# Patient Record
Sex: FEMALE | Race: WHITE | Employment: OTHER | URBAN - METROPOLITAN AREA
[De-identification: names, ages, dates, MRNs, and addresses within clinical notes are randomized per-mention and may not be internally consistent; named-entity substitution may affect disease eponyms.]

---

## 2017-02-03 RX ORDER — POTASSIUM CHLORIDE 20 MEQ/1
TABLET, EXTENDED RELEASE ORAL
Qty: 60 TAB | Refills: 0 | Status: SHIPPED | OUTPATIENT
Start: 2017-02-03 | End: 2017-02-17 | Stop reason: SDUPTHER

## 2017-02-03 RX ORDER — ATENOLOL 50 MG/1
TABLET ORAL
Qty: 30 TAB | Refills: 0 | Status: SHIPPED | OUTPATIENT
Start: 2017-02-03 | End: 2017-02-17 | Stop reason: SDUPTHER

## 2017-02-03 RX ORDER — LEVOTHYROXINE SODIUM 100 UG/1
TABLET ORAL
Qty: 30 TAB | Refills: 0 | Status: SHIPPED | OUTPATIENT
Start: 2017-02-03 | End: 2017-02-17 | Stop reason: SDUPTHER

## 2017-02-17 ENCOUNTER — OFFICE VISIT (OUTPATIENT)
Dept: FAMILY MEDICINE CLINIC | Age: 82
End: 2017-02-17

## 2017-02-17 VITALS
WEIGHT: 140.1 LBS | OXYGEN SATURATION: 97 % | HEART RATE: 83 BPM | TEMPERATURE: 96 F | SYSTOLIC BLOOD PRESSURE: 125 MMHG | RESPIRATION RATE: 18 BRPM | DIASTOLIC BLOOD PRESSURE: 69 MMHG | BODY MASS INDEX: 23.34 KG/M2 | HEIGHT: 65 IN

## 2017-02-17 DIAGNOSIS — I10 ESSENTIAL HYPERTENSION: ICD-10-CM

## 2017-02-17 DIAGNOSIS — E03.9 ACQUIRED HYPOTHYROIDISM: Primary | ICD-10-CM

## 2017-02-17 DIAGNOSIS — J30.89 SEASONAL ALLERGIC RHINITIS DUE TO OTHER ALLERGIC TRIGGER: ICD-10-CM

## 2017-02-17 RX ORDER — LISINOPRIL 20 MG/1
TABLET ORAL
Qty: 30 TAB | Refills: 12 | Status: SHIPPED | OUTPATIENT
Start: 2017-02-17 | End: 2017-05-03

## 2017-02-17 RX ORDER — QUETIAPINE FUMARATE 25 MG/1
TABLET, FILM COATED ORAL
Qty: 30 TAB | Refills: 12 | Status: SHIPPED | OUTPATIENT
Start: 2017-02-17 | End: 2017-05-05 | Stop reason: ALTCHOICE

## 2017-02-17 RX ORDER — LEVOTHYROXINE SODIUM 100 UG/1
TABLET ORAL
Qty: 30 TAB | Refills: 12 | Status: SHIPPED | OUTPATIENT
Start: 2017-02-17 | End: 2017-03-03 | Stop reason: SDUPTHER

## 2017-02-17 RX ORDER — ATENOLOL 50 MG/1
TABLET ORAL
Qty: 30 TAB | Refills: 12 | Status: SHIPPED | OUTPATIENT
Start: 2017-02-17 | End: 2017-03-03 | Stop reason: SDUPTHER

## 2017-02-17 RX ORDER — CHLORPHENIRAMINE MALEATE 4 MG
4 TABLET ORAL
Qty: 90 TAB | Refills: 5 | Status: SHIPPED | OUTPATIENT
Start: 2017-02-17 | End: 2017-05-05 | Stop reason: ALTCHOICE

## 2017-02-17 RX ORDER — POTASSIUM CHLORIDE 20 MEQ/1
TABLET, EXTENDED RELEASE ORAL
Qty: 60 TAB | Refills: 12 | Status: SHIPPED | OUTPATIENT
Start: 2017-02-17 | End: 2017-05-03

## 2017-02-17 NOTE — PROGRESS NOTES
HISTORY OF PRESENT ILLNESS  Anshu Tarango is a 80 y.o. female. HPI Pt. Comes in for blood pressure check. . Needs thyroid rechecked. Has problems with runny nose, sneezing, headaches. Gets a pain in face. Has tried benadryl, zyrtec, flonase- minimal relief. ROS    Physical Exam   Constitutional: She is oriented to person, place, and time. She appears well-developed and well-nourished. Neck: No thyromegaly present. Cardiovascular: Normal rate, regular rhythm and normal heart sounds. No murmur heard. Pulmonary/Chest: Effort normal and breath sounds normal. She has no wheezes. Br- no mass   Abdominal: Soft. Bowel sounds are normal. She exhibits no distension. There is no tenderness. There is no rebound and no guarding. Musculoskeletal: Normal range of motion. She exhibits no edema. Feet- bilateral bunions   Lymphadenopathy:     She has no cervical adenopathy. Neurological: She is alert and oriented to person, place, and time. Nursing note and vitals reviewed. ASSESSMENT and PLAN  Orders Placed This Encounter    TSH 3RD GENERATION    CBC WITH AUTOMATED DIFF    METABOLIC PANEL, COMPREHENSIVE    LIPID PANEL    levothyroxine (SYNTHROID) 100 mcg tablet    atenolol (TENORMIN) 50 mg tablet    lisinopril (PRINIVIL, ZESTRIL) 20 mg tablet    potassium chloride (K-DUR, KLOR-CON) 20 mEq tablet    QUEtiapine (SEROQUEL) 25 mg tablet    chlorpheniramine (CHLOR-TRIMETRON) 4 mg tablet     Elisabeth was seen today for hypertension and thyroid problem.     Diagnoses and all orders for this visit:    Acquired hypothyroidism  -     TSH 3RD GENERATION    Essential hypertension  -     TSH 3RD GENERATION  -     CBC WITH AUTOMATED DIFF  -     METABOLIC PANEL, COMPREHENSIVE  -     LIPID PANEL    Seasonal allergic rhinitis due to other allergic trigger    Other orders  -     levothyroxine (SYNTHROID) 100 mcg tablet; TAKE ONE TABLET DAILY FOR THYROID  -     atenolol (TENORMIN) 50 mg tablet; TAKE 1 TABLET BY MOUTH DAILY FOR THE HEART AND BLOOD PRESSURE. -     lisinopril (PRINIVIL, ZESTRIL) 20 mg tablet; TAKE 1 TAB DAILY FOR BLOOD PRESSURE  -     potassium chloride (K-DUR, KLOR-CON) 20 mEq tablet; TAKE ONE TABLET TWICE A DAY  -     QUEtiapine (SEROQUEL) 25 mg tablet; TAKE 1 TAB EVERY EVENING  -     chlorpheniramine (CHLOR-TRIMETRON) 4 mg tablet; Take 1 Tab by mouth every six (6) hours as needed for Allergies. Follow-up Disposition:  Return in about 6 months (around 8/17/2017).

## 2017-02-17 NOTE — MR AVS SNAPSHOT
Visit Information Date & Time Provider Department Dept. Phone Encounter #  
 2/17/2017  8:45 AM Zita Angelo MD [facility] [phone] [id number] Follow-up Instructions Return in about 6 months (around 8/17/2017). Upcoming Health Maintenance Date Due  
 MEDICARE YEARLY EXAM 8/20/2015 GLAUCOMA SCREENING Q2Y 2/17/2019 DTaP/Tdap/Td series (2 - Td) 8/17/2026 Allergies as of 2/17/2017  Review Complete On: 2/17/2017 By: Zita Angelo MD  
 No Known Allergies Current Immunizations  Reviewed on 8/19/2014 Name Date Influenza Vaccine Split  Deferred (Patient Refused) Pneumococcal Polysaccharide (PPSV-23) 2/15/2013 Pneumococcal Vaccine (Unspecified Type)  Deferred (Patient Refused) Not reviewed this visit You Were Diagnosed With   
  
 Codes Comments Acquired hypothyroidism    -  Primary ICD-10-CM: E03.9 ICD-9-CM: 244.9 Essential hypertension     ICD-10-CM: I10 
ICD-9-CM: 401.9 Seasonal allergic rhinitis due to other allergic trigger     ICD-10-CM: J30.89 Vitals BP Pulse Temp Resp Height(growth percentile) Weight(growth percentile) 125/69 83 96 °F (35.6 °C) 18 5' 5\" (1.651 m) 140 lb 1.6 oz (63.5 kg) SpO2 BMI OB Status Smoking Status 97% 23.31 kg/m2 Postmenopausal Never Smoker Vitals History BMI and BSA Data Body Mass Index Body Surface Area  
 23.31 kg/m 2 1.71 m 2 Preferred Pharmacy Pharmacy Name Phone [address] [phone] Your Updated Medication List  
  
   
This list is accurate as of: 2/17/17  9:41 AM.  Always use your most recent med list.  
  
  
  
  
 atenolol 50 mg tablet Commonly known as:  TENORMIN  
TAKE 1 TABLET BY MOUTH DAILY FOR THE HEART AND BLOOD PRESSURE.  
  
 azelastine 137 mcg (0.1 %) nasal spray Commonly known as:  ASTELIN  
 1 [place] by Both Nostrils route two (2) times a day. Use in each nostril as directed Calcium 600 + D tablet Generic drug:  calcium-cholecalciferol (D3)  
TAKE 1 TABLET TWICE A DAY. (CALCIUM SUPPLEMENT)  
  
 chlorpheniramine 4 mg tablet Commonly known as:  CHLOR-TRIMETRON Take 1 Tab by mouth every six (6) hours as needed for Allergies. fluticasone 50 mcg/actuation nasal spray Commonly known as:  FLONASE  
2 sprays each nostril daily  
  
 levothyroxine 100 mcg tablet Commonly known as:  SYNTHROID  
TAKE ONE TABLET DAILY FOR THYROID  
  
 lisinopril 20 mg tablet Commonly known as:  PRINIVIL, ZESTRIL  
TAKE 1 TAB DAILY FOR BLOOD PRESSURE potassium chloride 20 mEq tablet Commonly known as:  K-DUR, KLOR-CON  
TAKE ONE TABLET TWICE A DAY PRADAXA 150 mg capsule Generic drug:  dabigatran etexilate TAKE 1 CAPSULE BY MOUTH TWICE A DAY. QUEtiapine 25 mg tablet Commonly known as:  SEROquel TAKE 1 TAB EVERY EVENING  
  
 spironolactone 50 mg tablet Commonly known as:  ALDACTONE  
TAKE 1 TABLET BY MOUTH DAILY AS A DIURETIC-FLUID PILL Prescriptions Printed Refills  
 chlorpheniramine (CHLOR-TRIMETRON) 4 mg tablet 5 Sig: Take 1 Tab by mouth every six (6) hours as needed for Allergies. Class: Print Route: Oral  
  
Prescriptions Sent to Pharmacy Refills  
 levothyroxine (SYNTHROID) 100 mcg tablet 12 Sig: TAKE ONE TABLET DAILY FOR THYROID Class: Normal  
 Pharmacy: [facility]any Flores Ph #: [phone]  
 atenolol (TENORMIN) 50 mg tablet 12 Sig: TAKE 1 TABLET BY MOUTH DAILY FOR THE HEART AND BLOOD PRESSURE. Class: Normal  
 Pharmacy: [facility] Ph #: [phone]  
 lisinopril (PRINIVIL, ZESTRIL) 20 mg tablet 12 Sig: TAKE 1 TAB DAILY FOR BLOOD PRESSURE  Class: Normal  
 Pharmacy: [facility], [address] [place] [address] Ph #: [phone]  
 potassium chloride (K-DUR, KLOR-CON) 20 mEq tablet 12 Sig: TAKE ONE TABLET TWICE A DAY Class: Normal  
 Pharmacy: [address] [place] [address] Ph #: [phone] QUEtiapine (SEROQUEL) 25 mg tablet 12 Sig: TAKE 1 TAB EVERY EVENING Class: Normal  
 Pharmacy: [address] Ph #: [phone] We Performed the Following CBC WITH AUTOMATED DIFF [63234 CPT(R)] LIPID PANEL [40789 CPT(R)] METABOLIC PANEL, COMPREHENSIVE [49201 CPT(R)] TSH 3RD GENERATION [19054 CPT(R)] Follow-up Instructions Return in about 6 months (around 8/17/2017). Introducing [facility]! Maria Isabel Ortiz introduces [employer] patient portal. Now you can access parts of your medical record, email your doctor's office, and request medication refills online. 1. In your internet browser, go to [email or web address] [email or web address] 2. Click on the First Time User? Click Here link in the Sign In box. You will see the New Member Sign Up page. 3. Enter your [employer] Access Code exactly as it appears below. You will not need to use this code after youve completed the sign-up process. If you do not sign up before the expiration date, you must request a new code. · [employer] Access Code: [email or web address] Expires: 5/18/2017  9:41 AM 
 
4. Enter the last four digits of your Social Security Number (xxxx) and Date of Birth (mm/dd/yyyy) as indicated and click Submit. You will be taken to the next sign-up page. 5. Create a [employer] ID. This will be your [employer] login ID and cannot be changed, so think of one that is secure and easy to remember. 6. Create a [employer] password. You can change your password at any time. 7. Enter your Password Reset Question and Answer.  This can be used at a later time if you forget your password. 8. Enter your e-mail address. You will receive e-mail notification when new information is available in [address] 9. Click Sign Up. You can now view and download portions of your medical record. 10. Click the Download Summary menu link to download a portable copy of your medical information. If you have questions, please visit the Frequently Asked Questions section of the [employer] website. Remember, [employer] is NOT to be used for urgent needs. For medical emergencies, dial 911. Now available from your iPhone and Android! Please provide this summary of care documentation to your next provider. Your primary care clinician is listed as Adilene Mauricio. If you have any questions after today's visit, please call [phone].

## 2017-02-17 NOTE — PROGRESS NOTES
Chief Complaint   Patient presents with    Hypertension    Thyroid Problem     Pt here for 6 month follow up. Discussed health maintenance with pt. Pt declined flu vaccine . Pt also declined eye exam. Stated she can see just fine. Pt stated she still has sinus issues. States nothing really helps the pressure.

## 2017-02-18 LAB
ALBUMIN SERPL-MCNC: 4.3 G/DL (ref 3.5–4.7)
ALBUMIN/GLOB SERPL: 1.2 {RATIO} (ref 1.1–2.5)
ALP SERPL-CCNC: 77 IU/L (ref 39–117)
ALT SERPL-CCNC: 11 IU/L (ref 0–32)
AST SERPL-CCNC: 16 IU/L (ref 0–40)
BASOPHILS # BLD AUTO: 0 X10E3/UL (ref 0–0.2)
BASOPHILS NFR BLD AUTO: 0 %
BILIRUB SERPL-MCNC: 0.3 MG/DL (ref 0–1.2)
BUN SERPL-MCNC: 25 MG/DL (ref 8–27)
BUN/CREAT SERPL: 20 (ref 11–26)
CALCIUM SERPL-MCNC: 8.7 MG/DL (ref 8.7–10.3)
CHLORIDE SERPL-SCNC: 107 MMOL/L (ref 96–106)
CHOLEST SERPL-MCNC: 94 MG/DL (ref 100–199)
CO2 SERPL-SCNC: 16 MMOL/L (ref 18–29)
CREAT SERPL-MCNC: 1.27 MG/DL (ref 0.57–1)
EOSINOPHIL # BLD AUTO: 0.2 X10E3/UL (ref 0–0.4)
EOSINOPHIL NFR BLD AUTO: 4 %
ERYTHROCYTE [DISTWIDTH] IN BLOOD BY AUTOMATED COUNT: 14.4 % (ref 12.3–15.4)
GLOBULIN SER CALC-MCNC: 3.7 G/DL (ref 1.5–4.5)
GLUCOSE SERPL-MCNC: 107 MG/DL (ref 65–99)
HCT VFR BLD AUTO: 42.6 % (ref 34–46.6)
HDLC SERPL-MCNC: 30 MG/DL
HGB BLD-MCNC: 14.4 G/DL (ref 11.1–15.9)
IMM GRANULOCYTES # BLD: 0 X10E3/UL (ref 0–0.1)
IMM GRANULOCYTES NFR BLD: 0 %
INTERPRETATION, 910389: NORMAL
INTERPRETATION: NORMAL
LDLC SERPL CALC-MCNC: 46 MG/DL (ref 0–99)
LYMPHOCYTES # BLD AUTO: 1.7 X10E3/UL (ref 0.7–3.1)
LYMPHOCYTES NFR BLD AUTO: 26 %
MCH RBC QN AUTO: 33.1 PG (ref 26.6–33)
MCHC RBC AUTO-ENTMCNC: 33.8 G/DL (ref 31.5–35.7)
MCV RBC AUTO: 98 FL (ref 79–97)
MONOCYTES # BLD AUTO: 0.5 X10E3/UL (ref 0.1–0.9)
MONOCYTES NFR BLD AUTO: 8 %
NEUTROPHILS # BLD AUTO: 4 X10E3/UL (ref 1.4–7)
NEUTROPHILS NFR BLD AUTO: 62 %
PDF IMAGE, 910387: NORMAL
PLATELET # BLD AUTO: 264 X10E3/UL (ref 150–379)
POTASSIUM SERPL-SCNC: 5.2 MMOL/L (ref 3.5–5.2)
PROT SERPL-MCNC: 8 G/DL (ref 6–8.5)
RBC # BLD AUTO: 4.35 X10E6/UL (ref 3.77–5.28)
SODIUM SERPL-SCNC: 137 MMOL/L (ref 134–144)
TRIGL SERPL-MCNC: 90 MG/DL (ref 0–149)
TSH SERPL DL<=0.005 MIU/L-ACNC: 1.97 UIU/ML (ref 0.45–4.5)
VLDLC SERPL CALC-MCNC: 18 MG/DL (ref 5–40)
WBC # BLD AUTO: 6.5 X10E3/UL (ref 3.4–10.8)

## 2017-04-03 RX ORDER — FLUTICASONE PROPIONATE 50 MCG
SPRAY, SUSPENSION (ML) NASAL
Qty: 16 G | Status: SHIPPED | OUTPATIENT
Start: 2017-04-03 | End: 2018-06-26 | Stop reason: SDUPTHER

## 2017-04-27 ENCOUNTER — HOSPITAL ENCOUNTER (INPATIENT)
Age: 82
LOS: 6 days | Discharge: HOME HEALTH | DRG: 683 | End: 2017-05-03
Attending: EMERGENCY MEDICINE | Admitting: INTERNAL MEDICINE
Payer: MEDICARE

## 2017-04-27 ENCOUNTER — APPOINTMENT (OUTPATIENT)
Dept: GENERAL RADIOLOGY | Age: 82
DRG: 683 | End: 2017-04-27
Attending: EMERGENCY MEDICINE
Payer: MEDICARE

## 2017-04-27 ENCOUNTER — OFFICE VISIT (OUTPATIENT)
Dept: FAMILY MEDICINE CLINIC | Age: 82
End: 2017-04-27

## 2017-04-27 VITALS
WEIGHT: 135 LBS | BODY MASS INDEX: 22.49 KG/M2 | DIASTOLIC BLOOD PRESSURE: 47 MMHG | OXYGEN SATURATION: 88 % | HEART RATE: 96 BPM | RESPIRATION RATE: 14 BRPM | SYSTOLIC BLOOD PRESSURE: 70 MMHG | TEMPERATURE: 95.6 F | HEIGHT: 65 IN

## 2017-04-27 DIAGNOSIS — N28.9 RENAL INSUFFICIENCY: ICD-10-CM

## 2017-04-27 DIAGNOSIS — E86.0 DEHYDRATION: ICD-10-CM

## 2017-04-27 DIAGNOSIS — I48.0 PAROXYSMAL ATRIAL FIBRILLATION (HCC): Primary | ICD-10-CM

## 2017-04-27 DIAGNOSIS — I95.9 HYPOTENSIVE EPISODE: ICD-10-CM

## 2017-04-27 DIAGNOSIS — E87.5 ACUTE HYPERKALEMIA: Primary | ICD-10-CM

## 2017-04-27 DIAGNOSIS — E87.20 METABOLIC ACIDOSIS, NORMAL ANION GAP (NAG): ICD-10-CM

## 2017-04-27 LAB
ALBUMIN SERPL BCP-MCNC: 2.9 G/DL (ref 3.5–5)
ALBUMIN/GLOB SERPL: 0.7 {RATIO} (ref 1.1–2.2)
ALP SERPL-CCNC: 60 U/L (ref 45–117)
ALT SERPL-CCNC: 17 U/L (ref 12–78)
ANION GAP BLD CALC-SCNC: 11 MMOL/L (ref 5–15)
ANION GAP BLD CALC-SCNC: 8 MMOL/L (ref 5–15)
APPEARANCE UR: CLEAR
AST SERPL W P-5'-P-CCNC: 12 U/L (ref 15–37)
ATRIAL RATE: 71 BPM
BACTERIA URNS QL MICRO: NEGATIVE /HPF
BASOPHILS # BLD AUTO: 0 K/UL (ref 0–0.1)
BASOPHILS # BLD: 1 % (ref 0–1)
BILIRUB SERPL-MCNC: 0.3 MG/DL (ref 0.2–1)
BILIRUB UR QL: NEGATIVE
BUN SERPL-MCNC: 63 MG/DL (ref 6–20)
BUN SERPL-MCNC: 65 MG/DL (ref 6–20)
BUN/CREAT SERPL: 30 (ref 12–20)
BUN/CREAT SERPL: 31 (ref 12–20)
CALCIUM SERPL-MCNC: 7.8 MG/DL (ref 8.5–10.1)
CALCIUM SERPL-MCNC: 8.5 MG/DL (ref 8.5–10.1)
CALCULATED R AXIS, ECG10: -42 DEGREES
CALCULATED T AXIS, ECG11: 23 DEGREES
CHLORIDE SERPL-SCNC: 126 MMOL/L (ref 97–108)
CHLORIDE SERPL-SCNC: 128 MMOL/L (ref 97–108)
CK MB CFR SERPL CALC: 4.3 % (ref 0–2.5)
CK MB SERPL-MCNC: 1 NG/ML (ref 5–25)
CK SERPL-CCNC: 23 U/L (ref 26–192)
CO2 SERPL-SCNC: 13 MMOL/L (ref 21–32)
CO2 SERPL-SCNC: 9 MMOL/L (ref 21–32)
COLOR UR: ABNORMAL
CREAT SERPL-MCNC: 2.03 MG/DL (ref 0.55–1.02)
CREAT SERPL-MCNC: 2.16 MG/DL (ref 0.55–1.02)
DIAGNOSIS, 93000: NORMAL
EOSINOPHIL # BLD: 0.3 K/UL (ref 0–0.4)
EOSINOPHIL NFR BLD: 5 % (ref 0–7)
EPITH CASTS URNS QL MICRO: ABNORMAL /LPF
ERYTHROCYTE [DISTWIDTH] IN BLOOD BY AUTOMATED COUNT: 17 % (ref 11.5–14.5)
GLOBULIN SER CALC-MCNC: 4.1 G/DL (ref 2–4)
GLUCOSE SERPL-MCNC: 59 MG/DL (ref 65–100)
GLUCOSE SERPL-MCNC: 87 MG/DL (ref 65–100)
GLUCOSE UR STRIP.AUTO-MCNC: NEGATIVE MG/DL
HCT VFR BLD AUTO: 34.9 % (ref 35–47)
HGB BLD-MCNC: 11.1 G/DL (ref 11.5–16)
HGB UR QL STRIP: NEGATIVE
KETONES UR QL STRIP.AUTO: NEGATIVE MG/DL
LEUKOCYTE ESTERASE UR QL STRIP.AUTO: ABNORMAL
LYMPHOCYTES # BLD AUTO: 26 % (ref 12–49)
LYMPHOCYTES # BLD: 1.8 K/UL (ref 0.8–3.5)
MAGNESIUM SERPL-MCNC: 1.8 MG/DL (ref 1.6–2.4)
MCH RBC QN AUTO: 28.8 PG (ref 26–34)
MCHC RBC AUTO-ENTMCNC: 31.8 G/DL (ref 30–36.5)
MCV RBC AUTO: 90.6 FL (ref 80–99)
MONOCYTES # BLD: 0.8 K/UL (ref 0–1)
MONOCYTES NFR BLD AUTO: 11 % (ref 5–13)
NEUTS SEG # BLD: 4.1 K/UL (ref 1.8–8)
NEUTS SEG NFR BLD AUTO: 57 % (ref 32–75)
NITRITE UR QL STRIP.AUTO: NEGATIVE
PH UR STRIP: 5.5 [PH] (ref 5–8)
PLATELET # BLD AUTO: 140 K/UL (ref 150–400)
POTASSIUM SERPL-SCNC: 5.4 MMOL/L (ref 3.5–5.1)
POTASSIUM SERPL-SCNC: 8.1 MMOL/L (ref 3.5–5.1)
PROT SERPL-MCNC: 7 G/DL (ref 6.4–8.2)
PROT UR STRIP-MCNC: ABNORMAL MG/DL
Q-T INTERVAL, ECG07: 372 MS
QRS DURATION, ECG06: 108 MS
QTC CALCULATION (BEZET), ECG08: 393 MS
RBC # BLD AUTO: 3.85 M/UL (ref 3.8–5.2)
RBC #/AREA URNS HPF: ABNORMAL /HPF (ref 0–5)
SODIUM SERPL-SCNC: 143 MMOL/L (ref 136–145)
SODIUM SERPL-SCNC: 152 MMOL/L (ref 136–145)
SP GR UR REFRACTOMETRY: 1.01 (ref 1–1.03)
TROPONIN I SERPL-MCNC: <0.04 NG/ML
UROBILINOGEN UR QL STRIP.AUTO: 0.2 EU/DL (ref 0.2–1)
VENTRICULAR RATE, ECG03: 67 BPM
WBC # BLD AUTO: 7.1 K/UL (ref 3.6–11)
WBC URNS QL MICRO: ABNORMAL /HPF (ref 0–4)

## 2017-04-27 PROCEDURE — 74011250636 HC RX REV CODE- 250/636: Performed by: EMERGENCY MEDICINE

## 2017-04-27 PROCEDURE — 36415 COLL VENOUS BLD VENIPUNCTURE: CPT | Performed by: EMERGENCY MEDICINE

## 2017-04-27 PROCEDURE — 80053 COMPREHEN METABOLIC PANEL: CPT | Performed by: EMERGENCY MEDICINE

## 2017-04-27 PROCEDURE — 80048 BASIC METABOLIC PNL TOTAL CA: CPT | Performed by: FAMILY MEDICINE

## 2017-04-27 PROCEDURE — 82550 ASSAY OF CK (CPK): CPT | Performed by: EMERGENCY MEDICINE

## 2017-04-27 PROCEDURE — 99285 EMERGENCY DEPT VISIT HI MDM: CPT

## 2017-04-27 PROCEDURE — 96375 TX/PRO/DX INJ NEW DRUG ADDON: CPT

## 2017-04-27 PROCEDURE — 96361 HYDRATE IV INFUSION ADD-ON: CPT

## 2017-04-27 PROCEDURE — 74011000250 HC RX REV CODE- 250: Performed by: INTERNAL MEDICINE

## 2017-04-27 PROCEDURE — 71010 XR CHEST PORT: CPT

## 2017-04-27 PROCEDURE — 81001 URINALYSIS AUTO W/SCOPE: CPT | Performed by: EMERGENCY MEDICINE

## 2017-04-27 PROCEDURE — 93005 ELECTROCARDIOGRAM TRACING: CPT

## 2017-04-27 PROCEDURE — 74011000250 HC RX REV CODE- 250: Performed by: EMERGENCY MEDICINE

## 2017-04-27 PROCEDURE — 65660000000 HC RM CCU STEPDOWN

## 2017-04-27 PROCEDURE — 74011250637 HC RX REV CODE- 250/637: Performed by: INTERNAL MEDICINE

## 2017-04-27 PROCEDURE — 85025 COMPLETE CBC W/AUTO DIFF WBC: CPT | Performed by: EMERGENCY MEDICINE

## 2017-04-27 PROCEDURE — 96365 THER/PROPH/DIAG IV INF INIT: CPT

## 2017-04-27 PROCEDURE — 84484 ASSAY OF TROPONIN QUANT: CPT | Performed by: EMERGENCY MEDICINE

## 2017-04-27 PROCEDURE — 83735 ASSAY OF MAGNESIUM: CPT | Performed by: EMERGENCY MEDICINE

## 2017-04-27 PROCEDURE — 74011636637 HC RX REV CODE- 636/637: Performed by: EMERGENCY MEDICINE

## 2017-04-27 PROCEDURE — 74011000258 HC RX REV CODE- 258: Performed by: EMERGENCY MEDICINE

## 2017-04-27 RX ORDER — ACETAMINOPHEN 325 MG/1
650 TABLET ORAL
Status: DISCONTINUED | OUTPATIENT
Start: 2017-04-27 | End: 2017-05-03 | Stop reason: HOSPADM

## 2017-04-27 RX ORDER — SODIUM CHLORIDE 0.9 % (FLUSH) 0.9 %
5-10 SYRINGE (ML) INJECTION AS NEEDED
Status: DISCONTINUED | OUTPATIENT
Start: 2017-04-27 | End: 2017-05-03 | Stop reason: HOSPADM

## 2017-04-27 RX ORDER — ATENOLOL 25 MG/1
25 TABLET ORAL DAILY
Status: DISCONTINUED | OUTPATIENT
Start: 2017-04-28 | End: 2017-05-03 | Stop reason: HOSPADM

## 2017-04-27 RX ORDER — SODIUM BICARBONATE 1 MEQ/ML
100 SYRINGE (ML) INTRAVENOUS
Status: COMPLETED | OUTPATIENT
Start: 2017-04-27 | End: 2017-04-27

## 2017-04-27 RX ORDER — CALCIUM GLUCONATE 94 MG/ML
1 INJECTION, SOLUTION INTRAVENOUS
Status: DISCONTINUED | OUTPATIENT
Start: 2017-04-27 | End: 2017-04-27 | Stop reason: SDUPTHER

## 2017-04-27 RX ORDER — SODIUM POLYSTYRENE SULFONATE 15 G/60ML
45 SUSPENSION ORAL; RECTAL
Status: COMPLETED | OUTPATIENT
Start: 2017-04-27 | End: 2017-04-27

## 2017-04-27 RX ORDER — DABIGATRAN ETEXILATE 75 MG/1
75 CAPSULE ORAL 2 TIMES DAILY
Status: DISCONTINUED | OUTPATIENT
Start: 2017-04-27 | End: 2017-04-30

## 2017-04-27 RX ORDER — LEVOTHYROXINE SODIUM 100 UG/1
100 TABLET ORAL
Status: DISCONTINUED | OUTPATIENT
Start: 2017-04-28 | End: 2017-05-03 | Stop reason: HOSPADM

## 2017-04-27 RX ORDER — DEXTROSE 50 % IN WATER (D50W) INTRAVENOUS SYRINGE
50
Status: COMPLETED | OUTPATIENT
Start: 2017-04-27 | End: 2017-04-27

## 2017-04-27 RX ORDER — SODIUM CHLORIDE 0.9 % (FLUSH) 0.9 %
5-10 SYRINGE (ML) INJECTION EVERY 8 HOURS
Status: DISCONTINUED | OUTPATIENT
Start: 2017-04-27 | End: 2017-05-03 | Stop reason: HOSPADM

## 2017-04-27 RX ORDER — SODIUM CHLORIDE 450 MG/100ML
50 INJECTION, SOLUTION INTRAVENOUS CONTINUOUS
Status: DISCONTINUED | OUTPATIENT
Start: 2017-04-28 | End: 2017-05-03 | Stop reason: HOSPADM

## 2017-04-27 RX ADMIN — DEXTROSE MONOHYDRATE 25 G: 25 INJECTION, SOLUTION INTRAVENOUS at 15:24

## 2017-04-27 RX ADMIN — INSULIN HUMAN 10 UNITS: 100 INJECTION, SOLUTION PARENTERAL at 15:23

## 2017-04-27 RX ADMIN — SODIUM CHLORIDE 500 ML: 900 INJECTION, SOLUTION INTRAVENOUS at 15:24

## 2017-04-27 RX ADMIN — Medication 10 ML: at 21:56

## 2017-04-27 RX ADMIN — Medication 10 ML: at 15:24

## 2017-04-27 RX ADMIN — CALCIUM GLUCONATE 1 G: 94 INJECTION, SOLUTION INTRAVENOUS at 15:51

## 2017-04-27 RX ADMIN — SODIUM BICARBONATE 100 MEQ: 84 INJECTION, SOLUTION INTRAVENOUS at 15:25

## 2017-04-27 RX ADMIN — DABIGATRAN ETEXILATE MESYLATE 75 MG: 75 CAPSULE ORAL at 22:30

## 2017-04-27 RX ADMIN — SODIUM CHLORIDE 1000 ML: 900 INJECTION, SOLUTION INTRAVENOUS at 13:22

## 2017-04-27 RX ADMIN — SODIUM POLYSTYRENE SULFONATE 45 G: 15 SUSPENSION ORAL; RECTAL at 15:59

## 2017-04-27 RX ADMIN — WATER: 1000 INJECTION, SOLUTION INTRAVENOUS at 19:13

## 2017-04-27 NOTE — PROGRESS NOTES
Chief Complaint   Patient presents with    Extremity Weakness     bilateral legs    Allergies     cough, sneezing, nose runny, ears feel clogged uo   Says she is not taking any allergy meds.

## 2017-04-27 NOTE — ED PROVIDER NOTES
HPI Comments: Keila Manzanares is a 80 y.o. female with hx significant for HTN and a-fib who presents ambulatory to ED [facility] ED sent here by PCP on call (Dr. Nathalie Fernandez) for irregular heart rate and hypotensive episode today. Pt reports she was seeing the PCP on call for BL leg weakness and sensation of them \"feeling like rubber\" which has been ongoing for several weeks. Pt was then sent here for irregular HR in the office. However, pt does not feel any palpitations. Per daughter, pt appears more SOB to her. Pt notes h/o electrical cardioversion in 2011 and has since been on Pradaxa. Pt denies any CP, nausea, vomiting, diarrhea, or appetite changes. PCP: Brittanie Cano MD  Cardiologist: Kristy Anderson MD    Social Hx: -tobacco, -EtOH, -illicit drug usage    There are no other complaints, changes or physical findings at this time. The history is provided by a relative and the patient (daughter). Past Medical History:   Diagnosis Date    Arrhythmia     atrial fibrillation, chronic. Stress test 2011 essent. normal.  Mild-mod AR on ECHO    Hypertension     Thyroid disease     hypothyroid       Past Surgical History:   Procedure Laterality Date    ECHO TRANSESOPHAGEAL (DENISE) W OR WO CONTRAST  4/27/2011         HX GYN      vaginal deliveries x10    HX TUBAL LIGATION      ME CARDIOVERSION ELECTIVE ARRHYTHMIA EXTERNAL  6/14/2011              Family History:   Problem Relation Age of Onset    Heart Disease Father     Cancer Son     Heart Disease Son        Social History     Social History    Marital status:      Spouse name: N/A    Number of children: N/A    Years of education: N/A     Occupational History    Not on file.      Social History Main Topics    Smoking status: Never Smoker    Smokeless tobacco: Never Used    Alcohol use No    Drug use: No    Sexual activity: No     Other Topics Concern    Not on file     Social History Narrative         ALLERGIES: Review of patient's allergies indicates no known allergies. Review of Systems   Constitutional: Negative for appetite change, chills, fatigue and fever. HENT: Negative. Negative for congestion, rhinorrhea, sinus pressure and sore throat. Eyes: Negative. Respiratory: Positive for shortness of breath. Negative for cough, choking, chest tightness and wheezing. Cardiovascular: Negative for chest pain, palpitations and leg swelling.        +irregular HR   Gastrointestinal: Negative for abdominal pain, constipation, diarrhea, nausea and vomiting. Endocrine: Negative. Genitourinary: Negative. Negative for difficulty urinating, dysuria, flank pain and urgency. Musculoskeletal: Negative. Skin: Negative. Neurological: Positive for weakness (BL legs). Negative for dizziness, speech difficulty, light-headedness, numbness and headaches. Psychiatric/Behavioral: Negative. All other systems reviewed and are negative. Patient Vitals for the past 12 hrs:   Temp Pulse Resp BP SpO2   04/27/17 1630 - 76 22 102/58 94 %   04/27/17 1530 - 71 19 99/66 100 %   04/27/17 1400 - 67 19 92/50 99 %   04/27/17 1309 - 68 21 (!) 80/57 97 %   04/27/17 1308 - 72 21 (!) 77/40 97 %   04/27/17 1300 - 72 19 (!) 86/49 (!) 89 %   04/27/17 1241 - 74 22 98/60 98 %   04/27/17 1219 97.3 °F (36.3 °C) 68 16 91/44 100 %              Physical Exam   Constitutional: She is oriented to person, place, and time. She appears well-developed and well-nourished. No distress. lderly female, no acute distress   HENT:   Head: Normocephalic and atraumatic. Mouth/Throat: No oropharyngeal exudate. MM- dry   Eyes: Conjunctivae and EOM are normal. Pupils are equal, round, and reactive to light. Neck: Normal range of motion. Neck supple. No JVD present. No tracheal deviation present. Cardiovascular: Normal rate, normal heart sounds and intact distal pulses. No murmur heard. Irreg   Pulmonary/Chest: Effort normal and breath sounds normal. No stridor. No respiratory distress. She has no wheezes. She has no rales. She exhibits no tenderness. Abdominal: Soft. She exhibits no distension. There is no tenderness. There is no rebound and no guarding. Musculoskeletal: Normal range of motion. She exhibits no edema or tenderness. Neurological: She is alert and oriented to person, place, and time. No cranial nerve deficit. No gross motor or sensory deficits    Skin: Skin is dry. She is not diaphoretic.   cool   Psychiatric: She has a normal mood and affect. Her behavior is normal.   Nursing note and vitals reviewed. MDM  Number of Diagnoses or Management Options  Acute hyperkalemia:   Dehydration:   Renal insufficiency:   Diagnosis management comments: DDx: a-fib, electrolyte abnormality, dehydration, UTI       Amount and/or Complexity of Data Reviewed  Clinical lab tests: ordered and reviewed  Tests in the radiology section of CPT®: ordered and reviewed  Tests in the medicine section of CPT®: reviewed and ordered  Obtain history from someone other than the patient: yes (Daughter)  Review and summarize past medical records: yes  Discuss the patient with other providers: yes (Hospitalist)  Independent visualization of images, tracings, or specimens: yes    Critical Care  Total time providing critical care: 30-74 minutes    Patient Progress  Patient progress: stable    ED Course       Procedures     EKG- Afib, rate 67, left axis, incomplete RBBB, NSST. Mary Jo Ruelas DO      Pt has been on potassium replacement daily for some time, pt is not on a loop diuretic, but is taking Spironolactone. PT K of 8, and non-anion gap acidosis, pt to be given     ATTESTATION:  This above portions of this note is prepared by Julio C Cedeno, acting as Scribe for Rodrigo Olguin, [address] [place] [address], DO: The scribe's documentation has been prepared under my direction and personally reviewed by me in its entirety.  I confirm that the note above accurately reflects all work, treatment, procedures, and medical decision making performed by me. CRITICAL CARE NOTE :  3:14 PM  IMPENDING DETERIORATION -Metabolic and Renal    ASSOCIATED RISK FACTORS - Metabolic changes    MANAGEMENT- Bedside Assessment and Supervision of Care    INTERPRETATION -  Xrays, ECG and labs    INTERVENTIONS - Metobolic interventions    CASE REVIEW - Hospitalist, Nursing and Family    TREATMENT RESPONSE -Stable    PERFORMED BY - Self    NOTES   :  I have spent 40 minutes of critical care time involved in lab review, consultations with specialist, family decision- making, bedside attention and documentation. During this entire length of time I was immediately available to the patient . Rafael James DO    CONSULT NOTE:   3:52 PM  Rafael James DO spoke with Dr Maylin Hobbs,   Specialty: Hospitalist  Discussed pt's hx, disposition, and available diagnostic and imaging results. Reviewed care plans. Consultant will evaluate pt for admission.   Written by Antonio Zepeda, ED Scribe, as dictated by Rafael James DO.    LABORATORY TESTS:  Recent Results (from the past 12 hour(s))   EKG, 12 LEAD, INITIAL    Collection Time: 04/27/17 12:11 PM   Result Value Ref Range    Ventricular Rate 67 BPM    Atrial Rate 71 BPM    QRS Duration 108 ms    Q-T Interval 372 ms    QTC Calculation (Bezet) 393 ms    Calculated R Axis -42 degrees    Calculated T Axis 23 degrees    Diagnosis       Atrial fibrillation with a competing junctional pacemaker  Left axis deviation  Low voltage QRS  Incomplete left bundle branch block  When compared with ECG of 14-JUN-2011 11:05,  Atrial fibrillation has replaced Sinus rhythm  QT has shortened     CBC WITH AUTOMATED DIFF    Collection Time: 04/27/17  1:20 PM   Result Value Ref Range    WBC 7.1 3.6 - 11.0 K/uL    RBC 3.85 3.80 - 5.20 M/uL    HGB 11.1 (L) 11.5 - 16.0 g/dL    HCT 34.9 (L) 35.0 - 47.0 %    MCV 90.6 80.0 - 99.0 FL    MCH 28.8 26.0 - 34.0 PG    MCHC 31.8 30.0 - 36.5 g/dL    RDW 17.0 (H) 11.5 - 14.5 %    PLATELET 334 (L) 566 - 400 K/uL    NEUTROPHILS 57 32 - 75 %    LYMPHOCYTES 26 12 - 49 %    MONOCYTES 11 5 - 13 %    EOSINOPHILS 5 0 - 7 %    BASOPHILS 1 0 - 1 %    ABS. NEUTROPHILS 4.1 1.8 - 8.0 K/UL    ABS. LYMPHOCYTES 1.8 0.8 - 3.5 K/UL    ABS. MONOCYTES 0.8 0.0 - 1.0 K/UL    ABS. EOSINOPHILS 0.3 0.0 - 0.4 K/UL    ABS. BASOPHILS 0.0 0.0 - 0.1 K/UL   METABOLIC PANEL, COMPREHENSIVE    Collection Time: 04/27/17  2:21 PM   Result Value Ref Range    Sodium 143 136 - 145 mmol/L    Potassium 8.1 (HH) 3.5 - 5.1 mmol/L    Chloride 126 (H) 97 - 108 mmol/L    CO2 9 (LL) 21 - 32 mmol/L    Anion gap 8 5 - 15 mmol/L    Glucose 87 65 - 100 mg/dL    BUN 65 (H) 6 - 20 MG/DL    Creatinine 2.16 (H) 0.55 - 1.02 MG/DL    BUN/Creatinine ratio 30 (H) 12 - 20      GFR est AA 26 (L) >60 ml/min/1.73m2    GFR est non-AA 22 (L) >60 ml/min/1.73m2    Calcium 7.8 (L) 8.5 - 10.1 MG/DL    Bilirubin, total 0.3 0.2 - 1.0 MG/DL    ALT (SGPT) 17 12 - 78 U/L    AST (SGOT) 12 (L) 15 - 37 U/L    Alk.  phosphatase 60 45 - 117 U/L    Protein, total 7.0 6.4 - 8.2 g/dL    Albumin 2.9 (L) 3.5 - 5.0 g/dL    Globulin 4.1 (H) 2.0 - 4.0 g/dL    A-G Ratio 0.7 (L) 1.1 - 2.2     CK W/ CKMB & INDEX    Collection Time: 04/27/17  2:21 PM   Result Value Ref Range    CK 23 (L) 26 - 192 U/L    CK - MB 1.0 <3.6 NG/ML    CK-MB Index 4.3 (H) 0 - 2.5     MAGNESIUM    Collection Time: 04/27/17  2:21 PM   Result Value Ref Range    Magnesium 1.8 1.6 - 2.4 mg/dL   TROPONIN I    Collection Time: 04/27/17  2:21 PM   Result Value Ref Range    Troponin-I, Qt. <0.04 <0.05 ng/mL   URINALYSIS W/MICROSCOPIC    Collection Time: 04/27/17  4:22 PM   Result Value Ref Range    Color YELLOW/STRAW      Appearance CLEAR CLEAR      Specific gravity 1.011 1.003 - 1.030      pH (UA) 5.5 5.0 - 8.0      Protein TRACE (A) NEG mg/dL    Glucose NEGATIVE  NEG mg/dL    Ketone NEGATIVE  NEG mg/dL    Bilirubin NEGATIVE  NEG      Blood NEGATIVE  NEG      Urobilinogen 0.2 0.2 - 1.0 EU/dL    Nitrites NEGATIVE  NEG      Leukocyte Esterase SMALL (A) NEG      WBC 5-10 0 - 4 /hpf    RBC 0-5 0 - 5 /hpf    Epithelial cells FEW FEW /lpf    Bacteria NEGATIVE  NEG /hpf       IMAGING RESULTS:  CXR Results  (Last 48 hours)               04/27/17 1654  XR CHEST PORT Final result    Impression:  IMPRESSION:    Clear lungs. Narrative:  PORTABLE CHEST RADIOGRAPH/S: 4/27/2017 2:44 PM       INDICATION: Chest pain. HISTORY (per electronic medical record): Atrial fibrillation. Hypertension. COMPARISON: 3/14/2011. TECHNIQUE: Portable frontal upright radiograph/s of the chest.       FINDINGS:    Incidental note is made of small calcified granulomas. The lungs are otherwise   clear. The central airways are patent. No pneumothorax or pleural effusion. MEDICATIONS GIVEN:  Medications   sodium chloride (NS) flush 5-10 mL (10 mL IntraVENous Given 4/27/17 1524)   sodium chloride (NS) flush 5-10 mL (not administered)   sodium chloride 0.9 % bolus infusion 1,000 mL (0 mL IntraVENous IV Completed 4/27/17 1422)   sodium chloride 0.9 % bolus infusion 500 mL (0 mL IntraVENous IV Completed 4/27/17 1624)   sodium bicarbonate 8.4 % (1 mEq/mL) injection 100 mEq (100 mEq IntraVENous Given 4/27/17 1525)   insulin regular (NOVOLIN R, HUMULIN R) injection 10 Units (10 Units IntraVENous Given 4/27/17 1523)   dextrose (D50W) injection syrg 25 g (25 g IntraVENous Given 4/27/17 1524)   calcium gluconate 1 g in 0.9% sodium chloride 100 mL IVPB (0 g IntraVENous IV Completed 4/27/17 1651)   sodium polystyrene (KAYEXALATE) 15 gram/60 mL oral suspension 45 g (45 g Oral Given 4/27/17 1559)       IMPRESSION:  1. Acute hyperkalemia    2. Renal insufficiency    3. Dehydration    4. Metabolic acidosis, normal anion gap (NAG)        PLAN:  1. Admit    3:55 PM  Patient is being admitted to the hospital by Dr. Juan Manuel aNir.  The results of their tests and reasons for their admission have been discussed with them and/or available family. They convey agreement and understanding for the need to be admitted and for their admission diagnosis. Consultation has been made with the inpatient physician specialist for hospitalization. This note is prepared by Hema Qiu, acting as Scribe for Anitra Baker, [address] [place] [address]: The scribe's documentation has been prepared under my direction and personally reviewed by me in its entirety. I confirm that the note above accurately reflects all work, treatment, procedures, and medical decision making performed by me.

## 2017-04-27 NOTE — ED NOTES
Bedside and Verbal shift change report given to TINO Hollingsworth (oncoming nurse) by [facility] [employer], RN (offgoing nurse). Report included the following information ED Summary.

## 2017-04-27 NOTE — PROGRESS NOTES
Alona Sullivan is a 80 y.o. female, pt of Dr. Sherine Hidalgo, here with her daughter. She feels like her legs are weak, like \"rubber\". She's a bit def, unable to pinpoint changes. Hypotensive: here her BP have been hypotensive. She is on atenolol, lisinopril and spironolactone. She have a hx of Afib that was successfully syncronized in 2011. However she is on chronic blood thinner Pradaxa. Last visit with Dr. Sherine Hidalgo 02/2017 her BP was WNL. EKG in office showed A-fib rate controlled. Last EKG scanned in chart in 2013 is afib with rate controlled. Given her age, new hypotensive and weakness, will send her to ED for further evaluation. Reviewed: active problem list, medication list, allergies, notes from last encounter, other EKG    Pertinent items are noted in HPI. No Known Allergies  Current Outpatient Prescriptions on File Prior to Visit   Medication Sig Dispense Refill    fluticasone (FLONASE) 50 mcg/actuation nasal spray USE 1-2 SPRAYS IN EACH NOSTRIL ONCE DAILY. 16 g PRN    levothyroxine (SYNTHROID) 100 mcg tablet TAKE ONE TABLET DAILY FOR THYROID 30 Tab 12    atenolol (TENORMIN) 50 mg tablet TAKE 1 TABLET BY MOUTH DAILY FOR THE HEART AND BLOOD PRESSURE. 30 Tab 12    lisinopril (PRINIVIL, ZESTRIL) 20 mg tablet TAKE 1 TAB DAILY FOR BLOOD PRESSURE 30 Tab 12    potassium chloride (K-DUR, KLOR-CON) 20 mEq tablet TAKE ONE TABLET TWICE A DAY 60 Tab 12    QUEtiapine (SEROQUEL) 25 mg tablet TAKE 1 TAB EVERY EVENING 30 Tab 12    spironolactone (ALDACTONE) 50 mg tablet TAKE 1 TABLET BY MOUTH DAILY AS A DIURETIC-FLUID PILL 30 Tab 5    PRADAXA 150 mg capsule TAKE 1 CAPSULE BY MOUTH TWICE A DAY. 60 Cap 12    CALCIUM 600 + D tablet TAKE 1 TABLET TWICE A DAY. (CALCIUM SUPPLEMENT) 60 Tab 12    chlorpheniramine (CHLOR-TRIMETRON) 4 mg tablet Take 1 Tab by mouth every six (6) hours as needed for Allergies.  90 Tab 5    azelastine (ASTELIN) 137 mcg (0.1 %) nasal spray 1 [place] by Both Nostrils route two (2) times a day. Use in each nostril as directed 1 Bottle 12     No current facility-administered medications on file prior to visit. Patient Active Problem List   Diagnosis Code    Rhabdomyolysis M62.82    Atrial fibrillation--s/p successful synchronized [address] I48.91    HTN (hypertension) I10    Encephalopathy acute G93.40    Insomnia G47.00    Hypokalemia E87.6    Hypothyroidism E03.9    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       Visit Vitals    BP (!) 70/47 (BP Patient Position: Standing)    Pulse 96    Temp 95.6 °F (35.3 °C) (Oral)    Resp 14    Ht 5' 5\" (1.651 m)    Wt 135 lb (61.2 kg)    SpO2 (!) 88%    BMI 22.47 kg/m2     General appearance: alert, cooperative, no distress, appears stated age  Head: Normocephalic, without obvious abnormality, atraumatic  Eyes: conjunctivae/corneas clear. PERRL, EOM's intact. Lungs: clear to auscultation bilaterally  Heart: irregularly irregular rhythm  Extremities: extremities normal, atraumatic, no cyanosis or edema  Pulses: irregularly irregular  Neuro: Normal neuro exam, no focal deficit. EKG: unchanged from previous tracings, atrial fibrillation, rate 70s. Assessment/Plans:  EKG in office showed A-fib rate controlled. Last EKG scanned in chart in 2013 is afib with rate controlled. Given her age, new hypotensive and weakness, will send her to ED for further evaluation. Paroxysmal atrial fibrillation (HCC)    Hypotensive episode  -     METABOLIC PANEL, COMPREHENSIVE  -     CBC W/O DIFF  -     AMB POC EKG ROUTINE W/ 12 LEADS, INTER & REP      Discussed plans, risk/benefits of treatments/observations. Through the use of shared decision making, above plans were agreed upon. Medication compliance advised. Patient verbalized understanding. Follow-up Disposition:  Return for to Dr. Marlon Gonzalez.       Abran Belcher MD  4/27/2017

## 2017-04-27 NOTE — H&P
Hospitalist Admission Note    NAME: Ryan Mesa   :  1930   MRN:  [id number]     Date/Time:  2017 6:02 PM    Patient PCP: Aidee Russo MD  ________________________________________________________________________    My assessment of this patient's clinical condition and my plan of care is as follows. Assessment / Plan:    Hypotension  -suspect volume depletion + BP medications   -start IVF volume expansion after fluid bolus in ER    Hyperkalemia and VY  -multifactorial from aldactone, ACEi, K supplements and VY. She received the acute treatment in ER so will give dose of Kayexalate and repeat labs. Obviously will hold meds. Bicarb drip will help also    Hyperchloremic metabolic acidosis (normal AG)  -she denies diarrhea. 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 Consider renal consult if her labs don't improve back to normal.    -will start bicarb infusion overnight then switch to half normal in AM    Chronic Afib  -continue pradaxa. Decrease atenolol to half for now    Hypothyroidism  -continue synthroid      Code Status:   Full  Surrogate Decision Maker:   She has AMD / health care proxy on file    DVT Prophylaxis:   Continue pradaxa  GI Prophylaxis: not indicated    Baseline:   . Lives with children          Subjective:   CHIEF COMPLAINT:    Referred to the ER for low BP. Patient complains of leg weakness     HISTORY OF PRESENT ILLNESS:     Rich Hussein is a 80 y.o.  female with a history of HTN, Afib and hypothyroidism who initially went to see her PCP because of leg weakness x weeks. In the office, she was noted to have an irregular HR and was orthostatic (70/47 standing) so she was referred to the ER. Her daughter came with her. Patient does not have much complaints. ER evaluation is remarkable for hyperkalemia (8), hypotension (77/40), VY (Cr 2.16), and acidosis (CO2 9). She was given the acute treatment for hyperkalemia and NS bolus.   We were asked to admit for work up and evaluation of the above problems. Past Medical History:   Diagnosis Date    Arrhythmia     atrial fibrillation, chronic. Stress test 2011 essent. normal.  Mild-mod AR on ECHO    Hypertension     Thyroid disease     hypothyroid        Past Surgical History:   Procedure Laterality Date    ECHO TRANSESOPHAGEAL (DENISE) W OR WO CONTRAST  4/27/2011         HX GYN      vaginal deliveries x10    HX TUBAL LIGATION      AR CARDIOVERSION ELECTIVE ARRHYTHMIA EXTERNAL  6/14/2011            Social History   Substance Use Topics    Smoking status: Never Smoker    Smokeless tobacco: Never Used    Alcohol use No        Family History   Problem Relation Age of Onset    Heart Disease Father     Cancer Son     Heart Disease Son      No Known Allergies     Prior to Admission medications    Medication Sig Start Date End Date Taking? Authorizing Provider   fluticasone (FLONASE) 50 mcg/actuation nasal spray USE 1-2 SPRAYS IN EACH NOSTRIL ONCE DAILY. 4/3/17   Sammie Falcon MD   levothyroxine (SYNTHROID) 100 mcg tablet TAKE ONE TABLET DAILY FOR THYROID 3/4/17   Sammie Falcon MD   atenolol (TENORMIN) 50 mg tablet TAKE 1 TABLET BY MOUTH DAILY FOR THE HEART AND BLOOD PRESSURE. 3/4/17   Sammie Falcon MD   lisinopril (PRINIVIL, ZESTRIL) 20 mg tablet TAKE 1 TAB DAILY FOR BLOOD PRESSURE 2/17/17   Sammie Falcon MD   potassium chloride (K-DUR, KLOR-CON) 20 mEq tablet TAKE ONE TABLET TWICE A DAY 2/17/17   Sammie Falcon MD   QUEtiapine (SEROQUEL) 25 mg tablet TAKE 1 TAB EVERY EVENING 2/17/17   Sammie Falcon MD   chlorpheniramine (CHLOR-TRIMETRON) 4 mg tablet Take 1 Tab by mouth every six (6) hours as needed for Allergies. 2/17/17   Sammie Falcon MD   spironolactone (ALDACTONE) 50 mg tablet TAKE 1 TABLET BY MOUTH DAILY AS A DIURETIC-FLUID PILL 12/3/16   Sammie Falcon MD   PRADAXA 150 mg capsule TAKE 1 CAPSULE BY MOUTH TWICE A DAY.  10/3/16   Sammie Falcon MD   azelastine (ASTELIN) 137 mcg (0.1 %) nasal spray 1 [place] by Both Nostrils route two (2) times a day. Use in each nostril as directed 8/17/15   Deja Shell MD   CALCIUM 600 + D tablet TAKE 1 TABLET TWICE A DAY. (CALCIUM SUPPLEMENT) 7/6/15   Deja Shell MD       REVIEW OF SYSTEMS:       Total of 12 systems reviewed as follows:       POSITIVE= underlined text  Negative = text not underlined  General:  fever, chills, sweats, generalized weakness, weight loss/gain,      loss of appetite   Eyes:    blurred vision, eye pain, loss of vision, double vision  ENT:    rhinorrhea, pharyngitis   Respiratory:   cough, sputum production, SOB, HILTON, wheezing, pleuritic pain   Cardiology:   chest pain, palpitations, orthopnea, PND, edema, syncope   Gastrointestinal:  abdominal pain , N/V, diarrhea, dysphagia, constipation, bleeding   Genitourinary:  frequency, urgency, dysuria, hematuria, incontinence   Muskuloskeletal :  arthralgia, myalgia, back pain  Hematology:  easy bruising, nose or gum bleeding, lymphadenopathy   Dermatological: rash, ulceration, pruritis, color change / jaundice  Endocrine:   hot flashes or polydipsia   Neurological:  headache, dizziness, confusion, focal weakness, paresthesia,     Speech difficulties, memory loss, gait difficulty  Psychological: Feelings of anxiety, depression, agitation    Objective:   VITALS:    Visit Vitals    /58    Pulse 76    Temp 97.3 °F (36.3 °C)    Resp 22    Ht 5' 5\" (1.651 m)    Wt 61.2 kg (135 lb)    SpO2 94%    BMI 22.47 kg/m2       PHYSICAL EXAM:    General:    Alert, cooperative, no distress, appears stated age. HEENT: Atraumatic, anicteric sclerae, pink conjunctivae     No oral ulcers, mucosa moist, throat clear, dentition fair  Neck:  Supple, symmetrical,  thyroid: non tender  Lungs:   Clear to auscultation bilaterally. No Wheezing or Rhonchi. No rales. Chest wall:  No tenderness  No Accessory muscle use.   Heart:   Regular  rhythm,  No  murmur   No edema  Abdomen: Soft, non-tender. Not distended. Bowel sounds normal  Extremities: No cyanosis. No clubbing,      Skin turgor normal, Capillary refill normal, Radial dial pulse 2+  Skin:     Not pale. Not Jaundiced  No rashes   Psych:  Good insight. Not depressed. Not anxious or agitated. Neurologic: EOMs intact. No facial asymmetry. No aphasia or slurred speech. Symmetrical strength, Sensation grossly intact. Alert and oriented X 4.     _______________________________________________________________________  Care Plan discussed with:    Comments   Patient x    Family  x  daughters   RN     Care Manager                    Consultant:      _______________________________________________________________________  Expected  Disposition:   Home with Family x   HH/PT/OT/RN    SNF/LTC    [facility]    ________________________________________________________________________  TOTAL TIME:  54  Minutes    Critical Care Provided     Minutes non procedure based      Comments    x Reviewed previous records   >50% of visit spent in counseling and coordination of care  Discussion with patient and/or family and questions answered       Given the patient's current clinical presentation, I have a high level of concern for decompensation if discharged from the ED. Complex decision making was performed which includes reviewing the patient's available past medical records, laboratory results, and Xray films. I have also directly communicated my plan and discussed this case with the involved ED physician.     ____________________________________________________________________  Antonieta Walters MD    Procedures: see electronic medical records for all procedures/Xrays and details which were not copied into this note but were reviewed prior to creation of Plan.     LAB DATA REVIEWED:    Recent Results (from the past 24 hour(s))   EKG, 12 LEAD, INITIAL    Collection Time: 04/27/17 12:11 PM   Result Value Ref Range    Ventricular Rate 67 BPM    Atrial Rate 71 BPM    QRS Duration 108 ms    Q-T Interval 372 ms    QTC Calculation (Bezet) 393 ms    Calculated R Axis -42 degrees    Calculated T Axis 23 degrees    Diagnosis       Atrial fibrillation with a competing junctional pacemaker  Left axis deviation  Low voltage QRS  Incomplete left bundle branch block  When compared with ECG of 14-JUN-2011 11:05,  Atrial fibrillation has replaced Sinus rhythm  QT has shortened     CBC WITH AUTOMATED DIFF    Collection Time: 04/27/17  1:20 PM   Result Value Ref Range    WBC 7.1 3.6 - 11.0 K/uL    RBC 3.85 3.80 - 5.20 M/uL    HGB 11.1 (L) 11.5 - 16.0 g/dL    HCT 34.9 (L) 35.0 - 47.0 %    MCV 90.6 80.0 - 99.0 FL    MCH 28.8 26.0 - 34.0 PG    MCHC 31.8 30.0 - 36.5 g/dL    RDW 17.0 (H) 11.5 - 14.5 %    PLATELET 480 (L) 682 - 400 K/uL    NEUTROPHILS 57 32 - 75 %    LYMPHOCYTES 26 12 - 49 %    MONOCYTES 11 5 - 13 %    EOSINOPHILS 5 0 - 7 %    BASOPHILS 1 0 - 1 %    ABS. NEUTROPHILS 4.1 1.8 - 8.0 K/UL    ABS. LYMPHOCYTES 1.8 0.8 - 3.5 K/UL    ABS. MONOCYTES 0.8 0.0 - 1.0 K/UL    ABS. EOSINOPHILS 0.3 0.0 - 0.4 K/UL    ABS. BASOPHILS 0.0 0.0 - 0.1 K/UL   METABOLIC PANEL, COMPREHENSIVE    Collection Time: 04/27/17  2:21 PM   Result Value Ref Range    Sodium 143 136 - 145 mmol/L    Potassium 8.1 (HH) 3.5 - 5.1 mmol/L    Chloride 126 (H) 97 - 108 mmol/L    CO2 9 (LL) 21 - 32 mmol/L    Anion gap 8 5 - 15 mmol/L    Glucose 87 65 - 100 mg/dL    BUN 65 (H) 6 - 20 MG/DL    Creatinine 2.16 (H) 0.55 - 1.02 MG/DL    BUN/Creatinine ratio 30 (H) 12 - 20      GFR est AA 26 (L) >60 ml/min/1.73m2    GFR est non-AA 22 (L) >60 ml/min/1.73m2    Calcium 7.8 (L) 8.5 - 10.1 MG/DL    Bilirubin, total 0.3 0.2 - 1.0 MG/DL    ALT (SGPT) 17 12 - 78 U/L    AST (SGOT) 12 (L) 15 - 37 U/L    Alk.  phosphatase 60 45 - 117 U/L    Protein, total 7.0 6.4 - 8.2 g/dL    Albumin 2.9 (L) 3.5 - 5.0 g/dL    Globulin 4.1 (H) 2.0 - 4.0 g/dL    A-G Ratio 0.7 (L) 1.1 - 2.2     CK W/ CKMB & INDEX    Collection Time: 04/27/17  2:21 PM Result Value Ref Range    CK 23 (L) 26 - 192 U/L    CK - MB 1.0 <3.6 NG/ML    CK-MB Index 4.3 (H) 0 - 2.5     MAGNESIUM    Collection Time: 04/27/17  2:21 PM   Result Value Ref Range    Magnesium 1.8 1.6 - 2.4 mg/dL   TROPONIN I    Collection Time: 04/27/17  2:21 PM   Result Value Ref Range    Troponin-I, Qt. <0.04 <0.05 ng/mL   URINALYSIS W/MICROSCOPIC    Collection Time: 04/27/17  4:22 PM   Result Value Ref Range    Color YELLOW/STRAW      Appearance CLEAR CLEAR      Specific gravity 1.011 1.003 - 1.030      pH (UA) 5.5 5.0 - 8.0      Protein TRACE (A) NEG mg/dL    Glucose NEGATIVE  NEG mg/dL    Ketone NEGATIVE  NEG mg/dL    Bilirubin NEGATIVE  NEG      Blood NEGATIVE  NEG      Urobilinogen 0.2 0.2 - 1.0 EU/dL    Nitrites NEGATIVE  NEG      Leukocyte Esterase SMALL (A) NEG      WBC 5-10 0 - 4 /hpf    RBC 0-5 0 - 5 /hpf    Epithelial cells FEW FEW /lpf    Bacteria NEGATIVE  NEG /hpf

## 2017-04-27 NOTE — ED NOTES
Bedside and Verbal shift change report given to Kinsey Owens RN (oncoming nurse) by Author Omar RN (offgoing nurse). Report included the following information SBAR, ED Summary, MAR and Recent Results.

## 2017-04-27 NOTE — IP AVS SNAPSHOT
[address] [facility] 
[phone] Patient: Kelton Osman MRN: [id number] LESLY:[birth year] You are allergic to the following No active allergies Recent Documentation Height Weight Breastfeeding? BMI OB Status Smoking Status 1.651 m 61.2 kg No 22.47 kg/m2 Postmenopausal Never Smoker Emergency Contacts Name Discharge Info Relation Home Work Mobile Yina Cooper  Child [2] [phone] About your hospitalization You were admitted on:  April 27, 2017 You last received care in the:  [facility] 3 RENAL MEDICINE You were discharged on:  May 3, 2017 Unit phone number:  [phone] Why you were hospitalized Your primary diagnosis was:  Not on File Your diagnoses also included:  Hyperkalemia Providers Seen During Your Hospitalizations Provider Role Specialty Primary office phone Myesha Reza DO Attending Provider Emergency Medicine [phone] Yolande Smith MD Attending Provider [facility] [phone] Your Primary Care Physician (PCP) Primary Care Physician Office Phone Office Fax Sugey Solis [phone] Follow-up Information Follow up With Details Comments Contact Info Yolande Smith MD On 5/10/2017 11:15am [address] [facility] 7 [id number] [phone] Your Appointments Wednesday May 10, 2017 11:15 AM EDT TRANSITIONAL CARE MANAGEMENT with Yolande Smith MD  
[facility] [address] [facility] 7 [id number] [phone] Current Discharge Medication List  
  
START taking these medications Dose & Instructions Dispensing Information Comments Morning Noon Evening Bedtime  
 pantoprazole 40 mg tablet Commonly known as:  PROTONIX Your last dose was: Your next dose is:    
   
   
 Dose:  40 mg Take 1 Tab by mouth Daily (before breakfast). For indigestion and gastritis. Quantity:  30 Tab Refills:  5  
     
   
   
   
  
 predniSONE 10 mg tablet Commonly known as:  Jeannette HERRMANNyster Your last dose was: Your next dose is:    
   
   
 Dose:  10 mg Take 1 Tab by mouth two (2) times a day. For foot pain Quantity:  10 Tab Refills:  0 CONTINUE these medications which have NOT CHANGED Dose & Instructions Dispensing Information Comments Morning Noon Evening Bedtime  
 atenolol 50 mg tablet Commonly known as:  TENORMIN Your last dose was: Your next dose is: TAKE 1 TABLET BY MOUTH DAILY FOR THE HEART AND BLOOD PRESSURE. Quantity:  30 Tab Refills:  12  
     
   
   
   
  
 azelastine 137 mcg (0.1 %) nasal spray Commonly known as:  ASTELIN Your last dose was: Your next dose is:    
   
   
 Dose:  1 Spray 1 [place] by Both Nostrils route two (2) times a day. Use in each nostril as directed Quantity:  1 Bottle Refills:  12 Calcium 600 + D tablet Generic drug:  calcium-cholecalciferol (D3) Your last dose was: Your next dose is: TAKE 1 TABLET TWICE A DAY. (CALCIUM SUPPLEMENT) Quantity:  60 Tab Refills:  12  
     
   
   
   
  
 chlorpheniramine 4 mg tablet Commonly known as:  CHLOR-TRIMETRON Your last dose was: Your next dose is:    
   
   
 Dose:  4 mg Take 1 Tab by mouth every six (6) hours as needed for Allergies. Quantity:  90 Tab Refills:  5  
     
   
   
   
  
 fluticasone 50 mcg/actuation nasal spray Commonly known as:  Dominique Six Your last dose was: Your next dose is:    
   
   
 USE 1-2 SPRAYS IN EACH NOSTRIL ONCE DAILY. Quantity:  16 g Refills:  PRN  
     
   
   
   
  
 levothyroxine 100 mcg tablet Commonly known as:  SYNTHROID  
   
 Your last dose was: Your next dose is: TAKE ONE TABLET DAILY FOR THYROID Quantity:  30 Tab Refills:  12 QUEtiapine 25 mg tablet Commonly known as:  SEROquel Your last dose was: Your next dose is: TAKE 1 TAB EVERY EVENING Quantity:  30 Tab Refills:  12 STOP taking these medications   
 lisinopril 20 mg tablet Commonly known as:  PRINIVIL, ZESTRIL  
   
  
 potassium chloride 20 mEq tablet Commonly known as:  K-DUR, KLOR-CON PRADAXA 150 mg capsule Generic drug:  dabigatran etexilate  
   
  
 spironolactone 50 mg tablet Commonly known as:  ALDACTONE Where to Get Your Medications Information on where to get these meds will be given to you by the nurse or doctor. ! Ask your nurse or doctor about these medications  
  pantoprazole 40 mg tablet  
 predniSONE 10 mg tablet Discharge Instructions PATIENT DISCHARGE INSTRUCTIONS PATIENT DISCHARGE INSTRUCTIONS Manuela Norma / [id number] : 1930 Admitted 2017 Discharged: 5/3/2017 · It is important that you take the medication exactly as they are prescribed. · Keep your medication in the bottles provided by the pharmacist and keep a list of the medication names, dosages, and times to be taken in your wallet. · Do not take other medications without consulting your doctor. What to do at [facility] Recommended Diet: Regular Diet Recommended Activity: Activity as tolerated If you experience any of the following symptoms increasing weakness, please follow up with Dr. Florina Tavarez. Signed By: Marilu Shoemaker MD   
 May 3, 2017 General Daily Progress Note Admit Date: 2017 Hospital day a few Subjective:  
 
Patient has no complaint of Saad Carr Medication side effects: none Current Facility-Administered Medications Medication Dose Route Frequency  potassium chloride SR (KLOR-CON 10) tablet 10 mEq  10 mEq Oral BID  QUEtiapine (SEROquel) tablet 25 mg  25 mg Oral QHS  pantoprazole (PROTONIX) tablet 40 mg  40 mg Oral ACB  ondansetron (ZOFRAN) injection 4 mg  4 mg IntraVENous Q6H PRN  
 sodium chloride (NS) flush 5-10 mL  5-10 mL IntraVENous Q8H  
 sodium chloride (NS) flush 5-10 mL  5-10 mL IntraVENous PRN  
 atenolol (TENORMIN) tablet 25 mg  25 mg Oral DAILY  levothyroxine (SYNTHROID) tablet 100 mcg  100 mcg Oral 7am  
 acetaminophen (TYLENOL) tablet 650 mg  650 mg Oral Q4H PRN  
 0.45% sodium chloride infusion  50 mL/hr IntraVENous CONTINUOUS Review of Systems Pertinent items are noted in HPI. Objective:  
 
Patient Vitals for the past 8 hrs: 
 BP Temp Pulse Resp SpO2  
05/03/17 0428 128/75 98 °F (36.7 °C) 99 16 95 % 05/03/17 0016 138/65 98.1 °F (36.7 °C) 74 16 98 % 05/02 1901 - 05/03 0700 In: 1080 [P.O.:480; I.V.:600] Out: -  
05/01 0701 - 05/02 1900 In: 1500 [P.O.:1300; I.V.:200] Out: - Physical Exam: No exam performed today, other. ECG: normal sinus rhythm Data Review Recent Results (from the past 24 hour(s)) POC H. PYLORI, TISSUE Collection Time: 05/02/17  1:15 PM  
Result Value Ref Range H. pylori from tissue Negative Negative Positive control postive Negative control negative Lot no. [id number] CBC WITH AUTOMATED DIFF Collection Time: 05/03/17  4:10 AM  
Result Value Ref Range WBC 5.4 3.6 - 11.0 K/uL  
 RBC 2.81 (L) 3.80 - 5.20 M/uL HGB 8.3 (L) 11.5 - 16.0 g/dL HCT 24.7 (L) 35.0 - 47.0 % MCV 87.9 80.0 - 99.0 FL  
 MCH 29.5 26.0 - 34.0 PG  
 MCHC 33.6 30.0 - 36.5 g/dL  
 RDW 16.2 (H) 11.5 - 14.5 % PLATELET 858 (L) 465 - 400 K/uL NEUTROPHILS 63 32 - 75 % LYMPHOCYTES 26 12 - 49 % MONOCYTES 9 5 - 13 % EOSINOPHILS 2 0 - 7 % BASOPHILS 0 0 - 1 %  
 ABS. NEUTROPHILS 3.4 1.8 - 8.0 K/UL  
 ABS. LYMPHOCYTES 1.4 0.8 - 3.5 K/UL ABS. MONOCYTES 0.5 0.0 - 1.0 K/UL  
 ABS. EOSINOPHILS 0.1 0.0 - 0.4 K/UL  
 ABS. BASOPHILS 0.0 0.0 - 0.1 K/UL Assessment:  
 
Active Problems: Hyperkalemia (4/27/2017) Plan:  
 
 
 
   
   
  
 
 
 
 
  
   
   
   
   
   
   
   
   
   
   
   
   
   
 
 
 
 
 
Discharge Orders None MyChart Announcement We are excited to announce that we are making your provider's discharge notes available to you in [employer]. You will see these notes when they are completed and signed by the physician that discharged you from your recent hospital stay. If you have any questions or concerns about any information you see in [employer], please call the Health Information Department where you were seen or reach out to your Primary Care Provider for more information about your plan of care. Introducing [facility] SERVICES! Serena Cleverly introduces [employer] patient portal. Now you can access parts of your medical record, email your doctor's office, and request medication refills online. 1. In your internet browser, go to [email or web address] 2. Click on the First Time User? Click Here link in the Sign In box. You will see the New Member Sign Up page. 3. Enter your [employer] Access Code exactly as it appears below. You will not need to use this code after youve completed the sign-up process. If you do not sign up before the expiration date, you must request a new code. · [employer] Access Code: [email or web address] Expires: 5/18/2017 10:41 AM 
 
4. Enter the last four digits of your Social Security Number (xxxx) and Date of Birth (mm/dd/yyyy) as indicated and click Submit. You will be taken to the next sign-up page. 5. Create a [employer] ID. This will be your [employer] login ID and cannot be changed, so think of one that is secure and easy to remember. 6. Create a [employer] password. You can change your password at any time. 7. Enter your Password Reset Question and Answer. This can be used at a later time if you forget your password. 8. Enter your e-mail address. You will receive e-mail notification when new information is available in [address] 9. Click Sign Up. You can now view and download portions of your medical record. 10. Click the Download Summary menu link to download a portable copy of your medical information. If you have questions, please visit the Frequently Asked Questions section of the [employer] website. Remember, [employer] is NOT to be used for urgent needs. For medical emergencies, dial 911. Now available from your iPhone and Android! General Information Please provide this summary of care documentation to your next provider. Patient Signature:  ____________________________________________________________ Date:  ____________________________________________________________  
  
[facility] Provider Signature:  ____________________________________________________________ Date:  ____________________________________________________________

## 2017-04-27 NOTE — IP AVS SNAPSHOT
Current Discharge Medication List  
  
START taking these medications Dose & Instructions Dispensing Information Comments Morning Noon Evening Bedtime  
 pantoprazole 40 mg tablet Commonly known as:  PROTONIX Your last dose was: Your next dose is:    
   
   
 Dose:  40 mg Take 1 Tab by mouth Daily (before breakfast). For indigestion and gastritis. Quantity:  30 Tab Refills:  5  
     
   
   
   
  
 predniSONE 10 mg tablet Commonly known as:  Leansarath Pian Your last dose was: Your next dose is:    
   
   
 Dose:  10 mg Take 1 Tab by mouth two (2) times a day. For foot pain Quantity:  10 Tab Refills:  0 CONTINUE these medications which have NOT CHANGED Dose & Instructions Dispensing Information Comments Morning Noon Evening Bedtime  
 atenolol 50 mg tablet Commonly known as:  TENORMIN Your last dose was: Your next dose is: TAKE 1 TABLET BY MOUTH DAILY FOR THE HEART AND BLOOD PRESSURE. Quantity:  30 Tab Refills:  12  
     
   
   
   
  
 azelastine 137 mcg (0.1 %) nasal spray Commonly known as:  ASTELIN Your last dose was: Your next dose is:    
   
   
 Dose:  1 Spray 1 [place] by Both Nostrils route two (2) times a day. Use in each nostril as directed Quantity:  1 Bottle Refills:  12 Calcium 600 + D tablet Generic drug:  calcium-cholecalciferol (D3) Your last dose was: Your next dose is: TAKE 1 TABLET TWICE A DAY. (CALCIUM SUPPLEMENT) Quantity:  60 Tab Refills:  12  
     
   
   
   
  
 chlorpheniramine 4 mg tablet Commonly known as:  CHLOR-TRIMETRON Your last dose was: Your next dose is:    
   
   
 Dose:  4 mg Take 1 Tab by mouth every six (6) hours as needed for Allergies. Quantity:  90 Tab Refills:  5  
     
   
   
   
  
 fluticasone 50 mcg/actuation nasal spray Commonly known as:  Virginia Gonzalez Your last dose was: Your next dose is:    
   
   
 USE 1-2 SPRAYS IN EACH NOSTRIL ONCE DAILY. Quantity:  16 g Refills:  PRN  
     
   
   
   
  
 levothyroxine 100 mcg tablet Commonly known as:  SYNTHROID Your last dose was: Your next dose is: TAKE ONE TABLET DAILY FOR THYROID Quantity:  30 Tab Refills:  12 QUEtiapine 25 mg tablet Commonly known as:  SEROquel Your last dose was: Your next dose is: TAKE 1 TAB EVERY EVENING Quantity:  30 Tab Refills:  12 STOP taking these medications   
 lisinopril 20 mg tablet Commonly known as:  PRINIVIL, ZESTRIL  
   
  
 potassium chloride 20 mEq tablet Commonly known as:  K-DUR, KLOR-CON PRADAXA 150 mg capsule Generic drug:  dabigatran etexilate  
   
  
 spironolactone 50 mg tablet Commonly known as:  ALDACTONE Where to Get Your Medications Information on where to get these meds will be given to you by the nurse or doctor. ! Ask your nurse or doctor about these medications  
  pantoprazole 40 mg tablet  
 predniSONE 10 mg tablet

## 2017-04-28 LAB
ANION GAP BLD CALC-SCNC: 10 MMOL/L (ref 5–15)
BASOPHILS # BLD AUTO: 0 K/UL (ref 0–0.1)
BASOPHILS # BLD: 0 % (ref 0–1)
BUN SERPL-MCNC: 51 MG/DL (ref 6–20)
BUN/CREAT SERPL: 31 (ref 12–20)
CALCIUM SERPL-MCNC: 8.1 MG/DL (ref 8.5–10.1)
CHLORIDE SERPL-SCNC: 116 MMOL/L (ref 97–108)
CO2 SERPL-SCNC: 20 MMOL/L (ref 21–32)
CREAT SERPL-MCNC: 1.67 MG/DL (ref 0.55–1.02)
EOSINOPHIL # BLD: 0.2 K/UL (ref 0–0.4)
EOSINOPHIL NFR BLD: 4 % (ref 0–7)
ERYTHROCYTE [DISTWIDTH] IN BLOOD BY AUTOMATED COUNT: 16.8 % (ref 11.5–14.5)
GLUCOSE SERPL-MCNC: 87 MG/DL (ref 65–100)
HCT VFR BLD AUTO: 28.7 % (ref 35–47)
HGB BLD-MCNC: 9.3 G/DL (ref 11.5–16)
LYMPHOCYTES # BLD AUTO: 22 % (ref 12–49)
LYMPHOCYTES # BLD: 1.3 K/UL (ref 0.8–3.5)
MAGNESIUM SERPL-MCNC: 1.5 MG/DL (ref 1.6–2.4)
MCH RBC QN AUTO: 28.6 PG (ref 26–34)
MCHC RBC AUTO-ENTMCNC: 32.4 G/DL (ref 30–36.5)
MCV RBC AUTO: 88.3 FL (ref 80–99)
MONOCYTES # BLD: 0.4 K/UL (ref 0–1)
MONOCYTES NFR BLD AUTO: 7 % (ref 5–13)
NEUTS SEG # BLD: 4 K/UL (ref 1.8–8)
NEUTS SEG NFR BLD AUTO: 67 % (ref 32–75)
PLATELET # BLD AUTO: 115 K/UL (ref 150–400)
POTASSIUM SERPL-SCNC: 4 MMOL/L (ref 3.5–5.1)
RBC # BLD AUTO: 3.25 M/UL (ref 3.8–5.2)
SODIUM SERPL-SCNC: 146 MMOL/L (ref 136–145)
WBC # BLD AUTO: 6 K/UL (ref 3.6–11)

## 2017-04-28 PROCEDURE — 74011250637 HC RX REV CODE- 250/637: Performed by: INTERNAL MEDICINE

## 2017-04-28 PROCEDURE — 74011000258 HC RX REV CODE- 258: Performed by: INTERNAL MEDICINE

## 2017-04-28 PROCEDURE — 80048 BASIC METABOLIC PNL TOTAL CA: CPT | Performed by: INTERNAL MEDICINE

## 2017-04-28 PROCEDURE — 65660000000 HC RM CCU STEPDOWN

## 2017-04-28 PROCEDURE — 85025 COMPLETE CBC W/AUTO DIFF WBC: CPT | Performed by: INTERNAL MEDICINE

## 2017-04-28 PROCEDURE — 36415 COLL VENOUS BLD VENIPUNCTURE: CPT | Performed by: INTERNAL MEDICINE

## 2017-04-28 PROCEDURE — 83735 ASSAY OF MAGNESIUM: CPT | Performed by: INTERNAL MEDICINE

## 2017-04-28 RX ADMIN — DABIGATRAN ETEXILATE MESYLATE 75 MG: 75 CAPSULE ORAL at 08:16

## 2017-04-28 RX ADMIN — DABIGATRAN ETEXILATE MESYLATE 75 MG: 75 CAPSULE ORAL at 17:24

## 2017-04-28 RX ADMIN — Medication 10 ML: at 21:38

## 2017-04-28 RX ADMIN — Medication 10 ML: at 16:28

## 2017-04-28 RX ADMIN — SODIUM CHLORIDE 100 ML/HR: 4.5 INJECTION, SOLUTION INTRAVENOUS at 03:09

## 2017-04-28 RX ADMIN — LEVOTHYROXINE SODIUM 100 MCG: 100 TABLET ORAL at 08:14

## 2017-04-28 RX ADMIN — Medication 10 ML: at 17:24

## 2017-04-28 RX ADMIN — SODIUM CHLORIDE 100 ML/HR: 4.5 INJECTION, SOLUTION INTRAVENOUS at 16:31

## 2017-04-28 RX ADMIN — Medication 10 ML: at 05:55

## 2017-04-28 RX ADMIN — ACETAMINOPHEN 650 MG: 325 TABLET, FILM COATED ORAL at 17:29

## 2017-04-28 NOTE — PROGRESS NOTES
Bedside and Verbal shift change report given to Altagracia JIMÉNEZ (oncoming nurse) by Aidan Flor RN (offgoing nurse). Report included the following information SBAR, Kardex, ED Summary, Intake/Output, MAR and Cardiac Rhythm A Fib rate controled. Zone Phone for oncoming shift:       Shift Summary: New admission from ED, hypotensive, family in room    LDAs               Peripheral IV 04/27/17 Right Antecubital (Active)   Site Assessment Clean, dry, & intact; Other (Comment) 4/28/2017  3:56 AM   Phlebitis Assessment 0 4/28/2017  3:56 AM   Infiltration Assessment 0 4/28/2017  3:56 AM   Dressing Status Clean, dry, & intact 4/28/2017  3:56 AM   Dressing Type Transparent;Tape 4/28/2017  3:56 AM   Hub Color/Line Status Infusing;Green 4/28/2017  3:56 AM                        Intake & Output   Date 04/27/17 0700 - 04/28/17 0659 04/28/17 0700 - 04/29/17 0659   Shift 6451-1888 8747-9563 24 Hour Total 2844-6474 7472-6490 24 Hour Total   I  N  T  A  K  E   I.V.  (mL/kg/hr)  547.5 547.5         Volume (sodium bicarbonate (8.4%) 150 mEq in sterile water 1,000 mL infusion)  195.8 195.8         Volume (0.45% sodium chloride infusion)  351.7 351.7       Shift Total  (mL/kg)  547.5  (8.9) 547.5  (8.9)      O  U  T  P  U  T   Urine  (mL/kg/hr)            Urine Occurrence(s)  1 x 1 x       Shift Total  (mL/kg)         NET  547.5 547.5      Weight (kg) 61.2 61.2 61.2 61.2 61.2 61.2      Last Bowel Movement Last Bowel Movement Date: 04/27/17   Glucose Checks [x] N/A  [] AC/HS  [] Q6  Concerns:   Nutrition Active Orders   Diet    DIET CARDIAC Regular       Consults []PT  []OT  []Speech  [x]Case Management   Cardiac Monitoring []N/A [x]Yes Expires:

## 2017-04-28 NOTE — ROUTINE PROCESS
TRANSFER - OUT REPORT:    Verbal report given to TINO Pierce(name) on [address]  being transferred to Renal(unit) for routine progression of care       Report consisted of patients Situation, Background, Assessment and   Recommendations(SBAR). Information from the following report(s) SBAR, ED Summary, [facility] and Recent Results was reviewed with the receiving nurse. Lines:   Peripheral IV 04/27/17 Right Antecubital (Active)   Site Assessment Clean, dry, & intact 4/27/2017 12:31 PM   Phlebitis Assessment 0 4/27/2017 12:31 PM   Infiltration Assessment 0 4/27/2017 12:31 PM   Dressing Status Clean, dry, & intact 4/27/2017 12:31 PM   Dressing Type Transparent 4/27/2017 12:31 PM   Hub Color/Line Status Pink 4/27/2017 12:31 PM        Opportunity for questions and clarification was provided.

## 2017-04-28 NOTE — PROGRESS NOTES
Pt admitted with hyperkalemia, weakness, and hypotension. Pt lives with dtr, in [facility], is independent with adls, uses no dme, PCP Dr aBrbara Bravo, does not want any blood transfusions, has Medicare//[employer], and is a full code. No d/c needs noted. Please order PT consult to assess safety//mobility. Care Management Interventions  PCP Verified by CM:  Yes  Mode of Transport at Discharge: Self  MyChart Signup: No  Discharge Durable Medical Equipment: No  Health Maintenance Reviewed: Yes  Physical Therapy Consult: No  Occupational Therapy Consult: No  Speech Therapy Consult: No  Current Support Network: Lives with Caregiver, Family Lives [place], Relative's Home  Confirm Follow Up Transport: Family  Plan discussed with Pt/Family/Caregiver: Yes  Freedom of Choice Offered: Yes  Discharge Location  Discharge Placement: Home

## 2017-04-28 NOTE — PROGRESS NOTES
General Daily Progress Note    Admit Date: 4/27/2017  Hospital day 2    Subjective:     Patient has no complaint of diarrhea or vomiting. Still feels somewhat weak. ..   Medication side effects: none    Current Facility-Administered Medications   Medication Dose Route Frequency    sodium chloride (NS) flush 5-10 mL  5-10 mL IntraVENous Q8H    sodium chloride (NS) flush 5-10 mL  5-10 mL IntraVENous PRN    atenolol (TENORMIN) tablet 25 mg  25 mg Oral DAILY    levothyroxine (SYNTHROID) tablet 100 mcg  100 mcg Oral 7am    dabigatran etexilate (PRADAXA) capsule 75 mg  75 mg Oral BID    acetaminophen (TYLENOL) tablet 650 mg  650 mg Oral Q4H PRN    0.45% sodium chloride infusion  100 mL/hr IntraVENous CONTINUOUS        Review of Systems  Pertinent items are noted in HPI. Objective:     Patient Vitals for the past 8 hrs:   BP Temp Pulse Resp SpO2   04/28/17 0447 (!) 89/43 - 77 - -   04/28/17 0320 (!) 76/47 97.5 °F (36.4 °C) 79 16 98 %   04/28/17 0032 100/57 97.6 °F (36.4 °C) 74 16 96 %        04/26 1901 - 04/28 0700  In: 547.5 [I.V.:547.5]  Out: -     Physical Exam:   Visit Vitals    BP (!) 89/43    Pulse 77    Temp 97.5 °F (36.4 °C)    Resp 16    Ht 5' 5\" (1.651 m)    Wt 135 lb (61.2 kg)    SpO2 98%    BMI 22.47 kg/m2     Lungs: clear to auscultation bilaterally  Heart: irregularly irregular rhythm  Abdomen: soft, non-tender.  Bowel sounds normal. No masses,  no organomegaly  Extremities: extremities normal, atraumatic, no cyanosis or edema      ECG: atrial fibrillation, rate 80     Data Review   Recent Results (from the past 24 hour(s))   EKG, 12 LEAD, INITIAL    Collection Time: 04/27/17 12:11 PM   Result Value Ref Range    Ventricular Rate 67 BPM    Atrial Rate 71 BPM    QRS Duration 108 ms    Q-T Interval 372 ms    QTC Calculation (Bezet) 393 ms    Calculated R Axis -42 degrees    Calculated T Axis 23 degrees    Diagnosis       Atrial fibrillation with a competing junctional pacemaker  Left axis deviation  Incomplete left bundle branch block  When compared with ECG of 14-JUN-2011 11:05,  Atrial fibrillation has replaced Sinus rhythm    Confirmed by JUDITH Wooten ([id number]) on 4/27/2017 6:27:07 PM     CBC WITH AUTOMATED DIFF    Collection Time: 04/27/17  1:20 PM   Result Value Ref Range    WBC 7.1 3.6 - 11.0 K/uL    RBC 3.85 3.80 - 5.20 M/uL    HGB 11.1 (L) 11.5 - 16.0 g/dL    HCT 34.9 (L) 35.0 - 47.0 %    MCV 90.6 80.0 - 99.0 FL    MCH 28.8 26.0 - 34.0 PG    MCHC 31.8 30.0 - 36.5 g/dL    RDW 17.0 (H) 11.5 - 14.5 %    PLATELET 610 (L) 554 - 400 K/uL    NEUTROPHILS 57 32 - 75 %    LYMPHOCYTES 26 12 - 49 %    MONOCYTES 11 5 - 13 %    EOSINOPHILS 5 0 - 7 %    BASOPHILS 1 0 - 1 %    ABS. NEUTROPHILS 4.1 1.8 - 8.0 K/UL    ABS. LYMPHOCYTES 1.8 0.8 - 3.5 K/UL    ABS. MONOCYTES 0.8 0.0 - 1.0 K/UL    ABS. EOSINOPHILS 0.3 0.0 - 0.4 K/UL    ABS. BASOPHILS 0.0 0.0 - 0.1 K/UL   METABOLIC PANEL, COMPREHENSIVE    Collection Time: 04/27/17  2:21 PM   Result Value Ref Range    Sodium 143 136 - 145 mmol/L    Potassium 8.1 (HH) 3.5 - 5.1 mmol/L    Chloride 126 (H) 97 - 108 mmol/L    CO2 9 (LL) 21 - 32 mmol/L    Anion gap 8 5 - 15 mmol/L    Glucose 87 65 - 100 mg/dL    BUN 65 (H) 6 - 20 MG/DL    Creatinine 2.16 (H) 0.55 - 1.02 MG/DL    BUN/Creatinine ratio 30 (H) 12 - 20      GFR est AA 26 (L) >60 ml/min/1.73m2    GFR est non-AA 22 (L) >60 ml/min/1.73m2    Calcium 7.8 (L) 8.5 - 10.1 MG/DL    Bilirubin, total 0.3 0.2 - 1.0 MG/DL    ALT (SGPT) 17 12 - 78 U/L    AST (SGOT) 12 (L) 15 - 37 U/L    Alk.  phosphatase 60 45 - 117 U/L    Protein, total 7.0 6.4 - 8.2 g/dL    Albumin 2.9 (L) 3.5 - 5.0 g/dL    Globulin 4.1 (H) 2.0 - 4.0 g/dL    A-G Ratio 0.7 (L) 1.1 - 2.2     CK W/ CKMB & INDEX    Collection Time: 04/27/17  2:21 PM   Result Value Ref Range    CK 23 (L) 26 - 192 U/L    CK - MB 1.0 <3.6 NG/ML    CK-MB Index 4.3 (H) 0 - 2.5     MAGNESIUM    Collection Time: 04/27/17  2:21 PM   Result Value Ref Range    Magnesium 1.8 1.6 - 2.4 mg/dL   TROPONIN I    Collection Time: 04/27/17  2:21 PM   Result Value Ref Range    Troponin-I, Qt. <0.04 <0.05 ng/mL   URINALYSIS W/MICROSCOPIC    Collection Time: 04/27/17  4:22 PM   Result Value Ref Range    Color YELLOW/STRAW      Appearance CLEAR CLEAR      Specific gravity 1.011 1.003 - 1.030      pH (UA) 5.5 5.0 - 8.0      Protein TRACE (A) NEG mg/dL    Glucose NEGATIVE  NEG mg/dL    Ketone NEGATIVE  NEG mg/dL    Bilirubin NEGATIVE  NEG      Blood NEGATIVE  NEG      Urobilinogen 0.2 0.2 - 1.0 EU/dL    Nitrites NEGATIVE  NEG      Leukocyte Esterase SMALL (A) NEG      WBC 5-10 0 - 4 /hpf    RBC 0-5 0 - 5 /hpf    Epithelial cells FEW FEW /lpf    Bacteria NEGATIVE  NEG /hpf   METABOLIC PANEL, BASIC    Collection Time: 04/27/17  6:05 PM   Result Value Ref Range    Sodium 152 (H) 136 - 145 mmol/L    Potassium 5.4 (H) 3.5 - 5.1 mmol/L    Chloride 128 (H) 97 - 108 mmol/L    CO2 13 (LL) 21 - 32 mmol/L    Anion gap 11 5 - 15 mmol/L    Glucose 59 (L) 65 - 100 mg/dL    BUN 63 (H) 6 - 20 MG/DL    Creatinine 2.03 (H) 0.55 - 1.02 MG/DL    BUN/Creatinine ratio 31 (H) 12 - 20      GFR est AA 28 (L) >60 ml/min/1.73m2    GFR est non-AA 23 (L) >60 ml/min/1.73m2    Calcium 8.5 8.5 - 10.1 MG/DL   CBC WITH AUTOMATED DIFF    Collection Time: 04/28/17  3:13 AM   Result Value Ref Range    WBC 6.0 3.6 - 11.0 K/uL    RBC 3.25 (L) 3.80 - 5.20 M/uL    HGB 9.3 (L) 11.5 - 16.0 g/dL    HCT 28.7 (L) 35.0 - 47.0 %    MCV 88.3 80.0 - 99.0 FL    MCH 28.6 26.0 - 34.0 PG    MCHC 32.4 30.0 - 36.5 g/dL    RDW 16.8 (H) 11.5 - 14.5 %    PLATELET 708 (L) 146 - 400 K/uL    NEUTROPHILS 67 32 - 75 %    LYMPHOCYTES 22 12 - 49 %    MONOCYTES 7 5 - 13 %    EOSINOPHILS 4 0 - 7 %    BASOPHILS 0 0 - 1 %    ABS. NEUTROPHILS 4.0 1.8 - 8.0 K/UL    ABS. LYMPHOCYTES 1.3 0.8 - 3.5 K/UL    ABS. MONOCYTES 0.4 0.0 - 1.0 K/UL    ABS. EOSINOPHILS 0.2 0.0 - 0.4 K/UL    ABS.  BASOPHILS 0.0 0.0 - 0.1 K/UL   METABOLIC PANEL, BASIC    Collection Time: 04/28/17  3:13 AM Result Value Ref Range    Sodium 146 (H) 136 - 145 mmol/L    Potassium 4.0 3.5 - 5.1 mmol/L    Chloride 116 (H) 97 - 108 mmol/L    CO2 20 (L) 21 - 32 mmol/L    Anion gap 10 5 - 15 mmol/L    Glucose 87 65 - 100 mg/dL    BUN 51 (H) 6 - 20 MG/DL    Creatinine 1.67 (H) 0.55 - 1.02 MG/DL    BUN/Creatinine ratio 31 (H) 12 - 20      GFR est AA 35 (L) >60 ml/min/1.73m2    GFR est non-AA 29 (L) >60 ml/min/1.73m2    Calcium 8.1 (L) 8.5 - 10.1 MG/DL   MAGNESIUM    Collection Time: 04/28/17  3:13 AM   Result Value Ref Range    Magnesium 1.5 (L) 1.6 - 2.4 mg/dL           Assessment:     Active Problems:    Hyperkalemia (4/27/2017)        Plan:     1, Hypotension- improved. Spironolactone and lisinopril on hold  2. ARF- improving on iv fluids  3. Hyperkalemia- resolved. 4. Past hx hypokalemia.

## 2017-04-28 NOTE — PROGRESS NOTES
Pharmacy MUE for Dabigatran    Pharmacy review for this 80 y.o. female ordered dabigatran for A fib    Major Interacting Medications (Dose/Frequency): None  Platelet Inhibitors (Dose/Frequecy): None    Recent Labs      04/27/17   1805  04/27/17   1421  04/27/17   1320   HGB   --    --   11.1*   PLT   --    --   140*   CREA  2.03*  2.16*   --    BUN  63*  65*   --      Creatinine Clearance (ml/min): 18 mL/min      Impression/Plan: Will decrease dose to 75 mg twice daily for CrCl 15-30 mL/min per renal dosing protocol.          Joao Fuentes, ALMAZD

## 2017-04-28 NOTE — PROGRESS NOTES
Primary Nurse Carl Mcrae RN and Nehemias Aguilar RN performed a dual skin assessment on this patient No impairment noted  Tanner score is 21

## 2017-04-29 LAB
ANION GAP BLD CALC-SCNC: 7 MMOL/L (ref 5–15)
BUN SERPL-MCNC: 37 MG/DL (ref 6–20)
BUN/CREAT SERPL: 28 (ref 12–20)
CALCIUM SERPL-MCNC: 7.2 MG/DL (ref 8.5–10.1)
CHLORIDE SERPL-SCNC: 116 MMOL/L (ref 97–108)
CO2 SERPL-SCNC: 22 MMOL/L (ref 21–32)
CREAT SERPL-MCNC: 1.31 MG/DL (ref 0.55–1.02)
GLUCOSE SERPL-MCNC: 108 MG/DL (ref 65–100)
POTASSIUM SERPL-SCNC: 3.6 MMOL/L (ref 3.5–5.1)
SODIUM SERPL-SCNC: 145 MMOL/L (ref 136–145)

## 2017-04-29 PROCEDURE — 65660000000 HC RM CCU STEPDOWN

## 2017-04-29 PROCEDURE — 74011000258 HC RX REV CODE- 258: Performed by: INTERNAL MEDICINE

## 2017-04-29 PROCEDURE — G8979 MOBILITY GOAL STATUS: HCPCS | Performed by: PHYSICAL THERAPIST

## 2017-04-29 PROCEDURE — 74011250637 HC RX REV CODE- 250/637: Performed by: INTERNAL MEDICINE

## 2017-04-29 PROCEDURE — 36415 COLL VENOUS BLD VENIPUNCTURE: CPT | Performed by: FAMILY MEDICINE

## 2017-04-29 PROCEDURE — G8978 MOBILITY CURRENT STATUS: HCPCS | Performed by: PHYSICAL THERAPIST

## 2017-04-29 PROCEDURE — 97116 GAIT TRAINING THERAPY: CPT | Performed by: PHYSICAL THERAPIST

## 2017-04-29 PROCEDURE — 80048 BASIC METABOLIC PNL TOTAL CA: CPT | Performed by: FAMILY MEDICINE

## 2017-04-29 PROCEDURE — 74011000258 HC RX REV CODE- 258: Performed by: FAMILY MEDICINE

## 2017-04-29 PROCEDURE — 97161 PT EVAL LOW COMPLEX 20 MIN: CPT | Performed by: PHYSICAL THERAPIST

## 2017-04-29 RX ORDER — ONDANSETRON 2 MG/ML
4 INJECTION INTRAMUSCULAR; INTRAVENOUS
Status: DISCONTINUED | OUTPATIENT
Start: 2017-04-29 | End: 2017-05-03 | Stop reason: HOSPADM

## 2017-04-29 RX ORDER — PANTOPRAZOLE SODIUM 40 MG/1
40 TABLET, DELAYED RELEASE ORAL
Status: DISCONTINUED | OUTPATIENT
Start: 2017-04-30 | End: 2017-05-03 | Stop reason: HOSPADM

## 2017-04-29 RX ADMIN — SODIUM CHLORIDE 100 ML/HR: 4.5 INJECTION, SOLUTION INTRAVENOUS at 01:40

## 2017-04-29 RX ADMIN — LEVOTHYROXINE SODIUM 100 MCG: 100 TABLET ORAL at 09:28

## 2017-04-29 RX ADMIN — ATENOLOL 25 MG: 25 TABLET ORAL at 09:27

## 2017-04-29 RX ADMIN — DABIGATRAN ETEXILATE MESYLATE 75 MG: 75 CAPSULE ORAL at 09:32

## 2017-04-29 RX ADMIN — SODIUM CHLORIDE 75 ML/HR: 4.5 INJECTION, SOLUTION INTRAVENOUS at 10:45

## 2017-04-29 RX ADMIN — DABIGATRAN ETEXILATE MESYLATE 75 MG: 75 CAPSULE ORAL at 19:25

## 2017-04-29 NOTE — PROGRESS NOTES
Problem: Mobility Impaired (Adult and Pediatric)  Goal: *Acute Goals and Plan of Care (Insert Text)  Physical Therapy Goals  Initiated 4/29/2017    1. Patient will transfer from bed to chair and chair to bed with independence using the least restrictive device within 7 day(s). 2. Patient will ambulate with supervision/set-up for 300 feet with the least restrictive device within 7 day(s). 3. Patient will ascend/descend 4 stairs with handrail(s) with supervision/set-up within 7 day(s). PHYSICAL THERAPY EVALUATION  SEEN 1407 TO 1436        Patient: Brittany Gunn (89 y.o. female)  Date: 4/29/2017  Primary Diagnosis: Hyperkalemia  Precautions: Falls      ASSESSMENT :  Based on the objective data described below, the patient presents with good bed mobility and sitting balance. Stood independently. Ambulated a total of 300' (150' without any assistive device and 150' with the RW). Without the RW she was scuffing her feet each step, kept her head down looking at her feet and had path deviations). With the RW, she cleared her feet and had fewer path deviations, still had to cue her to look ahead and not down at her feet. She went up/down 4 steps with use of bilateral rails. Her oldest daughter was present for the entire session. VSS throughout. Up in chair at end of session. Recommend HHPT at discharge. Daughter has a RW that patient can use at home. Patient will benefit from skilled intervention to address the above impairments.   Patients rehabilitation potential is considered to be Good  Factors which may influence rehabilitation potential include:   [X]         None noted  [ ]         Mental ability/status  [ ]         Medical condition  [ ]         Home/family situation and support systems  [ ]         Safety awareness  [ ]         Pain tolerance/management  [ ]         Other:        PLAN :  Recommendations and Planned Interventions:  [ ]           Bed Mobility Training             [ ] Neuromuscular Re-Education  [ ]           Transfer Training                   [ ]    Orthotic/Prosthetic Training  [X]           Gait Training                         [ ]    Modalities  [X]           Therapeutic Exercises           [ ]    Edema Management/Control  [ ]           Therapeutic Activities            [X]    Patient and Family Training/Education  [ ]           Other (comment):     Frequency/Duration: Patient will be followed by physical therapy  3 times a week to address goals. Discharge Recommendations: Home Health PT  Further Equipment Recommendations for Discharge: RW, daughter has one that patient can use       SUBJECTIVE:   Patient stated I had 13 children.       OBJECTIVE DATA SUMMARY:   HISTORY:    Past Medical History:   Diagnosis Date    Arrhythmia       atrial fibrillation, chronic. Stress test 2011 essent. normal.  Mild-mod AR on ECHO    Hypertension      Thyroid disease       hypothyroid     Past Surgical History:   Procedure Laterality Date    ECHO TRANSESOPHAGEAL (DENISE) W OR WO CONTRAST   4/27/2011          HX GYN         vaginal deliveries x10    HX TUBAL LIGATION        [place] CARDIOVERSION ELECTIVE ARRHYTHMIA EXTERNAL   6/14/2011           Prior Level of Function/Home Situation: independent with ADLs, ambulating without an assistive device, drives  Personal factors and/or comorbidities impacting plan of care:      Home Situation  Home Environment: Trailer/mobile home  # Steps to Enter: 2  One/Two Story Residence: One story  Living Alone: No  Support Systems: Child(ricardo)  Patient Expects to be Discharged to[de-identified] Trailer/mobile home  Current DME Used/Available at Home: None     EXAMINATION/PRESENTATION/DECISION MAKING:   Critical Behavior:  Pleasant and cooperative     Hearing: Auditory  Auditory Impairment: None      Skin:  Intact     Edema: none noted     Range Of Motion:  AROM: Within functional limits     Strength:    Strength:  Within functional limits     Tone & Sensation:   Tone: Normal  Sensation: Intact     Coordination:  Coordination: Within functional limits      Functional Mobility:  Bed Mobility:  Rolling: Independent  Supine to Sit: Independent  Sit to Supine: Independent  Scooting: Independent      Transfers:  Sit to Stand: Independent  Stand to Sit: Independent        Balance:   Sitting: Intact  Standing: Impaired  Standing - Static: Good  Standing - Dynamic : Fair (without RW)     Ambulation/Gait Training:  Distance (ft): 300 Feet (ft)  Assistive Device: Gait belt;Walker, rolling (150' without an assistive device, 150' with RW)  Ambulation - Level of Assistance: Contact guard assistance  Gait Abnormalities: Decreased step clearance; Path deviations                 Stairs:  Number of Stairs Trained: 4  Stairs - Level of Assistance: Contact guard assistance              Rail Use: Both     Functional Measure:     Elder Mobility Scale      14/20         EMS and G-code impairment scale:  Percentage of impairment CH  0% CI  1-19% CJ  20-39% CK  40-59% CL  60-79% CM  80-99% CN  100%   EMS Score 0-20 20 17-19 13-16 9-12 5-8 1-4 0      Scores under 10  generally these patients are dependent in mobility maneuvers; require help with  basic ADL, such as transfers, toileting and dressing. Scores between 10  13  generally these patients are borderline in terms of safe mobility and  independence in ADL i.e. they require some help with some mobility maneuvers. Scores over 14  Generally these patients are able to perform mobility maneuvers alone and safely  and are independent in basic ADL. G codes: In compliance with CMSs Claims Based Outcome Reporting, the following G-code set was chosen for this patient based on their primary functional limitation being treated: The outcome measure chosen to determine the severity of the functional limitation was the Elderly Mobility Scale Score with a score of 14/20 which was correlated with the impairment scale.       · Mobility - Walking and Moving Around:               - CURRENT STATUS:    CJ - 20%-39% impaired, limited or restricted               - GOAL STATUS:           CI - 1%-19% impaired, limited or restricted               - D/C STATUS:                       ---------------To be determined---------------      Pain:  Pain Scale 1: Numeric (0 - 10)  Pain Intensity 1: 0     Activity Tolerance: Tolerated PT evaluation/treatment session well. Please refer to the flowsheet for vital signs taken during this treatment. After treatment:   [X]         Patient left in no apparent distress sitting up in chair  [ ]         Patient left in no apparent distress in bed  [X]         Call bell left within reach  [X]         Nursing notified  [X]         Caregiver present  [ ]         Bed alarm activated (not being used)      COMMUNICATION/EDUCATION:   The patients plan of care was discussed with: Registered Nurse.  [X]         Fall prevention education was provided and the patient/caregiver indicated understanding. [ ]         Patient/family have participated as able in goal setting and plan of care. [X]         Patient/family agree to work toward stated goals and plan of care. [ ]         Patient understands intent and goals of therapy, but is neutral about his/her participation. [ ]         Patient is unable to participate in goal setting and plan of care.      Thank you for this referral.  Yamilka James, PT  Time Calculation: 29 mins

## 2017-04-29 NOTE — PROGRESS NOTES
General Daily Progress Note    Admit Date: 4/27/2017  Hospital day 3    Subjective:     Patient has no complaint of shortness of breath or chest pain. Some nausea this am. ..   Medication side effects: none    Current Facility-Administered Medications   Medication Dose Route Frequency    sodium chloride (NS) flush 5-10 mL  5-10 mL IntraVENous Q8H    sodium chloride (NS) flush 5-10 mL  5-10 mL IntraVENous PRN    atenolol (TENORMIN) tablet 25 mg  25 mg Oral DAILY    levothyroxine (SYNTHROID) tablet 100 mcg  100 mcg Oral 7am    dabigatran etexilate (PRADAXA) capsule 75 mg  75 mg Oral BID    acetaminophen (TYLENOL) tablet 650 mg  650 mg Oral Q4H PRN    0.45% sodium chloride infusion  75 mL/hr IntraVENous CONTINUOUS        Review of Systems  Pertinent items are noted in HPI. Objective:     Patient Vitals for the past 8 hrs:   BP Temp Pulse Resp SpO2   04/29/17 0817 93/62 97.9 °F (36.6 °C) 65 20 94 %   04/29/17 0147 107/55 97.6 °F (36.4 °C) 70 16 94 %     04/29 0701 - 04/29 1900  In: 335 [I.V.:335]  Out: -   04/27 1901 - 04/29 0700  In: 2785.8 [P.O.:120; I.V.:2665.8]  Out: -     Physical Exam:   Visit Vitals    BP 93/62 (BP 1 Location: Left arm, BP Patient Position: At rest)    Pulse 65    Temp 97.9 °F (36.6 °C)    Resp 20    Ht 5' 5\" (1.651 m)    Wt 135 lb (61.2 kg)    SpO2 94%    Breastfeeding No    BMI 22.47 kg/m2     Lungs: clear to auscultation bilaterally  Heart: regular rate and rhythm, S1, S2 normal, no murmur, click, rub or gallop  Abdomen: soft, non-tender.  Bowel sounds normal. No masses,  no organomegaly  Extremities: extremities normal, atraumatic, no cyanosis or edema      ECG: normal sinus rhythm     Data Review   Recent Results (from the past 24 hour(s))   METABOLIC PANEL, BASIC    Collection Time: 04/29/17  1:37 AM   Result Value Ref Range    Sodium 145 136 - 145 mmol/L    Potassium 3.6 3.5 - 5.1 mmol/L    Chloride 116 (H) 97 - 108 mmol/L    CO2 22 21 - 32 mmol/L    Anion gap 7 5 - 15 mmol/L    Glucose 108 (H) 65 - 100 mg/dL    BUN 37 (H) 6 - 20 MG/DL    Creatinine 1.31 (H) 0.55 - 1.02 MG/DL    BUN/Creatinine ratio 28 (H) 12 - 20      GFR est AA 47 (L) >60 ml/min/1.73m2    GFR est non-AA 39 (L) >60 ml/min/1.73m2    Calcium 7.2 (L) 8.5 - 10.1 MG/DL           Assessment:     Active Problems:    Hyperkalemia (4/27/2017)        Plan:     1, Hypotension- improved. Spironolactone and lisinopril on hold. BP still borderline low. 2. ARF- improving on iv fluids  3. Hyperkalemia- resolved. 4. Past hx hypokalemia.

## 2017-04-29 NOTE — PROGRESS NOTES
Bedside and Verbal shift change report given to Wes Green (oncoming nurse) by Torres Zamora RN (offgoing nurse). Report included the following information SBAR, Kardex, Intake/Output, MAR and Cardiac Rhythm A Fib Zone Phone for oncoming shift:       Shift Summary: BP is on the rise    LDAs               Peripheral IV 04/28/17 Left Forearm (Active)   Site Assessment Clean, dry, & intact 4/29/2017  3:51 AM   Phlebitis Assessment 0 4/29/2017  3:51 AM   Infiltration Assessment 0 4/29/2017  3:51 AM   Dressing Status Clean, dry, & intact 4/29/2017  3:51 AM   Dressing Type Tape;Transparent 4/29/2017  3:51 AM   Hub Color/Line Status Pink; Infusing 4/29/2017  3:51 AM                        Intake & Output   Date 04/28/17 0700 - 04/29/17 0659 04/29/17 0700 - 04/30/17 0659   Shift 7881-5787 1487-4612 24 Hour Total 6394-8660 5715-5304 24 Hour Total   I  N  T  A  K  E   P.O. 120  120         P. O. 120  120       I.V.  (mL/kg/hr)  2118.3  (2.9) 2118.3  (1.4) 335  335      Volume (0.45% sodium chloride infusion)  2118.3 2118.3 335  335    Shift Total  (mL/kg) 120  (2) 2118.3  (34.6) 2238.3  (36.6) 335  (5.5)  335  (5.5)   O  U  T  P  U  T   Urine  (mL/kg/hr)            Urine Occurrence(s) 1 x  1 x       Stool            Stool Occurrence(s) 1 x  1 x       Shift Total  (mL/kg)          2118.3 2238.3 335  335   Weight (kg) 61.2 61.2 61.2 61.2 61.2 61.2      Last Bowel Movement Last Bowel Movement Date: 04/27/17   Glucose Checks [x] N/A  [] AC/HS  [] Q6  Concerns:   Nutrition Active Orders   Diet    DIET CARDIAC Regular       Consults []PT  []OT  []Speech  []Case Management   Cardiac Monitoring []N/A [x]Yes Expires:

## 2017-04-30 LAB
ANION GAP BLD CALC-SCNC: 10 MMOL/L (ref 5–15)
BUN SERPL-MCNC: 21 MG/DL (ref 6–20)
BUN/CREAT SERPL: 18 (ref 12–20)
CALCIUM SERPL-MCNC: 7.1 MG/DL (ref 8.5–10.1)
CHLORIDE SERPL-SCNC: 116 MMOL/L (ref 97–108)
CO2 SERPL-SCNC: 19 MMOL/L (ref 21–32)
CREAT SERPL-MCNC: 1.14 MG/DL (ref 0.55–1.02)
ERYTHROCYTE [DISTWIDTH] IN BLOOD BY AUTOMATED COUNT: 16.6 % (ref 11.5–14.5)
GLUCOSE SERPL-MCNC: 78 MG/DL (ref 65–100)
HCT VFR BLD AUTO: 26 % (ref 35–47)
HGB BLD-MCNC: 8.6 G/DL (ref 11.5–16)
MCH RBC QN AUTO: 29 PG (ref 26–34)
MCHC RBC AUTO-ENTMCNC: 33.1 G/DL (ref 30–36.5)
MCV RBC AUTO: 87.5 FL (ref 80–99)
PLATELET # BLD AUTO: 94 K/UL (ref 150–400)
POTASSIUM SERPL-SCNC: 3.2 MMOL/L (ref 3.5–5.1)
RBC # BLD AUTO: 2.97 M/UL (ref 3.8–5.2)
SODIUM SERPL-SCNC: 145 MMOL/L (ref 136–145)
WBC # BLD AUTO: 5.4 K/UL (ref 3.6–11)

## 2017-04-30 PROCEDURE — 80048 BASIC METABOLIC PNL TOTAL CA: CPT | Performed by: FAMILY MEDICINE

## 2017-04-30 PROCEDURE — 36415 COLL VENOUS BLD VENIPUNCTURE: CPT | Performed by: FAMILY MEDICINE

## 2017-04-30 PROCEDURE — 65660000000 HC RM CCU STEPDOWN

## 2017-04-30 PROCEDURE — 85027 COMPLETE CBC AUTOMATED: CPT | Performed by: FAMILY MEDICINE

## 2017-04-30 PROCEDURE — 74011000258 HC RX REV CODE- 258: Performed by: FAMILY MEDICINE

## 2017-04-30 PROCEDURE — 74011250637 HC RX REV CODE- 250/637: Performed by: FAMILY MEDICINE

## 2017-04-30 PROCEDURE — 74011250637 HC RX REV CODE- 250/637: Performed by: INTERNAL MEDICINE

## 2017-04-30 RX ORDER — POTASSIUM CHLORIDE 750 MG/1
10 TABLET, FILM COATED, EXTENDED RELEASE ORAL 2 TIMES DAILY
Status: DISCONTINUED | OUTPATIENT
Start: 2017-04-30 | End: 2017-05-03 | Stop reason: HOSPADM

## 2017-04-30 RX ORDER — QUETIAPINE FUMARATE 25 MG/1
25 TABLET, FILM COATED ORAL
Status: DISCONTINUED | OUTPATIENT
Start: 2017-04-30 | End: 2017-05-03 | Stop reason: HOSPADM

## 2017-04-30 RX ORDER — BISACODYL 5 MG
10 TABLET, DELAYED RELEASE (ENTERIC COATED) ORAL
Status: COMPLETED | OUTPATIENT
Start: 2017-05-01 | End: 2017-05-01

## 2017-04-30 RX ADMIN — Medication 10 ML: at 15:31

## 2017-04-30 RX ADMIN — LEVOTHYROXINE SODIUM 100 MCG: 100 TABLET ORAL at 07:14

## 2017-04-30 RX ADMIN — SODIUM CHLORIDE 75 ML/HR: 4.5 INJECTION, SOLUTION INTRAVENOUS at 02:00

## 2017-04-30 RX ADMIN — POTASSIUM CHLORIDE 10 MEQ: 750 TABLET, FILM COATED, EXTENDED RELEASE ORAL at 17:25

## 2017-04-30 RX ADMIN — SODIUM CHLORIDE 75 ML/HR: 4.5 INJECTION, SOLUTION INTRAVENOUS at 15:31

## 2017-04-30 RX ADMIN — POTASSIUM CHLORIDE 10 MEQ: 750 TABLET, FILM COATED, EXTENDED RELEASE ORAL at 10:38

## 2017-04-30 RX ADMIN — Medication 10 ML: at 21:56

## 2017-04-30 RX ADMIN — PANTOPRAZOLE SODIUM 40 MG: 40 TABLET, DELAYED RELEASE ORAL at 07:14

## 2017-04-30 RX ADMIN — ATENOLOL 25 MG: 25 TABLET ORAL at 10:37

## 2017-04-30 RX ADMIN — QUETIAPINE FUMARATE 25 MG: 25 TABLET, FILM COATED ORAL at 21:56

## 2017-04-30 NOTE — PROGRESS NOTES
Bedside and Verbal shift change report given to [address] [place] [address] (oncoming nurse) by Cj Malave RN (offgoing nurse). Report included the following information SBAR, Kardex, Intake/Output, MAR and Recent Results. Zone Phone for oncoming shift:   [phone]    Shift Summary: VS stable. No c/o pain. LDAs               Peripheral IV 04/28/17 Left Forearm (Active)   Site Assessment Clean, dry, & intact 4/29/2017  4:54 PM   Phlebitis Assessment 0 4/29/2017  4:54 PM   Infiltration Assessment 0 4/29/2017  4:54 PM   Dressing Status Clean, dry, & intact 4/29/2017  4:54 PM   Dressing Type Transparent 4/29/2017  4:54 PM   Hub Color/Line Status Pink; Infusing 4/29/2017  4:54 PM                        Intake & Output   Date 04/28/17 1900 - 04/29/17 0659 04/29/17 0700 - 04/30/17 0659   Shift 6503-5021 24 Hour Total 0925-2477 9428-1856 24 Hour Total   I  N  T  A  K  E   P.O.  120 480  480      P. O.  120 480  480    I.V.  (mL/kg/hr) 2118.3 2118.3 815  (1.1)  815      Volume (0.45% sodium chloride infusion) 2118.3 2118.3 815  815    Shift Total  (mL/kg) 2118.3  (34.6) 2238.3  (36.6) 1295  (21.1)  1295  (21.1)   O  U  T  P  U  T   Urine  (mL/kg/hr)           Urine Occurrence(s)  1 x 2 x  2 x    Stool           Stool Occurrence(s)  1 x       Shift Total  (mL/kg)        NET 2118.3 2238.3 1295  1295   Weight (kg) 61.2 61.2 61.2 61.2 61.2      Last Bowel Movement Last Bowel Movement Date: 04/27/17   Glucose Checks [x] N/A  [] AC/HS  [] Q6  Concerns:   Nutrition Active Orders   Diet    DIET CARDIAC Regular       Consults [x]PT  []OT  []Speech  []Case Management   Cardiac Monitoring []N/A [x]Yes Expires:

## 2017-04-30 NOTE — CONSULTS
Gastroenterology Consult     Referring Physician: Antonio Sommer  PCP:  Mandy Baron MD  Gastroenterologist:  None prior that she knows--had colonoscopy many years ago    Consult Date: 4/30/2017     Subjective:     Chief Complaint: anemia, progressive     History of Present Illness: Samantha Cui is a 80 y.o. female who is seen in consultation for the above issues. She had symtomws of leg pain, weakness and upper resp symptoms. .  She is improve with RX of her low K+. Found to have anemia, progressive, heme +. No blood transfusion. --Vivia Lama witness    On Pradaxa--stopped today  A fib    + prior colonoscopy  Not sure of doctor    +++ indigstion sour stomach yesterday    No black or bloody stools  + weight loss 30 lbs a year or so ago, then another 5 lbs since 2.2017            Past Medical History:   Diagnosis Date    Arrhythmia     atrial fibrillation, chronic. Stress test 2011 ess[facility].  normal.  Mild-mod AR on ECHO    Hypertension     Thyroid disease     hypothyroid     Past Surgical History:   Procedure Laterality Date    ECHO TRANSESOPHAGEAL (DENISE) W OR WO CONTRAST  4/27/2011         HX GYN      vaginal deliveries x10    HX TUBAL LIGATION      [place] CARDIOVERSION ELECTIVE ARRHYTHMIA EXTERNAL  6/14/2011           Family History   Problem Relation Age of Onset    Heart Disease Father     Cancer Son     Heart Disease Son      Social History   Substance Use Topics    Smoking status: Never Smoker    Smokeless tobacco: Never Used    Alcohol use No      No Known Allergies  Current Facility-Administered Medications   Medication Dose Route Frequency    potassium chloride SR (KLOR-CON 10) tablet 10 mEq  10 mEq Oral BID    pantoprazole (PROTONIX) tablet 40 mg  40 mg Oral ACB    ondansetron (ZOFRAN) injection 4 mg  4 mg IntraVENous Q6H PRN    sodium chloride (NS) flush 5-10 mL  5-10 mL IntraVENous Q8H    sodium chloride (NS) flush 5-10 mL  5-10 mL IntraVENous PRN    atenolol (TENORMIN) tablet 25 mg  25 mg Oral DAILY    levothyroxine (SYNTHROID) tablet 100 mcg  100 mcg Oral 7am    acetaminophen (TYLENOL) tablet 650 mg  650 mg Oral Q4H PRN    0.45% sodium chloride infusion  75 mL/hr IntraVENous CONTINUOUS        Review of Systems:  A detailed 10 organ review of systems is obtained with pertinent positives as listed in the History of Present Illness and Past Medical History. All others are negative.    +_ thyroid problems      Objective:     Physical Exam:  Visit Vitals    /65 (BP 1 Location: Right arm, BP Patient Position: At rest)    Pulse 65    Temp 97.9 °F (36.6 °C)    Resp 18    Ht 5' 5\" (1.651 m)    Wt 61.2 kg (135 lb)    SpO2 95%    Breastfeeding No    BMI 22.47 kg/m2      Gen: Thin nad. Skin:  Extremities and face reveal no rashes. HEENT: Sclerae anicteric. Extra-occular muscles are intact. No oral ulcers. Cardiovascular: Regular rate and rhythm. No murmurs, gallops, or rubs. PMI nondisplaced. Carotids without bruits. Respiratory:  Comfortable breathing with no accessory muscle use. Clear breath sounds with no wheezes, rales, or rhonchi. GI:  Abdomen nondistended, soft, and nontender. Normal active bowel sounds. No enlargement of the liver or spleen. No masses palpable. Rectal:  Deferred  Musculoskeletal:  No pitting edema of the lower legs. Extremities have good range of motion. No costovertebral tenderness. Neurological:  Gross memory appears intact. Patient is alert and oriented. Psychiatric:  Mood appears appropriate with judgement intact. Lymphatic:  No cervical or supraclavicular adenopathy.     Lab/Data Review:  Recent Labs      04/30/17 0452  04/28/17 0313   WBC  5.4  6.0   HGB  8.6*  9.3*   HCT  26.0*  28.7*   PLT  94*  115*     Recent Labs      04/30/17   0452  04/29/17   0137  04/28/17 0313 04/27/17   1421   NA  145  145  146*   < >  143   K  3.2*  3.6  4.0   < >  8.1*   CL  116*  116*  116*   < >  126*   CO2  19*  22  20*   < >  9*   BUN  21*  37*  51*   < > 65*   CREA  1.14*  1.31*  1.67*   < >  2.16*   GLU  78  108*  87   < >  87   CA  7.1*  7.2*  8.1*   < >  7.8*   MG   --    --   1.5*   --   1.8    < > = values in this interval not displayed. Recent Labs      04/27/17   1421   SGOT  12*   ALT  17   AP  60   TBILI  0.3   TP  7.0   ALB  2.9*   GLOB  4.1*     No results for input(s): INR, PTP, APTT in the last 72 hours. No lab exists for component: INREXT   No results for input(s): FE, TIBC, PSAT, FERR in the last 72 hours. No results found for: FOL, RBCF   No results for input(s): PH, PCO2, PO2 in the last 72 hours. Recent Labs      04/27/17   1421   CPK  23*   CKNDX  4.3*   TROIQ  <0.04     Lab Results   Component Value Date/Time    Cholesterol, total 94 02/17/2017 09:52 AM    HDL Cholesterol 30 02/17/2017 09:52 AM    LDL, calculated 46 02/17/2017 09:52 AM    Triglyceride 90 02/17/2017 09:52 AM    CHOL/HDL Ratio 3.1 04/20/2010 01:10 AM     No results found for: [facility]  Lab Results   Component Value Date/Time    Color YELLOW/STRAW 04/27/2017 04:22 PM    Appearance CLEAR 04/27/2017 04:22 PM    Specific gravity 1.011 04/27/2017 04:22 PM    Specific gravity 1.025 05/07/2011 11:50 PM    pH (UA) 5.5 04/27/2017 04:22 PM    Protein TRACE 04/27/2017 04:22 PM    Glucose NEGATIVE  04/27/2017 04:22 PM    Ketone NEGATIVE  04/27/2017 04:22 PM    Bilirubin NEGATIVE  04/27/2017 04:22 PM    Urobilinogen 0.2 04/27/2017 04:22 PM    Nitrites NEGATIVE  04/27/2017 04:22 PM    Leukocyte Esterase SMALL 04/27/2017 04:22 PM    Epithelial cells FEW 04/27/2017 04:22 PM    Bacteria NEGATIVE  04/27/2017 04:22 PM    WBC 5-10 04/27/2017 04:22 PM    RBC 0-5 04/27/2017 04:22 PM        Assessment/Plan:     Active Problems:    Hyperkalemia (4/27/2017)       Given age, renal function and pradaxa use as well as her not wanting blood transfusions will defer endoscopic work up to 5. 2.2017  I will write prep for tomorrow   I discussed with her (family present) the objectives, risks, consequences and alternatives to the procedure. We discussed risk of no blood products. She is comitted to not receiving blood. I discussed myocardial infarction, poor tissue perfusion.   I consider this an informed refusal.      Griselda Muir, MD  1:48 PM  4/30/2017

## 2017-04-30 NOTE — PROGRESS NOTES
Pt vomited minimal emesis, states she \"is not sure if it's heartburn or not\"; paged Dr. Graeme Garcia, telephone orders received for Protonix 40 mg PO Daily and Zofran 4 mg IV Q6 PRN nausea.

## 2017-04-30 NOTE — PROGRESS NOTES
General Daily Progress Note    Admit Date: 4/27/2017  Hospital day 4    Subjective:     Patient has no complaint of abdominal pain. Did have episode of vomiting yesterday, and has been having heartburn lately. ..   Medication side effects: none    Current Facility-Administered Medications   Medication Dose Route Frequency    pantoprazole (PROTONIX) tablet 40 mg  40 mg Oral ACB    ondansetron (ZOFRAN) injection 4 mg  4 mg IntraVENous Q6H PRN    sodium chloride (NS) flush 5-10 mL  5-10 mL IntraVENous Q8H    sodium chloride (NS) flush 5-10 mL  5-10 mL IntraVENous PRN    atenolol (TENORMIN) tablet 25 mg  25 mg Oral DAILY    levothyroxine (SYNTHROID) tablet 100 mcg  100 mcg Oral 7am    dabigatran etexilate (PRADAXA) capsule 75 mg  75 mg Oral BID    acetaminophen (TYLENOL) tablet 650 mg  650 mg Oral Q4H PRN    0.45% sodium chloride infusion  75 mL/hr IntraVENous CONTINUOUS        Review of Systems  Pertinent items are noted in HPI. Objective:     Patient Vitals for the past 8 hrs:   BP Temp Pulse Resp SpO2   04/30/17 0835 112/65 97.9 °F (36.6 °C) 65 18 95 %   04/30/17 0355 118/67 97.8 °F (36.6 °C) 64 18 97 %        04/28 1901 - 04/30 0700  In: 4413.3 [P.O.:680; I.V.:3733.3]  Out: -     Physical Exam:   Visit Vitals    /65 (BP 1 Location: Right arm, BP Patient Position: At rest)    Pulse 65    Temp 97.9 °F (36.6 °C)    Resp 18    Ht 5' 5\" (1.651 m)    Wt 135 lb (61.2 kg)    SpO2 95%    Breastfeeding No    BMI 22.47 kg/m2     Lungs: clear to auscultation bilaterally  Heart: regular rate and rhythm, S1, S2 normal, no murmur, click, rub or gallop  Abdomen: soft, non-tender. Bowel sounds normal. No masses,  no organomegaly  Extremities: extremities normal, atraumatic, no cyanosis or edema  Rect- stool brown and markedly heme positive.        ECG: normal sinus rhythm     Data Review   Recent Results (from the past 24 hour(s))   METABOLIC PANEL, BASIC    Collection Time: 04/30/17  4:52 AM   Result Value Ref Range    Sodium 145 136 - 145 mmol/L    Potassium 3.2 (L) 3.5 - 5.1 mmol/L    Chloride 116 (H) 97 - 108 mmol/L    CO2 19 (L) 21 - 32 mmol/L    Anion gap 10 5 - 15 mmol/L    Glucose 78 65 - 100 mg/dL    BUN 21 (H) 6 - 20 MG/DL    Creatinine 1.14 (H) 0.55 - 1.02 MG/DL    BUN/Creatinine ratio 18 12 - 20      GFR est AA 55 (L) >60 ml/min/1.73m2    GFR est non-AA 45 (L) >60 ml/min/1.73m2    Calcium 7.1 (L) 8.5 - 10.1 MG/DL   CBC W/O DIFF    Collection Time: 04/30/17  4:52 AM   Result Value Ref Range    WBC 5.4 3.6 - 11.0 K/uL    RBC 2.97 (L) 3.80 - 5.20 M/uL    HGB 8.6 (L) 11.5 - 16.0 g/dL    HCT 26.0 (L) 35.0 - 47.0 %    MCV 87.5 80.0 - 99.0 FL    MCH 29.0 26.0 - 34.0 PG    MCHC 33.1 30.0 - 36.5 g/dL    RDW 16.6 (H) 11.5 - 14.5 %    PLATELET 94 (L) 227 - 400 K/uL           Assessment:     Active Problems:    Hyperkalemia (4/27/2017)        Plan:       1, Hypotension- improved. Spironolactone and lisinopril on hold. BP still borderline low. 2. ARF- improving on iv fluids  3. Hyperkalemia- resolved. Now potassium is low. Will replace. 4. Past hx hypokalemia. 5. Drop in hgb, heme positive stool. Recent GERD symptoms. Will start PPI and consult gi. Pt. Bj Melvin Witness, refuses transfusions.  Will hold pradaxa for now also.

## 2017-05-01 LAB
BASOPHILS # BLD AUTO: 0 K/UL (ref 0–0.1)
BASOPHILS # BLD: 0 % (ref 0–1)
EOSINOPHIL # BLD: 0.1 K/UL (ref 0–0.4)
EOSINOPHIL NFR BLD: 3 % (ref 0–7)
ERYTHROCYTE [DISTWIDTH] IN BLOOD BY AUTOMATED COUNT: 16.6 % (ref 11.5–14.5)
HCT VFR BLD AUTO: 27.6 % (ref 35–47)
HGB BLD-MCNC: 8.9 G/DL (ref 11.5–16)
LYMPHOCYTES # BLD AUTO: 33 % (ref 12–49)
LYMPHOCYTES # BLD: 1.7 K/UL (ref 0.8–3.5)
MCH RBC QN AUTO: 28.6 PG (ref 26–34)
MCHC RBC AUTO-ENTMCNC: 32.2 G/DL (ref 30–36.5)
MCV RBC AUTO: 88.7 FL (ref 80–99)
MONOCYTES # BLD: 0.5 K/UL (ref 0–1)
MONOCYTES NFR BLD AUTO: 10 % (ref 5–13)
NEUTS SEG # BLD: 2.7 K/UL (ref 1.8–8)
NEUTS SEG NFR BLD AUTO: 54 % (ref 32–75)
PLATELET # BLD AUTO: 103 K/UL (ref 150–400)
RBC # BLD AUTO: 3.11 M/UL (ref 3.8–5.2)
WBC # BLD AUTO: 5 K/UL (ref 3.6–11)

## 2017-05-01 PROCEDURE — 74011250637 HC RX REV CODE- 250/637: Performed by: INTERNAL MEDICINE

## 2017-05-01 PROCEDURE — 74011250637 HC RX REV CODE- 250/637: Performed by: SPECIALIST

## 2017-05-01 PROCEDURE — 74011000258 HC RX REV CODE- 258: Performed by: FAMILY MEDICINE

## 2017-05-01 PROCEDURE — 65660000000 HC RM CCU STEPDOWN

## 2017-05-01 PROCEDURE — 74011250637 HC RX REV CODE- 250/637: Performed by: FAMILY MEDICINE

## 2017-05-01 PROCEDURE — 85025 COMPLETE CBC W/AUTO DIFF WBC: CPT | Performed by: FAMILY MEDICINE

## 2017-05-01 PROCEDURE — 74011000250 HC RX REV CODE- 250: Performed by: SPECIALIST

## 2017-05-01 PROCEDURE — 97116 GAIT TRAINING THERAPY: CPT | Performed by: PHYSICAL THERAPIST

## 2017-05-01 PROCEDURE — 36415 COLL VENOUS BLD VENIPUNCTURE: CPT | Performed by: FAMILY MEDICINE

## 2017-05-01 RX ADMIN — SODIUM CHLORIDE 75 ML/HR: 4.5 INJECTION, SOLUTION INTRAVENOUS at 06:35

## 2017-05-01 RX ADMIN — BISACODYL 10 MG: 5 TABLET, COATED ORAL at 16:30

## 2017-05-01 RX ADMIN — QUETIAPINE FUMARATE 25 MG: 25 TABLET, FILM COATED ORAL at 21:03

## 2017-05-01 RX ADMIN — Medication 10 ML: at 21:03

## 2017-05-01 RX ADMIN — LEVOTHYROXINE SODIUM 100 MCG: 100 TABLET ORAL at 06:30

## 2017-05-01 RX ADMIN — POTASSIUM CHLORIDE 10 MEQ: 750 TABLET, FILM COATED, EXTENDED RELEASE ORAL at 09:38

## 2017-05-01 RX ADMIN — ATENOLOL 25 MG: 25 TABLET ORAL at 09:38

## 2017-05-01 RX ADMIN — Medication 10 ML: at 16:27

## 2017-05-01 RX ADMIN — PANTOPRAZOLE SODIUM 40 MG: 40 TABLET, DELAYED RELEASE ORAL at 09:38

## 2017-05-01 RX ADMIN — POTASSIUM CHLORIDE 10 MEQ: 750 TABLET, FILM COATED, EXTENDED RELEASE ORAL at 17:22

## 2017-05-01 RX ADMIN — POLYETHYLENE GLYCOL 3350, SODIUM SULFATE ANHYDROUS, SODIUM BICARBONATE, SODIUM CHLORIDE, POTASSIUM CHLORIDE 4000 ML: 236; 22.74; 6.74; 5.86; 2.97 POWDER, FOR SOLUTION ORAL at 09:47

## 2017-05-01 RX ADMIN — ACETAMINOPHEN 650 MG: 325 TABLET, FILM COATED ORAL at 03:52

## 2017-05-01 RX ADMIN — Medication 10 ML: at 06:30

## 2017-05-01 NOTE — PROGRESS NOTES
Problem: Mobility Impaired (Adult and Pediatric)  Goal: *Acute Goals and Plan of Care (Insert Text)  Physical Therapy Goals  Initiated 4/29/2017    1. Patient will transfer from bed to chair and chair to bed with independence using the least restrictive device within 7 day(s). 2. Patient will ambulate with supervision/set-up for 300 feet with the least restrictive device within 7 day(s). 3. Patient will ascend/descend 4 stairs with handrail(s) with supervision/set-up within 7 day(s). PHYSICAL THERAPY TREATMENT  Patient: Mora López (28 y.o. female)  Date: 5/1/2017  Diagnosis: Hyperkalemia  progressive anemia, heme Pos stool   <principal problem not specified>  Procedure(s) (LRB):  ESOPHAGOGASTRODUODENOSCOPY (EGD) (N/A)  COLONOSCOPY (N/A)           ASSESSMENT: Patient reporting R foot pain this am which limits her overall OOB mobility. Patient demonstrating independence with bed mobility and requires SBA for transfers. Gait is slow and antalgic secondary to R foot pain, but gait is steady using RW for support. Recommend HHPT for safety evaluation following discharge. Patient lives with children and has reports excellent family support. Progression toward goals:  [ ]    Improving appropriately and progressing toward goals  [X]    Improving slowly and progressing toward goals  [ ]    Not making progress toward goals and plan of care will be adjusted       PLAN:  Patient continues to benefit from skilled intervention to address the above impairments. Continue treatment per established plan of care. Discharge Recommendations:  Home Health  Further Equipment Recommendations for Discharge:  Daughter has RW for patient to use       SUBJECTIVE:   Patient stated I think I twisted my foot in my sleep. My foot really hurts today.       OBJECTIVE DATA SUMMARY:   Critical Behavior:  Neurologic State: Alert  Orientation Level: Oriented X4  Cognition: Appropriate for age attention/concentration, Follows commands Functional Mobility Training:  Bed Mobility:  Rolling: Independent  Supine to Sit: Independent     Scooting: Independent        Transfers:  Sit to Stand: Stand-by asssistance  Stand to Sit: Contact guard assistance (VC for safety)                             Balance:  Sitting: Intact  Standing: Impaired  Standing - Static: Good; Unsupported  Standing - Dynamic : Good (using RW for support)  Ambulation/Gait Training:  Distance (ft): 175 Feet (ft)  Assistive Device: Walker, rolling  Ambulation - Level of Assistance: Contact guard assistance        Gait Abnormalities: Antalgic;Decreased step clearance        Base of Support: Widened     Speed/Vanessa: Pace decreased (<100 feet/min)  Step Length: Left shortened         Gait is slow and antalgic using RW for support; no LOB noted        Pain:  Pain Scale 1: Numeric (0 - 10)  Pain Intensity 1: 0  Pain Location 1: Foot  Pain Orientation 1: Right  Pain Description 1: Aching  Pain Intervention(s) 1: Medication (see MAR)  Activity Tolerance:   O2 sats remained 98-99% throughout tx session  Please refer to the flowsheet for vital signs taken during this treatment.   After treatment:   [X]    Patient left in no apparent distress sitting up in chair  [ ]    Patient left in no apparent distress in bed  [X]    Call bell left within reach  [X]    Nursing notified  [ ]    Caregiver present  [ ]    Bed alarm activated      COMMUNICATION/COLLABORATION:   The patients plan of care was discussed with: Registered Nurse     Pollyann Apgar, PT   Time Calculation: 19 mins

## 2017-05-01 NOTE — PROGRESS NOTES
General Daily Progress Note    Admit Date: 4/27/2017  Hospital day 4    Subjective:     Patient has no complaint of abdominal pain, bloody stools. Some foot pain. ..   Medication side effects: none    Current Facility-Administered Medications   Medication Dose Route Frequency    potassium chloride SR (KLOR-CON 10) tablet 10 mEq  10 mEq Oral BID    bisacodyl (DULCOLAX) tablet 10 mg  10 mg Oral NOW    peg 3350-electrolytes (COLYTE) 4000 mL  4,000 mL Oral ONCE    QUEtiapine (SEROquel) tablet 25 mg  25 mg Oral QHS    pantoprazole (PROTONIX) tablet 40 mg  40 mg Oral ACB    ondansetron (ZOFRAN) injection 4 mg  4 mg IntraVENous Q6H PRN    sodium chloride (NS) flush 5-10 mL  5-10 mL IntraVENous Q8H    sodium chloride (NS) flush 5-10 mL  5-10 mL IntraVENous PRN    atenolol (TENORMIN) tablet 25 mg  25 mg Oral DAILY    levothyroxine (SYNTHROID) tablet 100 mcg  100 mcg Oral 7am    acetaminophen (TYLENOL) tablet 650 mg  650 mg Oral Q4H PRN    0.45% sodium chloride infusion  75 mL/hr IntraVENous CONTINUOUS        Review of Systems  Pertinent items are noted in HPI. Objective:     Patient Vitals for the past 8 hrs:   BP Temp Pulse Resp SpO2   05/01/17 0354 129/60 97.9 °F (36.6 °C) 67 16 96 %        04/29 1901 - 05/01 0700  In: 1240 [P.O.:440; I.V.:800]  Out: -     Physical Exam:   Visit Vitals    /60 (BP 1 Location: Right arm, BP Patient Position: At rest;Post activity)    Pulse 67    Temp 97.9 °F (36.6 °C)    Resp 16    Ht 5' 5\" (1.651 m)    Wt 135 lb (61.2 kg)    SpO2 96%    Breastfeeding No    BMI 22.47 kg/m2     Lungs: clear to auscultation bilaterally  Heart: regular rate and rhythm, S1, S2 normal, no murmur, click, rub or gallop  Abdomen: soft, non-tender.  Bowel sounds normal. No masses,  no organomegaly  Extremities: extremities normal, atraumatic, no cyanosis or edema      ECG: atrial fibrillation, rate 80     Data Review   Recent Results (from the past 24 hour(s))   CBC WITH AUTOMATED DIFF Collection Time: 05/01/17  3:54 AM   Result Value Ref Range    WBC 5.0 3.6 - 11.0 K/uL    RBC 3.11 (L) 3.80 - 5.20 M/uL    HGB 8.9 (L) 11.5 - 16.0 g/dL    HCT 27.6 (L) 35.0 - 47.0 %    MCV 88.7 80.0 - 99.0 FL    MCH 28.6 26.0 - 34.0 PG    MCHC 32.2 30.0 - 36.5 g/dL    RDW 16.6 (H) 11.5 - 14.5 %    PLATELET 249 (L) 969 - 400 K/uL    NEUTROPHILS 54 32 - 75 %    LYMPHOCYTES 33 12 - 49 %    MONOCYTES 10 5 - 13 %    EOSINOPHILS 3 0 - 7 %    BASOPHILS 0 0 - 1 %    ABS. NEUTROPHILS 2.7 1.8 - 8.0 K/UL    ABS. LYMPHOCYTES 1.7 0.8 - 3.5 K/UL    ABS. MONOCYTES 0.5 0.0 - 1.0 K/UL    ABS. EOSINOPHILS 0.1 0.0 - 0.4 K/UL    ABS. BASOPHILS 0.0 0.0 - 0.1 K/UL           Assessment:     Active Problems:    Hyperkalemia (4/27/2017)        Plan:     1, Hypotension- improved. Spironolactone and lisinopril on hold. BP still borderline low. 2. ARF- improving on iv fluids  3. Hyperkalemia- resolved. Now potassium is low. Will replace. 4. Past hx hypokalemia. 5. Drop in hgb, heme positive stool. Recent GERD symptoms. Will start PPI and consult gi. Pt. Ericka Sales Witness, refuses transfusions. Will hold pradaxa for now also. Gi workup tomorrow.

## 2017-05-01 NOTE — PROGRESS NOTES
Bedside and Verbal shift change report given to Altagracia JIMÉNEZ (oncoming nurse) by Sudhakar Fulton (offgoing nurse). Report included the following information SBAR, Kardex, Intake/Output, MAR and Recent Results. Zone Phone for oncoming shift:   [phone]    Shift Summary: Patient rested quietly throughout the night. C/O pain in R foot, PRN Tylenol given. LDAs               Peripheral IV 04/28/17 Left Forearm (Active)   Site Assessment Clean, dry, & intact 5/1/2017 12:56 AM   Phlebitis Assessment 0 5/1/2017 12:56 AM   Infiltration Assessment 0 5/1/2017 12:56 AM   Dressing Status Clean, dry, & intact 5/1/2017 12:56 AM   Dressing Type Tape;Transparent 5/1/2017 12:56 AM   Hub Color/Line Status Blue; Infusing 5/1/2017 12:56 AM                        Intake & Output   Date 04/30/17 0700 - 05/01/17 0659 05/01/17 0700 - 05/02/17 0659   Shift 6129-2416 5833-2641 24 Hour Total 5437-1248 6072-5504 24 Hour Total   I  N  T  A  K  E   P.O. 240  240         P. O. 240  240       Shift Total  (mL/kg) 240  (3.9)  240  (3.9)      O  U  T  P  U  T   Shift Total  (mL/kg)           240      Weight (kg) 61.2 61.2 61.2 61.2 61.2 61.2      Last Bowel Movement Last Bowel Movement Date: 04/30/17   Glucose Checks [x] N/A  [] AC/HS  [] Q6  Concerns:   Nutrition Active Orders   Diet    DIET CLEAR LIQUID    DIET NPO       Consults [x]PT  [x]OT  []Speech  []Case Management   Cardiac Monitoring []N/A [x]Yes Expires: 72 hours

## 2017-05-01 NOTE — PROGRESS NOTES
Shift change report given to El Angelo Jun (oncoming nurse) by Byron Esparza RN (offgoing nurse). Report included the following information SBAR, Kardex, Intake/Output, MAR and Recent Results. Zone Phone for oncoming shift:   n/a    Shift Summary: VS stable. No c/o pain. LDAs               Peripheral IV 04/28/17 Left Forearm (Active)   Site Assessment Clean, dry, & intact 4/30/2017 10:03 PM   Phlebitis Assessment 0 4/30/2017 10:03 PM   Infiltration Assessment 0 4/30/2017 10:03 PM   Dressing Status Clean, dry, & intact 4/30/2017 10:03 PM   Dressing Type Transparent 4/30/2017 10:03 PM   Hub Color/Line Status Blue; Infusing 4/30/2017 10:03 PM                        Intake & Output   Date 04/29/17 1900 - 04/30/17 0659 04/30/17 0700 - 05/01/17 0659   Shift 7653-0767 24 Hour Total 2386-9717 7855-8861 24 Hour Total   I  N  T  A  K  E   P.O. 200 680 240  240      P. O. 200 680 240  240    I.V.  (mL/kg/hr) 800 1615         I.V. 800 800         Volume (0.45% sodium chloride infusion)  815       Blood 0 0         Autotransfused 0 0       Shift Total  (mL/kg) 1000  (16.3) 2295  (37.5) 240  (3.9)  240  (3.9)   O  U  T  P  U  T   Urine  (mL/kg/hr)           Urine Occurrence(s)  2 x       Shift Total  (mL/kg)        NET 1000 2295 240  240   Weight (kg) 61.2 61.2 61.2 61.2 61.2      Last Bowel Movement Last Bowel Movement Date: 04/30/17   Glucose Checks [x] N/A  [] AC/HS  [] Q6  Concerns:   Nutrition Active Orders   Diet    DIET CARDIAC Regular    DIET CLEAR LIQUID    DIET NPO       Consults [x]PT  []OT  []Speech  []Case Management   Cardiac Monitoring []N/A [x]Yes Expires:

## 2017-05-01 NOTE — PROGRESS NOTES
F/U for anemia, heme +, Pradaxa, [place]    S: Ms. Sera Guerrier was seen by me today during rounds. At this time, she is resting comfortably. The patient has no new complaints today. Please see admission consult for details of ROS; there are no changes today. No melena or hematochezia. No abd pain or N/V. Clear liquid diet and bowel prep today for EGD/Colon tomorrow. Holding Pradaxa. Patient has been drinking red juice. O: Blood pressure 121/57, pulse 74, temperature 97.6 °F (36.4 °C), resp. rate 16, height 5' 5\" (1.651 m), weight 61.2 kg (135 lb), SpO2 97 %, not currently breastfeeding. Gen: Patient is in no acute distress. There is no jaundice. Lungs: Clear to auscultation bilaterally . Heart:RRR. Abd: Soft, non tender, non-distended, bowel sounds present. Extremities: Warm. Lab Results   Component Value Date/Time    WBC 5.0 05/01/2017 03:54 AM    HGB 8.9 05/01/2017 03:54 AM    HCT 27.6 05/01/2017 03:54 AM    PLATELET 563 05/57/0561 03:54 AM    MCV 88.7 05/01/2017 03:54 AM     Hgb yesterday 8.6    A: Active Problems:    Hyperkalemia (4/27/2017)        Comment:    P: Hgb stable/up slightly. No overt signs of GIB. Proceed with bowel prep today, EGD/colon tomorrow. I wrote for no red or purple colored liquids today.     NITHYA Oneill  05/01/17  10:36 AM

## 2017-05-01 NOTE — PROGRESS NOTES
[facility]rapy is [facility] . Will arrange [facility] nursing//PT if ordered and pt is in agreement.

## 2017-05-02 ENCOUNTER — ANESTHESIA EVENT (OUTPATIENT)
Dept: ENDOSCOPY | Age: 82
DRG: 683 | End: 2017-05-02
Payer: MEDICARE

## 2017-05-02 ENCOUNTER — ANESTHESIA (OUTPATIENT)
Dept: ENDOSCOPY | Age: 82
DRG: 683 | End: 2017-05-02
Payer: MEDICARE

## 2017-05-02 ENCOUNTER — APPOINTMENT (OUTPATIENT)
Dept: GENERAL RADIOLOGY | Age: 82
DRG: 683 | End: 2017-05-02
Attending: FAMILY MEDICINE
Payer: MEDICARE

## 2017-05-02 LAB
ANION GAP BLD CALC-SCNC: 13 MMOL/L (ref 5–15)
BASOPHILS # BLD AUTO: 0 K/UL (ref 0–0.1)
BASOPHILS # BLD: 0 % (ref 0–1)
BUN SERPL-MCNC: 13 MG/DL (ref 6–20)
BUN/CREAT SERPL: 11 (ref 12–20)
CALCIUM SERPL-MCNC: 7.5 MG/DL (ref 8.5–10.1)
CHLORIDE SERPL-SCNC: 112 MMOL/L (ref 97–108)
CO2 SERPL-SCNC: 19 MMOL/L (ref 21–32)
CREAT SERPL-MCNC: 1.18 MG/DL (ref 0.55–1.02)
EOSINOPHIL # BLD: 0.1 K/UL (ref 0–0.4)
EOSINOPHIL NFR BLD: 2 % (ref 0–7)
ERYTHROCYTE [DISTWIDTH] IN BLOOD BY AUTOMATED COUNT: 16.6 % (ref 11.5–14.5)
GLUCOSE SERPL-MCNC: 77 MG/DL (ref 65–100)
H PYLORI FROM TISSUE: NEGATIVE
HCT VFR BLD AUTO: 28 % (ref 35–47)
HGB BLD-MCNC: 9.1 G/DL (ref 11.5–16)
KIT LOT NO., HCLOLOT: NORMAL
LYMPHOCYTES # BLD AUTO: 23 % (ref 12–49)
LYMPHOCYTES # BLD: 1.1 K/UL (ref 0.8–3.5)
MCH RBC QN AUTO: 28.8 PG (ref 26–34)
MCHC RBC AUTO-ENTMCNC: 32.5 G/DL (ref 30–36.5)
MCV RBC AUTO: 88.6 FL (ref 80–99)
MONOCYTES # BLD: 0.6 K/UL (ref 0–1)
MONOCYTES NFR BLD AUTO: 12 % (ref 5–13)
NEGATIVE CONTROL: NEGATIVE
NEUTS SEG # BLD: 3.1 K/UL (ref 1.8–8)
NEUTS SEG NFR BLD AUTO: 63 % (ref 32–75)
PLATELET # BLD AUTO: 113 K/UL (ref 150–400)
POSITIVE CONTROL: NORMAL
POTASSIUM SERPL-SCNC: 3.3 MMOL/L (ref 3.5–5.1)
RBC # BLD AUTO: 3.16 M/UL (ref 3.8–5.2)
SODIUM SERPL-SCNC: 144 MMOL/L (ref 136–145)
WBC # BLD AUTO: 4.9 K/UL (ref 3.6–11)

## 2017-05-02 PROCEDURE — 74011250637 HC RX REV CODE- 250/637: Performed by: FAMILY MEDICINE

## 2017-05-02 PROCEDURE — 76060000031 HC ANESTHESIA FIRST 0.5 HR: Performed by: SPECIALIST

## 2017-05-02 PROCEDURE — 80048 BASIC METABOLIC PNL TOTAL CA: CPT | Performed by: FAMILY MEDICINE

## 2017-05-02 PROCEDURE — 36415 COLL VENOUS BLD VENIPUNCTURE: CPT | Performed by: FAMILY MEDICINE

## 2017-05-02 PROCEDURE — 88305 TISSUE EXAM BY PATHOLOGIST: CPT | Performed by: SPECIALIST

## 2017-05-02 PROCEDURE — 85025 COMPLETE CBC W/AUTO DIFF WBC: CPT | Performed by: FAMILY MEDICINE

## 2017-05-02 PROCEDURE — 74011000250 HC RX REV CODE- 250

## 2017-05-02 PROCEDURE — 77030019988 HC FCPS ENDOSC DISP BSC -B: Performed by: SPECIALIST

## 2017-05-02 PROCEDURE — 65660000000 HC RM CCU STEPDOWN

## 2017-05-02 PROCEDURE — 74011250636 HC RX REV CODE- 250/636

## 2017-05-02 PROCEDURE — 0DJD8ZZ INSPECTION OF LOWER INTESTINAL TRACT, VIA NATURAL OR ARTIFICIAL OPENING ENDOSCOPIC: ICD-10-PCS | Performed by: SPECIALIST

## 2017-05-02 PROCEDURE — 76040000019: Performed by: SPECIALIST

## 2017-05-02 PROCEDURE — 74011250637 HC RX REV CODE- 250/637: Performed by: INTERNAL MEDICINE

## 2017-05-02 PROCEDURE — 87077 CULTURE AEROBIC IDENTIFY: CPT | Performed by: SPECIALIST

## 2017-05-02 PROCEDURE — 73630 X-RAY EXAM OF FOOT: CPT

## 2017-05-02 PROCEDURE — 74011000258 HC RX REV CODE- 258: Performed by: FAMILY MEDICINE

## 2017-05-02 PROCEDURE — 0DB68ZX EXCISION OF STOMACH, VIA NATURAL OR ARTIFICIAL OPENING ENDOSCOPIC, DIAGNOSTIC: ICD-10-PCS | Performed by: SPECIALIST

## 2017-05-02 RX ORDER — DEXTROMETHORPHAN/PSEUDOEPHED 2.5-7.5/.8
1.2 DROPS ORAL
Status: DISCONTINUED | OUTPATIENT
Start: 2017-05-02 | End: 2017-05-02 | Stop reason: HOSPADM

## 2017-05-02 RX ORDER — FLUMAZENIL 0.1 MG/ML
0.2 INJECTION INTRAVENOUS
Status: DISCONTINUED | OUTPATIENT
Start: 2017-05-02 | End: 2017-05-02 | Stop reason: HOSPADM

## 2017-05-02 RX ORDER — SODIUM CHLORIDE 0.9 % (FLUSH) 0.9 %
5-10 SYRINGE (ML) INJECTION EVERY 8 HOURS
Status: ACTIVE | OUTPATIENT
Start: 2017-05-02 | End: 2017-05-02

## 2017-05-02 RX ORDER — ATROPINE SULFATE 0.1 MG/ML
0.5 INJECTION INTRAVENOUS
Status: DISCONTINUED | OUTPATIENT
Start: 2017-05-02 | End: 2017-05-02 | Stop reason: HOSPADM

## 2017-05-02 RX ORDER — LIDOCAINE HYDROCHLORIDE 20 MG/ML
INJECTION, SOLUTION EPIDURAL; INFILTRATION; INTRACAUDAL; PERINEURAL AS NEEDED
Status: DISCONTINUED | OUTPATIENT
Start: 2017-05-02 | End: 2017-05-02 | Stop reason: HOSPADM

## 2017-05-02 RX ORDER — SODIUM CHLORIDE 0.9 % (FLUSH) 0.9 %
5-10 SYRINGE (ML) INJECTION AS NEEDED
Status: ACTIVE | OUTPATIENT
Start: 2017-05-02 | End: 2017-05-02

## 2017-05-02 RX ORDER — SODIUM CHLORIDE 9 MG/ML
100 INJECTION, SOLUTION INTRAVENOUS CONTINUOUS
Status: DISPENSED | OUTPATIENT
Start: 2017-05-02 | End: 2017-05-02

## 2017-05-02 RX ORDER — SODIUM CHLORIDE 0.9 % (FLUSH) 0.9 %
5-10 SYRINGE (ML) INJECTION EVERY 8 HOURS
Status: COMPLETED | OUTPATIENT
Start: 2017-05-02 | End: 2017-05-02

## 2017-05-02 RX ORDER — SODIUM CHLORIDE 9 MG/ML
INJECTION, SOLUTION INTRAVENOUS
Status: DISCONTINUED | OUTPATIENT
Start: 2017-05-02 | End: 2017-05-02 | Stop reason: HOSPADM

## 2017-05-02 RX ORDER — PROPOFOL 10 MG/ML
INJECTION, EMULSION INTRAVENOUS AS NEEDED
Status: DISCONTINUED | OUTPATIENT
Start: 2017-05-02 | End: 2017-05-02 | Stop reason: HOSPADM

## 2017-05-02 RX ORDER — MIDAZOLAM HYDROCHLORIDE 1 MG/ML
.25-5 INJECTION, SOLUTION INTRAMUSCULAR; INTRAVENOUS
Status: DISCONTINUED | OUTPATIENT
Start: 2017-05-02 | End: 2017-05-02 | Stop reason: HOSPADM

## 2017-05-02 RX ORDER — NALOXONE HYDROCHLORIDE 0.4 MG/ML
0.4 INJECTION, SOLUTION INTRAMUSCULAR; INTRAVENOUS; SUBCUTANEOUS
Status: DISCONTINUED | OUTPATIENT
Start: 2017-05-02 | End: 2017-05-02 | Stop reason: HOSPADM

## 2017-05-02 RX ORDER — EPINEPHRINE 0.1 MG/ML
1 INJECTION INTRACARDIAC; INTRAVENOUS
Status: DISCONTINUED | OUTPATIENT
Start: 2017-05-02 | End: 2017-05-02 | Stop reason: HOSPADM

## 2017-05-02 RX ADMIN — PROPOFOL 100 MG: 10 INJECTION, EMULSION INTRAVENOUS at 13:30

## 2017-05-02 RX ADMIN — Medication 10 ML: at 14:34

## 2017-05-02 RX ADMIN — POTASSIUM CHLORIDE 10 MEQ: 750 TABLET, FILM COATED, EXTENDED RELEASE ORAL at 17:24

## 2017-05-02 RX ADMIN — PANTOPRAZOLE SODIUM 40 MG: 40 TABLET, DELAYED RELEASE ORAL at 14:33

## 2017-05-02 RX ADMIN — Medication 10 ML: at 21:28

## 2017-05-02 RX ADMIN — LIDOCAINE HYDROCHLORIDE 20 MG: 20 INJECTION, SOLUTION EPIDURAL; INFILTRATION; INTRACAUDAL; PERINEURAL at 13:00

## 2017-05-02 RX ADMIN — SODIUM CHLORIDE: 9 INJECTION, SOLUTION INTRAVENOUS at 12:55

## 2017-05-02 RX ADMIN — ATENOLOL 25 MG: 25 TABLET ORAL at 14:41

## 2017-05-02 RX ADMIN — QUETIAPINE FUMARATE 25 MG: 25 TABLET, FILM COATED ORAL at 21:28

## 2017-05-02 RX ADMIN — PROPOFOL 20 MG: 10 INJECTION, EMULSION INTRAVENOUS at 13:28

## 2017-05-02 RX ADMIN — SODIUM CHLORIDE 50 ML/HR: 4.5 INJECTION, SOLUTION INTRAVENOUS at 03:06

## 2017-05-02 RX ADMIN — Medication 10 ML: at 06:17

## 2017-05-02 RX ADMIN — PROPOFOL 100 MG: 10 INJECTION, EMULSION INTRAVENOUS at 13:00

## 2017-05-02 RX ADMIN — Medication 10 ML: at 14:24

## 2017-05-02 NOTE — PROGRESS NOTES
Physical Therapy  Patient currently off the floor for procedure. Will check back at later time.   Bello Reyes, PT, DPT

## 2017-05-02 NOTE — PROGRESS NOTES
General Daily Progress Note    Admit Date: 4/27/2017  Hospital day 5    Subjective:     Patient has no complaint of abdominal pain, dizziness. .co pain on dorsum of R foot. Medication side effects: none    Current Facility-Administered Medications   Medication Dose Route Frequency    potassium chloride SR (KLOR-CON 10) tablet 10 mEq  10 mEq Oral BID    QUEtiapine (SEROquel) tablet 25 mg  25 mg Oral QHS    pantoprazole (PROTONIX) tablet 40 mg  40 mg Oral ACB    ondansetron (ZOFRAN) injection 4 mg  4 mg IntraVENous Q6H PRN    sodium chloride (NS) flush 5-10 mL  5-10 mL IntraVENous Q8H    sodium chloride (NS) flush 5-10 mL  5-10 mL IntraVENous PRN    atenolol (TENORMIN) tablet 25 mg  25 mg Oral DAILY    levothyroxine (SYNTHROID) tablet 100 mcg  100 mcg Oral 7am    acetaminophen (TYLENOL) tablet 650 mg  650 mg Oral Q4H PRN    0.45% sodium chloride infusion  50 mL/hr IntraVENous CONTINUOUS        Review of Systems  Pertinent items are noted in HPI. Objective:     Patient Vitals for the past 8 hrs:   BP Temp Pulse Resp SpO2   05/02/17 0302 130/67 98.4 °F (36.9 °C) 79 16 96 %   05/02/17 0016 113/63 99.1 °F (37.3 °C) 76 18 93 %        04/30 0701 - 05/01 1900  In: 1240 [P.O.:1240]  Out: -     Physical Exam:   Visit Vitals    /67 (BP 1 Location: Right arm, BP Patient Position: Head of bed elevated (Comment degrees))    Pulse 79    Temp 98.4 °F (36.9 °C)    Resp 16    Ht 5' 5\" (1.651 m)    Wt 135 lb (61.2 kg)    SpO2 96%    Breastfeeding No    BMI 22.47 kg/m2     Lungs: clear to auscultation bilaterally  Heart: regular rate and rhythm, S1, S2 normal, no murmur, click, rub or gallop  Abdomen: soft, non-tender. Bowel sounds normal. No masses,  no organomegaly  Extremities: edema , none. Tenderness dorsum R foot.        ECG: normal sinus rhythm     Data Review   Recent Results (from the past 24 hour(s))   METABOLIC PANEL, BASIC    Collection Time: 05/02/17  3:20 AM   Result Value Ref Range    Sodium 144 136 - 145 mmol/L    Potassium 3.3 (L) 3.5 - 5.1 mmol/L    Chloride 112 (H) 97 - 108 mmol/L    CO2 19 (L) 21 - 32 mmol/L    Anion gap 13 5 - 15 mmol/L    Glucose 77 65 - 100 mg/dL    BUN 13 6 - 20 MG/DL    Creatinine 1.18 (H) 0.55 - 1.02 MG/DL    BUN/Creatinine ratio 11 (L) 12 - 20      GFR est AA 53 (L) >60 ml/min/1.73m2    GFR est non-AA 43 (L) >60 ml/min/1.73m2    Calcium 7.5 (L) 8.5 - 10.1 MG/DL   CBC WITH AUTOMATED DIFF    Collection Time: 05/02/17  3:20 AM   Result Value Ref Range    WBC 4.9 3.6 - 11.0 K/uL    RBC 3.16 (L) 3.80 - 5.20 M/uL    HGB 9.1 (L) 11.5 - 16.0 g/dL    HCT 28.0 (L) 35.0 - 47.0 %    MCV 88.6 80.0 - 99.0 FL    MCH 28.8 26.0 - 34.0 PG    MCHC 32.5 30.0 - 36.5 g/dL    RDW 16.6 (H) 11.5 - 14.5 %    PLATELET 573 (L) 456 - 400 K/uL    NEUTROPHILS 63 32 - 75 %    LYMPHOCYTES 23 12 - 49 %    MONOCYTES 12 5 - 13 %    EOSINOPHILS 2 0 - 7 %    BASOPHILS 0 0 - 1 %    ABS. NEUTROPHILS 3.1 1.8 - 8.0 K/UL    ABS. LYMPHOCYTES 1.1 0.8 - 3.5 K/UL    ABS. MONOCYTES 0.6 0.0 - 1.0 K/UL    ABS. EOSINOPHILS 0.1 0.0 - 0.4 K/UL    ABS. BASOPHILS 0.0 0.0 - 0.1 K/UL           Assessment:     Active Problems:    Hyperkalemia (4/27/2017)        Plan:     1, Hypotension- improved. Spironolactone and lisinopril on hold. BP stable  2. ARF- improving on iv fluids  3. Hyperkalemia- resolved. Now potassium is low. Will replace. 4. Past hx hypokalemia. 5. Drop in hgb, heme positive stool. Recent GERD symptoms. Will start PPI and consult gi. Pt. Simonne Card Witness, refuses transfusions. Will hold pradaxa for now also. Gi workup today. 6. R foot pain- normal pulse. Will xray today.

## 2017-05-02 NOTE — PROGRESS NOTES
Alona Sullivan  12/5/1930  [id number]    Situation:  Verbal report received from: Wendy Myers RN  Procedure: Procedure(s) with comments:  ESOPHAGOGASTRODUODENOSCOPY (EGD) - [place] patient, Toro Said or Boones or nehapolo to do  COLONOSCOPY  ESOPHAGOGASTRODUODENAL (EGD) BIOPSY    Background:    Preoperative diagnosis: progressive anemia, heme Pos stool    Postoperative diagnosis: EGD- mid esophagus ulcer  colon-interan hemorrhoid    :  Dr. Marco Antonio Lopez  Assistant(s): Endoscopy Technician-1: Genaro Corbett  Endoscopy RN-1: Navi Kumari RN    Specimens:   ID Type Source Tests Collected by Time Destination   1 : duodenal esophagus biopsy Preservative   Lindsay Johnson MD 5/2/2017 1317 Pathology   2 : Distal esophagus biopsy Preservative Esophagus, Distal  Lindsay Johnson MD 5/2/2017 1317 Pathology   3 : Mid esophagus ulcer biopsy Preservative Esophagus, Mid  Lindsay Johnson MD 5/2/2017 1318 Pathology     H. Pylori  yes    Assessment:  Intra-procedure medications   Anesthesia gave intra-procedure sedation and medications, see anesthesia flow sheet yes    Intravenous fluids: NS@ KVO     Vital signs stable  yes    Abdominal assessment: round and soft  yes    Recommendation:  Discharge patient per MD order yes.   Return to floor yes  Permission to share finding with family or friend yes

## 2017-05-02 NOTE — PROGRESS NOTES
F/U for anemia  Dyspepsia  Weight loss    S: Ms. Oriana Gee was seen by me today during rounds. At this time, she is resting +comfortably. The patient has no new complaints today. Please see admission consult for details of ROS; there are no changes today. O: Blood pressure 128/53, pulse 81, temperature 98 °F (36.7 °C), resp. rate 16, height 5' 5\" (1.651 m), weight 61.2 kg (135 lb), SpO2 93 %, not currently breastfeeding. Gen: Patient is in no acute distress. There is no jaundice. Lungs: Clear to auscultation bilaterally . Heart:irregular . Abd: Soft, non tender, non-distended, bowel sounds present. Extremities: Warm. Recent Labs      05/01/17   0354  04/30/17   0452   WBC  5.0  5.4   HGB  8.9*  8.6*   HCT  27.6*  26.0*   PLT  103*  94*     Recent Labs      04/30/17   0452  04/29/17   0137   NA  145  145   K  3.2*  3.6   CL  116*  116*   CO2  19*  22   BUN  21*  37*   CREA  1.14*  1.31*   GLU  78  108*   CA  7.1*  7.2*     No results for input(s): SGOT, GPT, ALT, AP, TBIL, TBILI, TP, ALB, GLOB, GGT, AML, LPSE in the last 72 hours. No lab exists for component: AMYP, HLPSE  No results for input(s): INR, PTP, APTT in the last 72 hours. No lab exists for component: INREXT   No results for input(s): FE, TIBC, PSAT, FERR in the last 72 hours. No results found for: FOL, RBCF   No results for input(s): PH, PCO2, PO2 in the last 72 hours. No results for input(s): CPK, CKNDX, TROIQ in the last 72 hours.     No lab exists for component: CPKMB  Lab Results   Component Value Date/Time    Cholesterol, total 94 02/17/2017 09:52 AM    HDL Cholesterol 30 02/17/2017 09:52 AM    LDL, calculated 46 02/17/2017 09:52 AM    Triglyceride 90 02/17/2017 09:52 AM    CHOL/HDL Ratio 3.1 04/20/2010 01:10 AM     No results found for: [facility]  Lab Results   Component Value Date/Time    Color YELLOW/STRAW 04/27/2017 04:22 PM    Appearance CLEAR 04/27/2017 04:22 PM    Specific gravity 1.011 04/27/2017 04:22 PM Specific gravity 1.025 05/07/2011 11:50 PM    pH (UA) 5.5 04/27/2017 04:22 PM    Protein TRACE 04/27/2017 04:22 PM    Glucose NEGATIVE  04/27/2017 04:22 PM    Ketone NEGATIVE  04/27/2017 04:22 PM    Bilirubin NEGATIVE  04/27/2017 04:22 PM    Urobilinogen 0.2 04/27/2017 04:22 PM    Nitrites NEGATIVE  04/27/2017 04:22 PM    Leukocyte Esterase SMALL 04/27/2017 04:22 PM    Epithelial cells FEW 04/27/2017 04:22 PM    Bacteria NEGATIVE  04/27/2017 04:22 PM    WBC 5-10 04/27/2017 04:22 PM    RBC 0-5 04/27/2017 04:22 PM        A: Active Problems:    Hyperkalemia (4/27/2017)        Comment:  Anemia is no worse    Pt is preppign for colonoscopy egd tomorrow off anticoagulants     P:  Egd/ colonoscopy tomorrow   I discussed with her the objectives, risks, consequences and alternatives to the procedure.       Bertha Rosenthal MD  5/1/2017 9:32 PM

## 2017-05-02 NOTE — ANESTHESIA PREPROCEDURE EVALUATION
Anesthetic History   No history of anesthetic complications            Review of Systems / Medical History  Patient summary reviewed, nursing notes reviewed and pertinent labs reviewed    Pulmonary  Within defined limits                 Neuro/Psych   Within defined limits           Cardiovascular    Hypertension        Dysrhythmias : atrial fibrillation      Exercise tolerance: >4 METS     GI/Hepatic/Renal  Within defined limits              Endo/Other      Hypothyroidism       Other Findings              Physical Exam    Airway  Mallampati: II  TM Distance: 4 - 6 cm  Neck ROM: normal range of motion   Mouth opening: Normal     Cardiovascular    Rhythm: irregular  Rate: normal         Dental    Dentition: Edentulous     Pulmonary  Breath sounds clear to auscultation               Abdominal  GI exam deferred       Other Findings            Anesthetic Plan    ASA: 3  Anesthesia type: general and total IV anesthesia          Induction: Intravenous  Anesthetic plan and risks discussed with: Patient

## 2017-05-02 NOTE — H&P
Gastroenterology Outpatient History and Physical    Patient: Connie Brooks    Physician: Jane Huang MD    Vital Signs: Blood pressure 145/76, pulse 75, temperature 98.2 °F (36.8 °C), resp. rate 16, height 5' 5\" (1.651 m), weight 61.2 kg (135 lb), SpO2 98 %, not currently breastfeeding. Allergies: No Known Allergies    Chief Complaint: Anemia, r/o GI blood loss    History of Present Illness: 81 yo WF who is a [place] Witness admitted with symptomatic anemia with Hgb 8.9 who is on Pradaxa and is for EGD and Colonoscopy to r/o GI bleeding source. Justification for Procedure: above    History:  Past Medical History:   Diagnosis Date    Arrhythmia     atrial fibrillation, chronic. Stress test 2011 essent. normal.  Mild-mod AR on ECHO    Hypertension     Thyroid disease     hypothyroid      Past Surgical History:   Procedure Laterality Date    ECHO TRANSESOPHAGEAL (DENISE) W OR WO CONTRAST  4/27/2011         HX GYN      vaginal deliveries x10    HX TUBAL LIGATION      IA CARDIOVERSION ELECTIVE ARRHYTHMIA EXTERNAL  6/14/2011           Social History     Social History    Marital status:      Spouse name: N/A    Number of children: N/A    Years of education: N/A     Social History Main Topics    Smoking status: Never Smoker    Smokeless tobacco: Never Used    Alcohol use No    Drug use: No    Sexual activity: No     Other Topics Concern    None     Social History Narrative      Family History   Problem Relation Age of Onset    Heart Disease Father     Cancer Son     Heart Disease Son        Medications:   Prior to Admission medications    Medication Sig Start Date End Date Taking?  Authorizing Provider   levothyroxine (SYNTHROID) 100 mcg tablet TAKE ONE TABLET DAILY FOR THYROID 3/4/17  Yes Deja Shell MD   atenolol (TENORMIN) 50 mg tablet TAKE 1 TABLET BY MOUTH DAILY FOR THE HEART AND BLOOD PRESSURE. 3/4/17  Yes Deja Shell MD   lisinopril (PRINIVIL, ZESTRIL) 20 mg tablet TAKE 1 TAB DAILY FOR BLOOD PRESSURE 2/17/17  Yes Cipriano Cano MD   potassium chloride (K-DUR, KLOR-CON) 20 mEq tablet TAKE ONE TABLET TWICE A DAY 2/17/17  Yes Cipriano Cano MD   spironolactone (ALDACTONE) 50 mg tablet TAKE 1 TABLET BY MOUTH DAILY AS A DIURETIC-FLUID PILL 12/3/16  Yes Cipriano Cano MD   PRADAXA 150 mg capsule TAKE 1 CAPSULE BY MOUTH TWICE A DAY. 10/3/16  Yes Cipriano Cano MD   CALCIUM 600 + D tablet TAKE 1 TABLET TWICE A DAY. (CALCIUM SUPPLEMENT) 7/6/15  Yes Cipriano Cano MD   fluticasone (FLONASE) 50 mcg/actuation nasal spray USE 1-2 SPRAYS IN EACH NOSTRIL ONCE DAILY. 4/3/17   Cipriano Cano MD   QUEtiapine (SEROQUEL) 25 mg tablet TAKE 1 TAB EVERY EVENING 2/17/17   Cipriano Cano MD   chlorpheniramine (CHLOR-TRIMETRON) 4 mg tablet Take 1 Tab by mouth every six (6) hours as needed for Allergies. 2/17/17   Cipriano Cano MD   azelastine (ASTELIN) 137 mcg (0.1 %) nasal spray 1 [employer] [place] by Both Nostrils route two (2) times a day. Use in each nostril as directed 8/17/15   Cipriano Cano MD       Physical Exam:   General: alert, no distress   HEENT: Head: Normocephalic, no lesions, without obvious abnormality.    Heart: regular rate and rhythm, S1, S2 normal, no murmur, click, rub or gallop   Lungs: chest clear, no wheezing, rales, normal symmetric air entry   Abdominal: soft, nontender, nondistended, + BS   Neurological: Grossly normal   Extremities: extremities normal, atraumatic, no cyanosis or edema     Findings/Diagnosis: Iron Deficiency Anemia    Plan of Care/Planned Procedure: EGD and Colonoscopy with MAC    Signed By: Lavone Ganser, MD     May 2, 2017

## 2017-05-02 NOTE — PROGRESS NOTES
Bedside and Verbal shift change report given to Altagracia JIMÉNEZ (oncoming nurse) by Nidia Hale (offgoing nurse). Report included the following information SBAR, Kardex, Intake/Output, MAR and Recent Results. Zone Phone for oncoming shift:   [phone]    Shift Summary: Patient rested quietly throughout the night. No C/O pain voiced. Tolerated bowel prep well. LDAs               Peripheral IV 05/02/17 Left Forearm (Active)                        Intake & Output   Date 05/01/17 0700 - 05/02/17 0659 05/02/17 0700 - 05/03/17 0659   Shift 4768-1869 1938-6300 24 Hour Total 5125-0668 4432-5565 24 Hour Total   I  N  T  A  K  E   P.O. 1000  1000         P. O. 1000  1000       Shift Total  (mL/kg) 1000  (16.3)  1000  (16.3)      O  U  T  P  U  T   Shift Total  (mL/kg)         NET 1000  1000      Weight (kg) 61.2 61.2 61.2 61.2 61.2 61.2      Last Bowel Movement Last Bowel Movement Date: 05/01/17   Glucose Checks [x] N/A  [] AC/HS  [] Q6  Concerns:   Nutrition Active Orders   Diet    DIET NPO       Consults [x]PT  [x]OT  []Speech  []Case Management   Cardiac Monitoring []N/A [x]Yes Expires: 48 hours

## 2017-05-02 NOTE — ANESTHESIA POSTPROCEDURE EVALUATION
Post-Anesthesia Evaluation and Assessment    Patient: aSmantha Cui MRN: [id number]  SSN: [id number]    YOB: 1930  Age: 80 y.o. Sex: female       Cardiovascular Function/Vital Signs  Visit Vitals    /81    Pulse 89    Temp 36.8 °C (98.3 °F)    Resp 16    Ht 5' 5\" (1.651 m)    Wt 61.2 kg (135 lb)    SpO2 97%    Breastfeeding No    BMI 22.47 kg/m2       Patient is status post general, total IV anesthesia anesthesia for Procedure(s):  ESOPHAGOGASTRODUODENOSCOPY (EGD)  COLONOSCOPY  ESOPHAGOGASTRODUODENAL (EGD) BIOPSY. Nausea/Vomiting: None    Postoperative hydration reviewed and adequate. Pain:  Pain Scale 1: Numeric (0 - 10) (05/02/17 1442)  Pain Intensity 1: 0 (05/02/17 1442)   Managed    Neurological Status:   Neuro  Neurologic State: Alert (05/02/17 8925)  Orientation Level: Oriented X4 (05/02/17 0729)  Cognition: Appropriate for age attention/concentration (05/02/17 3463)  Speech: Clear (05/02/17 0729)   At baseline    Mental Status and Level of Consciousness: Arousable    Pulmonary Status:   O2 Device: Room air (05/02/17 1407)   Adequate oxygenation and airway patent    Complications related to anesthesia: None    Post-anesthesia assessment completed.  No concerns    Signed By: Uche Marrufo DO     May 2, 2017

## 2017-05-02 NOTE — PERIOP NOTES
TRANSFER - OUT REPORT:    Verbal report given to Altagracia(name) on Christiane Sanchez  being transferred to renal(unit) for ordered procedure       Report consisted of patients Situation, Background, Assessment and   Recommendations(SBAR). Information from the following report(s) SBAR was reviewed with the receiving nurse. Lines:   Peripheral IV 05/02/17 Left Forearm (Active)   Site Assessment Clean, dry, & intact 5/2/2017  7:34 AM   Phlebitis Assessment 0 5/2/2017  7:34 AM   Infiltration Assessment 0 5/2/2017  7:34 AM   Dressing Status Clean, dry, & intact 5/2/2017  7:34 AM   Dressing Type Tape;Transparent 5/2/2017  7:34 AM   Hub Color/Line Status Blue;Capped 5/2/2017  7:34 AM        Opportunity for questions and clarification was provided.       Patient transported with:

## 2017-05-02 NOTE — PERIOP NOTES
Anesthesia reports 200mg Propofol, 20mg Lidocaine and 200mL NS given during procedure. Received report from anesthesia staff on vital signs and status of patient.

## 2017-05-02 NOTE — PROCEDURES
Esophagogastroduodenoscopy Procedure Note      Manuela Green  12/5/1930  [id number]    Indication: Iron Deficiency Anemia on Pradaxa     Endoscopist: Kimberly Kincaid MD    Referring Provider:  Marilu Shoemaker MD    Sedation:  MAC anesthesia Propofol    Procedure Details:  After infomed consent was obtained for the procedure, with all risks and benefits of procedure explained the patient was taken to the endoscopy suite and placed in the left lateral decubitus position. Following sequential administration of sedation as per above, the endoscope was inserted into the mouth and advanced under direct vision to second portion of the duodenum. A careful inspection was made as the gastroscope was withdrawn, including a retroflexed view of the proximal stomach; findings and interventions are described below. Findings:     Esophagus:   + Focal ulceration that was seen at 29 cm from the esophagus with some friability r/o Pill Esophagitis vs other s/p Bx.  + Normal Z line at 39 cm from incisors. Stomach:   + Mild erythema in body and antrum without ulcer s/p REDD Bx to r/o H Pylori. Duodenum:   + There was mild erythema in duodenal bulb s/p Bx. No duodenal ulcer seen. Therapies:  See above    Specimen: Specimens were collected as described and send to the laboratory. Complications:   None were encountered during the procedure. EBL:  < 10 ml.           Recommendations:   -f/u path of esophageal ulcer that appeared c/w pill induced  -PPI     Kimberly Kincaid MD  5/2/2017  1:48 PM

## 2017-05-02 NOTE — PERIOP NOTES
TRANSFER - IN REPORT:    Verbal report received from Altagracia RN(name) on [address]  being received from renal(unit) for ordered procedure      Report consisted of patients Situation, Background, Assessment and   Recommendations(SBAR). Information from the following report(s) SBAR was reviewed with the receiving nurse. Opportunity for questions and clarification was provided. Assessment completed upon patients arrival to unit and care assumed.

## 2017-05-02 NOTE — PROGRESS NOTES
Bedside and Verbal shift change report given to Chrissie Tyson  (oncoming nurse) by Josias Rivera (offgoing nurse). Report included the following information SBAR, Kardex, Procedure Summary, Intake/Output, MAR and Recent Results.

## 2017-05-02 NOTE — PROGRESS NOTES
F/U for anemia, heme +, Pradaxa, Oriental orthodox    S: Ms. Merry Peterson was seen by me today during rounds. At this time, she is resting comfortably. The patient has no new complaints today. Please see admission consult for details of ROS; there are no changes today. Hgb up to 9.1. Had EGD/colon today. No blood noted. Colon negative. Esophageal ulcer, likely pill induced noted. O: Blood pressure 143/81, pulse 89, temperature 98.3 °F (36.8 °C), resp. rate 16, height 5' 5\" (1.651 m), weight 61.2 kg (135 lb), SpO2 97 %, not currently breastfeeding. Gen: Patient is in no acute distress. A: Active Problems:    Hyperkalemia (4/27/2017)     Anemia   Comment:    P: Stable hgb. Would pursue a capsule endoscopy as an outpatient to complete the GI work up for occult bleed.     NITHYA Gandhi  05/02/17  3:56 PM

## 2017-05-02 NOTE — PROCEDURES
Colonoscopy Procedure Note    Indications:   Iron deficiency anemia    Referring Physician: Leonela Salinas MD  Anesthesia/Sedation: MAC anesthesia Propofol  Endoscopist:  Dr. Jose Del Cid    Procedure in Detail:  Informed consent was obtained for the procedure, including sedation. Risks of perforation, hemorrhage, adverse drug reaction, and aspiration were discussed. The patient was placed in the left lateral decubitus position. Based on the pre-procedure assessment, including review of the patient's medical history, medications, allergies, and review of systems, she had been deemed to be an appropriate candidate for moderate sedation; she was therefore sedated with the medications listed above. The patient was monitored continuously with ECG tracing, pulse oximetry, blood pressure monitoring, and direct observations. A rectal examination was performed. The [id number] was inserted into the rectum and advanced under direct vision to the terminal ileum. The quality of the colonic preparation was excellent. A careful inspection was made as the colonoscope was withdrawn, including a retroflexed view of the rectum; findings and interventions are described below. Appropriate photodocumentation was obtained. Findings:      Excellent bowel preparation. Scope passed to the cecum and then traversed the ileocecal valve into the terminal ileum. There was no blood seen. Normal mucosa seen throughout. No polyps seen. No AVMs or bleeding noted. Normal ileum visualized. Small internal hemorrhoids. Therapies:  none    Specimen:  none     Complications: None were encountered during the procedure. EBL: none. Recommendations:   -negative colonoscopy for bleeding seen.   -start PO diet and consider Pill cam per Dr. Amor Smith By: Leah Talley MD                        May 2, 2017

## 2017-05-03 VITALS
SYSTOLIC BLOOD PRESSURE: 121 MMHG | BODY MASS INDEX: 22.49 KG/M2 | WEIGHT: 135 LBS | RESPIRATION RATE: 16 BRPM | DIASTOLIC BLOOD PRESSURE: 59 MMHG | TEMPERATURE: 98.4 F | HEART RATE: 74 BPM | HEIGHT: 65 IN | OXYGEN SATURATION: 94 %

## 2017-05-03 LAB
BASOPHILS # BLD AUTO: 0 K/UL (ref 0–0.1)
BASOPHILS # BLD: 0 % (ref 0–1)
EOSINOPHIL # BLD: 0.1 K/UL (ref 0–0.4)
EOSINOPHIL NFR BLD: 2 % (ref 0–7)
ERYTHROCYTE [DISTWIDTH] IN BLOOD BY AUTOMATED COUNT: 16.2 % (ref 11.5–14.5)
HCT VFR BLD AUTO: 24.7 % (ref 35–47)
HGB BLD-MCNC: 8.3 G/DL (ref 11.5–16)
LYMPHOCYTES # BLD AUTO: 26 % (ref 12–49)
LYMPHOCYTES # BLD: 1.4 K/UL (ref 0.8–3.5)
MCH RBC QN AUTO: 29.5 PG (ref 26–34)
MCHC RBC AUTO-ENTMCNC: 33.6 G/DL (ref 30–36.5)
MCV RBC AUTO: 87.9 FL (ref 80–99)
MONOCYTES # BLD: 0.5 K/UL (ref 0–1)
MONOCYTES NFR BLD AUTO: 9 % (ref 5–13)
NEUTS SEG # BLD: 3.4 K/UL (ref 1.8–8)
NEUTS SEG NFR BLD AUTO: 63 % (ref 32–75)
PLATELET # BLD AUTO: 109 K/UL (ref 150–400)
RBC # BLD AUTO: 2.81 M/UL (ref 3.8–5.2)
URATE SERPL-MCNC: 5 MG/DL (ref 2.6–6)
WBC # BLD AUTO: 5.4 K/UL (ref 3.6–11)

## 2017-05-03 PROCEDURE — 74011000258 HC RX REV CODE- 258: Performed by: FAMILY MEDICINE

## 2017-05-03 PROCEDURE — 74011250637 HC RX REV CODE- 250/637: Performed by: INTERNAL MEDICINE

## 2017-05-03 PROCEDURE — 36415 COLL VENOUS BLD VENIPUNCTURE: CPT | Performed by: FAMILY MEDICINE

## 2017-05-03 PROCEDURE — 85025 COMPLETE CBC W/AUTO DIFF WBC: CPT | Performed by: FAMILY MEDICINE

## 2017-05-03 PROCEDURE — 74011250637 HC RX REV CODE- 250/637: Performed by: FAMILY MEDICINE

## 2017-05-03 PROCEDURE — 84550 ASSAY OF BLOOD/URIC ACID: CPT | Performed by: FAMILY MEDICINE

## 2017-05-03 RX ORDER — PREDNISONE 10 MG/1
10 TABLET ORAL 2 TIMES DAILY
Qty: 10 TAB | Refills: 0 | Status: SHIPPED | OUTPATIENT
Start: 2017-05-03 | End: 2017-05-19 | Stop reason: ALTCHOICE

## 2017-05-03 RX ORDER — PANTOPRAZOLE SODIUM 40 MG/1
40 TABLET, DELAYED RELEASE ORAL
Qty: 30 TAB | Refills: 5 | Status: SHIPPED | OUTPATIENT
Start: 2017-05-03 | End: 2017-05-05 | Stop reason: ALTCHOICE

## 2017-05-03 RX ADMIN — ATENOLOL 25 MG: 25 TABLET ORAL at 08:32

## 2017-05-03 RX ADMIN — SODIUM CHLORIDE 50 ML/HR: 4.5 INJECTION, SOLUTION INTRAVENOUS at 04:08

## 2017-05-03 RX ADMIN — LEVOTHYROXINE SODIUM 100 MCG: 100 TABLET ORAL at 08:32

## 2017-05-03 RX ADMIN — PANTOPRAZOLE SODIUM 40 MG: 40 TABLET, DELAYED RELEASE ORAL at 08:31

## 2017-05-03 RX ADMIN — Medication 10 ML: at 05:16

## 2017-05-03 RX ADMIN — ACETAMINOPHEN 650 MG: 325 TABLET, FILM COATED ORAL at 05:35

## 2017-05-03 RX ADMIN — POTASSIUM CHLORIDE 10 MEQ: 750 TABLET, FILM COATED, EXTENDED RELEASE ORAL at 08:32

## 2017-05-03 NOTE — PROGRESS NOTES
Bedside shift change report given to Danette Real (oncoming nurse) by Adia Walter (offgoing nurse). Report included the following information SBAR, Kardex, Intake/Output, MAR and Recent Results. Zone Phone for oncoming shift:       Shift Summary: Pt rested quietly through night. Incidence of footache, relieved by medication. LDAs               Peripheral IV 05/02/17 Left Forearm (Active)   Site Assessment Clean, dry, & intact 5/3/2017  2:38 AM   Phlebitis Assessment 0 5/3/2017  2:38 AM   Infiltration Assessment 0 5/3/2017  2:38 AM   Dressing Status Clean, dry, & intact 5/3/2017  2:38 AM   Dressing Type Transparent;Tape 5/3/2017  2:38 AM   Hub Color/Line Status Blue;Flushed 5/3/2017  2:38 AM                        Intake & Output   Date 05/02/17 0700 - 05/03/17 0659 05/03/17 0700 - 05/04/17 0659   Shift 6369-4909 5376-5981 24 Hour Total 3458-9865 8251-8665 24 Hour Total   I  N  T  A  K  E   P.O. 300 480 780         P. O. 300 480 780       I.V.  (mL/kg/hr) 200  (0.3) 600 800         I.V.  600 600         Volume (0.9% sodium chloride infusion) 200  200       Shift Total  (mL/kg) 500  (8.2) 1080  (17.6) 1580  (25.8)      O  U  T  P  U  T   Urine  (mL/kg/hr)            Urine Occurrence(s) 1 x 2 x 3 x       Shift Total  (mL/kg)          1080 1580      Weight (kg) 61.2 61.2 61.2 61.2 61.2 61.2      Last Bowel Movement Last Bowel Movement Date: 05/01/17   Glucose Checks [x] N/A  [] AC/HS  [] Q6  Concerns:   Nutrition Active Orders   Diet    DIET GI LITE (POST SURGICAL)       Consults []PT  []OT  []Speech  [x]Case Management   Cardiac Monitoring []N/A [x]Yes Expires:48 hrs

## 2017-05-03 NOTE — PROGRESS NOTES
Per.  Dr. Andrea Antonio order, Mrs. Lock Lower follow-up appointment has been rescheduled for May 5 at 11:30am

## 2017-05-03 NOTE — DISCHARGE INSTRUCTIONS
PATIENT DISCHARGE INSTRUCTIONS      PATIENT DISCHARGE INSTRUCTIONS    Rafael Granados / [id number] : 1930    Admitted 2017 Discharged: 5/3/2017       · It is important that you take the medication exactly as they are prescribed. · Keep your medication in the bottles provided by the pharmacist and keep a list of the medication names, dosages, and times to be taken in your wallet. · Do not take other medications without consulting your doctor. What to do at Home    Recommended Diet: Regular Diet    Recommended Activity: Activity as tolerated    If you experience any of the following symptoms increasing weakness, please follow up with Dr. Estella Bhakta. Signed By: Jarrett López MD     May 3, 2017         General Daily Progress Note    Admit Date: 2017  Hospital day a few    Subjective:     Patient has no complaint of . Dominick Gill Medication side effects: none    Current Facility-Administered Medications   Medication Dose Route Frequency    potassium chloride SR (KLOR-CON 10) tablet 10 mEq  10 mEq Oral BID    QUEtiapine (SEROquel) tablet 25 mg  25 mg Oral QHS    pantoprazole (PROTONIX) tablet 40 mg  40 mg Oral ACB    ondansetron (ZOFRAN) injection 4 mg  4 mg IntraVENous Q6H PRN    sodium chloride (NS) flush 5-10 mL  5-10 mL IntraVENous Q8H    sodium chloride (NS) flush 5-10 mL  5-10 mL IntraVENous PRN    atenolol (TENORMIN) tablet 25 mg  25 mg Oral DAILY    levothyroxine (SYNTHROID) tablet 100 mcg  100 mcg Oral 7am    acetaminophen (TYLENOL) tablet 650 mg  650 mg Oral Q4H PRN    0.45% sodium chloride infusion  50 mL/hr IntraVENous CONTINUOUS        Review of Systems  Pertinent items are noted in HPI.     Objective:     Patient Vitals for the past 8 hrs:   BP Temp Pulse Resp SpO2   17 0428 128/75 98 °F (36.7 °C) 99 16 95 %   17 0016 138/65 98.1 °F (36.7 °C) 74 16 98 %     1901 -  07  In: 1080 [P.O.:480; I.V.:600]  Out: -    07 -  1900  In: 1500 [P.O.:1300; I.V.:200]  Out: -     Physical Exam: No exam performed today, other. ECG: normal sinus rhythm     Data Review   Recent Results (from the past 24 hour(s))   POC H. PYLORI, TISSUE    Collection Time: 05/02/17  1:15 PM   Result Value Ref Range    H. pylori from tissue Negative Negative    Positive control postive     Negative control negative     Lot no. [id number]    CBC WITH AUTOMATED DIFF    Collection Time: 05/03/17  4:10 AM   Result Value Ref Range    WBC 5.4 3.6 - 11.0 K/uL    RBC 2.81 (L) 3.80 - 5.20 M/uL    HGB 8.3 (L) 11.5 - 16.0 g/dL    HCT 24.7 (L) 35.0 - 47.0 %    MCV 87.9 80.0 - 99.0 FL    MCH 29.5 26.0 - 34.0 PG    MCHC 33.6 30.0 - 36.5 g/dL    RDW 16.2 (H) 11.5 - 14.5 %    PLATELET 322 (L) 496 - 400 K/uL    NEUTROPHILS 63 32 - 75 %    LYMPHOCYTES 26 12 - 49 %    MONOCYTES 9 5 - 13 %    EOSINOPHILS 2 0 - 7 %    BASOPHILS 0 0 - 1 %    ABS. NEUTROPHILS 3.4 1.8 - 8.0 K/UL    ABS. LYMPHOCYTES 1.4 0.8 - 3.5 K/UL    ABS. MONOCYTES 0.5 0.0 - 1.0 K/UL    ABS. EOSINOPHILS 0.1 0.0 - 0.4 K/UL    ABS.  BASOPHILS 0.0 0.0 - 0.1 K/UL           Assessment:     Active Problems:    Hyperkalemia (4/27/2017)        Plan:

## 2017-05-03 NOTE — PROGRESS NOTES
1224 Received discharge orders. Uric acid also drawn per orders, result within normal limits. 1004 Reviewed discharge instructions with patient and her daughter. Provided medication education and answered all questions. Removed IV and disconnected from tele box. Will ask if tech can return tele box. Requested volunteer services to assist patient to exit via wheelchair, daughter to transport home.

## 2017-05-04 ENCOUNTER — PATIENT OUTREACH (OUTPATIENT)
Dept: FAMILY MEDICINE CLINIC | Age: 82
End: 2017-05-04

## 2017-05-04 NOTE — ADT AUTH CERT NOTES
Patient Demographics        Patient Name [address] Sex  Address Phone       Diann Carlin [phone] Female 1930 [address] [phone] (Home)  [phone] (Mobile)           CSN:       [id number]           Admit Date: Admit Time Room Bed       2017 12:25 PM 3256 [18041] 01 [55169]           Attending Providers        Provider Pager From To       Cheryle Wells DO  17       Roseann Saleem MD  17           Emergency Contact(s)        Name Relation Home Work Mobile       Ronnie Toney [phone]           Utilization Review            Contin stay review. by Naomi Joyner        Review Status Review Entered       In Primary 2017       Details         ; vs 98.3 °F (36.8 °C) 85, bp 101/48 -- -- 22, sat 98 % Room air .  lab; [facility] 9.. plt 113. K 3.3, cr 1.18, ca 7.5, gfr non aa 43. Meds; NS iv at 50, tenormin po ,protonix po , klorcon po.      C scopy; Excellent bowel preparation. Scope passed to the cecum and then traversed the ileocecal valve into the terminal ileum. There was no blood seen. Normal mucosa seen throughout. No polyps seen. No AVMs or bleeding noted. Normal ileum visualized. Small internal hemorrhoids. EBL: none. Post proc Dx; Iron Def Anemia, Internal Hemorrhoids.    Recommendations: -negative colonoscopy for bleeding seen. -start PO diet and consider Pill cam per Dr. Eduardo Hidalgo.      EGD; Esophagus:   + Focal ulceration that was seen at 29 cm from the esophagus with some friability r/o Pill Esophagitis vs other s/p Bx.  + Normal Z line at 39 cm from incisors.   Stomach: + Mild erythema in body and antrum without ulcer s/p REDD Bx to r/o H Pylori.   Duodenum: + There was mild erythema in duodenal bulb s/p Bx. No duodenal ulcer seen.     Post proced Dx; Ulcer of Esophagus without bleeding, Acute gastritis on hemorrhage.      Recommendations: -f/u path of esophageal ulcer that appeared c/w pill induced - PPI.            Additional Notes       PER MEDICINE; 1, Hypotension- improved. Spironolactone and lisinopril on hold. BP stable       2. ARF- improving on iv fluids       3. Hyperkalemia- resolved. Now potassium is low. Will replace.        4. Past hx hypokalemia.        5. Drop in hgb, heme positive stool. Recent GERD symptoms. Will start PPI and consult gi. Pt. Homer Serene Witness, refuses transfusions. Will hold pradaxa for now also. Gi workup today.        6. R foot pain- normal pulse. Will xray today.               Order FOR GI lite diet near 1356 pm.           5/1 Contin stay review. by Ivanna Garza        Review Status Review Entered       In Primary 5/4/2017       Details         5/1 VS; 99.1 °F (37.3 °C) 76 , /63 -- At rest 18 SAT 93 ra.     LABS; hh 8.9/ 27.6, rbc 3.11, plt 103. (K 3.2 on 4/30)     meds; duocolax po , COLYTE PO , NS iv at 50/h, tenormin po , protonix po , klor con po bid,      5/1 Per GI; Pt is prepping for colonoscopy egd tomorrow off anticoagulants . Clear liquid diet and bowel prep today for EGD/Colon tomorrow. Holding Pradaxa. Patient has been drinking red juice.      PER MEDICINE; 1, Hypotension- improved. Spironolactone and lisinopril on hold. BP still borderline low. 2. ARF- improving on iv fluids  3. Hyperkalemia- resolved. Now potassium is low. Will replace. 4. Past hx hypokalemia. 5. Drop in hgb, heme positive stool. Recent GERD symptoms. Will start PPI and consult gi. Pt. Homer Serene Witness, refuses transfusions. Will hold pradaxa for now also. Gi workup tomorrow.

## 2017-05-04 NOTE — DISCHARGE SUMMARY
[address] SUMMARY       Name:  Chintan Duncan   MR#:  [id number]   :  1930   Account #:  [de-identified]        Date of Adm:  2017       FINAL DIAGNOSES:   1. Hypotension. 2. Acute renal failure. 3. Hyperkalemia. 4. Gastrointestinal bleeding. 5. Esophageal ulcers. 6. Gastritis. 7. Chronic atrial fibrillation. 8. Hypothyroidism. 9. Probable right foot. The patient is to follow up in my office in 2 days. DISCHARGE MEDICATIONS:   1. Prednisone 10 mg b.i.d. for 5 days. 2. Protonix 40 mg daily. 3. Flonase 2 sprays in each nostril once daily. 4. Synthroid 100 mcg daily. 5. Atenolol 50 mg daily. 6. Seroquel 25 mg at bedtime. 7. Astelin nasal spray. HISTORY OF PRESENT ILLNESS: The patient is a pleasant 80-year-  old white female with history of hypertension, atrial fibrillation, and   hypothyroidism who had been seen recently for leg weakness. She   was noted to be orthostatic with a blood pressure of 70/47, was sent to   the emergency room for further evaluation. She was found to be   hyperkalemic, hypotensive 77/40. Creatinine was up to 2.16 and CO2   of 9. The patient was given normal saline bolus and Kayexalate. PAST MEDICAL HISTORY: Significant for chronic atrial fibrillation,   hypertension, hypothyroidism. The patient has had 10 vaginal   deliveries, history of tubal ligation, history of cardioversion. SOCIAL HISTORY: Smoking and alcohol are negative. The patient is   . Her daughter lives with her. MEDICATIONS ON ADMISSION:   1. Flonase 2 sprays each nostril once daily. 2. Synthroid 100 mcg daily. 3. Atenolol 50 mg daily. 4. Lisinopril 20 mg daily. 5. Potassium chloride 20 mEq twice daily. 6. Seroquel 50 mg at bedtime. 7. Chlor-Trimeton p.r.n.   8. Spironolactone 50 mg daily. 9. Pradaxa 150 mg twice daily. 10. Astelin nasal spray.    11. Calcium 1 tablet twice daily. REVIEW OF SYSTEMS: Please see HPI. PHYSICAL EXAMINATION   VITAL SIGNS: Blood pressure 102/68, pulse 76, temperature 97.3,   respiratory rate 22, height 5 feet 5 inches, weight 135. GENERAL: Alert, cooperative, in no distress. NECK: Supple, symmetrical. Thyroid nontender. LUNGS: Clear to auscultation and percussion. CARDIOVASCULAR: Regular rhythm and rate. No murmurs, rubs, or   gallops. ABDOMEN: Soft, nontender, nondistended. NEUROLOGIC: Nonfocal.     HOSPITAL COURSE: Again, the patient was given Kayexalate. She   was started on IV fluids. Her lisinopril, spironolactone and potassium   were all held. Her Pradaxa was continued. On admission, her   potassium was 8.1, sodium was 143, BUN 65, creatinine 2. 16. At   discharge, potassium down to 3.3, BUN 13, creatinine 1. 18. The   patient was noted to have progressive anemia throughout her hospital   stay with a drop in hemoglobin to 9.3. She is a [employer]. Rectal exam was done, was heme-positive. Her Pradaxa was stopped. She was started on Protonix. She was also having problems with   indigestion. Attending consultation by Ahsan Issa and Bing. Her   Pradaxa was stopped. The patient had an EGD and colonoscopy done   prior to discharge. There was focal ulceration in the esophagus at 29   cm with some friability thought to possibly be pill esophagitis, mild   erythema in the body and antrum of the stomach without ulcers. Mild   erythema in the duodenal bulb without ulcers. It was felt that maybe   the esophageal ulcer was pill-induced. She had a colonoscopy done. This was negative. Her hemoglobin remained stable at 8.3 on the day   of discharge. She was quite anxious to go home. She is feeling better. Her indigestion has resolved. Her Pradaxa will be held for 2 weeks. She is to recheck in the office in 2 days for repeat hemoglobin. Stable   and discharged home on 05/03/2017.  She also developed severe pain   and swelling in the right foot, thought to possibly be gout. X-ray was   negative. She was started on prednisone on the day of discharge. This   will be followed up in 2 days also.         Anne Marie Valdes MD      MS / [facility]   D:  05/03/2017   19:57   T:  05/03/2017   22:05   Job #:  [id number]

## 2017-05-05 ENCOUNTER — OFFICE VISIT (OUTPATIENT)
Dept: FAMILY MEDICINE CLINIC | Age: 82
End: 2017-05-05

## 2017-05-05 ENCOUNTER — PATIENT OUTREACH (OUTPATIENT)
Dept: FAMILY MEDICINE CLINIC | Age: 82
End: 2017-05-05

## 2017-05-05 VITALS
OXYGEN SATURATION: 93 % | WEIGHT: 147.8 LBS | BODY MASS INDEX: 24.62 KG/M2 | DIASTOLIC BLOOD PRESSURE: 49 MMHG | HEART RATE: 84 BPM | RESPIRATION RATE: 14 BRPM | SYSTOLIC BLOOD PRESSURE: 116 MMHG | TEMPERATURE: 95.2 F | HEIGHT: 65 IN

## 2017-05-05 DIAGNOSIS — E87.6 HYPOKALEMIA: Primary | ICD-10-CM

## 2017-05-05 DIAGNOSIS — Z13.39 SCREENING FOR ALCOHOLISM: ICD-10-CM

## 2017-05-05 DIAGNOSIS — Z00.00 ROUTINE GENERAL MEDICAL EXAMINATION AT A HEALTH CARE FACILITY: ICD-10-CM

## 2017-05-05 RX ORDER — PANTOPRAZOLE SODIUM 40 MG/1
40 TABLET, DELAYED RELEASE ORAL DAILY
Qty: 30 TAB | Refills: 12
Start: 2017-05-05 | End: 2017-11-02 | Stop reason: SDUPTHER

## 2017-05-05 RX ORDER — POTASSIUM CHLORIDE 20 MEQ/1
TABLET, EXTENDED RELEASE ORAL
COMMUNITY
Start: 2017-04-03 | End: 2017-05-05 | Stop reason: ALTCHOICE

## 2017-05-05 NOTE — PROGRESS NOTES
This is a Subsequent Medicare Annual Wellness Visit providing Personalized Prevention Plan Services (PPPS) (Performed 12 months after initial AWV and PPPS )    I have reviewed the patient's medical history in detail and updated the computerized patient record. History     Past Medical History:   Diagnosis Date    Arrhythmia     atrial fibrillation, chronic. Stress test 2011 essent. normal.  Mild-mod AR on ECHO    Hypertension     Thyroid disease     hypothyroid      Past Surgical History:   Procedure Laterality Date    COLONOSCOPY N/A 5/2/2017    COLONOSCOPY performed by Jane Huang MD at [facility] ENDOSCOPY    ECHO TRANSESOPHAGEAL (DENISE) W OR WO CONTRAST  4/27/2011         HX GYN      vaginal deliveries x10    HX TUBAL LIGATION      VT CARDIOVERSION ELECTIVE ARRHYTHMIA EXTERNAL  6/14/2011          Current Outpatient Prescriptions   Medication Sig Dispense Refill    predniSONE (DELTASONE) 10 mg tablet Take 1 Tab by mouth two (2) times a day. For foot pain 10 Tab 0    fluticasone (FLONASE) 50 mcg/actuation nasal spray USE 1-2 SPRAYS IN EACH NOSTRIL ONCE DAILY. 16 g PRN    levothyroxine (SYNTHROID) 100 mcg tablet TAKE ONE TABLET DAILY FOR THYROID 30 Tab 12    atenolol (TENORMIN) 50 mg tablet TAKE 1 TABLET BY MOUTH DAILY FOR THE HEART AND BLOOD PRESSURE. 30 Tab 12    CALCIUM 600 + D tablet TAKE 1 TABLET TWICE A DAY. (CALCIUM SUPPLEMENT) 60 Tab 12    potassium chloride (K-DUR, KLOR-CON) 20 mEq tablet       pantoprazole (PROTONIX) 40 mg tablet Take 1 Tab by mouth Daily (before breakfast). For indigestion and gastritis. 30 Tab 5    QUEtiapine (SEROQUEL) 25 mg tablet TAKE 1 TAB EVERY EVENING 30 Tab 12    chlorpheniramine (CHLOR-TRIMETRON) 4 mg tablet Take 1 Tab by mouth every six (6) hours as needed for Allergies. 90 Tab 5    azelastine (ASTELIN) 137 mcg (0.1 %) nasal spray 1 [place] by Both Nostrils route two (2) times a day.  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 No    Do you average more than 7 drinks per week? No      Functional Ability and Level of Safety:     Hearing Loss   borderline normal    Activities of Daily Living   Self-care. Requires assistance with: no ADLs    Fall Risk     Fall Risk Assessment, last 12 mths 5/5/2017   Able to walk? Yes   Fall in past 12 months? No     Abuse Screen   Patient is not abused    Review of Systems   Not required    Physical Examination     Evaluation of Cognitive Function:  Mood/affect:  happy  Appearance: age appropriate  Family member/caregiver input: Daughter-Pat    No exam performed today, done by Dr Babar Up. Patient Care Team:  Yolande Smith MD as PCP - Minerva Monaco, RN as Ambulatory Care Navigator    Advice/Referrals/Counseling   Education and counseling provided:Declined Tetanus vaccine. Over due for Eye Exam, states she had Zoster Vaccine at [facility] in [place]. Assessment/Plan   As per Dr Babar Up.

## 2017-05-05 NOTE — PATIENT INSTRUCTIONS
Medicare Part B Preventive Services Limitations Recommendation Scheduled   Bone Mass Measurement  (age 72 & older, biennial) Requires diagnosis related to osteoporosis or estrogen deficiency. Biennial benefit unless patient has history of long-term glucocorticoid tx or baseline is needed because initial test was by other method  Done 2/2012   Cardiovascular Screening Blood Tests (every 5 years)  Total cholesterol, HDL, Triglycerides Order as a panel if possible  Last blood sugar 77 5/2017   Colorectal Cancer Screening  -Fecal occult blood test (annual)  -Flexible sigmoidoscopy (5y)  -Screening colonoscopy (10y)  -Barium Enema   Done 5/2017   Counseling to Prevent Tobacco Use (up to 8 sessions per year)  - Counseling greater than 3 and up to 10 minutes  - Counseling greater than 10 minutes Patients must be asymptomatic of tobacco-related conditions to receive as preventive service  Non smoker   Diabetes Screening Tests (at least every 3 years, Medicare covers annually or at 6-month intervals for prediabetic patients)    Fasting blood sugar (FBS) or glucose tolerance test (GTT) Patient must be diagnosed with one of the following:  -Hypertension, Dyslipidemia, obesity, previous impaired FBS or GTT  Or any two of the following: overweight, FH of diabetes, age ? 72, history of gestational diabetes, birth of baby weighing more than 9 pounds  N/A   Diabetes Self-Management Training (DSMT) (no USPSTF recommendation) Requires referral by treating physician for patient with diabetes or renal disease. 10 hours of initial DSMT session of no less than 30 minutes each in a continuous 12-month period. 2 hours of follow-up DSMT in subsequent years.   N/A   Glaucoma Screening (no USPSTF recommendation) Diabetes mellitus, family history, , age 48 or over,  American, age 72 or over  Over due   Human Immunodeficiency Virus (HIV) Screening (annually for increased risk patients)  HIV-1 and HIV-2 by EIA, KADEN, rapid antibody test, or oral mucosa transudate Patient must be at increased risk for HIV infection per USPSTF guidelines or pregnant. Tests covered annually for patients at increased risk. Pregnant patients may receive up to 3 test during pregnancy. Not high risk   Medical Nutrition Therapy (MNT) (for diabetes or renal disease not recommended schedule) Requires referral by treating physician for patient with diabetes or renal disease. Can be provided in same year as diabetes self-management training (DSMT), and CMS recommends medical nutrition therapy take place after DSMT. Up to 3 hours for initial year and 2 hours in subsequent years. N/A   Shingles Vaccination A shingles vaccine is also recommended once in a lifetime after age 61  States done at [facility]   Seasonal Influenza Vaccination (annually)   Due Fall-2017   Pneumococcal Vaccination (once after 65)   Done   Hepatitis B Vaccinations (if medium/high risk) Medium/high risk factors:  End-stage renal disease,  Hemophiliacs who received Factor VIII or IX concentrates, Clients of institutions for the mentally retarded, Persons who live in the same house as a HepB virus carrier, Homosexual men, Illicit injectable drug abusers. N/A   Screening Mammography (biennial age 54-69) Annually (age 36 or over)  Last done 2/2012   Screening Pap Tests and Pelvic Examination (up to age 79 and after 79 if unknown history or abnormal study last 10 years) Every 25 months except high risk  All normal in the past   Ultrasound Screening for Abdominal Aortic Aneurysm (AAA) (once) Patient must be referred through IPPE and not have had a screening for abdominal aortic aneurysm before under Medicare.   Limited to patients who meet one of the following criteria:  - Men who are 73-68 years old and have smoked more than 100 cigarettes in their lifetime.  -Anyone with a FH of AAA  -Anyone recommended for screening by USPSTF  N/A

## 2017-05-05 NOTE — MR AVS SNAPSHOT
Visit Information Date & Time Provider Department Dept. Phone Encounter #  
 5/5/2017 11:30 AM Gaby Pennington MD [facility] [phone] [id number] Follow-up Instructions Return in about 2 weeks (around 5/19/2017). Follow-up and Disposition History Your Appointments 8/17/2017  9:45 AM  
ROUTINE CARE with Gaby Pennington MD  
[facility]) Appt Note: 6m f/u  
 [address] [phone]  
[phone] [address] [id number]  
  
    
  
 5/10/2017 11:15 AM  
TRANSITIONAL CARE MANAGEMENT with Gaby Pennington MD  
[facility]) Appt Note: f/u [address] [facility] [address] [phone]  
[phone] [address] Upcoming Health Maintenance Date Due  
 MEDICARE YEARLY EXAM 8/20/2015 INFLUENZA AGE 9 TO ADULT 8/1/2017 GLAUCOMA SCREENING Q2Y 2/17/2019 DTaP/Tdap/Td series (2 - Td) 8/17/2026 Allergies as of 5/5/2017  Review Complete On: 5/5/2017 By: Gaby Pennington MD  
 No Known Allergies Current Immunizations  Reviewed on 8/19/2014 Name Date Influenza Vaccine Split  Deferred (Patient Refused) Pneumococcal Polysaccharide (PPSV-23) 2/15/2013 Pneumococcal Vaccine (Unspecified Type)  Deferred (Patient Refused) Not reviewed this visit You Were Diagnosed With   
  
 Codes Comments Hypokalemia    -  Primary ICD-10-CM: E87.6 ICD-9-CM: 276.8 Routine general medical examination at a health care facility     ICD-10-CM: Z00.00 ICD-9-CM: V70.0 Screening for alcoholism     ICD-10-CM: Z13.89 ICD-9-CM: V79.1 Vitals BP Pulse Temp Resp Height(growth percentile) Weight(growth percentile) 116/49 84 95.2 °F (35.1 °C) (Oral) 14 5' 5\" (1.651 m) 147 lb 12.8 oz (67 kg) SpO2 BMI OB Status Smoking Status 93% 24.6 kg/m2 Postmenopausal Never Smoker BMI and BSA Data Body Mass Index Body Surface Area  
 24.6 kg/m 2 1.75 m 2 Preferred Pharmacy Pharmacy Name Phone [address], [place] [facility] [phone] Your Updated Medication List  
  
   
This list is accurate as of: 5/5/17  1:17 PM.  Always use your most recent med list.  
  
  
  
  
 atenolol 50 mg tablet Commonly known as:  TENORMIN  
TAKE 1 TABLET BY MOUTH DAILY FOR THE HEART AND BLOOD PRESSURE. Calcium 600 + D tablet Generic drug:  calcium-cholecalciferol (D3)  
TAKE 1 TABLET TWICE A DAY. (CALCIUM SUPPLEMENT)  
  
 fluticasone 50 mcg/actuation nasal spray Commonly known as:  FLONASE  
USE 1-2 SPRAYS IN EACH NOSTRIL ONCE DAILY. levothyroxine 100 mcg tablet Commonly known as:  SYNTHROID  
TAKE ONE TABLET DAILY FOR THYROID  
  
 pantoprazole 40 mg tablet Commonly known as:  PROTONIX Take 1 Tab by mouth daily. predniSONE 10 mg tablet Commonly known as:  [place] Take 1 Tab by mouth two (2) times a day. For foot pain We Performed the Following AMB POC COMPLETE CBC, AUTOMATED [64608 CPT(R)] Follow-up Instructions Return in about 2 weeks (around 5/19/2017). Patient Instructions Medicare Part B Preventive Services Limitations Recommendation Scheduled Bone Mass Measurement 
(age 72 & older, biennial) Requires diagnosis related to osteoporosis or estrogen deficiency. Biennial benefit unless patient has history of long-term glucocorticoid tx or baseline is needed because initial test was by other method  Done 2/2012 Cardiovascular Screening Blood Tests (every 5 years) Total cholesterol, HDL, Triglycerides Order as a panel if possible  Last blood sugar 77 5/2017 Colorectal Cancer Screening 
-Fecal occult blood test (annual) -Flexible sigmoidoscopy (5y) 
-Screening colonoscopy (10y) -Barium Enema   Done 5/2017 Counseling to Prevent Tobacco Use (up to 8 sessions per year) - Counseling greater than 3 and up to 10 minutes - Counseling greater than 10 minutes Patients must be asymptomatic of tobacco-related conditions to receive as preventive service  Non smoker Diabetes Screening Tests (at least every 3 years, Medicare covers annually or at 6-month intervals for prediabetic patients) Fasting blood sugar (FBS) or glucose tolerance test (GTT) Patient must be diagnosed with one of the following: 
-Hypertension, Dyslipidemia, obesity, previous impaired FBS or GTT 
Or any two of the following: overweight, FH of diabetes, age ? 72, history of gestational diabetes, birth of baby weighing more than 9 pounds  N/A Diabetes Self-Management Training (DSMT) (no USPSTF recommendation) Requires referral by treating physician for patient with diabetes or renal disease. 10 hours of initial DSMT session of no less than 30 minutes each in a continuous 12-month period. 2 hours of follow-up DSMT in subsequent years. N/A Glaucoma Screening (no USPSTF recommendation) Diabetes mellitus, family history, , age 48 or over,  American, age 72 or over  Over due Human Immunodeficiency Virus (HIV) Screening (annually for increased risk patients) HIV-1 and HIV-2 by EIA, KADEN, rapid antibody test, or oral mucosa transudate Patient must be at increased risk for HIV infection per USPSTF guidelines or pregnant. Tests covered annually for patients at increased risk. Pregnant patients may receive up to 3 test during pregnancy. Not high risk Medical Nutrition Therapy (MNT) (for diabetes or renal disease not recommended schedule) Requires referral by treating physician for patient with diabetes or renal disease. Can be provided in same year as diabetes self-management training (DSMT), and CMS recommends medical nutrition therapy take place after DSMT.   Up to 3 hours for initial year and 2 hours in subsequent years. N/A Shingles Vaccination A shingles vaccine is also recommended once in a lifetime after age 61  States done at [facility] Seasonal Influenza Vaccination (annually)   Due Fall-2017 Pneumococcal Vaccination (once after 65)   Done Hepatitis B Vaccinations (if medium/high risk) Medium/high risk factors:  End-stage renal disease, Hemophiliacs who received Factor VIII or IX concentrates, Clients of institutions for the mentally retarded, Persons who live in the same house as a HepB virus carrier, Homosexual men, Illicit injectable drug abusers. N/A Screening Mammography (biennial age 54-69) Annually (age 36 or over)  Last done 2/2012 Screening Pap Tests and Pelvic Examination (up to age 79 and after 79 if unknown history or abnormal study last 10 years) Every 24 months except high risk  All normal in the past  
Ultrasound Screening for Abdominal Aortic Aneurysm (AAA) (once) Patient must be referred through IPPE and not have had a screening for abdominal aortic aneurysm before under Medicare. Limited to patients who meet one of the following criteria: 
- Men who are 73-68 years old and have smoked more than 100 cigarettes in their lifetime. 
-Anyone with a FH of AAA 
-Anyone recommended for screening by USPSTF  N/A Introducing [facility]! Eleuterio Dukes introduces [employer] patient portal. Now you can access parts of your medical record, email your doctor's office, and request medication refills online. 1. In your internet browser, go to [email or web address] 2. Click on the First Time User? Click Here link in the Sign In box. You will see the New Member Sign Up page. 3. Enter your [employer] Access Code exactly as it appears below. You will not need to use this code after youve completed the sign-up process. If you do not sign up before the expiration date, you must request a new code. · [employer] Access Code: [email or web address] Expires: 5/18/2017 10:41 AM 
 
4. Enter the last four digits of your Social Security Number (xxxx) and Date of Birth (mm/dd/yyyy) as indicated and click Submit. You will be taken to the next sign-up page. 5. Create a [employer] ID. This will be your [employer] login ID and cannot be changed, so think of one that is secure and easy to remember. 6. Create a [employer] password. You can change your password at any time. 7. Enter your Password Reset Question and Answer. This can be used at a later time if you forget your password. 8. Enter your e-mail address. You will receive e-mail notification when new information is available in [address] 9. Click Sign Up. You can now view and download portions of your medical record. 10. Click the Download Summary menu link to download a portable copy of your medical information. If you have questions, please visit the Frequently Asked Questions section of the [employer] website. Remember, [employer] is NOT to be used for urgent needs. For medical emergencies, dial 911. Now available from your iPhone and Android! Please provide this summary of care documentation to your next provider. Your primary care clinician is listed as Roxie Pantoja. If you have any questions after today's visit, please call [phone].

## 2017-05-05 NOTE — PROGRESS NOTES
HISTORY OF PRESENT ILLNESS  Goldy Acosta is a 80 y.o. female. HPI In for hospital followup. Doing well, no dizziness. Still feels like ears are stopped up. No abdominal pain. Going back for pill cam test.  Is off of pradaxa, aldactone, potassium and lisinopril     ROS    Physical Exam   Constitutional: She is oriented to person, place, and time. She appears well-developed and well-nourished. HENT:   R ear- cerumen present   Neck: No thyromegaly present. Cardiovascular: Normal rate, regular rhythm and normal heart sounds. No murmur heard. Pulmonary/Chest: Effort normal and breath sounds normal. She has no wheezes. Abdominal: Soft. Bowel sounds are normal. She exhibits no distension. There is no tenderness. There is no rebound and no guarding. Musculoskeletal: Normal range of motion. She exhibits no edema. Lymphadenopathy:     She has no cervical adenopathy. Neurological: She is alert and oriented to person, place, and time. Nursing note and vitals reviewed. ASSESSMENT and PLAN  Orders Placed This Encounter    AMB POC COMPLETE CBC, AUTOMATED     Elisabeth was seen today for hospital follow up and annual wellness visit. Diagnoses and all orders for this visit:    Hypokalemia  -     AMB POC COMPLETE CBC, AUTOMATED    Routine general medical examination at a health care facility    Screening for alcoholism      Follow-up Disposition:  Return in about 2 weeks (around 5/19/2017). R ear irrigated.

## 2017-05-05 NOTE — PROGRESS NOTES
Chief Complaint   Patient presents with   [facility] Follow Up     Endoscopy and High K+.   4/27/17 and 5/2/17   [facility]     1. Have you been to the ER, urgent care clinic since your last visit? Hospitalized since your last visit? YES see chief complaint      2. Have you seen or consulted any other health care providers outside of the [address] [place] [address] since your last visit? Include any pap smears or colon screening.  No    Health Maintenance Due   Topic Date Due    MEDICARE YEARLY EXAM  08/20/2015

## 2017-05-09 RX ORDER — FUROSEMIDE 40 MG/1
TABLET ORAL
Qty: 60 TAB | Refills: 3 | Status: SHIPPED | OUTPATIENT
Start: 2017-05-09 | End: 2017-12-11

## 2017-05-09 NOTE — TELEPHONE ENCOUNTER
Spoke with pt and informed per  new order for lasix 40mg tab- take 1-2 tabs po daily if needed to remove excess fluids from legs and feet.  Pt verbalized understanding

## 2017-05-19 ENCOUNTER — OFFICE VISIT (OUTPATIENT)
Dept: FAMILY MEDICINE CLINIC | Age: 82
End: 2017-05-19

## 2017-05-19 VITALS
BODY MASS INDEX: 22.26 KG/M2 | RESPIRATION RATE: 18 BRPM | OXYGEN SATURATION: 95 % | HEIGHT: 65 IN | HEART RATE: 78 BPM | TEMPERATURE: 96.8 F | DIASTOLIC BLOOD PRESSURE: 54 MMHG | SYSTOLIC BLOOD PRESSURE: 110 MMHG | WEIGHT: 133.6 LBS

## 2017-05-19 DIAGNOSIS — K92.2 GASTROINTESTINAL HEMORRHAGE, UNSPECIFIED GASTROINTESTINAL HEMORRHAGE TYPE: Primary | ICD-10-CM

## 2017-05-19 DIAGNOSIS — I10 ESSENTIAL HYPERTENSION: ICD-10-CM

## 2017-05-19 RX ORDER — QUETIAPINE FUMARATE 25 MG/1
TABLET, FILM COATED ORAL
COMMUNITY
Start: 2017-05-03 | End: 2017-06-12 | Stop reason: SDUPTHER

## 2017-05-19 NOTE — PROGRESS NOTES
Samantha Cui          Name and  verified        Chief Complaint   Patient presents with   [facility] Follow Up     2017 Hyperkalemia       Health Maintenance reviewed-discussed with patient.

## 2017-05-19 NOTE — PROGRESS NOTES
HISTORY OF PRESENT ILLNESS  [place] Galeazzi is a 80 y.o. female. HPI In for followup. Went for small bowel capsule study today. Feeling well, no abdominal pains, melena, anorexia. R ankle pain is much better. ROS    Physical Exam   Constitutional: She is oriented to person, place, and time. She appears well-developed and well-nourished. Neck: No thyromegaly present. Cardiovascular: Normal rate, regular rhythm and normal heart sounds. No murmur heard. Pulmonary/Chest: Effort normal and breath sounds normal. She has no wheezes. Abdominal: Soft. Bowel sounds are normal. She exhibits no distension. There is no tenderness. There is no rebound and no guarding. Musculoskeletal: Normal range of motion. She exhibits no edema. Lymphadenopathy:     She has no cervical adenopathy. Neurological: She is alert and oriented to person, place, and time. Nursing note and vitals reviewed. ASSESSMENT and PLAN  Orders Placed This Encounter    METABOLIC PANEL, BASIC    AMB POC COMPLETE CBC, AUTOMATED     Elisabeth was seen today for hospital follow up. Diagnoses and all orders for this visit:    Gastrointestinal hemorrhage, unspecified gastrointestinal hemorrhage type  -     AMB POC COMPLETE CBC, AUTOMATED    Essential hypertension  -     METABOLIC PANEL, BASIC      Follow-up Disposition:  Return in about 6 weeks (around 6/30/2017). Would like to see what small bowel capsule study shows before resuming pradaxa.

## 2017-05-19 NOTE — MR AVS SNAPSHOT
Visit Information Date & Time Provider Department Dept. Phone Encounter #  
 5/19/2017 11:15 AM Libby Guaman MD [facility] [phone] [id number] Follow-up Instructions Return in about 6 weeks (around 6/30/2017). Your Appointments 8/17/2017  9:45 AM  
ROUTINE CARE with Libby Guaman MD  
[facility]) Appt Note: 6m f/u  
 [address] [facility] [address]  [id number] [phone] [address] [facility] [address] Upcoming Health Maintenance Date Due  
 MEDICARE YEARLY EXAM 8/20/2015 INFLUENZA AGE 9 TO ADULT 8/1/2017 GLAUCOMA SCREENING Q2Y 2/17/2019 DTaP/Tdap/Td series (2 - Td) 8/17/2026 Allergies as of 5/19/2017  Review Complete On: 5/19/2017 By: Libby Guaman MD  
 No Known Allergies Current Immunizations  Reviewed on 8/19/2014 Name Date Influenza Vaccine Split  Deferred (Patient Refused) Pneumococcal Polysaccharide (PPSV-23) 2/15/2013 Pneumococcal Vaccine (Unspecified Type)  Deferred (Patient Refused) Not reviewed this visit You Were Diagnosed With   
  
 Codes Comments Gastrointestinal hemorrhage, unspecified gastrointestinal hemorrhage type    -  Primary ICD-10-CM: K92.2 ICD-9-CM: 578.9 Essential hypertension     ICD-10-CM: I10 
ICD-9-CM: 401.9 Vitals BP Pulse Temp Resp Height(growth percentile) Weight(growth percentile) 110/54 (BP 1 Location: Left arm, BP Patient Position: At rest) 78 96.8 °F (36 °C) (Oral) 18 5' 5\" (1.651 m) 133 lb 9.6 oz (60.6 kg) SpO2 BMI OB Status Smoking Status 95% 22.23 kg/m2 Postmenopausal Never Smoker Vitals History BMI and BSA Data Body Mass Index Body Surface Area  
 22.23 kg/m 2 1.67 m 2 Preferred Pharmacy Pharmacy Name Phone [address], [address] [place] [address] [phone] Your Updated Medication List  
  
   
 This list is accurate as of: 5/19/17 12:18 PM.  Always use your most recent med list.  
  
  
  
  
 atenolol 50 mg tablet Commonly known as:  TENORMIN  
TAKE 1 TABLET BY MOUTH DAILY FOR THE HEART AND BLOOD PRESSURE. Calcium 600 + D tablet Generic drug:  calcium-cholecalciferol (D3)  
TAKE 1 TABLET TWICE A DAY. (CALCIUM SUPPLEMENT)  
  
 fluticasone 50 mcg/actuation nasal spray Commonly known as:  FLONASE  
USE 1-2 SPRAYS IN EACH NOSTRIL ONCE DAILY. furosemide 40 mg tablet Commonly known as:  LASIX Take 1 - 2 tabs po daily if needed to remove excess fluid  
  
 levothyroxine 100 mcg tablet Commonly known as:  SYNTHROID  
TAKE ONE TABLET DAILY FOR THYROID  
  
 pantoprazole 40 mg tablet Commonly known as:  PROTONIX Take 1 Tab by mouth daily. QUEtiapine 25 mg tablet Commonly known as:  SEROquel  
nightly. We Performed the Following AMB POC COMPLETE CBC, AUTOMATED [80778 CPT(R)] METABOLIC PANEL, BASIC [08428 CPT(R)] Follow-up Instructions Return in about 6 weeks (around 6/30/2017). Patient Instructions Hyperkalemia: Care Instructions Your Care Instructions Hyperkalemia is too much potassium in the blood. Potassium helps keep the right mix of fluids in your body. It also helps your nerves and muscles work as they should. And it keeps your heartbeat in a normal rhythm. Some things can raise potassium levels. These include some health problems, medicines, and kidney problems. (Normally, your kidneys remove extra potassium.) Too much potassium can cause nausea. It also can cause a heartbeat that isn't normal. But you may not have any symptoms. Too much potassium can be dangerous. That's why it's important to treat it. If you are taking any of the medicines that can raise your levels, your doctor will ask you to stop. You may get medicines to lower your levels. And you may have to limit or not eat foods that have a lot of potassium. Follow-up care is a key part of your treatment and safety. Be sure to make and go to all appointments, and call your doctor if you are having problems. It's also a good idea to know your test results and keep a list of the medicines you take. How can you care for yourself at home? · Take your medicines exactly as prescribed. Call your doctor if you think you are having a problem with your medicine. · Stop taking certain medicines if your doctor asks you to. They may be causing your high potassium levels. If you have concerns about stopping medicine, talk with your doctor. · If you have kidney, heart, or liver disease and have to limit fluids, talk with your doctor before you increase the amount of fluids you drink. If the doctor says it's okay, drink plenty of fluids. This means drinking enough so that your urine is light yellow or clear like water. · Avoid strenuous exercise until your doctor tells you it is okay. · Potassium is in many foods, including vegetables, fruits, and milk products. Foods high in potassium include bananas, cantaloupe, broccoli, milk, potatoes, and tomatoes. · Low potassium foods include blueberries, raspberries, cucumber, white or brown rice, spaghetti, and macaroni. · Do not use a salt substitute without talking to your doctor first. Most of these are very high in potassium. · Be sure to tell your doctor about any prescription, over-the-counter, or herbal medicines you take. Some of these can raise potassium. When should you call for help? Call 911 anytime you think you may need emergency care. For example, call if: 
· You passed out (lost consciousness). · You have trouble breathing. · You have an unusual heartbeat. Your heart may beat fast or skip beats. Call your doctor now or seek immediate medical care if: 
· You have trouble urinating or can urinate only very small amounts. · Your nausea does not get better. Watch closely for changes in your health, and be sure to contact your doctor if: 
· You do not get better as expected. · You want more help planning meals. Where can you learn more? Go to http://dana-keshia.info/. Enter N103 in the search box to learn more about \"Hyperkalemia: Care Instructions. \" Current as of: November 20, 2015 Content Version: 11.2 © 9479-2438 [employer]. Care instructions adapted under license by [employer] (which disclaims liability or warranty for this information). If you have questions about a medical condition or this instruction, always ask your healthcare professional. [address] any warranty or liability for your use of this information. Introducing [facility] [place] [facility]! [employer] introduces [employer] patient portal. Now you can access parts of your medical record, email your doctor's office, and request medication refills online. 1. In your internet browser, go to [email or web address] 2. Click on the First Time User? Click Here link in the Sign In box. You will see the New Member Sign Up page. 3. Enter your [employer] Access Code exactly as it appears below. You will not need to use this code after youve completed the sign-up process. If you do not sign up before the expiration date, you must request a new code. · [employer] Access Code: [email or web address] Expires: 8/17/2017 12:17 PM 
 
4. Enter the last four digits of your Social Security Number (xxxx) and Date of Birth (mm/dd/yyyy) as indicated and click Submit. You will be taken to the next sign-up page. 5. Create a [employer] ID. This will be your [employer] login ID and cannot be changed, so think of one that is secure and easy to remember. 6. Create a [employer] password. You can change your password at any time. 7. Enter your Password Reset Question and Answer. This can be used at a later time if you forget your password. 8. Enter your e-mail address. You will receive e-mail notification when new information is available in [address] 9. Click Sign Up. You can now view and download portions of your medical record. 10. Click the Download Summary menu link to download a portable copy of your medical information. If you have questions, please visit the Frequently Asked Questions section of the [employer] website. Remember, [employer] is NOT to be used for urgent needs. For medical emergencies, dial 911. Now available from your iPhone and Android! Please provide this summary of care documentation to your next provider. Your primary care clinician is listed as Veto Leyva. If you have any questions after today's visit, please call [phone].

## 2017-05-19 NOTE — PATIENT INSTRUCTIONS
Hyperkalemia: Care Instructions  Your Care Instructions  Hyperkalemia is too much potassium in the blood. Potassium helps keep the right mix of fluids in your body. It also helps your nerves and muscles work as they should. And it keeps your heartbeat in a normal rhythm. Some things can raise potassium levels. These include some health problems, medicines, and kidney problems. (Normally, your kidneys remove extra potassium.)  Too much potassium can cause nausea. It also can cause a heartbeat that isn't normal. But you may not have any symptoms. Too much potassium can be dangerous. That's why it's important to treat it. If you are taking any of the medicines that can raise your levels, your doctor will ask you to stop. You may get medicines to lower your levels. And you may have to limit or not eat foods that have a lot of potassium. Follow-up care is a key part of your treatment and safety. Be sure to make and go to all appointments, and call your doctor if you are having problems. It's also a good idea to know your test results and keep a list of the medicines you take. How can you care for yourself at home? · Take your medicines exactly as prescribed. Call your doctor if you think you are having a problem with your medicine. · Stop taking certain medicines if your doctor asks you to. They may be causing your high potassium levels. If you have concerns about stopping medicine, talk with your doctor. · If you have kidney, heart, or liver disease and have to limit fluids, talk with your doctor before you increase the amount of fluids you drink. If the doctor says it's okay, drink plenty of fluids. This means drinking enough so that your urine is light yellow or clear like water. · Avoid strenuous exercise until your doctor tells you it is okay. · Potassium is in many foods, including vegetables, fruits, and milk products.  Foods high in potassium include bananas, cantaloupe, broccoli, milk, potatoes, and tomatoes. · Low potassium foods include blueberries, raspberries, cucumber, white or brown rice, spaghetti, and macaroni. · Do not use a salt substitute without talking to your doctor first. Most of these are very high in potassium. · Be sure to tell your doctor about any prescription, over-the-counter, or herbal medicines you take. Some of these can raise potassium. When should you call for help? Call 911 anytime you think you may need emergency care. For example, call if:  · You passed out (lost consciousness). · You have trouble breathing. · You have an unusual heartbeat. Your heart may beat fast or skip beats. Call your doctor now or seek immediate medical care if:  · You have trouble urinating or can urinate only very small amounts. · Your nausea does not get better. Watch closely for changes in your health, and be sure to contact your doctor if:  · You do not get better as expected. · You want more help planning meals. Where can you learn more? Go to http://dana-keshia.info/. Enter S150 in the search box to learn more about \"Hyperkalemia: Care Instructions. \"  Current as of: November 20, 2015  Content Version: 11.2  © 8948-2379 [employer]wise[employer]. Care instructions adapted under license by [employer] (which disclaims liability or warranty for this information). If you have questions about a medical condition or this instruction, always ask your healthcare professional. [place] [address] any warranty or liability for your use of this information.

## 2017-05-20 LAB
BUN SERPL-MCNC: 14 MG/DL (ref 8–27)
BUN/CREAT SERPL: 13 (ref 12–28)
CALCIUM SERPL-MCNC: 8.6 MG/DL (ref 8.7–10.3)
CHLORIDE SERPL-SCNC: 97 MMOL/L (ref 96–106)
CO2 SERPL-SCNC: 26 MMOL/L (ref 18–29)
CREAT SERPL-MCNC: 1.05 MG/DL (ref 0.57–1)
GLUCOSE SERPL-MCNC: 155 MG/DL (ref 65–99)
INTERPRETATION: NORMAL
POTASSIUM SERPL-SCNC: 3.6 MMOL/L (ref 3.5–5.2)
SODIUM SERPL-SCNC: 141 MMOL/L (ref 134–144)

## 2017-06-07 ENCOUNTER — DOCUMENTATION ONLY (OUTPATIENT)
Dept: FAMILY MEDICINE CLINIC | Age: 82
End: 2017-06-07

## 2017-06-07 NOTE — PROGRESS NOTES
Reached out to 's office to get results of small bowel capsule study done and any other procedures , notes from Alycia Ba recently. Dr. Crowley Shadow office still awaiting study results but will fax over waht they have for now.

## 2017-06-12 NOTE — PROGRESS NOTES
pc with pt. Will rx atrial fib with aspirin 81 mg daily. 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

## 2017-06-19 ENCOUNTER — TELEPHONE (OUTPATIENT)
Dept: FAMILY MEDICINE CLINIC | Age: 82
End: 2017-06-19

## 2017-06-19 NOTE — TELEPHONE ENCOUNTER
----- Message from Claudell Alken sent at 6/19/2017  9:05 AM EDT -----  Regarding: Dr. Eulogio Baker  Patient is having pain in her left leg that comes and goes. She has been seen for this before and they are keeping an eye on it. She is also having trouble sleeping and is sluggish during the day. Daughter would like a call back today. No appts available. Best contact number is [phone], Harish Darden.

## 2017-06-21 ENCOUNTER — PATIENT OUTREACH (OUTPATIENT)
Dept: FAMILY MEDICINE CLINIC | Age: 82
End: 2017-06-21

## 2017-06-21 ENCOUNTER — OFFICE VISIT (OUTPATIENT)
Dept: FAMILY MEDICINE CLINIC | Age: 82
End: 2017-06-21

## 2017-06-21 VITALS
RESPIRATION RATE: 16 BRPM | OXYGEN SATURATION: 93 % | HEIGHT: 65 IN | HEART RATE: 78 BPM | SYSTOLIC BLOOD PRESSURE: 145 MMHG | BODY MASS INDEX: 23.99 KG/M2 | DIASTOLIC BLOOD PRESSURE: 62 MMHG | TEMPERATURE: 97.5 F | WEIGHT: 144 LBS

## 2017-06-21 DIAGNOSIS — Z00.00 ROUTINE GENERAL MEDICAL EXAMINATION AT A HEALTH CARE FACILITY: ICD-10-CM

## 2017-06-21 DIAGNOSIS — Z23 ENCOUNTER FOR IMMUNIZATION: ICD-10-CM

## 2017-06-21 DIAGNOSIS — Z13.39 SCREENING FOR ALCOHOLISM: ICD-10-CM

## 2017-06-21 DIAGNOSIS — R06.09 DYSPNEA ON EXERTION: Primary | ICD-10-CM

## 2017-06-21 DIAGNOSIS — M79.605 LEFT LEG PAIN: ICD-10-CM

## 2017-06-21 RX ORDER — QUETIAPINE FUMARATE 25 MG/1
TABLET, FILM COATED ORAL
COMMUNITY
Start: 2017-06-03 | End: 2017-06-29

## 2017-06-21 NOTE — PROGRESS NOTES
Chief Complaint   Patient presents with    Leg Pain     pain and numbness,  below the knee  Pain is affecting pt's sleep    Shortness of Breath     upon exertion    Insomnia     1. Have you been to the ER, urgent care clinic since your last visit? Hospitalized since your last visit? No    2. Have you seen or consulted any other health care providers outside of the [address] [place] [address] since your last visit? Include any pap smears or colon screening.  No     Health Maintenance Due   Topic Date Due    MEDICARE YEARLY EXAM  08/20/2015

## 2017-06-21 NOTE — PATIENT INSTRUCTIONS
Medicare Part B Preventive Services Limitations Recommendation Scheduled   Bone Mass Measurement  (age 72 & older, biennial) Requires diagnosis related to osteoporosis or estrogen deficiency. Biennial benefit unless patient has history of long-term glucocorticoid tx or baseline is needed because initial test was by other method  Done 2/2012   Cardiovascular Screening Blood Tests (every 5 years)  Total cholesterol, HDL, Triglycerides Order as a panel if possible  Done 2/2017   Colorectal Cancer Screening  -Fecal occult blood test (annual)  -Flexible sigmoidoscopy (5y)  -Screening colonoscopy (10y)  -Barium Enema   Done this year   Counseling to Prevent Tobacco Use (up to 8 sessions per year)  - Counseling greater than 3 and up to 10 minutes  - Counseling greater than 10 minutes Patients must be asymptomatic of tobacco-related conditions to receive as preventive service  Non smoker   Diabetes Screening Tests (at least every 3 years, Medicare covers annually or at 6-month intervals for prediabetic patients)    Fasting blood sugar (FBS) or glucose tolerance test (GTT) Patient must be diagnosed with one of the following:  -Hypertension, Dyslipidemia, obesity, previous impaired FBS or GTT  Or any two of the following: overweight, FH of diabetes, age ? 72, history of gestational diabetes, birth of baby weighing more than 9 pounds  Last blood sugar was 88 on 5/2017 normal is    Diabetes Self-Management Training (DSMT) (no USPSTF recommendation) Requires referral by treating physician for patient with diabetes or renal disease. 10 hours of initial DSMT session of no less than 30 minutes each in a continuous 12-month period. 2 hours of follow-up DSMT in subsequent years.   N/A   Glaucoma Screening (no USPSTF recommendation) Diabetes mellitus, family history, , age 48 or over,  American, age 72 or over  Over due, done by Dr Brock Perez (HIV) Screening (annually for increased risk patients)  HIV-1 and HIV-2 by EIA, KADEN, rapid antibody test, or oral mucosa transudate Patient must be at increased risk for HIV infection per USPSTF guidelines or pregnant. Tests covered annually for patients at increased risk. Pregnant patients may receive up to 3 test during pregnancy. Not high risk   Medical Nutrition Therapy (MNT) (for diabetes or renal disease not recommended schedule) Requires referral by treating physician for patient with diabetes or renal disease. Can be provided in same year as diabetes self-management training (DSMT), and CMS recommends medical nutrition therapy take place after DSMT. Up to 3 hours for initial year and 2 hours in subsequent years. N/A   Shingles Vaccination A shingles vaccine is also recommended once in a lifetime after age 61  Done 9/2014   Seasonal Influenza Vaccination (annually)   Fall 2014   Pneumococcal Vaccination (once after 72)   Ordered Prevnar 15 today   Hepatitis B Vaccinations (if medium/high risk) Medium/high risk factors:  End-stage renal disease,  Hemophiliacs who received Factor VIII or IX concentrates, Clients of institutions for the mentally retarded, Persons who live in the same house as a HepB virus carrier, Homosexual men, Illicit injectable drug abusers. N/A   Screening Mammography (biennial age 54-69) Annually (age 36 or over)  Done 2/2012   Screening Pap Tests and Pelvic Examination (up to age 79 and after 79 if unknown history or abnormal study last 8 years) Every 25 months except high risk  Age 80   Ultrasound Screening for Abdominal Aortic Aneurysm (AAA) (once) Patient must be referred through IPPE and not have had a screening for abdominal aortic aneurysm before under Medicare.   Limited to patients who meet one of the following criteria:  - Men who are 73-68 years old and have smoked more than 100 cigarettes in their lifetime.  -Anyone with a FH of AAA  -Anyone recommended for screening by USPSTF  N/A

## 2017-06-21 NOTE — PROGRESS NOTES
HISTORY OF PRESENT ILLNESS  Rafael Granados is a 80 y.o. female. HPI Had a bad  pain in L leg  For 30 minutes last Wednesday. Pain was from L knee down to L ankle, was associated with numbness. Relieved by tylenol and aspercream. Has had some mild dyspnea for 2-3 weeks. Some cough. No fever, chills. No chest pains. NOnsmoker, but has lots of 2nd hand smoke exposure. Has been having some swelling in both legs. ROS    Physical Exam   Constitutional: She is oriented to person, place, and time. She appears well-developed and well-nourished. Neck: No thyromegaly present. Cardiovascular: Normal rate, regular rhythm and normal heart sounds. No murmur heard. Pulmonary/Chest: Effort normal and breath sounds normal. She has no wheezes. Abdominal: Soft. Bowel sounds are normal. She exhibits no distension. There is no tenderness. There is no rebound and no guarding. Musculoskeletal: Normal range of motion. She exhibits edema (1-2 + edema bilaterally). Lymphadenopathy:     She has no cervical adenopathy. Neurological: She is alert and oriented to person, place, and time. Nursing note and vitals reviewed. ASSESSMENT and PLAN  Orders Placed This Encounter    XR CHEST PA LAT    XR TIB/FIB LT    METABOLIC PANEL, COMPREHENSIVE    REFERRAL TO CARDIOLOGY    AMB POC COMPLETE CBC, AUTOMATED    AMB POC EKG ROUTINE W/ 12 LEADS, INTER & REP    2D ECHO COMPLETE ADULT (TTE) W OR WO CONTR    pneumococcal 13 katia conj dip (PREVNAR 13, PF,) 0.5 mL syrg injection     Elisabeth was seen today for leg pain, shortness of breath, insomnia and annual wellness visit. Diagnoses and all orders for this visit:    Dyspnea on exertion  -     AMB POC EKG ROUTINE W/ 12 LEADS, INTER & REP  -     XR CHEST PA LAT;  Future  -     AMB POC COMPLETE CBC, AUTOMATED  -     REFERRAL TO CARDIOLOGY  -     2D ECHO COMPLETE ADULT (TTE) W OR WO CONTR; Future    Routine general medical examination at a health care facility  - pneumococcal 13 katia conj dip (PREVNAR 13, PF,) 0.5 mL syrg injection; 0.5 mL by IntraMUSCular route once for 1 dose. -     METABOLIC PANEL, COMPREHENSIVE    Screening for alcoholism    Encounter for immunization  -     pneumococcal 13 katia conj dip (PREVNAR 13, PF,) 0.5 mL syrg injection; 0.5 mL by IntraMUSCular route once for 1 dose. Left leg pain  -     XR TIB/FIB LT; Future      Follow-up Disposition:  Return in about 1 week (around 6/28/2017).

## 2017-06-21 NOTE — PROGRESS NOTES
This is a Subsequent Medicare Annual Wellness Visit providing Personalized Prevention Plan Services (PPPS) (Performed 12 months after initial AWV and PPPS )    I have reviewed the patient's medical history in detail and updated the computerized patient record. History     Past Medical History:   Diagnosis Date    Arrhythmia     atrial fibrillation, chronic. Stress test 2011 essent. normal.  Mild-mod AR on ECHO    Atrial fibrillation (HCC)     GI bleeding     Hypertension     Refusal of blood transfusions as patient is Muslim     Thyroid disease     hypothyroid      Past Surgical History:   Procedure Laterality Date    COLONOSCOPY N/A 5/2/2017    [facility] ENDOSCOPY    ECHO TRANSESOPHAGEAL (DENISE) W OR WO CONTRAST  4/27/2011         HX GYN      vaginal deliveries x10    HX TUBAL LIGATION      VT CARDIOVERSION ELECTIVE ARRHYTHMIA EXTERNAL  6/14/2011          Current Outpatient Prescriptions   Medication Sig Dispense Refill    QUEtiapine (SEROQUEL) 25 mg tablet       aspirin 81 mg chewable tablet Take 1 Tab by mouth daily. To prevent stroke 30 Tab 12    QUEtiapine (SEROQUEL) 50 mg tablet Take 1 Tab by mouth nightly. 30 Tab 12    furosemide (LASIX) 40 mg tablet Take 1 - 2 tabs po daily if needed to remove excess fluid 60 Tab 3    pantoprazole (PROTONIX) 40 mg tablet Take 1 Tab by mouth daily. 30 Tab 12    fluticasone (FLONASE) 50 mcg/actuation nasal spray USE 1-2 SPRAYS IN EACH NOSTRIL ONCE DAILY. 16 g PRN    levothyroxine (SYNTHROID) 100 mcg tablet TAKE ONE TABLET DAILY FOR THYROID 30 Tab 12    atenolol (TENORMIN) 50 mg tablet TAKE 1 TABLET BY MOUTH DAILY FOR THE HEART AND BLOOD PRESSURE. 30 Tab 12    CALCIUM 600 + D tablet TAKE 1 TABLET TWICE A DAY.  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 No    Do you average more than 7 drinks per week? No        Functional Ability and Level of Safety:     Hearing Loss   normal-to-mild    Activities of Daily Living   Self-care. Requires assistance with: no ADLs    Fall Risk   Fall Risk Assessment, last 12 mths 6/21/2017   Able to walk? Yes   Fall in past 12 months? No     Abuse Screen   Patient is not abused    Review of Systems   Not required    Physical Examination     Evaluation of Cognitive Function:  Mood/affect:  neutral  Appearance: age appropriate  Family member/caregiver input: Daughter-Pat    No exam performed today, done by Dr Azeb Celeste    Patient Care Team:  Aidee Russo MD as PCP - Blu Angelo RN as Ambulatory Care Navigator    Advice/Referrals/Counseling   Education and counseling provided:  Screening for glaucoma      Assessment/Plan   As directed by Dr Azeb Celeste.

## 2017-06-21 NOTE — MR AVS SNAPSHOT
Visit Information Date & Time Provider Department Dept. Phone Encounter #  
 6/21/2017 12:30 PM Mandy Baron MD [facility] [phone] [id number] Your Appointments 8/17/2017  9:45 AM  
ROUTINE CARE with Mandy Baron MD  
[facility]) Appt Note: 6m f/u  
 [address] [facility] [address]  [address] [phone] [address] [place] [address] [address] Upcoming Health Maintenance Date Due  
 MEDICARE YEARLY EXAM 8/20/2015 INFLUENZA AGE 9 TO ADULT 8/1/2017 GLAUCOMA SCREENING Q2Y 2/17/2019 DTaP/Tdap/Td series (2 - Td) 8/17/2026 Allergies as of 6/21/2017  Review Complete On: 6/21/2017 By: Mandy Baron MD  
 No Known Allergies Current Immunizations  Reviewed on 8/19/2014 Name Date Influenza Vaccine Split  Deferred (Patient Refused) Pneumococcal Polysaccharide (PPSV-23) 2/15/2013 Pneumococcal Vaccine (Unspecified Type)  Deferred (Patient Refused) Not reviewed this visit You Were Diagnosed With   
  
 Codes Comments Dyspnea on exertion    -  Primary ICD-10-CM: R06.09 
ICD-9-CM: 786.09 Routine general medical examination at a health care facility     ICD-10-CM: Z00.00 ICD-9-CM: V70.0 Screening for alcoholism     ICD-10-CM: Z13.89 ICD-9-CM: V79.1 Encounter for immunization     ICD-10-CM: J85 ICD-9-CM: V03.89 Left leg pain     ICD-10-CM: M79.605 ICD-9-CM: 729.5 Vitals BP Pulse Temp Resp Height(growth percentile) Weight(growth percentile) 145/62 78 97.5 °F (36.4 °C) (Oral) 16 5' 5\" (1.651 m) 144 lb (65.3 kg) SpO2 BMI OB Status Smoking Status 93% 23.96 kg/m2 Postmenopausal Never Smoker Vitals History BMI and BSA Data Body Mass Index Body Surface Area  
 23.96 kg/m 2 1.73 m 2 Preferred Pharmacy Pharmacy Name Phone  [facility] - [address], [address] [place] [address] [phone] Your Updated Medication List  
  
   
This list is accurate as of: 17  2:07 PM.  Always use your most recent med list.  
  
  
  
  
 aspirin 81 mg chewable tablet Take 1 Tab by mouth daily. To prevent stroke  
  
 atenolol 50 mg tablet Commonly known as:  TENORMIN  
TAKE 1 TABLET BY MOUTH DAILY FOR THE HEART AND BLOOD PRESSURE. Calcium 600 + D tablet Generic drug:  calcium-cholecalciferol (D3)  
TAKE 1 TABLET TWICE A DAY. (CALCIUM SUPPLEMENT)  
  
 fluticasone 50 mcg/actuation nasal spray Commonly known as:  FLONASE  
USE 1-2 SPRAYS IN EACH NOSTRIL ONCE DAILY. furosemide 40 mg tablet Commonly known as:  LASIX Take 1 - 2 tabs po daily if needed to remove excess fluid  
  
 levothyroxine 100 mcg tablet Commonly known as:  SYNTHROID  
TAKE ONE TABLET DAILY FOR THYROID  
  
 pantoprazole 40 mg tablet Commonly known as:  PROTONIX Take 1 Tab by mouth daily. pneumococcal 13 katia conj dip 0.5 mL Syrg injection Commonly known as:  PREVNAR 13 (PF)  
0.5 mL by IntraMUSCular route once for 1 dose. * QUEtiapine 25 mg tablet Commonly known as:  SEROquel * QUEtiapine 50 mg tablet Commonly known as:  SEROquel Take 1 Tab by mouth nightly. * Notice: This list has 2 medication(s) that are the same as other medications prescribed for you. Read the directions carefully, and ask your doctor or other care provider to review them with you. Prescriptions Printed Refills  
 pneumococcal 13 katia conj dip (PREVNAR 13, PF,) 0.5 mL syrg injection 0 Si.5 mL by IntraMUSCular route once for 1 dose. Class: Print Route: IntraMUSCular We Performed the Following AMB POC COMPLETE CBC, AUTOMATED [30510 CPT(R)] AMB POC EKG ROUTINE W/ 12 LEADS, INTER & REP [87809 CPT(R)] METABOLIC PANEL, COMPREHENSIVE [36508 CPT(R)] To-Do List   
 2017 Imaging:  XR CHEST PA LAT   
  
 2017 Imaging:  XR TIB/FIB LT   
  
  
Patient Instructions Medicare Part B Preventive Services Limitations Recommendation Scheduled Bone Mass Measurement 
(age 72 & older, biennial) Requires diagnosis related to osteoporosis or estrogen deficiency. Biennial benefit unless patient has history of long-term glucocorticoid tx or baseline is needed because initial test was by other method  Done 2/2012 Cardiovascular Screening Blood Tests (every 5 years) Total cholesterol, HDL, Triglycerides Order as a panel if possible  Done 2/2017 Colorectal Cancer Screening 
-Fecal occult blood test (annual) -Flexible sigmoidoscopy (5y) 
-Screening colonoscopy (10y) -Barium Enema   Done this year Counseling to Prevent Tobacco Use (up to 8 sessions per year) - Counseling greater than 3 and up to 10 minutes - Counseling greater than 10 minutes Patients must be asymptomatic of tobacco-related conditions to receive as preventive service  Non smoker Diabetes Screening Tests (at least every 3 years, Medicare covers annually or at 6-month intervals for prediabetic patients) Fasting blood sugar (FBS) or glucose tolerance test (GTT) Patient must be diagnosed with one of the following: 
-Hypertension, Dyslipidemia, obesity, previous impaired FBS or GTT 
Or any two of the following: overweight, FH of diabetes, age ? 72, history of gestational diabetes, birth of baby weighing more than 9 pounds  Last blood sugar was 88 on 5/2017 normal is  Diabetes Self-Management Training (DSMT) (no USPSTF recommendation) Requires referral by treating physician for patient with diabetes or renal disease. 10 hours of initial DSMT session of no less than 30 minutes each in a continuous 12-month period. 2 hours of follow-up DSMT in subsequent years. N/A Glaucoma Screening (no USPSTF recommendation) Diabetes mellitus, family history, , age 48 or over,  American, age 72 or over  Over due, done by Dr Jane Spencer Human Immunodeficiency Virus (HIV) Screening (annually for increased risk patients) HIV-1 and HIV-2 by EIA, KADEN, rapid antibody test, or oral mucosa transudate Patient must be at increased risk for HIV infection per USPSTF guidelines or pregnant. Tests covered annually for patients at increased risk. Pregnant patients may receive up to 3 test during pregnancy. Not high risk Medical Nutrition Therapy (MNT) (for diabetes or renal disease not recommended schedule) Requires referral by treating physician for patient with diabetes or renal disease. Can be provided in same year as diabetes self-management training (DSMT), and CMS recommends medical nutrition therapy take place after DSMT. Up to 3 hours for initial year and 2 hours in subsequent years. N/A Shingles Vaccination A shingles vaccine is also recommended once in a lifetime after age 61  Done 9/2014 Seasonal Influenza Vaccination (annually)   Fall 2014 Pneumococcal Vaccination (once after 65)   Ordered Prevnar 13 today Hepatitis B Vaccinations (if medium/high risk) Medium/high risk factors:  End-stage renal disease, Hemophiliacs who received Factor VIII or IX concentrates, Clients of institutions for the mentally retarded, Persons who live in the same house as a HepB virus carrier, Homosexual men, Illicit injectable drug abusers. N/A Screening Mammography (biennial age 54-69) Annually (age 36 or over)  Done 2/2012 Screening Pap Tests and Pelvic Examination (up to age 79 and after 79 if unknown history or abnormal study last 10 years) Every 24 months except high risk  Age 80 Ultrasound Screening for Abdominal Aortic Aneurysm (AAA) (once) Patient must be referred through IPPE and not have had a screening for abdominal aortic aneurysm before under Medicare. Limited to patients who meet one of the following criteria: 
- Men who are 73-68 years old and have smoked more than 100 cigarettes in their lifetime. 
-Anyone with a FH of AAA -Anyone recommended for screening by USPSTF  N/A Introducing \[facility]\"" & HEALTH SERVICES! [employer] introduces [employer] patient portal. Now you can access parts of your medical record, email your doctor's office, and request medication refills online. 1. In your internet browser, go to [email or web address] 2. Click on the First Time User? Click Here link in the Sign In box. You will see the New Member Sign Up page. 3. Enter your [employer] Access Code exactly as it appears below. You will not need to use this code after youve completed the sign-up process. If you do not sign up before the expiration date, you must request a new code. · [employer] Access Code: [email or web address] Expires: 8/17/2017 12:17 PM 
 
4. Enter the last four digits of your Social Security Number (xxxx) and Date of Birth (mm/dd/yyyy) as indicated and click Submit. You will be taken to the next sign-up page. 5. Create a [employer] ID. This will be your [employer] login ID and cannot be changed, so think of one that is secure and easy to remember. 6. Create a [employer] password. You can change your password at any time. 7. Enter your Password Reset Question and Answer. This can be used at a later time if you forget your password. 8. Enter your e-mail address. You will receive e-mail notification when new information is available in [address] 9. Click Sign Up. You can now view and download portions of your medical record. 10. Click the Download Summary menu link to download a portable copy of your medical information. If you have questions, please visit the Frequently Asked Questions section of the [employer] website. Remember, [employer] is NOT to be used for urgent needs. For medical emergencies, dial 911. Now available from your iPhone and Android! Please provide this summary of care documentation to your next provider. Your primary care clinician is listed as Sun Melendez.  If you have any questions after today's visit, please call [phone].

## 2017-06-22 LAB
ALBUMIN SERPL-MCNC: 3.7 G/DL (ref 3.5–4.7)
ALBUMIN/GLOB SERPL: 0.9 {RATIO} (ref 1.2–2.2)
ALP SERPL-CCNC: 75 IU/L (ref 39–117)
ALT SERPL-CCNC: 7 IU/L (ref 0–32)
AST SERPL-CCNC: 34 IU/L (ref 0–40)
BILIRUB SERPL-MCNC: 1.2 MG/DL (ref 0–1.2)
BUN SERPL-MCNC: 18 MG/DL (ref 8–27)
BUN/CREAT SERPL: 16 (ref 12–28)
CALCIUM SERPL-MCNC: 8.7 MG/DL (ref 8.7–10.3)
CHLORIDE SERPL-SCNC: 106 MMOL/L (ref 96–106)
CO2 SERPL-SCNC: 23 MMOL/L (ref 18–29)
CREAT SERPL-MCNC: 1.13 MG/DL (ref 0.57–1)
GLOBULIN SER CALC-MCNC: 3.9 G/DL (ref 1.5–4.5)
GLUCOSE SERPL-MCNC: 108 MG/DL (ref 65–99)
INTERPRETATION: NORMAL
POTASSIUM SERPL-SCNC: 3.5 MMOL/L (ref 3.5–5.2)
PROT SERPL-MCNC: 7.6 G/DL (ref 6–8.5)
SODIUM SERPL-SCNC: 146 MMOL/L (ref 134–144)

## 2017-06-27 ENCOUNTER — TELEPHONE (OUTPATIENT)
Dept: FAMILY MEDICINE CLINIC | Age: 82
End: 2017-06-27

## 2017-06-27 NOTE — TELEPHONE ENCOUNTER
----- Message from Curry Tucker sent at 6/27/2017  1:45 PM EDT -----  Regarding:  Faina Dye, granddaughter, is requesting a call back regarding anxiety possibly due to \"Asprin 81 mg\" interfering with \"Calcium 600 mg\", \"Flonase 50 mg\", \"Lasix 40 mg\", \"Levathoroxin 100 mcg\", \"Protonic 40 mg\", and \"Seroquil 50 mg\". Best contact [phone].

## 2017-06-27 NOTE — TELEPHONE ENCOUNTER
----- Message from Tosin Mike sent at 6/27/2017  2:33 PM EDT -----  Regarding: Dr Rosa/telephone  Pt daughter would like to know when is her mom suppose to get a echocardiogram, was suppose to had it before her appt with Dr Ange Maria on 6-.  Please call daughter Ernestine [facility] [phone]

## 2017-06-27 NOTE — TELEPHONE ENCOUNTER
Pt called back and writer spoke with pt directly. Pt stated she hasnt heard when her appt is to have her echo test done. Information was placed in chart when appt was for pt along with documentation that pt was attempted to reach but unavailable. Pt complained she hadn;t heard anything and wanted to speak with referral office. Rosibel Parish from referral office picked up and spoke with pt.

## 2017-06-28 ENCOUNTER — HOSPITAL ENCOUNTER (OUTPATIENT)
Dept: NON INVASIVE DIAGNOSTICS | Age: 82
Discharge: HOME | End: 2017-06-28
Attending: FAMILY MEDICINE
Payer: MEDICARE

## 2017-06-28 DIAGNOSIS — R06.09 DYSPNEA ON EXERTION: ICD-10-CM

## 2017-06-28 PROCEDURE — 93306 TTE W/DOPPLER COMPLETE: CPT

## 2017-06-29 ENCOUNTER — OFFICE VISIT (OUTPATIENT)
Dept: FAMILY MEDICINE CLINIC | Age: 82
End: 2017-06-29

## 2017-06-29 VITALS
RESPIRATION RATE: 16 BRPM | TEMPERATURE: 97 F | HEART RATE: 86 BPM | SYSTOLIC BLOOD PRESSURE: 125 MMHG | BODY MASS INDEX: 21.46 KG/M2 | DIASTOLIC BLOOD PRESSURE: 52 MMHG | HEIGHT: 65 IN | OXYGEN SATURATION: 96 % | WEIGHT: 128.8 LBS

## 2017-06-29 DIAGNOSIS — R60.0 LOCALIZED EDEMA: Primary | ICD-10-CM

## 2017-06-29 RX ORDER — TRAZODONE HYDROCHLORIDE 150 MG/1
150 TABLET ORAL
Qty: 30 TAB | Refills: 5 | Status: SHIPPED | OUTPATIENT
Start: 2017-06-29 | End: 2017-11-14 | Stop reason: ALTCHOICE

## 2017-06-29 RX ORDER — POTASSIUM CHLORIDE 20 MEQ/1
TABLET, EXTENDED RELEASE ORAL
COMMUNITY
Start: 2017-04-03 | End: 2017-07-03 | Stop reason: SDUPTHER

## 2017-06-29 NOTE — PROGRESS NOTES
Chief Complaint   Patient presents with    Heart Problem     F/U     1. Have you been to the ER, urgent care clinic since your last visit? Hospitalized since your last visit? No    2. Have you seen or consulted any other health care providers outside of the [address] [place] [address] since your last visit? Include any pap smears or colon screening. No     There are no preventive care reminders to display for this patient.

## 2017-06-29 NOTE — MR AVS SNAPSHOT
Visit Information Date & Time Provider Department Dept. Phone Encounter #  
 6/29/2017  3:00 PM Kelli Cifuentes MD [facility] [phone] [id number] Your Appointments 7/5/2017  1:30 PM  
New Patient with Stevie Mahoney MD  
[facility] ([address]) Appt Note: np; dysnea on excution; ref by jordan/dr bradley; mld forms 6-21-17 -lj [address] [place] [address] [address] [facility] [address]  
[phone]  
  
   
 [address] [place] [address] [address] [facility] [address]  
  
    
 8/17/2017  9:45 AM  
ROUTINE CARE with Kelli Cifuentes MD  
[facility] [address] [facility]) Appt Note: 6m f/u  
 [address] [facility] [address] [address]  
[phone] [address] [place] [address] [address] Upcoming Health Maintenance Date Due INFLUENZA AGE 9 TO ADULT 8/1/2017 MEDICARE YEARLY EXAM 6/22/2018 GLAUCOMA SCREENING Q2Y 2/17/2019 DTaP/Tdap/Td series (2 - Td) 8/17/2026 Allergies as of 6/29/2017  Review Complete On: 6/29/2017 By: Kelli Cifuentes MD  
  
 Severity Noted Reaction Type Reactions Ambien [Zolpidem]  06/29/2017    Other (comments) Couldn't move Seroquel [Quetiapine]  06/29/2017    Other (comments) PSYCOLOGICAL REACTION Pt didn't feel like them selves but felt like a different person, iT DEEPLY DISTURBED PT.  PT BECAME IRRITABLE. Current Immunizations  Reviewed on 8/19/2014 Name Date Influenza Vaccine Split  Deferred (Patient Refused) Pneumococcal Polysaccharide (PPSV-23) 2/15/2013 Pneumococcal Vaccine (Unspecified Type)  Deferred (Patient Refused) Not reviewed this visit You Were Diagnosed With   
  
 Codes Comments Localized edema    -  Primary ICD-10-CM: R60.0 ICD-9-CM: 697. 3 Vitals BP Pulse Temp Resp Height(growth percentile) Weight(growth percentile) 125/52 86 97 °F (36.1 °C) (Oral) 16 5' 5\" (1.651 m) 128 lb 12.8 oz (58.4 kg) SpO2 BMI OB Status Smoking Status 96% 21.43 kg/m2 Postmenopausal Never Smoker BMI and BSA Data Body Mass Index Body Surface Area  
 21.43 kg/m 2 1.64 m 2 Preferred Pharmacy Pharmacy Name Phone [facility] [phone] Your Updated Medication List  
  
   
This list is accurate as of: 6/29/17  3:35 PM.  Always use your most recent med list.  
  
  
  
  
 aspirin 81 mg chewable tablet Take 1 Tab by mouth daily. To prevent stroke  
  
 atenolol 50 mg tablet Commonly known as:  TENORMIN  
TAKE 1 TABLET BY MOUTH DAILY FOR THE HEART AND BLOOD PRESSURE. Calcium 600 + D tablet Generic drug:  calcium-cholecalciferol (D3)  
TAKE 1 TABLET TWICE A DAY. (CALCIUM SUPPLEMENT)  
  
 fluticasone 50 mcg/actuation nasal spray Commonly known as:  FLONASE  
USE 1-2 SPRAYS IN EACH NOSTRIL ONCE DAILY. furosemide 40 mg tablet Commonly known as:  LASIX Take 1 - 2 tabs po daily if needed to remove excess fluid  
  
 levothyroxine 100 mcg tablet Commonly known as:  SYNTHROID  
TAKE ONE TABLET DAILY FOR THYROID  
  
 pantoprazole 40 mg tablet Commonly known as:  PROTONIX Take 1 Tab by mouth daily. potassium chloride 20 mEq tablet Commonly known as:  K-DUR, KLOR-CON  
  
 traZODone 150 mg tablet Commonly known as:  Shelva [place] Take 1 Tab by mouth nightly. For sleep Prescriptions Sent to Pharmacy Refills  
 traZODone (DESYREL) 150 mg tablet 5 Sig: Take 1 Tab by mouth nightly. For sleep Class: Normal  
 Pharmacy: [facility] Ph #: [phone] Route: Oral  
  
We Performed the Following METABOLIC PANEL, BASIC [41272 CPT(R)] Introducing [facility] & HEALTH SERVICES! [employer] introduces [employer] patient portal. Now you can access parts of your medical record, email your doctor's office, and request medication refills online. 1. In your internet browser, go to [email or web address] 2. Click on the First Time User? Click Here link in the Sign In box. You will see the New Member Sign Up page. 3. Enter your [employer] Access Code exactly as it appears below. You will not need to use this code after youve completed the sign-up process. If you do not sign up before the expiration date, you must request a new code. · [employer] Access Code: [email or web address] Expires: 8/17/2017 12:17 PM 
 
4. Enter the last four digits of your Social Security Number (xxxx) and Date of Birth (mm/dd/yyyy) as indicated and click Submit. You will be taken to the next sign-up page. 5. Create a [employer] ID. This will be your [employer] login ID and cannot be changed, so think of one that is secure and easy to remember. 6. Create a [employer] password. You can change your password at any time. 7. Enter your Password Reset Question and Answer. This can be used at a later time if you forget your password. 8. Enter your e-mail address. You will receive e-mail notification when new information is available in [address] 9. Click Sign Up. You can now view and download portions of your medical record. 10. Click the Download Summary menu link to download a portable copy of your medical information. If you have questions, please visit the Frequently Asked Questions section of the [employer] website. Remember, [employer] is NOT to be used for urgent needs. For medical emergencies, dial 911. Now available from your iPhone and Android! Please provide this summary of care documentation to your next provider. Your primary care clinician is listed as Tommie Mckeon. If you have any questions after today's visit, please call [phone].

## 2017-06-29 NOTE — PROGRESS NOTES
HISTORY OF PRESENT ILLNESS  Rafael Granados is a 80 y.o. female. HPI In for recheck. Breathing is doing very well. Swelling in legs is gone. No chest tightness. ROS    Physical Exam   Constitutional: She is oriented to person, place, and time. She appears well-developed and well-nourished. Neck: No thyromegaly present. Cardiovascular: Normal rate, regular rhythm and normal heart sounds. No murmur heard. Pulmonary/Chest: Effort normal and breath sounds normal. She has no wheezes. Abdominal: Soft. Bowel sounds are normal. She exhibits no distension. There is no tenderness. There is no rebound and no guarding. Musculoskeletal: Normal range of motion. She exhibits no edema. Lymphadenopathy:     She has no cervical adenopathy. Neurological: She is alert and oriented to person, place, and time. Nursing note and vitals reviewed. ASSESSMENT and PLAN  Orders Placed This Encounter    METABOLIC PANEL, BASIC    traZODone (DESYREL) 150 mg tablet     Elisabeth was seen today for heart problem. Diagnoses and all orders for this visit:    Localized edema  -     METABOLIC PANEL, BASIC    Other orders  -     traZODone (DESYREL) 150 mg tablet; Take 1 Tab by mouth nightly.  For sleep      Follow-up Disposition: Not on File

## 2017-06-30 LAB
BUN SERPL-MCNC: 31 MG/DL (ref 8–27)
BUN/CREAT SERPL: 22 (ref 12–28)
CALCIUM SERPL-MCNC: 8.5 MG/DL (ref 8.7–10.3)
CHLORIDE SERPL-SCNC: 91 MMOL/L (ref 96–106)
CO2 SERPL-SCNC: 30 MMOL/L (ref 18–29)
CREAT SERPL-MCNC: 1.38 MG/DL (ref 0.57–1)
GLUCOSE SERPL-MCNC: 142 MG/DL (ref 65–99)
INTERPRETATION: NORMAL
POTASSIUM SERPL-SCNC: 3.1 MMOL/L (ref 3.5–5.2)
SODIUM SERPL-SCNC: 140 MMOL/L (ref 134–144)

## 2017-07-05 ENCOUNTER — OFFICE VISIT (OUTPATIENT)
Dept: CARDIOLOGY CLINIC | Age: 82
End: 2017-07-05

## 2017-07-05 VITALS
DIASTOLIC BLOOD PRESSURE: 62 MMHG | SYSTOLIC BLOOD PRESSURE: 132 MMHG | HEART RATE: 80 BPM | BODY MASS INDEX: 22.09 KG/M2 | HEIGHT: 65 IN | WEIGHT: 132.6 LBS | RESPIRATION RATE: 18 BRPM | OXYGEN SATURATION: 95 %

## 2017-07-05 DIAGNOSIS — K92.2 GASTROINTESTINAL HEMORRHAGE, UNSPECIFIED GASTROINTESTINAL HEMORRHAGE TYPE: ICD-10-CM

## 2017-07-05 DIAGNOSIS — K21.00 GERD WITH ESOPHAGITIS: ICD-10-CM

## 2017-07-05 DIAGNOSIS — Z92.29 HX: LONG TERM ANTICOAGULANT USE: ICD-10-CM

## 2017-07-05 DIAGNOSIS — N18.30 CKD (CHRONIC KIDNEY DISEASE) STAGE 3, GFR 30-59 ML/MIN (HCC): ICD-10-CM

## 2017-07-05 DIAGNOSIS — E78.5 DYSLIPIDEMIA (HIGH LDL; LOW HDL): ICD-10-CM

## 2017-07-05 DIAGNOSIS — I42.9 CARDIOMYOPATHY, SECONDARY (HCC): ICD-10-CM

## 2017-07-05 DIAGNOSIS — R06.09 DOE (DYSPNEA ON EXERTION): Primary | ICD-10-CM

## 2017-07-05 DIAGNOSIS — I48.19 PERSISTENT ATRIAL FIBRILLATION (HCC): ICD-10-CM

## 2017-07-05 DIAGNOSIS — R60.0 BILATERAL LEG EDEMA: ICD-10-CM

## 2017-07-05 DIAGNOSIS — G47.00 INSOMNIA, UNSPECIFIED TYPE: ICD-10-CM

## 2017-07-05 DIAGNOSIS — I10 ESSENTIAL HYPERTENSION: ICD-10-CM

## 2017-07-05 NOTE — PROGRESS NOTES
Chief Complaint   Patient presents with    Shortness of Breath     NP, ref by Dr. Amish Bond. Connie for SOB on exertion.

## 2017-07-05 NOTE — PROGRESS NOTES
[address] [place] [address]        [phone]                             NEW PATIENT HPI/FOLLOW-UP    NAME:  Ortega Bennett   :   1930   MRN:   [id number]   PCP:  Moe Yusuf MD           Subjective: The patient is a 80y.o. year old female with PMHx HTN, hypothyroidism, chronic atrial fib s/p DENISE/DCCV  on Pradaxa stopped  due to recent GI bleed, refusal of blood transfusions as Sikhism,negative stress test  who returns after long hiatus since  for symptom based f/u for leg edema,vague chest discomfort and HILTON. All resolved on Lasix intermittently since discharge after hydration for hypotension, dehydration,hyperkalemia,ARF. Also here to discuss resumption of Pradaxa since recent GI bleed. Denies change in exercise tolerance, chest pain, medication intolerance, palpitations, PND/orthopnea wheezing, sputum, syncope, dizziness or light headedness. Doing satisfactorily now. Echo 17 revealed LVEF 40-45% as compared to LVEF 65% .        Review of Systems:     [] Unable to obtain  ROS due to  []mental status change  []sedated   []intubated   [x]Total of 12 systems reviewed as follows:  Constitutional: negative fever, negative chills, negative weight loss  Eyes:   negative visual changes  ENT:   negative sore throat, tongue or lip swelling  Chest/Resp:  negative cough, wheezing, + dyspnea,tenderness  Cards:  negative for chest pain, decreased exercise endurance, palpitations, +lower extremity edema,PND,orthopnea,syncpoe,dizziness,lightheadedness  GI:   negative for nausea, vomiting, diarrhea, and abdominal pain;+ GI bleed  :  negative for frequency, dysuria  Integument:  negative for rash and pruritus  Heme:  negative for easy bruising and gum/nose bleeding  Musculoskel: negative for myalgias,  back pain and muscle weakness  Neuro:  negative for headaches, dizziness, vertigo  Psych:  negative for feelings of anxiety, depression Past Medical History:   Diagnosis Date    Arrhythmia     atrial fibrillation, chronic. Stress test 2011 essent. normal.  Mild-mod AR on ECHO    Atrial fibrillation ([address]     GI bleeding     Hypertension     Refusal of blood transfusions as patient is Gnosticist     Thyroid disease     hypothyroid     Patient Active Problem List    Diagnosis Date Noted    HILTON (dyspnea on exertion) 07/05/2017    Bilateral leg edema 07/05/2017    HX: long term anticoagulant use--Pradaxa stopped 5/17 for chronic atrial fib 07/05/2017    GI bleeding     Hyperkalemia 04/27/2017    Hypothyroidism 04/27/2011    Hyperthyroidism--iatrogenic 04/27/2011    Insomnia 03/16/2011    Hypokalemia 03/16/2011    Encephalopathy acute 03/15/2011    Rhabdomyolysis 03/14/2011    Persistent atrial fibrillation ([facility] [address] 03/14/2011    HTN (hypertension) 03/14/2011      Past Surgical History:   Procedure Laterality Date    COLONOSCOPY N/A 5/2/2017    COLONOSCOPY performed by [facility]ceo [facility] ENDOSCOPY    ECHO TRANSESOPHAGEAL (DENISE) W OR WO CONTRAST  4/27/2011         HX GYN      vaginal deliveries x10    HX TUBAL LIGATION      MI CARDIOVERSION ELECTIVE ARRHYTHMIA EXTERNAL  6/14/2011          Allergies   Allergen Reactions    Ambien [Zolpidem] Other (comments)     Couldn't move    Seroquel [Quetiapine] Other (comments)     PSYCOLOGICAL REACTION Pt didn't feel like them selves but felt like a different person, iT DEEPLY DISTURBED PT.  PT BECAME IRRITABLE. Family History   Problem Relation Age of Onset    Heart Disease Father     Cancer Son     Heart Disease Son       Social History     Social History    Marital status:      Spouse name: N/A    Number of children: N/A    Years of education: N/A     Occupational History    Not on file.      Social History Main Topics    Smoking status: Never Smoker    Smokeless tobacco: Never Used    Alcohol use No    Drug use: No    Sexual activity: No     Other Topics Concern    Not on file     Social History Narrative      Current Outpatient Prescriptions   Medication Sig    potassium chloride (K-DUR, KLOR-CON) 20 mEq tablet Take 1 Tab by mouth two (2) times a day.  traZODone (DESYREL) 150 mg tablet Take 1 Tab by mouth nightly. For sleep    aspirin 81 mg chewable tablet Take 1 Tab by mouth daily. To prevent stroke    furosemide (LASIX) 40 mg tablet Take 1 - 2 tabs po daily if needed to remove excess fluid    pantoprazole (PROTONIX) 40 mg tablet Take 1 Tab by mouth daily.  fluticasone (FLONASE) 50 mcg/actuation nasal spray USE 1-2 SPRAYS IN EACH NOSTRIL ONCE DAILY.  levothyroxine (SYNTHROID) 100 mcg tablet TAKE ONE TABLET DAILY FOR THYROID    atenolol (TENORMIN) 50 mg tablet TAKE 1 TABLET BY MOUTH DAILY FOR THE HEART AND BLOOD PRESSURE.  CALCIUM 600 + D tablet TAKE 1 TABLET TWICE A DAY. (CALCIUM SUPPLEMENT)     No current facility-administered medications for this visit. I have reviewed the nurses notes, vitals, problem list, allergy list, medical history, family medical, social history and medications. Objective:     Physical Exam:     Vitals:    07/05/17 1319 07/05/17 1325   BP: 128/60 132/62   Pulse: 80    Resp: 18    SpO2: 95%    Weight: 132 lb 9.6 oz (60.1 kg)    Height: 5' 5\" (1.651 m)     Body mass index is 22.07 kg/(m^2). General: Well developed, in no acute distress. HEENT: No carotid bruits, no JVD, trach is midline. Heart:  Normal S1/S2 negative S3 or S4. Irregular, no murmur, gallop or rub.   Respiratory: Clear bilaterally, no wheezing or rales  Abdomen:   Soft, non-tender, bowel sounds are active.   Extremities:  No edema, normal cap refill, no cyanosis. Neuro: A&Ox3, speech clear, gait stable. Skin: Skin color is normal. No rashes or lesions. No diaphoresis.   Vascular: 2+ pulses symmetric in all extremities        Data Review:       Cardiographics:    EKG: Atrial fibrillation with controlled VR,LAD, clockwise rotation    Cardiology Labs:    Results for orders placed or performed during the hospital encounter of 04/27/17   EKG, 12 LEAD, INITIAL   Result Value Ref Range    Ventricular Rate 67 BPM    Atrial Rate 71 BPM    QRS Duration 108 ms    Q-T Interval 372 ms    QTC Calculation (Bezet) 393 ms    Calculated R Axis -42 degrees    Calculated T Axis 23 degrees    Diagnosis       Atrial fibrillation with a competing junctional pacemaker  Left axis deviation  Incomplete left bundle branch block  When compared with ECG of 14-JUN-2011 11:05,  Atrial fibrillation has replaced Sinus rhythm    Confirmed by Mo Santoyo, P.V. ([id number]) on 4/27/2017 6:27:07 PM         Lab Results   Component Value Date/Time    Cholesterol, total 94 02/17/2017 09:52 AM    HDL Cholesterol 30 02/17/2017 09:52 AM    LDL, calculated 46 02/17/2017 09:52 AM    Triglyceride 90 02/17/2017 09:52 AM    CHOL/HDL Ratio 3.1 04/20/2010 01:10 AM       Lab Results   Component Value Date/Time    Sodium 140 06/29/2017 03:38 PM    Potassium 3.1 06/29/2017 03:38 PM    Chloride 91 06/29/2017 03:38 PM    CO2 30 06/29/2017 03:38 PM    Anion gap 13 05/02/2017 03:20 AM    Glucose 142 06/29/2017 03:38 PM    BUN 31 06/29/2017 03:38 PM    Creatinine 1.38 06/29/2017 03:38 PM    BUN/Creatinine ratio 22 06/29/2017 03:38 PM    GFR est AA 40 06/29/2017 03:38 PM    GFR est non-AA 35 06/29/2017 03:38 PM    Calcium 8.5 06/29/2017 03:38 PM    Bilirubin, total 1.2 06/21/2017 02:55 PM    AST (SGOT) 34 06/21/2017 02:55 PM    Alk. phosphatase 75 06/21/2017 02:55 PM    Protein, total 7.6 06/21/2017 02:55 PM    Albumin 3.7 06/21/2017 02:55 PM    Globulin 4.1 04/27/2017 02:21 PM    A-G Ratio 0.9 06/21/2017 02:55 PM    ALT (SGPT) 7 06/21/2017 02:55 PM          Assessment:       ICD-10-CM ICD-9-CM    1. HILTON (dyspnea on exertion) R06.09 786.09 AMB POC EKG ROUTINE W/ 12 LEADS, INTER & REP      CANCELED: 2D ECHO COMPLETE ADULT (TTE) W OR WO CONTR   2.  Gastrointestinal hemorrhage, unspecified gastrointestinal hemorrhage type--esophageal(pill) K92.2 578.9    3. Persistent atrial fibrillation (HCC) I48.1 427.31    4. Essential hypertension I10 401.9 CANCELED: 2D ECHO COMPLETE ADULT (TTE) W OR WO CONTR   5. Insomnia, unspecified type G47.00 780.52 CANCELED: 2D ECHO COMPLETE ADULT (TTE) W OR WO CONTR   6. Bilateral leg edema R60.0 782.3 CANCELED: 2D ECHO COMPLETE ADULT (TTE) W OR WO CONTR   7. HX: long term anticoagulant use--Pradaxa stopped 5/17 for chronic atrial fib Z79.01 V58.61    8. Cardiomyopathy, secondary ([facility] [address] I42.9 425.9    9. GERD with esophagitis K21.0 530.11    10. Dyslipidemia (high LDL; low HDL) E78.4 272.4    11. CKD (chronic kidney disease) stage 3, GFR 30-59 ml/min N18.3 585. 3          Discussion: Patient presents at this time with recent multitude of symptoms and hospitalization for extreme hypotension, GI bleed, dehydration, VY, hyperkalemia requiring hydration, chronic atrial fib,etc. Also has had vague HILTON and non-exertional chest discomfort and transient leg edema possibly holdover from hydration and steroid rx--resolved. No recurrent HITLON nor chest pain since resolution of leg edema. Recent echo revealed mild cardiomyopathy etiology to be determined. Will obtain Lexiscan NST to exclude CAD given risk factors. On Atenolol providing rate control for AFIB. However may be contributing to mild CM. Will continue for now and repeat echo 3 months. Would recommend restart of Pradaxa and will leave to PCP if okay with GI. Patient has f/u with PCP next week. On Lasix prn 2-5 lb weight gain so as to avoid dehydration/worsening CKD and hyperkalemia. Plan: 1. Continue same meds. Lipid profile and labs followed by PCP    2. Encouraged to exercise to tolerance and follow low fat, low cholesterol, low sodium predominantly Plant-based (consider Mediterranean) diet. Call with questions or concerns. Will follow up any test results by phone and/or f/u here in office if needed. Yamilet Niño       3.Follow up: 3 MONTHS    I have discussed the diagnosis with the patient and the intended plan as seen in the above orders. The patient has received an after-visit summary and questions were answered concerning future plans. I have discussed any concerning medication side effects and warnings with the patient as well.     Cabrera Parra MD  7/5/2017

## 2017-07-06 ENCOUNTER — TELEPHONE (OUTPATIENT)
Dept: CARDIOLOGY CLINIC | Age: 82
End: 2017-07-06

## 2017-07-06 DIAGNOSIS — E78.5 DYSLIPIDEMIA (HIGH LDL; LOW HDL): ICD-10-CM

## 2017-07-06 DIAGNOSIS — R60.0 BILATERAL LEG EDEMA: ICD-10-CM

## 2017-07-06 DIAGNOSIS — R06.09 DOE (DYSPNEA ON EXERTION): ICD-10-CM

## 2017-07-06 DIAGNOSIS — I10 ESSENTIAL HYPERTENSION: ICD-10-CM

## 2017-07-06 DIAGNOSIS — N18.30 CKD (CHRONIC KIDNEY DISEASE) STAGE 3, GFR 30-59 ML/MIN (HCC): ICD-10-CM

## 2017-07-06 DIAGNOSIS — I48.19 PERSISTENT ATRIAL FIBRILLATION (HCC): ICD-10-CM

## 2017-07-06 DIAGNOSIS — I42.9 CARDIOMYOPATHY, SECONDARY (HCC): Primary | ICD-10-CM

## 2017-07-06 DIAGNOSIS — R07.9 CHEST PAIN WITH MODERATE RISK FOR CARDIAC ETIOLOGY: ICD-10-CM

## 2017-07-06 NOTE — TELEPHONE ENCOUNTER
Spoke with patient. Verified patient with two patient identifiers. Advised needs Herbert JACK. Will ask PSR to call pt to schedule test.  Patient verbalized understanding.

## 2017-07-13 ENCOUNTER — OFFICE VISIT (OUTPATIENT)
Dept: FAMILY MEDICINE CLINIC | Age: 82
End: 2017-07-13

## 2017-07-13 VITALS
TEMPERATURE: 96.6 F | BODY MASS INDEX: 22.16 KG/M2 | HEIGHT: 65 IN | RESPIRATION RATE: 16 BRPM | WEIGHT: 133 LBS | OXYGEN SATURATION: 93 % | DIASTOLIC BLOOD PRESSURE: 58 MMHG | HEART RATE: 77 BPM | SYSTOLIC BLOOD PRESSURE: 136 MMHG

## 2017-07-13 DIAGNOSIS — D50.0 IRON DEFICIENCY ANEMIA DUE TO CHRONIC BLOOD LOSS: Primary | ICD-10-CM

## 2017-07-13 DIAGNOSIS — I50.32 CHRONIC DIASTOLIC CONGESTIVE HEART FAILURE (HCC): ICD-10-CM

## 2017-07-13 RX ORDER — DABIGATRAN ETEXILATE 75 MG/1
75 CAPSULE ORAL EVERY 12 HOURS
Qty: 60 CAP | Refills: 5 | Status: SHIPPED | OUTPATIENT
Start: 2017-07-13 | End: 2018-01-02 | Stop reason: SDUPTHER

## 2017-07-13 NOTE — MR AVS SNAPSHOT
Visit Information Date & Time Provider Department Dept. Phone Encounter #  
 7/13/2017  2:30 PM Pat Lee MD [facility] [phone] [id number] Your Appointments 7/17/2017  9:00 AM  
NUCLEAR MEDICINE with NUCLEAR, [facility] [address] [facility]) Appt Note: Lexiscan stress test - NUCLEAR STRESS TEST [LRB9541] (Order [id number]) [address] [facility]  
[phone] [address] [facility]  
  
    
 8/17/2017  9:45 AM  
ROUTINE CARE with Pat Lee MD  
[facility] [address] [facility]) Appt Note: 6m f/u  
 [address] [facility] [address] [id number] [phone] [address] [facility] [address]  
  
    
 10/4/2017  1:00 PM  
3 MONTH with Jennifer Burton [facility] [address] [place] [address]) Appt Note: $0CP 7//5/17ksr [address] [facility] [address]  
[phone]  
  
   
 [address] [place] [address] [address] Upcoming Health Maintenance Date Due INFLUENZA AGE 9 TO ADULT 8/1/2017 MEDICARE YEARLY EXAM 6/22/2018 GLAUCOMA SCREENING Q2Y 2/17/2019 DTaP/Tdap/Td series (2 - Td) 8/17/2026 Allergies as of 7/13/2017  Review Complete On: 7/5/2017 By: Conrad Cespedes MD  
  
 Severity Noted Reaction Type Reactions Ambien [Zolpidem]  06/29/2017    Other (comments) Couldn't move Seroquel [Quetiapine]  06/29/2017    Other (comments) PSYCOLOGICAL REACTION Pt didn't feel like them selves but felt like a different person, iT DEEPLY DISTURBED PT.  PT BECAME IRRITABLE. Current Immunizations  Reviewed on 8/19/2014 Name Date Influenza Vaccine Split  Deferred (Patient Refused) Pneumococcal Polysaccharide (PPSV-23) 2/15/2013 Pneumococcal Vaccine (Unspecified Type)  Deferred (Patient Refused) Not reviewed this visit You Were Diagnosed With   
 Codes Comments Iron deficiency anemia due to chronic blood loss    -  Primary ICD-10-CM: D50.0 ICD-9-CM: 280.0 Chronic diastolic congestive heart failure (HCC)     ICD-10-CM: I50.32 
ICD-9-CM: 428.32, 428.0 Vitals BP Pulse Temp Resp Height(growth percentile) Weight(growth percentile) 136/58 (BP 1 Location: Right arm, BP Patient Position: Sitting) 77 96.6 °F (35.9 °C) (Oral) 16 5' 5\" (1.651 m) 133 lb (60.3 kg) SpO2 BMI OB Status Smoking Status 93% 22.13 kg/m2 Postmenopausal Never Smoker Vitals History BMI and BSA Data Body Mass Index Body Surface Area  
 22.13 kg/m 2 1.66 m 2 Preferred Pharmacy Pharmacy Name Phone [address], [address] [place] [address] [phone] Your Updated Medication List  
  
   
This list is accurate as of: 7/13/17  3:37 PM.  Always use your most recent med list.  
  
  
  
  
 aspirin 81 mg chewable tablet Take 1 Tab by mouth daily. To prevent stroke  
  
 atenolol 50 mg tablet Commonly known as:  TENORMIN  
TAKE 1 TABLET BY MOUTH DAILY FOR THE HEART AND BLOOD PRESSURE. Calcium 600 + D tablet Generic drug:  calcium-cholecalciferol (D3)  
TAKE 1 TABLET TWICE A DAY. (CALCIUM SUPPLEMENT)  
  
 dabigatran etexilate 75 mg capsule Commonly known as:  PRADAXA Take 1 Cap by mouth every twelve (12) hours. fluticasone 50 mcg/actuation nasal spray Commonly known as:  FLONASE  
USE 1-2 SPRAYS IN EACH NOSTRIL ONCE DAILY. furosemide 40 mg tablet Commonly known as:  LASIX Take 1 - 2 tabs po daily if needed to remove excess fluid  
  
 levothyroxine 100 mcg tablet Commonly known as:  SYNTHROID  
TAKE ONE TABLET DAILY FOR THYROID  
  
 pantoprazole 40 mg tablet Commonly known as:  PROTONIX Take 1 Tab by mouth daily. potassium chloride 20 mEq tablet Commonly known as:  K-DUR, KLOR-CON Take 1 Tab by mouth two (2) times a day. traZODone 150 mg tablet Commonly known as:  Parry Spatz Take 1 Tab by mouth nightly. For sleep Prescriptions Printed Refills  
 dabigatran etexilate (PRADAXA) 75 mg capsule 5 Sig: Take 1 Cap by mouth every twelve (12) hours. Class: Print Route: Oral  
  
We Performed the Following AMB POC COMPLETE CBC, AUTOMATED [60600 CPT(R)] METABOLIC PANEL, BASIC [67556 CPT(R)] Introducing [facility] SERVICES! [facility] introduces [facility] patient portal. Now you can access parts of your medical record, email your doctor's office, and request medication refills online. 1. In your internet browser, go to [email or web address] 2. Click on the First Time User? Click Here link in the Sign In box. You will see the New Member Sign Up page. 3. Enter your [employer] Access Code exactly as it appears below. You will not need to use this code after youve completed the sign-up process. If you do not sign up before the expiration date, you must request a new code. · [employer] Access Code: [email or web address] Expires: 8/17/2017 12:17 PM 
 
4. Enter the last four digits of your Social Security Number (xxxx) and Date of Birth (mm/dd/yyyy) as indicated and click Submit. You will be taken to the next sign-up page. 5. Create a [employer] ID. This will be your [employer] login ID and cannot be changed, so think of one that is secure and easy to remember. 6. Create a [employer] password. You can change your password at any time. 7. Enter your Password Reset Question and Answer. This can be used at a later time if you forget your password. 8. Enter your e-mail address. You will receive e-mail notification when new information is available in [address] 9. Click Sign Up. You can now view and download portions of your medical record. 10. Click the Download Summary menu link to download a portable copy of your medical information.  
 
If you have questions, please visit the Frequently Asked Questions section of the [facility]. Remember, [facility] is NOT to be used for urgent needs. For medical emergencies, dial 911. Now available from your iPhone and Android! Please provide this summary of care documentation to your next provider. Your primary care clinician is listed as Elio Carey. If you have any questions after today's visit, please call [phone].

## 2017-07-13 NOTE — PROGRESS NOTES
HISTORY OF PRESENT ILLNESS  Kirill Miller is a 80 y.o. female. HPI In for recheck. Breathing is doing well. Has seen Dr. Adeola Barkley who recommended she go back on pradaxa. No abdominal pain. No blood in stools. ROS    Physical Exam   Constitutional: She is oriented to person, place, and time. She appears well-developed and well-nourished. Neck: No thyromegaly present. Cardiovascular: Normal rate and normal heart sounds. No murmur heard. Irregular irregular rhythmn   Pulmonary/Chest: Effort normal and breath sounds normal. She has no wheezes. Abdominal: Soft. Bowel sounds are normal. She exhibits no distension. There is no tenderness. There is no rebound and no guarding. Musculoskeletal: Normal range of motion. She exhibits no edema. Lymphadenopathy:     She has no cervical adenopathy. Neurological: She is alert and oriented to person, place, and time. Nursing note and vitals reviewed. ASSESSMENT and PLAN  Orders Placed This Encounter    METABOLIC PANEL, BASIC    AMB POC COMPLETE CBC, AUTOMATED    dabigatran etexilate (PRADAXA) 75 mg capsule     Elisabeth was seen today for follow-up. Diagnoses and all orders for this visit:    Iron deficiency anemia due to chronic blood loss  -     AMB POC COMPLETE CBC, AUTOMATED    Chronic diastolic congestive heart failure (HCC)  -     METABOLIC PANEL, BASIC    Other orders  -     dabigatran etexilate (PRADAXA) 75 mg capsule; Take 1 Cap by mouth every twelve (12) hours. Follow-up Disposition:  Return in about 4 weeks (around 8/10/2017).

## 2017-07-13 NOTE — PROGRESS NOTES
Camilla Morejon is a 80 y.o. female    Chief Complaint   Patient presents with    Follow-up     1. Have you been to the ER, urgent care clinic since your last visit? Hospitalized since your last visit? No    2. Have you seen or consulted any other health care providers outside of the [address] [place] [address] since your last visit? Include any pap smears or colon screening.  No

## 2017-07-14 LAB
BUN SERPL-MCNC: 30 MG/DL (ref 8–27)
BUN/CREAT SERPL: 31 (ref 12–28)
CALCIUM SERPL-MCNC: 9 MG/DL (ref 8.7–10.3)
CHLORIDE SERPL-SCNC: 105 MMOL/L (ref 96–106)
CO2 SERPL-SCNC: 19 MMOL/L (ref 18–29)
CREAT SERPL-MCNC: 0.97 MG/DL (ref 0.57–1)
GLUCOSE SERPL-MCNC: 88 MG/DL (ref 65–99)
INTERPRETATION: NORMAL
POTASSIUM SERPL-SCNC: 4.8 MMOL/L (ref 3.5–5.2)
SODIUM SERPL-SCNC: 142 MMOL/L (ref 134–144)

## 2017-07-17 ENCOUNTER — CLINICAL SUPPORT (OUTPATIENT)
Dept: CARDIOLOGY CLINIC | Age: 82
End: 2017-07-17

## 2017-07-17 DIAGNOSIS — R06.09 DYSPNEA ON EXERTION: Primary | ICD-10-CM

## 2017-07-25 ENCOUNTER — TELEPHONE (OUTPATIENT)
Dept: FAMILY MEDICINE CLINIC | Age: 82
End: 2017-07-25

## 2017-07-25 NOTE — TELEPHONE ENCOUNTER
----- Message from Cinthia Schwartz sent at 7/25/2017  8:38 AM EDT -----  Regarding: Dr. Gwen Liu telephone  Contact: [phone]  Pt Brain Kipper daughter is requesting a call back to see if the results for recent stress test was available. Best contact number is [phone].

## 2017-07-25 NOTE — TELEPHONE ENCOUNTER
Called daughter Slava Jin back. No answer. Left vm stating we didn't received results yet but she can reach out to 's office for them, number was given.  Will continue to be on look out for results

## 2017-07-31 ENCOUNTER — TELEPHONE (OUTPATIENT)
Dept: CARDIOLOGY CLINIC | Age: 82
End: 2017-07-31

## 2017-07-31 NOTE — TELEPHONE ENCOUNTER
Spoke with pt and daughter, Missael Hernandes, on HIPAA. Verified patient with two patient identifiers. Advised ESt read as low likelihood for CAD. Will confirm with Dr. Bronson Walker for formal reading. Patient verbalized understanding.

## 2017-08-01 NOTE — TELEPHONE ENCOUNTER
Spoke with daughter, Radha Mccallum, on HIPAA. Verified patient with two patient identifiers. Discussed Nuc EST with her. EF improved. Return in 3 months with repeat echo prior. Will ask PSR to call pt to schedule once order for echo is entered. She verbalized understanding.       Claudeen Platts, MD Fleeta Dk, LPN        Caller: Unspecified (Yesterday, 12:18 PM)                     Likely normal     lvef 60%--normal--up from 45% via echo last month     Continue same     F/u 3 months for repeat echo     B

## 2017-08-02 DIAGNOSIS — R60.0 BILATERAL LEG EDEMA: Primary | ICD-10-CM

## 2017-08-02 DIAGNOSIS — I48.19 PERSISTENT ATRIAL FIBRILLATION (HCC): ICD-10-CM

## 2017-08-02 DIAGNOSIS — I42.9 CARDIOMYOPATHY, SECONDARY (HCC): ICD-10-CM

## 2017-08-02 DIAGNOSIS — R07.9 CHEST PAIN WITH MODERATE RISK FOR CARDIAC ETIOLOGY: ICD-10-CM

## 2017-08-02 DIAGNOSIS — R06.09 DOE (DYSPNEA ON EXERTION): ICD-10-CM

## 2017-08-02 DIAGNOSIS — I10 ESSENTIAL HYPERTENSION: ICD-10-CM

## 2017-08-02 NOTE — TELEPHONE ENCOUNTER
Spoke with patient. Verified patient with two patient identifiers. Echo scheduled for 9-27-17 at 1:30 pm.   Has FU appt Wed 10-4-17 at 1:00 pm.    Patient verbalized understanding.

## 2017-08-11 ENCOUNTER — TELEPHONE (OUTPATIENT)
Dept: CARDIOLOGY CLINIC | Age: 82
End: 2017-08-11

## 2017-08-14 ENCOUNTER — OFFICE VISIT (OUTPATIENT)
Dept: FAMILY MEDICINE CLINIC | Age: 82
End: 2017-08-14

## 2017-08-14 VITALS
HEIGHT: 65 IN | BODY MASS INDEX: 21.73 KG/M2 | TEMPERATURE: 96.3 F | WEIGHT: 130.4 LBS | DIASTOLIC BLOOD PRESSURE: 62 MMHG | HEART RATE: 70 BPM | SYSTOLIC BLOOD PRESSURE: 120 MMHG | OXYGEN SATURATION: 97 % | RESPIRATION RATE: 18 BRPM

## 2017-08-14 DIAGNOSIS — D50.0 IRON DEFICIENCY ANEMIA DUE TO CHRONIC BLOOD LOSS: Primary | ICD-10-CM

## 2017-08-14 DIAGNOSIS — E87.5 HYPERKALEMIA: ICD-10-CM

## 2017-08-14 DIAGNOSIS — G47.00 INSOMNIA, UNSPECIFIED TYPE: ICD-10-CM

## 2017-08-14 NOTE — MR AVS SNAPSHOT
Visit Information Date & Time Provider Department Dept. Phone Encounter #  
 8/14/2017 11:45 AM Kenia Pope MD [facility] [phone] [id number] Follow-up Instructions Return in about 3 months (around 11/14/2017). Your Appointments 9/27/2017  1:30 PM  
ECHO CARDIOGRAMS 2D with ECHO, [facility]) Appt Note: $0CP 8/2/17ksr /per Dr FOURNIER/  
 [address]  
[phone] [address]  
  
    
 10/4/2017  1:00 PM  
3 MONTH with Cabrera Parra, [address] [facility] Associate [address] [facility]) Appt Note: $0CP 7//5/17ksr [address] [id number]  
[phone]  
  
   
 [address] Upcoming Health Maintenance Date Due INFLUENZA AGE 9 TO ADULT 8/1/2017 MEDICARE YEARLY EXAM 6/22/2018 GLAUCOMA SCREENING Q2Y 2/17/2019 DTaP/Tdap/Td series (2 - Td) 8/17/2026 Allergies as of 8/14/2017  Review Complete On: 8/14/2017 By: Kenia Pope MD  
  
 Severity Noted Reaction Type Reactions Ambien [Zolpidem]  06/29/2017    Other (comments) Couldn't move Seroquel [Quetiapine]  06/29/2017    Other (comments) PSYCOLOGICAL REACTION Pt didn't feel like them selves but felt like a different person, iT DEEPLY DISTURBED PT.  PT BECAME IRRITABLE. Current Immunizations  Reviewed on 8/19/2014 Name Date Influenza Vaccine Split  Deferred (Patient Refused) Pneumococcal Polysaccharide (PPSV-23) 2/15/2013 ZZZ-RETIRED (DO NOT USE) Pneumococcal Vaccine (Unspecified Type)  Deferred (Patient Refused) Not reviewed this visit You Were Diagnosed With   
  
 Codes Comments Iron deficiency anemia due to chronic blood loss    -  Primary ICD-10-CM: D50.0 ICD-9-CM: 280.0 Hyperkalemia     ICD-10-CM: E87.5 ICD-9-CM: 276.7 Insomnia, unspecified type     ICD-10-CM: G47.00 ICD-9-CM: 780.52 Vitals BP Pulse Temp Resp Height(growth percentile) Weight(growth percentile) 120/62 70 96.3 °F (35.7 °C) (Oral) 18 5' 5\" (1.651 m) 130 lb 6.4 oz (59.1 kg) SpO2 BMI OB Status Smoking Status 97% 21.7 kg/m2 Postmenopausal Never Smoker Vitals History BMI and BSA Data Body Mass Index Body Surface Area 21.7 kg/m 2 1.65 m 2 Preferred Pharmacy Pharmacy Name Phone [address], [address] [place] [address] [phone] Your Updated Medication List  
  
   
This list is accurate as of: 8/14/17 12:18 PM.  Always use your most recent med list.  
  
  
  
  
 atenolol 50 mg tablet Commonly known as:  TENORMIN  
TAKE 1 TABLET BY MOUTH DAILY FOR THE HEART AND BLOOD PRESSURE. Calcium 600 + D tablet Generic drug:  calcium-cholecalciferol (D3)  
TAKE 1 TABLET TWICE A DAY. (CALCIUM SUPPLEMENT)  
  
 dabigatran etexilate 75 mg capsule Commonly known as:  PRADAXA Take 1 Cap by mouth every twelve (12) hours. fluticasone 50 mcg/actuation nasal spray Commonly known as:  FLONASE  
USE 1-2 SPRAYS IN EACH NOSTRIL ONCE DAILY. furosemide 40 mg tablet Commonly known as:  LASIX Take 1 - 2 tabs po daily if needed to remove excess fluid  
  
 levothyroxine 100 mcg tablet Commonly known as:  SYNTHROID  
TAKE ONE TABLET DAILY FOR THYROID  
  
 pantoprazole 40 mg tablet Commonly known as:  PROTONIX Take 1 Tab by mouth daily. potassium chloride 20 mEq tablet Commonly known as:  K-DUR, KLOR-CON Take 1 Tab by mouth two (2) times a day. suvorexant 15 mg tablet Commonly known as:  Lola Juniper Take 1 Tab by mouth nightly as needed for Insomnia. Max Daily Amount: 15 mg.  
  
 traZODone 150 mg tablet Commonly known as:  Laurie  Take 1 Tab by mouth nightly. For sleep Prescriptions Printed Refills suvorexant (BELSOMRA) 15 mg tablet 12 Sig: Take 1 Tab by mouth nightly as needed for Insomnia. Max Daily Amount: 15 mg.  
 Class: Print Route: Oral  
  
We Performed the Following CBC WITH AUTOMATED DIFF [46261 CPT(R)] METABOLIC PANEL, BASIC [64749 CPT(R)] Follow-up Instructions Return in about 3 months (around 11/14/2017). Introducing [facility] SERVICES! Ruthy Hansen introduces Coderwall patient portal. Now you can access parts of your medical record, email your doctor's office, and request medication refills online. 1. In your internet browser, go to [email or web address] 2. Click on the First Time User? Click Here link in the Sign In box. You will see the New Member Sign Up page. 3. Enter your Coderwall Access Code exactly as it appears below. You will not need to use this code after youve completed the sign-up process. If you do not sign up before the expiration date, you must request a new code. · Coderwall Access Code: [email or web address] Expires: 8/17/2017 12:17 PM 
 
4. Enter the last four digits of your Social Security Number (xxxx) and Date of Birth (mm/dd/yyyy) as indicated and click Submit. You will be taken to the next sign-up page. 5. Create a Coderwall ID. This will be your Coderwall login ID and cannot be changed, so think of one that is secure and easy to remember. 6. Create a Coderwall password. You can change your password at any time. 7. Enter your Password Reset Question and Answer. This can be used at a later time if you forget your password. 8. Enter your e-mail address. You will receive e-mail notification when new information is available in [address] 9. Click Sign Up. You can now view and download portions of your medical record. 10. Click the Download Summary menu link to download a portable copy of your medical information.  
 
If you have questions, please visit the Frequently Asked Questions section of the Navera. Remember, Dubizzlehart is NOT to be used for urgent needs. For medical emergencies, dial 911. Now available from your iPhone and Android! Please provide this summary of care documentation to your next provider. Your primary care clinician is listed as Zainab Patel. If you have any questions after today's visit, please call [phone].

## 2017-08-14 NOTE — PROGRESS NOTES
HISTORY OF PRESENT ILLNESS  Latrice Collazo is a 80 y.o. female. HPI Has been having difficulty sleeping. Breathing has been doing ok. ROS    Physical Exam   Constitutional: She is oriented to person, place, and time. She appears well-developed and well-nourished. Neck: No thyromegaly present. Cardiovascular: Normal rate, regular rhythm and normal heart sounds. No murmur heard. Pulmonary/Chest: Effort normal and breath sounds normal. She has no wheezes. Abdominal: Soft. Bowel sounds are normal. She exhibits no distension. There is no tenderness. There is no rebound and no guarding. Musculoskeletal: Normal range of motion. She exhibits no edema. Lymphadenopathy:     She has no cervical adenopathy. Neurological: She is alert and oriented to person, place, and time. Nursing note and vitals reviewed. ASSESSMENT and PLAN  Orders Placed This Encounter    METABOLIC PANEL, BASIC    CBC WITH AUTOMATED DIFF    suvorexant (BELSOMRA) 15 mg tablet     Diagnoses and all orders for this visit:    1. Iron deficiency anemia due to chronic blood loss  -     CBC WITH AUTOMATED DIFF    2. Hyperkalemia  -     METABOLIC PANEL, BASIC    3. Insomnia, unspecified type    Other orders  -     suvorexant (BELSOMRA) 15 mg tablet; Take 1 Tab by mouth nightly as needed for Insomnia. Max Daily Amount: 15 mg. Follow-up Disposition:  Return in about 3 months (around 11/14/2017).

## 2017-08-14 NOTE — PROGRESS NOTES
Chief Complaint   Patient presents with    Breathing Problem     Pt here for 4 week follow up. Pt started back taking pradaxa.     Pt stated trazadone doesn't want

## 2017-08-15 LAB
BASOPHILS # BLD AUTO: 0 X10E3/UL (ref 0–0.2)
BASOPHILS NFR BLD AUTO: 0 %
BUN SERPL-MCNC: 21 MG/DL (ref 8–27)
BUN/CREAT SERPL: 17 (ref 12–28)
CALCIUM SERPL-MCNC: 8.7 MG/DL (ref 8.7–10.3)
CHLORIDE SERPL-SCNC: 104 MMOL/L (ref 96–106)
CO2 SERPL-SCNC: 17 MMOL/L (ref 18–29)
CREAT SERPL-MCNC: 1.23 MG/DL (ref 0.57–1)
EOSINOPHIL # BLD AUTO: 0.2 X10E3/UL (ref 0–0.4)
EOSINOPHIL NFR BLD AUTO: 3 %
ERYTHROCYTE [DISTWIDTH] IN BLOOD BY AUTOMATED COUNT: 16.4 % (ref 12.3–15.4)
GLUCOSE SERPL-MCNC: 97 MG/DL (ref 65–99)
HCT VFR BLD AUTO: 32.8 % (ref 34–46.6)
HGB BLD-MCNC: 10.2 G/DL (ref 11.1–15.9)
IMM GRANULOCYTES # BLD: 0 X10E3/UL (ref 0–0.1)
IMM GRANULOCYTES NFR BLD: 1 %
INTERPRETATION: NORMAL
LYMPHOCYTES # BLD AUTO: 1.6 X10E3/UL (ref 0.7–3.1)
LYMPHOCYTES NFR BLD AUTO: 31 %
MCH RBC QN AUTO: 24.5 PG (ref 26.6–33)
MCHC RBC AUTO-ENTMCNC: 31.1 G/DL (ref 31.5–35.7)
MCV RBC AUTO: 79 FL (ref 79–97)
MONOCYTES # BLD AUTO: 0.6 X10E3/UL (ref 0.1–0.9)
MONOCYTES NFR BLD AUTO: 11 %
NEUTROPHILS # BLD AUTO: 2.8 X10E3/UL (ref 1.4–7)
NEUTROPHILS NFR BLD AUTO: 54 %
PLATELET # BLD AUTO: 196 X10E3/UL (ref 150–379)
POTASSIUM SERPL-SCNC: 4.1 MMOL/L (ref 3.5–5.2)
RBC # BLD AUTO: 4.17 X10E6/UL (ref 3.77–5.28)
SODIUM SERPL-SCNC: 140 MMOL/L (ref 134–144)
WBC # BLD AUTO: 5.2 X10E3/UL (ref 3.4–10.8)

## 2017-08-16 NOTE — TELEPHONE ENCOUNTER
Spoke with patient  Verified patient with 2 patient identifier    Discussed vascular screening, patient declined says not having any symptoms at this time.

## 2017-08-17 ENCOUNTER — DOCUMENTATION ONLY (OUTPATIENT)
Dept: FAMILY MEDICINE CLINIC | Age: 82
End: 2017-08-17

## 2017-08-18 ENCOUNTER — DOCUMENTATION ONLY (OUTPATIENT)
Dept: FAMILY MEDICINE CLINIC | Age: 82
End: 2017-08-18

## 2017-10-02 RX ORDER — CALCIUM CARBONATE/VITAMIN D3 600 MG-10
TABLET ORAL
Qty: 60 TAB | Refills: 0 | Status: SHIPPED | OUTPATIENT
Start: 2017-10-02

## 2017-10-04 ENCOUNTER — OFFICE VISIT (OUTPATIENT)
Dept: CARDIOLOGY CLINIC | Age: 82
End: 2017-10-04

## 2017-10-04 VITALS
DIASTOLIC BLOOD PRESSURE: 58 MMHG | WEIGHT: 131.8 LBS | RESPIRATION RATE: 18 BRPM | HEIGHT: 65 IN | BODY MASS INDEX: 21.96 KG/M2 | OXYGEN SATURATION: 97 % | HEART RATE: 76 BPM | SYSTOLIC BLOOD PRESSURE: 114 MMHG

## 2017-10-04 DIAGNOSIS — K21.00 GERD WITH ESOPHAGITIS: ICD-10-CM

## 2017-10-04 DIAGNOSIS — I48.19 PERSISTENT ATRIAL FIBRILLATION (HCC): ICD-10-CM

## 2017-10-04 DIAGNOSIS — I42.9 CARDIOMYOPATHY, SECONDARY (HCC): ICD-10-CM

## 2017-10-04 DIAGNOSIS — E78.5 DYSLIPIDEMIA (HIGH LDL; LOW HDL): ICD-10-CM

## 2017-10-04 DIAGNOSIS — R06.09 DOE (DYSPNEA ON EXERTION): Primary | ICD-10-CM

## 2017-10-04 DIAGNOSIS — Z92.29 HX: LONG TERM ANTICOAGULANT USE: ICD-10-CM

## 2017-10-04 DIAGNOSIS — N18.30 CKD (CHRONIC KIDNEY DISEASE) STAGE 3, GFR 30-59 ML/MIN (HCC): ICD-10-CM

## 2017-10-04 NOTE — PROGRESS NOTES
[address] [place] [address]        [phone]                             NEW PATIENT HPI/FOLLOW-UP    NAME:  Ernie Miranda   :   1930   MRN:   [id number]   PCP:  Elvie Petersen MD           Subjective: The patient is a 80y.o. year old female  who returns for a routine follow-up. Since the last visit, patient reports no new symptoms. NST  revealed normal study--LVEF 60%,back to normal. Was 40-45% back in  at time of acute hospitalization. Denies change in exercise tolerance, chest pain, edema, medication intolerance, palpitations, shortness of breath, PND/orthopnea wheezing, sputum, syncope, dizziness or light headedness. Doing satisfactorily. Review of Systems  General: Pt denies excessive weight gain or loss. Pt is able to conduct ADL's. Respiratory:+ HILTON, wheezing or stridor. Cardiovascular: Denies precordial pain, palpitations, edema or PND  Gastrointestinal: Denies poor appetite, indigestion, abdominal pain or blood in stool  Peripheral vascular: Denies claudication, leg cramps  Neurological: Denies paresthesias, tingling.numbness  Psychiatric: Denies anxiety,depression,fatigue  Musculoskeletal: Denies pain,tenderness, soreness,swelling      Past Medical History:   Diagnosis Date    Arrhythmia     atrial fibrillation, chronic. Stress test  essent.  normal.  Mild-mod AR on ECHO    Atrial fibrillation (HCC)     GI bleeding     Hypertension     Refusal of blood transfusions as patient is [employer]     Thyroid disease     hypothyroid     Patient Active Problem List    Diagnosis Date Noted    Chest pain with moderate risk for cardiac etiology 2017    HILTON (dyspnea on exertion) 2017    Bilateral leg edema 2017    HX: long term anticoagulant use--Pradaxa stopped  for chronic atrial fib 2017    Cardiomyopathy, secondary ([facility] 2017    GERD with esophagitis 2017    Dyslipidemia (high LDL; low HDL) 07/05/2017    CKD (chronic kidney disease) stage 3, GFR 30-59 ml/min 07/05/2017    GI bleeding     Hyperkalemia 04/27/2017    Hypothyroidism 04/27/2011    Hyperthyroidism--iatrogenic 04/27/2011    Insomnia 03/16/2011    Hypokalemia 03/16/2011    Encephalopathy acute 03/15/2011    Rhabdomyolysis 03/14/2011    Persistent atrial fibrillation ([address] 03/14/2011    HTN (hypertension) 03/14/2011      Past Surgical History:   Procedure Laterality Date    COLONOSCOPY N/A 5/2/2017    COLONOSCOPY performed by [facility]erva [facility] at [facility] ENDOSCOPY    ECHO TRANSESOPHAGEAL (DENISE) W OR WO CONTRAST  4/27/2011         HX GYN      vaginal deliveries x10    HX TUBAL LIGATION      SC CARDIOVERSION ELECTIVE ARRHYTHMIA EXTERNAL  6/14/2011          Allergies   Allergen Reactions    Ambien [Zolpidem] Other (comments)     Couldn't move    Seroquel [Quetiapine] Other (comments)     PSYCOLOGICAL REACTION Pt didn't feel like them selves but felt like a different person, iT DEEPLY DISTURBED PT.  PT BECAME IRRITABLE. Family History   Problem Relation Age of Onset    Heart Disease Father     Cancer Son     Heart Disease Son       Social History     Social History    Marital status:      Spouse name: N/A    Number of children: N/A    Years of education: N/A     Occupational History    Not on file. Social History Main Topics    Smoking status: Never Smoker    Smokeless tobacco: Never Used    Alcohol use Not on file    Drug use: No    Sexual activity: No     Other Topics Concern    Not on file     Social History Narrative      Current Outpatient Prescriptions   Medication Sig    CALCIUM 600 + D tablet TAKE 1 TABLET TWICE A DAY. (CALCIUM SUPPLEMENT)    dabigatran etexilate (PRADAXA) 75 mg capsule Take 1 Cap by mouth every twelve (12) hours.  potassium chloride (K-DUR, KLOR-CON) 20 mEq tablet Take 1 Tab by mouth two (2) times a day.     traZODone (DESYREL) 150 mg tablet Take 1 Tab by mouth nightly. For sleep    furosemide (LASIX) 40 mg tablet Take 1 - 2 tabs po daily if needed to remove excess fluid (Patient taking differently: Take 1  po daily if needed to remove excess fluid)    pantoprazole (PROTONIX) 40 mg tablet Take 1 Tab by mouth daily.  fluticasone (FLONASE) 50 mcg/actuation nasal spray USE 1-2 SPRAYS IN EACH NOSTRIL ONCE DAILY.  levothyroxine (SYNTHROID) 100 mcg tablet TAKE ONE TABLET DAILY FOR THYROID    atenolol (TENORMIN) 50 mg tablet TAKE 1 TABLET BY MOUTH DAILY FOR THE HEART AND BLOOD PRESSURE.  suvorexant (BELSOMRA) 15 mg tablet Take 1 Tab by mouth nightly as needed for Insomnia. Max Daily Amount: 15 mg. No current facility-administered medications for this visit. I have reviewed the nurses notes, vitals, problem list, allergy list, medical history, family medical, social history and medications. Objective:     Physical Exam:     Vitals:    10/04/17 1257   BP: 114/58   Pulse: 76   Resp: 18   SpO2: 97%   Weight: 131 lb 12.8 oz (59.8 kg)   Height: 5' 5\" (1.651 m)    Body mass index is 21.93 kg/(m^2). General: Well developed, in no acute distress. HEENT: No carotid bruits, no JVD, trach is midline. Heart:  Normal S1/S2 negative S3 or S4. Irregular, no murmur, gallop or rub.   Respiratory: Clear bilaterally, no wheezing or rales  Abdomen:   Soft, non-tender, bowel sounds are active.   Extremities:  No edema, normal cap refill, no cyanosis. Neuro: A&Ox3, speech clear, gait stable. Skin: Skin color is normal. No rashes or lesions. No diaphoresis.   Vascular: 2+ pulses symmetric in all extremities        Data Review:       Cardiographics:    EKG: Atrial fib with controlled VR,LVH,LAD    Cardiology Labs:    Results for orders placed or performed during the hospital encounter of 04/27/17   EKG, 12 LEAD, INITIAL   Result Value Ref Range    Ventricular Rate 67 BPM    Atrial Rate 71 BPM    QRS Duration 108 ms    Q-T Interval 372 ms    QTC Calculation (Bezet) 393 ms    Calculated R Axis -42 degrees    Calculated T Axis 23 degrees    Diagnosis       Atrial fibrillation with a competing junctional pacemaker  Left axis deviation  Incomplete left bundle branch block  When compared with ECG of 14-JUN-2011 11:05,  Atrial fibrillation has replaced Sinus rhythm    Confirmed by JUDITH Peña ([id number]) on 4/27/2017 6:27:07 PM         Lab Results   Component Value Date/Time    Cholesterol, total 94 02/17/2017 09:52 AM    HDL Cholesterol 30 02/17/2017 09:52 AM    LDL, calculated 46 02/17/2017 09:52 AM    Triglyceride 90 02/17/2017 09:52 AM    CHOL/HDL Ratio 3.1 04/20/2010 01:10 AM       Lab Results   Component Value Date/Time    Sodium 140 08/14/2017 12:25 PM    Potassium 4.1 08/14/2017 12:25 PM    Chloride 104 08/14/2017 12:25 PM    CO2 17 08/14/2017 12:25 PM    Anion gap 13 05/02/2017 03:20 AM    Glucose 97 08/14/2017 12:25 PM    BUN 21 08/14/2017 12:25 PM    Creatinine 1.23 08/14/2017 12:25 PM    BUN/Creatinine ratio 17 08/14/2017 12:25 PM    GFR est AA 46 08/14/2017 12:25 PM    GFR est non-AA 40 08/14/2017 12:25 PM    Calcium 8.7 08/14/2017 12:25 PM    Bilirubin, total 1.2 06/21/2017 02:55 PM    AST (SGOT) 34 06/21/2017 02:55 PM    Alk. phosphatase 75 06/21/2017 02:55 PM    Protein, total 7.6 06/21/2017 02:55 PM    Albumin 3.7 06/21/2017 02:55 PM    Globulin 4.1 04/27/2017 02:21 PM    A-G Ratio 0.9 06/21/2017 02:55 PM    ALT (SGPT) 7 06/21/2017 02:55 PM          Assessment:       ICD-10-CM ICD-9-CM    1. HILTON (dyspnea on exertion) R06.09 786.09 AMB POC EKG ROUTINE W/ 12 LEADS, INTER & REP   2. Persistent atrial fibrillation (HCC) I48.1 427.31 AMB POC EKG ROUTINE W/ 12 LEADS, INTER & REP   3. Cardiomyopathy, secondary ([address] I42.9 425.9    4. Dyslipidemia (high LDL; low HDL) E78.4 272.4    5. GERD with esophagitis K21.0 530.11    6. CKD (chronic kidney disease) stage 3, GFR 30-59 ml/min N18.3 585.3    7.  HX: long term anticoagulant use--Pradaxa stopped 5/17 for chronic atrial fib Z79.01 V58.61          Discussion: Patient presents at this time stable from a cardiac perspective. Remains in chronic atrial fib--rate controlled. CM resolved. LVEF NOW NORMAL via NST 7/17. Back on Pradaxa low dose. Hgb stable per PCP since recent GI bleed. Pleased with present cardiac status. Plan: 1. Continue same meds. Lipid profile and labs followed by PCP. 2.Encouraged to exercise to tolerance, lose weight and follow low fat, low cholesterol, low sodium predominantly Plant-based (consider Mediterranean) diet. Call with questions or concerns. Will follow up any test results by phone and/or f/u here in office if needed. Martell Zarate 3.Follow up: 6 months. No need for echo since EF now normal via NST. I have discussed the diagnosis with the patient and the intended plan as seen in the above orders. The patient has received an after-visit summary and questions were answered concerning future plans. I have discussed any concerning medication side effects and warnings with the patient as well.     Patric Hernandez MD  10/4/2017

## 2017-11-02 RX ORDER — PANTOPRAZOLE SODIUM 40 MG/1
TABLET, DELAYED RELEASE ORAL
Qty: 30 TAB | Refills: 0 | Status: SHIPPED | OUTPATIENT
Start: 2017-11-02 | End: 2017-11-14 | Stop reason: SDUPTHER

## 2017-11-14 ENCOUNTER — OFFICE VISIT (OUTPATIENT)
Dept: FAMILY MEDICINE CLINIC | Age: 82
End: 2017-11-14

## 2017-11-14 VITALS
RESPIRATION RATE: 14 BRPM | DIASTOLIC BLOOD PRESSURE: 45 MMHG | OXYGEN SATURATION: 96 % | HEIGHT: 65 IN | HEART RATE: 64 BPM | WEIGHT: 134 LBS | BODY MASS INDEX: 22.33 KG/M2 | TEMPERATURE: 95.9 F | SYSTOLIC BLOOD PRESSURE: 128 MMHG

## 2017-11-14 DIAGNOSIS — I42.9 CARDIOMYOPATHY, SECONDARY (HCC): Primary | ICD-10-CM

## 2017-11-14 DIAGNOSIS — D64.9 ANEMIA, UNSPECIFIED TYPE: ICD-10-CM

## 2017-11-14 DIAGNOSIS — F51.01 PRIMARY INSOMNIA: ICD-10-CM

## 2017-11-14 RX ORDER — QUETIAPINE FUMARATE 25 MG/1
25 TABLET, FILM COATED ORAL
Qty: 30 TAB | Refills: 5 | Status: SHIPPED | OUTPATIENT
Start: 2017-11-14 | End: 2018-05-14 | Stop reason: ALTCHOICE

## 2017-11-14 RX ORDER — QUETIAPINE FUMARATE 25 MG/1
TABLET, FILM COATED ORAL
COMMUNITY
Start: 2017-11-02 | End: 2017-12-18 | Stop reason: SDUPTHER

## 2017-11-14 RX ORDER — PANTOPRAZOLE SODIUM 40 MG/1
TABLET, DELAYED RELEASE ORAL
Qty: 30 TAB | Refills: 12 | Status: SHIPPED | OUTPATIENT
Start: 2017-11-14 | End: 2018-05-14 | Stop reason: SDUPTHER

## 2017-11-14 NOTE — MR AVS SNAPSHOT
Visit Information Date & Time Provider Department Dept. Phone Encounter #  
 11/14/2017 10:30 AM Rose Haile MD [facility] [phone] [id number] Your Appointments 5/9/2018  1:00 PM  
6 MONTH with Linda Jade MD  
[facility] Cardiology Associate [place] [address]) Appt Note: $40 cp 10/4/17KSR  
 [address] [address]  
[phone]  
  
   
 [address] [place] [address] Road [address] Upcoming Health Maintenance Date Due  
 MEDICARE YEARLY EXAM 6/22/2018 GLAUCOMA SCREENING Q2Y 2/17/2019 DTaP/Tdap/Td series (2 - Td) 8/17/2026 Allergies as of 11/14/2017  Review Complete On: 11/14/2017 By: Megan George LPN Severity Noted Reaction Type Reactions Ambien [Zolpidem]  06/29/2017    Other (comments) Couldn't move Seroquel [Quetiapine]  06/29/2017    Other (comments) PSYCOLOGICAL REACTION Pt didn't feel like them selves but felt like a different person, iT DEEPLY DISTURBED PT.  PT BECAME IRRITABLE. Current Immunizations  Reviewed on 8/19/2014 Name Date Influenza Vaccine Split  Deferred (Patient Refused) Pneumococcal Polysaccharide (PPSV-23) 2/15/2013 ZZZ-RETIRED (DO NOT USE) Pneumococcal Vaccine (Unspecified Type)  Deferred (Patient Refused) Not reviewed this visit You Were Diagnosed With   
  
 Codes Comments Cardiomyopathy, secondary ([facility]    -  Primary ICD-10-CM: I42.9 ICD-9-CM: 425.9 Anemia, unspecified type     ICD-10-CM: D64.9 ICD-9-CM: 516. 9 Vitals BP Pulse Temp Resp Height(growth percentile) Weight(growth percentile) 128/45 64 95.9 °F (35.5 °C) (Oral) 14 5' 5\" (1.651 m) 134 lb (60.8 kg) SpO2 BMI OB Status Smoking Status 96% 22.3 kg/m2 Postmenopausal Never Smoker BMI and BSA Data Body Mass Index Body Surface Area  
 22.3 kg/m 2 1.67 m 2 Preferred Pharmacy Pharmacy Name Phone [address], [address] [facility] File [phone] Your Updated Medication List  
  
   
This list is accurate as of: 11/14/17 11:49 AM.  Always use your most recent med list.  
  
  
  
  
 atenolol 50 mg tablet Commonly known as:  TENORMIN  
TAKE 1 TABLET BY MOUTH DAILY FOR THE HEART AND BLOOD PRESSURE. Calcium 600 + D tablet Generic drug:  calcium-cholecalciferol (D3)  
TAKE 1 TABLET TWICE A DAY. (CALCIUM SUPPLEMENT)  
  
 dabigatran etexilate 75 mg capsule Commonly known as:  PRADAXA Take 1 Cap by mouth every twelve (12) hours. fluticasone 50 mcg/actuation nasal spray Commonly known as:  FLONASE  
USE 1-2 SPRAYS IN EACH NOSTRIL ONCE DAILY. furosemide 40 mg tablet Commonly known as:  LASIX Take 1 - 2 tabs po daily if needed to remove excess fluid  
  
 levothyroxine 100 mcg tablet Commonly known as:  SYNTHROID  
TAKE ONE TABLET DAILY FOR THYROID  
  
 pantoprazole 40 mg tablet Commonly known as:  PROTONIX  
TAKE 1 TABLET BY MOUTH DAILY BEFORE BREAKFAST, FOR INDIGESTION AND GASTRITIS. potassium chloride 20 mEq tablet Commonly known as:  K-DUR, KLOR-CON Take 1 Tab by mouth two (2) times a day. QUEtiapine 25 mg tablet Commonly known as:  SEROquel Introducing [facility] SERVICES! [facility] introduces LS9 patient portal. Now you can access parts of your medical record, email your doctor's office, and request medication refills online. 1. In your internet browser, go to [email or web address] 2. Click on the First Time User? Click Here link in the Sign In box. You will see the New Member Sign Up page. 3. Enter your LS9 Access Code exactly as it appears below. You will not need to use this code after youve completed the sign-up process. If you do not sign up before the expiration date, you must request a new code. · LS9 Access Code: [email or web address] Expires: 2/12/2018 10:18 AM 
 
 4. Enter the last four digits of your Social Security Number (xxxx) and Date of Birth (mm/dd/yyyy) as indicated and click Submit. You will be taken to the next sign-up page. 5. Create a Lovely ID. This will be your Lovely login ID and cannot be changed, so think of one that is secure and easy to remember. 6. Create a Lovely password. You can change your password at any time. 7. Enter your Password Reset Question and Answer. This can be used at a later time if you forget your password. 8. Enter your e-mail address. You will receive e-mail notification when new information is available in [address] 9. Click Sign Up. You can now view and download portions of your medical record. 10. Click the Download Summary menu link to download a portable copy of your medical information. If you have questions, please visit the Frequently Asked Questions section of the Lovely website. Remember, Lovely is NOT to be used for urgent needs. For medical emergencies, dial 911. Now available from your iPhone and Android! Please provide this summary of care documentation to your next provider. Your primary care clinician is listed as Casie Orta. If you have any questions after today's visit, please call [phone].

## 2017-11-14 NOTE — PROGRESS NOTES
Chief Complaint   Patient presents with    Hypertension    Heart Problem     1. Have you been to the ER, urgent care clinic since your last visit? Hospitalized since your last visit? No    2. Have you seen or consulted any other health care providers outside of the [address] [place] [address] since your last visit? Include any pap smears or colon screening. No     There are no preventive care reminders to display for this patient.

## 2017-11-14 NOTE — PROGRESS NOTES
HISTORY OF PRESENT ILLNESS  Alyse Amaro is a 80 y.o. female. HPI In for blood pressure recheck. belsomra didn't help sleep. Says that did well with seroquel in the past.    ROS    Physical Exam   Constitutional: She is oriented to person, place, and time. She appears well-developed and well-nourished. Neck: No thyromegaly present. Cardiovascular: Normal rate, regular rhythm and normal heart sounds. No murmur heard. Pulmonary/Chest: Effort normal and breath sounds normal. She has no wheezes. Abdominal: Soft. Bowel sounds are normal. She exhibits no distension. There is no tenderness. There is no rebound and no guarding. Musculoskeletal: Normal range of motion. She exhibits no edema. Lymphadenopathy:     She has no cervical adenopathy. Neurological: She is alert and oriented to person, place, and time. Nursing note and vitals reviewed. ASSESSMENT and PLAN  Orders Placed This Encounter    CBC WITH AUTOMATED DIFF    QUEtiapine (SEROQUEL) 25 mg tablet    pantoprazole (PROTONIX) 40 mg tablet     Diagnoses and all orders for this visit:    1. Cardiomyopathy, secondary ([address]  -     CBC WITH AUTOMATED DIFF    2. Anemia, unspecified type    3. Primary insomnia    Other orders  -     QUEtiapine (SEROQUEL) 25 mg tablet; Take 1 Tab by mouth nightly. -     pantoprazole (PROTONIX) 40 mg tablet; TAKE 1 TABLET BY MOUTH DAILY BEFORE BREAKFAST, FOR INDIGESTION AND GASTRITIS.       Follow-up Disposition: Not on File

## 2017-11-15 LAB
BASOPHILS # BLD AUTO: 0 X10E3/UL (ref 0–0.2)
BASOPHILS NFR BLD AUTO: 0 %
EOSINOPHIL # BLD AUTO: 0.1 X10E3/UL (ref 0–0.4)
EOSINOPHIL NFR BLD AUTO: 2 %
ERYTHROCYTE [DISTWIDTH] IN BLOOD BY AUTOMATED COUNT: 19.2 % (ref 12.3–15.4)
HCT VFR BLD AUTO: 32.9 % (ref 34–46.6)
HGB BLD-MCNC: 10.5 G/DL (ref 11.1–15.9)
IMM GRANULOCYTES # BLD: 0 X10E3/UL (ref 0–0.1)
IMM GRANULOCYTES NFR BLD: 0 %
LYMPHOCYTES # BLD AUTO: 2.1 X10E3/UL (ref 0.7–3.1)
LYMPHOCYTES NFR BLD AUTO: 34 %
MCH RBC QN AUTO: 24.3 PG (ref 26.6–33)
MCHC RBC AUTO-ENTMCNC: 31.9 G/DL (ref 31.5–35.7)
MCV RBC AUTO: 76 FL (ref 79–97)
MONOCYTES # BLD AUTO: 0.8 X10E3/UL (ref 0.1–0.9)
MONOCYTES NFR BLD AUTO: 14 %
NEUTROPHILS # BLD AUTO: 3 X10E3/UL (ref 1.4–7)
NEUTROPHILS NFR BLD AUTO: 50 %
PLATELET # BLD AUTO: 218 X10E3/UL (ref 150–379)
RBC # BLD AUTO: 4.32 X10E6/UL (ref 3.77–5.28)
WBC # BLD AUTO: 6 X10E3/UL (ref 3.4–10.8)

## 2017-12-11 ENCOUNTER — TELEPHONE (OUTPATIENT)
Dept: FAMILY MEDICINE CLINIC | Age: 82
End: 2017-12-11

## 2017-12-11 ENCOUNTER — APPOINTMENT (OUTPATIENT)
Dept: CT IMAGING | Age: 82
End: 2017-12-11
Attending: EMERGENCY MEDICINE
Payer: MEDICARE

## 2017-12-11 ENCOUNTER — HOSPITAL ENCOUNTER (EMERGENCY)
Age: 82
Discharge: HOME | End: 2017-12-11
Attending: EMERGENCY MEDICINE
Payer: MEDICARE

## 2017-12-11 VITALS
TEMPERATURE: 97.3 F | BODY MASS INDEX: 22.15 KG/M2 | WEIGHT: 132.94 LBS | OXYGEN SATURATION: 98 % | DIASTOLIC BLOOD PRESSURE: 71 MMHG | HEART RATE: 84 BPM | HEIGHT: 65 IN | SYSTOLIC BLOOD PRESSURE: 147 MMHG | RESPIRATION RATE: 16 BRPM

## 2017-12-11 DIAGNOSIS — K52.9 GASTROENTERITIS, ACUTE: Primary | ICD-10-CM

## 2017-12-11 LAB
ALBUMIN SERPL-MCNC: 3.6 G/DL (ref 3.5–5)
ALBUMIN/GLOB SERPL: 0.7 {RATIO} (ref 1.1–2.2)
ALP SERPL-CCNC: 97 U/L (ref 45–117)
ALT SERPL-CCNC: 13 U/L (ref 12–78)
ANION GAP SERPL CALC-SCNC: 9 MMOL/L (ref 5–15)
AST SERPL-CCNC: 18 U/L (ref 15–37)
BASOPHILS # BLD: 0 K/UL (ref 0–0.1)
BASOPHILS NFR BLD: 0 % (ref 0–1)
BILIRUB SERPL-MCNC: 0.4 MG/DL (ref 0.2–1)
BUN SERPL-MCNC: 28 MG/DL (ref 6–20)
BUN/CREAT SERPL: 26 (ref 12–20)
CALCIUM SERPL-MCNC: 9.2 MG/DL (ref 8.5–10.1)
CHLORIDE SERPL-SCNC: 107 MMOL/L (ref 97–108)
CO2 SERPL-SCNC: 24 MMOL/L (ref 21–32)
CREAT SERPL-MCNC: 1.08 MG/DL (ref 0.55–1.02)
EOSINOPHIL # BLD: 0 K/UL (ref 0–0.4)
EOSINOPHIL NFR BLD: 0 % (ref 0–7)
ERYTHROCYTE [DISTWIDTH] IN BLOOD BY AUTOMATED COUNT: 18.1 % (ref 11.5–14.5)
GLOBULIN SER CALC-MCNC: 5.3 G/DL (ref 2–4)
GLUCOSE SERPL-MCNC: 134 MG/DL (ref 65–100)
HCT VFR BLD AUTO: 35 % (ref 35–47)
HGB BLD-MCNC: 10.8 G/DL (ref 11.5–16)
LACTATE SERPL-SCNC: 1.8 MMOL/L (ref 0.4–2)
LYMPHOCYTES # BLD: 1.3 K/UL (ref 0.8–3.5)
LYMPHOCYTES NFR BLD: 17 % (ref 12–49)
MCH RBC QN AUTO: 23.5 PG (ref 26–34)
MCHC RBC AUTO-ENTMCNC: 30.9 G/DL (ref 30–36.5)
MCV RBC AUTO: 76.1 FL (ref 80–99)
MONOCYTES # BLD: 1 K/UL (ref 0–1)
MONOCYTES NFR BLD: 13 % (ref 5–13)
NEUTS SEG # BLD: 5.5 K/UL (ref 1.8–8)
NEUTS SEG NFR BLD: 70 % (ref 32–75)
PLATELET # BLD AUTO: 205 K/UL (ref 150–400)
POTASSIUM SERPL-SCNC: 4.3 MMOL/L (ref 3.5–5.1)
PROT SERPL-MCNC: 8.9 G/DL (ref 6.4–8.2)
RBC # BLD AUTO: 4.6 M/UL (ref 3.8–5.2)
SODIUM SERPL-SCNC: 140 MMOL/L (ref 136–145)
WBC # BLD AUTO: 7.9 K/UL (ref 3.6–11)

## 2017-12-11 PROCEDURE — 96374 THER/PROPH/DIAG INJ IV PUSH: CPT

## 2017-12-11 PROCEDURE — 74177 CT ABD & PELVIS W/CONTRAST: CPT

## 2017-12-11 PROCEDURE — 74011636320 HC RX REV CODE- 636/320: Performed by: EMERGENCY MEDICINE

## 2017-12-11 PROCEDURE — 36415 COLL VENOUS BLD VENIPUNCTURE: CPT | Performed by: EMERGENCY MEDICINE

## 2017-12-11 PROCEDURE — 80053 COMPREHEN METABOLIC PANEL: CPT | Performed by: EMERGENCY MEDICINE

## 2017-12-11 PROCEDURE — 85025 COMPLETE CBC W/AUTO DIFF WBC: CPT | Performed by: EMERGENCY MEDICINE

## 2017-12-11 PROCEDURE — 96361 HYDRATE IV INFUSION ADD-ON: CPT

## 2017-12-11 PROCEDURE — 83605 ASSAY OF LACTIC ACID: CPT | Performed by: EMERGENCY MEDICINE

## 2017-12-11 PROCEDURE — 96375 TX/PRO/DX INJ NEW DRUG ADDON: CPT

## 2017-12-11 PROCEDURE — 99282 EMERGENCY DEPT VISIT SF MDM: CPT

## 2017-12-11 PROCEDURE — 74011250636 HC RX REV CODE- 250/636: Performed by: EMERGENCY MEDICINE

## 2017-12-11 RX ORDER — FENTANYL CITRATE 50 UG/ML
50 INJECTION, SOLUTION INTRAMUSCULAR; INTRAVENOUS
Status: COMPLETED | OUTPATIENT
Start: 2017-12-11 | End: 2017-12-11

## 2017-12-11 RX ORDER — SODIUM CHLORIDE 0.9 % (FLUSH) 0.9 %
10 SYRINGE (ML) INJECTION
Status: COMPLETED | OUTPATIENT
Start: 2017-12-11 | End: 2017-12-11

## 2017-12-11 RX ORDER — SODIUM CHLORIDE 9 MG/ML
50 INJECTION, SOLUTION INTRAVENOUS
Status: COMPLETED | OUTPATIENT
Start: 2017-12-11 | End: 2017-12-11

## 2017-12-11 RX ORDER — DICYCLOMINE HYDROCHLORIDE 10 MG/1
10 CAPSULE ORAL 4 TIMES DAILY
Qty: 20 CAP | Refills: 0 | Status: SHIPPED | OUTPATIENT
Start: 2017-12-11 | End: 2017-12-16

## 2017-12-11 RX ORDER — ONDANSETRON 4 MG/1
4 TABLET, FILM COATED ORAL
Qty: 20 TAB | Refills: 0 | Status: SHIPPED | OUTPATIENT
Start: 2017-12-11 | End: 2019-11-20

## 2017-12-11 RX ORDER — LOPERAMIDE HCL 2 MG
2 TABLET ORAL
Qty: 20 TAB | Refills: 0 | Status: SHIPPED | OUTPATIENT
Start: 2017-12-11 | End: 2017-12-21

## 2017-12-11 RX ORDER — ONDANSETRON 2 MG/ML
4 INJECTION INTRAMUSCULAR; INTRAVENOUS
Status: COMPLETED | OUTPATIENT
Start: 2017-12-11 | End: 2017-12-11

## 2017-12-11 RX ADMIN — SODIUM CHLORIDE 50 ML/HR: 900 INJECTION, SOLUTION INTRAVENOUS at 18:57

## 2017-12-11 RX ADMIN — ONDANSETRON 4 MG: 2 INJECTION INTRAMUSCULAR; INTRAVENOUS at 17:23

## 2017-12-11 RX ADMIN — FENTANYL CITRATE 50 MCG: 50 INJECTION, SOLUTION INTRAMUSCULAR; INTRAVENOUS at 17:23

## 2017-12-11 RX ADMIN — IOPAMIDOL 100 ML: 755 INJECTION, SOLUTION INTRAVENOUS at 18:57

## 2017-12-11 RX ADMIN — SODIUM CHLORIDE 1000 ML: 900 INJECTION, SOLUTION INTRAVENOUS at 17:16

## 2017-12-11 RX ADMIN — Medication 10 ML: at 18:57

## 2017-12-11 NOTE — ED NOTES
Patient resting in stretcher with call bell within reach. Patient provided with vomit bag because of complaints of nausea. Daughter at bedside.      1850 Patient to CT via stretcher

## 2017-12-11 NOTE — ED PROVIDER NOTES
EMERGENCY DEPARTMENT HISTORY AND PHYSICAL EXAM      Date: 12/11/2017  Patient Name: Horacio Armenta    History of Presenting Illness     Chief Complaint   Patient presents with    Abdominal Pain     onset last night    Vomiting    Diarrhea       History Provided By: Patient and Patient's Daughter    HPI: Horacio Armenta, 80 y.o. female with PMHx significant for HTN, a fib, and PShx for tubal ligation, presents ambulatory with her daughter to the ED with cc of sudden onset lower abdominal pain with nausea, vomiting, and diarrhea that began last night. Pt reports waking up with her sxs. She was unable to sleep through the night due to episodes of vomiting and diarrhea. This morning, pt was given Zofran by her daughter and ate some cottage cheese and an Ensure but vomited shortly after. In the ED, pt complains of fatigue. Her other daughter has been ill with N/V/D sxs recently. Per the daughter, pt \"dehydrates easily\". Pt denies a history of diverticulitis, colitis, recent Abx tx, or intake of suspicious food. Pt specifically denies dysuria, fevers, or blood in stool. PCP: Haven Yadav MD     Social Hx: - Tobacco, - EtOH, - Illicit Drugs    There are no other complaints, changes, or physical findings at this time. Current Outpatient Prescriptions   Medication Sig Dispense Refill    loperamide (IMMODIUM) 2 mg tablet Take 1 Tab by mouth four (4) times daily as needed for Diarrhea for up to 10 days. 20 Tab 0    QUEtiapine (SEROQUEL) 25 mg tablet Take 1 Tab by mouth nightly. 30 Tab 5    pantoprazole (PROTONIX) 40 mg tablet TAKE 1 TABLET BY MOUTH DAILY BEFORE BREAKFAST, FOR INDIGESTION AND GASTRITIS. 30 Tab 12    CALCIUM 600 + D tablet TAKE 1 TABLET TWICE A DAY. (CALCIUM SUPPLEMENT) 60 Tab 0    dabigatran etexilate (PRADAXA) 75 mg capsule Take 1 Cap by mouth every twelve (12) hours. 60 Cap 5    potassium chloride (K-DUR, KLOR-CON) 20 mEq tablet Take 1 Tab by mouth two (2) times a day.  60 Tab 12    levothyroxine (SYNTHROID) 100 mcg tablet TAKE ONE TABLET DAILY FOR THYROID 30 Tab 12    atenolol (TENORMIN) 50 mg tablet TAKE 1 TABLET BY MOUTH DAILY FOR THE HEART AND BLOOD PRESSURE. 30 Tab 12    QUEtiapine (SEROQUEL) 25 mg tablet       fluticasone (FLONASE) 50 mcg/actuation nasal spray USE 1-2 SPRAYS IN EACH NOSTRIL ONCE DAILY. 16 g PRN       Past History     Past Medical History:  Past Medical History:   Diagnosis Date    Arrhythmia     atrial fibrillation, chronic. Stress test 2011 essent. normal.  Mild-mod AR on ECHO    Atrial fibrillation (HCC)     GI bleeding     Hypertension     Refusal of blood transfusions as patient is [employer]     Thyroid disease     hypothyroid     Past Surgical History:  Past Surgical History:   Procedure Laterality Date    COLONOSCOPY N/A 5/2/2017    COLONOSCOPY performed by Jose Juan Forde [facility] ENDOSCOPY    ECHO TRANSESOPHAGEAL (DENISE) W OR WO CONTRAST  4/27/2011         HX GYN      vaginal deliveries x10    HX TUBAL LIGATION      OH CARDIOVERSION ELECTIVE ARRHYTHMIA EXTERNAL  6/14/2011          Family History:  Family History   Problem Relation Age of Onset    Heart Disease Father     Cancer Son     Heart Disease Son      Social History:  Social History   Substance Use Topics    Smoking status: Never Smoker    Smokeless tobacco: Never Used    Alcohol use No     Allergies: Allergies   Allergen Reactions    Ambien [Zolpidem] Other (comments)     Couldn't move    Seroquel [Quetiapine] Other (comments)     PSYCOLOGICAL REACTION Pt didn't feel like them selves but felt like a different person, iT DEEPLY DISTURBED PT.  PT BECAME IRRITABLE. Review of Systems   Review of Systems   Constitutional: Positive for fatigue. Negative for chills and fever. Respiratory: Negative for cough and shortness of breath. Cardiovascular: Negative for chest pain. Gastrointestinal: Positive for abdominal pain, diarrhea, nausea and vomiting.  Negative for blood in stool and constipation. Genitourinary: Negative for dysuria. Neurological: Negative for weakness and numbness. All other systems reviewed and are negative. Physical Exam   Physical Exam   Constitutional: She is oriented to person, place, and time. She appears well-developed and well-nourished. HENT:   Head: Normocephalic and atraumatic. Mouth/Throat: Mucous membranes are dry. Eyes: Conjunctivae and EOM are normal.   Neck: Normal range of motion. Neck supple. Cardiovascular: Normal rate and regular rhythm. Pulmonary/Chest: Effort normal and breath sounds normal. No respiratory distress. Abdominal: Soft. She exhibits no distension. There is tenderness in the right lower quadrant and periumbilical area. Minimally tender over the epigastric region   Musculoskeletal: Normal range of motion. Neurological: She is alert and oriented to person, place, and time. Skin: Skin is warm and dry. Psychiatric: She has a normal mood and affect. Nursing note and vitals reviewed. Diagnostic Study Results     Labs -     Recent Results (from the past 12 hour(s))   CBC WITH AUTOMATED DIFF    Collection Time: 12/11/17  5:17 PM   Result Value Ref Range    WBC 7.9 3.6 - 11.0 K/uL    RBC 4.60 3.80 - 5.20 M/uL    HGB 10.8 (L) 11.5 - 16.0 g/dL    HCT 35.0 35.0 - 47.0 %    MCV 76.1 (L) 80.0 - 99.0 FL    MCH 23.5 (L) 26.0 - 34.0 PG    MCHC 30.9 30.0 - 36.5 g/dL    RDW 18.1 (H) 11.5 - 14.5 %    PLATELET 899 548 - 655 K/uL    NEUTROPHILS 70 32 - 75 %    LYMPHOCYTES 17 12 - 49 %    MONOCYTES 13 5 - 13 %    EOSINOPHILS 0 0 - 7 %    BASOPHILS 0 0 - 1 %    ABS. NEUTROPHILS 5.5 1.8 - 8.0 K/UL    ABS. LYMPHOCYTES 1.3 0.8 - 3.5 K/UL    ABS. MONOCYTES 1.0 0.0 - 1.0 K/UL    ABS. EOSINOPHILS 0.0 0.0 - 0.4 K/UL    ABS.  BASOPHILS 0.0 0.0 - 0.1 K/UL   METABOLIC PANEL, COMPREHENSIVE    Collection Time: 12/11/17  5:17 PM   Result Value Ref Range    Sodium 140 136 - 145 mmol/L    Potassium 4.3 3.5 - 5.1 mmol/L    Chloride 107 97 - 108 mmol/L    CO2 24 21 - 32 mmol/L    Anion gap 9 5 - 15 mmol/L    Glucose 134 (H) 65 - 100 mg/dL    BUN 28 (H) 6 - 20 MG/DL    Creatinine 1.08 (H) 0.55 - 1.02 MG/DL    BUN/Creatinine ratio 26 (H) 12 - 20      GFR est AA 58 (L) >60 ml/min/1.73m2    GFR est non-AA 48 (L) >60 ml/min/1.73m2    Calcium 9.2 8.5 - 10.1 MG/DL    Bilirubin, total 0.4 0.2 - 1.0 MG/DL    ALT (SGPT) 13 12 - 78 U/L    AST (SGOT) 18 15 - 37 U/L    Alk. phosphatase 97 45 - 117 U/L    Protein, total 8.9 (H) 6.4 - 8.2 g/dL    Albumin 3.6 3.5 - 5.0 g/dL    Globulin 5.3 (H) 2.0 - 4.0 g/dL    A-G Ratio 0.7 (L) 1.1 - 2.2     LACTIC ACID    Collection Time: 12/11/17  5:17 PM   Result Value Ref Range    Lactic acid 1.8 0.4 - 2.0 MMOL/L       Radiologic Studies -     CT Results  (Last 48 hours)               12/11/17 1856  CT ABD PELV W CONT Final result    Impression:  IMPRESSION:       1. 1 stool within the distal small bowel with mild distention of the distal   small bowel without overt obstruction. Question nonspecific enteritis   2. 14 mm hyperdense lesion upper pole left kidney. Dedicated renal protocol CT   is recommended once acute episode has resolved           Narrative:  EXAM:  CT ABD PELV W CONT       INDICATION: abd pain diarrhea       COMPARISON: None       CONTRAST:  100 mL of Isovue-370. TECHNIQUE:    Following the uneventful intravenous administration of contrast, thin axial   images were obtained through the abdomen and pelvis. Coronal and sagittal   reconstructions were generated. Oral contrast was not administered. CT dose   reduction was achieved through use of a standardized protocol tailored for this   examination and automatic exposure control for dose modulation. FINDINGS:    LUNG BASES: Clear.    INCIDENTALLY IMAGED HEART AND MEDIASTINUM: Heart is mildly enlarged   LIVER: Tiny sub-5 mm hypodensity inferior right hepatic lobe too small to   characterize   GALLBLADDER: Multiple calcified stones within the gallbladder. No wall   thickening or surrounding inflammation   SPLEEN: No mass. PANCREAS: No mass or ductal dilatation. ADRENALS: Unremarkable. KIDNEYS: Cyst within the right kidney. Within the upper pole left kidney there   is a hyperdense 14 mm exophytic lesion measuring 97 Hounsfield units. No   hydronephrosis   STOMACH: Distal small bowel loops are fluid-filled. There is formed stool within   the distal small bowel. .   SMALL BOWEL: No dilatation or wall thickening. COLON: No dilatation or wall thickening. APPENDIX: not identified but there are no secondary signs of appendicitis. PERITONEUM: Small amount of free fluid around the liver and no free air   RETROPERITONEUM: No lymphadenopathy or aortic aneurysm. There is vascular   calcification   REPRODUCTIVE ORGANS: Within normal limits   URINARY BLADDER: No mass or calculus. BONES: Chronic collapse of L3   ADDITIONAL COMMENTS: N/A                 Medical Decision Making   I am the first provider for this patient. I reviewed the vital signs, available nursing notes, past medical history, past surgical history, family history and social history. Vital Signs-Reviewed the patient's vital signs. Patient Vitals for the past 12 hrs:   Temp Pulse Resp BP SpO2   12/11/17 1906 - - - 147/71 98 %   12/11/17 1830 - - - 150/74 96 %   12/11/17 1758 - - - 152/72 95 %   12/11/17 1637 97.3 °F (36.3 °C) 84 16 149/86 100 %     Records Reviewed: Nursing Notes and Old Medical Records    Provider Notes (Medical Decision Making):   Patient presents with abdominal pain. DDx: Gastroenteritis, SBO, appendicitis, colitis, IBD, diverticulitis, mesenteric ischemia, AAA or descending dissection, ACS, ureteral stone. Will get labs and CT Abdomen. ED Course:   Initial assessment performed. The patients presenting problems have been discussed, and they are in agreement with the care plan formulated and outlined with them.   I have encouraged them to ask questions as they arise throughout their visit. 7:20 PM  Counseled pt and family on gastroenteritis. Also provided [facility] information as pt may become dehydrated. Disposition:  Discharge Note:  7:24 PM  The patient has been re-evaluated and is ready for discharge. Reviewed available results with patient. Counseled patient/parent/guardian on diagnosis and care plan. Patient has expressed understanding, and all questions have been answered. Patient agrees with plan and agrees to follow up as recommended, or return to the ED if their symptoms worsen. Discharge instructions have been provided and explained to the patient, along with reasons to return to the ED. PLAN:  1. Current Discharge Medication List      START taking these medications    Details   loperamide (IMMODIUM) 2 mg tablet Take 1 Tab by mouth four (4) times daily as needed for Diarrhea for up to 10 days. Qty: 20 Tab, Refills: 0           2. Follow-up Information     Follow up With Details Comments Contact Info    Angelica Malave MD  As needed Parvez Woo  [phone]          Return to ED if worse     Diagnosis     Clinical Impression:   1. Gastroenteritis, acute        Attestations:    Attestation: This note is prepared by Michelle Alonzo, acting as Scribe for MD Jamal Deal MD: The scribe's documentation has been prepared under my direction and personally reviewed by me in its entirety. I confirm that the note above accurately reflects all work, treatment, procedures, and medical decision making performed by me.

## 2017-12-11 NOTE — ED NOTES
Patient reports to ED with daughter who states patient's appetite has decreased tremendously because patient is unable to eat with becoming nauseated and having diarrhea. Patient states she began having belly pain yesterday which was accompanied with diarrhea. Patient denies seeing blood in her stools. Patient on the monitor x3 call bell within reach. Side rails x2. Daughter remains at bedside.

## 2017-12-11 NOTE — TELEPHONE ENCOUNTER
Patients daughter called wanting her to be seen asap. She is vomiting and has an upset stomach. There was an appointment available on Wednesday but the patient said she can not wait that long and wanted to be called. Her  Number is [phone].

## 2017-12-12 NOTE — TELEPHONE ENCOUNTER
Called pt's daughter back. She took pt to ED. Pt was given meds for nausea and vomiting, and for diarrhea. Pt at home now resting.  Daughter will keep us posted

## 2017-12-12 NOTE — DISCHARGE INSTRUCTIONS
Gastroenteritis: Care Instructions  Your Care Instructions    Gastroenteritis is an illness that may cause nausea, vomiting, and diarrhea. It is sometimes called \"stomach flu. \" It can be caused by bacteria or a virus. You will probably begin to feel better in 1 to 2 days. In the meantime, get plenty of rest and make sure you do not become dehydrated. Dehydration occurs when your body loses too much fluid. Follow-up care is a key part of your treatment and safety. Be sure to make and go to all appointments, and call your doctor if you are having problems. It's also a good idea to know your test results and keep a list of the medicines you take. How can you care for yourself at home? · If your doctor prescribed antibiotics, take them as directed. Do not stop taking them just because you feel better. You need to take the full course of antibiotics. · Drink plenty of fluids to prevent dehydration, enough so that your urine is light yellow or clear like water. Choose water and other caffeine-free clear liquids until you feel better. If you have kidney, heart, or liver disease and have to limit fluids, talk with your doctor before you increase your fluid intake. · Drink fluids slowly, in frequent, small amounts, because drinking too much too fast can cause vomiting. · Begin eating mild foods, such as dry toast, yogurt, applesauce, bananas, and rice. Avoid spicy, hot, or high-fat foods, and do not drink alcohol or caffeine for a day or two. Do not drink milk or eat ice cream until you are feeling better. How to prevent gastroenteritis  · Keep hot foods hot and cold foods cold. · Do not eat meats, dressings, salads, or other foods that have been kept at room temperature for more than 2 hours. · Use a thermometer to check your refrigerator. It should be between 34°F and 40°F.  · Defrost meats in the refrigerator or microwave, not on the kitchen counter. · Keep your hands and your kitchen clean.  Wash your hands, cutting boards, and countertops with hot soapy water frequently. · Cook meat until it is well done. · Do not eat raw eggs or uncooked sauces made with raw eggs. · Do not take chances. If food looks or tastes spoiled, throw it out. When should you call for help? Call 911 anytime you think you may need emergency care. For example, call if:  ? · You vomit blood or what looks like coffee grounds. ? · You passed out (lost consciousness). ? · You pass maroon or very bloody stools. ?Call your doctor now or seek immediate medical care if:  ? · You have severe belly pain. ? · You have signs of needing more fluids. You have sunken eyes, a dry mouth, and pass only a little dark urine. ? · You feel like you are going to faint. ? · You have increased belly pain that does not go away in 1 to 2 days. ? · You have new or increased nausea, or you are vomiting. ? · You have a new or higher fever. ? · Your stools are black and tarlike or have streaks of blood. ? Watch closely for changes in your health, and be sure to contact your doctor if:  ? · You are dizzy or lightheaded. ? · You urinate less than usual, or your urine is dark yellow or brown. ? · You do not feel better with each day that goes by. Where can you learn more? Go to http://dana-keshia.info/. Enter N142 in the search box to learn more about \"Gastroenteritis: Care Instructions. \"  Current as of: March 3, 2017  Content Version: 11.4  © 6039-3778 [employer]. Care instructions adapted under license by [employer] (which disclaims liability or warranty for this information). If you have questions about a medical condition or this instruction, always ask your healthcare professional. [address] any warranty or liability for your use of this information.

## 2017-12-12 NOTE — ED NOTES
MD Brett Hunter reviewed discharge instructions with the patient. The patient verbalized understanding. Patient discharged home with vitals at baseline. Patient to vehicle via wheelchair. No further complaints noted at this time.

## 2017-12-18 ENCOUNTER — OFFICE VISIT (OUTPATIENT)
Dept: FAMILY MEDICINE CLINIC | Age: 82
End: 2017-12-18

## 2017-12-18 VITALS
OXYGEN SATURATION: 96 % | RESPIRATION RATE: 18 BRPM | SYSTOLIC BLOOD PRESSURE: 131 MMHG | TEMPERATURE: 95.8 F | HEART RATE: 63 BPM | HEIGHT: 65 IN | WEIGHT: 133.4 LBS | BODY MASS INDEX: 22.23 KG/M2 | DIASTOLIC BLOOD PRESSURE: 54 MMHG

## 2017-12-18 DIAGNOSIS — J40 BRONCHITIS: Primary | ICD-10-CM

## 2017-12-18 RX ORDER — AMOXICILLIN 500 MG/1
500 CAPSULE ORAL 3 TIMES DAILY
Qty: 30 CAP | Refills: 0 | Status: SHIPPED | OUTPATIENT
Start: 2017-12-18 | End: 2017-12-28

## 2017-12-18 NOTE — MR AVS SNAPSHOT
Visit Information Date & Time Provider Department Dept. Phone Encounter #  
 12/18/2017 12:30 PM Neisha Sood MD [facility] [phone] [id number] Your Appointments 5/9/2018  1:00 PM  
6 MONTH with Irene Ortega MD  
[facility] Cardiology Associate [address] [facility]) Appt Note: $40 cp 10/4/17KSR  
 [address] [facility] [address] [phone]  
  
   
 [address] [place] [address] [address] 5/14/2018 11:00 AM  
ROUTINE CARE with Neisha Sood MD  
[facility]) Appt Note: f/u  
 [address] [facility] [address] [id number] [phone] [address] [facility] [address] Upcoming Health Maintenance Date Due  
 MEDICARE YEARLY EXAM 6/22/2018 GLAUCOMA SCREENING Q2Y 2/17/2019 DTaP/Tdap/Td series (2 - Td) 8/17/2026 Allergies as of 12/18/2017  Review Complete On: 12/18/2017 By: Neisha Sood MD  
  
 Severity Noted Reaction Type Reactions Ambien [Zolpidem]  06/29/2017    Other (comments) Couldn't move Seroquel [Quetiapine]  06/29/2017    Other (comments) PSYCOLOGICAL REACTION Pt didn't feel like them selves but felt like a different person, iT DEEPLY DISTURBED PT.  PT BECAME IRRITABLE. Current Immunizations  Reviewed on 8/19/2014 Name Date Influenza Vaccine Split  Deferred (Patient Refused) Pneumococcal Polysaccharide (PPSV-23) 2/15/2013 ZZZ-RETIRED (DO NOT USE) Pneumococcal Vaccine (Unspecified Type)  Deferred (Patient Refused) Not reviewed this visit You Were Diagnosed With   
  
 Codes Comments Bronchitis    -  Primary ICD-10-CM: U72 ICD-9-CM: 935 Vitals BP Pulse Temp Resp Height(growth percentile) Weight(growth percentile) 131/54 63 95.8 °F (35.4 °C) (Oral) 18 5' 5\" (1.651 m) 133 lb 6.4 oz (60.5 kg) SpO2 BMI OB Status Smoking Status 96% 22.2 kg/m2 Postmenopausal Never Smoker Vitals History BMI and BSA Data Body Mass Index Body Surface Area  
 22.2 kg/m 2 1.67 m 2 Preferred Pharmacy Pharmacy Name Phone [address] [facility] [phone] Your Updated Medication List  
  
   
This list is accurate as of: 12/18/17  2:12 PM.  Always use your most recent med list.  
  
  
  
  
 amoxicillin 500 mg capsule Commonly known as:  AMOXIL Take 1 Cap by mouth three (3) times daily for 10 days. atenolol 50 mg tablet Commonly known as:  TENORMIN  
TAKE 1 TABLET BY MOUTH DAILY FOR THE HEART AND BLOOD PRESSURE. Calcium 600 + D tablet Generic drug:  calcium-cholecalciferol (D3)  
TAKE 1 TABLET TWICE A DAY. (CALCIUM SUPPLEMENT)  
  
 dabigatran etexilate 75 mg capsule Commonly known as:  PRADAXA Take 1 Cap by mouth every twelve (12) hours. fluticasone 50 mcg/actuation nasal spray Commonly known as:  FLONASE  
USE 1-2 SPRAYS IN EACH NOSTRIL ONCE DAILY. levothyroxine 100 mcg tablet Commonly known as:  SYNTHROID  
TAKE ONE TABLET DAILY FOR THYROID  
  
 loperamide 2 mg tablet Commonly known as:  IMMODIUM Take 1 Tab by mouth four (4) times daily as needed for Diarrhea for up to 10 days. ondansetron hcl 4 mg tablet Commonly known as:  ZOFRAN (AS HYDROCHLORIDE) Take 1 Tab by mouth every eight (8) hours as needed for Nausea. pantoprazole 40 mg tablet Commonly known as:  PROTONIX  
TAKE 1 TABLET BY MOUTH DAILY BEFORE BREAKFAST, FOR INDIGESTION AND GASTRITIS. potassium chloride 20 mEq tablet Commonly known as:  K-DUR, KLOR-CON Take 1 Tab by mouth two (2) times a day. QUEtiapine 25 mg tablet Commonly known as:  SEROquel Take 1 Tab by mouth nightly. Prescriptions Sent to Pharmacy Refills  
 amoxicillin (AMOXIL) 500 mg capsule 0 Sig: Take 1 Cap by mouth three (3) times daily for 10 days.   
 Class: Normal  
 Pharmacy: [address]  #: [phone] Route: Oral  
  
Introducing [facility]! [employer] introduces [employer] patient portal. Now you can access parts of your medical record, email your doctor's office, and request medication refills online. 1. In your internet browser, go to [email or web address] 2. Click on the First Time User? Click Here link in the Sign In box. You will see the New Member Sign Up page. 3. Enter your [employer] Access Code exactly as it appears below. You will not need to use this code after youve completed the sign-up process. If you do not sign up before the expiration date, you must request a new code. · [employer] Access Code: [email or web address] Expires: 2/12/2018 10:18 AM 
 
4. Enter the last four digits of your Social Security Number (xxxx) and Date of Birth (mm/dd/yyyy) as indicated and click Submit. You will be taken to the next sign-up page. 5. Create a [employer] ID. This will be your [employer] login ID and cannot be changed, so think of one that is secure and easy to remember. 6. Create a [employer] password. You can change your password at any time. 7. Enter your Password Reset Question and Answer. This can be used at a later time if you forget your password. 8. Enter your e-mail address. You will receive e-mail notification when new information is available in [address] 9. Click Sign Up. You can now view and download portions of your medical record. 10. Click the Download Summary menu link to download a portable copy of your medical information. If you have questions, please visit the Frequently Asked Questions section of the [employer] website. Remember, [employer] is NOT to be used for urgent needs. For medical emergencies, dial 911. Now available from your iPhone and Android! Please provide this summary of care documentation to your next provider. Your primary care clinician is listed as [facility]. If you have any questions after today's visit, please call [phone].

## 2017-12-18 NOTE — PROGRESS NOTES
Chief Complaint   Patient presents with    Cough    Cold Symptoms     Pt had cough and congested for about a week along with diarrhea. Pt went to ED on 12/11/17 for this reason/.    Pt feeling better now but wanted to get checked out.

## 2017-12-18 NOTE — PROGRESS NOTES
HISTORY OF PRESENT ILLNESS  Donald Velazquez is a 80 y.o. female. HPI Has had runny nose and cough and diarrhea for 5 days. No fever, chills. No dyspnea or wheezing. Mucinex- some relief. Has been sleeping better on seroquel. ROS    Physical Exam   Constitutional: She is oriented to person, place, and time. She appears well-developed and well-nourished. Neck: No thyromegaly present. Cardiovascular: Normal rate, regular rhythm and normal heart sounds. No murmur heard. Pulmonary/Chest: Effort normal and breath sounds normal. She has no wheezes. Abdominal: Soft. Bowel sounds are normal. She exhibits no distension. There is no tenderness. There is no rebound and no guarding. Musculoskeletal: Normal range of motion. She exhibits no edema. Lymphadenopathy:     She has no cervical adenopathy. Neurological: She is alert and oriented to person, place, and time. Nursing note and vitals reviewed. ASSESSMENT and PLAN  Orders Placed This Encounter    amoxicillin (AMOXIL) 500 mg capsule     Diagnoses and all orders for this visit:    1. Bronchitis    Other orders  -     amoxicillin (AMOXIL) 500 mg capsule; Take 1 Cap by mouth three (3) times daily for 10 days.       Follow-up Disposition: Not on File

## 2018-01-02 RX ORDER — DABIGATRAN ETEXILATE MESYLATE 75 MG/1
CAPSULE ORAL
Qty: 60 CAP | Refills: 0 | Status: SHIPPED | OUTPATIENT
Start: 2018-01-02 | End: 2018-03-02 | Stop reason: SDUPTHER

## 2018-03-13 ENCOUNTER — HOSPITAL ENCOUNTER (EMERGENCY)
Age: 83
Discharge: HOME | End: 2018-03-13
Attending: EMERGENCY MEDICINE | Admitting: EMERGENCY MEDICINE
Payer: MEDICARE

## 2018-03-13 ENCOUNTER — APPOINTMENT (OUTPATIENT)
Dept: GENERAL RADIOLOGY | Age: 83
End: 2018-03-13
Attending: PHYSICIAN ASSISTANT
Payer: MEDICARE

## 2018-03-13 VITALS
WEIGHT: 137.13 LBS | BODY MASS INDEX: 22.85 KG/M2 | RESPIRATION RATE: 16 BRPM | SYSTOLIC BLOOD PRESSURE: 146 MMHG | DIASTOLIC BLOOD PRESSURE: 75 MMHG | OXYGEN SATURATION: 98 % | HEIGHT: 65 IN | HEART RATE: 81 BPM | TEMPERATURE: 98.2 F

## 2018-03-13 DIAGNOSIS — K56.41 FECAL IMPACTION (HCC): ICD-10-CM

## 2018-03-13 DIAGNOSIS — K59.00 CONSTIPATION, UNSPECIFIED CONSTIPATION TYPE: Primary | ICD-10-CM

## 2018-03-13 LAB — HEMOCCULT STL QL: POSITIVE

## 2018-03-13 PROCEDURE — 74018 RADEX ABDOMEN 1 VIEW: CPT

## 2018-03-13 PROCEDURE — 99282 EMERGENCY DEPT VISIT SF MDM: CPT

## 2018-03-13 PROCEDURE — 82272 OCCULT BLD FECES 1-3 TESTS: CPT | Performed by: EMERGENCY MEDICINE

## 2018-03-13 RX ORDER — POLYETHYLENE GLYCOL 3350 17 G/17G
17 POWDER, FOR SOLUTION ORAL DAILY
Qty: 595 G | Refills: 0 | Status: SHIPPED | OUTPATIENT
Start: 2018-03-13 | End: 2019-05-28

## 2018-03-13 RX ORDER — FACIAL-BODY WIPES
10 EACH TOPICAL DAILY
Qty: 5 SUPPOSITORY | Refills: 0 | Status: SHIPPED | OUTPATIENT
Start: 2018-03-13 | End: 2019-05-28

## 2018-03-13 NOTE — ED PROVIDER NOTES
EMERGENCY DEPARTMENT HISTORY AND PHYSICAL EXAM      Date: 3/13/2018  Patient Name: Leena Randall    History of Presenting Illness     Chief Complaint   Patient presents with    Constipation     pt reports constipation, last normal BM was Sunday, took OTC meds with no relief       History Provided By: Patient    HPI: Leena Randall, 80 y.o. female with PMHx significant for hypothyroid, HTN, and a-fib, presents ambulatory to the ED for evaluation of mild constipation x 2 days. Pt reports onset of symptoms s/p eating a lot of almonds. She notes associated mild rectal pain with bearing down. She denies any alleviating factors. She denies any nausea or vomiting. No abd pain. Denies any urinary frequency, hematuria or dysuria. She notes she has been taking OTC medication with minimal relief. She states her last normal BM was 2 days ago. She denies any history of similar symptoms. Pt specifically denies any fever or NV. PCP: Debbie King MD    There are no other complaints, changes, or physical findings at this time. Current Outpatient Prescriptions   Medication Sig Dispense Refill    polyethylene glycol (MIRALAX) 17 gram/dose powder Take 17 g by mouth daily. 1 tablespoon with 8 oz of water daily 595 g 0    bisacodyl (DULCOLAX, BISACODYL,) 10 mg suppository Insert 10 mg into rectum daily. 5 Suppository 0    PRADAXA 75 mg capsule TAKE ONE CAPSULE BY MOUTH EVERY 12 HOURS 60 Cap 5    ondansetron hcl (ZOFRAN, AS HYDROCHLORIDE,) 4 mg tablet Take 1 Tab by mouth every eight (8) hours as needed for Nausea. 20 Tab 0    QUEtiapine (SEROQUEL) 25 mg tablet Take 1 Tab by mouth nightly. 30 Tab 5    pantoprazole (PROTONIX) 40 mg tablet TAKE 1 TABLET BY MOUTH DAILY BEFORE BREAKFAST, FOR INDIGESTION AND GASTRITIS. 30 Tab 12    CALCIUM 600 + D tablet TAKE 1 TABLET TWICE A DAY. (CALCIUM SUPPLEMENT) 60 Tab 0    potassium chloride (K-DUR, KLOR-CON) 20 mEq tablet Take 1 Tab by mouth two (2) times a day.  60 Tab 12    fluticasone (FLONASE) 50 mcg/actuation nasal spray USE 1-2 SPRAYS IN EACH NOSTRIL ONCE DAILY. 16 g PRN    levothyroxine (SYNTHROID) 100 mcg tablet TAKE ONE TABLET DAILY FOR THYROID 30 Tab 12    atenolol (TENORMIN) 50 mg tablet TAKE 1 TABLET BY MOUTH DAILY FOR THE HEART AND BLOOD PRESSURE. 30 Tab 12       Past History     Past Medical History:  Past Medical History:   Diagnosis Date    Arrhythmia     atrial fibrillation, chronic. Stress test 2011 essent. normal.  Mild-mod AR on ECHO    Atrial fibrillation (HCC)     GI bleeding     Hypertension     Refusal of blood transfusions as patient is Alevism     Thyroid disease     hypothyroid       Past Surgical History:  Past Surgical History:   Procedure Laterality Date    COLONOSCOPY N/A 5/2/2017    COLONOSCOPY performed by Jessica Talley MD at hospitals ENDOSCOPY    ECHO TRANSESOPHAGEAL (DENISE) W OR WO CONTRAST  4/27/2011         HX GYN      vaginal deliveries x10    HX TUBAL LIGATION      SD CARDIOVERSION ELECTIVE ARRHYTHMIA EXTERNAL  6/14/2011            Family History:  Family History   Problem Relation Age of Onset    Heart Disease Father     Cancer Son     Heart Disease Son        Social History:  Social History   Substance Use Topics    Smoking status: Never Smoker    Smokeless tobacco: Never Used    Alcohol use No       Allergies: Allergies   Allergen Reactions    Ambien [Zolpidem] Other (comments)     Couldn't move    Seroquel [Quetiapine] Other (comments)     PSYCOLOGICAL REACTION Pt didn't feel like them selves but felt like a different person, iT DEEPLY DISTURBED PT.  PT BECAME IRRITABLE. Review of Systems   Review of Systems   Constitutional: Negative. Negative for chills and fever. HENT: Negative. Negative for congestion, facial swelling, rhinorrhea, sore throat, trouble swallowing and voice change. Eyes: Negative. Respiratory: Negative.   Negative for apnea, cough, chest tightness, shortness of breath and wheezing. Cardiovascular: Negative. Negative for chest pain, palpitations and leg swelling. Gastrointestinal: Positive for constipation and rectal pain. Negative for abdominal distention, abdominal pain, anal bleeding, blood in stool, diarrhea, nausea and vomiting. Endocrine: Negative. Negative for cold intolerance, heat intolerance and polyuria. Genitourinary: Negative. Negative for difficulty urinating, dysuria, flank pain, frequency, hematuria and urgency. Musculoskeletal: Negative. Negative for arthralgias, back pain, myalgias, neck pain and neck stiffness. Skin: Negative. Negative for color change and rash. Neurological: Negative. Negative for dizziness, syncope, facial asymmetry, speech difficulty, weakness, light-headedness, numbness and headaches. Hematological: Negative. Does not bruise/bleed easily. Psychiatric/Behavioral: Negative. Negative for confusion and self-injury. The patient is not nervous/anxious. Physical Exam   Physical Exam   Constitutional: She is oriented to person, place, and time. She appears well-developed and well-nourished. No distress. HENT:   Head: Normocephalic and atraumatic. Mouth/Throat: Oropharynx is clear and moist. No oropharyngeal exudate. Eyes: Conjunctivae and EOM are normal. Pupils are equal, round, and reactive to light. Neck: Normal range of motion. Neck supple. Cardiovascular: Normal rate, regular rhythm, normal heart sounds and intact distal pulses. Exam reveals no gallop and no friction rub. No murmur heard. Pulmonary/Chest: Effort normal and breath sounds normal. No respiratory distress. She has no wheezes. She has no rales. She exhibits no tenderness. Abdominal: Soft. Bowel sounds are normal. She exhibits no distension and no mass. There is no tenderness. There is no rebound and no guarding. Musculoskeletal: Normal range of motion. She exhibits no edema, tenderness or deformity.    Neurological: She is alert and oriented to person, place, and time. She displays normal reflexes. No cranial nerve deficit. She exhibits normal muscle tone. Coordination normal.   Skin: Skin is warm and dry. No rash noted. She is not diaphoretic. Psychiatric: She has a normal mood and affect. Her behavior is normal. Thought content normal.   Nursing note and vitals reviewed. Diagnostic Study Results     Labs -     No results found for this or any previous visit (from the past 12 hour(s)). Radiologic Studies -   EXAM:  XR ABD (KUB)  INDICATION:  constipation; pain  COMPARISON: CT of the abdomen and pelvis, 12/11/2017. Alexandr Taylor FINDINGS:   A supine radiograph of the abdomen shows a normal bowel gas pattern. Levoscoliosis with degenerative changes in the lumbar spine. Calcifications in  the aorta. Vascular calcifications in the pelvis and in the left upper quadrant. Superior endplate compression of L3, likely not significant changed. Alexandr Taylor IMPRESSION  IMPRESSION:  1. No evidence of acute process.       Medical Decision Making   I am the first provider for this patient. I reviewed the vital signs, available nursing notes, past medical history, past surgical history, family history and social history. Vital Signs-Reviewed the patient's vital signs. No data found. Pulse Oximetry Analysis - 98% on RA    Cardiac Monitor:   Rate: 81 bpm    Records Reviewed: Nursing Notes, Old Medical Records, Previous electrocardiograms, Previous Radiology Studies and Previous Laboratory Studies    Provider Notes (Medical Decision Making):   DDx: Obstruction, constipation, UTI    79 yo F presents with constipation, no signs of obstruction. Stable vitals. Non toxic. Abd nontender. Will order XR ABD, fleets enema and reassess. No indication for labs or CT warranted at this time. ED Course:   Initial assessment performed. The patients presenting problems have been discussed, and they are in agreement with the care plan formulated and outlined with them.   I have encouraged them to ask questions as they arise throughout their visit. Procedure Note - Rectal Exam:   5:05 PM  Performed by: Hortencia Block MD  Chaperoned by: RN  Rectal exam performed. Stool was collected. Stool was collected and sent to the lab for Hemoccult testing. Other findings: No evidence of hemorrhoids or active bleeding. Evidence of food products, ie almonds, in stool. The procedure took 1-15 minutes, and pt tolerated well.    6:27 PM  Pt reevaluated and updated on results. Reports significant improvement after enema. States she's ready to go home. Written by Maine Carrillo, ED Scribe, as dictated by Hortencia Block MD.    Progress Note:  Pt states she feels much better; pain resolved; denies any nausea; no new symptoms; pt able to tolerate PO and ambulate without issues; pt clinically safe for discharge home with close PCP f/u. At time of discharge, pt had stable vitals and had no questions or concerns, and was very satisfied with overall care. Critical Care Time:   0    Disposition:  DISCHARGE NOTE  6:36 PM  The patient has been re-evaluated and is ready for discharge. Reviewed available results with patient. Counseled pt on diagnosis and care plan. Pt has expressed understanding, and all questions have been answered. Pt agrees with plan and agrees to follow up as recommended, or return to the ED if their symptoms worsen. Discharge instructions have been provided and explained to the pt, along with reasons to return to the ED. PLAN:  1.    Discharge Medication List as of 3/13/2018  6:31 PM      CONTINUE these medications which have NOT CHANGED    Details   PRADAXA 75 mg capsule TAKE ONE CAPSULE BY MOUTH EVERY 12 HOURS, Normal, Disp-60 Cap, R-5      ondansetron hcl (ZOFRAN, AS HYDROCHLORIDE,) 4 mg tablet Take 1 Tab by mouth every eight (8) hours as needed for Nausea., Normal, Disp-20 Tab, R-0      QUEtiapine (SEROQUEL) 25 mg tablet Take 1 Tab by mouth nightly., Normal, Disp-30 Tab, R-5 pantoprazole (PROTONIX) 40 mg tablet TAKE 1 TABLET BY MOUTH DAILY BEFORE BREAKFAST, FOR INDIGESTION AND GASTRITIS., Normal, Disp-30 Tab, R-12      CALCIUM 600 + D tablet TAKE 1 TABLET TWICE A DAY. (CALCIUM SUPPLEMENT), Normal, Disp-60 Tab, R-0      potassium chloride (K-DUR, KLOR-CON) 20 mEq tablet Take 1 Tab by mouth two (2) times a day., Normal, Disp-60 Tab, R-12      fluticasone (FLONASE) 50 mcg/actuation nasal spray USE 1-2 SPRAYS IN EACH NOSTRIL ONCE DAILY., Normal, Disp-16 g, R-PRN      levothyroxine (SYNTHROID) 100 mcg tablet TAKE ONE TABLET DAILY FOR THYROID, Normal, Disp-30 Tab, R-12      atenolol (TENORMIN) 50 mg tablet TAKE 1 TABLET BY MOUTH DAILY FOR THE HEART AND BLOOD PRESSURE., Normal, Disp-30 Tab, R-12           2. Follow-up Information     Follow up With Details Comments Contact Info    MD Hang Moss [address] [place], [place] [id number]  [phone]      [facility] EMERGENCY DEPT  As needed, If symptoms worsen [address] [place] [address]  [phone]        Return to ED if worse     Diagnosis     Clinical Impression:   1. Constipation, unspecified constipation type    2. Fecal impaction ([facility] [address]          Attestations: This note is prepared by Vikki Goodson, acting as Scribe for Suresh Gillis MD.    Suresh Gillis MD: The scribe's documentation has been prepared under my direction and personally reviewed by me in its entirety. I confirm that the note above accurately reflects all work, treatment, procedures, and medical decision making performed by me. This note will not be viewable in [address]

## 2018-03-13 NOTE — DISCHARGE INSTRUCTIONS
Thank you! Thank you for allowing us to provide you with excellent care today. We hope we addressed all of your concerns and needs. We strive to provide excellent quality care in the Emergency Department. You will receive a survey after your visit to evaluate the care you were provided. Please rate us a level 5 (excellent), as anything less than excellent does not meet our goals. If you feel that you have not received excellent quality care or timely care, please ask to speak to the nurse manager. Please choose us in the future for your continued health care needs. ------------------------------------------------------------------------------------------------------------  The exam and treatment you received in the Emergency Department were for an urgent problem and are not intended as complete care. It is important that you follow up with a doctor, nurse practitioner, or physician assistant for ongoing care. If your symptoms become worse or you do not improve as expected and you are unable to reach your usual health care provider, you should return to the Emergency Department. We are available 24 hours a day. Please take your discharge instructions with you when you go to your follow-up appointment. If you have any problem arranging a follow-up appointment, contact the Emergency Department immediately. If a prescription has been provided, please have it filled as soon as possible to prevent a delay in treatment. Read the entire medication instruction sheet provided to you by the pharmacy. If you have any questions or reservations about taking the medication due to side effects or interactions with other medications, please call your primary care physician or contact the ER to speak with the charge nurse. Make an appointment with your family doctor or the physician you were referred to for follow-up of this visit as instructed on your discharge paperwork, as this is mandatory follow-up. Return to the ER if you are unable to be seen or if you are unable to be seen in a timely manner. If you have any problem arranging the follow-up visit, contact the Emergency Department immediately. Constipation: Care Instructions  Your Care Instructions    Constipation means that you have a hard time passing stools (bowel movements). People pass stools from 3 times a day to once every 3 days. What is normal for you may be different. Constipation may occur with pain in the rectum and cramping. The pain may get worse when you try to pass stools. Sometimes there are small amounts of bright red blood on toilet paper or the surface of stools. This is because of enlarged veins near the rectum (hemorrhoids). A few changes in your diet and lifestyle may help you avoid ongoing constipation. Your doctor may also prescribe medicine to help loosen your stool. Some medicines can cause constipation. These include pain medicines and antidepressants. Tell your doctor about all the medicines you take. Your doctor may want to make a medicine change to ease your symptoms. Follow-up care is a key part of your treatment and safety. Be sure to make and go to all appointments, and call your doctor if you are having problems. It's also a good idea to know your test results and keep a list of the medicines you take. How can you care for yourself at home? · Drink plenty of fluids, enough so that your urine is light yellow or clear like water. If you have kidney, heart, or liver disease and have to limit fluids, talk with your doctor before you increase the amount of fluids you drink. · Include high-fiber foods in your diet each day. These include fruits, vegetables, beans, and whole grains. · Get at least 30 minutes of exercise on most days of the week. Walking is a good choice. You also may want to do other activities, such as running, swimming, cycling, or playing tennis or team sports.   · Take a fiber supplement, such as Citrucel or Metamucil, every day. Read and follow all instructions on the label. · Schedule time each day for a bowel movement. A daily routine may help. Take your time having your bowel movement. · Support your feet with a small step stool when you sit on the toilet. This helps flex your hips and places your pelvis in a squatting position. · Your doctor may recommend an over-the-counter laxative to relieve your constipation. Examples are Milk of Magnesia and MiraLax. Read and follow all instructions on the label. Do not use laxatives on a long-term basis. When should you call for help? Call your doctor now or seek immediate medical care if:  ? · You have new or worse belly pain. ? · You have new or worse nausea or vomiting. ? · You have blood in your stools. ? Watch closely for changes in your health, and be sure to contact your doctor if:  ? · Your constipation is getting worse. ? · You do not get better as expected. Where can you learn more? Go to http://dana-keshia.info/. Enter 21 [phone] in the search box to learn more about \"Constipation: Care Instructions. \"  Current as of: March 20, 2017  Content Version: 11.4  © 9415-5704 [employer]. Care instructions adapted under license by [employer] (which disclaims liability or warranty for this information). If you have questions about a medical condition or this instruction, always ask your healthcare professional. [place] [address] any warranty or liability for your use of this information.

## 2018-03-13 NOTE — ED NOTES
Dr Rob Tyler reviewed discharge instructions with the patient. The patient verbalized understanding. Patient discharged home in stable condition, ambulatory with family. Patient discharged with discharge papers and prescriptions in hand. Patient awake and alert, stable gait.

## 2018-04-02 RX ORDER — ATENOLOL 50 MG/1
TABLET ORAL
Qty: 30 TAB | Refills: 0 | Status: SHIPPED | OUTPATIENT
Start: 2018-04-02 | End: 2018-05-03 | Stop reason: SDUPTHER

## 2018-04-02 RX ORDER — LEVOTHYROXINE SODIUM 100 UG/1
TABLET ORAL
Qty: 30 TAB | Refills: 0 | Status: SHIPPED | OUTPATIENT
Start: 2018-04-02 | End: 2018-05-03 | Stop reason: SDUPTHER

## 2018-05-04 RX ORDER — LEVOTHYROXINE SODIUM 100 UG/1
TABLET ORAL
Qty: 30 TAB | Refills: 0 | Status: SHIPPED | OUTPATIENT
Start: 2018-05-04 | End: 2018-06-26 | Stop reason: SDUPTHER

## 2018-05-04 RX ORDER — ATENOLOL 50 MG/1
TABLET ORAL
Qty: 30 TAB | Refills: 0 | Status: SHIPPED | OUTPATIENT
Start: 2018-05-04 | End: 2018-06-26 | Stop reason: SDUPTHER

## 2018-05-14 ENCOUNTER — OFFICE VISIT (OUTPATIENT)
Dept: FAMILY MEDICINE CLINIC | Age: 83
End: 2018-05-14

## 2018-05-14 VITALS
SYSTOLIC BLOOD PRESSURE: 119 MMHG | WEIGHT: 139 LBS | DIASTOLIC BLOOD PRESSURE: 62 MMHG | OXYGEN SATURATION: 97 % | RESPIRATION RATE: 14 BRPM | TEMPERATURE: 97 F | HEART RATE: 69 BPM | BODY MASS INDEX: 23.16 KG/M2 | HEIGHT: 65 IN

## 2018-05-14 DIAGNOSIS — E03.9 ACQUIRED HYPOTHYROIDISM: Primary | ICD-10-CM

## 2018-05-14 DIAGNOSIS — I10 ESSENTIAL HYPERTENSION: ICD-10-CM

## 2018-05-14 RX ORDER — TRAZODONE HYDROCHLORIDE 150 MG/1
150 TABLET ORAL
COMMUNITY
Start: 2018-05-03 | End: 2018-08-15 | Stop reason: SDUPTHER

## 2018-05-14 RX ORDER — PANTOPRAZOLE SODIUM 40 MG/1
TABLET, DELAYED RELEASE ORAL
Qty: 60 TAB | Refills: 12 | Status: SHIPPED | OUTPATIENT
Start: 2018-05-14 | End: 2019-06-03 | Stop reason: SDUPTHER

## 2018-05-14 NOTE — PROGRESS NOTES
Chief Complaint   Patient presents with    Hypertension    Hypothyroidism    GERD      1. Have you been to the ER, urgent care clinic since your last visit? Hospitalized since your last visit? UTI   Earlier     2. Have you seen or consulted any other health care providers outside of the [address] [place] [address] since your last visit? Include any pap smears or colon screening. No     There are no preventive care reminders to display for this patient.

## 2018-05-14 NOTE — PROGRESS NOTES
HISTORY OF PRESENT ILLNESS  Brennan Hudson is a 80 y.o. female. HPI In for blood pressure and thyroid check. Has some problems with constipation and gas. Had taken some pantoprazole in the past, not helping as much as before. ROS    Physical Exam   Constitutional: She appears well-developed and well-nourished. HENT:   Right Ear: External ear normal.   Left Ear: External ear normal.   Mouth/Throat: Oropharynx is clear and moist.   Neck: No thyromegaly present. Cardiovascular: Normal rate, regular rhythm, normal heart sounds and intact distal pulses. Pulmonary/Chest: Breath sounds normal. She has no wheezes. Abdominal: Soft. Bowel sounds are normal. She exhibits no distension and no mass. There is no tenderness. Musculoskeletal: She exhibits no edema. Lymphadenopathy:     She has no cervical adenopathy. Nursing note and vitals reviewed. ASSESSMENT and PLAN  Orders Placed This Encounter    TSH 3RD GENERATION    METABOLIC PANEL, BASIC    pantoprazole (PROTONIX) 40 mg tablet     Sig: TAKE 1 TABLET BY MOUTH TWICE DAILY,  FOR INDIGESTION AND GASTRITIS. Dispense:  60 Tab     Refill:  12     Orders Placed This Encounter    TSH 3RD GENERATION    METABOLIC PANEL, BASIC    pantoprazole (PROTONIX) 40 mg tablet     Diagnoses and all orders for this visit:    1. Acquired hypothyroidism  -     TSH 3RD GENERATION    2. Essential hypertension  -     METABOLIC PANEL, BASIC    Other orders  -     pantoprazole (PROTONIX) 40 mg tablet; TAKE 1 TABLET BY MOUTH TWICE DAILY,  FOR INDIGESTION AND GASTRITIS.

## 2018-05-14 NOTE — MR AVS SNAPSHOT
[address] [facility] 
 
 
 [address] [facility] [place] [address] [address] 
[phone] Patient: Simba Larios MRN: [id number] Visit Information Date & Time Provider Department Dept. Phone Encounter #  
 5/14/2018 11:00 AM Sommer Lackey MD [facility] [phone] [id number] Follow-up Instructions Return in about 6 months (around 11/14/2018). Your Appointments 6/4/2018  9:15 AM  
ESTABLISHED PATIENT with aKren Rider MD  
[facility] [address]) Appt Note: $40 cp 10/4/17KSR; r/s due to overbooking 5/7/18 Sycuan [address] [place] [address] [address] [facility] [address]  
[phone]  
  
   
 [address] [place] [address] [address] Upcoming Health Maintenance Date Due  
 MEDICARE YEARLY EXAM 6/22/2018 Influenza Age 5 to Adult 8/1/2018 GLAUCOMA SCREENING Q2Y 2/17/2019 DTaP/Tdap/Td series (2 - Td) 8/17/2026 Allergies as of 5/14/2018  Review Complete On: 5/14/2018 By: Sommer Lackey MD  
  
 Severity Noted Reaction Type Reactions Ambien [Zolpidem]  06/29/2017    Other (comments) Couldn't move Seroquel [Quetiapine]  06/29/2017    Other (comments) PSYCOLOGICAL REACTION Pt didn't feel like them selves but felt like a different person, iT DEEPLY DISTURBED PT.  PT BECAME IRRITABLE. Current Immunizations  Reviewed on 8/19/2014 Name Date Influenza Vaccine Split  Deferred (Patient Refused) Pneumococcal Polysaccharide (PPSV-23) 2/15/2013 ZZZ-RETIRED (DO NOT USE) Pneumococcal Vaccine (Unspecified Type)  Deferred (Patient Refused) Not reviewed this visit You Were Diagnosed With   
  
 Codes Comments Acquired hypothyroidism    -  Primary ICD-10-CM: E03.9 ICD-9-CM: 244.9 Essential hypertension     ICD-10-CM: I10 
ICD-9-CM: 401.9 Vitals BP Pulse Temp Resp Height(growth percentile) Weight(growth percentile) 119/62 69 97 °F (36.1 °C) (Oral) 14 5' 5\" (1.651 m) 139 lb (63 kg) SpO2 BMI OB Status Smoking Status 97% 23.13 kg/m2 Postmenopausal Never Smoker BMI and BSA Data Body Mass Index Body Surface Area  
 23.13 kg/m 2 1.7 m 2 Preferred Pharmacy Pharmacy Name Phone [address], [address] [place] [address] Ivan [phone] Your Updated Medication List  
  
   
This list is accurate as of 5/14/18 12:49 PM.  Always use your most recent med list.  
  
  
  
  
 atenolol 50 mg tablet Commonly known as:  TENORMIN  
TAKE 1 TABLET BY MOUTH DAILY FOR THE HEART AND BLOOD PRESSURE.  
  
 bisacodyl 10 mg suppository Commonly known as:  DULCOLAX (BISACODYL) Insert 10 mg into rectum daily. Calcium 600 + D tablet Generic drug:  calcium-cholecalciferol (D3)  
TAKE 1 TABLET TWICE A DAY. (CALCIUM SUPPLEMENT)  
  
 fluticasone 50 mcg/actuation nasal spray Commonly known as:  FLONASE  
USE 1-2 SPRAYS IN EACH NOSTRIL ONCE DAILY. levothyroxine 100 mcg tablet Commonly known as:  SYNTHROID  
TAKE ONE TABLET DAILY FOR THYROID  
  
 ondansetron hcl 4 mg tablet Commonly known as:  Tiny Maxin Take 1 Tab by mouth every eight (8) hours as needed for Nausea. pantoprazole 40 mg tablet Commonly known as:  PROTONIX  
TAKE 1 TABLET BY MOUTH TWICE DAILY,  FOR INDIGESTION AND GASTRITIS. polyethylene glycol 17 gram/dose powder Commonly known as:  Claudeen Cast Take 17 g by mouth daily. 1 tablespoon with 8 oz of water daily  
  
 potassium chloride 20 mEq tablet Commonly known as:  K-DUR, KLOR-CON Take 1 Tab by mouth two (2) times a day. PRADAXA 75 mg capsule Generic drug:  dabigatran etexilate TAKE ONE CAPSULE BY MOUTH EVERY 12 HOURS  
  
 traZODone 150 mg tablet Commonly known as:  Selina Edill Prescriptions Sent to Pharmacy  Refills  
 pantoprazole (PROTONIX) 40 mg tablet 12  
 Sig: TAKE 1 TABLET BY MOUTH TWICE DAILY,  FOR INDIGESTION AND GASTRITIS. Class: Normal  
 Pharmacy: [address] [facility], [address] [facility] #: [phone] We Performed the Following METABOLIC PANEL, BASIC [50554 CPT(R)] TSH 3RD GENERATION [02632 CPT(R)] Follow-up Instructions Return in about 6 months (around 11/14/2018). Introducing [facility] & HEALTH SERVICES! [employer] introduces Mowjow patient portal. Now you can access parts of your medical record, email your doctor's office, and request medication refills online. 1. In your internet browser, go to [email or web address] 2. Click on the First Time User? Click Here link in the Sign In box. You will see the New Member Sign Up page. 3. Enter your Mowjow Access Code exactly as it appears below. You will not need to use this code after youve completed the sign-up process. If you do not sign up before the expiration date, you must request a new code. · Mowjow Access Code: [email or web address] Expires: 6/11/2018  6:31 PM 
 
4. Enter the last four digits of your Social Security Number (xxxx) and Date of Birth (mm/dd/yyyy) as indicated and click Submit. You will be taken to the next sign-up page. 5. Create a Mowjow ID. This will be your Mowjow login ID and cannot be changed, so think of one that is secure and easy to remember. 6. Create a Mowjow password. You can change your password at any time. 7. Enter your Password Reset Question and Answer. This can be used at a later time if you forget your password. 8. Enter your e-mail address. You will receive e-mail notification when new information is available in [address] 9. Click Sign Up. You can now view and download portions of your medical record. 10. Click the Download Summary menu link to download a portable copy of your medical information.  
 
If you have questions, please visit the Frequently Asked Questions section of the "[employer]. Remember, itembasehart is NOT to be used for urgent needs. For medical emergencies, dial 911. Now available from your iPhone and Android! Please provide this summary of care documentation to your next provider. Your primary care clinician is listed as Fili Bueno. If you have any questions after today's visit, please call [phone].

## 2018-05-15 LAB
BUN SERPL-MCNC: 17 MG/DL (ref 8–27)
BUN/CREAT SERPL: 17 (ref 12–28)
CALCIUM SERPL-MCNC: 8.8 MG/DL (ref 8.7–10.3)
CHLORIDE SERPL-SCNC: 108 MMOL/L (ref 96–106)
CO2 SERPL-SCNC: 20 MMOL/L (ref 18–29)
CREAT SERPL-MCNC: 0.99 MG/DL (ref 0.57–1)
GFR SERPLBLD CREATININE-BSD FMLA CKD-EPI: 51 ML/MIN/1.73
GFR SERPLBLD CREATININE-BSD FMLA CKD-EPI: 59 ML/MIN/1.73
GLUCOSE SERPL-MCNC: 100 MG/DL (ref 65–99)
INTERPRETATION: NORMAL
POTASSIUM SERPL-SCNC: 4.4 MMOL/L (ref 3.5–5.2)
SODIUM SERPL-SCNC: 141 MMOL/L (ref 134–144)
TSH SERPL DL<=0.005 MIU/L-ACNC: 1.48 UIU/ML (ref 0.45–4.5)

## 2018-06-04 ENCOUNTER — OFFICE VISIT (OUTPATIENT)
Dept: CARDIOLOGY CLINIC | Age: 83
End: 2018-06-04

## 2018-06-04 VITALS
DIASTOLIC BLOOD PRESSURE: 68 MMHG | BODY MASS INDEX: 22.49 KG/M2 | OXYGEN SATURATION: 95 % | WEIGHT: 135 LBS | SYSTOLIC BLOOD PRESSURE: 132 MMHG | HEIGHT: 65 IN | HEART RATE: 70 BPM | RESPIRATION RATE: 18 BRPM

## 2018-06-04 DIAGNOSIS — I48.19 PERSISTENT ATRIAL FIBRILLATION (HCC): Primary | ICD-10-CM

## 2018-06-04 DIAGNOSIS — Z92.29 HX: LONG TERM ANTICOAGULANT USE: ICD-10-CM

## 2018-06-04 DIAGNOSIS — E78.5 DYSLIPIDEMIA (HIGH LDL; LOW HDL): ICD-10-CM

## 2018-06-04 DIAGNOSIS — I42.9 CARDIOMYOPATHY, SECONDARY (HCC): ICD-10-CM

## 2018-06-04 DIAGNOSIS — K92.2 GASTROINTESTINAL HEMORRHAGE, UNSPECIFIED GASTROINTESTINAL HEMORRHAGE TYPE: ICD-10-CM

## 2018-06-04 DIAGNOSIS — N18.30 CKD (CHRONIC KIDNEY DISEASE) STAGE 3, GFR 30-59 ML/MIN (HCC): ICD-10-CM

## 2018-06-04 DIAGNOSIS — R06.09 DOE (DYSPNEA ON EXERTION): ICD-10-CM

## 2018-06-04 DIAGNOSIS — I49.9 IRREGULAR HEART BEAT: ICD-10-CM

## 2018-06-04 NOTE — PROGRESS NOTES
1. Have you been to the ER, urgent care clinic since your last visit? Hospitalized since your last visit? No    2. Have you seen or consulted any other health care providers outside of the [facility] since your last visit? Include any pap smears or colon screening. No    Chief Complaint   Patient presents with    Irregular Heart Beat     6 mo appt. Denied cardiac symptoms.

## 2018-06-04 NOTE — PROGRESS NOTES
[address] [place] [address]        [phone]                             NEW PATIENT HPI/FOLLOW-UP    NAME:  Ortega Bennett   :   1930   MRN:   [id number]   PCP:  Moe Yusuf MD           Subjective: The patient is a 80y.o. year old female  who returns for a routine follow-up. Since the last visit, patient reports no new symptoms. Denies change in exercise tolerance, chest pain, edema, medication intolerance, palpitations, shortness of breath, PND/orthopnea wheezing, sputum, syncope, dizziness or light headedness. Doing satisfactorily. Review of Systems  General: Pt denies excessive weight gain or loss. Pt is able to conduct ADL's. Respiratory: Denies shortness of breath, HILTON, wheezing or stridor. Cardiovascular: Denies precordial pain, palpitations, edema or PND  Gastrointestinal: Denies poor appetite, indigestion, abdominal pain or blood in stool  Peripheral vascular: Denies claudication, leg cramps  Neurological: Denies paresthesias, tingling.numbness  Psychiatric: Denies anxiety,depression,fatigue  Musculoskeletal: Denies pain,tenderness, soreness,swelling      Past Medical History:   Diagnosis Date    Arrhythmia     atrial fibrillation, chronic. Stress test  essent.  normal.  Mild-mod AR on ECHO    Atrial fibrillation ([address]     GI bleeding     Hypertension     Refusal of blood transfusions as patient is [place]     Thyroid disease     hypothyroid     Patient Active Problem List    Diagnosis Date Noted    Irregular heart beat 2018    Chest pain with moderate risk for cardiac etiology 2017    HILTON (dyspnea on exertion) 2017    Bilateral leg edema 2017    HX: long term anticoagulant use--Pradaxa stopped  for chronic atrial fib 2017    Cardiomyopathy, secondary ([address] 2017    GERD with esophagitis 2017    Dyslipidemia (high LDL; low HDL) 2017    CKD (chronic kidney disease) stage 3, GFR 30-59 ml/min 07/05/2017    GI bleeding     Hyperkalemia 04/27/2017    Hypothyroidism 04/27/2011    Hyperthyroidism--iatrogenic 04/27/2011    Insomnia 03/16/2011    Hypokalemia 03/16/2011    Encephalopathy acute 03/15/2011    Rhabdomyolysis 03/14/2011    Persistent atrial fibrillation ([facility] 03/14/2011    HTN (hypertension) 03/14/2011      Past Surgical History:   Procedure Laterality Date    COLONOSCOPY N/A 5/2/2017    COLONOSCOPY performed by Conor Sneed MD at [facility] ENDOSCOPY    ECHO TRANSESOPHAGEAL (DENISE) W OR WO CONTRAST  4/27/2011         HX GYN      vaginal deliveries x10    HX TUBAL LIGATION      WA CARDIOVERSION ELECTIVE ARRHYTHMIA EXTERNAL  6/14/2011          Allergies   Allergen Reactions    Ambien [Zolpidem] Other (comments)     Couldn't move    Seroquel [Quetiapine] Other (comments)     PSYCOLOGICAL REACTION Pt didn't feel like them selves but felt like a different person, iT DEEPLY DISTURBED PT.  PT BECAME IRRITABLE. Family History   Problem Relation Age of Onset    Heart Disease Father     Cancer Son     Heart Disease Son       Social History     Social History    Marital status:      Spouse name: N/A    Number of children: N/A    Years of education: N/A     Occupational History    Not on file. Social History Main Topics    Smoking status: Never Smoker    Smokeless tobacco: Never Used    Alcohol use No    Drug use: No    Sexual activity: No     Other Topics Concern    Not on file     Social History Narrative      Current Outpatient Prescriptions   Medication Sig    traZODone (DESYREL) 150 mg tablet Take 150 mg by mouth nightly.  pantoprazole (PROTONIX) 40 mg tablet TAKE 1 TABLET BY MOUTH TWICE DAILY,  FOR INDIGESTION AND GASTRITIS. (Patient taking differently: Take 40 mg by mouth daily.  TAKE 1 TABLET BY MOUTH TWICE DAILY,  FOR INDIGESTION AND GASTRITIS.)    atenolol (TENORMIN) 50 mg tablet TAKE 1 TABLET BY MOUTH DAILY FOR THE HEART AND BLOOD PRESSURE.  levothyroxine (SYNTHROID) 100 mcg tablet TAKE ONE TABLET DAILY FOR THYROID    PRADAXA 75 mg capsule TAKE ONE CAPSULE BY MOUTH EVERY 12 HOURS    ondansetron hcl (ZOFRAN, AS HYDROCHLORIDE,) 4 mg tablet Take 1 Tab by mouth every eight (8) hours as needed for Nausea.  CALCIUM 600 + D tablet TAKE 1 TABLET TWICE A DAY. (CALCIUM SUPPLEMENT)    potassium chloride (K-DUR, KLOR-CON) 20 mEq tablet Take 1 Tab by mouth two (2) times a day.  fluticasone (FLONASE) 50 mcg/actuation nasal spray USE 1-2 SPRAYS IN EACH NOSTRIL ONCE DAILY.  polyethylene glycol (MIRALAX) 17 gram/dose powder Take 17 g by mouth daily. 1 tablespoon with 8 oz of water daily    bisacodyl (DULCOLAX, BISACODYL,) 10 mg suppository Insert 10 mg into rectum daily. No current facility-administered medications for this visit. I have reviewed the nurses notes, vitals, problem list, allergy list, medical history, family medical, social history and medications. Objective:     Physical Exam:     Vitals:    06/04/18 0846   BP: 132/68   Pulse: 70   Resp: 18   SpO2: 95%   Weight: 135 lb (61.2 kg)   Height: 5' 5\" (1.651 m)    Body mass index is 22.47 kg/(m^2). General: Well developed, in no acute distress. HEENT: No carotid bruits, no JVD, trach is midline. Heart:  Normal S1/S2 negative S3 or S4. Irregular, no murmur, gallop or rub.   Respiratory: Clear bilaterally, no wheezing or rales  Abdomen:   Soft, non-tender, bowel sounds are active.   Extremities:  No edema, normal cap refill, no cyanosis. Neuro: A&Ox3, speech clear, gait stable. Skin: Skin color is normal. No rashes or lesions. No diaphoresis.   Vascular: 2+ pulses symmetric in all extremities        Data Review:       Cardiographics:    EKG: Atrial fib with controlled VR,LAD,clockwise rotation    Cardiology Labs:    Results for orders placed or performed during the hospital encounter of 04/27/17   EKG, 12 LEAD, INITIAL   Result Value Ref Range Ventricular Rate 67 BPM    Atrial Rate 71 BPM    QRS Duration 108 ms    Q-T Interval 372 ms    QTC Calculation (Bezet) 393 ms    Calculated R Axis -42 degrees    Calculated T Axis 23 degrees    Diagnosis       Atrial fibrillation with a competing junctional pacemaker  Left axis deviation  Incomplete left bundle branch block  When compared with ECG of 14-JUN-2011 11:05,  Atrial fibrillation has replaced Sinus rhythm    Confirmed by JUDITH Childers ([id number]) on 4/27/2017 6:27:07 PM         Lab Results   Component Value Date/Time    Cholesterol, total 94 (L) 02/17/2017 09:52 AM    HDL Cholesterol 30 (L) 02/17/2017 09:52 AM    LDL, calculated 46 02/17/2017 09:52 AM    Triglyceride 90 02/17/2017 09:52 AM    CHOL/HDL Ratio 3.1 04/20/2010 01:10 AM       Lab Results   Component Value Date/Time    Sodium 141 05/14/2018 01:10 PM    Potassium 4.4 05/14/2018 01:10 PM    Chloride 108 (H) 05/14/2018 01:10 PM    CO2 20 05/14/2018 01:10 PM    Anion gap 9 12/11/2017 05:17 PM    Glucose 100 (H) 05/14/2018 01:10 PM    BUN 17 05/14/2018 01:10 PM    Creatinine 0.99 05/14/2018 01:10 PM    BUN/Creatinine ratio 17 05/14/2018 01:10 PM    GFR est AA 59 (L) 05/14/2018 01:10 PM    GFR est non-AA 51 (L) 05/14/2018 01:10 PM    Calcium 8.8 05/14/2018 01:10 PM    Bilirubin, total 0.4 12/11/2017 05:17 PM    AST (SGOT) 18 12/11/2017 05:17 PM    Alk. phosphatase 97 12/11/2017 05:17 PM    Protein, total 8.9 (H) 12/11/2017 05:17 PM    Albumin 3.6 12/11/2017 05:17 PM    Globulin 5.3 (H) 12/11/2017 05:17 PM    A-G Ratio 0.7 (L) 12/11/2017 05:17 PM    ALT (SGPT) 13 12/11/2017 05:17 PM          Assessment:       ICD-10-CM ICD-9-CM    1. Persistent atrial fibrillation (HCC) I48.1 427.31 AMB POC EKG ROUTINE W/ 12 LEADS, INTER & REP   2. Irregular heart beat I49.9 427.9 AMB POC EKG ROUTINE W/ 12 LEADS, INTER & REP   3. Cardiomyopathy, secondary ([address] I42.9 425.9    4.  Dyslipidemia (high LDL; low HDL) E78.5 272.4    5. HILTON (dyspnea on exertion) R06.09 786.09 6. HX: long term anticoagulant use--Pradaxa stopped 5/17 for chronic atrial fib Z92.29 V87.49    7. CKD (chronic kidney disease) stage 3, GFR 30-59 ml/min N18.3 585.3    8. Gastrointestinal hemorrhage, unspecified gastrointestinal hemorrhage type K92.2 578.9          Discussion: Patient presents at this time stable from a cardiac perspective. Persistent atrial fib on low dose Pradaxa. Pleased with present cardiac status. Plan: 1. Continue same meds. Lipid profile and labs followed by PCP. 2.Encouraged to exercise to tolerance, lose weight and follow low fat, low cholesterol, low sodium predominantly Plant-based (consider Mediterranean) diet. Call with questions or concerns. Will follow up any test results by phone and/or f/u here in office if needed. Arleen Antonio 3.Follow up: 6 MONTHS    I have discussed the diagnosis with the patient and the intended plan as seen in the above orders. The patient has received an after-visit summary and questions were answered concerning future plans. I have discussed any concerning medication side effects and warnings with the patient as well.     Guido Marcus MD  6/4/2018

## 2018-06-26 RX ORDER — LEVOTHYROXINE SODIUM 100 UG/1
TABLET ORAL
Qty: 30 TAB | Refills: 0 | Status: SHIPPED | OUTPATIENT
Start: 2018-06-26 | End: 2018-11-08 | Stop reason: SDUPTHER

## 2018-06-26 RX ORDER — ATENOLOL 50 MG/1
TABLET ORAL
Qty: 30 TAB | Refills: 0 | Status: SHIPPED | OUTPATIENT
Start: 2018-06-26 | End: 2018-08-03 | Stop reason: SDUPTHER

## 2018-06-26 RX ORDER — TRAZODONE HYDROCHLORIDE 150 MG/1
TABLET ORAL
Qty: 30 TAB | Refills: 0 | Status: SHIPPED | OUTPATIENT
Start: 2018-06-26 | End: 2018-08-03 | Stop reason: SDUPTHER

## 2018-06-26 RX ORDER — FLUTICASONE PROPIONATE 50 MCG
SPRAY, SUSPENSION (ML) NASAL
Qty: 16 G | Status: SHIPPED | OUTPATIENT
Start: 2018-06-26 | End: 2019-05-28

## 2018-08-03 RX ORDER — ATENOLOL 50 MG/1
TABLET ORAL
Qty: 30 TAB | Refills: 0 | Status: SHIPPED | OUTPATIENT
Start: 2018-08-03 | End: 2018-09-04 | Stop reason: SDUPTHER

## 2018-08-03 RX ORDER — LEVOTHYROXINE SODIUM 100 UG/1
TABLET ORAL
Qty: 30 TAB | Refills: 0 | Status: SHIPPED | OUTPATIENT
Start: 2018-08-03 | End: 2018-08-15 | Stop reason: SDUPTHER

## 2018-08-03 RX ORDER — POTASSIUM CHLORIDE 20 MEQ/1
TABLET, EXTENDED RELEASE ORAL
Qty: 60 TAB | Refills: 0 | Status: SHIPPED | OUTPATIENT
Start: 2018-08-03 | End: 2018-09-04 | Stop reason: SDUPTHER

## 2018-08-03 RX ORDER — TRAZODONE HYDROCHLORIDE 150 MG/1
TABLET ORAL
Qty: 30 TAB | Refills: 0 | Status: SHIPPED | OUTPATIENT
Start: 2018-08-03 | End: 2018-09-04 | Stop reason: SDUPTHER

## 2018-08-15 ENCOUNTER — OFFICE VISIT (OUTPATIENT)
Dept: FAMILY MEDICINE CLINIC | Age: 83
End: 2018-08-15

## 2018-08-15 VITALS
DIASTOLIC BLOOD PRESSURE: 57 MMHG | SYSTOLIC BLOOD PRESSURE: 124 MMHG | OXYGEN SATURATION: 96 % | BODY MASS INDEX: 22.33 KG/M2 | HEIGHT: 65 IN | RESPIRATION RATE: 14 BRPM | HEART RATE: 68 BPM | TEMPERATURE: 96 F | WEIGHT: 134 LBS

## 2018-08-15 DIAGNOSIS — J40 BRONCHITIS: Primary | ICD-10-CM

## 2018-08-15 RX ORDER — PROMETHAZINE HYDROCHLORIDE AND DEXTROMETHORPHAN HYDROBROMIDE 6.25; 15 MG/5ML; MG/5ML
5 SYRUP ORAL
Qty: 240 ML | Refills: 2 | Status: SHIPPED | OUTPATIENT
Start: 2018-08-15 | End: 2019-05-28 | Stop reason: ALTCHOICE

## 2018-08-15 RX ORDER — METHYLPREDNISOLONE 4 MG/1
TABLET ORAL
Refills: 0 | COMMUNITY
Start: 2018-08-12 | End: 2018-11-08 | Stop reason: ALTCHOICE

## 2018-08-15 RX ORDER — DOXYCYCLINE HYCLATE 100 MG
TABLET ORAL
Refills: 0 | COMMUNITY
Start: 2018-08-12 | End: 2018-11-08 | Stop reason: ALTCHOICE

## 2018-08-15 NOTE — PROGRESS NOTES
HISTORY OF PRESENT ILLNESS  Gretel Álvarez is a 80 y.o. female. HPI Has had a cough for 3-4 days. Cough has been productive at times. No fever, chills. No wheezing or dyspnea. [address] to clinic [address], was given medrol dose pack and doxycycline. Had a CXR done, was told that this was ok. No dyspnea or chest pains. ROS    Physical Exam   Constitutional: She appears well-developed and well-nourished. HENT:   Right Ear: External ear normal.   Left Ear: External ear normal.   Mouth/Throat: Oropharynx is clear and moist.   Neck: No thyromegaly present. Cardiovascular: Normal rate, regular rhythm, normal heart sounds and intact distal pulses. Pulmonary/Chest: Breath sounds normal. She has no wheezes. Abdominal: Soft. Bowel sounds are normal. She exhibits no distension and no mass. There is no tenderness. Musculoskeletal: She exhibits no edema. Lymphadenopathy:     She has no cervical adenopathy. Nursing note and vitals reviewed.       ASSESSMENT and PLAN  Orders Placed This Encounter    promethazine-dextromethorphan (PROMETHAZINE-DM) 6.25-15 mg/5 mL syrup     Bronchitis- continue doxycycline

## 2018-08-15 NOTE — MR AVS SNAPSHOT
[address] [facility] 
 
 
 [address] [facility] [address] [address] 
[phone] Patient: Ernie Miranda MRN: [id number] LEK:[id number] Visit Information Date & Time Provider Department Dept. Phone Encounter #  
 8/15/2018  9:45 AM Elvie Petersen MD [facility] [phone] [id number] Your Appointments 11/8/2018 10:30 AM  
ROUTINE CARE with Elvie Petersen MD  
[facility]) Appt Note: 6m follow up  
 [address] [facility] [address] [address]  
[phone] [phone]  
  
    
 12/17/2018 11:30 AM  
ESTABLISHED PATIENT with Jackie Fernandez, [facility] [address] Jordi Jinnatalya Barahona [facility]) Appt Note: 6 month f/u 6/4/18 [address] [place] [address]  
[phone]  
  
   
 [address] [place] [address] 60 [address] [facility] [address] Upcoming Health Maintenance Date Due  
 MEDICARE YEARLY EXAM 6/22/2018 Influenza Age 5 to Adult 8/1/2018 GLAUCOMA SCREENING Q2Y 2/17/2019 DTaP/Tdap/Td series (2 - Td) 8/17/2026 Allergies as of 8/15/2018  Review Complete On: 8/15/2018 By: Elvie Petersen MD  
  
 Severity Noted Reaction Type Reactions Ambien [Zolpidem]  06/29/2017    Other (comments) Couldn't move Seroquel [Quetiapine]  06/29/2017    Other (comments) PSYCOLOGICAL REACTION Pt didn't feel like them selves but felt like a different person, iT DEEPLY DISTURBED PT.  PT BECAME IRRITABLE. Current Immunizations  Reviewed on 8/19/2014 Name Date Influenza Vaccine Split  Deferred (Patient Refused) Pneumococcal Polysaccharide (PPSV-23) 2/15/2013 ZZZ-RETIRED (DO NOT USE) Pneumococcal Vaccine (Unspecified Type)  Deferred (Patient Refused) Not reviewed this visit Vitals BP Pulse Temp Resp Height(growth percentile) Weight(growth percentile) 124/57 68 96 °F (35.6 °C) (Oral) 14 5' 5\" (1.651 m) 134 lb (60.8 kg) SpO2 BMI OB Status Smoking Status 96% 22.3 kg/m2 Postmenopausal Never Smoker Vitals History BMI and BSA Data Body Mass Index Body Surface Area  
 22.3 kg/m 2 1.67 m 2 Preferred Pharmacy Pharmacy Name Phone [address], [address] [facility] File [phone] Your Updated Medication List  
  
   
This list is accurate as of 8/15/18 10:50 AM.  Always use your most recent med list.  
  
  
  
  
 atenolol 50 mg tablet Commonly known as:  TENORMIN  
TAKE 1 TABLET BY MOUTH DAILY FOR THE HEART AND BLOOD PRESSURE.  
  
 bisacodyl 10 mg suppository Commonly known as:  DULCOLAX (BISACODYL) Insert 10 mg into rectum daily. Calcium 600 + D tablet Generic drug:  calcium-cholecalciferol (D3)  
TAKE 1 TABLET TWICE A DAY. (CALCIUM SUPPLEMENT)  
  
 doxycycline 100 mg tablet Commonly known as:  VIBRA-TABS TAKE 1 TABLET BY MOUTH TWICE A DAY  
  
 fluticasone 50 mcg/actuation nasal spray Commonly known as:  FLONASE  
USE 1-2 SPRAYS IN EACH NOSTRIL ONCE DAILY. levothyroxine 100 mcg tablet Commonly known as:  SYNTHROID  
TAKE ONE TABLET DAILY FOR THYROID  
  
 methylPREDNISolone 4 mg tablet Commonly known as:  MEDROL DOSEPACK  
TAKE 6 TABLETS ON DAY 1 AS DIRECTED ON PACKAGE AND DECREASE BY 1 TAB EACH DAY FOR A TOTAL OF 6 DAYS  
  
 ondansetron hcl 4 mg tablet Commonly known as:  Tommas Harder Take 1 Tab by mouth every eight (8) hours as needed for Nausea. pantoprazole 40 mg tablet Commonly known as:  PROTONIX  
TAKE 1 TABLET BY MOUTH TWICE DAILY,  FOR INDIGESTION AND GASTRITIS. polyethylene glycol 17 gram/dose powder Commonly known as:  Sheria Bud Take 17 g by mouth daily. 1 tablespoon with 8 oz of water daily  
  
 potassium chloride 20 mEq tablet Commonly known as:  K-DUR, KLOR-CON  
TAKE 1 TABLET BY MOUTH TWICE A DAY. (POTASSIUM) PRADAXA 75 mg capsule Generic drug:  dabigatran etexilate TAKE ONE CAPSULE BY MOUTH EVERY 12 HOURS  
  
 promethazine-dextromethorphan 6.25-15 mg/5 mL syrup Commonly known as:  PROMETHAZINE-DM Take 5 mL by mouth four (4) times daily as needed for Cough. traZODone 150 mg tablet Commonly known as:  DESYREL  
TAKE 1 TABLET BY MOUTH NIGHTLY FOR SLEEP. Prescriptions Sent to Pharmacy Refills  
 promethazine-dextromethorphan (PROMETHAZINE-DM) 6.25-15 mg/5 mL syrup 2 Sig: Take 5 mL by mouth four (4) times daily as needed for Cough. Class: Normal  
 Pharmacy: [facility], [address] [place] [address] #: [phone] Route: Oral  
  
Introducing [facility]! Chris Schultz introduces [employer] (SAM) patient portal. Now you can access parts of your medical record, email your doctor's office, and request medication refills online. 1. In your internet browser, go to [email or web address] 2. Click on the First Time User? Click Here link in the Sign In box. You will see the New Member Sign Up page. 3. Enter your [employer] (SAM) Access Code exactly as it appears below. You will not need to use this code after youve completed the sign-up process. If you do not sign up before the expiration date, you must request a new code. · [employer] Mapping (SAM) Access Code: [email or web address] Expires: 11/13/2018 10:06 AM 
 
4. Enter the last four digits of your Social Security Number (xxxx) and Date of Birth (mm/dd/yyyy) as indicated and click Submit. You will be taken to the next sign-up page. 5. Create a Within3t ID. This will be your [employer] Mapping (SAM) login ID and cannot be changed, so think of one that is secure and easy to remember. 6. Create a [employer] (SAM) password. You can change your password at any time. 7. Enter your Password Reset Question and Answer. This can be used at a later time if you forget your password. 8. Enter your e-mail address. You will receive e-mail notification when new information is available in [address] 9. Click Sign Up. You can now view and download portions of your medical record. 10. Click the Download Summary menu link to download a portable copy of your medical information. If you have questions, please visit the Frequently Asked Questions section of the [employer] website. Remember, Arch[employer]urn is NOT to be used for urgent needs. For medical emergencies, dial 911. Now available from your iPhone and Android! Please provide this summary of care documentation to your next provider. Your primary care clinician is listed as Lance Hyatt. If you have any questions after today's visit, please call [phone].

## 2018-08-15 NOTE — PROGRESS NOTES
Chief Complaint   Patient presents with    ED Follow-up     URGENT CARE  [facility]  Cold symptom  coughing sneezing     Cough    Insomnia    Nasal Congestion     runny nose      1. Have you been to the ER, urgent care clinic since your last visit? Hospitalized since your last visit? No    2. Have you seen or consulted any other health care providers outside of the [address] [place] [address] since your last visit? Include any pap smears or colon screening.  No     Health Maintenance Due   Topic Date Due    MEDICARE YEARLY EXAM  06/22/2018    Influenza Age 5 to Adult  08/01/2018

## 2018-09-04 RX ORDER — POTASSIUM CHLORIDE 20 MEQ/1
TABLET, EXTENDED RELEASE ORAL
Qty: 60 TAB | Refills: 0 | Status: SHIPPED | OUTPATIENT
Start: 2018-09-04 | End: 2018-10-03 | Stop reason: SDUPTHER

## 2018-09-04 RX ORDER — ATENOLOL 50 MG/1
TABLET ORAL
Qty: 30 TAB | Refills: 0 | Status: SHIPPED | OUTPATIENT
Start: 2018-09-04 | End: 2018-10-03 | Stop reason: SDUPTHER

## 2018-09-04 RX ORDER — TRAZODONE HYDROCHLORIDE 150 MG/1
TABLET ORAL
Qty: 30 TAB | Refills: 0 | Status: SHIPPED | OUTPATIENT
Start: 2018-09-04 | End: 2018-10-03 | Stop reason: SDUPTHER

## 2018-09-04 RX ORDER — LEVOTHYROXINE SODIUM 100 UG/1
TABLET ORAL
Qty: 30 TAB | Refills: 0 | Status: SHIPPED | OUTPATIENT
Start: 2018-09-04 | End: 2018-10-03 | Stop reason: SDUPTHER

## 2018-10-03 RX ORDER — TRAZODONE HYDROCHLORIDE 150 MG/1
TABLET ORAL
Qty: 30 TAB | Refills: 0 | Status: SHIPPED | OUTPATIENT
Start: 2018-10-03 | End: 2018-11-01 | Stop reason: SDUPTHER

## 2018-10-03 RX ORDER — LEVOTHYROXINE SODIUM 100 UG/1
TABLET ORAL
Qty: 30 TAB | Refills: 0 | Status: SHIPPED | OUTPATIENT
Start: 2018-10-03 | End: 2018-11-08 | Stop reason: SDUPTHER

## 2018-10-03 RX ORDER — DABIGATRAN ETEXILATE MESYLATE 75 MG/1
CAPSULE ORAL
Qty: 60 CAP | Refills: 0 | Status: SHIPPED | OUTPATIENT
Start: 2018-10-03 | End: 2018-11-01 | Stop reason: SDUPTHER

## 2018-10-03 RX ORDER — ATENOLOL 50 MG/1
TABLET ORAL
Qty: 30 TAB | Refills: 0 | Status: SHIPPED | OUTPATIENT
Start: 2018-10-03 | End: 2018-11-01 | Stop reason: SDUPTHER

## 2018-10-03 RX ORDER — POTASSIUM CHLORIDE 20 MEQ/1
TABLET, EXTENDED RELEASE ORAL
Qty: 60 TAB | Refills: 0 | Status: SHIPPED | OUTPATIENT
Start: 2018-10-03 | End: 2018-11-01 | Stop reason: SDUPTHER

## 2018-11-01 RX ORDER — DABIGATRAN ETEXILATE MESYLATE 75 MG/1
CAPSULE ORAL
Qty: 60 CAP | Refills: 0 | Status: SHIPPED | OUTPATIENT
Start: 2018-11-01 | End: 2018-11-02 | Stop reason: SDUPTHER

## 2018-11-01 RX ORDER — POTASSIUM CHLORIDE 20 MEQ/1
TABLET, EXTENDED RELEASE ORAL
Qty: 60 TAB | Refills: 0 | Status: SHIPPED | OUTPATIENT
Start: 2018-11-01 | End: 2018-11-08 | Stop reason: SDUPTHER

## 2018-11-01 RX ORDER — ATENOLOL 50 MG/1
TABLET ORAL
Qty: 30 TAB | Refills: 0 | Status: SHIPPED | OUTPATIENT
Start: 2018-11-01 | End: 2018-11-08 | Stop reason: SDUPTHER

## 2018-11-01 RX ORDER — TRAZODONE HYDROCHLORIDE 150 MG/1
TABLET ORAL
Qty: 30 TAB | Refills: 0 | Status: SHIPPED | OUTPATIENT
Start: 2018-11-01 | End: 2018-11-08 | Stop reason: SDUPTHER

## 2018-11-01 RX ORDER — LEVOTHYROXINE SODIUM 100 UG/1
TABLET ORAL
Qty: 30 TAB | Refills: 0 | Status: SHIPPED | OUTPATIENT
Start: 2018-11-01 | End: 2018-11-08 | Stop reason: SDUPTHER

## 2018-11-02 RX ORDER — DABIGATRAN ETEXILATE MESYLATE 75 MG/1
CAPSULE ORAL
Qty: 60 CAP | Refills: 0 | Status: SHIPPED | OUTPATIENT
Start: 2018-11-02 | End: 2018-11-08 | Stop reason: SDUPTHER

## 2018-11-08 ENCOUNTER — OFFICE VISIT (OUTPATIENT)
Dept: FAMILY MEDICINE CLINIC | Age: 83
End: 2018-11-08

## 2018-11-08 VITALS
WEIGHT: 145.2 LBS | BODY MASS INDEX: 24.19 KG/M2 | HEART RATE: 66 BPM | TEMPERATURE: 96 F | HEIGHT: 65 IN | OXYGEN SATURATION: 98 % | DIASTOLIC BLOOD PRESSURE: 57 MMHG | RESPIRATION RATE: 16 BRPM | SYSTOLIC BLOOD PRESSURE: 138 MMHG

## 2018-11-08 DIAGNOSIS — E03.9 HYPOTHYROIDISM, UNSPECIFIED TYPE: Primary | ICD-10-CM

## 2018-11-08 DIAGNOSIS — Z23 ENCOUNTER FOR IMMUNIZATION: ICD-10-CM

## 2018-11-08 DIAGNOSIS — I10 ESSENTIAL HYPERTENSION: ICD-10-CM

## 2018-11-08 RX ORDER — ATENOLOL 50 MG/1
TABLET ORAL
Qty: 30 TAB | Refills: 12 | Status: SHIPPED | OUTPATIENT
Start: 2018-11-08 | End: 2019-11-08 | Stop reason: SDUPTHER

## 2018-11-08 RX ORDER — POTASSIUM CHLORIDE 20 MEQ/1
TABLET, EXTENDED RELEASE ORAL
Qty: 60 TAB | Refills: 12 | Status: SHIPPED | OUTPATIENT
Start: 2018-11-08 | End: 2019-11-08 | Stop reason: SDUPTHER

## 2018-11-08 RX ORDER — TRAZODONE HYDROCHLORIDE 150 MG/1
TABLET ORAL
Qty: 30 TAB | Refills: 12 | Status: SHIPPED | OUTPATIENT
Start: 2018-11-08 | End: 2019-11-08 | Stop reason: SDUPTHER

## 2018-11-08 RX ORDER — LEVOTHYROXINE SODIUM 100 UG/1
TABLET ORAL
Qty: 30 TAB | Refills: 12 | Status: SHIPPED | OUTPATIENT
Start: 2018-11-08 | End: 2019-11-08 | Stop reason: SDUPTHER

## 2018-11-08 RX ORDER — DABIGATRAN ETEXILATE 75 MG/1
CAPSULE ORAL
Qty: 60 CAP | Refills: 12 | Status: SHIPPED | OUTPATIENT
Start: 2018-11-08 | End: 2019-12-02 | Stop reason: SDUPTHER

## 2018-11-08 NOTE — PROGRESS NOTES
HISTORY OF PRESENT ILLNESS Lorraine Long is a 80 y.o. female. HPI In for cholesterol and thyroid check. Doesn't feel depressed. No complaints of chest pain, shortness of breath, TIAs, claudication or edema. ROS Physical Exam  
Constitutional: She appears well-developed and well-nourished. HENT:  
Right Ear: External ear normal.  
Left Ear: External ear normal.  
Mouth/Throat: Oropharynx is clear and moist.  
Neck: No thyromegaly present. Cardiovascular: Normal rate, regular rhythm, normal heart sounds and intact distal pulses. Pulmonary/Chest: Breath sounds normal. She has no wheezes. Abdominal: Soft. Bowel sounds are normal. She exhibits no distension and no mass. There is no tenderness. Musculoskeletal: She exhibits no edema. Lymphadenopathy:  
  She has no cervical adenopathy. Nursing note and vitals reviewed. ASSESSMENT and PLAN Orders Placed This Encounter  Pneumococcal conjugate 13 valent, IM (PREVNAR-13)  CBC WITH AUTOMATED DIFF  
 METABOLIC PANEL, COMPREHENSIVE  
 TSH 3RD GENERATION  
 dabigatran etexilate (PRADAXA) 75 mg capsule  traZODone (DESYREL) 150 mg tablet  atenolol (TENORMIN) 50 mg tablet  potassium chloride (K-DUR, KLOR-CON) 20 mEq tablet  levothyroxine (SYNTHROID) 100 mcg tablet Diagnoses and all orders for this visit: 
 
1. Hypothyroidism, unspecified type 
-     TSH 3RD GENERATION 2. Essential hypertension 
-     CBC WITH AUTOMATED DIFF 
-     METABOLIC PANEL, COMPREHENSIVE 3. Encounter for immunization 
-     PNEUMOCOCCAL CONJ VACCINE 13 VALENT IM Other orders 
-     dabigatran etexilate (PRADAXA) 75 mg capsule; TAKE ONE CAPSULE BY MOUTH EVERY 12 HOURS 
-     traZODone (DESYREL) 150 mg tablet; TAKE 1 TABLET BY MOUTH NIGHTLY FOR SLEEP. -     atenolol (TENORMIN) 50 mg tablet; TAKE 1 TABLET BY MOUTH DAILY FOR THE HEART AND BLOOD PRESSURE.  
-     potassium chloride (K-DUR, KLOR-CON) 20 mEq tablet; TAKE 1 TABLET BY MOUTH TWICE A DAY. (POTASSIUM) -     levothyroxine (SYNTHROID) 100 mcg tablet; TAKE ONE TABLET DAILY FOR THYROID Follow-up Disposition: 
Return in about 6 months (around 5/8/2019).

## 2018-11-08 NOTE — PROGRESS NOTES
Chief Complaint Patient presents with  Cholesterol Problem  Thyroid Problem  
  hypothyroid Pt here for 6 month follow up Pt refused flu vaccine

## 2018-11-09 LAB
ALBUMIN SERPL-MCNC: 4.3 G/DL (ref 3.5–4.7)
ALBUMIN/GLOB SERPL: 1.3 {RATIO} (ref 1.2–2.2)
ALP SERPL-CCNC: 67 IU/L (ref 39–117)
ALT SERPL-CCNC: 10 IU/L (ref 0–32)
AST SERPL-CCNC: 19 IU/L (ref 0–40)
BASOPHILS # BLD AUTO: 0 X10E3/UL (ref 0–0.2)
BASOPHILS NFR BLD AUTO: 1 %
BILIRUB SERPL-MCNC: <0.2 MG/DL (ref 0–1.2)
BUN SERPL-MCNC: 23 MG/DL (ref 8–27)
BUN/CREAT SERPL: 21 (ref 12–28)
CALCIUM SERPL-MCNC: 8.9 MG/DL (ref 8.7–10.3)
CHLORIDE SERPL-SCNC: 110 MMOL/L (ref 96–106)
CO2 SERPL-SCNC: 20 MMOL/L (ref 20–29)
CREAT SERPL-MCNC: 1.11 MG/DL (ref 0.57–1)
EOSINOPHIL # BLD AUTO: 0.1 X10E3/UL (ref 0–0.4)
EOSINOPHIL NFR BLD AUTO: 2 %
ERYTHROCYTE [DISTWIDTH] IN BLOOD BY AUTOMATED COUNT: 19.1 % (ref 12.3–15.4)
GLOBULIN SER CALC-MCNC: 3.4 G/DL (ref 1.5–4.5)
GLUCOSE SERPL-MCNC: 111 MG/DL (ref 65–99)
HCT VFR BLD AUTO: 31.9 % (ref 34–46.6)
HGB BLD-MCNC: 9.8 G/DL (ref 11.1–15.9)
IMM GRANULOCYTES # BLD: 0 X10E3/UL (ref 0–0.1)
IMM GRANULOCYTES NFR BLD: 0 %
INTERPRETATION: NORMAL
LYMPHOCYTES # BLD AUTO: 2 X10E3/UL (ref 0.7–3.1)
LYMPHOCYTES NFR BLD AUTO: 37 %
MCH RBC QN AUTO: 23.9 PG (ref 26.6–33)
MCHC RBC AUTO-ENTMCNC: 30.7 G/DL (ref 31.5–35.7)
MCV RBC AUTO: 78 FL (ref 79–97)
MONOCYTES # BLD AUTO: 0.7 X10E3/UL (ref 0.1–0.9)
MONOCYTES NFR BLD AUTO: 13 %
NEUTROPHILS # BLD AUTO: 2.5 X10E3/UL (ref 1.4–7)
NEUTROPHILS NFR BLD AUTO: 47 %
PLATELET # BLD AUTO: 195 X10E3/UL (ref 150–379)
POTASSIUM SERPL-SCNC: 4.5 MMOL/L (ref 3.5–5.2)
PROT SERPL-MCNC: 7.7 G/DL (ref 6–8.5)
RBC # BLD AUTO: 4.1 X10E6/UL (ref 3.77–5.28)
SODIUM SERPL-SCNC: 142 MMOL/L (ref 134–144)
TSH SERPL DL<=0.005 MIU/L-ACNC: 0.94 UIU/ML (ref 0.45–4.5)
WBC # BLD AUTO: 5.4 X10E3/UL (ref 3.4–10.8)

## 2018-11-14 NOTE — PROGRESS NOTES
pc with pt. Has a mild hypochromic, microcytic anemia- but she had egd and colonoscopy last year. Ulcer on egd. Continue iron pills for now. hgb is actually better than it was a year ago.

## 2018-12-17 ENCOUNTER — OFFICE VISIT (OUTPATIENT)
Dept: CARDIOLOGY CLINIC | Age: 83
End: 2018-12-17

## 2018-12-17 VITALS
BODY MASS INDEX: 23.93 KG/M2 | HEIGHT: 65 IN | SYSTOLIC BLOOD PRESSURE: 150 MMHG | RESPIRATION RATE: 18 BRPM | OXYGEN SATURATION: 96 % | DIASTOLIC BLOOD PRESSURE: 70 MMHG | HEART RATE: 66 BPM | WEIGHT: 143.6 LBS

## 2018-12-17 DIAGNOSIS — I48.19 PERSISTENT ATRIAL FIBRILLATION (HCC): Primary | ICD-10-CM

## 2018-12-17 DIAGNOSIS — Z92.29 HX: LONG TERM ANTICOAGULANT USE: ICD-10-CM

## 2018-12-17 DIAGNOSIS — N18.30 CKD (CHRONIC KIDNEY DISEASE) STAGE 3, GFR 30-59 ML/MIN (HCC): ICD-10-CM

## 2018-12-17 DIAGNOSIS — K21.00 GERD WITH ESOPHAGITIS: ICD-10-CM

## 2018-12-17 RX ORDER — DEXTROMETHORPHAN HYDROBROMIDE, GUAIFENESIN 5; 100 MG/5ML; MG/5ML
650 LIQUID ORAL EVERY 8 HOURS
COMMUNITY

## 2018-12-17 RX ORDER — DIPHENHYDRAMINE HCL 25 MG
25 CAPSULE ORAL
COMMUNITY
End: 2020-05-27 | Stop reason: ALTCHOICE

## 2018-12-17 NOTE — PROGRESS NOTES
[address] [facility]        [phone]                             NEW PATIENT HPI/FOLLOW-UP    NAME:  Nga Johansen   :   1930   MRN:   [id number]   PCP:  Tonja Taylor MD           Subjective: The patient is a 80y.o. year old female  who returns for a routine follow-up. Since the last visit, patient reports no new symptoms. Son hospitalized with pneumonia sepsis and lung cancer since Friday. Quite stressful. Here with daughter. Denies change in exercise tolerance, chest pain, edema, medication intolerance, palpitations, shortness of breath, PND/orthopnea wheezing, sputum, syncope, dizziness or light headedness. Doing satisfactorily. Review of Systems  General: Pt denies excessive weight gain or loss. Pt is able to conduct ADL's. Respiratory: Denies shortness of breath, HILTON, wheezing or stridor. Cardiovascular: Denies precordial pain, palpitations, edema or PND  Gastrointestinal: Denies poor appetite, indigestion, abdominal pain or blood in stool  Peripheral vascular: Denies claudication, leg cramps  Neurological: Denies paresthesias, tingling.numbness  Psychiatric: Denies anxiety,depression,fatigue  Musculoskeletal: Denies pain,tenderness, soreness,swelling      Past Medical History:   Diagnosis Date    Arrhythmia     atrial fibrillation, chronic. Stress test  essent.  normal.  Mild-mod AR on ECHO    Atrial fibrillation (HCC)     GI bleeding     Hypertension     Refusal of blood transfusions as patient is Hinduism     Thyroid disease     hypothyroid     Patient Active Problem List    Diagnosis Date Noted    Irregular heart beat 2018    Chest pain with moderate risk for cardiac etiology 2017    HILTON (dyspnea on exertion) 2017    Bilateral leg edema 2017    HX: long term anticoagulant use--Pradaxa stopped  for chronic atrial fib 2017    Cardiomyopathy, secondary ([address] 2017    GERD with esophagitis 07/05/2017    Dyslipidemia (high LDL; low HDL) 07/05/2017    CKD (chronic kidney disease) stage 3, GFR 30-59 ml/min ([facility]) 07/05/2017    GI bleeding     Hyperkalemia 04/27/2017    Hypothyroidism 04/27/2011    Hyperthyroidism--iatrogenic 04/27/2011    Insomnia 03/16/2011    Hypokalemia 03/16/2011    Encephalopathy acute 03/15/2011    Rhabdomyolysis 03/14/2011    Persistent atrial fibrillation ([address] 03/14/2011    HTN (hypertension) 03/14/2011      Past Surgical History:   Procedure Laterality Date    COLONOSCOPY N/A 5/2/2017    COLONOSCOPY performed by Henny Alexander MD at [facility] ENDOSCOPY    ECHO TRANSESOPHAGEAL (DENISE) W OR WO CONTRAST  4/27/2011         HX GYN      vaginal deliveries x10    HX TUBAL LIGATION      NC CARDIOVERSION ELECTIVE ARRHYTHMIA EXTERNAL  6/14/2011          Allergies   Allergen Reactions    Seroquel [Quetiapine] Other (comments)     PSYCOLOGICAL REACTION Pt didn't feel like them selves but felt like a different person, iT DEEPLY DISTURBED PT.  PT BECAME IRRITABLE.     Ambien [Zolpidem] Other (comments)     Couldn't move      Family History   Problem Relation Age of Onset    Heart Disease Father     Cancer Son     Heart Disease Son       Social History     Socioeconomic History    Marital status:      Spouse name: Not on file    Number of children: Not on file    Years of education: Not on file    Highest education level: Not on file   Social Needs    Financial resource strain: Not on file    Food insecurity - worry: Not on file    Food insecurity - inability: Not on file   [employer] needs - medical: Not on file   [employer] needs - non-medical: Not on file   Occupational History    Not on file   Tobacco Use    Smoking status: Never Smoker    Smokeless tobacco: Never Used   Substance and Sexual Activity    Alcohol use: No    Drug use: No    Sexual activity: No   Other Topics Concern    Not on file   Social History Narrative    Not on file      Current Outpatient Medications   Medication Sig    diphenhydrAMINE (BENADRYL) 25 mg capsule Take 25 mg by mouth every six (6) hours as needed.  acetaminophen (TYLENOL ARTHRITIS PAIN) 650 mg TbER Take 650 mg by mouth every eight (8) hours.  dabigatran etexilate (PRADAXA) 75 mg capsule TAKE ONE CAPSULE BY MOUTH EVERY 12 HOURS    traZODone (DESYREL) 150 mg tablet TAKE 1 TABLET BY MOUTH NIGHTLY FOR SLEEP.  atenolol (TENORMIN) 50 mg tablet TAKE 1 TABLET BY MOUTH DAILY FOR THE HEART AND BLOOD PRESSURE.  potassium chloride (K-DUR, KLOR-CON) 20 mEq tablet TAKE 1 TABLET BY MOUTH TWICE A DAY. (POTASSIUM)    levothyroxine (SYNTHROID) 100 mcg tablet TAKE ONE TABLET DAILY FOR THYROID    fluticasone (FLONASE) 50 mcg/actuation nasal spray USE 1-2 SPRAYS IN EACH NOSTRIL ONCE DAILY.  pantoprazole (PROTONIX) 40 mg tablet TAKE 1 TABLET BY MOUTH TWICE DAILY,  FOR INDIGESTION AND GASTRITIS. (Patient taking differently: Take 40 mg by mouth daily. TAKE 1 TABLET BY MOUTH TWICE DAILY,  FOR INDIGESTION AND GASTRITIS.)    ondansetron hcl (ZOFRAN, AS HYDROCHLORIDE,) 4 mg tablet Take 1 Tab by mouth every eight (8) hours as needed for Nausea.  promethazine-dextromethorphan (PROMETHAZINE-DM) 6.25-15 mg/5 mL syrup Take 5 mL by mouth four (4) times daily as needed for Cough.  polyethylene glycol (MIRALAX) 17 gram/dose powder Take 17 g by mouth daily. 1 tablespoon with 8 oz of water daily    bisacodyl (DULCOLAX, BISACODYL,) 10 mg suppository Insert 10 mg into rectum daily.  CALCIUM 600 + D tablet TAKE 1 TABLET TWICE A DAY. (CALCIUM SUPPLEMENT)     No current facility-administered medications for this visit. I have reviewed the nurses notes, vitals, problem list, allergy list, medical history, family medical, social history and medications.         Objective:     Physical Exam:     Vitals:    12/17/18 1155 12/17/18 1202   BP: 142/70 150/70   Pulse: 66    Resp: 18    SpO2: 96%    Weight: 143 lb 9.6 oz (65.1 kg)    Height: 5' 5\" (1.651 m)     Body mass index is 23.9 kg/m². General: Well developed, in no acute distress. HEENT: No carotid bruits, no JVD, trach is midline. Heart:  Normal S1/S2 negative S3 or S4. Regular, no murmur, gallop or rub.   Respiratory: Clear bilaterally, no wheezing or rales  Abdomen:   Soft, non-tender, bowel sounds are active.   Extremities:  No edema, normal cap refill, no cyanosis. Neuro: A&Ox3, speech clear, gait stable. Skin: Skin color is normal. No rashes or lesions. No diaphoresis.   Vascular: 2+ pulses symmetric in all extremities        Data Review:       Cardiographics:    EKG: Atrial fib with controlled VR, clockwise rotation, LAD,IVCD    Cardiology Labs:    Results for orders placed or performed during the hospital encounter of 04/27/17   EKG, 12 LEAD, INITIAL   Result Value Ref Range    Ventricular Rate 67 BPM    Atrial Rate 71 BPM    QRS Duration 108 ms    Q-T Interval 372 ms    QTC Calculation (Bezet) 393 ms    Calculated R Axis -42 degrees    Calculated T Axis 23 degrees    Diagnosis       Atrial fibrillation with a competing junctional pacemaker  Left axis deviation  Incomplete left bundle branch block  When compared with ECG of 14-JUN-2011 11:05,  Atrial fibrillation has replaced Sinus rhythm    Confirmed by Shaun Hurtado P.VSaad ([id number]) on 4/27/2017 6:27:07 PM         Lab Results   Component Value Date/Time    Cholesterol, total 94 (L) 02/17/2017 09:52 AM    HDL Cholesterol 30 (L) 02/17/2017 09:52 AM    LDL, calculated 46 02/17/2017 09:52 AM    Triglyceride 90 02/17/2017 09:52 AM    CHOL/HDL Ratio 3.1 04/20/2010 01:10 AM       Lab Results   Component Value Date/Time    Sodium 142 11/08/2018 11:46 AM    Potassium 4.5 11/08/2018 11:46 AM    Chloride 110 (H) 11/08/2018 11:46 AM    CO2 20 11/08/2018 11:46 AM    Anion gap 9 12/11/2017 05:17 PM    Glucose 111 (H) 11/08/2018 11:46 AM    BUN 23 11/08/2018 11:46 AM    Creatinine 1.11 (H) 11/08/2018 11:46 AM    BUN/Creatinine ratio 21 11/08/2018 11:46 AM    GFR est AA 52 (L) 11/08/2018 11:46 AM    GFR est non-AA 45 (L) 11/08/2018 11:46 AM    Calcium 8.9 11/08/2018 11:46 AM    Bilirubin, total <0.2 11/08/2018 11:46 AM    AST (SGOT) 19 11/08/2018 11:46 AM    Alk. phosphatase 67 11/08/2018 11:46 AM    Protein, total 7.7 11/08/2018 11:46 AM    Albumin 4.3 11/08/2018 11:46 AM    Globulin 5.3 (H) 12/11/2017 05:17 PM    A-G Ratio 1.3 11/08/2018 11:46 AM    ALT (SGPT) 10 11/08/2018 11:46 AM          Assessment:       ICD-10-CM ICD-9-CM    1. Persistent atrial fibrillation (HCC) I48.1 427.31 AMB POC EKG ROUTINE W/ 12 LEADS, INTER & REP   2. HX: long term anticoagulant use--Pradaxa stopped 5/17 for chronic atrial fib Z92.29 V87.49    3. GERD with esophagitis K21.0 530.11    4. CKD (chronic kidney disease) stage 3, GFR 30-59 ml/min (HCC) N18.3 585. 3          Discussion: Patient presents at this time stable from a cardiac perspective. Remains in persistent AFIB on low dose Pradaxa. Pleased with present cardiac status. Plan: 1. Continue same meds. Lipid profile and labs followed by PCP--\"too perfect\" on no meds! 2. Encouraged to exercise to tolerance and follow low fat, low cholesterol, low sodium predominantly Plant-based (consider Mediterranean) diet. Call with questions or concerns. Will follow up any test results by phone and/or f/u here in office if needed. Sarah Han 3.Follow up: 6 months    I have discussed the diagnosis with the patient and the intended plan as seen in the above orders. The patient has received an after-visit summary and questions were answered concerning future plans. I have discussed any concerning medication side effects and warnings with the patient as well.     Alysha Gipson MD  12/17/2018

## 2018-12-17 NOTE — PROGRESS NOTES
1. Have you been to the ER, urgent care clinic since your last visit? Hospitalized since your last visit? No    2. Have you seen or consulted any other health care providers outside of the [facility] since your last visit? Include any pap smears or colon screening. No      Chief Complaint   Patient presents with    Hypertension     6 mo appt. Denied cardiac symptoms.

## 2019-02-27 ENCOUNTER — OFFICE VISIT (OUTPATIENT)
Dept: FAMILY MEDICINE CLINIC | Age: 84
End: 2019-02-27

## 2019-02-27 VITALS
DIASTOLIC BLOOD PRESSURE: 50 MMHG | WEIGHT: 141.2 LBS | SYSTOLIC BLOOD PRESSURE: 133 MMHG | OXYGEN SATURATION: 96 % | BODY MASS INDEX: 23.53 KG/M2 | HEART RATE: 62 BPM | TEMPERATURE: 98 F | HEIGHT: 65 IN | RESPIRATION RATE: 18 BRPM

## 2019-02-27 DIAGNOSIS — J40 BRONCHITIS: Primary | ICD-10-CM

## 2019-02-27 RX ORDER — AZITHROMYCIN 250 MG/1
TABLET, FILM COATED ORAL
Qty: 6 TAB | Refills: 0 | Status: SHIPPED | OUTPATIENT
Start: 2019-02-27 | End: 2019-04-04 | Stop reason: ALTCHOICE

## 2019-02-27 RX ORDER — PROMETHAZINE HYDROCHLORIDE AND DEXTROMETHORPHAN HYDROBROMIDE 6.25; 15 MG/5ML; MG/5ML
5 SYRUP ORAL
Qty: 240 ML | Refills: 2 | Status: SHIPPED | OUTPATIENT
Start: 2019-02-27 | End: 2019-04-04 | Stop reason: SDUPTHER

## 2019-02-27 NOTE — PROGRESS NOTES
HISTORY OF PRESENT ILLNESS Bessie Damon is a 80 y.o. female. HPI cough and sneezing for one week. Cough has been minimally productive. No fever, chills. No wheezing, dyspnea. Has been taking cough syrup and flonase- mild relief. ROS Physical Exam  
Constitutional: She appears well-developed and well-nourished. HENT:  
Right Ear: External ear normal.  
Left Ear: External ear normal.  
Mouth/Throat: Oropharynx is clear and moist.  
Neck: No thyromegaly present. Cardiovascular: Normal rate, regular rhythm, normal heart sounds and intact distal pulses. Pulmonary/Chest: Breath sounds normal. She has no wheezes. Abdominal: Soft. Bowel sounds are normal. She exhibits no distension and no mass. There is no tenderness. Musculoskeletal: She exhibits no edema. Lymphadenopathy:  
  She has no cervical adenopathy. Nursing note and vitals reviewed. ASSESSMENT and PLAN Orders Placed This Encounter  azithromycin (ZITHROMAX) 250 mg tablet  promethazine-dextromethorphan (PROMETHAZINE-DM) 6.25-15 mg/5 mL syrup Diagnoses and all orders for this visit: 1. Bronchitis Other orders 
-     azithromycin (ZITHROMAX) 250 mg tablet; 2 tabs po -day 1, then 1 tab po daily- days 2-5 
-     promethazine-dextromethorphan (PROMETHAZINE-DM) 6.25-15 mg/5 mL syrup; Take 5 mL by mouth four (4) times daily as needed for Cough. Follow-up Disposition: Not on File

## 2019-02-27 NOTE — PROGRESS NOTES
Chief Complaint Patient presents with  Cough Pt reports having cough and congestion for over a week. No productive cough. . Mucus is loose and not tight. Pt tried some otc mucinex and some flonax but nothing helped. 1. Have you been to the ER, urgent care clinic since your last visit? Hospitalized since your last visit? No  
 
2. Have you seen or consulted any other health care providers outside of the [address] [place] [address] since your last visit? No Include any pap smears or colon screening.

## 2019-04-04 ENCOUNTER — TELEPHONE (OUTPATIENT)
Dept: FAMILY MEDICINE CLINIC | Age: 84
End: 2019-04-04

## 2019-04-04 ENCOUNTER — OFFICE VISIT (OUTPATIENT)
Dept: FAMILY MEDICINE CLINIC | Age: 84
End: 2019-04-04

## 2019-04-04 VITALS
TEMPERATURE: 97.1 F | HEART RATE: 81 BPM | DIASTOLIC BLOOD PRESSURE: 51 MMHG | WEIGHT: 140.4 LBS | BODY MASS INDEX: 23.39 KG/M2 | SYSTOLIC BLOOD PRESSURE: 118 MMHG | HEIGHT: 65 IN | RESPIRATION RATE: 14 BRPM | OXYGEN SATURATION: 92 %

## 2019-04-04 DIAGNOSIS — J40 BRONCHITIS: Primary | ICD-10-CM

## 2019-04-04 RX ORDER — DOXYCYCLINE 100 MG/1
100 CAPSULE ORAL 2 TIMES DAILY
Qty: 20 CAP | Refills: 0 | Status: SHIPPED | OUTPATIENT
Start: 2019-04-04 | End: 2019-04-14

## 2019-04-04 RX ORDER — PROMETHAZINE HYDROCHLORIDE AND DEXTROMETHORPHAN HYDROBROMIDE 6.25; 15 MG/5ML; MG/5ML
5 SYRUP ORAL
Qty: 240 ML | Refills: 2 | Status: SHIPPED | OUTPATIENT
Start: 2019-04-04 | End: 2019-04-04 | Stop reason: CLARIF

## 2019-04-04 NOTE — TELEPHONE ENCOUNTER
Patient called stating that she would like to be seen this afternoon. Patient is vomiting and appears to have a temperature because she is really hot. Patient is also coughing. Patient can be reached at  [phone].

## 2019-04-04 NOTE — PROGRESS NOTES
HISTORY OF PRESENT ILLNESS Beulah Ruiz is a 80 y.o. female. HPI3 day hx cough, sneezing, yellowish nasal drainage. No fever. No wheezing or dyspnea. mucinex- some relief. ROS Physical Exam  
Constitutional: She appears well-developed and well-nourished. HENT:  
Right Ear: External ear normal.  
Left Ear: External ear normal.  
Mouth/Throat: Oropharynx is clear and moist.  
Neck: No thyromegaly present. Cardiovascular: Normal rate, regular rhythm, normal heart sounds and intact distal pulses. Pulmonary/Chest: Breath sounds normal. She has no wheezes. Abdominal: Soft. Bowel sounds are normal. She exhibits no distension and no mass. There is no tenderness. Musculoskeletal: She exhibits no edema. Lymphadenopathy:  
  She has no cervical adenopathy. Nursing note and vitals reviewed. ASSESSMENT and PLAN Orders Placed This Encounter  DISCONTD: promethazine-dextromethorphan (PROMETHAZINE-DM) 6.25-15 mg/5 mL syrup  doxycycline (VIBRAMYCIN) 100 mg capsule Diagnoses and all orders for this visit: 1. Bronchitis Other orders 
-     doxycycline (VIBRAMYCIN) 100 mg capsule; Take 1 Cap by mouth two (2) times a day for 10 days.

## 2019-04-04 NOTE — PROGRESS NOTES
Chief Complaint Patient presents with  Cough  Cold Symptoms  Nasal Congestion  Sore Throat Terry Cords Annual Wellness Visit 1. Have you been to the ER, urgent care clinic since your last visit? Hospitalized since your last visit? No 
 
2. Have you seen or consulted any other health care providers outside of the [address] [place] [address] since your last visit? Include any pap smears or colon screening. No  
 
Health Maintenance Due Topic Date Due  Shingrix Vaccine Age 50> (1 of 2) [birth year]/05/1980  MEDICARE YEARLY EXAM  06/22/2018  GLAUCOMA SCREENING Q2Y  02/17/2019

## 2019-05-08 ENCOUNTER — OFFICE VISIT (OUTPATIENT)
Dept: FAMILY MEDICINE CLINIC | Age: 84
End: 2019-05-08

## 2019-05-08 VITALS
RESPIRATION RATE: 16 BRPM | TEMPERATURE: 96.8 F | WEIGHT: 138.4 LBS | BODY MASS INDEX: 23.06 KG/M2 | DIASTOLIC BLOOD PRESSURE: 82 MMHG | HEIGHT: 65 IN | SYSTOLIC BLOOD PRESSURE: 145 MMHG | OXYGEN SATURATION: 97 % | HEART RATE: 62 BPM

## 2019-05-08 DIAGNOSIS — K22.4 ESOPHAGEAL HYPERTENSION: Primary | ICD-10-CM

## 2019-05-08 DIAGNOSIS — Z00.00 ENCOUNTER FOR MEDICARE ANNUAL WELLNESS EXAM: ICD-10-CM

## 2019-05-08 NOTE — PROGRESS NOTES
Chief Complaint   Patient presents with    Hypertension    Thyroid Problem     1. Have you been to the ER, urgent care clinic since your last visit? Hospitalized since your last visit? No    2. Have you seen or consulted any other health care providers outside of the [address] [place] [address] since your last visit? Include any pap smears or colon screening. No      Pt to see if eye exam is needed.

## 2019-05-08 NOTE — PROGRESS NOTES
HISTORY OF PRESENT ILLNESS  Sasha López is a 80 y.o. female. HPI Pt. Comes in for blood pressure check. Needs thyroid checked also. No complaints of chest pain, shortness of breath, TIAs, claudication or edema. Has been having problems with runny nose, helped by flonase. Sees Dr. Diana Hernandez (cardiology). UTD on pneumonia shots, declines flu shot. No doing mammograms anymore. Review of Systems   HENT: Negative for hearing loss. Neurological:        No falls, canes, walkers. Independent in all adls. Psychiatric/Behavioral: Negative for depression. No alcohol. Feels safe at home. Physical Exam   Constitutional: She appears well-developed and well-nourished. HENT:   Right Ear: External ear normal.   Left Ear: External ear normal.   Mouth/Throat: Oropharynx is clear and moist.   Neck: No thyromegaly present. Cardiovascular: Normal rate, regular rhythm, normal heart sounds and intact distal pulses. Pulmonary/Chest: Breath sounds normal. She has no wheezes. Abdominal: Soft. Bowel sounds are normal. She exhibits no distension and no mass. There is no tenderness. Musculoskeletal: She exhibits no edema. Lymphadenopathy:     She has no cervical adenopathy. Neurological:   AMT 4 4/4   Nursing note and vitals reviewed. ASSESSMENT and PLAN  Orders Placed This Encounter    CBC WITH AUTOMATED DIFF    METABOLIC PANEL, COMPREHENSIVE     Diagnoses and all orders for this visit:    1. Esophageal hypertension  -     CBC WITH AUTOMATED DIFF  -     METABOLIC PANEL, COMPREHENSIVE    2. Encounter for Medicare annual wellness exam      Follow-up and Dispositions    · Return in about 6 months (around 11/8/2019).

## 2019-05-09 LAB
ALBUMIN SERPL-MCNC: 3.9 G/DL (ref 3.5–4.7)
ALBUMIN/GLOB SERPL: 1.1 {RATIO} (ref 1.2–2.2)
ALP SERPL-CCNC: 49 IU/L (ref 39–117)
ALT SERPL-CCNC: 6 IU/L (ref 0–32)
AST SERPL-CCNC: 17 IU/L (ref 0–40)
BASOPHILS # BLD AUTO: 0 X10E3/UL (ref 0–0.2)
BASOPHILS NFR BLD AUTO: 0 %
BILIRUB SERPL-MCNC: <0.2 MG/DL (ref 0–1.2)
BUN SERPL-MCNC: 15 MG/DL (ref 8–27)
BUN/CREAT SERPL: 13 (ref 12–28)
CALCIUM SERPL-MCNC: 8.8 MG/DL (ref 8.7–10.3)
CHLORIDE SERPL-SCNC: 108 MMOL/L (ref 96–106)
CO2 SERPL-SCNC: 19 MMOL/L (ref 20–29)
CREAT SERPL-MCNC: 1.14 MG/DL (ref 0.57–1)
EOSINOPHIL # BLD AUTO: 0.2 X10E3/UL (ref 0–0.4)
EOSINOPHIL NFR BLD AUTO: 3 %
ERYTHROCYTE [DISTWIDTH] IN BLOOD BY AUTOMATED COUNT: 20.3 % (ref 12.3–15.4)
GLOBULIN SER CALC-MCNC: 3.7 G/DL (ref 1.5–4.5)
GLUCOSE SERPL-MCNC: 107 MG/DL (ref 65–99)
HCT VFR BLD AUTO: 32.4 % (ref 34–46.6)
HGB BLD-MCNC: 9.5 G/DL (ref 11.1–15.9)
IMM GRANULOCYTES # BLD AUTO: 0 X10E3/UL (ref 0–0.1)
IMM GRANULOCYTES NFR BLD AUTO: 0 %
INTERPRETATION: NORMAL
LYMPHOCYTES # BLD AUTO: 1.9 X10E3/UL (ref 0.7–3.1)
LYMPHOCYTES NFR BLD AUTO: 34 %
MCH RBC QN AUTO: 23.9 PG (ref 26.6–33)
MCHC RBC AUTO-ENTMCNC: 29.3 G/DL (ref 31.5–35.7)
MCV RBC AUTO: 81 FL (ref 79–97)
MONOCYTES # BLD AUTO: 0.6 X10E3/UL (ref 0.1–0.9)
MONOCYTES NFR BLD AUTO: 11 %
NEUTROPHILS # BLD AUTO: 2.9 X10E3/UL (ref 1.4–7)
NEUTROPHILS NFR BLD AUTO: 52 %
PLATELET # BLD AUTO: 215 X10E3/UL (ref 150–379)
POTASSIUM SERPL-SCNC: 4.4 MMOL/L (ref 3.5–5.2)
PROT SERPL-MCNC: 7.6 G/DL (ref 6–8.5)
RBC # BLD AUTO: 3.98 X10E6/UL (ref 3.77–5.28)
SODIUM SERPL-SCNC: 141 MMOL/L (ref 134–144)
WBC # BLD AUTO: 5.6 X10E3/UL (ref 3.4–10.8)

## 2019-05-28 ENCOUNTER — OFFICE VISIT (OUTPATIENT)
Dept: FAMILY MEDICINE CLINIC | Age: 84
End: 2019-05-28

## 2019-05-28 VITALS
OXYGEN SATURATION: 94 % | HEIGHT: 65 IN | WEIGHT: 138.6 LBS | RESPIRATION RATE: 16 BRPM | SYSTOLIC BLOOD PRESSURE: 139 MMHG | BODY MASS INDEX: 23.09 KG/M2 | HEART RATE: 86 BPM | DIASTOLIC BLOOD PRESSURE: 73 MMHG | TEMPERATURE: 97.2 F

## 2019-05-28 DIAGNOSIS — J30.89 ENVIRONMENTAL AND SEASONAL ALLERGIES: Primary | ICD-10-CM

## 2019-05-28 RX ORDER — MOMETASONE FUROATE 50 UG/1
2 SPRAY, METERED NASAL DAILY
Qty: 1 CONTAINER | Refills: 1 | Status: SHIPPED | OUTPATIENT
Start: 2019-05-28 | End: 2019-11-08 | Stop reason: ALTCHOICE

## 2019-05-28 RX ORDER — MINERAL OIL
180 ENEMA (ML) RECTAL
Qty: 30 TAB | Refills: 1 | Status: SHIPPED | OUTPATIENT
Start: 2019-05-28 | End: 2019-11-08 | Stop reason: ALTCHOICE

## 2019-05-28 NOTE — PATIENT INSTRUCTIONS
You can use Debrox (available over-the-counter) to help loosen and remove your earwax. Allergies: Care Instructions  Your Care Instructions    Allergies occur when your body's defense system (immune system) overreacts to certain substances. The immune system treats a harmless substance as if it were a harmful germ or virus. Many things can cause this overreaction, including pollens, medicine, food, dust, animal dander, and mold. Allergies can be mild or severe. Mild allergies can be managed with home treatment. But medicine may be needed to prevent problems. Managing your allergies is an important part of staying healthy. Your doctor may suggest that you have allergy testing to help find out what is causing your allergies. When you know what things trigger your symptoms, you can avoid them. This can prevent allergy symptoms and other health problems. For severe allergies that cause reactions that affect your whole body (anaphylactic reactions), your doctor may prescribe a shot of epinephrine to carry with you in case you have a severe reaction. Learn how to give yourself the shot and keep it with you at all times. Make sure it is not . Follow-up care is a key part of your treatment and safety. Be sure to make and go to all appointments, and call your doctor if you are having problems. It's also a good idea to know your test results and keep a list of the medicines you take. How can you care for yourself at home? · If you have been told by your doctor that dust or dust mites are causing your allergy, decrease the dust around your bed:  ? Wash sheets, pillowcases, and other bedding in hot water every week. ? Use dust-proof covers for pillows, duvets, and mattresses. Avoid plastic covers because they tear easily and do not \"breathe. \" Wash as instructed on the label. ? Do not use any blankets and pillows that you do not need. ? Use blankets that you can wash in your washing machine. ?  Consider removing drapes and carpets, which attract and hold dust, from your bedroom. · If you are allergic to house dust and mites, do not use home humidifiers. Your doctor can suggest ways you can control dust and mites. · Look for signs of cockroaches. Cockroaches cause allergic reactions. Use cockroach baits to get rid of them. Then, clean your home well. Cockroaches like areas where grocery bags, newspapers, empty bottles, or cardboard boxes are stored. Do not keep these inside your home, and keep trash and food containers sealed. Seal off any spots where cockroaches might enter your home. · If you are allergic to mold, get rid of furniture, rugs, and drapes that smell musty. Check for mold in the bathroom. · If you are allergic to outdoor pollen or mold spores, use air-conditioning. Change or clean all filters every month. Keep windows closed. · If you are allergic to pollen, stay inside when pollen counts are high. Use a vacuum  with a HEPA filter or a double-thickness filter at least two times each week. · Stay inside when air pollution is bad. Avoid paint fumes, perfumes, and other strong odors. · Avoid conditions that make your allergies worse. Stay away from smoke. Do not smoke or let anyone else smoke in your house. Do not use fireplaces or wood-burning stoves. · If you are allergic to your pets, change the air filter in your furnace every month. Use high-efficiency filters. · If you are allergic to pet dander, keep pets outside or out of your bedroom. Old carpet and cloth furniture can hold a lot of animal dander. You may need to replace them. When should you call for help? Give an epinephrine shot if:    · You think you are having a severe allergic reaction.     · You have symptoms in more than one body area, such as mild nausea and an itchy mouth.    After giving an epinephrine shot call 911, even if you feel better.   Call 911 if:    · You have symptoms of a severe allergic reaction.  These may include:  ? Sudden raised, red areas (hives) all over your body. ? Swelling of the throat, mouth, lips, or tongue. ? Trouble breathing. ? Passing out (losing consciousness). Or you may feel very lightheaded or suddenly feel weak, confused, or restless.     · You have been given an epinephrine shot, even if you feel better.    Call your doctor now or seek immediate medical care if:    · You have symptoms of an allergic reaction, such as:  ? A rash or hives (raised, red areas on the skin). ? Itching. ? Swelling. ? Belly pain, nausea, or vomiting.    Watch closely for changes in your health, and be sure to contact your doctor if:    · You do not get better as expected. Where can you learn more? Go to http://dana-keshia.info/. Enter Q338 in the search box to learn more about \"Allergies: Care Instructions. \"  Current as of: June 27, 2018  Content Version: 11.9  © 9462-3717 [employer]. Care instructions adapted under license by [employer] (which disclaims liability or warranty for this information). If you have questions about a medical condition or this instruction, always ask your healthcare professional. [place] [address] any warranty or liability for your use of this information.

## 2019-05-28 NOTE — PROGRESS NOTES
HISTORY OF PRESENT ILLNESS  Luci Garvin is a 80 y.o. female. HPI  Patient of Dr. Lali Brown with PMHx significant for hypertension, afib, cardiomyopathy, CKD here for an acute visit with chief complaint of cough and congestion. Has a history of bad allergies. She is having nasal congestion, runny eyes, non-productive cough; her symptoms started less than a week ago. She has tried benadryl, Mucinex, and cough syrup. No fevers. Was previously on flonase but stopped it as she did not find it was helping. Had similar symptoms earlier this month when she saw her PCP and was given antibiotics, which helped for a few days, but then symptoms recurred. Visit Vitals  /73 (BP 1 Location: Right arm, BP Patient Position: Sitting)   Pulse 86   Temp 97.2 °F (36.2 °C) (Oral)   Resp 16   Ht 5' 5\" (1.651 m)   Wt 138 lb 9.6 oz (62.9 kg)   SpO2 94%   BMI 23.06 kg/m²     Current Outpatient Medications on File Prior to Visit   Medication Sig Dispense Refill    acetaminophen (TYLENOL ARTHRITIS PAIN) 650 mg TbER Take 650 mg by mouth every eight (8) hours.  dabigatran etexilate (PRADAXA) 75 mg capsule TAKE ONE CAPSULE BY MOUTH EVERY 12 HOURS 60 Cap 12    traZODone (DESYREL) 150 mg tablet TAKE 1 TABLET BY MOUTH NIGHTLY FOR SLEEP. 30 Tab 12    atenolol (TENORMIN) 50 mg tablet TAKE 1 TABLET BY MOUTH DAILY FOR THE HEART AND BLOOD PRESSURE. 30 Tab 12    potassium chloride (K-DUR, KLOR-CON) 20 mEq tablet TAKE 1 TABLET BY MOUTH TWICE A DAY. (POTASSIUM) 60 Tab 12    levothyroxine (SYNTHROID) 100 mcg tablet TAKE ONE TABLET DAILY FOR THYROID 30 Tab 12    pantoprazole (PROTONIX) 40 mg tablet TAKE 1 TABLET BY MOUTH TWICE DAILY,  FOR INDIGESTION AND GASTRITIS. (Patient taking differently: Take 40 mg by mouth daily. TAKE 1 TABLET BY MOUTH TWICE DAILY,  FOR INDIGESTION AND GASTRITIS.) 60 Tab 12    CALCIUM 600 + D tablet TAKE 1 TABLET TWICE A DAY.  (CALCIUM SUPPLEMENT) 60 Tab 0    diphenhydrAMINE (BENADRYL) 25 mg capsule Take 25 mg by mouth every six (6) hours as needed.  ondansetron hcl (ZOFRAN, AS HYDROCHLORIDE,) 4 mg tablet Take 1 Tab by mouth every eight (8) hours as needed for Nausea. (Patient not taking: Reported on 5/8/2019) 20 Tab 0     No current facility-administered medications on file prior to visit. Review of Systems   Constitutional: Negative for chills, fever and malaise/fatigue. HENT: Positive for congestion and sinus pain. Negative for ear discharge and ear pain. Eyes: Positive for discharge. Negative for blurred vision, double vision, pain and redness. Respiratory: Positive for cough. Negative for sputum production, shortness of breath and wheezing. Cardiovascular: Negative for chest pain and palpitations. Neurological: Negative for weakness and headaches. Physical Exam   Constitutional: She is oriented to person, place, and time. She appears well-developed and well-nourished. No distress. HENT:   Head: Normocephalic and atraumatic. Right Ear: External ear normal.   Left Ear: External ear normal.   Nose: Mucosal edema and rhinorrhea present. Right sinus exhibits no maxillary sinus tenderness and no frontal sinus tenderness. Left sinus exhibits no maxillary sinus tenderness and no frontal sinus tenderness. Mouth/Throat: Mucous membranes are normal. Posterior oropharyngeal erythema present. No oropharyngeal exudate or posterior oropharyngeal edema.   + PND   Eyes: Pupils are equal, round, and reactive to light. Conjunctivae and EOM are normal. Right eye exhibits no discharge. Left eye exhibits no discharge. Neck: Neck supple. Cardiovascular: Normal rate. An irregularly irregular rhythm present. Pulmonary/Chest: Effort normal and breath sounds normal. No respiratory distress. She has no wheezes. She has no rales. Lymphadenopathy:     She has no cervical adenopathy. Neurological: She is alert and oriented to person, place, and time. Skin: Skin is warm and dry.  She is not diaphoretic. Nursing note and vitals reviewed. ASSESSMENT and PLAN    ICD-10-CM ICD-9-CM    1. Environmental and seasonal allergies J30.89 477.8 mometasone (NASONEX) 50 mcg/actuation nasal spray      fexofenadine (ALLEGRA) 180 mg tablet     1. Allergies - Symptoms consistent with allergies. Start allegra daily and switch to nasonex nasal spray. Discussed that antibiotics will not work against these symptoms. Could add singulair if symptoms fail to improve. Follow up with me PRN and with PCP as recommended/scheduled for routine care. Follow-up and Dispositions    · Return if symptoms worsen or fail to improve.

## 2019-05-28 NOTE — PROGRESS NOTES
Chief Complaint   Patient presents with    Cough    Nasal Congestion     1. Have you been to the ER, urgent care clinic since your last visit? Hospitalized since your last visit? no    2. Have you seen or consulted any other health care providers outside of the [address] [place] [address] since your last visit? Include any pap smears or colon screening. No     Patient here for non productive cough, nasal congestion and eyes redness.

## 2019-06-03 RX ORDER — PANTOPRAZOLE SODIUM 40 MG/1
TABLET, DELAYED RELEASE ORAL
Qty: 60 TAB | Refills: 0 | Status: SHIPPED | OUTPATIENT
Start: 2019-06-03 | End: 2019-07-02 | Stop reason: SDUPTHER

## 2019-06-24 ENCOUNTER — OFFICE VISIT (OUTPATIENT)
Dept: CARDIOLOGY CLINIC | Age: 84
End: 2019-06-24

## 2019-06-24 VITALS
HEIGHT: 65 IN | HEART RATE: 66 BPM | SYSTOLIC BLOOD PRESSURE: 110 MMHG | OXYGEN SATURATION: 97 % | BODY MASS INDEX: 23.13 KG/M2 | RESPIRATION RATE: 16 BRPM | WEIGHT: 138.8 LBS | DIASTOLIC BLOOD PRESSURE: 66 MMHG

## 2019-06-24 DIAGNOSIS — I10 HYPERTENSION, UNSPECIFIED TYPE: ICD-10-CM

## 2019-06-24 DIAGNOSIS — K92.2 GASTROINTESTINAL HEMORRHAGE, UNSPECIFIED GASTROINTESTINAL HEMORRHAGE TYPE: ICD-10-CM

## 2019-06-24 DIAGNOSIS — K21.9 GERD WITHOUT ESOPHAGITIS: ICD-10-CM

## 2019-06-24 DIAGNOSIS — Z92.29 HX: LONG TERM ANTICOAGULANT USE: ICD-10-CM

## 2019-06-24 DIAGNOSIS — N18.30 CKD (CHRONIC KIDNEY DISEASE) STAGE 3, GFR 30-59 ML/MIN (HCC): ICD-10-CM

## 2019-06-24 DIAGNOSIS — E78.5 DYSLIPIDEMIA (HIGH LDL; LOW HDL): ICD-10-CM

## 2019-06-24 DIAGNOSIS — I48.19 PERSISTENT ATRIAL FIBRILLATION (HCC): Primary | ICD-10-CM

## 2019-06-24 DIAGNOSIS — I42.9 CARDIOMYOPATHY, SECONDARY (HCC): ICD-10-CM

## 2019-06-24 RX ORDER — FLUTICASONE PROPIONATE 50 MCG
2 SPRAY, SUSPENSION (ML) NASAL
COMMUNITY
Start: 2019-05-03 | End: 2021-11-17

## 2019-06-24 NOTE — PROGRESS NOTES
[address] [place] [address]        [phone]                             NEW PATIENT HPI/FOLLOW-UP    NAME:  Matt Lambert   :   1930   MRN:   [id number]   PCP:  Edwina Fuentes MD           Subjective: The patient is a 80y.o. year old female  who returns for a routine follow-up. Tolerating lower dose Pradaxa for persistent atrial fib. Since the last visit, patient reports no new symptoms. Denies change in exercise tolerance, chest pain, edema, medication intolerance, palpitations, shortness of breath, PND/orthopnea wheezing, sputum, syncope, dizziness or light headedness. Doing satisfactorily. Review of Systems  General: Pt denies excessive weight gain or loss. Pt is able to conduct ADL's. Respiratory: Denies shortness of breath, HILTON, wheezing or stridor. Cardiovascular: Denies precordial pain, palpitations, edema or PND  Gastrointestinal: Denies poor appetite, indigestion, abdominal pain or blood in stool  Peripheral vascular: Denies claudication, leg cramps  Neurological: Denies paresthesias, tingling.numbness  Psychiatric: Denies anxiety,depression,fatigue  Musculoskeletal: Denies pain,tenderness, soreness,swelling      Past Medical History:   Diagnosis Date    Arrhythmia     atrial fibrillation, chronic. Stress test  essent.  normal.  Mild-mod AR on ECHO    Atrial fibrillation (HCC)     GI bleeding     Hypertension     Refusal of blood transfusions as patient is Anabaptism     Thyroid disease     hypothyroid     Patient Active Problem List    Diagnosis Date Noted    Irregular heart beat 2018    Chest pain with moderate risk for cardiac etiology 2017    HILTON (dyspnea on exertion) 2017    Bilateral leg edema 2017    HX: long term anticoagulant use--Pradaxa stopped  for chronic atrial fib 2017    Cardiomyopathy, secondary ([facility] [address] 2017    GERD with esophagitis 2017    Dyslipidemia (high LDL; low HDL) 07/05/2017    CKD (chronic kidney disease) stage 3, GFR 30-59 ml/min (HCC) 07/05/2017    GI bleeding     Hyperkalemia 04/27/2017    Hypothyroidism 04/27/2011    Hyperthyroidism--iatrogenic 04/27/2011    Insomnia 03/16/2011    Hypokalemia 03/16/2011    Encephalopathy acute 03/15/2011    Rhabdomyolysis 03/14/2011    Persistent atrial fibrillation ([address] 03/14/2011    HTN (hypertension) 03/14/2011      Past Surgical History:   Procedure Laterality Date    COLONOSCOPY N/A 5/2/2017    COLONOS[facility]eder [facility] ENDOSCOPY    ECHO TRANSESOPHAGEAL (DENISE) W OR WO CONTRAST  4/27/2011         HX GYN      vaginal deliveries x10    HX TUBAL LIGATION      IA CARDIOVERSION ELECTIVE ARRHYTHMIA EXTERNAL  6/14/2011          Allergies   Allergen Reactions    Seroquel [Quetiapine] Other (comments)     PSYCOLOGICAL REACTION Pt didn't feel like them selves but felt like a different person, iT DEEPLY DISTURBED PT.  PT BECAME IRRITABLE.     Ambien [Zolpidem] Other (comments)     Couldn't move      Family History   Problem Relation Age of Onset    Heart Disease Father     Cancer Son     Heart Disease Son       Social History     Socioeconomic History    Marital status:      Spouse name: Not on file    Number of children: Not on file    Years of education: Not on file    Highest education level: Not on file   Occupational History    Not on file   Social Needs    Financial resource strain: Not on file    Food insecurity:     Worry: Not on file     Inability: Not on file    Transportation needs:     Medical: Not on file     Non-medical: Not on file   Tobacco Use    Smoking status: Never Smoker    Smokeless tobacco: Never Used   Substance and Sexual Activity    Alcohol use: No    Drug use: No    Sexual activity: Never   Lifestyle    Physical activity:     Days per week: Not on file     Minutes per session: Not on file    Stress: Not on file   Relationships    Social connections:     Talks on phone: Not on file     Gets together: Not on file     Attends Confucianism service: Not on file     Active member of club or organization: Not on file     Attends meetings of clubs or organizations: Not on file     Relationship status: Not on file    Intimate partner violence:     Fear of current or ex partner: Not on file     Emotionally abused: Not on file     Physically abused: Not on file     Forced sexual activity: Not on file   Other Topics Concern    Not on file   Social History Narrative    Not on file      Current Outpatient Medications   Medication Sig    fluticasone propionate (FLONASE) 50 mcg/actuation nasal spray     pantoprazole (PROTONIX) 40 mg tablet TAKE 1 TABLET BY MOUTH TWICE DAILY, FOR INDIGESTION AND GASTRITIS.  mometasone (NASONEX) 50 mcg/actuation nasal spray 2 Sprays by Both Nostrils route daily. Indications: inflammation of the nose due to an allergy    diphenhydrAMINE (BENADRYL) 25 mg capsule Take 25 mg by mouth every six (6) hours as needed.  acetaminophen (TYLENOL ARTHRITIS PAIN) 650 mg TbER Take 650 mg by mouth every eight (8) hours.  dabigatran etexilate (PRADAXA) 75 mg capsule TAKE ONE CAPSULE BY MOUTH EVERY 12 HOURS    traZODone (DESYREL) 150 mg tablet TAKE 1 TABLET BY MOUTH NIGHTLY FOR SLEEP.  atenolol (TENORMIN) 50 mg tablet TAKE 1 TABLET BY MOUTH DAILY FOR THE HEART AND BLOOD PRESSURE.  potassium chloride (K-DUR, KLOR-CON) 20 mEq tablet TAKE 1 TABLET BY MOUTH TWICE A DAY. (POTASSIUM)    levothyroxine (SYNTHROID) 100 mcg tablet TAKE ONE TABLET DAILY FOR THYROID    CALCIUM 600 + D tablet TAKE 1 TABLET TWICE A DAY. (CALCIUM SUPPLEMENT)    fexofenadine (ALLEGRA) 180 mg tablet Take 1 Tab by mouth daily as needed for Allergies.  ondansetron hcl (ZOFRAN, AS HYDROCHLORIDE,) 4 mg tablet Take 1 Tab by mouth every eight (8) hours as needed for Nausea.  (Patient not taking: Reported on 5/8/2019)     No current facility-administered medications for this visit. I have reviewed the nurses notes, vitals, problem list, allergy list, medical history, family medical, social history and medications. Objective:     Physical Exam:     Vitals:    06/24/19 1115   BP: 110/66   Pulse: 66   Resp: 16   SpO2: 97%   Weight: 138 lb 12.8 oz (63 kg)   Height: 5' 5\" (1.651 m)    Body mass index is 23.1 kg/m². General: Well developed, in no acute distress. HEENT: No carotid bruits, no JVD, trach is midline. Heart:  Normal S1/S2 negative S3 or S4. Irregular, no murmur, gallop or rub.   Respiratory: Clear bilaterally, no wheezing or rales  Abdomen:   Soft, non-tender, bowel sounds are active.   Extremities:  No edema, normal cap refill, no cyanosis. Neuro: A&Ox3, speech clear, gait stable. Skin: Skin color is normal. No rashes or lesions. No diaphoresis.   Vascular: 2+ pulses symmetric in all extremities        Data Review:       Cardiographics:    EKG: Atrial fib controlled VR    Cardiology Labs:    Results for orders placed or performed during the hospital encounter of 04/27/17   EKG, 12 LEAD, INITIAL   Result Value Ref Range    Ventricular Rate 67 BPM    Atrial Rate 71 BPM    QRS Duration 108 ms    Q-T Interval 372 ms    QTC Calculation (Bezet) 393 ms    Calculated R Axis -42 degrees    Calculated T Axis 23 degrees    Diagnosis       Atrial fibrillation with a competing junctional pacemaker  Left axis deviation  Incomplete left bundle branch block  When compared with ECG of 14-JUN-2011 11:05,  Atrial fibrillation has replaced Sinus rhythm    Confirmed by Rosendo Aden, P.V. ([id number]) on 4/27/2017 6:27:07 PM         Lab Results   Component Value Date/Time    Cholesterol, total 94 (L) 02/17/2017 09:52 AM    HDL Cholesterol 30 (L) 02/17/2017 09:52 AM    LDL, calculated 46 02/17/2017 09:52 AM    Triglyceride 90 02/17/2017 09:52 AM    CHOL/HDL Ratio 3.1 04/20/2010 01:10 AM       Lab Results   Component Value Date/Time    Sodium 141 05/08/2019 11:22 AM Potassium 4.4 05/08/2019 11:22 AM    Chloride 108 (H) 05/08/2019 11:22 AM    CO2 19 (L) 05/08/2019 11:22 AM    Anion gap 9 12/11/2017 05:17 PM    Glucose 107 (H) 05/08/2019 11:22 AM    BUN 15 05/08/2019 11:22 AM    Creatinine 1.14 (H) 05/08/2019 11:22 AM    BUN/Creatinine ratio 13 05/08/2019 11:22 AM    GFR est AA 50 (L) 05/08/2019 11:22 AM    GFR est non-AA 43 (L) 05/08/2019 11:22 AM    Calcium 8.8 05/08/2019 11:22 AM    Bilirubin, total <0.2 05/08/2019 11:22 AM    AST (SGOT) 17 05/08/2019 11:22 AM    Alk. phosphatase 49 05/08/2019 11:22 AM    Protein, total 7.6 05/08/2019 11:22 AM    Albumin 3.9 05/08/2019 11:22 AM    Globulin 5.3 (H) 12/11/2017 05:17 PM    A-G Ratio 1.1 (L) 05/08/2019 11:22 AM    ALT (SGPT) 6 05/08/2019 11:22 AM          Assessment:       ICD-10-CM ICD-9-CM    1. Persistent atrial fibrillation (HCC) I48.1 427.31    2. Hypertension, unspecified type I10 401.9 AMB POC EKG ROUTINE W/ 12 LEADS, INTER & REP   3. HX: long term anticoagulant use--Pradaxa stopped 5/17 GI bleed then resumed lower dose Z92.29 V87.49    4. CKD (chronic kidney disease) stage 3, GFR 30-59 ml/min (HCC) N18.3 585.3    5. GERD without esophagitis K21.9 530.81    6. Dyslipidemia (high LDL; low HDL) E78.5 272.4    7. Cardiomyopathy, secondary (HCC)--LVEF 40-45% 6/17 echo I42.9 425.9    8. Gastrointestinal hemorrhage, unspecified gastrointestinal hemorrhage type--hx of K92.2 578.9          Discussion: Patient presents at this time stable from a cardiac perspective. Permanent atrial fib on PAC--Pradaxa 75 mg BID--dose lowered due to GI bleed. Pleased with present cardiac status. Plan: 1. Continue same meds. Lipid profile and labs followed by PCP---at goal.    2.Encouraged to exercise to tolerance and follow low fat, low cholesterol, low sodium predominantly Plant-based (consider Mediterranean) diet. Call with questions or concerns. Will follow up any test results by phone and/or f/u here in office if needed. Meek Self       3.Follow up: 6 months    I have discussed the diagnosis with the patient and the intended plan as seen in the above orders. The patient has received an after-visit summary and questions were answered concerning future plans. I have discussed any concerning medication side effects and warnings with the patient as well.     Porfirio Meek MD  6/24/2019

## 2019-06-24 NOTE — PROGRESS NOTES
Chief Complaint   Patient presents with    Hypertension     6 month follow up     1. Have you been to the ER, urgent care clinic since your last visit? Hospitalized since your last visit? No    2. Have you seen or consulted any other health care providers outside of the [address] [place] [address] since your last visit? Include any pap smears or colon screening.  NO

## 2019-07-03 RX ORDER — PANTOPRAZOLE SODIUM 40 MG/1
TABLET, DELAYED RELEASE ORAL
Qty: 60 TAB | Refills: 0 | Status: SHIPPED | OUTPATIENT
Start: 2019-07-03 | End: 2019-08-07 | Stop reason: SDUPTHER

## 2019-08-05 RX ORDER — PANTOPRAZOLE SODIUM 40 MG/1
TABLET, DELAYED RELEASE ORAL
Qty: 60 TAB | Refills: 0 | OUTPATIENT
Start: 2019-08-05

## 2019-08-05 RX ORDER — PANTOPRAZOLE SODIUM 40 MG/1
TABLET, DELAYED RELEASE ORAL
Qty: 60 TAB | Refills: 0 | Status: SHIPPED | OUTPATIENT
Start: 2019-08-05 | End: 2019-11-20

## 2019-08-07 RX ORDER — PANTOPRAZOLE SODIUM 40 MG/1
TABLET, DELAYED RELEASE ORAL
Qty: 60 TAB | Refills: 0 | Status: CANCELLED | OUTPATIENT
Start: 2019-08-07

## 2019-08-07 RX ORDER — PANTOPRAZOLE SODIUM 40 MG/1
40 TABLET, DELAYED RELEASE ORAL DAILY
Qty: 60 TAB | Refills: 6 | Status: SHIPPED | OUTPATIENT
Start: 2019-08-07 | End: 2019-11-08 | Stop reason: ALTCHOICE

## 2019-11-08 ENCOUNTER — OFFICE VISIT (OUTPATIENT)
Dept: FAMILY MEDICINE CLINIC | Age: 84
End: 2019-11-08

## 2019-11-08 VITALS
HEART RATE: 60 BPM | DIASTOLIC BLOOD PRESSURE: 60 MMHG | RESPIRATION RATE: 18 BRPM | BODY MASS INDEX: 23.17 KG/M2 | OXYGEN SATURATION: 96 % | TEMPERATURE: 97.2 F | SYSTOLIC BLOOD PRESSURE: 134 MMHG | HEIGHT: 65 IN | WEIGHT: 139.1 LBS

## 2019-11-08 DIAGNOSIS — H54.7 DECREASED VISUAL ACUITY: Primary | ICD-10-CM

## 2019-11-08 DIAGNOSIS — I10 ESSENTIAL HYPERTENSION: ICD-10-CM

## 2019-11-08 DIAGNOSIS — E03.9 HYPOTHYROIDISM, UNSPECIFIED TYPE: ICD-10-CM

## 2019-11-08 RX ORDER — LEVOTHYROXINE SODIUM 100 UG/1
TABLET ORAL
Qty: 30 TAB | Refills: 12 | Status: SHIPPED | OUTPATIENT
Start: 2019-11-08 | End: 2020-11-15

## 2019-11-08 RX ORDER — POTASSIUM CHLORIDE 20 MEQ/1
TABLET, EXTENDED RELEASE ORAL
Qty: 60 TAB | Refills: 12 | Status: SHIPPED | OUTPATIENT
Start: 2019-11-08 | End: 2019-12-04 | Stop reason: SDUPTHER

## 2019-11-08 RX ORDER — SPIRONOLACTONE 25 MG
TABLET ORAL
Refills: 11 | COMMUNITY
Start: 2019-11-03 | End: 2019-11-20 | Stop reason: SDUPTHER

## 2019-11-08 RX ORDER — TRAZODONE HYDROCHLORIDE 150 MG/1
TABLET ORAL
Qty: 30 TAB | Refills: 12 | Status: SHIPPED | OUTPATIENT
Start: 2019-11-08 | End: 2020-11-20

## 2019-11-08 RX ORDER — ATENOLOL 50 MG/1
TABLET ORAL
Qty: 30 TAB | Refills: 12 | Status: SHIPPED | OUTPATIENT
Start: 2019-11-08 | End: 2019-11-25

## 2019-11-08 NOTE — PROGRESS NOTES
Chief Complaint   Patient presents with    Hypertension    Thyroid Problem     Pt here for 6 month follow up.     Pt declined flu vaccine

## 2019-11-08 NOTE — PROGRESS NOTES
HISTORY OF PRESENT ILLNESS  Craig Wallace is a 80 y.o. female. HPI Pt. Comes in for blood pressure check. Needs referal to ophthalmology. ROS    Physical Exam   Constitutional: She appears well-developed and well-nourished. HENT:   Right Ear: External ear normal.   Left Ear: External ear normal.   Mouth/Throat: Oropharynx is clear and moist.   Neck: No thyromegaly present. Cardiovascular: Normal rate, regular rhythm, normal heart sounds and intact distal pulses. Pulmonary/Chest: Breath sounds normal. She has no wheezes. Abdominal: Soft. Bowel sounds are normal. She exhibits no distension and no mass. There is no tenderness. Musculoskeletal: She exhibits no edema. Lymphadenopathy:     She has no cervical adenopathy. Nursing note and vitals reviewed. ASSESSMENT and PLAN  Orders Placed This Encounter    TSH 3RD GENERATION    METABOLIC PANEL, COMPREHENSIVE    CBC WITH AUTOMATED DIFF    REFERRAL TO OPHTHALMOLOGY    levothyroxine (SYNTHROID) 100 mcg tablet    potassium chloride (K-DUR, KLOR-CON) 20 mEq tablet    atenolol (TENORMIN) 50 mg tablet    traZODone (DESYREL) 150 mg tablet     Diagnoses and all orders for this visit:    1. Decreased visual acuity  -     REFERRAL TO OPHTHALMOLOGY    2. Hypothyroidism, unspecified type  -     TSH 3RD GENERATION    3. Essential hypertension  -     METABOLIC PANEL, COMPREHENSIVE  -     CBC WITH AUTOMATED DIFF    Other orders  -     levothyroxine (SYNTHROID) 100 mcg tablet; TAKE ONE TABLET DAILY FOR THYROID  -     potassium chloride (K-DUR, KLOR-CON) 20 mEq tablet; TAKE 1 TABLET BY MOUTH TWICE A DAY. (POTASSIUM)  -     atenolol (TENORMIN) 50 mg tablet; TAKE 1 TABLET BY MOUTH DAILY FOR THE HEART AND BLOOD PRESSURE. -     traZODone (DESYREL) 150 mg tablet; TAKE 1 TABLET BY MOUTH NIGHTLY FOR SLEEP.

## 2019-11-09 LAB
ALBUMIN SERPL-MCNC: 4.4 G/DL (ref 3.5–4.7)
ALBUMIN/GLOB SERPL: 1 {RATIO} (ref 1.2–2.2)
ALP SERPL-CCNC: 66 IU/L (ref 39–117)
ALT SERPL-CCNC: 10 IU/L (ref 0–32)
AST SERPL-CCNC: 26 IU/L (ref 0–40)
BASOPHILS # BLD AUTO: 0 X10E3/UL (ref 0–0.2)
BASOPHILS NFR BLD AUTO: 1 %
BILIRUB SERPL-MCNC: 0.2 MG/DL (ref 0–1.2)
BUN SERPL-MCNC: 20 MG/DL (ref 8–27)
BUN/CREAT SERPL: 18 (ref 12–28)
CALCIUM SERPL-MCNC: 9.7 MG/DL (ref 8.7–10.3)
CHLORIDE SERPL-SCNC: 108 MMOL/L (ref 96–106)
CO2 SERPL-SCNC: 18 MMOL/L (ref 20–29)
CREAT SERPL-MCNC: 1.1 MG/DL (ref 0.57–1)
EOSINOPHIL # BLD AUTO: 0.2 X10E3/UL (ref 0–0.4)
EOSINOPHIL NFR BLD AUTO: 3 %
ERYTHROCYTE [DISTWIDTH] IN BLOOD BY AUTOMATED COUNT: 16.6 % (ref 12.3–15.4)
GLOBULIN SER CALC-MCNC: 4.2 G/DL (ref 1.5–4.5)
GLUCOSE SERPL-MCNC: 90 MG/DL (ref 65–99)
HCT VFR BLD AUTO: 35.6 % (ref 34–46.6)
HGB BLD-MCNC: 10.5 G/DL (ref 11.1–15.9)
IMM GRANULOCYTES # BLD AUTO: 0 X10E3/UL (ref 0–0.1)
IMM GRANULOCYTES NFR BLD AUTO: 0 %
INTERPRETATION: NORMAL
LYMPHOCYTES # BLD AUTO: 2.4 X10E3/UL (ref 0.7–3.1)
LYMPHOCYTES NFR BLD AUTO: 38 %
MCH RBC QN AUTO: 23.7 PG (ref 26.6–33)
MCHC RBC AUTO-ENTMCNC: 29.5 G/DL (ref 31.5–35.7)
MCV RBC AUTO: 80 FL (ref 79–97)
MONOCYTES # BLD AUTO: 0.6 X10E3/UL (ref 0.1–0.9)
MONOCYTES NFR BLD AUTO: 10 %
NEUTROPHILS # BLD AUTO: 2.9 X10E3/UL (ref 1.4–7)
NEUTROPHILS NFR BLD AUTO: 48 %
PLATELET # BLD AUTO: 266 X10E3/UL (ref 150–450)
POTASSIUM SERPL-SCNC: 5 MMOL/L (ref 3.5–5.2)
PROT SERPL-MCNC: 8.6 G/DL (ref 6–8.5)
RBC # BLD AUTO: 4.43 X10E6/UL (ref 3.77–5.28)
SODIUM SERPL-SCNC: 141 MMOL/L (ref 134–144)
TSH SERPL DL<=0.005 MIU/L-ACNC: 1.76 UIU/ML (ref 0.45–4.5)
WBC # BLD AUTO: 6.1 X10E3/UL (ref 3.4–10.8)

## 2019-11-20 ENCOUNTER — HOSPITAL ENCOUNTER (INPATIENT)
Age: 84
LOS: 5 days | Discharge: HOME | DRG: 291 | End: 2019-11-25
Attending: EMERGENCY MEDICINE | Admitting: INTERNAL MEDICINE
Payer: MEDICARE

## 2019-11-20 ENCOUNTER — APPOINTMENT (OUTPATIENT)
Dept: GENERAL RADIOLOGY | Age: 84
DRG: 291 | End: 2019-11-20
Attending: EMERGENCY MEDICINE
Payer: MEDICARE

## 2019-11-20 DIAGNOSIS — I48.21 PERMANENT ATRIAL FIBRILLATION (HCC): ICD-10-CM

## 2019-11-20 DIAGNOSIS — I50.31 ACUTE DIASTOLIC CHF (CONGESTIVE HEART FAILURE) (HCC): ICD-10-CM

## 2019-11-20 DIAGNOSIS — A41.9 SEPSIS, DUE TO UNSPECIFIED ORGANISM, UNSPECIFIED WHETHER ACUTE ORGAN DYSFUNCTION PRESENT (HCC): ICD-10-CM

## 2019-11-20 DIAGNOSIS — J96.01 ACUTE RESPIRATORY FAILURE WITH HYPOXIA (HCC): Primary | ICD-10-CM

## 2019-11-20 DIAGNOSIS — J18.9 PNEUMONIA OF RIGHT LOWER LOBE DUE TO INFECTIOUS ORGANISM: ICD-10-CM

## 2019-11-20 PROBLEM — I50.9 CHF (CONGESTIVE HEART FAILURE) (HCC): Status: ACTIVE | Noted: 2019-11-20

## 2019-11-20 LAB
ALBUMIN SERPL-MCNC: 3.3 G/DL (ref 3.5–5)
ALBUMIN/GLOB SERPL: 0.6 {RATIO} (ref 1.1–2.2)
ALP SERPL-CCNC: 70 U/L (ref 45–117)
ALT SERPL-CCNC: 12 U/L (ref 12–78)
ANION GAP SERPL CALC-SCNC: 8 MMOL/L (ref 5–15)
AST SERPL-CCNC: 23 U/L (ref 15–37)
ATRIAL RATE: 108 BPM
BASOPHILS # BLD: 0 K/UL (ref 0–0.1)
BASOPHILS NFR BLD: 0 % (ref 0–1)
BILIRUB SERPL-MCNC: 1.2 MG/DL (ref 0.2–1)
BNP SERPL-MCNC: 3113 PG/ML
BUN SERPL-MCNC: 18 MG/DL (ref 6–20)
BUN/CREAT SERPL: 15 (ref 12–20)
CALCIUM SERPL-MCNC: 8.7 MG/DL (ref 8.5–10.1)
CALCULATED R AXIS, ECG10: -37 DEGREES
CALCULATED T AXIS, ECG11: 90 DEGREES
CHLORIDE SERPL-SCNC: 101 MMOL/L (ref 97–108)
CK SERPL-CCNC: 48 U/L (ref 26–192)
CO2 SERPL-SCNC: 23 MMOL/L (ref 21–32)
CREAT SERPL-MCNC: 1.22 MG/DL (ref 0.55–1.02)
DIAGNOSIS, 93000: NORMAL
DIFFERENTIAL METHOD BLD: ABNORMAL
EOSINOPHIL # BLD: 0 K/UL (ref 0–0.4)
EOSINOPHIL NFR BLD: 0 % (ref 0–7)
ERYTHROCYTE [DISTWIDTH] IN BLOOD BY AUTOMATED COUNT: 18.6 % (ref 11.5–14.5)
GLOBULIN SER CALC-MCNC: 6 G/DL (ref 2–4)
GLUCOSE SERPL-MCNC: 117 MG/DL (ref 65–100)
HCT VFR BLD AUTO: 35.4 % (ref 35–47)
HGB BLD-MCNC: 10.6 G/DL (ref 11.5–16)
IMM GRANULOCYTES # BLD AUTO: 0.1 K/UL (ref 0–0.04)
IMM GRANULOCYTES NFR BLD AUTO: 1 % (ref 0–0.5)
LACTATE BLD-SCNC: 1.83 MMOL/L (ref 0.4–2)
LIPASE SERPL-CCNC: 71 U/L (ref 73–393)
LYMPHOCYTES # BLD: 1.4 K/UL (ref 0.8–3.5)
LYMPHOCYTES NFR BLD: 13 % (ref 12–49)
MCH RBC QN AUTO: 24.5 PG (ref 26–34)
MCHC RBC AUTO-ENTMCNC: 29.9 G/DL (ref 30–36.5)
MCV RBC AUTO: 81.8 FL (ref 80–99)
MONOCYTES # BLD: 1 K/UL (ref 0–1)
MONOCYTES NFR BLD: 9 % (ref 5–13)
NEUTS SEG # BLD: 8.8 K/UL (ref 1.8–8)
NEUTS SEG NFR BLD: 77 % (ref 32–75)
NRBC # BLD: 0 K/UL (ref 0–0.01)
NRBC BLD-RTO: 0 PER 100 WBC
PLATELET # BLD AUTO: 177 K/UL (ref 150–400)
PMV BLD AUTO: 12.1 FL (ref 8.9–12.9)
POTASSIUM SERPL-SCNC: 4 MMOL/L (ref 3.5–5.1)
PROT SERPL-MCNC: 9.3 G/DL (ref 6.4–8.2)
Q-T INTERVAL, ECG07: 352 MS
QRS DURATION, ECG06: 120 MS
QTC CALCULATION (BEZET), ECG08: 471 MS
RBC # BLD AUTO: 4.33 M/UL (ref 3.8–5.2)
SODIUM SERPL-SCNC: 132 MMOL/L (ref 136–145)
TROPONIN I SERPL-MCNC: <0.05 NG/ML
VENTRICULAR RATE, ECG03: 108 BPM
WBC # BLD AUTO: 11.3 K/UL (ref 3.6–11)

## 2019-11-20 PROCEDURE — 77010033678 HC OXYGEN DAILY

## 2019-11-20 PROCEDURE — 83690 ASSAY OF LIPASE: CPT

## 2019-11-20 PROCEDURE — 65660000000 HC RM CCU STEPDOWN

## 2019-11-20 PROCEDURE — 36415 COLL VENOUS BLD VENIPUNCTURE: CPT

## 2019-11-20 PROCEDURE — 96365 THER/PROPH/DIAG IV INF INIT: CPT

## 2019-11-20 PROCEDURE — 74011000250 HC RX REV CODE- 250: Performed by: INTERNAL MEDICINE

## 2019-11-20 PROCEDURE — 84484 ASSAY OF TROPONIN QUANT: CPT

## 2019-11-20 PROCEDURE — 83880 ASSAY OF NATRIURETIC PEPTIDE: CPT

## 2019-11-20 PROCEDURE — 80053 COMPREHEN METABOLIC PANEL: CPT

## 2019-11-20 PROCEDURE — 94640 AIRWAY INHALATION TREATMENT: CPT

## 2019-11-20 PROCEDURE — 74011250637 HC RX REV CODE- 250/637: Performed by: INTERNAL MEDICINE

## 2019-11-20 PROCEDURE — 83605 ASSAY OF LACTIC ACID: CPT

## 2019-11-20 PROCEDURE — 74011250637 HC RX REV CODE- 250/637: Performed by: EMERGENCY MEDICINE

## 2019-11-20 PROCEDURE — 74011000258 HC RX REV CODE- 258: Performed by: EMERGENCY MEDICINE

## 2019-11-20 PROCEDURE — 82550 ASSAY OF CK (CPK): CPT

## 2019-11-20 PROCEDURE — 71046 X-RAY EXAM CHEST 2 VIEWS: CPT

## 2019-11-20 PROCEDURE — 99285 EMERGENCY DEPT VISIT HI MDM: CPT

## 2019-11-20 PROCEDURE — 74011000250 HC RX REV CODE- 250: Performed by: EMERGENCY MEDICINE

## 2019-11-20 PROCEDURE — 74011250636 HC RX REV CODE- 250/636: Performed by: INTERNAL MEDICINE

## 2019-11-20 PROCEDURE — 93005 ELECTROCARDIOGRAM TRACING: CPT

## 2019-11-20 PROCEDURE — 87040 BLOOD CULTURE FOR BACTERIA: CPT

## 2019-11-20 PROCEDURE — 74011250636 HC RX REV CODE- 250/636: Performed by: EMERGENCY MEDICINE

## 2019-11-20 PROCEDURE — 77030029684 HC NEB SM VOL KT MONA -A

## 2019-11-20 PROCEDURE — 85025 COMPLETE CBC W/AUTO DIFF WBC: CPT

## 2019-11-20 RX ORDER — PANTOPRAZOLE SODIUM 40 MG/1
40 TABLET, DELAYED RELEASE ORAL
Status: DISCONTINUED | OUTPATIENT
Start: 2019-11-21 | End: 2019-11-20

## 2019-11-20 RX ORDER — ALBUTEROL SULFATE 0.83 MG/ML
5 SOLUTION RESPIRATORY (INHALATION)
Status: COMPLETED | OUTPATIENT
Start: 2019-11-20 | End: 2019-11-20

## 2019-11-20 RX ORDER — GUAIFENESIN 100 MG/5ML
400 SOLUTION ORAL 3 TIMES DAILY
Status: DISCONTINUED | OUTPATIENT
Start: 2019-11-20 | End: 2019-11-25 | Stop reason: HOSPADM

## 2019-11-20 RX ORDER — IPRATROPIUM BROMIDE AND ALBUTEROL SULFATE 2.5; .5 MG/3ML; MG/3ML
3 SOLUTION RESPIRATORY (INHALATION)
Status: DISCONTINUED | OUTPATIENT
Start: 2019-11-20 | End: 2019-11-25 | Stop reason: HOSPADM

## 2019-11-20 RX ORDER — LEVOTHYROXINE SODIUM 100 UG/1
100 TABLET ORAL
Status: DISCONTINUED | OUTPATIENT
Start: 2019-11-21 | End: 2019-11-25 | Stop reason: HOSPADM

## 2019-11-20 RX ORDER — AZITHROMYCIN 250 MG/1
500 TABLET, FILM COATED ORAL DAILY
Status: COMPLETED | OUTPATIENT
Start: 2019-11-21 | End: 2019-11-24

## 2019-11-20 RX ORDER — ATENOLOL 25 MG/1
50 TABLET ORAL DAILY
Status: DISCONTINUED | OUTPATIENT
Start: 2019-11-21 | End: 2019-11-23

## 2019-11-20 RX ORDER — METOPROLOL TARTRATE 5 MG/5ML
5 INJECTION INTRAVENOUS
Status: DISCONTINUED | OUTPATIENT
Start: 2019-11-20 | End: 2019-11-25 | Stop reason: HOSPADM

## 2019-11-20 RX ORDER — ONDANSETRON 2 MG/ML
4 INJECTION INTRAMUSCULAR; INTRAVENOUS
Status: DISCONTINUED | OUTPATIENT
Start: 2019-11-20 | End: 2019-11-25 | Stop reason: HOSPADM

## 2019-11-20 RX ORDER — FLUTICASONE PROPIONATE 50 MCG
2 SPRAY, SUSPENSION (ML) NASAL DAILY
Status: DISCONTINUED | OUTPATIENT
Start: 2019-11-21 | End: 2019-11-25 | Stop reason: HOSPADM

## 2019-11-20 RX ORDER — AZITHROMYCIN 250 MG/1
500 TABLET, FILM COATED ORAL
Status: COMPLETED | OUTPATIENT
Start: 2019-11-20 | End: 2019-11-20

## 2019-11-20 RX ORDER — DABIGATRAN ETEXILATE 75 MG/1
75 CAPSULE ORAL EVERY 12 HOURS
Status: DISCONTINUED | OUTPATIENT
Start: 2019-11-20 | End: 2019-11-25 | Stop reason: HOSPADM

## 2019-11-20 RX ORDER — GUAIFENESIN 100 MG/5ML
100 SOLUTION ORAL
Status: DISCONTINUED | OUTPATIENT
Start: 2019-11-20 | End: 2019-11-20

## 2019-11-20 RX ORDER — AZITHROMYCIN 250 MG/1
500 TABLET, FILM COATED ORAL DAILY
Status: DISCONTINUED | OUTPATIENT
Start: 2019-11-21 | End: 2019-11-20

## 2019-11-20 RX ORDER — ACETAMINOPHEN 325 MG/1
650 TABLET ORAL
Status: DISCONTINUED | OUTPATIENT
Start: 2019-11-20 | End: 2019-11-25 | Stop reason: HOSPADM

## 2019-11-20 RX ORDER — GUAIFENESIN 600 MG/1
600 TABLET, EXTENDED RELEASE ORAL ONCE
Status: COMPLETED | OUTPATIENT
Start: 2019-11-20 | End: 2019-11-20

## 2019-11-20 RX ORDER — FUROSEMIDE 10 MG/ML
40 INJECTION INTRAMUSCULAR; INTRAVENOUS DAILY
Status: DISCONTINUED | OUTPATIENT
Start: 2019-11-20 | End: 2019-11-23

## 2019-11-20 RX ADMIN — GUAIFENESIN 400 MG: 200 SOLUTION ORAL at 23:26

## 2019-11-20 RX ADMIN — GUAIFENESIN 600 MG: 600 TABLET, EXTENDED RELEASE ORAL at 13:35

## 2019-11-20 RX ADMIN — AZITHROMYCIN MONOHYDRATE 500 MG: 250 TABLET ORAL at 12:18

## 2019-11-20 RX ADMIN — GUAIFENESIN 400 MG: 200 SOLUTION ORAL at 17:20

## 2019-11-20 RX ADMIN — FUROSEMIDE 40 MG: 10 INJECTION, SOLUTION INTRAMUSCULAR; INTRAVENOUS at 17:13

## 2019-11-20 RX ADMIN — ALBUTEROL SULFATE 5 MG: 2.5 SOLUTION RESPIRATORY (INHALATION) at 12:57

## 2019-11-20 RX ADMIN — CEFTRIAXONE 1 G: 1 INJECTION, POWDER, FOR SOLUTION INTRAMUSCULAR; INTRAVENOUS at 12:18

## 2019-11-20 RX ADMIN — TRAZODONE HYDROCHLORIDE 150 MG: 100 TABLET ORAL at 23:26

## 2019-11-20 RX ADMIN — DABIGATRAN ETEXILATE MESYLATE 75 MG: 75 CAPSULE ORAL at 21:58

## 2019-11-20 RX ADMIN — SODIUM CHLORIDE 1000 ML: 900 INJECTION, SOLUTION INTRAVENOUS at 12:18

## 2019-11-20 RX ADMIN — IPRATROPIUM BROMIDE AND ALBUTEROL SULFATE 3 ML: .5; 3 SOLUTION RESPIRATORY (INHALATION) at 20:50

## 2019-11-20 NOTE — PROGRESS NOTES
Pharmacy Clarification of the Prior to Admission Medication Regimen Retrospective to the Admission Medication Reconciliation    The patient was interviewed regarding clarification of the prior to admission medication regimen. Patient's granddaughter was present in room and obtained permission from patient to discuss drug regimen with visitor(s) present. Patient was questioned regarding use of any other inhalers, topical products, over the counter medications, herbal medications, vitamin products or ophthalmic/nasal/otic medication use. Information Obtained From: RX Query, Patient    Recommendations/Findings: The following amendments were made to the patient's active medication list on file at [facility]:     1) Additions: None    2) Removals: Ondansetron  Pantoprazole     3) Changes:  fluticasone propionate (FLONASE) 50 mcg/actuation nasal spray (Old regimen: no sig /New regimen: 2 sprays each nostril daily)    4) Pertinent Pharmacy Findings: None     PTA medication list was corrected to the following:     Prior to Admission Medications   Prescriptions Last Dose Informant Patient Reported? Taking? CALCIUM 600 + D tablet 2019 at Unknown time Self No Yes   Sig: TAKE 1 TABLET TWICE A DAY. (CALCIUM SUPPLEMENT)   acetaminophen (TYLENOL ARTHRITIS PAIN) 650 mg TbER 2019 at Unknown time Self Yes Yes   Sig: Take 650 mg by mouth every eight (8) hours. atenolol (TENORMIN) 50 mg tablet 2019 at Unknown time Self No Yes   Sig: TAKE 1 TABLET BY MOUTH DAILY FOR THE HEART AND BLOOD PRESSURE.   dabigatran etexilate (PRADAXA) 75 mg capsule 2019 at Unknown time Self No Yes   Sig: TAKE ONE CAPSULE BY MOUTH EVERY 12 HOURS   diphenhydrAMINE (BENADRYL) 25 mg capsule 2019 at Unknown time Self Yes Yes   Sig: Take 25 mg by mouth every six (6) hours as needed.    fluticasone propionate (FLONASE) 50 mcg/actuation nasal spray 2019 at Unknown time Self Yes Yes   Si Sprays by Both Nostrils route daily.   levothyroxine (SYNTHROID) 100 mcg tablet 11/19/2019 at Unknown time Self No Yes   Sig: TAKE ONE TABLET DAILY FOR THYROID   potassium chloride (K-DUR, KLOR-CON) 20 mEq tablet 11/19/2019 at Unknown time Self No Yes   Sig: TAKE 1 TABLET BY MOUTH TWICE A DAY. (POTASSIUM)   traZODone (DESYREL) 150 mg tablet 11/19/2019 at Unknown time Self No Yes   Sig: TAKE 1 TABLET BY MOUTH NIGHTLY FOR SLEEP.       Facility-Administered Medications: None          Thank you,  Heaven Higgins  Medication History Pharmacy Technician

## 2019-11-20 NOTE — ED NOTES
Patient arrives to ED with complaints of cough, c/p and SOB. Patient placed on the monitor x3. In no apparent distress. Family at bedside.

## 2019-11-20 NOTE — ACP (ADVANCE CARE PLANNING)
Advance Care Planning Note      NAME: Chanel Gorman   :  1930   MRN:  [id number]     Date/Time:  2019 4:28 PM    Active Diagnoses:  Hospital Problems  Date Reviewed: 2019          Codes Class Noted POA    Acute hypoxemic respiratory failure [facility]) ICD-10-CM: J96.01  ICD-9-CM: 518.81  2019 Unknown        Pneumonia ICD-10-CM: J18.9  ICD-9-CM: 216  2019 Unknown        CHF (congestive heart failure) ([facility] [address] ICD-10-CM: I50.9  ICD-9-CM: 428.0  2019 Unknown              These active diagnoses are of sufficient risk that focused discussion on advance care planning is indicated in order to allow the patient to thoughtfully consider personal goals of care, and if situations arise that prevent the ability to personally give input, to ensure appropriate representation of their personal desires for different levels and aggressiveness of care. Discussion:   Code status addressed and wants to be a DNR / DNI. Patient does not central line and vasopressors if needed. Patient would not want a feeding tube, if needed, for nutritional support. Patient  would like to assign [facility]  [phone] as the surrogate decision maker. Persons present and participating in discussion: Heather Waite MD, [facility] ([phone])      Time Spent:   Total time spent face-to-face in education and discussion:   16  minutes.          Vazquez Valladares MD   Hospitalist

## 2019-11-20 NOTE — H&P
Hospitalist Admission Note    NAME: Hailee Jolly   :  1930   MRN:  [id number]     Date/Time:  2019 3:44 PM    Patient PCP: Pk Way MD  ______________________________________________________________________  Given the patient's current clinical presentation, I have a high level of concern for decompensation if discharged from the emergency department. Complex decision making was performed, which includes reviewing the patient's available past medical records, laboratory results, and x-ray films. My assessment of this patient's clinical condition and my plan of care is as follows. Assessment / Plan:  Acute Hypoxic Respiratory Failure POA: O2 sat 86% on RA, due to PNA and CHF, will c/w supplemental O2. Sepsis POA: tachycardia, leukocytosis, start ABX. Right hilar Pneumonia: start CTX/Z-max, bronchodilators, mucolytics, check sputum. Acute Diastolic Heart Failure POA: start diuresis, check ECHO, monitor I/O and weight, get Cardiology evaluation. Hypothyroidism: c/w L-thyroxine. HTN: c/w Atenolol, use metoprolol prn. A. Fib: rate controlled on Atenolol, c/w Pradaxa, get Cardiology input. Code Status: DNR as per patient  Surrogate Decision Maker: DTR Dallas Lorenz [phone] or [address] 120 [id number]  DVT Prophylaxis: Pradaxa  GI Prophylaxis: not indicated  Baseline: Independent is Confucianism (no blood products)      Subjective:   CHIEF COMPLAINT: \"I feel weak and tired\"    HISTORY OF PRESENT ILLNESS:     Kamila Diehl is a 80 y.o.  female  with past medical history of HTN, A. Fib, Hypothyroidism, comes to ER due to not feeling well for the past 3 days, patient was feeling weak and tired with abdominal discomfort, SOB and cough  Pt denies any other alleviating or exacerbating factors.  Additionally, pt specifically denies any recent fever, chills, headache, nausea, vomiting, abdominal pain, CP, lightheadedness, dizziness, numbness, weakness, BLE swelling, heart palpitations, urinary sxs, diarrhea, constipation, melena, hematochezia, cough, or congestion. At this time patient is lying in bed c/o feeling weak and tired, she is Confucianist and is on Pradaxa for A. Fib, denies chest pain, no fever, no N/V no diarrhea, no urinary symptoms, no other associated symptoms. We were asked to admit for work up and evaluation of the above problems. Past Medical History:   Diagnosis Date    Arrhythmia     atrial fibrillation, chronic. Stress test 2011 essent. normal.  Mild-mod AR on ECHO    Atrial fibrillation (HCC)     GI bleeding     Hypertension     Refusal of blood transfusions as patient is Congregational     Thyroid disease     hypothyroid        Past Surgical History:   Procedure Laterality Date    COLONOSCOPY N/A 5/2/2017    COLONOSCOPY performed by Karen Colmenares MD at [facility] ENDOSCOPY    ECHO TRANSESOPHAGEAL (DENISE) W OR WO CONTRAST  4/27/2011         HX GYN      vaginal deliveries x10    HX TUBAL LIGATION      AL CARDIOVERSION ELECTIVE ARRHYTHMIA EXTERNAL  6/14/2011            Social History     Tobacco Use    Smoking status: Never Smoker    Smokeless tobacco: Never Used   Substance Use Topics    Alcohol use: No        Family History   Problem Relation Age of Onset    Heart Disease Father     Cancer Son     Heart Disease Son      Allergies   Allergen Reactions    Seroquel [Quetiapine] Other (comments)     PSYCOLOGICAL REACTION Pt didn't feel like them selves but felt like a different person, iT DEEPLY DISTURBED PT.  PT BECAME IRRITABLE.  Ambien [Zolpidem] Other (comments)     Couldn't move        Prior to Admission medications    Medication Sig Start Date End Date Taking?  Authorizing Provider   CALCIUM 600 WITH VITAMIN D3 600 mg(1,500mg) -400 unit chew  11/3/19   Provider, Historical   levothyroxine (SYNTHROID) 100 mcg tablet TAKE ONE TABLET DAILY FOR THYROID 11/8/19   Pk Swann MD   potassium chloride (K-DUR, KLOR-CON) 20 mEq tablet TAKE 1 TABLET BY MOUTH TWICE A DAY. (POTASSIUM) 11/8/19   Maryse Jacobs MD   atenolol (TENORMIN) 50 mg tablet TAKE 1 TABLET BY MOUTH DAILY FOR THE HEART AND BLOOD PRESSURE. 11/8/19   Maryse Jacobs MD   traZODone (DESYREL) 150 mg tablet TAKE 1 TABLET BY MOUTH NIGHTLY FOR SLEEP. 11/8/19   Maryse Jacobs MD   pantoprazole (PROTONIX) 40 mg tablet TAKE 1 TABLET BY MOUTH TWICE DAILY, FOR INDIGESTION AND GASTRITIS. 8/5/19   Maryse Jacobs MD   fluticasone propionate [facility]) 50 mcg/actuation nasal spray  5/3/19   Provider, Historical   diphenhydrAMINE (BENADRYL) 25 mg capsule Take 25 mg by mouth every six (6) hours as needed. Provider, Historical   acetaminophen (TYLENOL ARTHRITIS PAIN) 650 mg TbER Take 650 mg by mouth every eight (8) hours. Provider, Historical   dabigatran etexilate (PRADAXA) 75 mg capsule TAKE ONE CAPSULE BY MOUTH EVERY 12 HOURS 11/8/18   Maryse Jacobs MD   ondansetron hcl (ZOFRAN, AS HYDROCHLORIDE,) 4 mg tablet Take 1 Tab by mouth every eight (8) hours as needed for Nausea. 12/11/17   Donald Hall MD   CALCIUM 600 + D tablet TAKE 1 TABLET TWICE A DAY. (CALCIUM SUPPLEMENT) 10/2/17   Maryse Jacobs MD       REVIEW OF SYSTEMS:     I am not able to complete the review of systems because:    The patient is intubated and sedated    The patient has altered mental status due to his acute medical problems    The patient has baseline aphasia from prior stroke(s)    The patient has baseline dementia and is not reliable historian    The patient is in acute medical distress and unable to provide information           Total of 12 systems reviewed as follows:       POSITIVE= underlined text  Negative = text not underlined  General:  fever, chills, sweats, generalized weakness, weight loss/gain,      loss of appetite   Eyes:    blurred vision, eye pain, loss of vision, double vision  ENT:    rhinorrhea, pharyngitis   Respiratory:   cough, sputum production, SOB, HILTON, wheezing, pleuritic pain   Cardiology:   chest pain, palpitations, orthopnea, PND, edema, syncope   Gastrointestinal:  abdominal pain , N/V, diarrhea, dysphagia, constipation, bleeding   Genitourinary:  frequency, urgency, dysuria, hematuria, incontinence   Muskuloskeletal :  arthralgia, myalgia, back pain  Hematology:  easy bruising, nose or gum bleeding, lymphadenopathy   Dermatological: rash, ulceration, pruritis, color change / jaundice  Endocrine:   hot flashes or polydipsia   Neurological:  headache, dizziness, confusion, focal weakness, paresthesia,     Speech difficulties, memory loss, gait difficulty  Psychological: Feelings of anxiety, depression, agitation    Objective:   VITALS:    Visit Vitals  BP (!) 133/96   Pulse 100   Temp 99.1 °F (37.3 °C)   Resp 24   Ht 5' 2\" (1.575 m)   Wt 63 kg (139 lb)   SpO2 94%   BMI 25.42 kg/m²       PHYSICAL EXAM:    General:    Alert, cooperative, no distress, appears stated age. HEENT: Atraumatic, anicteric sclerae, pink conjunctivae     No oral ulcers, mucosa moist, throat clear, dentition poor  Neck:  Supple, symmetrical,  thyroid: non tender  Lungs:   Coarse BS, crackle sin both sides  Chest wall:  No tenderness  No Accessory muscle use. Heart:   Regular  rhythm,  No  murmur   Trace  edema  Abdomen:   Soft, non-tender. Not distended. Bowel sounds normal  Extremities: No cyanosis. No clubbing,      Skin turgor normal, Capillary refill normal, Radial  pulse 2+  Skin:     Not pale. Not Jaundiced  No rashes   Psych:  Good insight. Not depressed. Not anxious or agitated. Neurologic: EOMs intact. No facial asymmetry. No aphasia or slurred speech. Symmetrical strength, Sensation grossly intact.  Alert and oriented X 4.     _______________________________________________________________________  Care Plan discussed with:    Comments   Patient y    Family  y [id number] Bowser RN y    Care Manager                    Consultant: _______________________________________________________________________  Expected  Disposition:   Home with Family    HH/PT/OT/RN y   SNF/LTC    [facility]    ________________________________________________________________________  TOTAL TIME:  61 Minutes    Critical Care Provided     Minutes non procedure based      Comments    y Reviewed previous records   >50% of visit spent in counseling and coordination of care y Discussion with patient and/or family and questions answered       ________________________________________________________________________  Signed: Genoveva Lackey MD    Procedures: see electronic medical records for all procedures/Xrays and details which were not copied into this note but were reviewed prior to creation of Plan. LAB DATA REVIEWED:    Recent Results (from the past 24 hour(s))   CBC WITH AUTOMATED DIFF    Collection Time: 11/20/19  9:12 AM   Result Value Ref Range    WBC 11.3 (H) 3.6 - 11.0 K/uL    RBC 4.33 3.80 - 5.20 M/uL    HGB 10.6 (L) 11.5 - 16.0 g/dL    HCT 35.4 35.0 - 47.0 %    MCV 81.8 80.0 - 99.0 FL    MCH 24.5 (L) 26.0 - 34.0 PG    MCHC 29.9 (L) 30.0 - 36.5 g/dL    RDW 18.6 (H) 11.5 - 14.5 %    PLATELET 970 180 - 255 K/uL    MPV 12.1 8.9 - 12.9 FL    NRBC 0.0 0  WBC    ABSOLUTE NRBC 0.00 0.00 - 0.01 K/uL    NEUTROPHILS 77 (H) 32 - 75 %    LYMPHOCYTES 13 12 - 49 %    MONOCYTES 9 5 - 13 %    EOSINOPHILS 0 0 - 7 %    BASOPHILS 0 0 - 1 %    IMMATURE GRANULOCYTES 1 (H) 0.0 - 0.5 %    ABS. NEUTROPHILS 8.8 (H) 1.8 - 8.0 K/UL    ABS. LYMPHOCYTES 1.4 0.8 - 3.5 K/UL    ABS. MONOCYTES 1.0 0.0 - 1.0 K/UL    ABS. EOSINOPHILS 0.0 0.0 - 0.4 K/UL    ABS. BASOPHILS 0.0 0.0 - 0.1 K/UL    ABS. IMM.  GRANS. 0.1 (H) 0.00 - 0.04 K/UL    DF AUTOMATED     METABOLIC PANEL, COMPREHENSIVE    Collection Time: 11/20/19  9:12 AM   Result Value Ref Range    Sodium 132 (L) 136 - 145 mmol/L    Potassium 4.0 3.5 - 5.1 mmol/L    Chloride 101 97 - 108 mmol/L    CO2 23 21 - 32 mmol/L    Anion gap 8 5 - 15 mmol/L    Glucose 117 (H) 65 - 100 mg/dL    BUN 18 6 - 20 MG/DL    Creatinine 1.22 (H) 0.55 - 1.02 MG/DL    BUN/Creatinine ratio 15 12 - 20      GFR est AA 50 (L) >60 ml/min/1.73m2    GFR est non-AA 42 (L) >60 ml/min/1.73m2    Calcium 8.7 8.5 - 10.1 MG/DL    Bilirubin, total 1.2 (H) 0.2 - 1.0 MG/DL    ALT (SGPT) 12 12 - 78 U/L    AST (SGOT) 23 15 - 37 U/L    Alk.  phosphatase 70 45 - 117 U/L    Protein, total 9.3 (H) 6.4 - 8.2 g/dL    Albumin 3.3 (L) 3.5 - 5.0 g/dL    Globulin 6.0 (H) 2.0 - 4.0 g/dL    A-G Ratio 0.6 (L) 1.1 - 2.2     CK W/ REFLX CKMB    Collection Time: 11/20/19  9:12 AM   Result Value Ref Range    CK 48 26 - 192 U/L   TROPONIN I    Collection Time: 11/20/19  9:12 AM   Result Value Ref Range    Troponin-I, Qt. <0.05 <0.05 ng/mL   LIPASE    Collection Time: 11/20/19  9:12 AM   Result Value Ref Range    Lipase 71 (L) 73 - 393 U/L   EKG, 12 LEAD, INITIAL    Collection Time: 11/20/19  9:16 AM   Result Value Ref Range    Ventricular Rate 108 BPM    Atrial Rate 108 BPM    QRS Duration 120 ms    Q-T Interval 352 ms    QTC Calculation (Bezet) 471 ms    Calculated R Axis -37 degrees    Calculated T Axis 90 degrees    Diagnosis       Atrial fibrillation  Left axis deviation  Left ventricular hypertrophy with QRS widening and repolarization abnormality  Confirmed by Sindy Trejo ([id number]) on 11/20/2019 2:06:12 PM     NT-PRO BNP    Collection Time: 11/20/19 11:15 AM   Result Value Ref Range    NT pro-BNP 3,113 (H) <450 PG/ML   POC LACTIC ACID    Collection Time: 11/20/19 11:55 AM   Result Value Ref Range    Lactic Acid (POC) 1.83 0.40 - 2.00 mmol/L

## 2019-11-20 NOTE — ED PROVIDER NOTES
EMERGENCY DEPARTMENT HISTORY AND PHYSICAL EXAM      Please note that this dictation was completed with [employer], the computer voice recognition software. Quite often unanticipated grammatical, syntax, homophones, and other interpretive errors are inadvertently transcribed by the computer software. Please disregard these errors and any errors that have escaped final proofreading. Thank you. Date: 11/20/2019  Patient Name: Claudeen Mitts  Patient Age and Sex: 80 y.o. female    History of Presenting Illness     Chief Complaint   Patient presents with    Cough     pt reports cough, chest pain and shortness of breath x2 days, when asking pt where pain is she points to abdomen, denies pain at this time    Chest Pain    Shortness of Breath       History Provided By: Patient and EMS    HPI: Claudeen Mitts, 80 y.o. female with past medical history as documented below presents to the ED with c/o of 2-3 days of cough, chest pain and SOB. She reports cough has been productive of yellowish sputum. She reports two days of chest tightness, worst with cough and exertion. She reports feeling fatigued overall. She does take Pradaxa for Afib and denies any s/s of bleeding. Pt denies any other alleviating or exacerbating factors. Additionally, pt specifically denies any recent fever, chills, headache, nausea, vomiting, abdominal pain, lightheadedness, dizziness, numbness, weakness, BLE swelling, heart palpitations, urinary sxs, diarrhea, constipation, melena, hematochezia. There are no other complaints, changes or physical findings at this time. PCP: Anna Gill MD    Past History   Past Medical History:  Past Medical History:   Diagnosis Date    Arrhythmia     atrial fibrillation, chronic. Stress test 2011 [facility].  normal.  Mild-mod AR on ECHO    Atrial fibrillation (HCC)     GI bleeding     Hypertension     Refusal of blood transfusions as patient is Islam     Thyroid disease     hypothyroid Past Surgical History:  Past Surgical History:   Procedure Laterality Date    COLONOSCOPY N/A 5/2/2017    COLONOSCOPY performed by Fr[facility] Helm [facility] at [facility] ENDOSCOPY    ECHO TRANSESOPHAGEAL (DENISE) W OR WO CONTRAST  4/27/2011         HX GYN      vaginal deliveries x10    HX TUBAL LIGATION      SC CARDIOVERSION ELECTIVE ARRHYTHMIA EXTERNAL  6/14/2011            Family History:  Family History   Problem Relation Age of Onset    Heart Disease Father     Cancer Son     Heart Disease Son        Social History:  Social History     Tobacco Use    Smoking status: Never Smoker    Smokeless tobacco: Never Used   Substance Use Topics    Alcohol use: No    Drug use: No       Allergies: Allergies   Allergen Reactions    Seroquel [Quetiapine] Other (comments)     PSYCOLOGICAL REACTION Pt didn't feel like them selves but felt like a different person, iT DEEPLY DISTURBED PT.  PT BECAME IRRITABLE.  Ambien [Zolpidem] Other (comments)     Couldn't move       Current Medications:  No current facility-administered medications on file prior to encounter. Current Outpatient Medications on File Prior to Encounter   Medication Sig Dispense Refill    CALCIUM 600 WITH VITAMIN D3 600 mg(1,500mg) -400 unit chew   11    levothyroxine (SYNTHROID) 100 mcg tablet TAKE ONE TABLET DAILY FOR THYROID 30 Tab 12    potassium chloride (K-DUR, KLOR-CON) 20 mEq tablet TAKE 1 TABLET BY MOUTH TWICE A DAY. (POTASSIUM) 60 Tab 12    atenolol (TENORMIN) 50 mg tablet TAKE 1 TABLET BY MOUTH DAILY FOR THE HEART AND BLOOD PRESSURE. 30 Tab 12    traZODone (DESYREL) 150 mg tablet TAKE 1 TABLET BY MOUTH NIGHTLY FOR SLEEP. 30 Tab 12    pantoprazole (PROTONIX) 40 mg tablet TAKE 1 TABLET BY MOUTH TWICE DAILY, FOR INDIGESTION AND GASTRITIS. 60 Tab 0    fluticasone propionate (FLONASE) 50 mcg/actuation nasal spray       diphenhydrAMINE (BENADRYL) 25 mg capsule Take 25 mg by mouth every six (6) hours as needed.       acetaminophen (TYLENOL ARTHRITIS PAIN) 650 mg TbER Take 650 mg by mouth every eight (8) hours.  dabigatran etexilate (PRADAXA) 75 mg capsule TAKE ONE CAPSULE BY MOUTH EVERY 12 HOURS 60 Cap 12    ondansetron hcl (ZOFRAN, AS HYDROCHLORIDE,) 4 mg tablet Take 1 Tab by mouth every eight (8) hours as needed for Nausea. 20 Tab 0    CALCIUM 600 + D tablet TAKE 1 TABLET TWICE A DAY. (CALCIUM SUPPLEMENT) 60 Tab 0       Review of Systems   Review of Systems   Constitutional: Negative. Negative for chills and fever. HENT: Positive for congestion. Negative for facial swelling, rhinorrhea, sore throat, trouble swallowing and voice change. Eyes: Negative. Respiratory: Positive for cough and shortness of breath. Negative for apnea, chest tightness and wheezing. Cardiovascular: Positive for chest pain. Negative for palpitations and leg swelling. Gastrointestinal: Negative. Negative for abdominal distention, abdominal pain, blood in stool, constipation, diarrhea, nausea and vomiting. Endocrine: Negative. Negative for cold intolerance, heat intolerance and polyuria. Genitourinary: Negative. Negative for difficulty urinating, dysuria, flank pain, frequency, hematuria and urgency. Musculoskeletal: Negative. Negative for arthralgias, back pain, myalgias, neck pain and neck stiffness. Skin: Negative. Negative for color change and rash. Neurological: Negative. Negative for dizziness, syncope, facial asymmetry, speech difficulty, weakness, light-headedness, numbness and headaches. Hematological: Negative. Does not bruise/bleed easily. Psychiatric/Behavioral: Negative. Negative for confusion and self-injury. The patient is not nervous/anxious. Physical Exam   Physical Exam  Vitals signs and nursing note reviewed. Constitutional:       General: She is in acute distress. Appearance: She is well-developed. She is ill-appearing, toxic-appearing and diaphoretic. HENT:      Head: Normocephalic and atraumatic. Mouth/Throat:      Pharynx: No oropharyngeal exudate. Eyes:      Conjunctiva/sclera: Conjunctivae normal.      Pupils: Pupils are equal, round, and reactive to light. Neck:      Musculoskeletal: Normal range of motion. Cardiovascular:      Rate and Rhythm: Normal rate. Rhythm irregular. Heart sounds: Normal heart sounds. No murmur. No friction rub. No gallop. Pulmonary:      Effort: Tachypnea, accessory muscle usage and respiratory distress present. Breath sounds: Wheezing and rhonchi present. No rales. Chest:      Chest wall: No tenderness. Abdominal:      General: Bowel sounds are normal. There is no distension. Palpations: Abdomen is soft. There is no mass. Tenderness: There is no tenderness. There is no guarding or rebound. Musculoskeletal: Normal range of motion. General: No tenderness or deformity. Right lower leg: Edema present. Left lower leg: Edema present. Skin:     General: Skin is warm. Findings: No rash. Neurological:      Mental Status: She is alert and oriented to person, place, and time. Cranial Nerves: No cranial nerve deficit. Motor: No abnormal muscle tone.       Coordination: Coordination normal.      Deep Tendon Reflexes: Reflexes normal.         Diagnostic Study Results     Labs -  Recent Results (from the past 24 hour(s))   CBC WITH AUTOMATED DIFF    Collection Time: 11/20/19  9:12 AM   Result Value Ref Range    WBC 11.3 (H) 3.6 - 11.0 K/uL    RBC 4.33 3.80 - 5.20 M/uL    HGB 10.6 (L) 11.5 - 16.0 g/dL    HCT 35.4 35.0 - 47.0 %    MCV 81.8 80.0 - 99.0 FL    MCH 24.5 (L) 26.0 - 34.0 PG    MCHC 29.9 (L) 30.0 - 36.5 g/dL    RDW 18.6 (H) 11.5 - 14.5 %    PLATELET 993 394 - 824 K/uL    MPV 12.1 8.9 - 12.9 FL    NRBC 0.0 0  WBC    ABSOLUTE NRBC 0.00 0.00 - 0.01 K/uL    NEUTROPHILS 77 (H) 32 - 75 %    LYMPHOCYTES 13 12 - 49 %    MONOCYTES 9 5 - 13 %    EOSINOPHILS 0 0 - 7 %    BASOPHILS 0 0 - 1 %    IMMATURE GRANULOCYTES 1 (H) 0.0 - 0.5 %    ABS. NEUTROPHILS 8.8 (H) 1.8 - 8.0 K/UL    ABS. LYMPHOCYTES 1.4 0.8 - 3.5 K/UL    ABS. MONOCYTES 1.0 0.0 - 1.0 K/UL    ABS. EOSINOPHILS 0.0 0.0 - 0.4 K/UL    ABS. BASOPHILS 0.0 0.0 - 0.1 K/UL    ABS. IMM. GRANS. 0.1 (H) 0.00 - 0.04 K/UL    DF AUTOMATED     METABOLIC PANEL, COMPREHENSIVE    Collection Time: 11/20/19  9:12 AM   Result Value Ref Range    Sodium 132 (L) 136 - 145 mmol/L    Potassium 4.0 3.5 - 5.1 mmol/L    Chloride 101 97 - 108 mmol/L    CO2 23 21 - 32 mmol/L    Anion gap 8 5 - 15 mmol/L    Glucose 117 (H) 65 - 100 mg/dL    BUN 18 6 - 20 MG/DL    Creatinine 1.22 (H) 0.55 - 1.02 MG/DL    BUN/Creatinine ratio 15 12 - 20      GFR est AA 50 (L) >60 ml/min/1.73m2    GFR est non-AA 42 (L) >60 ml/min/1.73m2    Calcium 8.7 8.5 - 10.1 MG/DL    Bilirubin, total 1.2 (H) 0.2 - 1.0 MG/DL    ALT (SGPT) 12 12 - 78 U/L    AST (SGOT) 23 15 - 37 U/L    Alk.  phosphatase 70 45 - 117 U/L    Protein, total 9.3 (H) 6.4 - 8.2 g/dL    Albumin 3.3 (L) 3.5 - 5.0 g/dL    Globulin 6.0 (H) 2.0 - 4.0 g/dL    A-G Ratio 0.6 (L) 1.1 - 2.2     CK W/ REFLX CKMB    Collection Time: 11/20/19  9:12 AM   Result Value Ref Range    CK 48 26 - 192 U/L   TROPONIN I    Collection Time: 11/20/19  9:12 AM   Result Value Ref Range    Troponin-I, Qt. <0.05 <0.05 ng/mL   LIPASE    Collection Time: 11/20/19  9:12 AM   Result Value Ref Range    Lipase 71 (L) 73 - 393 U/L   EKG, 12 LEAD, INITIAL    Collection Time: 11/20/19  9:16 AM   Result Value Ref Range    Ventricular Rate 108 BPM    Atrial Rate 108 BPM    QRS Duration 120 ms    Q-T Interval 352 ms    QTC Calculation (Bezet) 471 ms    Calculated R Axis -37 degrees    Calculated T Axis 90 degrees    Diagnosis       Atrial fibrillation  Left axis deviation  Left ventricular hypertrophy with QRS widening and repolarization abnormality  Confirmed by James Belle ([id number]) on 11/20/2019 2:06:12 PM     NT-PRO BNP    Collection Time: 11/20/19 11:15 AM   Result Value Ref Range    NT pro-BNP 3,113 (H) <450 PG/ML   POC LACTIC ACID    Collection Time: 11/20/19 11:55 AM   Result Value Ref Range    Lactic Acid (POC) 1.83 0.40 - 2.00 mmol/L       Radiologic Studies -   XR CHEST PA LAT   Final Result   IMPRESSION:   1. Right perihilar airspace disease consistent with pneumonia. Follow-up to   resolution is suggested. CT Results  (Last 48 hours)    None        CXR Results  (Last 48 hours)               11/20/19 0924  XR CHEST PA LAT Final result    Impression:  IMPRESSION:   1. Right perihilar airspace disease consistent with pneumonia. Follow-up to   resolution is suggested. Narrative:  Exam:  2 view chest       Indication: Cough and chest pain and shortness of breath x2 days. COMPARISON: 6/21/2017       PA and lateral views demonstrate normal heart size. The lungs are fairly well aerated. There is airspace disease in the right   perihilar region consistent with pneumonia. Follow-up to resolution is   suggested. No pleural effusions. No evidence for pulmonary edema. There are vascular calcifications in the upper abdomen. Old right rib fractures are noted. Medical Decision Making   I am the first provider for this patient. I reviewed the vital signs, available nursing notes, past medical history, past surgical history, family history and social history. Vital Signs-Reviewed the patient's vital signs.   Patient Vitals for the past 24 hrs:   Temp Pulse Resp BP SpO2   11/20/19 1400  (!) 121 (!) 33 125/66 93 %   11/20/19 1357  (!) 121 (!) 33  (!) 88 %   11/20/19 1330   27 124/84 94 %   11/20/19 1328  (!) 115      11/20/19 1319  (!) 108 25  96 %   11/20/19 1308     95 %   11/20/19 1300  (!) 102 25 145/71 90 %   11/20/19 1258  (!) 101 23  (!) 89 %   11/20/19 1257     95 %   11/20/19 1242  98 24  91 %   11/20/19 1241  (!) 103 27  (!) 86 %   11/20/19 1240  97 (!) 32  (!) 87 %   11/20/19 1226  (!) 105 22  (!) 89 % 11/20/19 1208  (!) 109 26  90 %   11/20/19 1200  (!) 111 (!) 32 137/79 91 %   11/20/19 0902 99.1 °F (37.3 °C) (!) 115 18 116/81 94 %     Pulse Oximetry Analysis - 89% on RA    Cardiac Monitor:   Rate: 115 bpm  Rhythm: Atrial Fibrillation      ED EKG interpretation:  Rhythm: atrial fib; and irregular. Rate (approx.): 108; Axis: left axis deviation; P wave: normal; QRS interval: normal ; ST/T wave: normal; Other findings: normal. This EKG was interpreted by Jil Inman M.D. Records Reviewed: Nursing Notes, Old Medical Records, Previous electrocardiograms, Previous Radiology Studies and Previous Laboratory Studies    Provider Notes (Medical Decision Making):   Patient presents with acute dyspnea, cough, and chest tightness. DDx: asthma, copd, pna, pulmonary edema, acute bronchitis, ACS, ptx, pna. Will obtain EKG, labs, CXR, provide O2 as needed for hypoxia, treat symptomatically and reassess. Will continue to monitor closely in ED. ED Course:   Initial assessment performed. The patients presenting problems have been discussed, and they are in agreement with the care plan formulated and outlined with them. I have encouraged them to ask questions as they arise throughout their visit. HYPERTENSION COUNSELING   Education was provided to the patient today regarding their hypertension. Patient is made aware of their elevated blood pressure and is instructed to follow up this week with their Primary Care for a recheck. Patient is counseled regarding consequences of chronic, uncontrolled hypertension including kidney disease, heart disease, stroke or even death. Patient states their understanding and agrees to follow up this week. Additionally, during their visit, I discussed sodium restriction, maintaining ideal body weight and regular exercise program as physiologic means to achieve blood pressure control. The patient will strive towards this.      I reviewed our electronic medical record system for any past medical records that were available that may contribute to the patient's current condition, the nursing notes and vital signs from today's visit.   Vahid Joseph MD    ED Orders Placed :  Orders Placed This Encounter    SEVERE SEPSIS AND SEPTIC SHOCK BUNDLE INITIATED    CULTURE, BLOOD, PAIRED    XR CHEST PA LAT    XR FOOT RT MIN 3 V    CBC WITH AUTOMATED DIFF    METABOLIC PANEL, COMPREHENSIVE    CK W/ REFLX CKMB    TROPONIN I (Utilize POC if available)    LIPASE    NT-PRO BNP    CBC WITH AUTOMATED DIFF    HEMOGLOBIN A1C WITH EAG    MAGNESIUM    METABOLIC PANEL, COMPREHENSIVE    TSH 3RD GENERATION    CBC WITH AUTOMATED DIFF    METABOLIC PANEL, BASIC    METABOLIC PANEL, BASIC    CBC WITH AUTOMATED DIFF    URIC ACID    IP CONSULT TO OCCUPATIONAL THERAPY    IP CONSULT TO PHYSICAL THERAPY    OXYGEN CANNULA Liters per minute: 2; Indications for O2 therapy: CHEST PAIN CONTINUOUS STAT    OXYGEN CANNULA Liters per minute: 2; Indications for O2 therapy: HYPOXIA CONTINUOUS STAT    POC LACTIC ACID    EKG, 12 LEAD, INITIAL    cefTRIAXone (ROCEPHIN) 1 g in 0.9% sodium chloride (MBP/ADV) 50 mL    azithromycin (ZITHROMAX) tablet 500 mg    albuterol (PROVENTIL VENTOLIN) nebulizer solution 5 mg    sodium chloride 0.9 % bolus infusion 1,000 mL    guaiFENesin ER (MUCINEX) tablet 600 mg    DISCONTD: atenolol (TENORMIN) tablet 50 mg    DISCONTD: dabigatran etexilate (PRADAXA) capsule 75 mg    DISCONTD: fluticasone propionate (FLONASE) 50 mcg/actuation nasal spray 2 Spray    DISCONTD: levothyroxine (SYNTHROID) tablet 100 mcg    DISCONTD: pantoprazole (PROTONIX) tablet 40 mg    DISCONTD: traZODone (DESYREL) tablet 150 mg    DISCONTD: cefTRIAXone (ROCEPHIN) 1 g in 0.9% sodium chloride (MBP/ADV) 50 mL    DISCONTD: azithromycin (ZITHROMAX) tablet 500 mg    DISCONTD: furosemide (LASIX) injection 40 mg    DISCONTD: guaiFENesin (ROBITUSSIN) 100 mg/5 mL oral liquid 100 mg    DISCONTD: metoprolol (LOPRESSOR) injection 5 mg    DISCONTD: acetaminophen (TYLENOL) tablet 650 mg    DISCONTD: ondansetron (ZOFRAN) injection 4 mg    DISCONTD: guaiFENesin (ROBITUSSIN) 100 mg/5 mL oral liquid 400 mg    DISCONTD: albuterol-ipratropium (DUO-NEB) 2.5 MG-0.5 MG/3 ML    azithromycin (ZITHROMAX) tablet 500 mg    DISCONTD: cefTRIAXone (ROCEPHIN) 1 g in 0.9% sodium chloride (MBP/ADV) 50 mL    DISCONTD: lisinopril (PRINIVIL, ZESTRIL) tablet 10 mg    lisinopril (PRINIVIL, ZESTRIL) tablet 5 mg    DISCONTD: metoprolol succinate (TOPROL-XL) XL tablet 25 mg    DISCONTD: potassium chloride (K-DUR, KLOR-CON) SR tablet 20 mEq    lisinopril (PRINIVIL, ZESTRIL) 10 mg tablet    metoprolol succinate (TOPROL-XL) 25 mg XL tablet    predniSONE (DELTASONE) 10 mg tablet    DISCONTD: furosemide (LASIX) 20 mg tablet    IP CONSULT TO CARDIOLOGY    INITIAL PHYSICIAN ORDER: INPATIENT Telemetry; 3.  Patient receiving treatment that can only be provided in an inpatient setting (further clarification in H&P documentation)     ED Medications Administered:  Medications   cefTRIAXone (ROCEPHIN) 1 g in 0.9% sodium chloride (MBP/ADV) 50 mL (0 g IntraVENous IV Completed 11/20/19 1248)   azithromycin (ZITHROMAX) tablet 500 mg (500 mg Oral Given 11/20/19 1218)   albuterol (PROVENTIL VENTOLIN) nebulizer solution 5 mg (5 mg Nebulization Given 11/20/19 1257)   sodium chloride 0.9 % bolus infusion 1,000 mL (0 mL IntraVENous IV Completed 11/20/19 1318)   guaiFENesin ER (MUCINEX) tablet 600 mg (600 mg Oral Given 11/20/19 1335)   azithromycin (ZITHROMAX) tablet 500 mg (500 mg Oral Given 11/24/19 0854)   lisinopril (PRINIVIL, ZESTRIL) tablet 5 mg (5 mg Oral Given 11/22/19 1111)         Progress Note:  Sats noted to be 86% on RA, will place on supplemental oxygen, pt given neb treatment, antitussives    Progress Note:  Given tachycardia and elevated WBC, concern for SIRS/sepsis, broad IV abx started    Progress Note:  Chest imaging c/w with right hilar PNA, also evidence of acute diastolic CHF, start IV diuretics, admit    Progress Note:  I have just re-evaluated the patient. Patient reports improvement of sx's   I have reviewed Her vital signs and determined there is currently no worsening in their condition or physical exam. Results have been reviewed with them and their questions have been answered. We will continue to review further results as they come available. Consult Note:  Nevaeh Haskins MD spoke with Dr. Avelino Tejeda,   Specialty: Hospitalist  Discussed pt's hx, disposition, and available diagnostic and imaging results. Reviewed care plans. Agree with management and plan thus far. Consultant will evaluate pt for admission. [facility] CRITICAL CARE NOTE :    IMPENDING DETERIORATION -Airway, Respiratory and Cardiovascular  ASSOCIATED RISK FACTORS - Hypotension, Shock and Hypoxia  MANAGEMENT- Bedside Assessment and Supervision of Care  INTERPRETATION -  Xrays, ECG, Blood Pressure and Cardiac Output Measures   INTERVENTIONS - hemodynamic mngmt and Metobolic interventions  CASE REVIEW - Hospitalist, Nursing and Family  TREATMENT RESPONSE -Improved  PERFORMED BY - Self    NOTES   :    I have spent 60 minutes of critical care time involved in lab review, consultations with specialist, family decision- making, bedside attention and documentation. During this entire length of time I was immediately available to the patient . Critical Care: The reason for providing this level of medical care for this critically ill patient was due to a critical illness that impaired one or more vital organ systems, such that there was a high probability of imminent or life threatening deterioration in the patient's condition. This care involved high complexity decision making to assess, manipulate, and support vital system functions, to treat this degree of vital organ system failure, and to prevent further life threatening deterioration of the patients condition.       Nevaeh Haskins, MD    Disposition: ADMIT  Patient is being admitted to the hospital. The results of their tests and reasons for their admission have been discussed with them and/or available family. I have discussed the case with the admitting specialist and expressed my high concern for decompensation if patient is discharged from the ED. The patient and/or available family convey agreement and understanding for the need to be admitted and for their admission diagnosis. Consultation has been made with the inpatient physician specialist for hospitalization. Diagnosis     Clinical Impression:   1. Acute respiratory failure with hypoxia ([address]    2. Pneumonia of right lower lobe due to infectious organism ([address]    3. Acute diastolic CHF (congestive heart failure) ([address]    4. Sepsis, due to unspecified organism, unspecified whether acute organ dysfunction present ([address]    5. Permanent atrial fibrillation        Attestation:  I personally performed the services described in this documentation on this date 11/20/2019 for patient, Lei Ritter. Shahid Malhotra MD      This note will not be viewable in [address]

## 2019-11-20 NOTE — ED NOTES
Pt ambulated in hallway with pulse ox on room air. pts oxygen maintained 89-91%, pt appeared short of breath during ambulation.

## 2019-11-21 ENCOUNTER — APPOINTMENT (OUTPATIENT)
Dept: NON INVASIVE DIAGNOSTICS | Age: 84
DRG: 291 | End: 2019-11-21
Attending: INTERNAL MEDICINE
Payer: MEDICARE

## 2019-11-21 PROBLEM — I48.20 CHRONIC ATRIAL FIBRILLATION (HCC): Status: ACTIVE | Noted: 2019-11-21

## 2019-11-21 PROBLEM — I48.92 ATRIAL FLUTTER WITH RAPID VENTRICULAR RESPONSE (HCC): Status: ACTIVE | Noted: 2019-11-21

## 2019-11-21 LAB
ALBUMIN SERPL-MCNC: 3.1 G/DL (ref 3.5–5)
ALBUMIN/GLOB SERPL: 0.6 {RATIO} (ref 1.1–2.2)
ALP SERPL-CCNC: 79 U/L (ref 45–117)
ALT SERPL-CCNC: 12 U/L (ref 12–78)
ANION GAP SERPL CALC-SCNC: 7 MMOL/L (ref 5–15)
AST SERPL-CCNC: 28 U/L (ref 15–37)
AV VELOCITY RATIO: 0.41
AV VTI RATIO: 0.5
BASOPHILS # BLD: 0 K/UL (ref 0–0.1)
BASOPHILS NFR BLD: 0 % (ref 0–1)
BILIRUB SERPL-MCNC: 0.8 MG/DL (ref 0.2–1)
BUN SERPL-MCNC: 21 MG/DL (ref 6–20)
BUN/CREAT SERPL: 17 (ref 12–20)
CALCIUM SERPL-MCNC: 8.5 MG/DL (ref 8.5–10.1)
CHLORIDE SERPL-SCNC: 102 MMOL/L (ref 97–108)
CO2 SERPL-SCNC: 27 MMOL/L (ref 21–32)
CREAT SERPL-MCNC: 1.26 MG/DL (ref 0.55–1.02)
DIFFERENTIAL METHOD BLD: ABNORMAL
ECHO AO ROOT DIAM: 3.11 CM
ECHO AV AREA PEAK VELOCITY: 1 CM2
ECHO AV AREA VTI: 1.1 CM2
ECHO AV AREA/BSA PEAK VELOCITY: 0.6 CM2/M2
ECHO AV AREA/BSA VTI: 0.7 CM2/M2
ECHO AV CUSP MM: 1.7 CM
ECHO AV MEAN GRADIENT: 9.3 MMHG
ECHO AV MEAN VELOCITY: 1.42 M/S
ECHO AV PEAK GRADIENT: 17.1 MMHG
ECHO AV PEAK VELOCITY: 206.49 CM/S
ECHO AV VTI: 34.85 CM
ECHO EST RA PRESSURE: 10 MMHG
ECHO LA AREA 4C: 19.2 CM2
ECHO LA MAJOR AXIS: 2.96 CM
ECHO LA TO AORTIC ROOT RATIO: 0.95
ECHO LA VOL 2C: 53.79 ML (ref 22–52)
ECHO LA VOL 4C: 46.31 ML (ref 22–52)
ECHO LA VOL BP: 52.24 ML (ref 22–52)
ECHO LA VOL/BSA BIPLANE: 31.91 ML/M2 (ref 16–28)
ECHO LA VOLUME INDEX A2C: 32.86 ML/M2 (ref 16–28)
ECHO LA VOLUME INDEX A4C: 28.29 ML/M2 (ref 16–28)
ECHO LV EDV TEICHHOLZ: 41.16 ML
ECHO LV ESV TEICHHOLZ: 19.32 ML
ECHO LV INTERNAL DIMENSION DIASTOLIC: 3.96 CM (ref 3.9–5.3)
ECHO LV INTERNAL DIMENSION SYSTOLIC: 2.9 CM
ECHO LV IVSD: 1.36 CM (ref 0.6–0.9)
ECHO LV IVSS: 1.58 CM
ECHO LV MASS 2D: 243.1 G (ref 67–162)
ECHO LV MASS INDEX 2D: 148.5 G/M2 (ref 43–95)
ECHO LV POSTERIOR WALL DIASTOLIC: 1.44 CM (ref 0.6–0.9)
ECHO LV POSTERIOR WALL SYSTOLIC: 1.93 CM
ECHO LVOT CARDIAC OUTPUT: 3.5 L/MIN
ECHO LVOT DIAM: 1.76 CM
ECHO LVOT PEAK GRADIENT: 2.8 MMHG
ECHO LVOT PEAK VELOCITY: 84.14 CM/S
ECHO LVOT SV: 39.1 ML
ECHO LVOT VTI: 16.02 CM
ECHO MV AREA PHT: 5.1 CM2
ECHO MV E DECELERATION TIME (DT): 140.4 MS
ECHO MV E VELOCITY: 115.52 CM/S
ECHO MV PRESSURE HALF TIME (PHT): 43.4 MS
ECHO MV REGURGITANT RADIUS PISA: 0.84 CM
ECHO PULMONARY ARTERY SYSTOLIC PRESSURE (PASP): 30.8 MMHG
ECHO PV MAX VELOCITY: 75.87 CM/S
ECHO PV PEAK GRADIENT: 2.3 MMHG
ECHO RIGHT VENTRICULAR SYSTOLIC PRESSURE (RVSP): 27.3 MMHG
ECHO TV REGURGITANT MAX VELOCITY: 207.81 CM/S
ECHO TV REGURGITANT PEAK GRADIENT: 17.3 MMHG
EOSINOPHIL # BLD: 0.1 K/UL (ref 0–0.4)
EOSINOPHIL NFR BLD: 1 % (ref 0–7)
ERYTHROCYTE [DISTWIDTH] IN BLOOD BY AUTOMATED COUNT: 18.4 % (ref 11.5–14.5)
EST. AVERAGE GLUCOSE BLD GHB EST-MCNC: 131 MG/DL
GLOBULIN SER CALC-MCNC: 5.5 G/DL (ref 2–4)
GLUCOSE SERPL-MCNC: 96 MG/DL (ref 65–100)
HBA1C MFR BLD: 6.2 % (ref 4–5.6)
HCT VFR BLD AUTO: 33.8 % (ref 35–47)
HGB BLD-MCNC: 10 G/DL (ref 11.5–16)
IMM GRANULOCYTES # BLD AUTO: 0.1 K/UL (ref 0–0.04)
IMM GRANULOCYTES NFR BLD AUTO: 1 % (ref 0–0.5)
LVFS 2D: 26.89 %
LVOT MG: 1.46 MMHG
LVOT MV: 0.55 CM/S
LVSV (TEICH): 21.84 ML
LYMPHOCYTES # BLD: 2.2 K/UL (ref 0.8–3.5)
LYMPHOCYTES NFR BLD: 22 % (ref 12–49)
MAGNESIUM SERPL-MCNC: 1.6 MG/DL (ref 1.6–2.4)
MCH RBC QN AUTO: 24.6 PG (ref 26–34)
MCHC RBC AUTO-ENTMCNC: 29.6 G/DL (ref 30–36.5)
MCV RBC AUTO: 83 FL (ref 80–99)
MONOCYTES # BLD: 1 K/UL (ref 0–1)
MONOCYTES NFR BLD: 10 % (ref 5–13)
MV DEC SLOPE: 8.23
NEUTS SEG # BLD: 6.5 K/UL (ref 1.8–8)
NEUTS SEG NFR BLD: 66 % (ref 32–75)
NRBC # BLD: 0 K/UL (ref 0–0.01)
NRBC BLD-RTO: 0 PER 100 WBC
PLATELET # BLD AUTO: 149 K/UL (ref 150–400)
PMV BLD AUTO: 11.8 FL (ref 8.9–12.9)
POTASSIUM SERPL-SCNC: 3.7 MMOL/L (ref 3.5–5.1)
PROT SERPL-MCNC: 8.6 G/DL (ref 6.4–8.2)
RBC # BLD AUTO: 4.07 M/UL (ref 3.8–5.2)
SODIUM SERPL-SCNC: 136 MMOL/L (ref 136–145)
TSH SERPL DL<=0.05 MIU/L-ACNC: 1.54 UIU/ML (ref 0.36–3.74)
WBC # BLD AUTO: 9.8 K/UL (ref 3.6–11)

## 2019-11-21 PROCEDURE — 77010033678 HC OXYGEN DAILY

## 2019-11-21 PROCEDURE — 80053 COMPREHEN METABOLIC PANEL: CPT

## 2019-11-21 PROCEDURE — 93306 TTE W/DOPPLER COMPLETE: CPT

## 2019-11-21 PROCEDURE — 65660000000 HC RM CCU STEPDOWN

## 2019-11-21 PROCEDURE — 74011000250 HC RX REV CODE- 250: Performed by: INTERNAL MEDICINE

## 2019-11-21 PROCEDURE — 85025 COMPLETE CBC W/AUTO DIFF WBC: CPT

## 2019-11-21 PROCEDURE — 83036 HEMOGLOBIN GLYCOSYLATED A1C: CPT

## 2019-11-21 PROCEDURE — 36415 COLL VENOUS BLD VENIPUNCTURE: CPT

## 2019-11-21 PROCEDURE — 97165 OT EVAL LOW COMPLEX 30 MIN: CPT | Performed by: OCCUPATIONAL THERAPIST

## 2019-11-21 PROCEDURE — 74011250637 HC RX REV CODE- 250/637: Performed by: FAMILY MEDICINE

## 2019-11-21 PROCEDURE — 83735 ASSAY OF MAGNESIUM: CPT

## 2019-11-21 PROCEDURE — 97535 SELF CARE MNGMENT TRAINING: CPT | Performed by: OCCUPATIONAL THERAPIST

## 2019-11-21 PROCEDURE — 97162 PT EVAL MOD COMPLEX 30 MIN: CPT

## 2019-11-21 PROCEDURE — 94760 N-INVAS EAR/PLS OXIMETRY 1: CPT

## 2019-11-21 PROCEDURE — 97116 GAIT TRAINING THERAPY: CPT

## 2019-11-21 PROCEDURE — 74011250636 HC RX REV CODE- 250/636: Performed by: INTERNAL MEDICINE

## 2019-11-21 PROCEDURE — 74011250637 HC RX REV CODE- 250/637: Performed by: INTERNAL MEDICINE

## 2019-11-21 PROCEDURE — 74011250636 HC RX REV CODE- 250/636: Performed by: FAMILY MEDICINE

## 2019-11-21 PROCEDURE — 74011000258 HC RX REV CODE- 258: Performed by: FAMILY MEDICINE

## 2019-11-21 PROCEDURE — 94640 AIRWAY INHALATION TREATMENT: CPT

## 2019-11-21 PROCEDURE — 84443 ASSAY THYROID STIM HORMONE: CPT

## 2019-11-21 RX ADMIN — GUAIFENESIN 400 MG: 200 SOLUTION ORAL at 08:37

## 2019-11-21 RX ADMIN — FLUTICASONE PROPIONATE 2 SPRAY: 50 SPRAY, METERED NASAL at 15:52

## 2019-11-21 RX ADMIN — IPRATROPIUM BROMIDE AND ALBUTEROL SULFATE 3 ML: .5; 3 SOLUTION RESPIRATORY (INHALATION) at 16:11

## 2019-11-21 RX ADMIN — TRAZODONE HYDROCHLORIDE 150 MG: 100 TABLET ORAL at 21:20

## 2019-11-21 RX ADMIN — AZITHROMYCIN MONOHYDRATE 500 MG: 250 TABLET ORAL at 08:38

## 2019-11-21 RX ADMIN — FUROSEMIDE 40 MG: 10 INJECTION, SOLUTION INTRAMUSCULAR; INTRAVENOUS at 08:38

## 2019-11-21 RX ADMIN — IPRATROPIUM BROMIDE AND ALBUTEROL SULFATE 3 ML: .5; 3 SOLUTION RESPIRATORY (INHALATION) at 20:13

## 2019-11-21 RX ADMIN — GUAIFENESIN 400 MG: 200 SOLUTION ORAL at 17:26

## 2019-11-21 RX ADMIN — ATENOLOL 50 MG: 25 TABLET ORAL at 10:52

## 2019-11-21 RX ADMIN — CEFTRIAXONE 1 G: 1 INJECTION, POWDER, FOR SOLUTION INTRAMUSCULAR; INTRAVENOUS at 08:38

## 2019-11-21 RX ADMIN — DABIGATRAN ETEXILATE MESYLATE 75 MG: 75 CAPSULE ORAL at 08:38

## 2019-11-21 RX ADMIN — LEVOTHYROXINE SODIUM 100 MCG: 100 TABLET ORAL at 05:48

## 2019-11-21 RX ADMIN — ACETAMINOPHEN 650 MG: 325 TABLET ORAL at 19:22

## 2019-11-21 RX ADMIN — ONDANSETRON 4 MG: 2 INJECTION INTRAMUSCULAR; INTRAVENOUS at 08:38

## 2019-11-21 RX ADMIN — DABIGATRAN ETEXILATE MESYLATE 75 MG: 75 CAPSULE ORAL at 21:20

## 2019-11-21 RX ADMIN — GUAIFENESIN 400 MG: 200 SOLUTION ORAL at 21:20

## 2019-11-21 NOTE — PROGRESS NOTES
Problem: Self Care Deficits Care Plan (Adult)  Goal: *Acute Goals and Plan of Care (Insert Text)  Description    FUNCTIONAL STATUS PRIOR TO ADMISSION: Patient was independent and active without use of DME. Patient was independent for basic ADLs and shared responsibility for instrumental ADLs. Pt reports that she has recently needed additional assistance with ADLs d/t decline in health. HOME SUPPORT: The patient lived with her two daughters. Occupational Therapy Goals  Initiated 11/21/2019  1. Patient will perform light housework with stand by assist within 7 day(s). 2.  Patient will perform upper body and lower body dressing with item retrieval and with stand by assist within 7 day(s). 3.  Patient will perform all functional transfers (i.e. toilet transfers) with modified independence within 7 day(s). 4.  Patient will utilize energy conservation techniques during functional activities with verbal cues within 7 day(s). Outcome: Progressing Towards Goal   OCCUPATIONAL THERAPY EVALUATION  Patient: Francisco Bush (35 y.o. female)  Date: 11/21/2019  Primary Diagnosis: Acute hypoxemic respiratory failure (HCC) [J96.01]        Precautions: DNR, Bed Alarm    ASSESSMENT  Based on the objective data described below, the patient presents with pleasant demeanor and greeted therapist upon entry. Pt was attended by daughter asleep on the couch. Pt was received sitting in chair and was agreeable to therapy. Pt was able to mobilize from chair to bathroom, complete toilet transfer, and return to bed. Pt was able to mobilize throughout the room, and during course of eval demonstrated some LOB during gross positional changes, specifically posterior lean after standing from bending over forward. Pt was able to self correct this LOB. Pt's VSS throughout session.     Current Level of Function Impacting Discharge (ADLs/self-care): Pt is able to independently mobilize in bed, but requires CGA for functional mobility and transfers. She is independent with UB ADLs and needs CGA for all standing aspects of ADLs. Functional Outcome Measure: The patient scored 80/100 on the Barthel Index outcome measure which is indicative of minimal impairment of independent completion of ADLs. Other factors to consider for discharge: level of assistance available at home     Patient will benefit from skilled therapy intervention to address the above noted impairments. PLAN :  Recommendations and Planned Interventions: functional mobility training, therapeutic exercise, balance training, therapeutic activities, endurance activities, patient education, home safety training, and family training/education    Frequency/Duration: Patient will be followed by occupational therapy 3 times a week to address goals. Recommendation for discharge: (in order for the patient to meet his/her long term goals)  No skilled occupational therapy/ follow up rehabilitation needs identified at this time. This discharge recommendation:  Has not yet been discussed the attending provider and/or case management    IF patient discharges home will need the following DME: to be determined (TBD)       SUBJECTIVE:   Patient stated \"I don't like anyone doing anything for me if I can do it myself. \"    OBJECTIVE DATA SUMMARY:   HISTORY:   Past Medical History:   Diagnosis Date    Arrhythmia     atrial fibrillation, chronic. Stress test 2011 [facility]  normal.  Mild-mod AR on ECHO    Atrial fibrillation ([address]     Atrial flutter with rapid ventricular response ([address] 11/21/2019    GI bleeding     Hypertension     Refusal of blood transfusions as patient is Mandaeism     Thyroid disease     hypothyroid     Past Surgical History:   Procedure Laterality Date    COLONOSCOPY N/A 5/2/2017    COLONOSCOPY performed by Leonardo Beard MD at \[facility]\"" ENDOSCOPY    ECHO TRANSESOPHAGEAL (DENISE) W OR WO CONTRAST  4/27/2011         HX GYN      vaginal deliveries x10    HX TUBAL LIGATION      HI CARDIOVERSION ELECTIVE ARRHYTHMIA EXTERNAL  6/14/2011            Expanded or extensive additional review of patient history:     Home Situation  Home Environment: Trailer/mobile home  # Steps to Enter: 5  Rails to Enter: Yes  Hand Rails : Bilateral  One/Two Story Residence: One story  Living Alone: No  Support Systems: Child(ricardo)  Patient Expects to be Discharged to[de-identified] Unknown  Current DME Used/Available at Home: Shower chair, Grab bars  Tub or Shower Type: Tub/Shower combination    Hand dominance: Right    EXAMINATION OF PERFORMANCE DEFICITS:  Cognitive/Behavioral Status:  Neurologic State: Alert  Orientation Level: Oriented X4;Appropriate for age  Cognition: Appropriate decision making; Appropriate for age attention/concentration; Follows commands            Hearing: Auditory  Auditory Impairment: None    Vision/Perceptual:                           Acuity: Able to read clock/calendar on wall without difficulty    Corrective Lenses: Reading glasses    Range of Motion:  AROM: Within functional limits  PROM: Within functional limits                      Strength:  Strength: Generally decreased, functional                Coordination:  Coordination: Within functional limits  Fine Motor Skills-Upper: Left Intact; Right Intact    Gross Motor Skills-Upper: Left Intact; Right Intact    Tone & Sensation:  Tone: Normal  Sensation: Intact                      Balance:  Sitting: Intact  Standing: Impaired; Without support  Standing - Static: Good; Unsupported  Standing - Dynamic : Fair;Unsupported    Functional Mobility and Transfers for ADLs:  Bed Mobility:  Rolling: Independent  Supine to Sit: Stand-by assistance  Sit to Supine: Independent  Scooting: Independent    Transfers:  Sit to Stand: Contact guard assistance  Stand to Sit: Contact guard assistance  Bed to Chair: Contact guard assistance  Bathroom Mobility: Contact guard assistance  Toilet Transfer : Contact guard assistance    ADL Assessment:  Feeding: Independent    Oral Facial Hygiene/Grooming: Contact guard assistance(standing at sink)    Bathing: Contact guard assistance    Upper Body Dressing: Setup;Contact guard assistance(items provided; item retrieval)    Lower Body Dressing: Contact guard assistance    Toileting: Stand by assistance        Functional Measure:  Barthel Index:    Bathin  Bladder: 10  Bowels: 10  Groomin  Dressing: 10  Feeding: 10  Mobility: 10  Stairs: 5  Toilet Use: 10  Transfer (Bed to Chair and Back): 10  Total: 80/100        The Barthel ADL Index: Guidelines  1. The index should be used as a record of what a patient does, not as a record of what a patient could do. 2. The main aim is to establish degree of independence from any help, physical or verbal, however minor and for whatever reason. 3. The need for supervision renders the patient not independent. 4. A patient's performance should be established using the best available evidence. Asking the patient, friends/relatives and nurses are the usual sources, but direct observation and common sense are also important. However direct testing is not needed. 5. Usually the patient's performance over the preceding 24-48 hours is important, but occasionally longer periods will be relevant. 6. Middle categories imply that the patient supplies over 50 per cent of the effort. 7. Use of aids to be independent is allowed. Izaiah Robin., Barthel, D.W. (8491). Functional evaluation: the Barthel Index. [address] [employer] (14)2. [place] [employer] Welch, Klaus Castillo., Ilene Payton., [place], [address] [facility] (). Measuring the change indisability after inpatient rehabilitation; comparison of the responsiveness of the Barthel Index and Functional Wilcox Measure. Journal of Neurology, Neurosurgery, and Psychiatry, 66(4), 160-318. Alejandro Andino, JESSICA.J.A, Masood Lopez,  BLANQUITA.J.M, & Lord Hatch MSaadA. (2004.) Assessment of post-stroke quality of life in cost-effectiveness studies:  The usefulness of the Barthel Index and the EuroQoL-5D. Quality of Life Research, 15, 580-95        Occupational Therapy Evaluation Charge Determination   History Examination Decision-Making   LOW Complexity : Brief history review  LOW Complexity : 1-3 performance deficits relating to physical, cognitive , or psychosocial skils that result in activity limitations and / or participation restrictions  LOW Complexity : No comorbidities that affect functional and no verbal or physical assistance needed to complete eval tasks       Based on the above components, the patient evaluation is determined to be of the following complexity level: LOW   Pain Rating:  No complaints    Activity Tolerance:   Good  Please refer to the flowsheet for vital signs taken during this treatment. After treatment patient left in no apparent distress:    Supine in bed, Call bell within reach, Caregiver / family present, and Side rails x 3    COMMUNICATION/EDUCATION:   The patients plan of care was discussed with: Registered Nurse. Patient/family agree to work toward stated goals and plan of care. This patients plan of care is appropriate for delegation to [facility].     Thank you for this referral.  Mallory Otero  Time Calculation: 22 mins

## 2019-11-21 NOTE — PROGRESS NOTES
Bedside shift change report GIVEN TO [facility], RN. Report included the following information SBAR and Kardex. SIGNIFICANT CHANGES DURING SHIFT:  Uneventful shift       CONCERNS TO ADDRESS WITH MD:           [facility] NURSING NOTE   Admission Date 11/20/2019   Admission Diagnosis Acute hypoxemic respiratory failure ([address] [J96.01]   Consults IP CONSULT TO CARDIOLOGY  IP CONSULT TO PRIMARY CARE PROVIDER      Cardiac Monitoring [x] Yes [] No      Purposeful Hourly Rounding [x] Yes    Den Score Total Score: 2   Den score 3 or > [x] Bed Alarm [] Avasys [] 1:1 sitter [] Patient refused (Signed refusal form in chart)   Tanner Score Tanner Score: 20   Tanner score 14 or < [] PMT consult [] Wound Care consult    []  Specialty bed  [] Nutrition consult      Influenza Vaccine Received Flu Vaccine for Current Season (usually Sept-March): No    Patient/Guardian Refused (Notify MD): Yes      Oxygen needs? [] Room air Oxygen @  []1L    []2L    [x]3L   []4L    []5L   []6L via  NC   Chronic home O2 use?  [] Yes [x] No  Perform O2 challenge test and document in progress note using smartphrase (.Homeoxygen)      Last bowel movement        Urinary Catheter             LDAs               Peripheral IV 11/20/19 Left Forearm (Active)   Site Assessment Clean, dry, & intact 11/21/2019  3:51 AM   Phlebitis Assessment 0 11/21/2019  3:51 AM   Infiltration Assessment 0 11/21/2019  3:51 AM   Dressing Status Clean, dry, & intact 11/21/2019  3:51 AM   Dressing Type Transparent 11/21/2019  3:51 AM   Hub Color/Line Status Pink 11/21/2019  3:51 AM       Peripheral IV 11/20/19 Right Antecubital (Active)   Site Assessment Clean, dry, & intact 11/21/2019  3:51 AM   Phlebitis Assessment 0 11/21/2019  3:51 AM   Infiltration Assessment 0 11/21/2019  3:51 AM   Dressing Status Clean, dry, & intact 11/21/2019  3:51 AM   Dressing Type Transparent 11/21/2019  3:51 AM   Hub Color/Line Status Pink 11/21/2019  3:51 AM                         Readmission Risk Assessment Tool Score Medium Risk            19       Total Score        3 Has Seen PCP in Last 6 Months (Yes=3, No=0)    5 Pt. Coverage (Medicare=5 , Medicaid, or Self-Pay=4)    11 Charlson Comorbidity Score (Age + Comorbid Conditions)        Criteria that do not apply:    . Living with Significant Other. Assisted Living. LTAC. SNF.  or   Rehab    Patient Length of Stay (>5 days = 3)    IP Visits Last 12 Months (1-3=4, 4=9, >4=11)       Expected Length of Stay - - -   Actual Length of Stay 1

## 2019-11-21 NOTE — ED NOTES
TRANSFER - OUT REPORT:    Verbal report given to [facility], RN (name) on Radhames Patel  being transferred to [address] (unit) for routine progression of care       Report consisted of patients Situation, Background, Assessment and   Recommendations(SBAR). Information from the following report(s) SBAR, Kardex, ED Summary, Intake/Output, MAR and Recent Results was reviewed with the receiving nurse. Lines:   Peripheral IV 11/20/19 Left Forearm (Active)   Site Assessment Clean, dry, & intact 11/20/2019  9:18 AM   Phlebitis Assessment 0 11/20/2019  9:18 AM   Infiltration Assessment 0 11/20/2019  9:18 AM   Dressing Status Clean, dry, & intact 11/20/2019  9:18 AM   Dressing Type Transparent 11/20/2019  9:18 AM   Hub Color/Line Status Pink;Flushed;Patent 11/20/2019  9:18 AM       Peripheral IV 11/20/19 Right Antecubital (Active)   Site Assessment Clean, dry, & intact 11/20/2019  3:47 PM   Phlebitis Assessment 0 11/20/2019  3:47 PM   Infiltration Assessment 0 11/20/2019  3:47 PM   Dressing Status Clean, dry, & intact 11/20/2019  3:47 PM   Dressing Type Transparent 11/20/2019  3:47 PM   Hub Color/Line Status Flushed 11/20/2019  3:47 PM        Opportunity for questions and clarification was provided.       Patient transported with:   [employer]

## 2019-11-21 NOTE — PROGRESS NOTES
TRANSFER - IN REPORT:    Verbal report received from Estefany(name) on [address]  being received from ED(unit) for routine progression of care      Report consisted of patients Situation, Background, Assessment and   Recommendations(SBAR). Information from the following report(s) SBAR and Kardex was reviewed with the receiving nurse. Opportunity for questions and clarification was provided. Assessment completed upon patients arrival to unit and care assumed.        Primary Nurse Fernandez Payton and Dariusz RN performed a dual skin assessment on this patient No impairment noted  Tanner score is 20

## 2019-11-21 NOTE — PROGRESS NOTES
MICHELLE:  1. Home with family - declined HH  2. OP f/u appts with PCP and Cardiology  3. O2 challenge 24 hrs prior to d/c      Reason for Admission:  Respiratory failure due to PNA and CHF                    RRAT Score:  19                Do you (patient/family) have any concerns for transition/discharge? No concerns indicated at this time. Plan for utilizing home health: [facility]. Current Advanced Directive/Advance Care Plan: DNR code. AMD from 2011 on file - Maria Del Carmen Santana and Jason Rangel listed as mPOAs. They are not listed as emergency contacts, however. CM brought this to pt's attention and asked if she was aware of this document. Pt didn't remember but she said these two men were friends of hers. CM offered to have an updated AMD completed during her admission. Pt declined although it is unclear whether pt fully understands the advanced directive. Would be beneficial to have this explained again to her. Transition of Care Plan: Home with family assistance and f/u appts    CM met with patient at the bedside to introduce role, verify demographics, and discuss discharge planning. Patient was awake and alert/oriented x4. Pt is an 81 yo  female admitted to [facility] on 11/20/19 for pneumonia and CHF. Pt is currently requiring supplemental O2 and will need to have a walking challenge completed 24 hrs prior to d/c to assess for home O2 needs. Pt lives in a trailer with her two daughters who are supportive. Pt declined concern regarding ambulation/mobility and stated if she needs help her daughters are available to assist. Pt denied access to DME in the home. No hx of HH/SNF/Rehab. Pt reported to be independent with ADL/IADLs to include driving. Pt's daughters can transport at d/c - CM informed pt of hospital-preferred d/c time of 9:00 am on day of d/c. Pt verbalized understanding.  Pt is insured with [employer] and declined financial/medication cost concerns. CM will continue to follow and assist with d/c planning as needed. PCP: Dr. Mendez Ramirez  Cardiologist: Dr. Alfonse Phoenix  Ambulatory NN: Ezequiel Avery In elke message sent to inform of admission  Pharmacy: [facility]  PCP Verified by CM: Yes(Dr. Mendez Ramirez)  Last Visit to PCP: 11/08/19  Mode of Transport at Discharge:  Other (see comment)(daughter)  Transition of Care Consult (CM Consult): Discharge Planning(initial eval - anticipate home with f/u appts, pt declined HH)  MyChart Signup: No  Discharge Durable Medical Equipment: No(no reported DME)  Physical Therapy Consult: Yes  Occupational Therapy Consult: Yes  Speech Therapy Consult: No  Current Support Network: Own Home, Family Lives Nearby(pt and 2 daughters live together in a 1 story trailer with 5 NATE)  Confirm Follow Up Transport: Self(pt reporeted to still drive)  Plan discussed with Pt/Family/Caregiver: Yes(discussed with pt at bedside)    MONICA Stephenson  Care Manager  [phone]

## 2019-11-21 NOTE — PROGRESS NOTES
General Daily Progress Note    Admit Date: 11/20/2019  Hospital day 2    Subjective:     Patient has no complaint of shortness of breath this am. Abdominal pain is better. Still has some cough. Has been having some shortness of breath, anorexia, fatigue for 2 weeks. ..   Medication side effects: none    Current Facility-Administered Medications   Medication Dose Route Frequency    atenolol (TENORMIN) tablet 50 mg  50 mg Oral DAILY    dabigatran etexilate (PRADAXA) capsule 75 mg  75 mg Oral Q12H    fluticasone propionate (FLONASE) 50 mcg/actuation nasal spray 2 Spray  2 Spray Both Nostrils DAILY    levothyroxine (SYNTHROID) tablet 100 mcg  100 mcg Oral 6am    traZODone (DESYREL) tablet 150 mg  150 mg Oral QHS    furosemide (LASIX) injection 40 mg  40 mg IntraVENous DAILY    metoprolol (LOPRESSOR) injection 5 mg  5 mg IntraVENous Q6H PRN    acetaminophen (TYLENOL) tablet 650 mg  650 mg Oral Q4H PRN    ondansetron (ZOFRAN) injection 4 mg  4 mg IntraVENous Q6H PRN    guaiFENesin (ROBITUSSIN) 100 mg/5 mL oral liquid 400 mg  400 mg Oral TID    albuterol-ipratropium (DUO-NEB) 2.5 MG-0.5 MG/3 ML  3 mL Nebulization TID RT    azithromycin (ZITHROMAX) tablet 500 mg  500 mg Oral DAILY    cefTRIAXone (ROCEPHIN) 1 g in 0.9% sodium chloride (MBP/ADV) 50 mL  1 g IntraVENous Q24H        Review of Systems  Pertinent items are noted in HPI. Objective:     Patient Vitals for the past 8 hrs:   BP Temp Pulse Resp SpO2 Weight   11/21/19 0306 106/58 97.8 °F (36.6 °C) 84 22 99 %    11/21/19 0003 116/58 98.1 °F (36.7 °C) 89 22 98 % 136 lb 0.4 oz (61.7 kg)     11/20 1901 - 11/21 0700  In: 300 [P.O.:300]  Out: -   11/19 0701 - 11/20 1900  In: 8949 [I.V.:1050]  Out: -     Physical Exam:   Visit Vitals  /58 (BP 1 Location: Right arm, BP Patient Position: Supine; Head of bed elevated (Comment degrees); At rest)   Pulse 84   Temp 97.8 °F (36.6 °C)   Resp 22   Ht 5' 2\" (1.575 m)   Wt 136 lb 0.4 oz (61.7 kg)   SpO2 99%   BMI 24.88 kg/m²     Lungs: rales R base, L base  Heart: regular rate and rhythm, S1, S2 normal, no murmur, click, rub or gallop  Abdomen: soft, non-tender. Bowel sounds normal. No masses,  no organomegaly  Extremities: extremities normal, atraumatic, no cyanosis or edema      ECG: atrial fibrillation, rate 86     Data Review   Recent Results (from the past 24 hour(s))   CBC WITH AUTOMATED DIFF    Collection Time: 11/20/19  9:12 AM   Result Value Ref Range    WBC 11.3 (H) 3.6 - 11.0 K/uL    RBC 4.33 3.80 - 5.20 M/uL    HGB 10.6 (L) 11.5 - 16.0 g/dL    HCT 35.4 35.0 - 47.0 %    MCV 81.8 80.0 - 99.0 FL    MCH 24.5 (L) 26.0 - 34.0 PG    MCHC 29.9 (L) 30.0 - 36.5 g/dL    RDW 18.6 (H) 11.5 - 14.5 %    PLATELET 980 823 - 272 K/uL    MPV 12.1 8.9 - 12.9 FL    NRBC 0.0 0  WBC    ABSOLUTE NRBC 0.00 0.00 - 0.01 K/uL    NEUTROPHILS 77 (H) 32 - 75 %    LYMPHOCYTES 13 12 - 49 %    MONOCYTES 9 5 - 13 %    EOSINOPHILS 0 0 - 7 %    BASOPHILS 0 0 - 1 %    IMMATURE GRANULOCYTES 1 (H) 0.0 - 0.5 %    ABS. NEUTROPHILS 8.8 (H) 1.8 - 8.0 K/UL    ABS. LYMPHOCYTES 1.4 0.8 - 3.5 K/UL    ABS. MONOCYTES 1.0 0.0 - 1.0 K/UL    ABS. EOSINOPHILS 0.0 0.0 - 0.4 K/UL    ABS. BASOPHILS 0.0 0.0 - 0.1 K/UL    ABS. IMM. GRANS. 0.1 (H) 0.00 - 0.04 K/UL    DF AUTOMATED     METABOLIC PANEL, COMPREHENSIVE    Collection Time: 11/20/19  9:12 AM   Result Value Ref Range    Sodium 132 (L) 136 - 145 mmol/L    Potassium 4.0 3.5 - 5.1 mmol/L    Chloride 101 97 - 108 mmol/L    CO2 23 21 - 32 mmol/L    Anion gap 8 5 - 15 mmol/L    Glucose 117 (H) 65 - 100 mg/dL    BUN 18 6 - 20 MG/DL    Creatinine 1.22 (H) 0.55 - 1.02 MG/DL    BUN/Creatinine ratio 15 12 - 20      GFR est AA 50 (L) >60 ml/min/1.73m2    GFR est non-AA 42 (L) >60 ml/min/1.73m2    Calcium 8.7 8.5 - 10.1 MG/DL    Bilirubin, total 1.2 (H) 0.2 - 1.0 MG/DL    ALT (SGPT) 12 12 - 78 U/L    AST (SGOT) 23 15 - 37 U/L    Alk.  phosphatase 70 45 - 117 U/L    Protein, total 9.3 (H) 6.4 - 8.2 g/dL    Albumin 3.3 (L) 3.5 - 5.0 g/dL    Globulin 6.0 (H) 2.0 - 4.0 g/dL    A-G Ratio 0.6 (L) 1.1 - 2.2     CK W/ REFLX CKMB    Collection Time: 11/20/19  9:12 AM   Result Value Ref Range    CK 48 26 - 192 U/L   TROPONIN I    Collection Time: 11/20/19  9:12 AM   Result Value Ref Range    Troponin-I, Qt. <0.05 <0.05 ng/mL   LIPASE    Collection Time: 11/20/19  9:12 AM   Result Value Ref Range    Lipase 71 (L) 73 - 393 U/L   EKG, 12 LEAD, INITIAL    Collection Time: 11/20/19  9:16 AM   Result Value Ref Range    Ventricular Rate 108 BPM    Atrial Rate 108 BPM    QRS Duration 120 ms    Q-T Interval 352 ms    QTC Calculation (Bezet) 471 ms    Calculated R Axis -37 degrees    Calculated T Axis 90 degrees    Diagnosis       Atrial fibrillation  Left axis deviation  Left ventricular hypertrophy with QRS widening and repolarization abnormality  Confirmed by Janina Cowden ([id number]) on 11/20/2019 2:06:12 PM     NT-PRO BNP    Collection Time: 11/20/19 11:15 AM   Result Value Ref Range    NT pro-BNP 3,113 (H) <450 PG/ML   POC LACTIC ACID    Collection Time: 11/20/19 11:55 AM   Result Value Ref Range    Lactic Acid (POC) 1.83 0.40 - 2.00 mmol/L   CBC WITH AUTOMATED DIFF    Collection Time: 11/21/19  3:20 AM   Result Value Ref Range    WBC 9.8 3.6 - 11.0 K/uL    RBC 4.07 3.80 - 5.20 M/uL    HGB 10.0 (L) 11.5 - 16.0 g/dL    HCT 33.8 (L) 35.0 - 47.0 %    MCV 83.0 80.0 - 99.0 FL    MCH 24.6 (L) 26.0 - 34.0 PG    MCHC 29.6 (L) 30.0 - 36.5 g/dL    RDW 18.4 (H) 11.5 - 14.5 %    PLATELET 626 (L) 050 - 400 K/uL    MPV 11.8 8.9 - 12.9 FL    NRBC 0.0 0  WBC    ABSOLUTE NRBC 0.00 0.00 - 0.01 K/uL    NEUTROPHILS 66 32 - 75 %    LYMPHOCYTES 22 12 - 49 %    MONOCYTES 10 5 - 13 %    EOSINOPHILS 1 0 - 7 %    BASOPHILS 0 0 - 1 %    IMMATURE GRANULOCYTES 1 (H) 0.0 - 0.5 %    ABS. NEUTROPHILS 6.5 1.8 - 8.0 K/UL    ABS. LYMPHOCYTES 2.2 0.8 - 3.5 K/UL    ABS. MONOCYTES 1.0 0.0 - 1.0 K/UL    ABS. EOSINOPHILS 0.1 0.0 - 0.4 K/UL    ABS.  BASOPHILS 0.0 0.0 - 0.1 K/UL    ABS. IMM. GRANS. 0.1 (H) 0.00 - 0.04 K/UL    DF AUTOMATED     MAGNESIUM    Collection Time: 11/21/19  3:20 AM   Result Value Ref Range    Magnesium 1.6 1.6 - 2.4 mg/dL   METABOLIC PANEL, COMPREHENSIVE    Collection Time: 11/21/19  3:20 AM   Result Value Ref Range    Sodium 136 136 - 145 mmol/L    Potassium 3.7 3.5 - 5.1 mmol/L    Chloride 102 97 - 108 mmol/L    CO2 27 21 - 32 mmol/L    Anion gap 7 5 - 15 mmol/L    Glucose 96 65 - 100 mg/dL    BUN 21 (H) 6 - 20 MG/DL    Creatinine 1.26 (H) 0.55 - 1.02 MG/DL    BUN/Creatinine ratio 17 12 - 20      GFR est AA 49 (L) >60 ml/min/1.73m2    GFR est non-AA 40 (L) >60 ml/min/1.73m2    Calcium 8.5 8.5 - 10.1 MG/DL    Bilirubin, total 0.8 0.2 - 1.0 MG/DL    ALT (SGPT) 12 12 - 78 U/L    AST (SGOT) 28 15 - 37 U/L    Alk. phosphatase 79 45 - 117 U/L    Protein, total 8.6 (H) 6.4 - 8.2 g/dL    Albumin 3.1 (L) 3.5 - 5.0 g/dL    Globulin 5.5 (H) 2.0 - 4.0 g/dL    A-G Ratio 0.6 (L) 1.1 - 2.2     TSH 3RD GENERATION    Collection Time: 11/21/19  3:22 AM   Result Value Ref Range    TSH 1.54 0.36 - 3.74 uIU/mL           Assessment:     Active Problems:    Acute hypoxemic respiratory failure ([facility] (11/20/2019)      Pneumonia (11/20/2019)      CHF (congestive heart failure) ([facility] (11/20/2019)        Plan:     1. Hypoxic respiratory failure secondary to R perihilar pneumonia and/or CHF. Continue iv rocephin, zithromax, and lasix for now. 2. Echo 2 years ago showing EF 40-45% and mild to moderate AR. For repeat this am  3.  Chronic A fib- on pradaxa

## 2019-11-21 NOTE — PROGRESS NOTES
Problem: Mobility Impaired (Adult and Pediatric)  Goal: *Acute Goals and Plan of Care (Insert Text)  Description  FUNCTIONAL STATUS PRIOR TO ADMISSION: Patient was independent and active without use of DME.    HOME SUPPORT PRIOR TO ADMISSION: The patient lived with daughters but did not require assist. Daughters are home during the day. Physical Therapy Goals  Initiated 11/21/2019  1. Patient will move from supine to sit and sit to supine , scoot up and down and roll side to side in bed with independence within 7 day(s). 2.  Patient will transfer from bed to chair and chair to bed with independence using the least restrictive device within 7 day(s). 3.  Patient will perform sit to stand with independence within 7 day(s). 4.  Patient will ambulate with independence for 150 feet with the least restrictive device within 7 day(s). 5.  Patient will ascend/descend 5 stairs with bilateral handrail(s) with modified independence within 7 day(s). Outcome: Not Met   PHYSICAL THERAPY EVALUATION  Patient: Julio C Lowery (20 y.o. female)  Date: 11/21/2019  Primary Diagnosis: Acute hypoxemic respiratory failure (HCC) [J96.01]        Precautions:   (P) DNR, Bed Alarm      ASSESSMENT  Based on the objective data described below, the patient presents with decreased strength, decreased functional mobility, impaired balance, and unsteady gait following admission for acute respiratory failure. Patient is currently functioning below her baseline. Patient with decreased endurance, O2 sats dropped to 87% on 2L O2 with activity. She demonstrates mild path deviations, although reaching for walls for stability. Current Level of Function Impacting Discharge (mobility/balance): CGA-SBA without AD    Functional Outcome Measure: The patient scored 85/100 on the Barthel outcome measure which is indicative of mild functional impairment.       Other factors to consider for discharge: respiratory, O2     Patient will benefit from skilled therapy intervention to address the above noted impairments. PLAN :  Recommendations and Planned Interventions: bed mobility training, transfer training, gait training, therapeutic exercises, patient and family training/education and therapeutic activities      Frequency/Duration: Patient will be followed by physical therapy:  3 times a week to address goals. Recommendation for discharge: (in order for the patient to meet his/her long term goals)  Physical therapy at least 2 days/week in the home AND ensure assist and/or supervision for safety with functional mobility and ADLS    This discharge recommendation:  Has not yet been discussed the attending provider and/or case management    IF patient discharges home will need the following DME: none         SUBJECTIVE:   Patient stated I feel a little wobbly.     OBJECTIVE DATA SUMMARY:   HISTORY:    Past Medical History:   Diagnosis Date    Arrhythmia     atrial fibrillation, chronic. Stress test 2011 ess[facility].  normal.  Mild-mod AR on ECHO    Atrial fibrillation (HCC)     GI bleeding     Hypertension     Refusal of blood transfusions as patient is Anglican     Thyroid disease     hypothyroid     Past Surgical History:   Procedure Laterality Date    COLONOSCOPY N/A 5/2/2017    COLONOSCOPY performed by Brooke Mckeon MD at [facility] ENDOSCOPY    ECHO TRANSESOPHAGEAL (DENISE) W OR WO CONTRAST  4/27/2011         HX GYN      vaginal deliveries x10    HX TUBAL LIGATION      CT CARDIOVERSION ELECTIVE ARRHYTHMIA EXTERNAL  6/14/2011            Personal factors and/or comorbidities impacting plan of care: respiratory, O2    Home Situation  Home Environment: Trailer/mobile home  # Steps to Enter: 5  Rails to Enter: Yes  Hand Rails : Bilateral  One/Two Story Residence: One story  Living Alone: No  Support Systems: Child(ricardo)  Patient Expects to be Discharged to[de-identified] Unknown  Current DME Used/Available at Home: (P) Shower chair, Grab bars  Tub or Shower Type: Tub/Shower combination    EXAMINATION/PRESENTATION/DECISION MAKING:   Critical Behavior:              Hearing: Auditory  Auditory Impairment: None  Skin:    Edema:   Range Of Motion:  AROM: Within functional limits           PROM: Within functional limits           Strength:    Strength: Generally decreased, functional                    Tone & Sensation:   Tone: Normal              Sensation: Intact               Coordination:  Coordination: Within functional limits  Vision:      Functional Mobility:  Bed Mobility:  Rolling: Stand-by assistance  Supine to Sit: Stand-by assistance     Scooting: Stand-by assistance  Transfers:  Sit to Stand: Contact guard assistance  Stand to Sit: Contact guard assistance        Bed to Chair: Contact guard assistance              Balance:   Sitting: Intact; Without support  Standing: Impaired; Without support  Standing - Static: Good  Standing - Dynamic : Fair  Ambulation/Gait Training:  Distance (ft): 150 Feet (ft)  Assistive Device: Gait belt  Ambulation - Level of Assistance: Contact guard assistance     Gait Description (WDL): Exceptions to WDL  Gait Abnormalities: Decreased step clearance;Trunk sway increased; Path deviations        Base of Support: Narrowed     Speed/Vanessa: Pace decreased (<100 feet/min)  Step Length: Right shortened;Left shortened             Functional Measure:  Barthel Index:    Bathin  Bladder: 10  Bowels: 10  Groomin  Dressing: 10  Feeding: 10  Mobility: 10  Stairs: 5  Toilet Use: 10  Transfer (Bed to Chair and Back): 10  Total: 85/100       The Barthel ADL Index: Guidelines  1. The index should be used as a record of what a patient does, not as a record of what a patient could do. 2. The main aim is to establish degree of independence from any help, physical or verbal, however minor and for whatever reason. 3. The need for supervision renders the patient not independent.   4. A patient's performance should be established using the best available evidence. Asking the patient, friends/relatives and nurses are the usual sources, but direct observation and common sense are also important. However direct testing is not needed. 5. Usually the patient's performance over the preceding 24-48 hours is important, but occasionally longer periods will be relevant. 6. Middle categories imply that the patient supplies over 50 per cent of the effort. 7. Use of aids to be independent is allowed. Pricilla Acosta., Barthel, DSaadW. ([id number]). Functional evaluation: the Barthel Index. [address] [facility] (14)2. CHECO Dominique, Von Melvin., [facility]., Ivor, [address] (1999). Measuring the change indisability after inpatient rehabilitation; comparison of the responsiveness of the Barthel Index and Functional Mckinney Measure. Journal of Neurology, Neurosurgery, and Psychiatry, 66(4), 698-649. Florin Loredo, N.J.A, GATO Almonte, & Livier Carvajal M.A. (2004.) Assessment of post-stroke quality of life in cost-effectiveness studies: The usefulness of the Barthel Index and the EuroQoL-5D. Quality of Life Research, 15, 409-81            Physical Therapy Evaluation Charge Determination   History Examination Presentation Decision-Making   MEDIUM  Complexity : 1-2 comorbidities / personal factors will impact the outcome/ POC  MEDIUM Complexity : 3 Standardized tests and measures addressing body structure, function, activity limitation and / or participation in recreation  MEDIUM Complexity : Evolving with changing characteristics  Other outcome measures Barthel   MEDIUM      Based on the above components, the patient evaluation is determined to be of the following complexity level: MEDIUM      Activity Tolerance:   Fair, O2 sats dropped to 87% on 2L O2  Please refer to the flowsheet for vital signs taken during this treatment.     After treatment patient left in no apparent distress:   Sitting in chair, Call bell within reach and Bed / chair alarm activated    COMMUNICATION/EDUCATION:   The patients plan of care was discussed with: Occupational Therapist, Registered Nurse and . Fall prevention education was provided and the patient/caregiver indicated understanding., Patient/family have participated as able in goal setting and plan of care. and Patient/family agree to work toward stated goals and plan of care.     Thank you for this referral.  Kody Mcneill, PT, DPT   Time Calculation: 22 mins

## 2019-11-21 NOTE — PROGRESS NOTES
Bedside shift change report given to Yeni (oncoming nurse) by [facility] (offgoing nurse). Report included the following information SBAR.     0800 - Patient states she ate breakfast quickly and vomited. No complaints of nausea. 1426 - Called RT regarding nebulizer treatments.      1604 - Called RT again for nebulizer treatment

## 2019-11-21 NOTE — PROGRESS NOTES
Problem: Falls - Risk of  Goal: *Absence of Falls  Description  Document Mayorga Reason Fall Risk and appropriate interventions in the flowsheet.   Outcome: Progressing Towards Goal  Note: Fall Risk Interventions:  Mobility Interventions: Patient to call before getting OOB, PT Consult for mobility concerns, Strengthening exercises (ROM-active/passive), Bed/chair exit alarm         Medication Interventions: Patient to call before getting OOB, Teach patient to arise slowly, Bed/chair exit alarm

## 2019-11-21 NOTE — PROGRESS NOTES
Dual Skin assessment performed noted the following:  Varicosities bilateral lower extremities,  Sacrum red, but blanchable  Calluses of medial side of foot bilaterally

## 2019-11-21 NOTE — CONSULTS
[address] [place] [address] [address] [facility]     Date of  Admission: 11/20/2019 10:51 AM     Admission type:Emergency    Consult for: possible HF  Consult by: hospitalist     Subjective:     Mayra Rai is a 80 y.o. female admitted for Acute hypoxemic respiratory failure ([facility] [J96.01]. PMH of chronic afib, on chronic NOAC, cardiomyopathy with EF 40-45%. Admitted c/o SOB, respiratory failure, tx for PNA. Currently feeling better has diuresed and is on O2 nasal.    Denies CP, palpitations, dizziness/lightheadedness.      ECG afib, proBNP 3113    Previous treatment/evaluation includes echocardiogram . Cardiac risk factors: family history, dyslipidemia, sedentary life style, male gender, hypertension, post-menopausal.      Patient Active Problem List    Diagnosis Date Noted    Chronic atrial fibrillation 11/21/2019    [facility] ([facility] 11/20/2019    Pneumonia 11/20/2019    CHF (congestive heart failure) ([facility] 11/20/2019    Irregular heart beat 06/04/2018    Chest pain with moderate risk for cardiac etiology 07/06/2017    HILTON (dyspnea on exertion) 07/05/2017    Bilateral leg edema 07/05/2017    HX: long term anticoagulant use--Pradaxa stopped 5/17 for chronic atrial fib 07/05/2017    Cardiomyopathy, secondary ([facility] 07/05/2017    GERD with esophagitis 07/05/2017    Dyslipidemia (high LDL; low HDL) 07/05/2017    CKD (chronic kidney disease) stage 3, GFR 30-59 ml/min ([facility] 07/05/2017    GI bleeding     Hyperkalemia 04/27/2017    Hypothyroidism 04/27/2011    Hyperthyroidism--iatrogenic 04/27/2011    Insomnia 03/16/2011    Hypokalemia 03/16/2011    Encephalopathy acute 03/15/2011    Rhabdomyolysis 03/14/2011    Persistent atrial fibrillation 03/14/2011    HTN (hypertension) 03/14/2011      Kathy Lara MD  Past Medical History:   Diagnosis Date    Arrhythmia     atrial fibrillation, chronic. Stress test 2011 essent. normal.  Mild-mod AR on ECHO    Atrial fibrillation ([address]     Atrial flutter with rapid ventricular response ([address] 11/21/2019    Chronic atrial fibrillation 11/21/2019    GI bleeding     Hypertension     Refusal of blood transfusions as patient is Anglican     Thyroid disease     hypothyroid      Social History     Socioeconomic History    Marital status:      Spouse name: Not on file    Number of children: Not on file    Years of education: Not on file    Highest education level: Not on file   Tobacco Use    Smoking status: Never Smoker    Smokeless tobacco: Never Used   Substance and Sexual Activity    Alcohol use: No    Drug use: No    Sexual activity: Never     Allergies   Allergen Reactions    Seroquel [Quetiapine] Other (comments)     PSYCOLOGICAL REACTION Pt didn't feel like them selves but felt like a different person, iT DEEPLY DISTURBED PT.  PT BECAME IRRITABLE.     Ambien [Zolpidem] Other (comments)     Couldn't move      Family History   Problem Relation Age of Onset    Heart Disease Father     Cancer Son     Heart Disease Son       Current Facility-Administered Medications   Medication Dose Route Frequency    atenolol (TENORMIN) tablet 50 mg  50 mg Oral DAILY    dabigatran etexilate (PRADAXA) capsule 75 mg  75 mg Oral Q12H    fluticasone propionate (FLONASE) 50 mcg/actuation nasal spray 2 Spray  2 Spray Both Nostrils DAILY    levothyroxine (SYNTHROID) tablet 100 mcg  100 mcg Oral 6am    traZODone (DESYREL) tablet 150 mg  150 mg Oral QHS    furosemide (LASIX) injection 40 mg  40 mg IntraVENous DAILY    metoprolol (LOPRESSOR) injection 5 mg  5 mg IntraVENous Q6H PRN    acetaminophen (TYLENOL) tablet 650 mg  650 mg Oral Q4H PRN    ondansetron (ZOFRAN) injection 4 mg  4 mg IntraVENous Q6H PRN    guaiFENesin (ROBITUSSIN) 100 mg/5 mL oral liquid 400 mg  400 mg Oral TID    albuterol-ipratropium (DUO-NEB) 2.5 MG-0.5 MG/3 ML  3 mL Nebulization TID RT    azithromycin (ZITHROMAX) tablet 500 mg  500 mg Oral DAILY    cefTRIAXone (ROCEPHIN) 1 g in 0.9% sodium chloride (MBP/ADV) 50 mL  1 g IntraVENous Q24H        Review of Symptoms:   11 systems reviewed, negative other than as stated in the HPI        Objective:      Visit Vitals  /59 (BP 1 Location: Left arm, BP Patient Position: At rest)   Pulse 98   Temp 97.7 °F (36.5 °C)   Resp 16   Ht 5' 2\" (1.575 m)   Wt 61.7 kg (136 lb 0.4 oz)   SpO2 96%   BMI 24.88 kg/m²       Physical:   General: elderly  female in no acute distress  Heart: irr, irr, no m/S3/JVD, no carotid bruits   Lungs: diminished at bases  Abdomen: Soft, +BS, NTND   Extremities: LE luis e no edema   Neurologic: Grossly normal    Data Review:   Recent Labs     11/21/19  0320 11/20/19  0912   WBC 9.8 11.3*   HGB 10.0* 10.6*   HCT 33.8* 35.4   * 177     Recent Labs     11/21/19  0320 11/20/19  0912    132*   K 3.7 4.0    101   CO2 27 23   GLU 96 117*   BUN 21* 18   CREA 1.26* 1.22*   CA 8.5 8.7   MG 1.6  --    ALB 3.1* 3.3*   TBILI 0.8 1.2*   SGOT 28 23   ALT 12 12       Recent Labs     11/20/19  0912   TROIQ <0.05         Intake/Output Summary (Last 24 hours) at 11/21/2019 1252  Last data filed at 11/21/2019 1055  Gross per 24 hour   Intake 1420 ml   Output 300 ml   Net 1120 ml        Cardiographics    Telemetry: afib  ECG:  afib     Echocardiogram: 2017  Left ventricle: Ejection fraction was estimated in the range of 40 % to 45  %. There were no regional wall motion abnormalities.   Mitral valve: There was mild to moderate annular calcification.   Aortic valve: There was mild to moderate regurgitation.   INDICATIONS: Dyspnea/shortness of breath. CXRAY:Right perihilar airspace disease consistent with pneumonia.  Follow-up to  resolution is suggested       Assessment:       Active Problems:    HTN (hypertension) (3/14/2011)      Bilateral leg edema (7/5/2017)      HX: long term anticoagulant use--Pradaxa stopped 5/17 for chronic atrial fib (7/5/2017)      Acute hypoxemic respiratory failure ([facility] (11/20/2019)      Pneumonia (11/20/2019)      CHF (congestive heart failure) ([facility] (11/20/2019)      Chronic atrial fibrillation (11/21/2019)         Plan:     Cardiomyopathy with systolic heart failure (EF 40-45%)  Agree with diuresis, monitor I/Os, daily weights, labs  Echo pending  Continue on BB, consider ACEI/ARB/Entresto if BP allows    Chronic Afib:  Continue on Atenolol, on Pradaxa    PNA:  Per hospitalist    Thank you for consulting MELISSA Miranda NP

## 2019-11-22 LAB
ANION GAP SERPL CALC-SCNC: 7 MMOL/L (ref 5–15)
BASOPHILS # BLD: 0 K/UL (ref 0–0.1)
BASOPHILS NFR BLD: 0 % (ref 0–1)
BUN SERPL-MCNC: 29 MG/DL (ref 6–20)
BUN/CREAT SERPL: 23 (ref 12–20)
CALCIUM SERPL-MCNC: 8.1 MG/DL (ref 8.5–10.1)
CHLORIDE SERPL-SCNC: 100 MMOL/L (ref 97–108)
CO2 SERPL-SCNC: 28 MMOL/L (ref 21–32)
CREAT SERPL-MCNC: 1.25 MG/DL (ref 0.55–1.02)
DIFFERENTIAL METHOD BLD: ABNORMAL
EOSINOPHIL # BLD: 0.2 K/UL (ref 0–0.4)
EOSINOPHIL NFR BLD: 4 % (ref 0–7)
ERYTHROCYTE [DISTWIDTH] IN BLOOD BY AUTOMATED COUNT: 18.1 % (ref 11.5–14.5)
GLUCOSE SERPL-MCNC: 86 MG/DL (ref 65–100)
HCT VFR BLD AUTO: 32.6 % (ref 35–47)
HGB BLD-MCNC: 9.3 G/DL (ref 11.5–16)
IMM GRANULOCYTES # BLD AUTO: 0.1 K/UL (ref 0–0.04)
IMM GRANULOCYTES NFR BLD AUTO: 1 % (ref 0–0.5)
LYMPHOCYTES # BLD: 1.4 K/UL (ref 0.8–3.5)
LYMPHOCYTES NFR BLD: 24 % (ref 12–49)
MCH RBC QN AUTO: 23.8 PG (ref 26–34)
MCHC RBC AUTO-ENTMCNC: 28.5 G/DL (ref 30–36.5)
MCV RBC AUTO: 83.4 FL (ref 80–99)
MONOCYTES # BLD: 0.8 K/UL (ref 0–1)
MONOCYTES NFR BLD: 13 % (ref 5–13)
NEUTS SEG # BLD: 3.3 K/UL (ref 1.8–8)
NEUTS SEG NFR BLD: 58 % (ref 32–75)
NRBC # BLD: 0 K/UL (ref 0–0.01)
NRBC BLD-RTO: 0 PER 100 WBC
PLATELET # BLD AUTO: 140 K/UL (ref 150–400)
PMV BLD AUTO: 12.2 FL (ref 8.9–12.9)
POTASSIUM SERPL-SCNC: 3.8 MMOL/L (ref 3.5–5.1)
RBC # BLD AUTO: 3.91 M/UL (ref 3.8–5.2)
RBC MORPH BLD: ABNORMAL
RBC MORPH BLD: ABNORMAL
SODIUM SERPL-SCNC: 135 MMOL/L (ref 136–145)
WBC # BLD AUTO: 5.8 K/UL (ref 3.6–11)

## 2019-11-22 PROCEDURE — 80048 BASIC METABOLIC PNL TOTAL CA: CPT

## 2019-11-22 PROCEDURE — 77010033678 HC OXYGEN DAILY

## 2019-11-22 PROCEDURE — 65660000000 HC RM CCU STEPDOWN

## 2019-11-22 PROCEDURE — 85025 COMPLETE CBC W/AUTO DIFF WBC: CPT

## 2019-11-22 PROCEDURE — 94640 AIRWAY INHALATION TREATMENT: CPT

## 2019-11-22 PROCEDURE — 97116 GAIT TRAINING THERAPY: CPT

## 2019-11-22 PROCEDURE — 74011000258 HC RX REV CODE- 258: Performed by: FAMILY MEDICINE

## 2019-11-22 PROCEDURE — 74011250636 HC RX REV CODE- 250/636: Performed by: FAMILY MEDICINE

## 2019-11-22 PROCEDURE — 36415 COLL VENOUS BLD VENIPUNCTURE: CPT

## 2019-11-22 PROCEDURE — 74011000250 HC RX REV CODE- 250: Performed by: INTERNAL MEDICINE

## 2019-11-22 PROCEDURE — 94760 N-INVAS EAR/PLS OXIMETRY 1: CPT

## 2019-11-22 PROCEDURE — 74011250637 HC RX REV CODE- 250/637: Performed by: INTERNAL MEDICINE

## 2019-11-22 PROCEDURE — 74011250637 HC RX REV CODE- 250/637: Performed by: FAMILY MEDICINE

## 2019-11-22 PROCEDURE — 74011250636 HC RX REV CODE- 250/636: Performed by: INTERNAL MEDICINE

## 2019-11-22 RX ORDER — LISINOPRIL 5 MG/1
10 TABLET ORAL DAILY
Status: DISCONTINUED | OUTPATIENT
Start: 2019-11-22 | End: 2019-11-25 | Stop reason: HOSPADM

## 2019-11-22 RX ORDER — LISINOPRIL 5 MG/1
5 TABLET ORAL ONCE
Status: COMPLETED | OUTPATIENT
Start: 2019-11-22 | End: 2019-11-22

## 2019-11-22 RX ADMIN — GUAIFENESIN 400 MG: 200 SOLUTION ORAL at 20:43

## 2019-11-22 RX ADMIN — ATENOLOL 50 MG: 25 TABLET ORAL at 11:11

## 2019-11-22 RX ADMIN — LISINOPRIL 5 MG: 5 TABLET ORAL at 11:11

## 2019-11-22 RX ADMIN — GUAIFENESIN 400 MG: 200 SOLUTION ORAL at 10:14

## 2019-11-22 RX ADMIN — ACETAMINOPHEN 650 MG: 325 TABLET ORAL at 16:25

## 2019-11-22 RX ADMIN — LEVOTHYROXINE SODIUM 100 MCG: 100 TABLET ORAL at 05:25

## 2019-11-22 RX ADMIN — IPRATROPIUM BROMIDE AND ALBUTEROL SULFATE 3 ML: .5; 3 SOLUTION RESPIRATORY (INHALATION) at 19:51

## 2019-11-22 RX ADMIN — ACETAMINOPHEN 650 MG: 325 TABLET ORAL at 10:24

## 2019-11-22 RX ADMIN — FUROSEMIDE 40 MG: 10 INJECTION, SOLUTION INTRAMUSCULAR; INTRAVENOUS at 11:11

## 2019-11-22 RX ADMIN — DABIGATRAN ETEXILATE MESYLATE 75 MG: 75 CAPSULE ORAL at 10:14

## 2019-11-22 RX ADMIN — TRAZODONE HYDROCHLORIDE 150 MG: 100 TABLET ORAL at 20:43

## 2019-11-22 RX ADMIN — IPRATROPIUM BROMIDE AND ALBUTEROL SULFATE 3 ML: .5; 3 SOLUTION RESPIRATORY (INHALATION) at 07:38

## 2019-11-22 RX ADMIN — GUAIFENESIN 400 MG: 200 SOLUTION ORAL at 16:24

## 2019-11-22 RX ADMIN — IPRATROPIUM BROMIDE AND ALBUTEROL SULFATE 3 ML: .5; 3 SOLUTION RESPIRATORY (INHALATION) at 13:36

## 2019-11-22 RX ADMIN — AZITHROMYCIN MONOHYDRATE 500 MG: 250 TABLET ORAL at 10:14

## 2019-11-22 RX ADMIN — FLUTICASONE PROPIONATE 2 SPRAY: 50 SPRAY, METERED NASAL at 10:16

## 2019-11-22 RX ADMIN — DABIGATRAN ETEXILATE MESYLATE 75 MG: 75 CAPSULE ORAL at 20:42

## 2019-11-22 RX ADMIN — CEFTRIAXONE 1 G: 1 INJECTION, POWDER, FOR SOLUTION INTRAMUSCULAR; INTRAVENOUS at 10:15

## 2019-11-22 NOTE — PROGRESS NOTES
Bedside shift change report GIVEN TO Suzanne Bailey RN. Report included the following information SBAR and Kardex. SIGNIFICANT CHANGES DURING SHIFT:  Uneventful Shift      CONCERNS TO ADDRESS WITH MD:           [facility] NURSING NOTE   Admission Date 11/20/2019   Admission Diagnosis Acute hypoxemic respiratory failure ([address] [J96.01]   Consults IP CONSULT TO CARDIOLOGY  IP CONSULT TO PRIMARY CARE PROVIDER      Cardiac Monitoring [x] Yes [] No      Purposeful Hourly Rounding [x] Yes    Den Score Total Score: 2   Den score 3 or > [x] Bed Alarm [] Avasys [] 1:1 sitter [] Patient refused (Signed refusal form in chart)   Tanner Score Tanner Score: 17   Tanner score 14 or < [] PMT consult [] Wound Care consult    []  Specialty bed  [] Nutrition consult      Influenza Vaccine Received Flu Vaccine for Current Season (usually Sept-March): No    Patient/Guardian Refused (Notify MD): Yes      Oxygen needs? [] Room air Oxygen @  [x]1L    []2L    []3L   []4L    []5L   []6L via  NC   Chronic home O2 use?  [] Yes [x] No  Perform O2 challenge test and document in progress note using smartphrase (.Homeoxygen)      Last bowel movement Last Bowel Movement Date: 11/21/19      Urinary Catheter             LDAs               Peripheral IV 11/20/19 Left Forearm (Active)   Site Assessment Clean, dry, & intact 11/22/2019  3:54 AM   Phlebitis Assessment 0 11/22/2019  3:54 AM   Infiltration Assessment 0 11/22/2019  3:54 AM   Dressing Status Clean, dry, & intact 11/22/2019  3:54 AM   Dressing Type Transparent;Tape 11/22/2019  3:54 AM   Hub Color/Line Status Pink;Flushed 11/22/2019  3:54 AM       Peripheral IV 11/20/19 Right Antecubital (Active)   Site Assessment Clean, dry, & intact 11/22/2019  3:54 AM   Phlebitis Assessment 0 11/22/2019  3:54 AM   Infiltration Assessment 0 11/22/2019  3:54 AM   Dressing Status Clean, dry, & intact 11/22/2019  3:54 AM   Dressing Type Transparent;Tape 11/22/2019  3:54 AM   Hub Color/Line Status Pink;Flushed 11/22/2019  3:54 AM                         Readmission Risk Assessment Tool Score Medium Risk            19       Total Score        3 Has Seen PCP in Last 6 Months (Yes=3, No=0)    5 Pt. Coverage (Medicare=5 , Medicaid, or Self-Pay=4)    11 Charlson Comorbidity Score (Age + Comorbid Conditions)        Criteria that do not apply:    . Living with Significant Other. Assisted Living. LTAC. SNF.  or   Rehab    Patient Length of Stay (>5 days = 3)    IP Visits Last 12 Months (1-3=4, 4=9, >4=11)       Expected Length of Stay 4d 19h   Actual Length of Stay 2

## 2019-11-22 NOTE — PROGRESS NOTES
ADULT PROTOCOL: JET AEROSOL ASSESSMENT    Patient  Julio C Lowery     80 y.o.   female     11/22/2019  12:43 AM    Breath Sounds Pre Procedure: Right Breath Sounds: Diminished                               Left Breath Sounds: Diminished    Breath Sounds Post Procedure:  Diminished                                      Breathing pattern: Pre procedure Breathing Pattern: Regular          Post procedure Breathing Pattern: Regular    Heart Rate: Pre procedure Pulse: 81           Post procedure Pulse: 83    Resp Rate: Pre procedure Respirations: 20           Post procedure Respirations: 18    Cough: Pre procedure Cough: Non-productive    Oxygen: O2 Device: Nasal cannula       SpO2: Pre procedure SpO2: 96 %                Post procedure SpO2: 93 %     Nebulizer Therapy: Current medications Aerosolized Medications: DuoNeb  Problem List:   Patient Active Problem List   Diagnosis Code    Rhabdomyolysis M62.82    Persistent atrial fibrillation I48.19    HTN (hypertension) I10    Encephalopathy acute G93.40    Insomnia G47.00    Hypokalemia E87.6    Hypothyroidism E03.9    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    Hyperkalemia E87.5    GI bleeding K92.2    HILTON (dyspnea on exertion) R06.09    Bilateral leg edema R60.0    HX: long term anticoagulant use--Pradaxa stopped 5/17 for chronic atrial fib Z92.29    Cardiomyopathy, 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    GERD with esophagitis K21.0    Dyslipidemia (high LDL; low HDL) E78.5    CKD (chronic kidney disease) stage 3, GFR 30-59 ml/min (HCC) N18.3    Chest pain with moderate risk for cardiac etiology R07.9    Irregular heart beat I49.9    Acute hypoxemic respiratory failure (HCC) J96.01    Pneumonia J18.9    CHF (congestive heart failure) (HCC) I50.9    Chronic atrial fibrillation I48.20       Respiratory Therapist: Mary Ye RT

## 2019-11-22 NOTE — PROGRESS NOTES
General Daily Progress Note    Admit Date: 11/20/2019  Hospital day 3    Subjective:     Patient has no complaint of shortness of breath this am. Some  Rfoot pain. No chest pain. ..   Medication side effects: none    Current Facility-Administered Medications   Medication Dose Route Frequency    lisinopril (PRINIVIL, ZESTRIL) tablet 10 mg  10 mg Oral DAILY    atenolol (TENORMIN) tablet 50 mg  50 mg Oral DAILY    dabigatran etexilate (PRADAXA) capsule 75 mg  75 mg Oral Q12H    fluticasone propionate (FLONASE) 50 mcg/actuation nasal spray 2 Spray  2 Spray Both Nostrils DAILY    levothyroxine (SYNTHROID) tablet 100 mcg  100 mcg Oral 6am    traZODone (DESYREL) tablet 150 mg  150 mg Oral QHS    furosemide (LASIX) injection 40 mg  40 mg IntraVENous DAILY    metoprolol (LOPRESSOR) injection 5 mg  5 mg IntraVENous Q6H PRN    acetaminophen (TYLENOL) tablet 650 mg  650 mg Oral Q4H PRN    ondansetron (ZOFRAN) injection 4 mg  4 mg IntraVENous Q6H PRN    guaiFENesin (ROBITUSSIN) 100 mg/5 mL oral liquid 400 mg  400 mg Oral TID    albuterol-ipratropium (DUO-NEB) 2.5 MG-0.5 MG/3 ML  3 mL Nebulization TID RT    azithromycin (ZITHROMAX) tablet 500 mg  500 mg Oral DAILY    cefTRIAXone (ROCEPHIN) 1 g in 0.9% sodium chloride (MBP/ADV) 50 mL  1 g IntraVENous Q24H        Review of Systems  Pertinent items are noted in HPI.     Objective:     Patient Vitals for the past 8 hrs:   BP Temp Pulse Resp SpO2 Weight   11/22/19 0356      133 lb 6.1 oz (60.5 kg)   11/22/19 0335 114/54 97.4 °F (36.3 °C) 75 18 96 %    11/21/19 2308 111/49 98 °F (36.7 °C) 81 18 100 %      11/21 1901 - 11/22 0700  In: 360 [P.O.:360]  Out: -   11/20 0701 - 11/21 1900  In: 4486 [P.O.:660; I.V.:1050]  Out: 300 [Urine:300]    Physical Exam:   Visit Vitals  /54 (BP 1 Location: Right arm, BP Patient Position: At rest)   Pulse 75   Temp 97.4 °F (36.3 °C)   Resp 18   Ht 5' 2\" (1.575 m)   Wt 133 lb 6.1 oz (60.5 kg)   SpO2 96%   BMI 24.40 kg/m² Lungs: rales R base, L base  Heart: irregularly irregular rhythm  Abdomen: soft, non-tender. Bowel sounds normal. No masses,  no organomegaly  Extremities: no edema. 2+ dp pulse. Mild tenderness dorsum of foot.        ECG: atrial fibrillation, rate 60     Data Review   Recent Results (from the past 24 hour(s))   ECHO ADULT COMPLETE    Collection Time: 11/21/19  2:36 PM   Result Value Ref Range    Aortic Valve Systolic Peak Velocity 404.75 cm/s    AoV VTI 34.85 cm    Aortic Valve Area by Continuity of Peak Velocity 1.0 cm2    Aortic Valve Area by Continuity of VTI 1.1 cm2    AoV PG 17.1 mmHg    LVIDd 3.96 3.9 - 5.3 cm    LVPWd 1.44 (A) 0.6 - 0.9 cm    LVIDs 2.90 cm    IVSd 1.36 (A) 0.6 - 0.9 cm    IVSs 1.58 cm    LVOT d 1.76 cm    LVOT Peak Velocity 84.14 cm/s    LVOT Peak Gradient 2.8 mmHg    LVOT VTI 16.02 cm    MVA (PHT) 5.1 cm2    MV E Jonny 115.52 cm/s    Left Atrium to Aortic Root Ratio 0.95     Aortic Valve Systolic Mean Gradient 9.3 mmHg    LVOT Cardiac Output 3.5 L/min    LA Vol 4C 46.31 22 - 52 mL    LA Vol 2C 53.79 (A) 22 - 52 mL    LA Area 4C 19.2 cm2    LV Mass .1 (A) 67 - 162 g    LV Mass AL Index 148.5 (A) 43 - 95 g/m2    LVPWs 1.93 cm    Mitral Valve E Wave Deceleration Time 140.4 ms    Mitral Valve Pressure Half-time 43.4 ms    Left Atrium Major Axis 2.96 cm    Triscuspid Valve Regurgitation Peak Gradient 17.3 mmHg    Pulmonic Valve Max Velocity 75.87 cm/s    LVOT SV 39.1 ml    TR Max Velocity 207.81 cm/s    PISA MR Rad 0.84 cm    LA Vol Index 31.91 16 - 28 ml/m2    LA Vol Index 32.86 16 - 28 ml/m2    LA Vol Index 28.29 16 - 28 ml/m2    KELSIE/BSA Pk Jonny 0.6 cm2/m2    KELSIE/BSA VTI 0.7 cm2/m2    AV Cusp 1.70 cm    Left Ventricular Fractional Shortening by 2D 90.903854828 %    Left Ventricular Outflow Tract Mean Gradient 5.0610905968464 mmHg    Left Ventricular Outflow Tract Mean Velocity 2.99216332987226 cm/s    Mitral Valve Deceleration [place] 4.9329073726     AV Velocity Ratio 0.41     AV VTI Ratio 0.5     Aortic valve mean velocity 1.6583700560067 m/s    Left Ventricular End Diastolic Volume by Teichholz Method 98.992067098 mL    Left Ventricular End Systolic Volume by Teichholz Method 51.761365398 mL    Left Ventricular Stroke Volume by Teichholz Method 79.298276468 mL    PV peak gradient 2.3 mmHg    LA Volume 52.24 22 - 52 mL    Ao Root D 3.11 cm    RVSP 27.3 mmHg    Est. RA Pressure 10.0 mmHg    PASP 30.8 mmHg   CBC WITH AUTOMATED DIFF    Collection Time: 11/22/19  3:30 AM   Result Value Ref Range    WBC 5.8 3.6 - 11.0 K/uL    RBC 3.91 3.80 - 5.20 M/uL    HGB 9.3 (L) 11.5 - 16.0 g/dL    HCT 32.6 (L) 35.0 - 47.0 %    MCV 83.4 80.0 - 99.0 FL    MCH 23.8 (L) 26.0 - 34.0 PG    MCHC 28.5 (L) 30.0 - 36.5 g/dL    RDW 18.1 (H) 11.5 - 14.5 %    PLATELET 292 (L) 722 - 400 K/uL    MPV 12.2 8.9 - 12.9 FL    NRBC 0.0 0  WBC    ABSOLUTE NRBC 0.00 0.00 - 0.01 K/uL    NEUTROPHILS 58 32 - 75 %    LYMPHOCYTES 24 12 - 49 %    MONOCYTES 13 5 - 13 %    EOSINOPHILS 4 0 - 7 %    BASOPHILS 0 0 - 1 %    IMMATURE GRANULOCYTES 1 (H) 0.0 - 0.5 %    ABS. NEUTROPHILS 3.3 1.8 - 8.0 K/UL    ABS. LYMPHOCYTES 1.4 0.8 - 3.5 K/UL    ABS. MONOCYTES 0.8 0.0 - 1.0 K/UL    ABS. EOSINOPHILS 0.2 0.0 - 0.4 K/UL    ABS. BASOPHILS 0.0 0.0 - 0.1 K/UL    ABS. IMM.  GRANS. 0.1 (H) 0.00 - 0.04 K/UL    DF SMEAR SCANNED      RBC COMMENTS ANISOCYTOSIS  1+        RBC COMMENTS HYPOCHROMIA  1+       METABOLIC PANEL, BASIC    Collection Time: 11/22/19  3:30 AM   Result Value Ref Range    Sodium 135 (L) 136 - 145 mmol/L    Potassium 3.8 3.5 - 5.1 mmol/L    Chloride 100 97 - 108 mmol/L    CO2 28 21 - 32 mmol/L    Anion gap 7 5 - 15 mmol/L    Glucose 86 65 - 100 mg/dL    BUN 29 (H) 6 - 20 MG/DL    Creatinine 1.25 (H) 0.55 - 1.02 MG/DL    BUN/Creatinine ratio 23 (H) 12 - 20      GFR est AA 49 (L) >60 ml/min/1.73m2    GFR est non-AA 40 (L) >60 ml/min/1.73m2    Calcium 8.1 (L) 8.5 - 10.1 MG/DL           Assessment:     Active Problems:    HTN (hypertension) (3/14/2011)      Bilateral leg edema (7/5/2017)      HX: long term anticoagulant use--Pradaxa stopped 5/17 for chronic atrial fib (7/5/2017)      Acute hypoxemic respiratory failure ([address] (11/20/2019)      Pneumonia (11/20/2019)      CHF (congestive heart failure) ([address] (11/20/2019)      Chronic atrial fibrillation (11/21/2019)        Plan:     1. Hypoxic respiratory failure secondary to R perihilar pneumonia and/or CHF. Seems more like chf to me. Continue iv rocephin, zithromax, and lasix for now. Will add lisinopril. Also on atenolol. Try to wean off oxygen. Cost of entresto might be an issue. 2. Echo- EF 40-45%, moderate AR and mild MR  3.  Chronic A fib- on pradaxa

## 2019-11-22 NOTE — INTERDISCIPLINARY ROUNDS
[facility] INTERDISCIPLINARY ROUNDS Cardiopulmonary Care Interdisciplinary Rounds were held today to discuss patient's plan of care and outcomes. The following members were present: NP/Physician, Pharmacy, Nursing and Case Management. Expected Length of Stay:  4d 19h PLAN OF CARE:  
Continue current treatment plan

## 2019-11-22 NOTE — PROGRESS NOTES
Problem: Breathing Pattern - Ineffective  Goal: *Absence of hypoxia  Outcome: Progressing Towards Goal  Goal: *Use of effective breathing techniques  Outcome: Progressing Towards Goal     Problem: Patient Education: Go to Patient Education Activity  Goal: Patient/Family Education  Outcome: Progressing Towards Goal     Problem: Falls - Risk of  Goal: *Absence of Falls  Description  Document Binu Bert Fall Risk and appropriate interventions in the flowsheet.   Outcome: Progressing Towards Goal  Note: Fall Risk Interventions:  Mobility Interventions: Assess mobility with egress test, Bed/chair exit alarm, Communicate number of staff needed for ambulation/transfer, OT consult for ADLs, Patient to call before getting OOB, PT Consult for mobility concerns         Medication Interventions: Assess postural VS orthostatic hypotension, Bed/chair exit alarm, Evaluate medications/consider consulting pharmacy, Patient to call before getting OOB, Teach patient to arise slowly, Utilize gait belt for transfers/ambulation

## 2019-11-22 NOTE — PROGRESS NOTES
Bedside shift change report given to Yeni (oncoming nurse) by Naz Kowalski (offgoing nurse). Report included the following information SBAR.     1036 - Paged Dr. Anderson Verdin regarding blood pressure medications scheduled for this morning. BP is 104/52.    1020 - Administered tylenol for arthritis pain in R foot. 1049 - Given orders to administer 5mg lisinopril, OK to give atenolol and lasix.

## 2019-11-22 NOTE — PROGRESS NOTES
Problem: Falls - Risk of  Goal: *Absence of Falls  Description  Document Afshan Villafana Fall Risk and appropriate interventions in the flowsheet.   Outcome: Progressing Towards Goal  Note: Fall Risk Interventions:  Mobility Interventions: Assess mobility with egress test, Bed/chair exit alarm, Communicate number of staff needed for ambulation/transfer, OT consult for ADLs, Patient to call before getting OOB, PT Consult for mobility concerns, PT Consult for assist device competence         Medication Interventions: Assess postural VS orthostatic hypotension, Bed/chair exit alarm, Patient to call before getting OOB, Teach patient to arise slowly

## 2019-11-22 NOTE — PROGRESS NOTES
[address] [place] [address]  [phone]      Cardiology Progress Note      11/22/2019 3:59 PM    Admit Date: 11/20/2019    Admit Diagnosis:   Acute hypoxemic respiratory failure (HCC) [J96.01]    Subjective:     Darian Castillo has no c/o SOB and CP. Diuresing well    Visit Vitals  BP 95/46   Pulse 81   Temp 97.8 °F (36.6 °C)   Resp 19   Ht 5' 2\" (1.575 m)   Wt 60.5 kg (133 lb 6.1 oz)   SpO2 90%   BMI 24.40 kg/m²       Current Facility-Administered Medications   Medication Dose Route Frequency    lisinopril (PRINIVIL, ZESTRIL) tablet 10 mg  10 mg Oral DAILY    atenolol (TENORMIN) tablet 50 mg  50 mg Oral DAILY    dabigatran etexilate (PRADAXA) capsule 75 mg  75 mg Oral Q12H    fluticasone propionate (FLONASE) 50 mcg/actuation nasal spray 2 Spray  2 Spray Both Nostrils DAILY    levothyroxine (SYNTHROID) tablet 100 mcg  100 mcg Oral 6am    traZODone (DESYREL) tablet 150 mg  150 mg Oral QHS    furosemide (LASIX) injection 40 mg  40 mg IntraVENous DAILY    metoprolol (LOPRESSOR) injection 5 mg  5 mg IntraVENous Q6H PRN    acetaminophen (TYLENOL) tablet 650 mg  650 mg Oral Q4H PRN    ondansetron (ZOFRAN) injection 4 mg  4 mg IntraVENous Q6H PRN    guaiFENesin (ROBITUSSIN) 100 mg/5 mL oral liquid 400 mg  400 mg Oral TID    albuterol-ipratropium (DUO-NEB) 2.5 MG-0.5 MG/3 ML  3 mL Nebulization TID RT    azithromycin (ZITHROMAX) tablet 500 mg  500 mg Oral DAILY    cefTRIAXone (ROCEPHIN) 1 g in 0.9% sodium chloride (MBP/ADV) 50 mL  1 g IntraVENous Q24H       Objective:      Physical Exam:  General Appearance:  elderly  female in no acute distress  Chest:   Clear  Cardiovascular:  Regular rate and rhythm, no murmur. Abdomen:   Soft, non-tender, bowel sounds are active. Extremities: no peripheral edema  Skin:  Warm and dry.      Data Review:   Recent Labs     11/22/19  0330 11/21/19  0320 11/20/19  0912   WBC 5.8 9.8 11.3*   HGB 9.3* 10.0* 10.6*   HCT 32.6* 33.8* 35.4 * 149* 177     Recent Labs     11/22/19  0330 11/21/19  0320 11/20/19  0912   * 136 132*   K 3.8 3.7 4.0    102 101   CO2 28 27 23   GLU 86 96 117*   BUN 29* 21* 18   CREA 1.25* 1.26* 1.22*   CA 8.1* 8.5 8.7   MG  --  1.6  --    ALB  --  3.1* 3.3*   TBILI  --  0.8 1.2*   SGOT  --  28 23   ALT  --  12 12       Recent Labs     11/20/19  0912   TROIQ <0.05         Intake/Output Summary (Last 24 hours) at 11/22/2019 1559  Last data filed at 11/22/2019 1540  Gross per 24 hour   Intake 720 ml   Output    Net 720 ml        Telemetry: SR      Assessment:     Active Problems:    HTN (hypertension) (3/14/2011)      Bilateral leg edema (7/5/2017)      HX: long term anticoagulant use--Pradaxa stopped 5/17 for chronic atrial fib (7/5/2017)      Acute hypoxemic respiratory failure ([facility] (11/20/2019)      Pneumonia (11/20/2019)      CHF (congestive heart failure) ([facility] [address] (11/20/2019)      Chronic atrial fibrillation (11/21/2019)        Plan:     Cardiomyopathy with systolic heart failure (EF 40-45%)  Continue with diuresis, monitor I/Os, daily weights, labs  Continue on BB, consider ACEI/ARB/Entresto if BP allows     Chronic Afib:  Continue on Atenolol, on Pradaxa     PNA:  Per hospitalist       Owen Hurtado ACNP  Cardiology

## 2019-11-22 NOTE — PROGRESS NOTES
Spiritual Care Partner Volunteer visited patient in [facility] on 11/22/19. Documented by:  Dillon Lafleur MDiv   For  Assistance Page [phone])

## 2019-11-22 NOTE — PROGRESS NOTES
Bedside shift change report GIVEN TO Manjinder Naik RN. Report included the following information SBAR. SIGNIFICANT CHANGES DURING SHIFT:        CONCERNS TO ADDRESS WITH MD:            [facility] NURSING NOTE   Admission Date 11/20/2019   Admission Diagnosis Acute hypoxemic respiratory failure ([address] [J96.01]   Consults IP CONSULT TO CARDIOLOGY  IP CONSULT TO PRIMARY CARE PROVIDER      Cardiac Monitoring [x] Yes [] No      Purposeful Hourly Rounding [x] Yes    Den Score Total Score: 2   Den score 3 or > [x] Bed Alarm [] Avasys [] 1:1 sitter [] Patient refused (Signed refusal form in chart)   Tanner Score Tanner Score: 17   Tanner score 14 or < [] PMT consult [] Wound Care consult    []  Specialty bed  [] Nutrition consult      Influenza Vaccine Received Flu Vaccine for Current Season (usually Sept-March): No    Patient/Guardian Refused (Notify MD): Yes      Oxygen needs? [] Room air Oxygen @  [x]1L    []2L    []3L   []4L    []5L   []6L via  NC   Chronic home O2 use?  [] Yes [x] No  Perform O2 challenge test and document in progress note using smartphetrigge (.Homeoxygen)      Last bowel movement Last Bowel Movement Date: 11/21/19      Urinary Catheter             LDAs               Peripheral IV 11/20/19 Left Forearm (Active)   Site Assessment Clean, dry, & intact 11/22/2019  3:54 AM   Phlebitis Assessment 0 11/22/2019  3:54 AM   Infiltration Assessment 0 11/22/2019  3:54 AM   Dressing Status Clean, dry, & intact 11/22/2019  3:54 AM   Dressing Type Transparent;Tape 11/22/2019  3:54 AM   Hub Color/Line Status Pink;Flushed 11/22/2019  3:54 AM       Peripheral IV 11/20/19 Right Antecubital (Active)   Site Assessment Clean, dry, & intact 11/22/2019  3:54 AM   Phlebitis Assessment 0 11/22/2019  3:54 AM   Infiltration Assessment 0 11/22/2019  3:54 AM   Dressing Status Clean, dry, & intact 11/22/2019  3:54 AM   Dressing Type Transparent;Tape 11/22/2019  3:54 AM   Hub Color/Line Status Pink;Flushed 11/22/2019  3:54 AM Readmission Risk Assessment Tool Score Medium Risk            19       Total Score        3 Has Seen PCP in Last 6 Months (Yes=3, No=0)    5 Pt. Coverage (Medicare=5 , Medicaid, or Self-Pay=4)    11 Charlson Comorbidity Score (Age + Comorbid Conditions)        Criteria that do not apply:    . Living with Significant Other. Assisted Living. LTAC. SNF.  or   Rehab    Patient Length of Stay (>5 days = 3)    IP Visits Last 12 Months (1-3=4, 4=9, >4=11)       Expected Length of Stay 4d 19h   Actual Length of Stay 2

## 2019-11-22 NOTE — PROGRESS NOTES
Problem: Mobility Impaired (Adult and Pediatric)  Goal: *Acute Goals and Plan of Care (Insert Text)  Description  FUNCTIONAL STATUS PRIOR TO ADMISSION: Patient was independent and active without use of DME.    HOME SUPPORT PRIOR TO ADMISSION: The patient lived with daughters but did not require assist. Daughters are home during the day. Physical Therapy Goals  Initiated 11/21/2019  1. Patient will move from supine to sit and sit to supine , scoot up and down and roll side to side in bed with independence within 7 day(s). 2.  Patient will transfer from bed to chair and chair to bed with independence using the least restrictive device within 7 day(s). 3.  Patient will perform sit to stand with independence within 7 day(s). 4.  Patient will ambulate with independence for 150 feet with the least restrictive device within 7 day(s). 5.  Patient will ascend/descend 5 stairs with bilateral handrail(s) with modified independence within 7 day(s). Outcome: Progressing Towards Goal   PHYSICAL THERAPY TREATMENT  Patient: Tony De Paz (52 y.o. female)  Date: 11/22/2019  Diagnosis: Acute hypoxemic respiratory failure ([facility] [address] [J96.01]   <principal problem not specified>       Precautions: DNR, Bed Alarm  Chart, physical therapy assessment, plan of care and goals were reviewed. ASSESSMENT  Patient continues with skilled PT services and is progressing towards goals. Pt presents with decreased endurance and strength. Pt received in RA with o2 stats at 93% at rest. Pt performed all bed mobility independently with cueing. Pt performed sit to stand transfer at [facility] with cueing for hand placement. Pt reported increased pain in right foot. Due to pain pt agreeable to using RW. Pt ambulated 70ft with RW at [facility]. Pt  with antalgic gait due to foot pain but improving with time. Pts o2 stats >91% throughout ambulation but reported feeling fatigued after short bout. Pts o2 stats improving to 94% once sitting after ambulation. Will continue to increase mobility tolerance on RA without RW. Current Level of Function Impacting Discharge (mobility/balance): Pt requiring CGA for ambulation due to foot pain. Other factors to consider for discharge: none         PLAN :  Patient continues to benefit from skilled intervention to address the above impairments. Continue treatment per established plan of care. to address goals. Recommendation for discharge: (in order for the patient to meet his/her long term goals)  Physical therapy at least 2 days/week in the home     This discharge recommendation:  Has been made in collaboration with the attending provider and/or case management    IF patient discharges home will need the following DME: to be determined (TBD)       SUBJECTIVE:   Patient stated  My foot hurts today for some reason.     OBJECTIVE DATA SUMMARY:   Critical Behavior:  Neurologic State: Alert, Appropriate for age  Orientation Level: Oriented X4  Cognition: Appropriate for age attention/concentration, Follows commands     Functional Mobility Training:  Bed Mobility:  Rolling: Independent  Supine to Sit: Modified independent; Independent     Scooting: Independent        Transfers:  Sit to Stand: Contact guard assistance  Stand to Sit: Contact guard assistance                             Balance:  Sitting: Intact  Standing: Impaired; With support  Standing - Static: Good;Constant support  Standing - Dynamic : Fair;Constant support  Ambulation/Gait Training:  Distance (ft): 70 Feet (ft)  Assistive Device: Walker, rolling;Gait belt  Ambulation - Level of Assistance: Contact guard assistance        Gait Abnormalities: Decreased step clearance; Antalgic        Base of Support: Narrowed     Speed/Vanessa: Pace decreased (<100 feet/min)  Step Length: Right shortened;Left shortened          Pain Rating:  Pt reported pain in right foot but improved with mobility.      Activity Tolerance:   Good and SpO2 stable on RA but limited by foot pain  Please refer to the flowsheet for vital signs taken during this treatment.     After treatment patient left in no apparent distress:   Sitting in chair, Call bell within reach, Bed / chair alarm activated, and Caregiver / family present    COMMUNICATION/COLLABORATION:   The patients plan of care was discussed with: Registered Nurse    Lyn Sheffield PTA   Time Calculation: 14 mins

## 2019-11-23 LAB
ANION GAP SERPL CALC-SCNC: 9 MMOL/L (ref 5–15)
BASOPHILS # BLD: 0 K/UL (ref 0–0.1)
BASOPHILS NFR BLD: 1 % (ref 0–1)
BUN SERPL-MCNC: 33 MG/DL (ref 6–20)
BUN/CREAT SERPL: 22 (ref 12–20)
CALCIUM SERPL-MCNC: 8.3 MG/DL (ref 8.5–10.1)
CHLORIDE SERPL-SCNC: 99 MMOL/L (ref 97–108)
CO2 SERPL-SCNC: 27 MMOL/L (ref 21–32)
CREAT SERPL-MCNC: 1.49 MG/DL (ref 0.55–1.02)
DIFFERENTIAL METHOD BLD: ABNORMAL
EOSINOPHIL # BLD: 0.2 K/UL (ref 0–0.4)
EOSINOPHIL NFR BLD: 3 % (ref 0–7)
ERYTHROCYTE [DISTWIDTH] IN BLOOD BY AUTOMATED COUNT: 17.6 % (ref 11.5–14.5)
GLUCOSE SERPL-MCNC: 102 MG/DL (ref 65–100)
HCT VFR BLD AUTO: 31.5 % (ref 35–47)
HGB BLD-MCNC: 9.2 G/DL (ref 11.5–16)
IMM GRANULOCYTES # BLD AUTO: 0 K/UL (ref 0–0.04)
IMM GRANULOCYTES NFR BLD AUTO: 1 % (ref 0–0.5)
LYMPHOCYTES # BLD: 1.3 K/UL (ref 0.8–3.5)
LYMPHOCYTES NFR BLD: 21 % (ref 12–49)
MCH RBC QN AUTO: 24.3 PG (ref 26–34)
MCHC RBC AUTO-ENTMCNC: 29.2 G/DL (ref 30–36.5)
MCV RBC AUTO: 83.3 FL (ref 80–99)
MONOCYTES # BLD: 0.7 K/UL (ref 0–1)
MONOCYTES NFR BLD: 11 % (ref 5–13)
NEUTS SEG # BLD: 4 K/UL (ref 1.8–8)
NEUTS SEG NFR BLD: 63 % (ref 32–75)
NRBC # BLD: 0 K/UL (ref 0–0.01)
NRBC BLD-RTO: 0 PER 100 WBC
PLATELET # BLD AUTO: 186 K/UL (ref 150–400)
PMV BLD AUTO: 12.2 FL (ref 8.9–12.9)
POTASSIUM SERPL-SCNC: 3.3 MMOL/L (ref 3.5–5.1)
RBC # BLD AUTO: 3.78 M/UL (ref 3.8–5.2)
SODIUM SERPL-SCNC: 135 MMOL/L (ref 136–145)
WBC # BLD AUTO: 6.2 K/UL (ref 3.6–11)

## 2019-11-23 PROCEDURE — 85025 COMPLETE CBC W/AUTO DIFF WBC: CPT

## 2019-11-23 PROCEDURE — 74011000258 HC RX REV CODE- 258: Performed by: FAMILY MEDICINE

## 2019-11-23 PROCEDURE — 65660000000 HC RM CCU STEPDOWN

## 2019-11-23 PROCEDURE — 80048 BASIC METABOLIC PNL TOTAL CA: CPT

## 2019-11-23 PROCEDURE — 74011250636 HC RX REV CODE- 250/636: Performed by: FAMILY MEDICINE

## 2019-11-23 PROCEDURE — 74011250637 HC RX REV CODE- 250/637: Performed by: INTERNAL MEDICINE

## 2019-11-23 PROCEDURE — 74011000250 HC RX REV CODE- 250: Performed by: INTERNAL MEDICINE

## 2019-11-23 PROCEDURE — 74011250637 HC RX REV CODE- 250/637: Performed by: FAMILY MEDICINE

## 2019-11-23 PROCEDURE — 94760 N-INVAS EAR/PLS OXIMETRY 1: CPT

## 2019-11-23 PROCEDURE — 94640 AIRWAY INHALATION TREATMENT: CPT

## 2019-11-23 PROCEDURE — 36415 COLL VENOUS BLD VENIPUNCTURE: CPT

## 2019-11-23 RX ORDER — METOPROLOL SUCCINATE 25 MG/1
25 TABLET, EXTENDED RELEASE ORAL DAILY
Status: DISCONTINUED | OUTPATIENT
Start: 2019-11-24 | End: 2019-11-25 | Stop reason: HOSPADM

## 2019-11-23 RX ADMIN — ACETAMINOPHEN 650 MG: 325 TABLET ORAL at 15:06

## 2019-11-23 RX ADMIN — LISINOPRIL 10 MG: 5 TABLET ORAL at 09:41

## 2019-11-23 RX ADMIN — TRAZODONE HYDROCHLORIDE 150 MG: 100 TABLET ORAL at 21:10

## 2019-11-23 RX ADMIN — FLUTICASONE PROPIONATE 2 SPRAY: 50 SPRAY, METERED NASAL at 09:42

## 2019-11-23 RX ADMIN — DABIGATRAN ETEXILATE MESYLATE 75 MG: 75 CAPSULE ORAL at 09:41

## 2019-11-23 RX ADMIN — DABIGATRAN ETEXILATE MESYLATE 75 MG: 75 CAPSULE ORAL at 21:10

## 2019-11-23 RX ADMIN — GUAIFENESIN 400 MG: 200 SOLUTION ORAL at 15:06

## 2019-11-23 RX ADMIN — CEFTRIAXONE 1 G: 1 INJECTION, POWDER, FOR SOLUTION INTRAMUSCULAR; INTRAVENOUS at 09:41

## 2019-11-23 RX ADMIN — ATENOLOL 50 MG: 25 TABLET ORAL at 09:41

## 2019-11-23 RX ADMIN — IPRATROPIUM BROMIDE AND ALBUTEROL SULFATE 3 ML: .5; 3 SOLUTION RESPIRATORY (INHALATION) at 07:40

## 2019-11-23 RX ADMIN — IPRATROPIUM BROMIDE AND ALBUTEROL SULFATE 3 ML: .5; 3 SOLUTION RESPIRATORY (INHALATION) at 14:22

## 2019-11-23 RX ADMIN — LEVOTHYROXINE SODIUM 100 MCG: 100 TABLET ORAL at 06:09

## 2019-11-23 RX ADMIN — GUAIFENESIN 400 MG: 200 SOLUTION ORAL at 09:41

## 2019-11-23 RX ADMIN — GUAIFENESIN 400 MG: 200 SOLUTION ORAL at 21:10

## 2019-11-23 RX ADMIN — AZITHROMYCIN MONOHYDRATE 500 MG: 250 TABLET ORAL at 09:41

## 2019-11-23 RX ADMIN — IPRATROPIUM BROMIDE AND ALBUTEROL SULFATE 3 ML: .5; 3 SOLUTION RESPIRATORY (INHALATION) at 20:26

## 2019-11-23 NOTE — PROGRESS NOTES
Problem: Falls - Risk of  Goal: *Absence of Falls  Description  Document Martin Melara Fall Risk and appropriate interventions in the flowsheet. Outcome: Progressing Towards Goal  Note: Fall Risk Interventions:  Mobility Interventions: Bed/chair exit alarm         Medication Interventions: Bed/chair exit alarm                   Problem: Pressure Injury - Risk of  Goal: *Prevention of pressure injury  Description  Document Tanner Scale and appropriate interventions in the flowsheet.   Outcome: Progressing Towards Goal  Note: Pressure Injury Interventions:  Sensory Interventions: Assess changes in LOC         Activity Interventions: Increase time out of bed    Mobility Interventions: Assess need for specialty bed    Nutrition Interventions: Document food/fluid/supplement intake    Friction and Shear Interventions: Apply protective barrier, creams and emollients

## 2019-11-23 NOTE — PROGRESS NOTES
[facility] Assumed care of patient.

## 2019-11-23 NOTE — PROGRESS NOTES
Problem: Falls - Risk of  Goal: *Absence of Falls  Description  Document Rose Merino Fall Risk and appropriate interventions in the flowsheet.   Outcome: Progressing Towards Goal  Note: Fall Risk Interventions:  Mobility Interventions: Assess mobility with egress test, Bed/chair exit alarm, Communicate number of staff needed for ambulation/transfer, PT Consult for assist device competence, Strengthening exercises (ROM-active/passive), Patient to call before getting OOB         Medication Interventions: Assess postural VS orthostatic hypotension, Bed/chair exit alarm, Evaluate medications/consider consulting pharmacy, Patient to call before getting OOB, Teach patient to arise slowly, Utilize gait belt for transfers/ambulation

## 2019-11-23 NOTE — PROGRESS NOTES
[facility]      [address] [place] [address]  [phone]      Cardiology Progress Note      11/23/2019 1:46 PM    Admit Date: 11/20/2019    Admit Diagnosis:   Acute hypoxemic respiratory failure (HCC) [J96.01]    Subjective:     Elisabeth Blanco     No sob    Visit Vitals  /52 (BP 1 Location: Right arm, BP Patient Position: At rest)   Pulse 72   Temp 97.6 °F (36.4 °C)   Resp 20   Ht 5' 2\" (1.575 m)   Wt 130 lb 6.4 oz (59.1 kg)   SpO2 96%   BMI 23.85 kg/m²       Current Facility-Administered Medications   Medication Dose Route Frequency    [START ON 11/24/2019] metoprolol succinate (TOPROL-XL) XL tablet 25 mg  25 mg Oral DAILY    lisinopril (PRINIVIL, ZESTRIL) tablet 10 mg  10 mg Oral DAILY    dabigatran etexilate (PRADAXA) capsule 75 mg  75 mg Oral Q12H    fluticasone propionate (FLONASE) 50 mcg/actuation nasal spray 2 Spray  2 Spray Both Nostrils DAILY    levothyroxine (SYNTHROID) tablet 100 mcg  100 mcg Oral 6am    traZODone (DESYREL) tablet 150 mg  150 mg Oral QHS    furosemide (LASIX) injection 40 mg  40 mg IntraVENous DAILY    metoprolol (LOPRESSOR) injection 5 mg  5 mg IntraVENous Q6H PRN    acetaminophen (TYLENOL) tablet 650 mg  650 mg Oral Q4H PRN    ondansetron (ZOFRAN) injection 4 mg  4 mg IntraVENous Q6H PRN    guaiFENesin (ROBITUSSIN) 100 mg/5 mL oral liquid 400 mg  400 mg Oral TID    albuterol-ipratropium (DUO-NEB) 2.5 MG-0.5 MG/3 ML  3 mL Nebulization TID RT    azithromycin (ZITHROMAX) tablet 500 mg  500 mg Oral DAILY    cefTRIAXone (ROCEPHIN) 1 g in 0.9% sodium chloride (MBP/ADV) 50 mL  1 g IntraVENous Q24H       Objective:      Physical Exam:  General Appearance:    Chest:   Clear  Cardiovascular: irreg; rate controlled  Extremities: no edema  Skin:  Warm and dry.     Data Review:   Recent Labs     11/23/19  0258 11/22/19  0330 11/21/19  0320   WBC 6.2 5.8 9.8   HGB 9.2* 9.3* 10.0*   HCT 31.5* 32.6* 33.8*    140* 149*     Recent Labs 11/23/19  0258 11/22/19  0330 11/21/19  0320   * 135* 136   K 3.3* 3.8 3.7   CL 99 100 102   CO2 27 28 27   * 86 96   BUN 33* 29* 21*   CREA 1.49* 1.25* 1.26*   CA 8.3* 8.1* 8.5   MG  --   --  1.6   ALB  --   --  3.1*   TBILI  --   --  0.8   SGOT  --   --  28   ALT  --   --  12       No results for input(s): TROIQ, CPK, CKMB in the last 72 hours.       Intake/Output Summary (Last 24 hours) at 11/23/2019 1346  Last data filed at 11/22/2019 1703  Gross per 24 hour   Intake 120 ml   Output 300 ml   Net -180 ml        Telemetry:   EKG:  Cxray:    Assessment:     Active Problems:    HTN (hypertension) (3/14/2011)      Bilateral leg edema (7/5/2017)      HX: long term anticoagulant use--Pradaxa stopped 5/17 for chronic atrial fib (7/5/2017)      Acute hypoxemic respiratory failure ([facility] (11/20/2019)      Pneumonia (11/20/2019)      CHF (congestive heart failure) ([facility] (11/20/2019)      Chronic atrial fibrillation (11/21/2019)        Plan:     Cr up; DC furosemide    Keila Junior M.D., [facility] [place]

## 2019-11-23 NOTE — PROGRESS NOTES
INTERNAL MEDICINE ATTENDING NOTE    S: Ms. Hailee Jolly is a patient of [address] MD and was seen by me today during rounds. At this time, she is resting comfortably. \"I feel a little better; How long does it take to get over pneumonia? \" The patient has no new complaints today. ROS otherwise unremarkable except as noted elsewhere. O: Blood pressure 117/55, pulse 73, temperature 97.9 °F (36.6 °C), resp. rate 18, height 5' 2\" (1.575 m), weight 130 lb 6.4 oz (59.1 kg), SpO2 97 %. Gen: Patient is in no acute distress. Lungs: CTAB. Heart: RRR. Abd: S, NT, ND, BS present. Extremities: Warm. Recent Results (from the past 12 hour(s))   METABOLIC PANEL, BASIC    Collection Time: 11/23/19  2:58 AM   Result Value Ref Range    Sodium 135 (L) 136 - 145 mmol/L    Potassium 3.3 (L) 3.5 - 5.1 mmol/L    Chloride 99 97 - 108 mmol/L    CO2 27 21 - 32 mmol/L    Anion gap 9 5 - 15 mmol/L    Glucose 102 (H) 65 - 100 mg/dL    BUN 33 (H) 6 - 20 MG/DL    Creatinine 1.49 (H) 0.55 - 1.02 MG/DL    BUN/Creatinine ratio 22 (H) 12 - 20      GFR est AA 40 (L) >60 ml/min/1.73m2    GFR est non-AA 33 (L) >60 ml/min/1.73m2    Calcium 8.3 (L) 8.5 - 10.1 MG/DL   CBC WITH AUTOMATED DIFF    Collection Time: 11/23/19  2:58 AM   Result Value Ref Range    WBC 6.2 3.6 - 11.0 K/uL    RBC 3.78 (L) 3.80 - 5.20 M/uL    HGB 9.2 (L) 11.5 - 16.0 g/dL    HCT 31.5 (L) 35.0 - 47.0 %    MCV 83.3 80.0 - 99.0 FL    MCH 24.3 (L) 26.0 - 34.0 PG    MCHC 29.2 (L) 30.0 - 36.5 g/dL    RDW 17.6 (H) 11.5 - 14.5 %    PLATELET 373 709 - 491 K/uL    MPV 12.2 8.9 - 12.9 FL    NRBC 0.0 0  WBC    ABSOLUTE NRBC 0.00 0.00 - 0.01 K/uL    NEUTROPHILS 63 32 - 75 %    LYMPHOCYTES 21 12 - 49 %    MONOCYTES 11 5 - 13 %    EOSINOPHILS 3 0 - 7 %    BASOPHILS 1 0 - 1 %    IMMATURE GRANULOCYTES 1 (H) 0.0 - 0.5 %    ABS. NEUTROPHILS 4.0 1.8 - 8.0 K/UL    ABS. LYMPHOCYTES 1.3 0.8 - 3.5 K/UL    ABS. MONOCYTES 0.7 0.0 - 1.0 K/UL    ABS.  EOSINOPHILS 0.2 0.0 - 0.4 K/UL ABS. BASOPHILS 0.0 0.0 - 0.1 K/UL    ABS. IMM. GRANS. 0.0 0.00 - 0.04 K/UL    DF AUTOMATED          A / P:  1. Acute hypoxemic respiratory failure ([facility] (11/20/2019): Oxygen. 2. Pneumonia (11/20/2019): Continue antibiotics. 3. Sepsis: Improving. Supportive care. 4. HTN (hypertension) (3/14/2011): BP okay at this time. 5. Bilateral leg edema (7/5/2017)  6. CHF (congestive heart failure) ([facility] (11/20/2019): Cardiology following. 7. Chronic atrial fibrillation (11/21/2019): On pradaxa. BB for rate control. 8. Hypothyroidism: Continue levothyroxine.        Latrice Torres MD, FACP  Best contact is via Pager: [phone], or via hospital  at [phone]  Cell: [phone]

## 2019-11-23 NOTE — PROGRESS NOTES
Bedside shift change report GIVEN TO Reed Leiva RN. Report included the following information SBAR. SIGNIFICANT CHANGES DURING SHIFT:        CONCERNS TO ADDRESS WITH MD:            [address] NURSING NOTE   Admission Date 11/20/2019   Admission Diagnosis Acute hypoxemic respiratory failure ([facility] [J96.01]   Consults IP CONSULT TO CARDIOLOGY  IP CONSULT TO PRIMARY CARE PROVIDER      Cardiac Monitoring [x] Yes [] No      Purposeful Hourly Rounding [x] Yes    Den Score Total Score: 2   Den score 3 or > [x] Bed Alarm [] Avasys [] 1:1 sitter [] Patient refused (Signed refusal form in chart)   Tanner Score Tanner Score: 18   Tanner score 14 or < [] PMT consult [] Wound Care consult    []  Specialty bed  [] Nutrition consult      Influenza Vaccine Received Flu Vaccine for Current Season (usually Sept-March): No    Patient/Guardian Refused (Notify MD): Yes      Oxygen needs? [x] Room air Oxygen @  []1L    []2L    []3L   []4L    []5L   []6L via  NC   Chronic home O2 use?  [] Yes [] No  Perform O2 challenge test and document in progress note using smartphrase (.Homeoxygen)      Last bowel movement Last Bowel Movement Date: 11/21/19      Urinary Catheter             LDAs               Peripheral IV 11/20/19 Left Forearm (Active)   Site Assessment Clean, dry, & intact 11/22/2019  3:43 PM   Phlebitis Assessment 0 11/22/2019  3:43 PM   Infiltration Assessment 0 11/22/2019  3:43 PM   Dressing Status Clean, dry, & intact 11/22/2019  3:43 PM   Dressing Type Transparent;Tape 11/22/2019  3:43 PM   Hub Color/Line Status Pink;Flushed 11/22/2019  3:43 PM       Peripheral IV 11/20/19 Right Antecubital (Active)   Site Assessment Clean, dry, & intact 11/22/2019  3:43 PM   Phlebitis Assessment 0 11/22/2019  3:43 PM   Infiltration Assessment 0 11/22/2019  3:43 PM   Dressing Status Clean, dry, & intact 11/22/2019  3:43 PM   Dressing Type Transparent;Tape 11/22/2019  3:43 PM   Hub Color/Line Status Pink;Flushed 11/22/2019  3:43 PM Readmission Risk Assessment Tool Score Medium Risk            19       Total Score        3 Has Seen PCP in Last 6 Months (Yes=3, No=0)    5 Pt. Coverage (Medicare=5 , Medicaid, or Self-Pay=4)    11 Charlson Comorbidity Score (Age + Comorbid Conditions)        Criteria that do not apply:    . Living with Significant Other. Assisted Living. LTAC. SNF.  or   Rehab    Patient Length of Stay (>5 days = 3)    IP Visits Last 12 Months (1-3=4, 4=9, >4=11)       Expected Length of Stay 4d 19h   Actual Length of Stay 2

## 2019-11-24 LAB
ALBUMIN SERPL-MCNC: 2.9 G/DL (ref 3.5–5)
ALBUMIN/GLOB SERPL: 0.5 {RATIO} (ref 1.1–2.2)
ALP SERPL-CCNC: 80 U/L (ref 45–117)
ALT SERPL-CCNC: 23 U/L (ref 12–78)
ANION GAP SERPL CALC-SCNC: 10 MMOL/L (ref 5–15)
AST SERPL-CCNC: 39 U/L (ref 15–37)
BASOPHILS # BLD: 0 K/UL (ref 0–0.1)
BASOPHILS NFR BLD: 0 % (ref 0–1)
BILIRUB SERPL-MCNC: 0.3 MG/DL (ref 0.2–1)
BUN SERPL-MCNC: 30 MG/DL (ref 6–20)
BUN/CREAT SERPL: 24 (ref 12–20)
CALCIUM SERPL-MCNC: 8.7 MG/DL (ref 8.5–10.1)
CHLORIDE SERPL-SCNC: 99 MMOL/L (ref 97–108)
CO2 SERPL-SCNC: 27 MMOL/L (ref 21–32)
CREAT SERPL-MCNC: 1.23 MG/DL (ref 0.55–1.02)
DIFFERENTIAL METHOD BLD: ABNORMAL
EOSINOPHIL # BLD: 0.2 K/UL (ref 0–0.4)
EOSINOPHIL NFR BLD: 4 % (ref 0–7)
ERYTHROCYTE [DISTWIDTH] IN BLOOD BY AUTOMATED COUNT: 17.7 % (ref 11.5–14.5)
GLOBULIN SER CALC-MCNC: 5.8 G/DL (ref 2–4)
GLUCOSE SERPL-MCNC: 86 MG/DL (ref 65–100)
HCT VFR BLD AUTO: 32 % (ref 35–47)
HGB BLD-MCNC: 9.5 G/DL (ref 11.5–16)
IMM GRANULOCYTES # BLD AUTO: 0 K/UL (ref 0–0.04)
IMM GRANULOCYTES NFR BLD AUTO: 1 % (ref 0–0.5)
LYMPHOCYTES # BLD: 1.4 K/UL (ref 0.8–3.5)
LYMPHOCYTES NFR BLD: 24 % (ref 12–49)
MCH RBC QN AUTO: 24.7 PG (ref 26–34)
MCHC RBC AUTO-ENTMCNC: 29.7 G/DL (ref 30–36.5)
MCV RBC AUTO: 83.3 FL (ref 80–99)
MONOCYTES # BLD: 0.6 K/UL (ref 0–1)
MONOCYTES NFR BLD: 10 % (ref 5–13)
NEUTS SEG # BLD: 3.6 K/UL (ref 1.8–8)
NEUTS SEG NFR BLD: 61 % (ref 32–75)
NRBC # BLD: 0 K/UL (ref 0–0.01)
NRBC BLD-RTO: 0 PER 100 WBC
PLATELET # BLD AUTO: 223 K/UL (ref 150–400)
PMV BLD AUTO: 11.5 FL (ref 8.9–12.9)
POTASSIUM SERPL-SCNC: 3.5 MMOL/L (ref 3.5–5.1)
PROT SERPL-MCNC: 8.7 G/DL (ref 6.4–8.2)
RBC # BLD AUTO: 3.84 M/UL (ref 3.8–5.2)
SODIUM SERPL-SCNC: 136 MMOL/L (ref 136–145)
WBC # BLD AUTO: 5.9 K/UL (ref 3.6–11)

## 2019-11-24 PROCEDURE — 36415 COLL VENOUS BLD VENIPUNCTURE: CPT

## 2019-11-24 PROCEDURE — 74011250636 HC RX REV CODE- 250/636: Performed by: FAMILY MEDICINE

## 2019-11-24 PROCEDURE — 65660000000 HC RM CCU STEPDOWN

## 2019-11-24 PROCEDURE — 74011000258 HC RX REV CODE- 258: Performed by: FAMILY MEDICINE

## 2019-11-24 PROCEDURE — 94760 N-INVAS EAR/PLS OXIMETRY 1: CPT

## 2019-11-24 PROCEDURE — 74011250637 HC RX REV CODE- 250/637: Performed by: INTERNAL MEDICINE

## 2019-11-24 PROCEDURE — 74011000250 HC RX REV CODE- 250: Performed by: INTERNAL MEDICINE

## 2019-11-24 PROCEDURE — 85025 COMPLETE CBC W/AUTO DIFF WBC: CPT

## 2019-11-24 PROCEDURE — 80053 COMPREHEN METABOLIC PANEL: CPT

## 2019-11-24 PROCEDURE — 74011250637 HC RX REV CODE- 250/637: Performed by: FAMILY MEDICINE

## 2019-11-24 PROCEDURE — 94640 AIRWAY INHALATION TREATMENT: CPT

## 2019-11-24 RX ADMIN — GUAIFENESIN 400 MG: 200 SOLUTION ORAL at 21:01

## 2019-11-24 RX ADMIN — GUAIFENESIN 400 MG: 200 SOLUTION ORAL at 08:55

## 2019-11-24 RX ADMIN — LEVOTHYROXINE SODIUM 100 MCG: 100 TABLET ORAL at 06:30

## 2019-11-24 RX ADMIN — DABIGATRAN ETEXILATE MESYLATE 75 MG: 75 CAPSULE ORAL at 21:02

## 2019-11-24 RX ADMIN — IPRATROPIUM BROMIDE AND ALBUTEROL SULFATE 3 ML: .5; 3 SOLUTION RESPIRATORY (INHALATION) at 19:53

## 2019-11-24 RX ADMIN — LISINOPRIL 10 MG: 5 TABLET ORAL at 08:55

## 2019-11-24 RX ADMIN — GUAIFENESIN 400 MG: 200 SOLUTION ORAL at 16:33

## 2019-11-24 RX ADMIN — IPRATROPIUM BROMIDE AND ALBUTEROL SULFATE 3 ML: .5; 3 SOLUTION RESPIRATORY (INHALATION) at 13:46

## 2019-11-24 RX ADMIN — DABIGATRAN ETEXILATE MESYLATE 75 MG: 75 CAPSULE ORAL at 08:55

## 2019-11-24 RX ADMIN — FLUTICASONE PROPIONATE 2 SPRAY: 50 SPRAY, METERED NASAL at 08:56

## 2019-11-24 RX ADMIN — IPRATROPIUM BROMIDE AND ALBUTEROL SULFATE 3 ML: .5; 3 SOLUTION RESPIRATORY (INHALATION) at 07:21

## 2019-11-24 RX ADMIN — METOPROLOL SUCCINATE 25 MG: 25 TABLET, EXTENDED RELEASE ORAL at 08:54

## 2019-11-24 RX ADMIN — TRAZODONE HYDROCHLORIDE 150 MG: 100 TABLET ORAL at 21:01

## 2019-11-24 RX ADMIN — AZITHROMYCIN MONOHYDRATE 500 MG: 250 TABLET ORAL at 08:54

## 2019-11-24 RX ADMIN — CEFTRIAXONE 1 G: 1 INJECTION, POWDER, FOR SOLUTION INTRAMUSCULAR; INTRAVENOUS at 08:55

## 2019-11-24 NOTE — PROGRESS NOTES
0730  Report received from [facility]. SBAR, Kardex, ED Summary, Procedure Summary, Intake/Output, MAR, Accordion, Recent Results, Med Rec Status and Cardiac Rhythm a-fib were discussed.     [address]

## 2019-11-24 NOTE — PROGRESS NOTES
Problem: Breathing Pattern - Ineffective  Goal: *Absence of hypoxia  Outcome: Progressing Towards Goal  Goal: *Use of effective breathing techniques  Outcome: Progressing Towards Goal     Problem: Falls - Risk of  Goal: *Absence of Falls  Description  Document Raul Jedman Fall Risk and appropriate interventions in the flowsheet.   Outcome: Progressing Towards Goal  Note: Fall Risk Interventions:  Mobility Interventions: Assess mobility with egress test, Bed/chair exit alarm, OT consult for ADLs, PT Consult for mobility concerns, PT Consult for assist device competence, Utilize walker, cane, or other assistive device         Medication Interventions: Assess postural VS orthostatic hypotension, Bed/chair exit alarm, Evaluate medications/consider consulting pharmacy, Patient to call before getting OOB, Teach patient to arise slowly, Utilize gait belt for transfers/ambulation                   Problem: Patient Education: Go to Patient Education Activity  Goal: Patient/Family Education  Outcome: Progressing Towards Goal     Problem: Pneumonia: Day 2  Goal: Activity/Safety  Outcome: Progressing Towards Goal  Goal: Consults, if ordered  Outcome: Progressing Towards Goal  Goal: Diagnostic Test/Procedures  Outcome: Progressing Towards Goal  Goal: Nutrition/Diet  Outcome: Progressing Towards Goal  Goal: Discharge Planning  Outcome: Progressing Towards Goal  Goal: Medications  Outcome: Progressing Towards Goal  Goal: Respiratory  Outcome: Progressing Towards Goal  Goal: Treatments/Interventions/Procedures  Outcome: Progressing Towards Goal  Goal: Psychosocial  Outcome: Progressing Towards Goal  Goal: *Oxygen saturation within defined limits  Outcome: Progressing Towards Goal  Goal: *Hemodynamically stable  Outcome: Progressing Towards Goal  Goal: *Demonstrates progressive activity  Outcome: Progressing Towards Goal  Goal: *Tolerating diet  Outcome: Progressing Towards Goal  Goal: *Optimal pain control at patient's stated goal  Outcome: Progressing Towards Goal     Problem: Pressure Injury - Risk of  Goal: *Prevention of pressure injury  Description  Document Tanner Scale and appropriate interventions in the flowsheet. Outcome: Progressing Towards Goal  Note: Pressure Injury Interventions:  Sensory Interventions: Assess changes in LOC, Assess need for specialty bed, Check visual cues for pain, Discuss PT/OT consult with provider, Float heels, Chair cushion, Turn and reposition approx.  every two hours (pillows and wedges if needed)         Activity Interventions: Increase time out of bed, PT/OT evaluation    Mobility Interventions: PT/OT evaluation    Nutrition Interventions: Document food/fluid/supplement intake    Friction and Shear Interventions: Apply protective barrier, creams and emollients, Minimize layers

## 2019-11-24 NOTE — PROGRESS NOTES
Bedside shift change report GIVEN TO Toan Caldwell, RN and Thalia Alexander RN. Report included the following information SBAR and Kardex. SIGNIFICANT CHANGES DURING SHIFT:     1600: Patient sitting in chair, BP 80/44. No complaints of dizziness or lightheadedness. Returned patient to bed and BP went up to 98/44. CONCERNS TO ADDRESS WITH MD:  See above          [facility] NURSING NOTE   Admission Date 11/20/2019   Admission Diagnosis Acute hypoxemic respiratory failure ([address] [J96.01]   Consults IP CONSULT TO CARDIOLOGY  IP CONSULT TO PRIMARY CARE PROVIDER      Cardiac Monitoring [x] Yes [] No      Purposeful Hourly Rounding [x] Yes    Den Score Total Score: 2   Den score 3 or > [] Bed Alarm [] Avasys [] 1:1 sitter [] Patient refused (Signed refusal form in chart)   Tanner Score Tanner Score: 18   Tanner score 14 or < [] PMT consult [] Wound Care consult    []  Specialty bed  [] Nutrition consult      Influenza Vaccine Received Flu Vaccine for Current Season (usually Sept-March): No    Patient/Guardian Refused (Notify MD): Yes      Oxygen needs? [x] Room air Oxygen @  []1L    []2L    []3L   []4L    []5L   []6L via  NC   Chronic home O2 use?  [] Yes [x] No  Perform O2 challenge test and document in progress note using smartphrase (.Homeoxygen)      Last bowel movement Last Bowel Movement Date: 11/21/19      Urinary Catheter             LDAs               Peripheral IV 11/20/19 Left Forearm (Active)   Site Assessment Clean, dry, & intact 11/23/2019  4:00 PM   Phlebitis Assessment 0 11/23/2019  4:00 PM   Infiltration Assessment 0 11/23/2019  4:00 PM   Dressing Status Clean, dry, & intact 11/23/2019  4:00 PM   Dressing Type Transparent;Tape 11/23/2019  4:00 PM   Hub Color/Line Status Pink;Flushed 11/23/2019  4:00 PM       Peripheral IV 11/20/19 Right Antecubital (Active)   Site Assessment Clean, dry, & intact 11/23/2019  4:00 PM   Phlebitis Assessment 0 11/23/2019  4:00 PM   Infiltration Assessment 0 11/23/2019  4:00 PM Dressing Status Clean, dry, & intact 11/23/2019  4:00 PM   Dressing Type Transparent;Tape 11/23/2019  4:00 PM   Hub Color/Line Status Pink;Flushed 11/23/2019  4:00 PM                         Readmission Risk Assessment Tool Score Medium Risk            19       Total Score        3 Has Seen PCP in Last 6 Months (Yes=3, No=0)    5 Pt. Coverage (Medicare=5 , Medicaid, or Self-Pay=4)    11 Charlson Comorbidity Score (Age + Comorbid Conditions)        Criteria that do not apply:    . Living with Significant Other. Assisted Living. LTAC. SNF.  or   Rehab    Patient Length of Stay (>5 days = 3)    IP Visits Last 12 Months (1-3=4, 4=9, >4=11)       Expected Length of Stay 4d 19h   Actual Length of Stay 3

## 2019-11-24 NOTE — PROGRESS NOTES
Bedside shift change report GIVEN TO Diaz Flores RN. Report included the following information SBAR. SIGNIFICANT CHANGES DURING SHIFT:        CONCERNS TO ADDRESS WITH MD:            [facility] NURSING NOTE   Admission Date 11/20/2019   Admission Diagnosis Acute hypoxemic respiratory failure ([address] [J96.01]   Consults IP CONSULT TO CARDIOLOGY  IP CONSULT TO PRIMARY CARE PROVIDER      Cardiac Monitoring [x] Yes [] No      Purposeful Hourly Rounding [x] Yes    Den Score Total Score: 2   Den score 3 or > [x] Bed Alarm [] Avasys [] 1:1 sitter [] Patient refused (Signed refusal form in chart)   Tanner Score Tanner Score: 18   Tanner score 14 or < [] PMT consult [] Wound Care consult    []  Specialty bed  [] Nutrition consult      Influenza Vaccine Received Flu Vaccine for Current Season (usually Sept-March): No    Patient/Guardian Refused (Notify MD): Yes      Oxygen needs? [x] Room air Oxygen @  []1L    []2L    []3L   []4L    []5L   []6L via  NC   Chronic home O2 use?  [] Yes [x] No  Perform O2 challenge test and document in progress note using smartphrase (.Homeoxygen)      Last bowel movement Last Bowel Movement Date: 11/21/19      Urinary Catheter             LDAs               Peripheral IV 11/20/19 Left Forearm (Active)   Site Assessment Clean, dry, & intact 11/24/2019  4:00 AM   Phlebitis Assessment 0 11/24/2019  4:00 AM   Infiltration Assessment 0 11/24/2019  4:00 AM   Dressing Status Clean, dry, & intact 11/24/2019  4:00 AM   Dressing Type Transparent;Tape 11/24/2019  4:00 AM   Hub Color/Line Status Pink;Flushed 11/24/2019  4:00 AM       Peripheral IV 11/20/19 Right Antecubital (Active)   Site Assessment Clean, dry, & intact 11/24/2019  4:00 AM   Phlebitis Assessment 0 11/24/2019  4:00 AM   Infiltration Assessment 0 11/24/2019  4:00 AM   Dressing Status Clean, dry, & intact 11/24/2019  4:00 AM   Dressing Type Tape;Transparent 11/24/2019  4:00 AM   Hub Color/Line Status Pink;Flushed 11/24/2019  4:00 AM Readmission Risk Assessment Tool Score Medium Risk            19       Total Score        3 Has Seen PCP in Last 6 Months (Yes=3, No=0)    5 Pt. Coverage (Medicare=5 , Medicaid, or Self-Pay=4)    11 Charlson Comorbidity Score (Age + Comorbid Conditions)        Criteria that do not apply:    . Living with Significant Other. Assisted Living. LTAC. SNF.  or   Rehab    Patient Length of Stay (>5 days = 3)    IP Visits Last 12 Months (1-3=4, 4=9, >4=11)       Expected Length of Stay 4d 19h   Actual Length of Stay 4

## 2019-11-24 NOTE — PROGRESS NOTES
[facility]      [address] [facility], [address] [place] [address] Ne, [address] [facility]  [phone]      Cardiology Progress Note      11/24/2019 2:33 PM    Admit Date: 11/20/2019    Admit Diagnosis:   Acute hypoxemic respiratory failure (HCC) [J96.01]    Subjective:     Elisabeth Blanco     Breathing comfortably    Visit Vitals  /50 (BP 1 Location: Right arm, BP Patient Position: Sitting)   Pulse 80   Temp 97.2 °F (36.2 °C)   Resp 20   Ht 5' 2\" (1.575 m)   Wt 132 lb 11.5 oz (60.2 kg)   SpO2 96%   BMI 24.27 kg/m²       Current Facility-Administered Medications   Medication Dose Route Frequency    metoprolol succinate (TOPROL-XL) XL tablet 25 mg  25 mg Oral DAILY    lisinopril (PRINIVIL, ZESTRIL) tablet 10 mg  10 mg Oral DAILY    dabigatran etexilate (PRADAXA) capsule 75 mg  75 mg Oral Q12H    fluticasone propionate (FLONASE) 50 mcg/actuation nasal spray 2 Spray  2 Spray Both Nostrils DAILY    levothyroxine (SYNTHROID) tablet 100 mcg  100 mcg Oral 6am    traZODone (DESYREL) tablet 150 mg  150 mg Oral QHS    metoprolol (LOPRESSOR) injection 5 mg  5 mg IntraVENous Q6H PRN    acetaminophen (TYLENOL) tablet 650 mg  650 mg Oral Q4H PRN    ondansetron (ZOFRAN) injection 4 mg  4 mg IntraVENous Q6H PRN    guaiFENesin (ROBITUSSIN) 100 mg/5 mL oral liquid 400 mg  400 mg Oral TID    albuterol-ipratropium (DUO-NEB) 2.5 MG-0.5 MG/3 ML  3 mL Nebulization TID RT    cefTRIAXone (ROCEPHIN) 1 g in 0.9% sodium chloride (MBP/ADV) 50 mL  1 g IntraVENous Q24H       Objective:      Physical Exam:  General Appearance:    Chest:   Clear  Cardiovascular: irreg  Extremities: no edema  Skin:  Warm and dry.     Data Review:   Recent Labs     11/24/19  0425 11/23/19  0258 11/22/19  0330   WBC 5.9 6.2 5.8   HGB 9.5* 9.2* 9.3*   HCT 32.0* 31.5* 32.6*    186 140*     Recent Labs     11/24/19  0425 11/23/19  0258 11/22/19  0330    135* 135*   K 3.5 3.3* 3.8   CL 99 99 100   CO2 27 27 28   GLU 86 102* 86   BUN 30* 33* 29* CREA 1.23* 1.49* 1.25*   CA 8.7 8.3* 8.1*   ALB 2.9*  --   --    TBILI 0.3  --   --    SGOT 39*  --   --    ALT 23  --   --        No results for input(s): TROIQ, CPK, CKMB in the last 72 hours. Intake/Output Summary (Last 24 hours) at 11/24/2019 1433  Last data filed at 11/24/2019 0400  Gross per 24 hour   Intake 840 ml   Output 350 ml   Net 490 ml        Telemetry:   EKG:  Cxray:    Assessment:     Active Problems:    HTN (hypertension) (3/14/2011)      Bilateral leg edema (7/5/2017)      HX: long term anticoagulant use--Pradaxa stopped 5/17 for chronic atrial fib (7/5/2017)      Acute hypoxemic respiratory failure ([facility] (11/20/2019)      Pneumonia (11/20/2019)      CH[facility]congest[facility]) [facility] (11/20/2019)      Chronic atrial fibrillation (11/21/2019)        Plan:     Cr coming down off diuretics.   May be ready for Jocy Young M.D., [facility] [place]

## 2019-11-24 NOTE — PROGRESS NOTES
Problem: Breathing Pattern - Ineffective  Goal: *Absence of hypoxia  Outcome: Progressing Towards Goal  Goal: *Use of effective breathing techniques  Outcome: Progressing Towards Goal     Problem: Patient Education: Go to Patient Education Activity  Goal: Patient/Family Education  Outcome: Progressing Towards Goal     Problem: Falls - Risk of  Goal: *Absence of Falls  Description  Document Clydene Bone Fall Risk and appropriate interventions in the flowsheet. Outcome: Progressing Towards Goal  Note: Fall Risk Interventions:  Mobility Interventions: Assess mobility with egress test         Medication Interventions: Assess postural VS orthostatic hypotension, Patient to call before getting OOB                   Problem: Pneumonia: Day 1  Goal: Off Pathway (Use only if patient is Off Pathway)  Outcome: Progressing Towards Goal  Goal: Activity/Safety  Outcome: Progressing Towards Goal  Goal: Nutrition/Diet  Outcome: Progressing Towards Goal  Goal: Medications  Outcome: Progressing Towards Goal  Goal: Treatments/Interventions/Procedures  Outcome: Progressing Towards Goal  Goal: Psychosocial  Outcome: Progressing Towards Goal  Goal: *Demonstrates progressive activity  Outcome: Progressing Towards Goal  Goal: *Tolerating diet  Outcome: Progressing Towards Goal     Problem: Pressure Injury - Risk of  Goal: *Prevention of pressure injury  Description  Document Tanner Scale and appropriate interventions in the flowsheet.   Outcome: Progressing Towards Goal  Note: Pressure Injury Interventions:  Sensory Interventions: Assess changes in LOC, Keep linens dry and wrinkle-free, Minimize linen layers         Activity Interventions: Increase time out of bed    Mobility Interventions: Assess need for specialty bed    Nutrition Interventions: Document food/fluid/supplement intake    Friction and Shear Interventions: Apply protective barrier, creams and emollients, Minimize layers                Problem: Patient Education: Go to Patient Education Activity  Goal: Patient/Family Education  Outcome: Progressing Towards Goal     Problem: Patient Education: Go to Patient Education Activity  Goal: Patient/Family Education  Outcome: Progressing Towards Goal     Problem: Patient Education: Go to Patient Education Activity  Goal: Patient/Family Education  Outcome: Progressing Towards Goal

## 2019-11-24 NOTE — PROGRESS NOTES
ADULT PROTOCOL: JET AEROSOL  REASSESSMENT    Patient  Emanuel Simon     80 y.o.   female     11/23/2019  8:38 PM    Breath Sounds Pre Procedure: Right Breath Sounds: Clear                               Left Breath Sounds: Clear    Breath Sounds Post Procedure: Right Breath Sounds: Clear, Diminished                                 Left Breath Sounds: Clear, Diminished    Breathing pattern: Pre procedure Breathing Pattern: Regular          Post procedure Breathing Pattern: Regular    Heart Rate: Pre procedure Pulse: 69           Post procedure Pulse: 73    Resp Rate: Pre procedure Respirations: 18           Post procedure Respirations: 18      Cough: Pre procedure Cough: Non-productive               Post procedure Cough: Non-productive      Oxygen: O2 Device: Room air        Changed: NO    SpO2: Pre procedure SpO2: 92 %   without oxygen              Post procedure SpO2: 93 %  without oxygen    Nebulizer Therapy: Current medications Aerosolized Medications: DuoNeb      Changed: NO    Smoking History: never    Problem List:   Patient Active Problem List   Diagnosis Code    Rhabdomyolysis M62.82    Persistent atrial fibrillation I48.19    HTN (hypertension) I10    Encephalopathy acute G93.40    Insomnia G47.00    Hypokalemia E87.6    Hypothyroidism E03.9    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    Hyperkalemia E87.5    GI bleeding K92.2    HILTON (dyspnea on exertion) R06.09    Bilateral leg edema R60.0    HX: long term anticoagulant use--Pradaxa stopped 5/17 for chronic atrial fib Z92.29    Cardiomyopathy, secondary ([facility]) I42.9    GERD with esophagitis K21.0    Dyslipidemia (high LDL; low HDL) E78.5    CKD (chronic kidney disease) stage 3, GFR 30-59 ml/min ([facility]) N18.3    Chest pain with moderate risk for cardiac etiology R07.9    Irregular heart beat I49.9    Acute hypoxemic respiratory failure ([facility]) J96.01    Pneumonia J18.9    CHF (congestive heart failure) ([facility]) I50.9    Chronic atrial fibrillation I48.20       Respiratory Therapist: Florian Favre, RT

## 2019-11-24 NOTE — PROGRESS NOTES
INTERNAL MEDICINE ATTENDING NOTE    S: Ms. Mamie Saucedo is a patient of Cammy Hsieh MD and was seen by me today during rounds. At this time, she is resting comfortably. No dyspnea. The patient has no new complaints today. ROS otherwise unremarkable except as noted elsewhere. O: Blood pressure 108/54, pulse 81, temperature 98.1 °F (36.7 °C), resp. rate 20, height 5' 2\" (1.575 m), weight 132 lb 11.5 oz (60.2 kg), SpO2 95 %. Gen: Patient is in no acute distress. Lungs: CTAB. Heart: RRR. Abd: S, NT, ND, BS present. Extremities: Warm. Recent Results (from the past 12 hour(s))   METABOLIC PANEL, COMPREHENSIVE    Collection Time: 11/24/19  4:25 AM   Result Value Ref Range    Sodium 136 136 - 145 mmol/L    Potassium 3.5 3.5 - 5.1 mmol/L    Chloride 99 97 - 108 mmol/L    CO2 27 21 - 32 mmol/L    Anion gap 10 5 - 15 mmol/L    Glucose 86 65 - 100 mg/dL    BUN 30 (H) 6 - 20 MG/DL    Creatinine 1.23 (H) 0.55 - 1.02 MG/DL    BUN/Creatinine ratio 24 (H) 12 - 20      GFR est AA 50 (L) >60 ml/min/1.73m2    GFR est non-AA 41 (L) >60 ml/min/1.73m2    Calcium 8.7 8.5 - 10.1 MG/DL    Bilirubin, total 0.3 0.2 - 1.0 MG/DL    ALT (SGPT) 23 12 - 78 U/L    AST (SGOT) 39 (H) 15 - 37 U/L    Alk.  phosphatase 80 45 - 117 U/L    Protein, total 8.7 (H) 6.4 - 8.2 g/dL    Albumin 2.9 (L) 3.5 - 5.0 g/dL    Globulin 5.8 (H) 2.0 - 4.0 g/dL    A-G Ratio 0.5 (L) 1.1 - 2.2     CBC WITH AUTOMATED DIFF    Collection Time: 11/24/19  4:25 AM   Result Value Ref Range    WBC 5.9 3.6 - 11.0 K/uL    RBC 3.84 3.80 - 5.20 M/uL    HGB 9.5 (L) 11.5 - 16.0 g/dL    HCT 32.0 (L) 35.0 - 47.0 %    MCV 83.3 80.0 - 99.0 FL    MCH 24.7 (L) 26.0 - 34.0 PG    MCHC 29.7 (L) 30.0 - 36.5 g/dL    RDW 17.7 (H) 11.5 - 14.5 %    PLATELET 779 105 - 565 K/uL    MPV 11.5 8.9 - 12.9 FL    NRBC 0.0 0  WBC    ABSOLUTE NRBC 0.00 0.00 - 0.01 K/uL    NEUTROPHILS 61 32 - 75 %    LYMPHOCYTES 24 12 - 49 %    MONOCYTES 10 5 - 13 %    EOSINOPHILS 4 0 - 7 %    BASOPHILS 0 0 - 1 %    IMMATURE GRANULOCYTES 1 (H) 0.0 - 0.5 %    ABS. NEUTROPHILS 3.6 1.8 - 8.0 K/UL    ABS. LYMPHOCYTES 1.4 0.8 - 3.5 K/UL    ABS. MONOCYTES 0.6 0.0 - 1.0 K/UL    ABS. EOSINOPHILS 0.2 0.0 - 0.4 K/UL    ABS. BASOPHILS 0.0 0.0 - 0.1 K/UL    ABS. IMM. GRANS. 0.0 0.00 - 0.04 K/UL    DF AUTOMATED          A / P:  1. Acute hypoxemic respiratory failure ([facility] (11/20/2019): Oxygen. 2. Pneumonia (11/20/2019): Continue antibiotics. 3. Sepsis: Improving. Supportive care. 4. HTN (hypertension) (3/14/2011): BP okay at this time. 5. Bilateral leg edema (7/5/2017)  6. CHF (congestive heart failure) ([facility] (11/20/2019): Cardiology following. 7. Chronic atrial fibrillation (11/21/2019): On pradaxa. BB for rate control. 8. Hypothyroidism: Continue levothyroxine.        Jackie Lim MD, FACP  Best contact is via Pager: [phone], or via hospital  at [phone]  Cell: [phone]

## 2019-11-25 ENCOUNTER — APPOINTMENT (OUTPATIENT)
Dept: GENERAL RADIOLOGY | Age: 84
DRG: 291 | End: 2019-11-25
Attending: FAMILY MEDICINE
Payer: MEDICARE

## 2019-11-25 VITALS
TEMPERATURE: 97.6 F | HEIGHT: 62 IN | RESPIRATION RATE: 19 BRPM | WEIGHT: 134 LBS | SYSTOLIC BLOOD PRESSURE: 111 MMHG | HEART RATE: 83 BPM | BODY MASS INDEX: 24.66 KG/M2 | OXYGEN SATURATION: 96 % | DIASTOLIC BLOOD PRESSURE: 66 MMHG

## 2019-11-25 LAB
ALBUMIN SERPL-MCNC: 2.6 G/DL (ref 3.5–5)
ALBUMIN/GLOB SERPL: 0.5 {RATIO} (ref 1.1–2.2)
ALP SERPL-CCNC: 72 U/L (ref 45–117)
ALT SERPL-CCNC: 17 U/L (ref 12–78)
ANION GAP SERPL CALC-SCNC: 8 MMOL/L (ref 5–15)
AST SERPL-CCNC: 35 U/L (ref 15–37)
BACTERIA SPEC CULT: NORMAL
BASOPHILS # BLD: 0 K/UL (ref 0–0.1)
BASOPHILS NFR BLD: 1 % (ref 0–1)
BILIRUB SERPL-MCNC: 0.3 MG/DL (ref 0.2–1)
BUN SERPL-MCNC: 22 MG/DL (ref 6–20)
BUN/CREAT SERPL: 20 (ref 12–20)
CALCIUM SERPL-MCNC: 8.3 MG/DL (ref 8.5–10.1)
CHLORIDE SERPL-SCNC: 101 MMOL/L (ref 97–108)
CO2 SERPL-SCNC: 27 MMOL/L (ref 21–32)
CREAT SERPL-MCNC: 1.1 MG/DL (ref 0.55–1.02)
DIFFERENTIAL METHOD BLD: ABNORMAL
EOSINOPHIL # BLD: 0.2 K/UL (ref 0–0.4)
EOSINOPHIL NFR BLD: 3 % (ref 0–7)
ERYTHROCYTE [DISTWIDTH] IN BLOOD BY AUTOMATED COUNT: 17.3 % (ref 11.5–14.5)
GLOBULIN SER CALC-MCNC: 5.4 G/DL (ref 2–4)
GLUCOSE SERPL-MCNC: 96 MG/DL (ref 65–100)
HCT VFR BLD AUTO: 28.5 % (ref 35–47)
HGB BLD-MCNC: 8.6 G/DL (ref 11.5–16)
IMM GRANULOCYTES # BLD AUTO: 0 K/UL (ref 0–0.04)
IMM GRANULOCYTES NFR BLD AUTO: 0 % (ref 0–0.5)
LYMPHOCYTES # BLD: 1.1 K/UL (ref 0.8–3.5)
LYMPHOCYTES NFR BLD: 21 % (ref 12–49)
MCH RBC QN AUTO: 24.7 PG (ref 26–34)
MCHC RBC AUTO-ENTMCNC: 30.2 G/DL (ref 30–36.5)
MCV RBC AUTO: 81.9 FL (ref 80–99)
MONOCYTES # BLD: 0.6 K/UL (ref 0–1)
MONOCYTES NFR BLD: 11 % (ref 5–13)
NEUTS SEG # BLD: 3.2 K/UL (ref 1.8–8)
NEUTS SEG NFR BLD: 64 % (ref 32–75)
NRBC # BLD: 0 K/UL (ref 0–0.01)
NRBC BLD-RTO: 0 PER 100 WBC
PLATELET # BLD AUTO: 218 K/UL (ref 150–400)
PMV BLD AUTO: 10.6 FL (ref 8.9–12.9)
POTASSIUM SERPL-SCNC: 3.3 MMOL/L (ref 3.5–5.1)
PROT SERPL-MCNC: 8 G/DL (ref 6.4–8.2)
RBC # BLD AUTO: 3.48 M/UL (ref 3.8–5.2)
SERVICE CMNT-IMP: NORMAL
SODIUM SERPL-SCNC: 136 MMOL/L (ref 136–145)
URATE SERPL-MCNC: 6.6 MG/DL (ref 2.6–6)
WBC # BLD AUTO: 5.1 K/UL (ref 3.6–11)

## 2019-11-25 PROCEDURE — 74011000258 HC RX REV CODE- 258: Performed by: FAMILY MEDICINE

## 2019-11-25 PROCEDURE — 80053 COMPREHEN METABOLIC PANEL: CPT

## 2019-11-25 PROCEDURE — 74011250636 HC RX REV CODE- 250/636: Performed by: FAMILY MEDICINE

## 2019-11-25 PROCEDURE — 36415 COLL VENOUS BLD VENIPUNCTURE: CPT

## 2019-11-25 PROCEDURE — 84550 ASSAY OF BLOOD/URIC ACID: CPT

## 2019-11-25 PROCEDURE — 74011250637 HC RX REV CODE- 250/637: Performed by: FAMILY MEDICINE

## 2019-11-25 PROCEDURE — 74011000250 HC RX REV CODE- 250: Performed by: INTERNAL MEDICINE

## 2019-11-25 PROCEDURE — 85025 COMPLETE CBC W/AUTO DIFF WBC: CPT

## 2019-11-25 PROCEDURE — 94760 N-INVAS EAR/PLS OXIMETRY 1: CPT

## 2019-11-25 PROCEDURE — 74011250637 HC RX REV CODE- 250/637: Performed by: INTERNAL MEDICINE

## 2019-11-25 PROCEDURE — 94640 AIRWAY INHALATION TREATMENT: CPT

## 2019-11-25 PROCEDURE — 73630 X-RAY EXAM OF FOOT: CPT

## 2019-11-25 RX ORDER — PREDNISONE 10 MG/1
10 TABLET ORAL 2 TIMES DAILY
Qty: 10 TAB | Refills: 0 | Status: SHIPPED | OUTPATIENT
Start: 2019-11-25 | End: 2019-12-02 | Stop reason: ALTCHOICE

## 2019-11-25 RX ORDER — METOPROLOL SUCCINATE 25 MG/1
25 TABLET, EXTENDED RELEASE ORAL DAILY
Qty: 90 TAB | Refills: 3 | Status: SHIPPED | OUTPATIENT
Start: 2019-11-25 | End: 2019-12-02 | Stop reason: ALTCHOICE

## 2019-11-25 RX ORDER — LISINOPRIL 10 MG/1
10 TABLET ORAL DAILY
Qty: 30 TAB | Refills: 3 | Status: ON HOLD | OUTPATIENT
Start: 2019-11-25 | End: 2019-12-10 | Stop reason: SDUPTHER

## 2019-11-25 RX ORDER — POTASSIUM CHLORIDE 20 MEQ/1
20 TABLET, EXTENDED RELEASE ORAL 2 TIMES DAILY
Status: DISCONTINUED | OUTPATIENT
Start: 2019-11-25 | End: 2019-11-25 | Stop reason: HOSPADM

## 2019-11-25 RX ORDER — FUROSEMIDE 20 MG/1
TABLET ORAL
Qty: 30 TAB | Refills: 3 | Status: SHIPPED | OUTPATIENT
Start: 2019-11-25 | End: 2019-11-26

## 2019-11-25 RX ADMIN — DABIGATRAN ETEXILATE MESYLATE 75 MG: 75 CAPSULE ORAL at 09:14

## 2019-11-25 RX ADMIN — LEVOTHYROXINE SODIUM 100 MCG: 100 TABLET ORAL at 05:49

## 2019-11-25 RX ADMIN — IPRATROPIUM BROMIDE AND ALBUTEROL SULFATE 3 ML: .5; 3 SOLUTION RESPIRATORY (INHALATION) at 07:52

## 2019-11-25 RX ADMIN — FLUTICASONE PROPIONATE 2 SPRAY: 50 SPRAY, METERED NASAL at 09:15

## 2019-11-25 RX ADMIN — CEFTRIAXONE 1 G: 1 INJECTION, POWDER, FOR SOLUTION INTRAMUSCULAR; INTRAVENOUS at 09:15

## 2019-11-25 RX ADMIN — METOPROLOL SUCCINATE 25 MG: 25 TABLET, EXTENDED RELEASE ORAL at 09:14

## 2019-11-25 RX ADMIN — GUAIFENESIN 400 MG: 200 SOLUTION ORAL at 09:15

## 2019-11-25 RX ADMIN — LISINOPRIL 10 MG: 5 TABLET ORAL at 09:14

## 2019-11-25 RX ADMIN — POTASSIUM CHLORIDE 20 MEQ: 20 TABLET, EXTENDED RELEASE ORAL at 09:15

## 2019-11-25 NOTE — PROGRESS NOTES
Bedside shift change report GIVEN TO TINO Gunderson. Report included the following information SBAR. SIGNIFICANT CHANGES DURING SHIFT:        CONCERNS TO ADDRESS WITH MD:            [facility] NURSING NOTE   Admission Date 11/20/2019   Admission Diagnosis Acute hypoxemic respiratory failure ([address] [J96.01]   Consults IP CONSULT TO CARDIOLOGY  IP CONSULT TO PRIMARY CARE PROVIDER      Cardiac Monitoring [x] Yes [] No      Purposeful Hourly Rounding [x] Yes    Den Score Total Score: 2   Den score 3 or > [] Bed Alarm [] Avasys [] 1:1 sitter [] Patient refused (Signed refusal form in chart)   Tanner Score Tanner Score: 19   Tanner score 14 or < [] PMT consult [] Wound Care consult    []  Specialty bed  [] Nutrition consult      Influenza Vaccine Received Flu Vaccine for Current Season (usually Sept-March): No    Patient/Guardian Refused (Notify MD): Yes      Oxygen needs? [x] Room air Oxygen @  []1L    []2L    []3L   []4L    []5L   []6L via  NC   Chronic home O2 use?  [] Yes [x] No  Perform O2 challenge test and document in progress note using [employer])      Last bowel movement Last Bowel Movement Date: 11/21/19      Urinary Catheter             LDAs               Peripheral IV 11/20/19 Left Forearm (Active)   Site Assessment Clean, dry, & intact 11/25/2019  4:00 AM   Phlebitis Assessment 0 11/25/2019  4:00 AM   Infiltration Assessment 0 11/25/2019  4:00 AM   Dressing Status Clean, dry, & intact 11/25/2019  4:00 AM   Dressing Type Tape;Transparent 11/25/2019  4:00 AM   Hub Color/Line Status Pink;Flushed 11/25/2019  4:00 AM       Peripheral IV 11/20/19 Right Antecubital (Active)   Site Assessment Clean, dry, & intact 11/25/2019  4:00 AM   Phlebitis Assessment 0 11/25/2019  4:00 AM   Infiltration Assessment 0 11/25/2019  4:00 AM   Dressing Status Clean, dry, & intact 11/25/2019  4:00 AM   Dressing Type Tape;Transparent 11/25/2019  4:00 AM   Hub Color/Line Status Pink;Flushed 11/25/2019  4:00 AM Readmission Risk Assessment Tool Score Medium Risk            19       Total Score        3 Has Seen PCP in Last 6 Months (Yes=3, No=0)    5 Pt. Coverage (Medicare=5 , Medicaid, or Self-Pay=4)    11 Charlson Comorbidity Score (Age + Comorbid Conditions)        Criteria that do not apply:    . Living with Significant Other. Assisted Living. LTAC. SNF.  or   Rehab    Patient Length of Stay (>5 days = 3)    IP Visits Last 12 Months (1-3=4, 4=9, >4=11)       Expected Length of Stay 4d 19h   Actual Length of Stay 5

## 2019-11-25 NOTE — PROGRESS NOTES
Received report from Migel aguilera, [facility]. Assumed care of patient.

## 2019-11-25 NOTE — PROGRESS NOTES
ADULT PROTOCOL: JET AEROSOL  REASSESSMENT    Patient  Ray Morales     80 y.o.   female     11/25/2019  5:29 AM    Breath Sounds Pre Procedure: Right Breath Sounds: Diminished                               Left Breath Sounds: Diminished    Breath Sounds Post Procedure: Right Breath Sounds: Diminished                                 Left Breath Sounds: Diminished    Breathing pattern: Pre procedure Breathing Pattern: Regular          Post procedure Breathing Pattern: Regular    Heart Rate: Pre procedure Pulse: 85           Post procedure Pulse: 88    Resp Rate: Pre procedure Respirations: 20           Post procedure Respirations: 18     Oxygen: O2 Device: Room air       Changed: NO    SpO2: Pre procedure SpO2: 95 %   without oxygen              Post procedure SpO2: 98 %  without oxygen    Nebulizer Therapy: Current medications Aerosolized Medications: DuoNeb TID RESP      Changed: NO    Smoking History: never    Problem List:   Patient Active Problem List   Diagnosis Code    Rhabdomyolysis M62.82    Persistent atrial fibrillation I48.19    HTN (hypertension) I10    Encephalopathy acute G93.40    Insomnia G47.00    Hypokalemia E87.6    Hypothyroidism E03.9    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    Hyperkalemia E87.5    GI bleeding K92.2    HILTON (dyspnea on exertion) R06.09    Bilateral leg edema R60.0    HX: long term anticoagulant use--Pradaxa stopped 5/17 for chronic atrial fib Z92.29    Cardiomyopathy, 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

## 2019-11-25 NOTE — PROGRESS NOTES
1053: dr Yeimi Milan notified of lab and xr results. md states pt can be d/c'd.   -cm states pt cleared to be d/c'd.     3713: d/c instructions reviewed with pt and her dtr to their satisfaction. Signed copy on pt's ppr chart and original given to pt's dtr with 4 new rxs. Iv/tele removed. Pt d/c'd to home with dtr.

## 2019-11-25 NOTE — PROGRESS NOTES
General Daily Progress Note    Admit Date: 11/20/2019  Hospital day 6    Subjective:     Patient has no complaint of shortness of breath. Some pain in R foot. ..   Medication side effects: none    Current Facility-Administered Medications   Medication Dose Route Frequency    potassium chloride (K-DUR, KLOR-CON) SR tablet 20 mEq  20 mEq Oral BID    metoprolol succinate (TOPROL-XL) XL tablet 25 mg  25 mg Oral DAILY    lisinopril (PRINIVIL, ZESTRIL) tablet 10 mg  10 mg Oral DAILY    dabigatran etexilate (PRADAXA) capsule 75 mg  75 mg Oral Q12H    fluticasone propionate (FLONASE) 50 mcg/actuation nasal spray 2 Spray  2 Spray Both Nostrils DAILY    levothyroxine (SYNTHROID) tablet 100 mcg  100 mcg Oral 6am    traZODone (DESYREL) tablet 150 mg  150 mg Oral QHS    metoprolol (LOPRESSOR) injection 5 mg  5 mg IntraVENous Q6H PRN    acetaminophen (TYLENOL) tablet 650 mg  650 mg Oral Q4H PRN    ondansetron (ZOFRAN) injection 4 mg  4 mg IntraVENous Q6H PRN    guaiFENesin (ROBITUSSIN) 100 mg/5 mL oral liquid 400 mg  400 mg Oral TID    albuterol-ipratropium (DUO-NEB) 2.5 MG-0.5 MG/3 ML  3 mL Nebulization TID RT    cefTRIAXone (ROCEPHIN) 1 g in 0.9% sodium chloride (MBP/ADV) 50 mL  1 g IntraVENous Q24H        Review of Systems  Pertinent items are noted in HPI. Objective:     Patient Vitals for the past 8 hrs:   BP Temp Pulse Resp SpO2 Weight   11/25/19 0715 111/47 98.4 °F (36.9 °C) 78 19 94 %    11/25/19 0513      134 lb (60.8 kg)   11/25/19 0416 108/43 98.1 °F (36.7 °C) 73 18     11/25/19 0400 117/52 98.5 °F (36.9 °C) 73 20 93 %      No intake/output data recorded. 11/23 1901 - 11/25 0700  In: 0 [P.O.:600;  I.V.:50]  Out: -     Physical Exam:   Visit Vitals  /47 (BP 1 Location: Right arm, BP Patient Position: At rest)   Pulse 78   Temp 98.4 °F (36.9 °C)   Resp 19   Ht 5' 2\" (1.575 m)   Wt 134 lb (60.8 kg)   SpO2 94%   BMI 24.51 kg/m²     Lungs: clear to auscultation bilaterally  Heart: regular rate and rhythm, S1, S2 normal, no murmur, click, rub or gallop  Abdomen: soft, non-tender. Bowel sounds normal. No masses,  no organomegaly  Extremities: extremities normal, atraumatic, no cyanosis or edema      ECG: atrial fibrillation, rate 80     Data Review   Recent Results (from the past 24 hour(s))   METABOLIC PANEL, COMPREHENSIVE    Collection Time: 11/25/19  4:18 AM   Result Value Ref Range    Sodium 136 136 - 145 mmol/L    Potassium 3.3 (L) 3.5 - 5.1 mmol/L    Chloride 101 97 - 108 mmol/L    CO2 27 21 - 32 mmol/L    Anion gap 8 5 - 15 mmol/L    Glucose 96 65 - 100 mg/dL    BUN 22 (H) 6 - 20 MG/DL    Creatinine 1.10 (H) 0.55 - 1.02 MG/DL    BUN/Creatinine ratio 20 12 - 20      GFR est AA 57 (L) >60 ml/min/1.73m2    GFR est non-AA 47 (L) >60 ml/min/1.73m2    Calcium 8.3 (L) 8.5 - 10.1 MG/DL    Bilirubin, total 0.3 0.2 - 1.0 MG/DL    ALT (SGPT) 17 12 - 78 U/L    AST (SGOT) 35 15 - 37 U/L    Alk. phosphatase 72 45 - 117 U/L    Protein, total 8.0 6.4 - 8.2 g/dL    Albumin 2.6 (L) 3.5 - 5.0 g/dL    Globulin 5.4 (H) 2.0 - 4.0 g/dL    A-G Ratio 0.5 (L) 1.1 - 2.2     CBC WITH AUTOMATED DIFF    Collection Time: 11/25/19  4:18 AM   Result Value Ref Range    WBC 5.1 3.6 - 11.0 K/uL    RBC 3.48 (L) 3.80 - 5.20 M/uL    HGB 8.6 (L) 11.5 - 16.0 g/dL    HCT 28.5 (L) 35.0 - 47.0 %    MCV 81.9 80.0 - 99.0 FL    MCH 24.7 (L) 26.0 - 34.0 PG    MCHC 30.2 30.0 - 36.5 g/dL    RDW 17.3 (H) 11.5 - 14.5 %    PLATELET 471 003 - 345 K/uL    MPV 10.6 8.9 - 12.9 FL    NRBC 0.0 0  WBC    ABSOLUTE NRBC 0.00 0.00 - 0.01 K/uL    NEUTROPHILS 64 32 - 75 %    LYMPHOCYTES 21 12 - 49 %    MONOCYTES 11 5 - 13 %    EOSINOPHILS 3 0 - 7 %    BASOPHILS 1 0 - 1 %    IMMATURE GRANULOCYTES 0 0.0 - 0.5 %    ABS. NEUTROPHILS 3.2 1.8 - 8.0 K/UL    ABS. LYMPHOCYTES 1.1 0.8 - 3.5 K/UL    ABS. MONOCYTES 0.6 0.0 - 1.0 K/UL    ABS. EOSINOPHILS 0.2 0.0 - 0.4 K/UL    ABS. BASOPHILS 0.0 0.0 - 0.1 K/UL    ABS. IMM.  GRANS. 0.0 0.00 - 0.04 K/UL    DF AUTOMATED Assessment:     Active Problems:    HTN (hypertension) (3/14/2011)      Bilateral leg edema (7/5/2017)      HX: long term anticoagulant use--Pradaxa stopped 5/17 for chronic atrial fib (7/5/2017)      Acute hypoxemic respiratory failure ([facility] (11/20/2019)      Pneumonia (11/20/2019)      [facility] (11/20/2019)      Chronic atrial fibrillation (11/21/2019)        Plan:     1. Hypoxic respiratory failure secondary to R perihilar pneumonia and/or CHF. Seems more like chf to me. Continue iv rocephin, zithromax, and lasix for now. Will add lisinopril. Also on atenolol. Try to wean off oxygen. Cost of entresto might be an issue. 2. Echo- EF 40-45%, moderate AR and mild MR  3. Chronic A fib- on pradaxa  4. R foot pain- dp pulse 2+ . Some redness of dorsum of foot and swelling of ankle. Will check xray and uric acid level. 5. Disposition- dc home this am after xray. Will check xray.

## 2019-11-25 NOTE — PROGRESS NOTES
MICHELLE plan: Pt will discharge home today transported by her daughter in a personal vehicle once final labs results have been received. Daughter is here and ready to transport once ready. Pt is scheduled to follow-up with her PCP tomorrow, 11/26/19, and again on 12/2/19. Pt is also scheduled to see her Cardiologist on 12/9/19. Pt again declined [place] [id number] services following d/c. CM informed pt and daughter that if she were to change her mind she can reach out to her PCP office to setup that service. Pt and daughter verbalized understanding of the plan. In basket message sent to [facility], Jerzy Kelly, to inform of plan. No further concerns indicated at this time. AVS updated. Patient is ready for discharge from Care Managment standpoint. Medicare pt has received, reviewed, and signed 2nd IM letter informing them of their right to appeal the discharge. Signed copy has been placed on pt bedside chart. Care Management Interventions  PCP Verified by CM: Yes  Last Visit to PCP: 11/08/19  Mode of Transport at Discharge: Other (see comment)(daughter)  Transition of Care Consult (CM Consult):  Other(home w/ f/u appts)  MyChart Signup: No  Discharge Durable Medical Equipment: No  Physical Therapy Consult: Yes  Occupational Therapy Consult: Yes  Speech Therapy Consult: No  Current Support Network: Own Home, Family Lives Nearby(pt and 2 daughters live together in a 1 story trailer with 5 NATE)  Confirm Follow Up Transport: Family  Plan discussed with Pt/Family/Caregiver: Yes  Discharge Location  Discharge Placement: Home with family assistance    MONICA Bello  Care Manager  [phone]

## 2019-11-25 NOTE — PROGRESS NOTES
ADULT PROTOCOL: JET AEROSOL  REASSESSMENT    Patient  Claudeen Mitts     80 y.o.   female     11/25/2019  9:52 AM    Breath Sounds Pre Procedure: Right Breath Sounds: Lower, Crackles                               Left Breath Sounds: Lower, Crackles    Breath Sounds Post Procedure: Right Breath Sounds: Lower, Crackles                                 Left Breath Sounds: Lower, Crackles    Breathing pattern: Pre procedure Breathing Pattern: Regular          Post procedure Breathing Pattern: Regular    Heart Rate: Pre procedure Pulse: 86           Post procedure Pulse: 74    Resp Rate: Pre procedure Respirations: 20           Post procedure Respirations: 20            Cough: Pre procedure Cough: Non-productive               Post procedure Cough: Non-productive      Oxygen: O2 Device: Room air        Changed: NO    SpO2: Pre procedure SpO2: 92 %   without oxygen              Post procedure SpO2: 92 %  without oxygen    Nebulizer Therapy: Current medications Aerosolized Medications: DuoNeb      Changed: NO  Problem List:   Patient Active Problem List   Diagnosis Code    Rhabdomyolysis M62.82    Persistent atrial fibrillation I48.19    HTN (hypertension) I10    Encephalopathy acute G93.40    Insomnia G47.00    Hypokalemia E87.6    Hypothyroidism E03.9    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    Hyperkalemia E87.5    GI bleeding K92.2    HILTON (dyspnea on exertion) R06.09    Bilateral leg edema R60.0    HX: long term anticoagulant use--Pradaxa stopped 5/17 for chronic atrial fib Z92.29    Cardiomyopathy, secondary ([facility]) I42.9    GERD with esophagitis K21.0    Dyslipidemia (high LDL; low HDL) E78.5    CKD (chronic kidney disease) stage 3, GFR 30-59 ml/min ([facility]) N18.3    Chest pain with moderate risk for cardiac etiology R07.9    Irregular heart beat I49.9    Acute hypoxemic respiratory failure (HCC) J96.01    Pneumonia J18.9    CHF (congestive heart failure) ([facility]) I50.9    Chronic atrial fibrillation I48.20       Respiratory Therapist: Tavo Han, RT

## 2019-11-25 NOTE — PROGRESS NOTES
[facility]      [address] [place] [address], [address] [facility]  [phone]      Cardiology Progress Note      11/25/2019 10:43 AM    Admit Date: 11/20/2019    Admit Diagnosis:   Acute hypoxemic respiratory failure (HCC) [J96.01]    Subjective:     Eliasbeth Blanco     Breathing better    Visit Vitals  /47 (BP 1 Location: Right arm, BP Patient Position: At rest)   Pulse 78   Temp 98.4 °F (36.9 °C)   Resp 19   Ht 5' 2\" (1.575 m)   Wt 134 lb (60.8 kg)   SpO2 92%   BMI 24.51 kg/m²       Current Facility-Administered Medications   Medication Dose Route Frequency    potassium chloride (K-DUR, KLOR-CON) SR tablet 20 mEq  20 mEq Oral BID    metoprolol succinate (TOPROL-XL) XL tablet 25 mg  25 mg Oral DAILY    lisinopril (PRINIVIL, ZESTRIL) tablet 10 mg  10 mg Oral DAILY    dabigatran etexilate (PRADAXA) capsule 75 mg  75 mg Oral Q12H    fluticasone propionate (FLONASE) 50 mcg/actuation nasal spray 2 Spray  2 Spray Both Nostrils DAILY    levothyroxine (SYNTHROID) tablet 100 mcg  100 mcg Oral 6am    traZODone (DESYREL) tablet 150 mg  150 mg Oral QHS    metoprolol (LOPRESSOR) injection 5 mg  5 mg IntraVENous Q6H PRN    acetaminophen (TYLENOL) tablet 650 mg  650 mg Oral Q4H PRN    ondansetron (ZOFRAN) injection 4 mg  4 mg IntraVENous Q6H PRN    guaiFENesin (ROBITUSSIN) 100 mg/5 mL oral liquid 400 mg  400 mg Oral TID    albuterol-ipratropium (DUO-NEB) 2.5 MG-0.5 MG/3 ML  3 mL Nebulization TID RT    cefTRIAXone (ROCEPHIN) 1 g in 0.9% sodium chloride (MBP/ADV) 50 mL  1 g IntraVENous Q24H       Objective:      Physical Exam:  General Appearance:    Chest:   Clear  Cardiovascular: RRR  Extremities: no edema  Skin:  Warm and dry.     Data Review:   Recent Labs     11/25/19  0418 11/24/19  0425 11/23/19  0258   WBC 5.1 5.9 6.2   HGB 8.6* 9.5* 9.2*   HCT 28.5* 32.0* 31.5*    223 186     Recent Labs     11/25/19  0418 11/24/19  0425 11/23/19  0258    136 135*   K 3.3* 3.5 3.3*   CL 101 99 99   CO2 27 27 27   GLU 96 86 102*   BUN 22* 30* 33*   CREA 1.10* 1.23* 1.49*   CA 8.3* 8.7 8.3*   ALB 2.6* 2.9*  --    TBILI 0.3 0.3  --    SGOT 35 39*  --    ALT 17 23  --        No results for input(s): TROIQ, CPK, CKMB in the last 72 hours.       Intake/Output Summary (Last 24 hours) at 11/25/2019 1043  Last data filed at 11/25/2019 0600  Gross per 24 hour   Intake 290 ml   Output    Net 290 ml        Telemetry:   EKG:  Cxray:    Assessment:     Active Problems:    HTN (hypertension) (3/14/2011)      Bilateral leg edema (7/5/2017)      HX: long term anticoagulant use--Pradaxa stopped 5/17 for chronic atrial fib (7/5/2017)      Acute hypoxemic respiratory failure ([facility] (11/20/2019)      Pneumonia (11/20/2019)      [facility] (11/20/2019)      Chronic atrial fibrillation (11/21/2019)        Plan:     Agree with DC today on metoprolol, lisinopril, low dose furosemide    Kirsty Franz M.D., [facility]

## 2019-11-26 ENCOUNTER — OFFICE VISIT (OUTPATIENT)
Dept: FAMILY MEDICINE CLINIC | Age: 84
End: 2019-11-26

## 2019-11-26 VITALS
HEIGHT: 62 IN | DIASTOLIC BLOOD PRESSURE: 51 MMHG | SYSTOLIC BLOOD PRESSURE: 117 MMHG | TEMPERATURE: 96.7 F | RESPIRATION RATE: 18 BRPM | WEIGHT: 135.2 LBS | OXYGEN SATURATION: 95 % | HEART RATE: 73 BPM | BODY MASS INDEX: 24.88 KG/M2

## 2019-11-26 DIAGNOSIS — I50.31 ACUTE DIASTOLIC CONGESTIVE HEART FAILURE (HCC): Primary | ICD-10-CM

## 2019-11-26 DIAGNOSIS — D64.9 ANEMIA, UNSPECIFIED TYPE: ICD-10-CM

## 2019-11-26 RX ORDER — ALBUTEROL SULFATE 90 UG/1
2 AEROSOL, METERED RESPIRATORY (INHALATION)
Qty: 1 INHALER | Refills: 5 | Status: SHIPPED | OUTPATIENT
Start: 2019-11-26 | End: 2021-04-02 | Stop reason: ALTCHOICE

## 2019-11-26 RX ORDER — FUROSEMIDE 40 MG/1
TABLET ORAL
Qty: 30 TAB | Refills: 12 | Status: ON HOLD | OUTPATIENT
Start: 2019-11-26 | End: 2019-12-10 | Stop reason: SDUPTHER

## 2019-11-26 NOTE — PROGRESS NOTES
HISTORY OF PRESENT ILLNESS  Lidia Toledo is a 80 y.o. female. HPI Comes in with daughter Aileen Zapata. Breathing has been doing ok. Gets some episodic dyspnea lasting for 10 minutes at a time. No chest pain. Foot pain is doing much better. Mild nonproductive cough. Has some problems with runny, dripping nose, and gets redness and itching in her eyes. Takes benadryl- slight relief. Didn't have any trouble with allergies while in hospital.   In for [facility] visit. Was hospitalized for CHF, ?pneumonia?, possible gout of foot. Was started on prednisone     ROS    Physical Exam  Vitals signs and nursing note reviewed. Constitutional:       Appearance: She is well-developed. HENT:      Right Ear: External ear normal.      Left Ear: External ear normal.   Neck:      Thyroid: No thyromegaly. Cardiovascular:      Rate and Rhythm: Normal rate and regular rhythm. Heart sounds: Normal heart sounds. Pulmonary:      Breath sounds: Normal breath sounds. No wheezing. Comments: Some exp. Wheezes bilaterally  Abdominal:      General: Bowel sounds are normal. There is no distension. Palpations: Abdomen is soft. There is no mass. Tenderness: There is no tenderness. Musculoskeletal:      Comments: L foot- no tenderness   Lymphadenopathy:      Cervical: No cervical adenopathy. ASSESSMENT and PLAN  Orders Placed This Encounter    XR CHEST PA LAT    METABOLIC PANEL, BASIC    IRON PROFILE    AMB POC COMPLETE CBC, AUTOMATED    furosemide (LASIX) 40 mg tablet    albuterol (PROVENTIL HFA, VENTOLIN HFA, PROAIR HFA) 90 mcg/actuation inhaler     Diagnoses and all orders for this visit:    1. Acute diastolic congestive heart failure (HCC)  -     METABOLIC PANEL, BASIC  -     XR CHEST PA LAT; Future    2. Anemia, unspecified type  -     AMB POC COMPLETE CBC, AUTOMATED  -     IRON PROFILE    Other orders  -     furosemide (LASIX) 40 mg tablet;  One tab po daily for heart  -     albuterol (PROVENTIL HFA, VENTOLIN HFA, PROAIR HFA) 90 mcg/actuation inhaler; Take 2 Puffs by inhalation every four (4) hours as needed for Wheezing or Shortness of Breath. Follow-up and Dispositions    · Return in about 6 days (around 12/2/2019).

## 2019-11-27 LAB
BUN SERPL-MCNC: 21 MG/DL (ref 8–27)
BUN/CREAT SERPL: 21 (ref 12–28)
CALCIUM SERPL-MCNC: 9.6 MG/DL (ref 8.7–10.3)
CHLORIDE SERPL-SCNC: 97 MMOL/L (ref 96–106)
CO2 SERPL-SCNC: 18 MMOL/L (ref 20–29)
CREAT SERPL-MCNC: 0.99 MG/DL (ref 0.57–1)
GLUCOSE SERPL-MCNC: 103 MG/DL (ref 65–99)
INTERPRETATION: NORMAL
IRON SATN MFR SERPL: 6 % (ref 15–55)
IRON SERPL-MCNC: 21 UG/DL (ref 27–139)
POTASSIUM SERPL-SCNC: 4.6 MMOL/L (ref 3.5–5.2)
SODIUM SERPL-SCNC: 135 MMOL/L (ref 134–144)
TIBC SERPL-MCNC: 326 UG/DL (ref 250–450)
UIBC SERPL-MCNC: 305 UG/DL (ref 118–369)

## 2019-12-02 ENCOUNTER — OFFICE VISIT (OUTPATIENT)
Dept: FAMILY MEDICINE CLINIC | Age: 84
End: 2019-12-02

## 2019-12-02 VITALS
SYSTOLIC BLOOD PRESSURE: 105 MMHG | HEART RATE: 91 BPM | OXYGEN SATURATION: 96 % | RESPIRATION RATE: 18 BRPM | TEMPERATURE: 95.8 F | BODY MASS INDEX: 23.92 KG/M2 | WEIGHT: 130 LBS | HEIGHT: 62 IN | DIASTOLIC BLOOD PRESSURE: 61 MMHG

## 2019-12-02 DIAGNOSIS — I95.2 HYPOTENSION DUE TO DRUGS: ICD-10-CM

## 2019-12-02 DIAGNOSIS — I50.20 SYSTOLIC CONGESTIVE HEART FAILURE, UNSPECIFIED HF CHRONICITY (HCC): Primary | ICD-10-CM

## 2019-12-02 DIAGNOSIS — R42 DIZZINESS: ICD-10-CM

## 2019-12-02 RX ORDER — DABIGATRAN ETEXILATE 75 MG/1
CAPSULE ORAL
Qty: 60 CAP | Refills: 0 | Status: SHIPPED | OUTPATIENT
Start: 2019-12-02 | End: 2020-02-11

## 2019-12-02 NOTE — PROGRESS NOTES
HISTORY OF PRESENT ILLNESS  Gosia Goss is a 80 y.o. female. HPI In for hospital followup, still feels a little lightheaded when stands up. Is also unsteady when stands up to walk. No real dyspnea. Some dry cough. ROS    Physical Exam  Vitals signs and nursing note reviewed. Constitutional:       Appearance: She is well-developed. HENT:      Right Ear: External ear normal.      Left Ear: External ear normal.   Neck:      Thyroid: No thyromegaly. Cardiovascular:      Rate and Rhythm: Normal rate and regular rhythm. Heart sounds: Normal heart sounds. Pulmonary:      Breath sounds: Normal breath sounds. No wheezing. Abdominal:      General: Bowel sounds are normal. There is no distension. Palpations: Abdomen is soft. There is no mass. Tenderness: There is no tenderness. Lymphadenopathy:      Cervical: No cervical adenopathy. ASSESSMENT and PLAN  Orders Placed This Encounter    XR CHEST PA LAT    METABOLIC PANEL, BASIC    AMB POC COMPLETE CBC, AUTOMATED     Diagnoses and all orders for this visit:    1. Systolic congestive heart failure, unspecified HF chronicity (HCC)  -     XR CHEST PA LAT; Future  -     METABOLIC PANEL, BASIC    2. Dizziness  -     AMB POC COMPLETE CBC, AUTOMATED    3. Hypotension due to drugs      Follow-up and Dispositions    · Return in about 1 week (around 12/9/2019). Dc metoprolol. May need to lower dose of lasix also.

## 2019-12-02 NOTE — PROGRESS NOTES
Chief Complaint   Patient presents with   Des 11/20/2019    Fatigue    Dizziness     1. Have you been to the ER, urgent care clinic since your last visit? Hospitalized since your last visit? Yes   11/201/2019 [facility]  Acute Hypoxemic    2. Have you seen or consulted any other health care providers outside of the [address] [place] [address] since your last visit? Include any pap smears or colon screening.  No.

## 2019-12-03 ENCOUNTER — TELEPHONE (OUTPATIENT)
Dept: FAMILY MEDICINE CLINIC | Age: 84
End: 2019-12-03

## 2019-12-03 LAB
BUN SERPL-MCNC: 42 MG/DL (ref 8–27)
BUN/CREAT SERPL: 35 (ref 12–28)
CALCIUM SERPL-MCNC: 10.1 MG/DL (ref 8.7–10.3)
CHLORIDE SERPL-SCNC: 98 MMOL/L (ref 96–106)
CO2 SERPL-SCNC: 21 MMOL/L (ref 20–29)
CREAT SERPL-MCNC: 1.19 MG/DL (ref 0.57–1)
GLUCOSE SERPL-MCNC: 92 MG/DL (ref 65–99)
INTERPRETATION: NORMAL
POTASSIUM SERPL-SCNC: 5.3 MMOL/L (ref 3.5–5.2)
SODIUM SERPL-SCNC: 139 MMOL/L (ref 134–144)

## 2019-12-03 RX ORDER — PANTOPRAZOLE SODIUM 40 MG/1
40 TABLET, DELAYED RELEASE ORAL DAILY
Qty: 30 TAB | Refills: 5 | Status: SHIPPED | OUTPATIENT
Start: 2019-12-03 | End: 2020-12-18

## 2019-12-04 ENCOUNTER — TELEPHONE (OUTPATIENT)
Dept: FAMILY MEDICINE CLINIC | Age: 84
End: 2019-12-04

## 2019-12-04 RX ORDER — DABIGATRAN ETEXILATE 75 MG/1
CAPSULE ORAL
Qty: 60 CAP | Refills: 0 | Status: SHIPPED | OUTPATIENT
Start: 2019-12-04 | End: 2019-12-12 | Stop reason: SDUPTHER

## 2019-12-04 RX ORDER — ATENOLOL 50 MG/1
25 TABLET ORAL DAILY
Qty: 30 TAB | Refills: 2
Start: 2019-12-04 | End: 2020-01-22 | Stop reason: SDUPTHER

## 2019-12-04 RX ORDER — POTASSIUM CHLORIDE 20 MEQ/1
TABLET, EXTENDED RELEASE ORAL
Qty: 60 TAB | Refills: 12 | Status: SHIPPED | OUTPATIENT
Start: 2019-12-04 | End: 2020-12-18

## 2019-12-04 NOTE — TELEPHONE ENCOUNTER
pc with pt. Had gastritis 2 years ago. Will start protonix. Pt is on pradaxa, hgb is 12.4 but iron/tibc is 6%, She is a [employer] and doesn't want blood transfusions, but doesn't want to stop pradaxa either. Will recheck hgb next week.  Was 8.9 in hospital.

## 2019-12-04 NOTE — TELEPHONE ENCOUNTER
----- Message from Daiana Bagley sent at 12/4/2019 11:36 AM EST -----  Regarding: Dr. Rodríguez Sever: [phone]  Caller's first and last name: Dylon Cantu, Daughter   Reason for call:  She stated that the pt was supposed to get medication for low blood pressure. No Rx received by pharmacy. Please verify with Daughter.   Callback required yes/no and why: yes   Best contact number(s): [phone]  Details to clarify the request: n/a

## 2019-12-04 NOTE — TELEPHONE ENCOUNTER
pc with pts daughter . Decrease potassium  20 mg to once daily.  Decrease atenolol 50 mg to 1/2 tab daily

## 2019-12-04 NOTE — TELEPHONE ENCOUNTER
Patient daughter Zita Easley states that Dr. Wilfred Perez called her mother last night and said that he was putting her on a medication because her BP was low, the daughter went to the pharmacy no medication here there.  Telephone number [phone]

## 2019-12-08 ENCOUNTER — APPOINTMENT (OUTPATIENT)
Dept: GENERAL RADIOLOGY | Age: 84
End: 2019-12-08
Attending: EMERGENCY MEDICINE
Payer: MEDICARE

## 2019-12-08 ENCOUNTER — HOSPITAL ENCOUNTER (OUTPATIENT)
Age: 84
Setting detail: OBSERVATION
Discharge: HOME | End: 2019-12-10
Attending: EMERGENCY MEDICINE | Admitting: INTERNAL MEDICINE
Payer: MEDICARE

## 2019-12-08 DIAGNOSIS — E86.0 DEHYDRATION: Primary | ICD-10-CM

## 2019-12-08 DIAGNOSIS — N17.9 AKI (ACUTE KIDNEY INJURY) (HCC): ICD-10-CM

## 2019-12-08 DIAGNOSIS — I95.89 HYPOTENSION DUE TO HYPOVOLEMIA: ICD-10-CM

## 2019-12-08 DIAGNOSIS — E86.1 HYPOTENSION DUE TO HYPOVOLEMIA: ICD-10-CM

## 2019-12-08 PROBLEM — I95.9 HYPOTENSION: Status: ACTIVE | Noted: 2019-12-08

## 2019-12-08 LAB
ALBUMIN SERPL-MCNC: 3 G/DL (ref 3.5–5)
ALBUMIN/GLOB SERPL: 0.6 {RATIO} (ref 1.1–2.2)
ALP SERPL-CCNC: 65 U/L (ref 45–117)
ALT SERPL-CCNC: 20 U/L (ref 12–78)
ANION GAP SERPL CALC-SCNC: 6 MMOL/L (ref 5–15)
APPEARANCE UR: CLEAR
AST SERPL-CCNC: 18 U/L (ref 15–37)
BACTERIA URNS QL MICRO: NEGATIVE /HPF
BASOPHILS # BLD: 0 K/UL (ref 0–0.1)
BASOPHILS NFR BLD: 0 % (ref 0–1)
BILIRUB SERPL-MCNC: 0.4 MG/DL (ref 0.2–1)
BILIRUB UR QL: NEGATIVE
BUN SERPL-MCNC: 38 MG/DL (ref 6–20)
BUN/CREAT SERPL: 22 (ref 12–20)
CALCIUM SERPL-MCNC: 8.9 MG/DL (ref 8.5–10.1)
CHLORIDE SERPL-SCNC: 108 MMOL/L (ref 97–108)
CO2 SERPL-SCNC: 23 MMOL/L (ref 21–32)
COLOR UR: ABNORMAL
CREAT SERPL-MCNC: 1.72 MG/DL (ref 0.55–1.02)
D DIMER PPP FEU-MCNC: 0.88 MG/L FEU (ref 0–0.65)
DIFFERENTIAL METHOD BLD: ABNORMAL
EOSINOPHIL # BLD: 0.2 K/UL (ref 0–0.4)
EOSINOPHIL NFR BLD: 2 % (ref 0–7)
EPITH CASTS URNS QL MICRO: ABNORMAL /LPF
ERYTHROCYTE [DISTWIDTH] IN BLOOD BY AUTOMATED COUNT: 19.4 % (ref 11.5–14.5)
GLOBULIN SER CALC-MCNC: 5 G/DL (ref 2–4)
GLUCOSE SERPL-MCNC: 101 MG/DL (ref 65–100)
GLUCOSE UR STRIP.AUTO-MCNC: NEGATIVE MG/DL
HCT VFR BLD AUTO: 34.9 % (ref 35–47)
HGB BLD-MCNC: 10.4 G/DL (ref 11.5–16)
HGB UR QL STRIP: NEGATIVE
HYALINE CASTS URNS QL MICRO: ABNORMAL /LPF (ref 0–5)
IMM GRANULOCYTES # BLD AUTO: 0 K/UL (ref 0–0.04)
IMM GRANULOCYTES NFR BLD AUTO: 0 % (ref 0–0.5)
KETONES UR QL STRIP.AUTO: NEGATIVE MG/DL
LACTATE BLD-SCNC: 1.62 MMOL/L (ref 0.4–2)
LEUKOCYTE ESTERASE UR QL STRIP.AUTO: ABNORMAL
LYMPHOCYTES # BLD: 2 K/UL (ref 0.8–3.5)
LYMPHOCYTES NFR BLD: 22 % (ref 12–49)
MCH RBC QN AUTO: 25.2 PG (ref 26–34)
MCHC RBC AUTO-ENTMCNC: 29.8 G/DL (ref 30–36.5)
MCV RBC AUTO: 84.5 FL (ref 80–99)
MONOCYTES # BLD: 0.9 K/UL (ref 0–1)
MONOCYTES NFR BLD: 10 % (ref 5–13)
NEUTS SEG # BLD: 5.9 K/UL (ref 1.8–8)
NEUTS SEG NFR BLD: 66 % (ref 32–75)
NITRITE UR QL STRIP.AUTO: NEGATIVE
NRBC # BLD: 0 K/UL (ref 0–0.01)
NRBC BLD-RTO: 0 PER 100 WBC
PH UR STRIP: 5 [PH] (ref 5–8)
PLATELET # BLD AUTO: 226 K/UL (ref 150–400)
PMV BLD AUTO: 11.4 FL (ref 8.9–12.9)
POTASSIUM SERPL-SCNC: 4.6 MMOL/L (ref 3.5–5.1)
PROT SERPL-MCNC: 8 G/DL (ref 6.4–8.2)
PROT UR STRIP-MCNC: NEGATIVE MG/DL
RBC # BLD AUTO: 4.13 M/UL (ref 3.8–5.2)
RBC #/AREA URNS HPF: ABNORMAL /HPF (ref 0–5)
SODIUM SERPL-SCNC: 137 MMOL/L (ref 136–145)
SP GR UR REFRACTOMETRY: 1.01 (ref 1–1.03)
TROPONIN I SERPL-MCNC: <0.05 NG/ML
UA: UC IF INDICATED,UAUC: ABNORMAL
UROBILINOGEN UR QL STRIP.AUTO: 0.2 EU/DL (ref 0.2–1)
WBC # BLD AUTO: 9 K/UL (ref 3.6–11)
WBC URNS QL MICRO: ABNORMAL /HPF (ref 0–4)

## 2019-12-08 PROCEDURE — 94760 N-INVAS EAR/PLS OXIMETRY 1: CPT

## 2019-12-08 PROCEDURE — 74011250636 HC RX REV CODE- 250/636: Performed by: INTERNAL MEDICINE

## 2019-12-08 PROCEDURE — 87086 URINE CULTURE/COLONY COUNT: CPT

## 2019-12-08 PROCEDURE — 99285 EMERGENCY DEPT VISIT HI MDM: CPT

## 2019-12-08 PROCEDURE — 36415 COLL VENOUS BLD VENIPUNCTURE: CPT

## 2019-12-08 PROCEDURE — 96360 HYDRATION IV INFUSION INIT: CPT

## 2019-12-08 PROCEDURE — 74011250637 HC RX REV CODE- 250/637: Performed by: INTERNAL MEDICINE

## 2019-12-08 PROCEDURE — 80053 COMPREHEN METABOLIC PANEL: CPT

## 2019-12-08 PROCEDURE — 71046 X-RAY EXAM CHEST 2 VIEWS: CPT

## 2019-12-08 PROCEDURE — 84484 ASSAY OF TROPONIN QUANT: CPT

## 2019-12-08 PROCEDURE — 99218 HC RM OBSERVATION: CPT

## 2019-12-08 PROCEDURE — 85379 FIBRIN DEGRADATION QUANT: CPT

## 2019-12-08 PROCEDURE — 96361 HYDRATE IV INFUSION ADD-ON: CPT

## 2019-12-08 PROCEDURE — 85025 COMPLETE CBC W/AUTO DIFF WBC: CPT

## 2019-12-08 PROCEDURE — 81001 URINALYSIS AUTO W/SCOPE: CPT

## 2019-12-08 PROCEDURE — 74011250636 HC RX REV CODE- 250/636: Performed by: EMERGENCY MEDICINE

## 2019-12-08 PROCEDURE — 93005 ELECTROCARDIOGRAM TRACING: CPT

## 2019-12-08 PROCEDURE — 83605 ASSAY OF LACTIC ACID: CPT

## 2019-12-08 RX ORDER — DIPHENHYDRAMINE HCL 25 MG
25 CAPSULE ORAL
Status: DISCONTINUED | OUTPATIENT
Start: 2019-12-08 | End: 2019-12-10 | Stop reason: HOSPADM

## 2019-12-08 RX ORDER — FLUTICASONE PROPIONATE 50 MCG
2 SPRAY, SUSPENSION (ML) NASAL DAILY
Status: DISCONTINUED | OUTPATIENT
Start: 2019-12-09 | End: 2019-12-10 | Stop reason: HOSPADM

## 2019-12-08 RX ORDER — SODIUM CHLORIDE 0.9 % (FLUSH) 0.9 %
5-40 SYRINGE (ML) INJECTION AS NEEDED
Status: DISCONTINUED | OUTPATIENT
Start: 2019-12-08 | End: 2019-12-10 | Stop reason: HOSPADM

## 2019-12-08 RX ORDER — LEVOTHYROXINE SODIUM 100 UG/1
100 TABLET ORAL
Status: DISCONTINUED | OUTPATIENT
Start: 2019-12-09 | End: 2019-12-10 | Stop reason: HOSPADM

## 2019-12-08 RX ORDER — FERROUS SULFATE, DRIED 160(50) MG
1 TABLET, EXTENDED RELEASE ORAL 2 TIMES DAILY
Status: DISCONTINUED | OUTPATIENT
Start: 2019-12-08 | End: 2019-12-10 | Stop reason: HOSPADM

## 2019-12-08 RX ORDER — HYDROCODONE BITARTRATE AND ACETAMINOPHEN 5; 325 MG/1; MG/1
1 TABLET ORAL
Status: DISCONTINUED | OUTPATIENT
Start: 2019-12-08 | End: 2019-12-10 | Stop reason: HOSPADM

## 2019-12-08 RX ORDER — ALBUTEROL SULFATE 90 UG/1
2 AEROSOL, METERED RESPIRATORY (INHALATION)
Status: DISCONTINUED | OUTPATIENT
Start: 2019-12-08 | End: 2019-12-10 | Stop reason: HOSPADM

## 2019-12-08 RX ORDER — PANTOPRAZOLE SODIUM 40 MG/1
40 TABLET, DELAYED RELEASE ORAL
Status: DISCONTINUED | OUTPATIENT
Start: 2019-12-09 | End: 2019-12-10 | Stop reason: HOSPADM

## 2019-12-08 RX ORDER — ATENOLOL 25 MG/1
25 TABLET ORAL DAILY
Status: DISCONTINUED | OUTPATIENT
Start: 2019-12-09 | End: 2019-12-10 | Stop reason: HOSPADM

## 2019-12-08 RX ORDER — SODIUM CHLORIDE 9 MG/ML
50 INJECTION, SOLUTION INTRAVENOUS CONTINUOUS
Status: DISCONTINUED | OUTPATIENT
Start: 2019-12-08 | End: 2019-12-10 | Stop reason: HOSPADM

## 2019-12-08 RX ORDER — ACETAMINOPHEN 325 MG/1
625 TABLET ORAL
Status: DISCONTINUED | OUTPATIENT
Start: 2019-12-08 | End: 2019-12-10 | Stop reason: HOSPADM

## 2019-12-08 RX ORDER — BISACODYL 5 MG
5 TABLET, DELAYED RELEASE (ENTERIC COATED) ORAL DAILY PRN
Status: DISCONTINUED | OUTPATIENT
Start: 2019-12-08 | End: 2019-12-10 | Stop reason: HOSPADM

## 2019-12-08 RX ORDER — POTASSIUM CHLORIDE 20 MEQ/1
20 TABLET, EXTENDED RELEASE ORAL DAILY
Status: DISCONTINUED | OUTPATIENT
Start: 2019-12-09 | End: 2019-12-10 | Stop reason: HOSPADM

## 2019-12-08 RX ORDER — NALOXONE HYDROCHLORIDE 0.4 MG/ML
0.4 INJECTION, SOLUTION INTRAMUSCULAR; INTRAVENOUS; SUBCUTANEOUS AS NEEDED
Status: DISCONTINUED | OUTPATIENT
Start: 2019-12-08 | End: 2019-12-10 | Stop reason: HOSPADM

## 2019-12-08 RX ORDER — ONDANSETRON 2 MG/ML
4 INJECTION INTRAMUSCULAR; INTRAVENOUS
Status: DISCONTINUED | OUTPATIENT
Start: 2019-12-08 | End: 2019-12-10 | Stop reason: HOSPADM

## 2019-12-08 RX ORDER — DABIGATRAN ETEXILATE 75 MG/1
75 CAPSULE ORAL 2 TIMES DAILY
Status: DISCONTINUED | OUTPATIENT
Start: 2019-12-08 | End: 2019-12-10 | Stop reason: HOSPADM

## 2019-12-08 RX ORDER — SODIUM CHLORIDE 0.9 % (FLUSH) 0.9 %
5-40 SYRINGE (ML) INJECTION EVERY 8 HOURS
Status: DISCONTINUED | OUTPATIENT
Start: 2019-12-08 | End: 2019-12-10 | Stop reason: HOSPADM

## 2019-12-08 RX ADMIN — SODIUM CHLORIDE 1000 ML: 900 INJECTION, SOLUTION INTRAVENOUS at 15:50

## 2019-12-08 RX ADMIN — TRAZODONE HYDROCHLORIDE 150 MG: 50 TABLET ORAL at 21:13

## 2019-12-08 RX ADMIN — DABIGATRAN ETEXILATE MESYLATE 75 MG: 75 CAPSULE ORAL at 21:14

## 2019-12-08 RX ADMIN — CALCIUM CARBONATE 500 MG (1,250 MG)-VITAMIN D3 200 UNIT TABLET 1 TABLET: at 21:13

## 2019-12-08 RX ADMIN — ACETAMINOPHEN 650 MG: 325 TABLET ORAL at 21:19

## 2019-12-08 RX ADMIN — SODIUM CHLORIDE 500 ML: 900 INJECTION, SOLUTION INTRAVENOUS at 14:42

## 2019-12-08 RX ADMIN — SODIUM CHLORIDE 500 ML: 900 INJECTION, SOLUTION INTRAVENOUS at 13:58

## 2019-12-08 RX ADMIN — SODIUM CHLORIDE 50 ML/HR: 900 INJECTION, SOLUTION INTRAVENOUS at 21:15

## 2019-12-08 NOTE — ED PROVIDER NOTES
EMERGENCY DEPARTMENT HISTORY AND PHYSICAL EXAM      Date: 12/8/2019  Patient Name: Bernardino Puri  Patient Age and Sex: 80 y.o. female     History of Presenting Illness     No chief complaint on file. History Provided By: Patient    HPI: Bernardino Puri is a Is a 80-year-old female with past medical history of atrial fibrillation, GI bleeding, hypertension presenting today with low blood pressure. She reports that she was recently admitted to the hospital for pneumonia, and finished her treatment for this. She was discharged home. She reports that her children have been taking care of her and checked her blood pressure noting that it was low with systolics in the 26R. Patient reports that she is on 2 blood pressure medications. She denies any fevers, weakness, chest pain, shortness of breath, dysuria, or any pain. No other complaints at this time. There are no other complaints, changes, or physical findings at this time. PCP: La Bowles MD    No current facility-administered medications on file prior to encounter. Current Outpatient Medications on File Prior to Encounter   Medication Sig Dispense Refill    dabigatran etexilate (PRADAXA) 75 mg capsule TAKE ONE CAPSULE BY MOUTH EVERY 12 HOURS 60 Cap 0    atenolol (TENORMIN) 50 mg tablet Take 0.5 Tabs by mouth daily. 30 Tab 2    potassium chloride (K-DUR, KLOR-CON) 20 mEq tablet TAKE 1 TABLET BY MOUTH once daily 60 Tab 12    pantoprazole (PROTONIX) 40 mg tablet Take 1 Tab by mouth daily. To keep stomach from bleeding. 30 Tab 5    dabigatran etexilate (PRADAXA) 75 mg capsule TAKE ONE CAPSULE BY MOUTH EVERY 12 HOURS 60 Cap 0    furosemide (LASIX) 40 mg tablet One tab po daily for heart 30 Tab 12    albuterol (PROVENTIL HFA, VENTOLIN HFA, PROAIR HFA) 90 mcg/actuation inhaler Take 2 Puffs by inhalation every four (4) hours as needed for Wheezing or Shortness of Breath.  1 Inhaler 5    lisinopril (PRINIVIL, ZESTRIL) 10 mg tablet Take 1 Tab by mouth daily. For heart 30 Tab 3    levothyroxine (SYNTHROID) 100 mcg tablet TAKE ONE TABLET DAILY FOR THYROID 30 Tab 12    traZODone (DESYREL) 150 mg tablet TAKE 1 TABLET BY MOUTH NIGHTLY FOR SLEEP. 30 Tab 12    fluticasone propionate (FLONASE) 50 mcg/actuation nasal spray 2 Sprays by Both Nostrils route daily.  diphenhydrAMINE (BENADRYL) 25 mg capsule Take 25 mg by mouth every six (6) hours as needed.  acetaminophen (TYLENOL ARTHRITIS PAIN) 650 mg TbER Take 650 mg by mouth every eight (8) hours.  CALCIUM 600 + D tablet TAKE 1 TABLET TWICE A DAY. (CALCIUM SUPPLEMENT) 60 Tab 0       Past History     Past Medical History:  Past Medical History:   Diagnosis Date    Arrhythmia     atrial fibrillation, chronic. Stress test 2011 essent. normal.  Mild-mod AR on ECHO    Atrial fibrillation ([address]     Atrial flutter with rapid ventricular response ([address] 11/21/2019    Chronic atrial fibrillation 11/21/2019    GI bleeding     Hypertension     Refusal of blood transfusions as patient is Orthodox     Thyroid disease     hypothyroid       Past Surgical History:  Past Surgical History:   Procedure Laterality Date    COLONOSCOPY N/A 5/2/2017    COLONOSCOPY performed by [facility]velina Patton [facility] at [facility]OSCOPY    ECHO TRANSESOPHAGEAL (DENISE) W OR WO CONTRAST  4/27/2011         HX GYN      vaginal deliveries x10    HX TUBAL LIGATION      [place] CARDIOVERSION ELECTIVE ARRHYTHMIA EXTERNAL  6/14/2011            Family History:  Family History   Problem Relation Age of Onset    Heart Disease Father     Cancer Son     Heart Disease Son        Social History:  Social History     Tobacco Use    Smoking status: Never Smoker    Smokeless tobacco: Never Used   Substance Use Topics    Alcohol use: No    Drug use: No       Allergies:   Allergies   Allergen Reactions    Seroquel [Quetiapine] Other (comments)     PSYCOLOGICAL REACTION Pt didn't feel like them selves but felt like a different person, iT DEEPLY DISTURBED PT.  PT BECAME IRRITABLE.  Ambien [Zolpidem] Other (comments)     Couldn't move         Review of Systems   Constitutional: No  fever,  No  headache  Skin: +  rash, No  jaundice  HEENT: No  nasal congestion, No  eye drainage. Resp: No cough,  No  wheezing  CV: No chest pain, No  palpitations  GI: No vomiting,  No  diarrhea.,  No  constipation  : No dysuria,  No  hematuria  MSK: No joint pain,  No  trauma  Neuro: No numbness, No  tingling  Psych: No suicidal, No  paranoid      Physical Exam     Patient Vitals for the past 12 hrs:   Temp Pulse Resp BP SpO2   12/08/19 1506 98.1 °F (36.7 °C) 66 18 98/58 100 %   12/08/19 1500  68 15 (!) 87/38 99 %   12/08/19 1445  68 16 (!) 81/45 93 %   12/08/19 1415  61 17 (!) 77/37 98 %   12/08/19 1400  67 17 (!) 78/50 95 %   12/08/19 1349  69 21 90/55 95 %   12/08/19 1215  (!) 57 20 (!) 88/46 98 %   12/08/19 1200  67 19 97/57 99 %   12/08/19 1156     100 %   12/08/19 1145 98.2 °F (36.8 °C) 64 24 104/48 97 %     General: alert, No acute distress  Eyes: EOMI, normal conjunctiva  ENT: moist mucous membranes. Neck: Active, full ROM of neck. Skin: No rashes. no jaundice              Lungs: Equal chest expansion. no respiratory distress. clear to auscultation bilaterally No accessory muscle usage  Heart: regular rate     no peripheral edema   2+ radial pulses and DPs bilaterally  Abd:  non distended soft, nontender. No rebound tenderness. No guarding  Back: Full ROM  MSK: Full, active ROM in all 4 extremities.    Neuro: Person, Place, Time and Situation; normal speech;   Psych: Cooperative with exam; Appropriate mood and affect             Diagnostic Study Results     Labs -     Recent Results (from the past 12 hour(s))   EKG, 12 LEAD, INITIAL    Collection Time: 12/08/19 11:54 AM   Result Value Ref Range    Ventricular Rate 63 BPM    Atrial Rate 84 BPM    QRS Duration 118 ms    Q-T Interval 440 ms    QTC Calculation (Bezet) 450 ms    Calculated R Axis -42 degrees    Calculated T Axis -2 degrees    Diagnosis       Atrial fibrillation  Left axis deviation  Left ventricular hypertrophy with QRS widening  Nonspecific ST abnormality  When compared with ECG of 20-NOV-2019 09:16,  Vent. rate has decreased BY  45 BPM  ST now depressed in Inferior leads  ST no longer depressed in Lateral leads  T wave inversion now evident in Inferior leads  T wave inversion no longer evident in Lateral leads     CBC WITH AUTOMATED DIFF    Collection Time: 12/08/19 12:28 PM   Result Value Ref Range    WBC 9.0 3.6 - 11.0 K/uL    RBC 4.13 3.80 - 5.20 M/uL    HGB 10.4 (L) 11.5 - 16.0 g/dL    HCT 34.9 (L) 35.0 - 47.0 %    MCV 84.5 80.0 - 99.0 FL    MCH 25.2 (L) 26.0 - 34.0 PG    MCHC 29.8 (L) 30.0 - 36.5 g/dL    RDW 19.4 (H) 11.5 - 14.5 %    PLATELET 818 343 - 363 K/uL    MPV 11.4 8.9 - 12.9 FL    NRBC 0.0 0  WBC    ABSOLUTE NRBC 0.00 0.00 - 0.01 K/uL    NEUTROPHILS 66 32 - 75 %    LYMPHOCYTES 22 12 - 49 %    MONOCYTES 10 5 - 13 %    EOSINOPHILS 2 0 - 7 %    BASOPHILS 0 0 - 1 %    IMMATURE GRANULOCYTES 0 0.0 - 0.5 %    ABS. NEUTROPHILS 5.9 1.8 - 8.0 K/UL    ABS. LYMPHOCYTES 2.0 0.8 - 3.5 K/UL    ABS. MONOCYTES 0.9 0.0 - 1.0 K/UL    ABS. EOSINOPHILS 0.2 0.0 - 0.4 K/UL    ABS. BASOPHILS 0.0 0.0 - 0.1 K/UL    ABS. IMM. GRANS. 0.0 0.00 - 0.04 K/UL    DF AUTOMATED     METABOLIC PANEL, COMPREHENSIVE    Collection Time: 12/08/19 12:28 PM   Result Value Ref Range    Sodium 137 136 - 145 mmol/L    Potassium 4.6 3.5 - 5.1 mmol/L    Chloride 108 97 - 108 mmol/L    CO2 23 21 - 32 mmol/L    Anion gap 6 5 - 15 mmol/L    Glucose 101 (H) 65 - 100 mg/dL    BUN 38 (H) 6 - 20 MG/DL    Creatinine 1.72 (H) 0.55 - 1.02 MG/DL    BUN/Creatinine ratio 22 (H) 12 - 20      GFR est AA 34 (L) >60 ml/min/1.73m2    GFR est non-AA 28 (L) >60 ml/min/1.73m2    Calcium 8.9 8.5 - 10.1 MG/DL    Bilirubin, total 0.4 0.2 - 1.0 MG/DL    ALT (SGPT) 20 12 - 78 U/L    AST (SGOT) 18 15 - 37 U/L    Alk.  phosphatase 65 45 - 117 U/L Protein, total 8.0 6.4 - 8.2 g/dL    Albumin 3.0 (L) 3.5 - 5.0 g/dL    Globulin 5.0 (H) 2.0 - 4.0 g/dL    A-G Ratio 0.6 (L) 1.1 - 2.2     TROPONIN I    Collection Time: 12/08/19 12:28 PM   Result Value Ref Range    Troponin-I, Qt. <0.05 <0.05 ng/mL   POC LACTIC ACID    Collection Time: 12/08/19 12:50 PM   Result Value Ref Range    Lactic Acid (POC) 1.62 0.40 - 2.00 mmol/L   URINALYSIS W/ REFLEX CULTURE    Collection Time: 12/08/19  3:48 PM   Result Value Ref Range    Color YELLOW/STRAW      Appearance CLEAR CLEAR      Specific gravity 1.014 1.003 - 1.030      pH (UA) 5.0 5.0 - 8.0      Protein NEGATIVE  NEG mg/dL    Glucose NEGATIVE  NEG mg/dL    Ketone NEGATIVE  NEG mg/dL    Bilirubin NEGATIVE  NEG      Blood NEGATIVE  NEG      Urobilinogen 0.2 0.2 - 1.0 EU/dL    Nitrites NEGATIVE  NEG      Leukocyte Esterase TRACE (A) NEG      WBC 5-10 0 - 4 /hpf    RBC 0-5 0 - 5 /hpf    Epithelial cells FEW FEW /lpf    Bacteria NEGATIVE  NEG /hpf    UA:UC IF INDICATED URINE CULTURE ORDERED (A) CNI      Hyaline cast 2-5 0 - 5 /lpf   D DIMER    Collection Time: 12/08/19  3:55 PM   Result Value Ref Range    D-dimer 0.88 (H) 0.00 - 0.65 mg/L FEU       Radiologic Studies -   XR CHEST PA LAT   Final Result   IMPRESSION: No acute process           CT Results  (Last 48 hours)    None        CXR Results  (Last 48 hours)               12/08/19 1304  XR CHEST PA LAT Final result    Impression:  IMPRESSION: No acute process           Narrative:  INDICATION:  hypotension        COMPARISON: December 2       FINDINGS: PA and lateral views of the chest demonstrate a stable   cardiomediastinal silhouette and clear lungs bilaterally. The visualized osseous   structures are unremarkable.                    Medical Decision Making     Differential Diagnosis: Sepsis, dehydration, heart failure, electrolyte derangement, anemia    I reviewed the vital signs, available nursing notes, past medical history, past surgical history, family history and social history and old medical records. On my interpretation, Laboratory workup is significant for slight anemia with hemoglobin of 10.4, electrolytes with a creatinine of 1.72 elevated from the patient's baseline, otherwise unremarkable LFTs and electrolytes, troponin is negative, lactate 1.6, urinalysis without evidence of infection. D-dimer is 0.88  On my interpretation of the radiology studies chest x-ray shows no evidence of pneumonia  On my interpretation of the EKG atrial fibrillation a rate of 63, QTC is 450, no ST elevation    Management/ED course: Patient presents today with hypotension. She is asymptomatic, and I cannot find a clear reason for her hypotension. Patient has an unremarkable laboratory work-up with slight anemia. She does have an elevated d-dimer, but age-adjusted is within normal limits. The patient has a slight elevation in her creatinine, and I suspect that dehydration may be the cause of her hypotension. Treated with 1 L of IV fluids, this does not improve her blood significantly. She has had fluctuations, but has remained with systolics in the 63G to 32Y. Will obtain additional IV access, and continue fluid resuscitation. Ultimately patient is admitted for further evaluation. Dispo: Discharged. The patient has been re-evaluated and is ready for discharge. Reviewed available results with patient. Counseled patient on diagnosis and care plan. Patient has expressed understanding, and all questions have been answered. Patient agrees with plan and agrees to follow up as recommended, or to return to the ED if their symptoms worsen. Discharge instructions have been provided and explained to the patient, along with reasons to return to the ED. PLAN:  Current Discharge Medication List        2. Follow-up Information    None       3. Return to ED if worse     Diagnosis     Clinical Impression:   1. Dehydration    2. Hypotension due to hypovolemia    3.  VY (acute kidney injury) [facility])        Attestations:    Claudene Drone, MD

## 2019-12-08 NOTE — ED TRIAGE NOTES
Pt complains of fatigue and low blood pressure. Pt reports being admitted to the hospital last week for similar symptoms. EMS reports an SBP of 80.

## 2019-12-08 NOTE — H&P
Hospitalist Admission Note    NAME: Kari Sauceda   :  1930   MRN:  [id number]     Date/Time:  2019 4:59 PM    Patient PCP: Roosevelt Hong MD  ______________________________________________________________________  Given the patient's current clinical presentation, I have a high level of concern for decompensation if discharged from the emergency department. Complex decision making was performed, which includes reviewing the patient's available past medical records, laboratory results, and x-ray films. My assessment of this patient's clinical condition and my plan of care is as follows. Assessment / Plan:  Hypotension  -History, exam, and labs suggest hypovolemia as etiology. Will continue IVF at slow rate given patient's size and CHF history  -Hold lisinopril and Lasix; will continue atenolol given atrial fibrillation  -No overt findings to suggest adrenal insufficiency, but will check AM cortisol level  -Hemoglobin stable to improved compared to last check, doubt bleeding    Elevated creatinine, approaching VY  -Hold furosemide  -IVF overnight  -Repeat labs in AM    Chronic atrial fibrillation  -Continue Pradaxa and atenolol    Chronic systolic heart failure, EF 41-45%  -Hold Lasix  -Hypovolemic on exam    Essential hypertension  -Hold lisinopril and furosemide due to  Hypotension    Thyroid disease  -Continue levothyroxine    Code Status: DNR    DVT Prophylaxis: on Pradaxa  GI Prophylaxis: not indicated      Subjective:   CHIEF COMPLAINT: Low blood pressure    HISTORY OF PRESENT ILLNESS:     Olga Lidia Mendieta is a 80 y.o.  female who presents with hypotension. Her family report that they have been watching the patient's weight daily and measuring her blood pressure several times a day ever since the patient's hospital discharge on 2019.  The family note that the patient has lost about 3 lbs since discharge and that her blood pressure has been slowly decreasing in the time since hospital discharge, with blood pressures as low as the 48V systolic in the last 24  Hours. The patient denies any symptoms and says that she feels \"fine,\" though she admits that she will feel lightheaded if she \"gets up too fast.\" The patient has been using Lasix 40 mg daily, atenolol 25 mg daily, and lisinopril 10 mg daily as prescribed since hospital discharge. Family note that the patient drinks what sounds to be less than a liter of fluid per day and patient admits \"I think I dehydrated myself. \" No chest pain or pressure, no nausea, vomiting, or diarrhea, no bleeding. In the ED, patient's blood pressure was as low as 77/37 but has improved after 1500 mL of normal saline. We were asked to admit for work up and evaluation of the above problems. Past Medical History:   Diagnosis Date    Arrhythmia     atrial fibrillation, chronic. Stress test 2011 essent.  normal.  Mild-mod AR on ECHO    Atrial fibrillation ([address]     Atrial flutter with rapid ventricular response ([address] 11/21/2019    Chronic atrial fibrillation 11/21/2019    GI bleeding     Hypertension     Refusal of blood transfusions as patient is [place]     Thyroid disease     hypothyroid      Past Surgical History:   Procedure Laterality Date    COLONOSCOPY N/A 5/2/2017    COLONOSCOPY performed by Yany Jiménez MD at [facility] ENDOSCOPY    ECHO TRANSESOPHAGEAL (DENISE) W OR WO CONTRAST  4/27/2011         HX GYN      vaginal deliveries x10    HX TUBAL LIGATION      MS CARDIOVERSION ELECTIVE ARRHYTHMIA EXTERNAL  6/14/2011            Social History     Tobacco Use    Smoking status: Never Smoker    Smokeless tobacco: Never Used   Substance Use Topics    Alcohol use: No        Family History   Problem Relation Age of Onset    Heart Disease Father     Cancer Son     Heart Disease Son      Allergies   Allergen Reactions    Seroquel [Quetiapine] Other (comments)     PSYCOLOGICAL REACTION Pt didn't feel like them selves but felt like a different person, iT DEEPLY DISTURBED PT.  PT BECAME IRRITABLE.  Ambien [Zolpidem] Other (comments)     Couldn't move        Prior to Admission medications    Medication Sig Start Date End Date Taking? Authorizing Provider   dabigatran etexilate (PRADAXA) 75 mg capsule TAKE ONE CAPSULE BY MOUTH EVERY 12 HOURS 12/4/19   Elisha Zapata MD   atenolol (TENORMIN) 50 mg tablet Take 0.5 Tabs by mouth daily. 12/4/19   Elisha Zapata MD   potassium chloride (K-DUR, KLOR-CON) 20 mEq tablet TAKE 1 TABLET BY MOUTH once daily 12/4/19   Elisha Zapata MD   pantoprazole (PROTONIX) 40 mg tablet Take 1 Tab by mouth daily. To keep stomach from bleeding. 12/3/19   Elisha Zapata MD   dabigatran etexilate (PRADAXA) 75 mg capsule TAKE ONE CAPSULE BY MOUTH EVERY 12 HOURS 12/2/19   Elisha Zapata MD   furosemide (LASIX) 40 mg tablet One tab po daily for heart 11/26/19   Elisha Zapata MD   albuterol (PROVENTIL HFA, VENTOLIN HFA, PROAIR HFA) 90 mcg/actuation inhaler Take 2 Puffs by inhalation every four (4) hours as needed for Wheezing or Shortness of Breath. 11/26/19   Elisha Zapata MD   lisinopril (PRINIVIL, ZESTRIL) 10 mg tablet Take 1 Tab by mouth daily. For heart 11/25/19   Elisha Zapata MD   levothyroxine (SYNTHROID) 100 mcg tablet TAKE ONE TABLET DAILY FOR THYROID 11/8/19   Elisha Zapata MD   traZODone (DESYREL) 150 mg tablet TAKE 1 TABLET BY MOUTH NIGHTLY FOR SLEEP. 11/8/19   Elisha Zapata MD   fluticasone propionate (FLONASE) 50 mcg/actuation nasal spray 2 Sprays by Both Nostrils route daily. 5/3/19   Provider, Historical   diphenhydrAMINE (BENADRYL) 25 mg capsule Take 25 mg by mouth every six (6) hours as needed. Provider, Historical   acetaminophen (TYLENOL ARTHRITIS PAIN) 650 mg TbER Take 650 mg by mouth every eight (8) hours. Provider, Historical   CALCIUM 600 + D tablet TAKE 1 TABLET TWICE A DAY.  (CALCIUM SUPPLEMENT) 10/2/17   Elisha Zapata MD REVIEW OF SYSTEMS:     I am not able to complete the review of systems because: The patient is intubated and sedated    The patient has altered mental status due to his acute medical problems    The patient has baseline aphasia from prior stroke(s)    The patient has baseline dementia and is not reliable historian    The patient is in acute medical distress and unable to provide information           Total of 12 systems reviewed as follows:       POSITIVE= underlined text  Negative = text not underlined  General:  fever, chills, sweats, generalized weakness, weight loss/gain,      loss of appetite   Eyes:    blurred vision, eye pain, loss of vision, double vision  ENT:    rhinorrhea, pharyngitis   Respiratory:   cough, sputum production, SOB, HILTON, wheezing, pleuritic pain   Cardiology:   chest pain, palpitations, orthopnea, PND, edema, syncope   Gastrointestinal:  abdominal pain , N/V, diarrhea, dysphagia, constipation, bleeding   Genitourinary:  frequency, urgency, dysuria, hematuria, incontinence   Muskuloskeletal :  arthralgia, myalgia, back pain  Hematology:  easy bruising, nose or gum bleeding, lymphadenopathy   Dermatological: rash, ulceration, pruritis, color change / jaundice  Endocrine:   hot flashes or polydipsia   Neurological:  headache, dizziness (with standing up and walking quickly), confusion, focal weakness, paresthesia, Speech difficulties, memory loss, gait difficulty  Psychological: Feelings of anxiety, depression, agitation    Objective:   VITALS:    Visit Vitals  BP 98/58 (BP 1 Location: Left arm)   Pulse 66   Temp 98.1 °F (36.7 °C)   Resp 18   Ht 5' 2\" (1.575 m)   Wt 58.7 kg (129 lb 6 oz)   SpO2 100%   BMI 23.66 kg/m²       PHYSICAL EXAM:    General:    Alert, cooperative, no distress, appears stated age.      HEENT: Atraumatic, anicteric sclerae, pink conjunctivae     No oral ulcers, mucosa dry, throat clear, dentition fair  Neck:  Supple, symmetrical,  thyroid: non tender  Lungs: Squeaking sounds at bilateral bases, otherwise clear to auscultation bilaterally. Chest wall:  No tenderness  No Accessory muscle use. Heart:   Regular  rhythm,  No  murmur   No edema  Abdomen:   Soft, non-tender. Not distended. Bowel sounds normal  Extremities: No cyanosis. No clubbing,      +skin tenting, Capillary refill normal, Radial distal pulse 2+  Skin:     Not pale. Not Jaundiced  No rashes   Psych:  Good insight. Not depressed. Not anxious or agitated. Neurologic: EOMs intact. No facial asymmetry. No aphasia or slurred speech. Symmetrical strength, Sensation grossly intact. Alert and oriented X 4.     _______________________________________________________________________  Care Plan discussed with:    Comments   Patient x    Family  x    RN     Care Manager                    Consultant:      _______________________________________________________________________  Expected  Disposition:   Home with Family x   HH/PT/OT/RN    SNF/LTC    [facility]    ________________________________________________________________________  TOTAL TIME:  39 Minutes    Critical Care Provided     Minutes non procedure based      Comments    x Reviewed previous records   >50% of visit spent in counseling and coordination of care x Discussion with patient and/or family and questions answered       ________________________________________________________________________  Signed: Sergio Irby MD    Procedures: see electronic medical records for all procedures/Xrays and details which were not copied into this note but were reviewed prior to creation of Plan.     LAB DATA REVIEWED:    Recent Results (from the past 24 hour(s))   EKG, 12 LEAD, INITIAL    Collection Time: 12/08/19 11:54 AM   Result Value Ref Range    Ventricular Rate 63 BPM    Atrial Rate 84 BPM    QRS Duration 118 ms    Q-T Interval 440 ms    QTC Calculation (Bezet) 450 ms    Calculated R Axis -42 degrees    Calculated T Axis -2 degrees    Diagnosis       Atrial fibrillation  Left axis deviation  Left ventricular hypertrophy with QRS widening  Nonspecific ST abnormality  When compared with ECG of 20-NOV-2019 09:16,  Vent. rate has decreased BY  45 BPM  ST now depressed in Inferior leads  ST no longer depressed in Lateral leads  T wave inversion now evident in Inferior leads  T wave inversion no longer evident in Lateral leads     CBC WITH AUTOMATED DIFF    Collection Time: 12/08/19 12:28 PM   Result Value Ref Range    WBC 9.0 3.6 - 11.0 K/uL    RBC 4.13 3.80 - 5.20 M/uL    HGB 10.4 (L) 11.5 - 16.0 g/dL    HCT 34.9 (L) 35.0 - 47.0 %    MCV 84.5 80.0 - 99.0 FL    MCH 25.2 (L) 26.0 - 34.0 PG    MCHC 29.8 (L) 30.0 - 36.5 g/dL    RDW 19.4 (H) 11.5 - 14.5 %    PLATELET 644 827 - 182 K/uL    MPV 11.4 8.9 - 12.9 FL    NRBC 0.0 0  WBC    ABSOLUTE NRBC 0.00 0.00 - 0.01 K/uL    NEUTROPHILS 66 32 - 75 %    LYMPHOCYTES 22 12 - 49 %    MONOCYTES 10 5 - 13 %    EOSINOPHILS 2 0 - 7 %    BASOPHILS 0 0 - 1 %    IMMATURE GRANULOCYTES 0 0.0 - 0.5 %    ABS. NEUTROPHILS 5.9 1.8 - 8.0 K/UL    ABS. LYMPHOCYTES 2.0 0.8 - 3.5 K/UL    ABS. MONOCYTES 0.9 0.0 - 1.0 K/UL    ABS. EOSINOPHILS 0.2 0.0 - 0.4 K/UL    ABS. BASOPHILS 0.0 0.0 - 0.1 K/UL    ABS. IMM. GRANS. 0.0 0.00 - 0.04 K/UL    DF AUTOMATED     METABOLIC PANEL, COMPREHENSIVE    Collection Time: 12/08/19 12:28 PM   Result Value Ref Range    Sodium 137 136 - 145 mmol/L    Potassium 4.6 3.5 - 5.1 mmol/L    Chloride 108 97 - 108 mmol/L    CO2 23 21 - 32 mmol/L    Anion gap 6 5 - 15 mmol/L    Glucose 101 (H) 65 - 100 mg/dL    BUN 38 (H) 6 - 20 MG/DL    Creatinine 1.72 (H) 0.55 - 1.02 MG/DL    BUN/Creatinine ratio 22 (H) 12 - 20      GFR est AA 34 (L) >60 ml/min/1.73m2    GFR est non-AA 28 (L) >60 ml/min/1.73m2    Calcium 8.9 8.5 - 10.1 MG/DL    Bilirubin, total 0.4 0.2 - 1.0 MG/DL    ALT (SGPT) 20 12 - 78 U/L    AST (SGOT) 18 15 - 37 U/L    Alk.  phosphatase 65 45 - 117 U/L    Protein, total 8.0 6.4 - 8.2 g/dL    Albumin 3.0 (L) 3.5 - 5.0 g/dL    Globulin 5.0 (H) 2.0 - 4.0 g/dL    A-G Ratio 0.6 (L) 1.1 - 2.2     TROPONIN I    Collection Time: 12/08/19 12:28 PM   Result Value Ref Range    Troponin-I, Qt. <0.05 <0.05 ng/mL   POC LACTIC ACID    Collection Time: 12/08/19 12:50 PM   Result Value Ref Range    Lactic Acid (POC) 1.62 0.40 - 2.00 mmol/L   URINALYSIS W/ REFLEX CULTURE    Collection Time: 12/08/19  3:48 PM   Result Value Ref Range    Color YELLOW/STRAW      Appearance CLEAR CLEAR      Specific gravity 1.014 1.003 - 1.030      pH (UA) 5.0 5.0 - 8.0      Protein NEGATIVE  NEG mg/dL    Glucose NEGATIVE  NEG mg/dL    Ketone NEGATIVE  NEG mg/dL    Bilirubin NEGATIVE  NEG      Blood NEGATIVE  NEG      Urobilinogen 0.2 0.2 - 1.0 EU/dL    Nitrites NEGATIVE  NEG      Leukocyte Esterase TRACE (A) NEG      WBC 5-10 0 - 4 /hpf    RBC 0-5 0 - 5 /hpf    Epithelial cells FEW FEW /lpf    Bacteria NEGATIVE  NEG /hpf    UA:UC IF INDICATED URINE CULTURE ORDERED (A) CNI      Hyaline cast 2-5 0 - 5 /lpf   D DIMER    Collection Time: 12/08/19  3:55 PM   Result Value Ref Range    D-dimer 0.88 (H) 0.00 - 0.65 mg/L FEU

## 2019-12-08 NOTE — ED NOTES
TRANSFER - OUT REPORT:    Verbal report given to Rosaura RN on [facility]  being transferred to Oncology(unit) for routine progression of care       Report consisted of patients Situation, Background, Assessment and   Recommendations(SBAR). Information from the following report(s) SBAR, Kardex, ED Summary and MAR was reviewed with the receiving nurse. Lines:   Peripheral IV 12/08/19 Left Antecubital (Active)   Site Assessment Clean, dry, & intact 12/8/2019 11:58 AM   Phlebitis Assessment 0 12/8/2019 11:58 AM   Infiltration Assessment 0 12/8/2019 11:58 AM   Dressing Status Clean, dry, & intact 12/8/2019 11:58 AM   Dressing Type Transparent 12/8/2019 11:58 AM   Hub Color/Line Status Pink 12/8/2019 11:58 AM   Action Taken Blood drawn 12/8/2019 11:58 AM        Opportunity for questions and clarification was provided.       Patient transported with:   Registered Nurse

## 2019-12-09 LAB
ANION GAP SERPL CALC-SCNC: 7 MMOL/L (ref 5–15)
ATRIAL RATE: 84 BPM
BUN SERPL-MCNC: 28 MG/DL (ref 6–20)
BUN/CREAT SERPL: 23 (ref 12–20)
CALCIUM SERPL-MCNC: 7.7 MG/DL (ref 8.5–10.1)
CALCULATED R AXIS, ECG10: -42 DEGREES
CALCULATED T AXIS, ECG11: -2 DEGREES
CHLORIDE SERPL-SCNC: 116 MMOL/L (ref 97–108)
CO2 SERPL-SCNC: 19 MMOL/L (ref 21–32)
CREAT SERPL-MCNC: 1.23 MG/DL (ref 0.55–1.02)
DIAGNOSIS, 93000: NORMAL
GLUCOSE SERPL-MCNC: 93 MG/DL (ref 65–100)
POTASSIUM SERPL-SCNC: 3.8 MMOL/L (ref 3.5–5.1)
Q-T INTERVAL, ECG07: 440 MS
QRS DURATION, ECG06: 118 MS
QTC CALCULATION (BEZET), ECG08: 450 MS
SODIUM SERPL-SCNC: 142 MMOL/L (ref 136–145)
VENTRICULAR RATE, ECG03: 63 BPM

## 2019-12-09 PROCEDURE — 74011250637 HC RX REV CODE- 250/637: Performed by: INTERNAL MEDICINE

## 2019-12-09 PROCEDURE — 74011250636 HC RX REV CODE- 250/636: Performed by: INTERNAL MEDICINE

## 2019-12-09 PROCEDURE — 80048 BASIC METABOLIC PNL TOTAL CA: CPT

## 2019-12-09 PROCEDURE — 36415 COLL VENOUS BLD VENIPUNCTURE: CPT

## 2019-12-09 PROCEDURE — 94760 N-INVAS EAR/PLS OXIMETRY 1: CPT

## 2019-12-09 PROCEDURE — 99218 HC RM OBSERVATION: CPT

## 2019-12-09 RX ADMIN — Medication 10 ML: at 16:15

## 2019-12-09 RX ADMIN — PANTOPRAZOLE SODIUM 40 MG: 40 TABLET, DELAYED RELEASE ORAL at 07:01

## 2019-12-09 RX ADMIN — CALCIUM CARBONATE 500 MG (1,250 MG)-VITAMIN D3 200 UNIT TABLET 1 TABLET: at 21:08

## 2019-12-09 RX ADMIN — SODIUM CHLORIDE 50 ML/HR: 900 INJECTION, SOLUTION INTRAVENOUS at 16:18

## 2019-12-09 RX ADMIN — Medication 5 ML: at 21:08

## 2019-12-09 RX ADMIN — CALCIUM CARBONATE 500 MG (1,250 MG)-VITAMIN D3 200 UNIT TABLET 1 TABLET: at 08:14

## 2019-12-09 RX ADMIN — TRAZODONE HYDROCHLORIDE 150 MG: 50 TABLET ORAL at 21:08

## 2019-12-09 RX ADMIN — FLUTICASONE PROPIONATE 2 SPRAY: 50 SPRAY, METERED NASAL at 10:22

## 2019-12-09 RX ADMIN — POTASSIUM CHLORIDE 20 MEQ: 20 TABLET, EXTENDED RELEASE ORAL at 08:14

## 2019-12-09 RX ADMIN — ACETAMINOPHEN 650 MG: 325 TABLET ORAL at 21:22

## 2019-12-09 RX ADMIN — DABIGATRAN ETEXILATE MESYLATE 75 MG: 75 CAPSULE ORAL at 17:41

## 2019-12-09 RX ADMIN — DABIGATRAN ETEXILATE MESYLATE 75 MG: 75 CAPSULE ORAL at 08:23

## 2019-12-09 RX ADMIN — LEVOTHYROXINE SODIUM 100 MCG: 100 TABLET ORAL at 07:01

## 2019-12-09 RX ADMIN — ATENOLOL 25 MG: 25 TABLET ORAL at 08:14

## 2019-12-09 NOTE — PROGRESS NOTES
General Daily Progress Note    Admit Date: 12/8/2019  Hospital day 2    Subjective:     Patient has no complaint of shortness of breath. Still feels somewhat lightheaded and bp still on low side. Recently admitted for chf. .EF 41-45%, global hypokinesis. Medication side effects: hypotension    Current Facility-Administered Medications   Medication Dose Route Frequency    sodium chloride (NS) flush 5-40 mL  5-40 mL IntraVENous Q8H    sodium chloride (NS) flush 5-40 mL  5-40 mL IntraVENous PRN    0.9% sodium chloride infusion  50 mL/hr IntraVENous CONTINUOUS    naloxone (NARCAN) injection 0.4 mg  0.4 mg IntraVENous PRN    HYDROcodone-acetaminophen (NORCO) 5-325 mg per tablet 1 Tab  1 Tab Oral Q4H PRN    ondansetron (ZOFRAN) injection 4 mg  4 mg IntraVENous Q4H PRN    bisacodyl (DULCOLAX) tablet 5 mg  5 mg Oral DAILY PRN    acetaminophen (TYLENOL) tablet 650 mg  650 mg Oral Q8H PRN    albuterol (PROVENTIL HFA, VENTOLIN HFA, PROAIR HFA) inhaler 2 Puff  2 Puff Inhalation Q4H PRN    atenolol (TENORMIN) tablet 25 mg  25 mg Oral DAILY    calcium-vitamin D (OS-NATALIIA) 500 mg-200 unit tablet  1 Tab Oral BID    dabigatran etexilate (PRADAXA) capsule 75 mg  75 mg Oral BID    diphenhydrAMINE (BENADRYL) capsule 25 mg  25 mg Oral Q6H PRN    fluticasone propionate (FLONASE) 50 mcg/actuation nasal spray 2 Spray  2 Spray Both Nostrils DAILY    levothyroxine (SYNTHROID) tablet 100 mcg  100 mcg Oral ACB    pantoprazole (PROTONIX) tablet 40 mg  40 mg Oral ACB    potassium chloride (K-DUR, KLOR-CON) SR tablet 20 mEq  20 mEq Oral DAILY    traZODone (DESYREL) tablet 150 mg  150 mg Oral QHS    influenza vaccine 2019-20 (6 mos+)(PF) (FLUARIX/FLULAVAL/FLUZONE QUAD) injection 0.5 mL  0.5 mL IntraMUSCular PRIOR TO DISCHARGE        Review of Systems  Pertinent items are noted in HPI.     Objective:     Patient Vitals for the past 8 hrs:   BP Temp Pulse Resp SpO2   12/09/19 0745 99/53 98.3 °F (36.8 °C) 77 16 93 %   12/09/19 1940 105/62 98 °F (36.7 °C) 75 16 93 %     No intake/output data recorded. 12/07 1901 - 12/09 0700  In: 2504.2 [P.O.:400; I.V.:2104.2]  Out: -     Physical Exam:   Visit Vitals  BP 99/53 (BP 1 Location: Left arm, BP Patient Position: At rest)   Pulse 77   Temp 98.3 °F (36.8 °C)   Resp 16   Ht 5' 2\" (1.575 m)   Wt 129 lb 6 oz (58.7 kg)   SpO2 93%   BMI 23.66 kg/m²     Lungs: clear to auscultation bilaterally  Heart: regular rate and rhythm, S1, S2 normal, no murmur, click, rub or gallop  Abdomen: soft, non-tender. Bowel sounds normal. No masses,  no organomegaly  Extremities: extremities normal, atraumatic, no cyanosis or edema      ECG: normal sinus rhythm     Data Review   Recent Results (from the past 24 hour(s))   EKG, 12 LEAD, INITIAL    Collection Time: 12/08/19 11:54 AM   Result Value Ref Range    Ventricular Rate 63 BPM    Atrial Rate 84 BPM    QRS Duration 118 ms    Q-T Interval 440 ms    QTC Calculation (Bezet) 450 ms    Calculated R Axis -42 degrees    Calculated T Axis -2 degrees    Diagnosis       Atrial fibrillation  Left axis deviation  Left ventricular hypertrophy with QRS widening  Nonspecific ST abnormality  When compared with ECG of 20-NOV-2019 09:16,  Vent.  rate has decreased BY  45 BPM  ST now depressed in Inferior leads  ST no longer depressed in Lateral leads  T wave inversion now evident in Inferior leads  T wave inversion no longer evident in Lateral leads     CBC WITH AUTOMATED DIFF    Collection Time: 12/08/19 12:28 PM   Result Value Ref Range    WBC 9.0 3.6 - 11.0 K/uL    RBC 4.13 3.80 - 5.20 M/uL    HGB 10.4 (L) 11.5 - 16.0 g/dL    HCT 34.9 (L) 35.0 - 47.0 %    MCV 84.5 80.0 - 99.0 FL    MCH 25.2 (L) 26.0 - 34.0 PG    MCHC 29.8 (L) 30.0 - 36.5 g/dL    RDW 19.4 (H) 11.5 - 14.5 %    PLATELET 492 873 - 999 K/uL    MPV 11.4 8.9 - 12.9 FL    NRBC 0.0 0  WBC    ABSOLUTE NRBC 0.00 0.00 - 0.01 K/uL    NEUTROPHILS 66 32 - 75 %    LYMPHOCYTES 22 12 - 49 %    MONOCYTES 10 5 - 13 %    EOSINOPHILS 2 0 - 7 %    BASOPHILS 0 0 - 1 %    IMMATURE GRANULOCYTES 0 0.0 - 0.5 %    ABS. NEUTROPHILS 5.9 1.8 - 8.0 K/UL    ABS. LYMPHOCYTES 2.0 0.8 - 3.5 K/UL    ABS. MONOCYTES 0.9 0.0 - 1.0 K/UL    ABS. EOSINOPHILS 0.2 0.0 - 0.4 K/UL    ABS. BASOPHILS 0.0 0.0 - 0.1 K/UL    ABS. IMM. GRANS. 0.0 0.00 - 0.04 K/UL    DF AUTOMATED     METABOLIC PANEL, COMPREHENSIVE    Collection Time: 12/08/19 12:28 PM   Result Value Ref Range    Sodium 137 136 - 145 mmol/L    Potassium 4.6 3.5 - 5.1 mmol/L    Chloride 108 97 - 108 mmol/L    CO2 23 21 - 32 mmol/L    Anion gap 6 5 - 15 mmol/L    Glucose 101 (H) 65 - 100 mg/dL    BUN 38 (H) 6 - 20 MG/DL    Creatinine 1.72 (H) 0.55 - 1.02 MG/DL    BUN/Creatinine ratio 22 (H) 12 - 20      GFR est AA 34 (L) >60 ml/min/1.73m2    GFR est non-AA 28 (L) >60 ml/min/1.73m2    Calcium 8.9 8.5 - 10.1 MG/DL    Bilirubin, total 0.4 0.2 - 1.0 MG/DL    ALT (SGPT) 20 12 - 78 U/L    AST (SGOT) 18 15 - 37 U/L    Alk.  phosphatase 65 45 - 117 U/L    Protein, total 8.0 6.4 - 8.2 g/dL    Albumin 3.0 (L) 3.5 - 5.0 g/dL    Globulin 5.0 (H) 2.0 - 4.0 g/dL    A-G Ratio 0.6 (L) 1.1 - 2.2     TROPONIN I    Collection Time: 12/08/19 12:28 PM   Result Value Ref Range    Troponin-I, Qt. <0.05 <0.05 ng/mL   POC LACTIC ACID    Collection Time: 12/08/19 12:50 PM   Result Value Ref Range    Lactic Acid (POC) 1.62 0.40 - 2.00 mmol/L   URINALYSIS W/ REFLEX CULTURE    Collection Time: 12/08/19  3:48 PM   Result Value Ref Range    Color YELLOW/STRAW      Appearance CLEAR CLEAR      Specific gravity 1.014 1.003 - 1.030      pH (UA) 5.0 5.0 - 8.0      Protein NEGATIVE  NEG mg/dL    Glucose NEGATIVE  NEG mg/dL    Ketone NEGATIVE  NEG mg/dL    Bilirubin NEGATIVE  NEG      Blood NEGATIVE  NEG      Urobilinogen 0.2 0.2 - 1.0 EU/dL    Nitrites NEGATIVE  NEG      Leukocyte Esterase TRACE (A) NEG      WBC 5-10 0 - 4 /hpf    RBC 0-5 0 - 5 /hpf    Epithelial cells FEW FEW /lpf    Bacteria NEGATIVE  NEG /hpf    UA:UC IF INDICATED URINE CULTURE ORDERED (A) CNI      Hyaline cast 2-5 0 - 5 /lpf   D DIMER    Collection Time: 12/08/19  3:55 PM   Result Value Ref Range    D-dimer 0.88 (H) 0.00 - 0.65 mg/L FEU   METABOLIC PANEL, BASIC    Collection Time: 12/09/19  3:52 AM   Result Value Ref Range    Sodium 142 136 - 145 mmol/L    Potassium 3.8 3.5 - 5.1 mmol/L    Chloride 116 (H) 97 - 108 mmol/L    CO2 19 (L) 21 - 32 mmol/L    Anion gap 7 5 - 15 mmol/L    Glucose 93 65 - 100 mg/dL    BUN 28 (H) 6 - 20 MG/DL    Creatinine 1.23 (H) 0.55 - 1.02 MG/DL    BUN/Creatinine ratio 23 (H) 12 - 20      GFR est AA 50 (L) >60 ml/min/1.73m2    GFR est non-AA 41 (L) >60 ml/min/1.73m2    Calcium 7.7 (L) 8.5 - 10.1 MG/DL           Assessment:     Active Problems:    Dehydration (12/8/2019)      Hypotension (12/8/2019)        Plan:     1. Hypotension secondary to meds. Atenolol dose has been halved, lisinopril and lasix on hold. Receiving gentle iv fluids  2. Recent admit for CHF with EF 41-45%- treatment will be liminted by hypotension.

## 2019-12-09 NOTE — PROGRESS NOTES
Oncology End of Shift Note      Bedside shift change report given to TINO shetty (incoming nurse) by Devang Baird (outgoing nurse) on [facility]. Report included the following information SBAR, Kardex, ED Summary, Intake/Output, MAR and Recent Results. Shift Summary:   Patient admitted at end of shift. No complaints of pain. Fluids running. Family at bedside. Issues for Physician to Address:  none     Patient on Cardiac Monitoring?     [x] Yes  [x] No    Rhythm:          Shift Events        Abby Wayne

## 2019-12-09 NOTE — PROGRESS NOTES
MICHELLE:   1) Home with HH and f.u appts     Reason for Readmission:  Hypotension and dehydration          RRAT Score and Risk Level: 23 HIGH      Level of Readmission: Level 1       Care Conference scheduled:  CM requesting Palliative Consult to assist with goals of care and updated AMD.     Did you attend your follow up appointment (s): If not, why not:  Pt states she did attend her PCP follow up but did not attend her specialist appt as she was here at the hospital.     Resources/supports as identified by patient/family: Pt states she has a very strong family support system, lives with her two daughters. Top Challenges facing patient (as identified by patient/family and CM): Finances/Medication cost?  No problems affording or accessing meidcations   Transportation - Pt was driving prior to last hopsitalizaiton but has not driven since discharging   Support system or lack thereof? No problems identified   Living arrangements? Lives with dtrs in a trailer  Self-care/ADLs/Cognition? Pt is alert, oriented and able to care for self        Current Advanced Directive/Advance Care Plan: Pt has AMD on file but states she did not really know what the purpose of it was when completing it. Plan for utilizing home health: Per recommendation pending pt's progression during hospitalization stay              Transition of Care Plan:    Based on readmission, the patient's previous Plan of Care   has been evaluated and/or modified. The current Transition of Care Plan is:    Pt is a 80year old,  female,brought in under observation status for hypotension and dehydration. Pt was alert and oriented when meeting with dtr and CM. Pt states no demographics changed since last admission on 11/25/19. Previous admission pt declined to have [facility] services. CM inquired about this again this admission, pt agreeable. CM addressed AMD as she has people listed that are not emergency contacts.  CM explained the purpose of AMD, pt did not quite understand and states she still has 11 years to lives. Family was upset pt has Yazidism members listed to make decisions. CM contacting Dr. Abrahan Underwood regarding Palliative Consult to assist with goals of care. CM will continue to follow pt and remain available for support. CM also noted pt's Jainism as one that does not receive blood transfusions, pt stated this was correct. This is not noted on the chart, CM informed RN and also placed FYI to attending. Observation notice provided in writing to patient and/or caregiver as well as verbal explanation of the policy. Patients who are in outpatient status also receive the Observation notice. Care Management Interventions  PCP Verified by CM:  Yes  Transition of Care Consult (CM Consult): Discharge Planning  MyChart Signup: No  Discharge Durable Medical Equipment: No  Physical Therapy Consult: No  Occupational Therapy Consult: No  Speech Therapy Consult: No  Current Support Network: Relative's Home  Confirm Follow Up Transport: Family  Plan discussed with Pt/Family/Caregiver: Yes  Freedom of Choice Offered: Yes  Discharge Location  Discharge Placement: Home with home health     MONICA Keene, [address] [facility]    [phone]

## 2019-12-09 NOTE — PROGRESS NOTES
Problem: Falls - Risk of  Goal: *Absence of Falls  Description  Document Donata Carrel Fall Risk and appropriate interventions in the flowsheet.   Outcome: Progressing Towards Goal

## 2019-12-09 NOTE — PROGRESS NOTES
Observation End of Shift Note      Bedside shift change report given to [facility], RN (incoming nurse) by Lei Mcnally (outgoing nurse) on Dewain Bombay. Report included the following information SBAR, Kardex and MAR. Shift Summary:   Assumed care of patient at 3pm. Report received from [facility]. No complaints of pain, patient assessed and vitals completed. Patient family present at bedside for remainder of shift. Hourly rounding     Issues for Physician to Address:       Patient on Cardiac Monitoring?     [x] Yes  [] No    Rhythm:          Shift Events      Lei Mcnally

## 2019-12-10 ENCOUNTER — HOME HEALTH ADMISSION (OUTPATIENT)
Dept: HOME HEALTH SERVICES | Facility: HOME HEALTH | Age: 84
End: 2019-12-10
Payer: MEDICARE

## 2019-12-10 VITALS
HEIGHT: 62 IN | SYSTOLIC BLOOD PRESSURE: 133 MMHG | WEIGHT: 129.38 LBS | OXYGEN SATURATION: 100 % | BODY MASS INDEX: 23.81 KG/M2 | TEMPERATURE: 98 F | DIASTOLIC BLOOD PRESSURE: 59 MMHG | RESPIRATION RATE: 16 BRPM | HEART RATE: 64 BPM

## 2019-12-10 LAB
BACTERIA SPEC CULT: NORMAL
CC UR VC: NORMAL
SERVICE CMNT-IMP: NORMAL

## 2019-12-10 PROCEDURE — 90686 IIV4 VACC NO PRSV 0.5 ML IM: CPT | Performed by: FAMILY MEDICINE

## 2019-12-10 PROCEDURE — 74011250636 HC RX REV CODE- 250/636: Performed by: FAMILY MEDICINE

## 2019-12-10 PROCEDURE — 74011250637 HC RX REV CODE- 250/637: Performed by: INTERNAL MEDICINE

## 2019-12-10 PROCEDURE — 99218 HC RM OBSERVATION: CPT

## 2019-12-10 PROCEDURE — 90471 IMMUNIZATION ADMIN: CPT

## 2019-12-10 RX ORDER — LISINOPRIL 5 MG/1
5 TABLET ORAL DAILY
Qty: 90 TAB | Refills: 3 | Status: SHIPPED | OUTPATIENT
Start: 2019-12-10 | End: 2021-01-02

## 2019-12-10 RX ORDER — FUROSEMIDE 20 MG/1
TABLET ORAL
Qty: 90 TAB | Refills: 3 | Status: SHIPPED | OUTPATIENT
Start: 2019-12-10 | End: 2020-05-27 | Stop reason: ALTCHOICE

## 2019-12-10 RX ADMIN — FLUTICASONE PROPIONATE 2 SPRAY: 50 SPRAY, METERED NASAL at 08:30

## 2019-12-10 RX ADMIN — POTASSIUM CHLORIDE 20 MEQ: 20 TABLET, EXTENDED RELEASE ORAL at 08:27

## 2019-12-10 RX ADMIN — PANTOPRAZOLE SODIUM 40 MG: 40 TABLET, DELAYED RELEASE ORAL at 08:27

## 2019-12-10 RX ADMIN — DABIGATRAN ETEXILATE MESYLATE 75 MG: 75 CAPSULE ORAL at 08:30

## 2019-12-10 RX ADMIN — Medication 5 ML: at 07:16

## 2019-12-10 RX ADMIN — INFLUENZA VIRUS VACCINE 0.5 ML: 15; 15; 15; 15 SUSPENSION INTRAMUSCULAR at 10:05

## 2019-12-10 RX ADMIN — CALCIUM CARBONATE 500 MG (1,250 MG)-VITAMIN D3 200 UNIT TABLET 1 TABLET: at 08:27

## 2019-12-10 RX ADMIN — ATENOLOL 25 MG: 25 TABLET ORAL at 08:27

## 2019-12-10 RX ADMIN — LEVOTHYROXINE SODIUM 100 MCG: 100 TABLET ORAL at 07:16

## 2019-12-10 NOTE — ROUTINE PROCESS
The following appointments have been successfully scheduled: 
 
Date/time Wednesday, December 18, 2019 09:45 AM 
Patient  Zhou Levy 12/05/1930 (10JS B) [id number] Department [facility] OFFICE Appointment type Transitional Care Provider [facility]

## 2019-12-10 NOTE — PROGRESS NOTES
Pt going home today with belongings. Pt PIV removed and applied dry dressing. Signed copy on bedside chart. Pt has paperwork reviewed and 2 scripts given. Follow up appt has been made. Daughter is to take patient home.

## 2019-12-10 NOTE — DISCHARGE INSTRUCTIONS
PATIENT DISCHARGE INSTRUCTIONS      PATIENT DISCHARGE INSTRUCTIONS    Jeanna Lozano / [id number] : 1930    Admitted 2019 Discharged: 12/10/2019       · It is important that you take the medication exactly as they are prescribed. · Keep your medication in the bottles provided by the pharmacist and keep a list of the medication names, dosages, and times to be taken in your wallet. · Do not take other medications without consulting your doctor. What to do at Home    Recommended Diet: Regular Diet    Recommended Activity: Activity as tolerated    If you experience any of the following symptoms dizziness or shortness of breath, please follow up with Dr. Aaron Velasco.

## 2019-12-10 NOTE — DISCHARGE SUMMARY
Will dictate discharge summary later. Will cut atenolol, lisinopril and lasix doses in half and recheck pt on Thursday.

## 2019-12-12 ENCOUNTER — OFFICE VISIT (OUTPATIENT)
Dept: FAMILY MEDICINE CLINIC | Age: 84
End: 2019-12-12

## 2019-12-12 ENCOUNTER — HOME CARE VISIT (OUTPATIENT)
Dept: SCHEDULING | Facility: HOME HEALTH | Age: 84
End: 2019-12-12
Payer: MEDICARE

## 2019-12-12 VITALS
OXYGEN SATURATION: 94 % | WEIGHT: 133.4 LBS | HEIGHT: 62 IN | SYSTOLIC BLOOD PRESSURE: 128 MMHG | TEMPERATURE: 94.3 F | RESPIRATION RATE: 18 BRPM | DIASTOLIC BLOOD PRESSURE: 60 MMHG | HEART RATE: 85 BPM | BODY MASS INDEX: 24.55 KG/M2

## 2019-12-12 VITALS
BODY MASS INDEX: 24.14 KG/M2 | HEART RATE: 70 BPM | DIASTOLIC BLOOD PRESSURE: 64 MMHG | OXYGEN SATURATION: 94 % | RESPIRATION RATE: 18 BRPM | WEIGHT: 132 LBS | TEMPERATURE: 98.5 F | SYSTOLIC BLOOD PRESSURE: 118 MMHG

## 2019-12-12 DIAGNOSIS — I95.2 HYPOTENSION DUE TO DRUGS: ICD-10-CM

## 2019-12-12 DIAGNOSIS — I50.33 ACUTE ON CHRONIC DIASTOLIC HEART FAILURE (HCC): Primary | ICD-10-CM

## 2019-12-12 PROCEDURE — G0299 HHS/HOSPICE OF RN EA 15 MIN: HCPCS

## 2019-12-12 PROCEDURE — 400013 HH SOC

## 2019-12-12 NOTE — DISCHARGE SUMMARY
[address]    Name:  Elsie Cartagena  MR#:  [id number]  :  1930  ACCOUNT #:  [de-identified]  ADMIT DATE:  2019  DISCHARGE DATE:  12/10/2019    FINAL DIAGNOSES:  1. Hypotension. 2.  Elevated creatinine. 3.  Chronic atrial fibrillation. 4.  Chronic systolic heart failure. DISCHARGE MEDICATIONS:  Furosemide 20 mg daily, lisinopril 5 mg daily, Pradaxa 75 mg b.i.d., atenolol 50 mg half tablet daily, potassium 20 mEq daily, Protonix 40 mg daily, albuterol inhaler p.r.n., Synthroid 100 mcg daily, trazodone 150 mg at bedtime, Flonase nasal spray, Benadryl 25 mg p.r.n., calcium with vitamin D 600 plus one b.i.d. FOLLOWUP:  With me in my office in 2 days. HISTORY OF PRESENT ILLNESS:  The patient is a very pleasant 80-year-old white female who came to the emergency room with hypotension. The family had been watching the patient's weight on a daily basis and measured her blood pressures several times a day since she has been discharged from the hospital on 2019. She had been admitted for heart failure and possible pneumonia. The patient had lost some 3 pounds since discharge and blood pressure has been slowly decreasing as low as the 70 systolic. She denied any symptoms, although she did state that she felt somewhat lightheaded if she got up too fast.  She had been taking Lasix 40 mg daily, atenolol 25 mg, lisinopril 10 mg daily as prescribed since hospital discharge. Denies any chest pain or pressure. No nausea, vomiting or diarrhea. No bleeding. In the emergency room, blood pressure was 77/37, given 1500 mL of normal saline. PAST MEDICAL HISTORY:  Significant for chronic atrial fibrillation, GI bleeding, history of hypertension, refuse of blood transfusions being Tenriism, history of hypothyroidism. PAST SURGICAL HISTORY:  Includes colonoscopy, vaginal delivery, tubal ligation, cardioversion, atrial fibrillation.     SOCIAL HISTORY: Smoking and alcohol is negative. Again the patient has been admitted, recently discharged on 11/25/2019 for heart failure. During that admission had had an echo done showing ejection fraction 41%-45%. Left ventricular global hypokinesis, mild aortic valve stenosis, moderate aortic valve regurgitation, moderate mitral regurgitation. ALLERGIES:  SEROQUEL AND AMBIEN. MEDICATIONS ON ADMISSION:  Pradaxa 75 mg b.i.d., Tylenol 50 mg half tablet daily, potassium chloride 20 mEq daily, Protonix 40 mg daily, Plavix 75 mg b.i.d., furosemide 40 mg daily, albuterol inhaler 2 inhalations every 4 hours p.r.n., lisinopril 10 mg daily, Synthroid 100 mcg daily, trazodone 150 mg at bedtime, Flonase nasal spray, Benadryl 25 mg every 6 hours p.r.n., Tylenol p.r.n. REVIEW OF SYSTEMS:  Please see HPI. PHYSICAL EXAMINATION:  VITAL SIGNS:  Blood pressure 98/58, pulse 66, temperature 98.1, respiratory rate 18, height 5 feet 2 inches, weight 129, O2 sat 100%. GENERAL:  Alert, cooperative, in no distress. NECK:  Supple, symmetrical.  Thyroid nontender. LUNGS:  Clear to auscultation. CARDIOVASCULAR:  Regular rhythm and rate. No murmurs, thrills, rubs, or gallops. No edema. ABDOMEN:  Soft and nontender. NEUROLOGIC EXAM:  Nonfocal.    LABORATORY DATA:  On admission, hemoglobin is 10.4, hematocrit 34.9 just slightly above normal for the patient, platelets 478,951. BUN 28, creatinine 1.23, also on admission the creatinine was 1.72, BUN was 38. She was given IV fluids with following morning creatinine down to 1.23 and BUN was down to 28. DISCHARGE INSTRUCTIONS:  In general, the patient was feeling okay. We had cut her dose of Lasix in half, cut her lisinopril dose in half. She was discharged on home 12/10/2019. She is to follow up in my office in 2 days.       Sarah Link MD [id number]  D:  12/11/2019 18:49  T:  12/12/2019 5:34  JOB #:  [id number]

## 2019-12-13 ENCOUNTER — HOME CARE VISIT (OUTPATIENT)
Dept: SCHEDULING | Facility: HOME HEALTH | Age: 84
End: 2019-12-13
Payer: MEDICARE

## 2019-12-13 PROCEDURE — G0151 HHCP-SERV OF PT,EA 15 MIN: HCPCS

## 2019-12-17 ENCOUNTER — HOME CARE VISIT (OUTPATIENT)
Dept: SCHEDULING | Facility: HOME HEALTH | Age: 84
End: 2019-12-17
Payer: MEDICARE

## 2019-12-17 VITALS
TEMPERATURE: 98.2 F | HEART RATE: 78 BPM | WEIGHT: 129.6 LBS | OXYGEN SATURATION: 95 % | DIASTOLIC BLOOD PRESSURE: 64 MMHG | RESPIRATION RATE: 18 BRPM | BODY MASS INDEX: 23.7 KG/M2 | SYSTOLIC BLOOD PRESSURE: 110 MMHG

## 2019-12-17 PROCEDURE — G0299 HHS/HOSPICE OF RN EA 15 MIN: HCPCS

## 2019-12-18 ENCOUNTER — OFFICE VISIT (OUTPATIENT)
Dept: FAMILY MEDICINE CLINIC | Age: 84
End: 2019-12-18

## 2019-12-18 VITALS
BODY MASS INDEX: 23.92 KG/M2 | TEMPERATURE: 96.7 F | RESPIRATION RATE: 19 BRPM | HEIGHT: 62 IN | SYSTOLIC BLOOD PRESSURE: 128 MMHG | WEIGHT: 130 LBS | DIASTOLIC BLOOD PRESSURE: 58 MMHG | HEART RATE: 89 BPM

## 2019-12-18 DIAGNOSIS — I95.89 HYPOTENSION DUE TO HYPOVOLEMIA: Primary | ICD-10-CM

## 2019-12-18 DIAGNOSIS — E86.1 HYPOTENSION DUE TO HYPOVOLEMIA: Primary | ICD-10-CM

## 2019-12-18 NOTE — PROGRESS NOTES
HISTORY OF PRESENT ILLNESS  Isaiah St is a 80 y.o. female. HPI In for followup. Not having any dizziness. Swelling in legs is better. Mild dyspnea at times. Uses inhaler- some relief. ROS    Physical Exam  Vitals signs and nursing note reviewed. Constitutional:       Appearance: She is well-developed. HENT:      Right Ear: External ear normal.      Left Ear: External ear normal.   Neck:      Thyroid: No thyromegaly. Cardiovascular:      Rate and Rhythm: Normal rate and regular rhythm. Heart sounds: Normal heart sounds. Pulmonary:      Breath sounds: Normal breath sounds. No wheezing. Abdominal:      General: Bowel sounds are normal. There is no distension. Palpations: Abdomen is soft. There is no mass. Tenderness: There is no tenderness. Lymphadenopathy:      Cervical: No cervical adenopathy. ASSESSMENT and PLAN  No orders of the defined types were placed in this encounter. Follow-up and Dispositions    · Return in about 4 weeks (around 1/15/2020).

## 2019-12-18 NOTE — PROGRESS NOTES
Chief Complaint   Patient presents with    Follow Up Chronic Condition     Here with daughter for routine follow up to PNA. Mom states she is doing better, but still has some SOB. 1. Have you been to the ER, urgent care clinic since your last visit? Hospitalized since your last visit? No    2. Have you seen or consulted any other health care providers outside of the [address] [place] [address] since your last visit? Include any pap smears or colon screening.  No

## 2019-12-19 ENCOUNTER — HOME CARE VISIT (OUTPATIENT)
Dept: SCHEDULING | Facility: HOME HEALTH | Age: 84
End: 2019-12-19
Payer: MEDICARE

## 2019-12-19 VITALS
SYSTOLIC BLOOD PRESSURE: 130 MMHG | TEMPERATURE: 97.1 F | HEART RATE: 67 BPM | DIASTOLIC BLOOD PRESSURE: 68 MMHG | OXYGEN SATURATION: 96 %

## 2019-12-19 PROCEDURE — G0300 HHS/HOSPICE OF LPN EA 15 MIN: HCPCS

## 2019-12-20 ENCOUNTER — OFFICE VISIT (OUTPATIENT)
Dept: CARDIOLOGY CLINIC | Age: 84
End: 2019-12-20

## 2019-12-20 VITALS
HEIGHT: 62 IN | DIASTOLIC BLOOD PRESSURE: 70 MMHG | SYSTOLIC BLOOD PRESSURE: 104 MMHG | HEART RATE: 75 BPM | BODY MASS INDEX: 24 KG/M2 | WEIGHT: 130.4 LBS | RESPIRATION RATE: 16 BRPM | OXYGEN SATURATION: 93 %

## 2019-12-20 DIAGNOSIS — I48.19 PERSISTENT ATRIAL FIBRILLATION (HCC): ICD-10-CM

## 2019-12-20 DIAGNOSIS — I50.42 CHRONIC COMBINED SYSTOLIC AND DIASTOLIC CHF (CONGESTIVE HEART FAILURE) (HCC): Primary | ICD-10-CM

## 2019-12-20 DIAGNOSIS — Z92.29 HX: LONG TERM ANTICOAGULANT USE: ICD-10-CM

## 2019-12-20 DIAGNOSIS — N18.30 CKD (CHRONIC KIDNEY DISEASE) STAGE 3, GFR 30-59 ML/MIN (HCC): ICD-10-CM

## 2019-12-20 DIAGNOSIS — I42.8 NICM (NONISCHEMIC CARDIOMYOPATHY) (HCC): ICD-10-CM

## 2019-12-20 DIAGNOSIS — I10 ESSENTIAL HYPERTENSION: ICD-10-CM

## 2019-12-20 RX ORDER — METOPROLOL SUCCINATE 25 MG/1
TABLET, EXTENDED RELEASE ORAL
COMMUNITY
End: 2020-05-27 | Stop reason: ALTCHOICE

## 2019-12-20 NOTE — PROGRESS NOTES
Chief Complaint   Patient presents with    Hypertension     6 month follow up     1. Have you been to the ER, urgent care clinic since your last visit? Hospitalized since your last visit? yes [facility] [address] 12/08/2019     2. Have you seen or consulted any other health care providers outside of the [address] [place] [address] since your last visit? Include any pap smears or colon screening.  No

## 2019-12-20 NOTE — PROGRESS NOTES
[facility]   [address] [facility], [address] [place] [address] Road                                          NEW PATIENT HPI/FOLLOW-UP      NAME:  Latanya Talley   :   1930   MRN:   [id number]   PCP:  Candida Baltazar MD           Subjective: The patient is a 80y.o. year old female  who returns for a routine follow-up after recent hospitalization for AOCSDHF. Back on diuretic and is now compensated. Denies change in exercise tolerance, chest pain, edema, medication intolerance, palpitations, shortness of breath, PND/orthopnea wheezing, sputum, syncope, dizziness or light headedness. Doing satisfactorily. Review of Systems  General: Pt denies excessive weight gain or loss. Pt is able to conduct ADL's. Respiratory: Denies shortness of breath, HILTON, wheezing or stridor. Cardiovascular: Denies precordial pain, palpitations, edema or PND  Gastrointestinal: Denies poor appetite, indigestion, abdominal pain or blood in stool  Peripheral vascular: Denies claudication, leg cramps  Neuropsychiatric: Denies paresthesias,tingling,numbness,anxiety,depression,fatigue  Musculoskeletal: Denies pain,tenderness, soreness,swelling      Past Medical History:   Diagnosis Date    Arrhythmia     atrial fibrillation, chronic. Stress test  essent.  normal.  Mild-mod AR on ECHO    Atrial fibrillation ([address]     Atrial flutter with rapid ventricular response ([address] 2019    Chronic atrial fibrillation 2019    GI bleeding     Hypertension     Refusal of blood transfusions as patient is Taoist     Thyroid disease     hypothyroid     Patient Active Problem List    Diagnosis Date Noted    Dehydration 2019    Hypotension 2019    Chronic atrial fibrillation 2019    Acute hypoxemic respiratory failure ([address] 2019    Pneumonia 2019    CHF (congestive heart failure) ([address] 2019    Irregular heart beat 2018    Chest pain with moderate risk for cardiac etiology 07/06/2017    HILTON (dyspnea on exertion) 07/05/2017    Bilateral leg edema 07/05/2017    HX: long term anticoagulant use--Pradaxa stopped 5/17 for chronic atrial fib 07/05/2017    Cardiomyopathy, secondary ([facility] 07/05/2017    GERD with esophagitis 07/05/2017    Dyslipidemia (high LDL; low HDL) 07/05/2017    CKD (chronic kidney disease) stage 3, GFR 30-59 ml/min ([facility]) 07/05/2017    GI bleeding     Hyperkalemia 04/27/2017    Hypothyroidism 04/27/2011    Hyperthyroidism--iatrogenic 04/27/2011    Insomnia 03/16/2011    Hypokalemia 03/16/2011    Encephalopathy acute 03/15/2011    Rhabdomyolysis 03/14/2011    Persistent atrial fibrillation 03/14/2011    HTN (hypertension) 03/14/2011      Past Surgical History:   Procedure Laterality Date    COLONOSCOPY N/A 5/2/2017    COLONOSCOPY performed by Rikki Mann MD at \[facility]\"" ENDOSCOPY    ECHO TRANSESOPHAGEAL (DENISE) W OR WO CONTRAST  4/27/2011         HX GYN      vaginal deliveries x10    HX TUBAL LIGATION      [place] CARDIOVERSION ELECTIVE ARRHYTHMIA EXTERNAL  6/14/2011          Allergies   Allergen Reactions    Seroquel [Quetiapine] Other (comments)     PSYCOLOGICAL REACTION Pt didn't feel like them selves but felt like a different person, iT DEEPLY DISTURBED PT.  PT BECAME IRRITABLE.     Ambien [Zolpidem] Other (comments)     Couldn't move      Family History   Problem Relation Age of Onset    Heart Disease Father     Cancer Son     Heart Disease Son       Social History     Socioeconomic History    Marital status:      Spouse name: Not on file    Number of children: Not on file    Years of education: Not on file    Highest education level: Not on file   Occupational History    Not on file   Social Needs    Financial resource strain: Not on file    Food insecurity:     Worry: Not on file     Inability: Not on file    Transportation needs:     Medical: Not on file     Non-medical: Not on file   Tobacco Use    Smoking status: Never Smoker    Smokeless tobacco: Never Used   Substance and Sexual Activity    Alcohol use: No    Drug use: No    Sexual activity: Never   Lifestyle    Physical activity:     Days per week: Not on file     Minutes per session: Not on file    Stress: Not on file   Relationships    Social connections:     Talks on phone: Not on file     Gets together: Not on file     Attends Yarsanism service: Not on file     Active member of club or organization: Not on file     Attends meetings of clubs or organizations: Not on file     Relationship status: Not on file    Intimate partner violence:     Fear of current or ex partner: Not on file     Emotionally abused: Not on file     Physically abused: Not on file     Forced sexual activity: Not on file   Other Topics Concern    Not on file   Social History Narrative    Not on file      Current Outpatient Medications   Medication Sig    furosemide (LASIX) 20 mg tablet 1 tab po daily    lisinopril (PRINIVIL, ZESTRIL) 5 mg tablet Take 1 Tab by mouth daily. For heart    atenolol (TENORMIN) 50 mg tablet Take 0.5 Tabs by mouth daily.  potassium chloride (K-DUR, KLOR-CON) 20 mEq tablet TAKE 1 TABLET BY MOUTH once daily    pantoprazole (PROTONIX) 40 mg tablet Take 1 Tab by mouth daily. To keep stomach from bleeding.  dabigatran etexilate (PRADAXA) 75 mg capsule TAKE ONE CAPSULE BY MOUTH EVERY 12 HOURS    albuterol (PROVENTIL HFA, VENTOLIN HFA, PROAIR HFA) 90 mcg/actuation inhaler Take 2 Puffs by inhalation every four (4) hours as needed for Wheezing or Shortness of Breath.  levothyroxine (SYNTHROID) 100 mcg tablet TAKE ONE TABLET DAILY FOR THYROID    traZODone (DESYREL) 150 mg tablet TAKE 1 TABLET BY MOUTH NIGHTLY FOR SLEEP.  fluticasone propionate (FLONASE) 50 mcg/actuation nasal spray 2 Sprays by Both Nostrils route daily.  diphenhydrAMINE (BENADRYL) 25 mg capsule Take 25 mg by mouth every six (6) hours as needed.     acetaminophen (TYLENOL ARTHRITIS PAIN) 650 mg TbER Take 650 mg by mouth every eight (8) hours.  CALCIUM 600 + D tablet TAKE 1 TABLET TWICE A DAY. (CALCIUM SUPPLEMENT)    metoprolol succinate (TOPROL-XL) 25 mg XL tablet metoprolol succinate ER 25 mg tablet,extended release 24 hr     No current facility-administered medications for this visit. I have reviewed the nurses notes, vitals, problem list, allergy list, medical history, family medical, social history and medications. Objective:     Physical Exam:     Vitals:    12/20/19 1518 12/20/19 1531   BP: 110/68 104/70   Pulse: 75    Resp: 16    SpO2: 93%    Weight: 130 lb 6.4 oz (59.1 kg)    Height: 5' 2.01\" (1.575 m)     Body mass index is 23.84 kg/m². General: Well developed, thin in no acute distress. HEENT: No carotid bruits, no JVD, trach is midline. Heart:  Normal S1/S2 negative S3 or S4. Regular, no murmur, gallop or rub.   Respiratory: Clear bilaterally, no wheezing or rales  Abdomen:   Soft, non-tender, bowel sounds are active.   Extremities:  No edema, normal cap refill, no cyanosis. Neuro: A&Ox3, speech clear, gait stable. Skin: Skin color is normal. No rashes or lesions. No diaphoresis.   Vascular: 2+ pulses symmetric in all extremities        Data Review:       Cardiographics:    EKG: Chronic afib ,LAD,LVH    Cardiology Labs:    Results for orders placed or performed during the hospital encounter of 12/08/19   EKG, 12 LEAD, INITIAL   Result Value Ref Range    Ventricular Rate 63 BPM    Atrial Rate 84 BPM    QRS Duration 118 ms    Q-T Interval 440 ms    QTC Calculation (Bezet) 450 ms    Calculated R Axis -42 degrees    Calculated T Axis -2 degrees    Diagnosis       Atrial fibrillation  Left axis deviation  Left ventricular hypertrophy with QRS widening  Nonspecific ST abnormality  When compared with ECG of 20-NOV-2019 09:16,  ST now depressed in Inferior leads      Confirmed by Erin Altman PBEVERLY ([id number]) on 12/9/2019 6:29:59 PM         Lab Results Component Value Date/Time    Cholesterol, total 94 (L) 02/17/2017 09:52 AM    HDL Cholesterol 30 (L) 02/17/2017 09:52 AM    LDL, calculated 46 02/17/2017 09:52 AM    Triglyceride 90 02/17/2017 09:52 AM    CHOL/HDL Ratio 3.1 04/20/2010 01:10 AM       Lab Results   Component Value Date/Time    Sodium 142 12/09/2019 03:52 AM    Potassium 3.8 12/09/2019 03:52 AM    Chloride 116 (H) 12/09/2019 03:52 AM    CO2 19 (L) 12/09/2019 03:52 AM    Anion gap 7 12/09/2019 03:52 AM    Glucose 93 12/09/2019 03:52 AM    BUN 28 (H) 12/09/2019 03:52 AM    Creatinine 1.23 (H) 12/09/2019 03:52 AM    BUN/Creatinine ratio 23 (H) 12/09/2019 03:52 AM    GFR est AA 50 (L) 12/09/2019 03:52 AM    GFR est non-AA 41 (L) 12/09/2019 03:52 AM    Calcium 7.7 (L) 12/09/2019 03:52 AM    Bilirubin, total 0.4 12/08/2019 12:28 PM    AST (SGOT) 18 12/08/2019 12:28 PM    Alk. phosphatase 65 12/08/2019 12:28 PM    Protein, total 8.0 12/08/2019 12:28 PM    Albumin 3.0 (L) 12/08/2019 12:28 PM    Globulin 5.0 (H) 12/08/2019 12:28 PM    A-G Ratio 0.6 (L) 12/08/2019 12:28 PM    ALT (SGPT) 20 12/08/2019 12:28 PM          Assessment:       ICD-10-CM ICD-9-CM    1. Chronic combined systolic and diastolic CHF (congestive heart failure) ([facility]) I50.42 428.42      428.0    2. Essential hypertension I10 401.9 AMB POC EKG ROUTINE W/ 12 LEADS, INTER & REP   3. Persistent atrial fibrillation I48.19 427.31    4. HX: long term anticoagulant use--Pradaxa stopped 5/17 GI bleed then resumed lower dose Z92.29 V87.49    5. CKD (chronic kidney disease) stage 3, GFR 30-59 ml/min ([facility]) N18.3 585.3    6. NICM (nonischemic cardiomyopathy) ([facility] I42.8 425.4          Discussion: Patient presents at this time stable from a cardiac perspective. CSDHF compensated back on diuretic Rx. Atrial fib chronic on longterm AC tx. Pleased with present cardiac status. Plan: 1. Continue same meds. Lipid profile and labs followed by PCP.     2.Encouraged to exercise to tolerance and follow low fat, low cholesterol, low sodium predominantly Plant-based (consider Mediterranean) diet. Call with questions or concerns. Will follow up any test results by phone and/or f/u here in office if needed. Manish Shetty 3.Follow up:6 MONTHS    I have discussed the diagnosis with the patient and the intended plan as seen in the above orders. The patient has received an after-visit summary and questions were answered concerning future plans. I have discussed any concerning medication side effects and warnings with the patient as well.     Chris Hylton MD,FAC,[facility]  12/20/2019

## 2019-12-23 ENCOUNTER — HOME CARE VISIT (OUTPATIENT)
Dept: SCHEDULING | Facility: HOME HEALTH | Age: 84
End: 2019-12-23
Payer: MEDICARE

## 2019-12-27 ENCOUNTER — HOME CARE VISIT (OUTPATIENT)
Dept: SCHEDULING | Facility: HOME HEALTH | Age: 84
End: 2019-12-27
Payer: MEDICARE

## 2019-12-30 ENCOUNTER — HOME CARE VISIT (OUTPATIENT)
Dept: SCHEDULING | Facility: HOME HEALTH | Age: 84
End: 2019-12-30
Payer: MEDICARE

## 2019-12-30 VITALS
DIASTOLIC BLOOD PRESSURE: 70 MMHG | TEMPERATURE: 98.9 F | SYSTOLIC BLOOD PRESSURE: 102 MMHG | OXYGEN SATURATION: 98 % | HEART RATE: 88 BPM

## 2019-12-30 PROCEDURE — G0300 HHS/HOSPICE OF LPN EA 15 MIN: HCPCS

## 2020-01-02 ENCOUNTER — HOME CARE VISIT (OUTPATIENT)
Dept: SCHEDULING | Facility: HOME HEALTH | Age: 85
End: 2020-01-02
Payer: MEDICARE

## 2020-01-02 PROCEDURE — G0299 HHS/HOSPICE OF RN EA 15 MIN: HCPCS

## 2020-01-03 VITALS
DIASTOLIC BLOOD PRESSURE: 64 MMHG | OXYGEN SATURATION: 96 % | RESPIRATION RATE: 18 BRPM | SYSTOLIC BLOOD PRESSURE: 122 MMHG | HEART RATE: 72 BPM | TEMPERATURE: 98.2 F

## 2020-01-08 ENCOUNTER — HOME CARE VISIT (OUTPATIENT)
Dept: SCHEDULING | Facility: HOME HEALTH | Age: 85
End: 2020-01-08
Payer: MEDICARE

## 2020-01-10 ENCOUNTER — HOME CARE VISIT (OUTPATIENT)
Dept: SCHEDULING | Facility: HOME HEALTH | Age: 85
End: 2020-01-10
Payer: MEDICARE

## 2020-01-10 PROCEDURE — G0300 HHS/HOSPICE OF LPN EA 15 MIN: HCPCS

## 2020-01-13 VITALS
DIASTOLIC BLOOD PRESSURE: 72 MMHG | OXYGEN SATURATION: 98 % | TEMPERATURE: 98.3 F | HEART RATE: 57 BPM | SYSTOLIC BLOOD PRESSURE: 90 MMHG

## 2020-01-15 ENCOUNTER — OFFICE VISIT (OUTPATIENT)
Dept: FAMILY MEDICINE CLINIC | Age: 85
End: 2020-01-15

## 2020-01-15 VITALS
OXYGEN SATURATION: 95 % | HEART RATE: 70 BPM | BODY MASS INDEX: 23.41 KG/M2 | DIASTOLIC BLOOD PRESSURE: 55 MMHG | WEIGHT: 127.2 LBS | TEMPERATURE: 97.8 F | RESPIRATION RATE: 18 BRPM | HEIGHT: 62 IN | SYSTOLIC BLOOD PRESSURE: 112 MMHG

## 2020-01-15 DIAGNOSIS — E86.1 HYPOTENSION DUE TO HYPOVOLEMIA: ICD-10-CM

## 2020-01-15 DIAGNOSIS — M54.2 NECK PAIN: Primary | ICD-10-CM

## 2020-01-15 DIAGNOSIS — I95.89 HYPOTENSION DUE TO HYPOVOLEMIA: ICD-10-CM

## 2020-01-15 DIAGNOSIS — I50.31 ACUTE DIASTOLIC CONGESTIVE HEART FAILURE (HCC): ICD-10-CM

## 2020-01-15 RX ORDER — TRAMADOL HYDROCHLORIDE 50 MG/1
50 TABLET ORAL
Qty: 50 TAB | Refills: 1 | Status: SHIPPED | OUTPATIENT
Start: 2020-01-15 | End: 2020-02-14

## 2020-01-15 NOTE — PROGRESS NOTES
HISTORY OF PRESENT ILLNESS  Benny Zabala is a 80 y.o. female. HPI In for followup of CHF and hypotension. Breathing has been doing ok. NO dizziness when stands up. Has been washing a few dishes, and some housework. Has also had some pain in neck radiating into R shoulder. Took tylenol and aspercream- minimal improvement. ROS    Physical Exam  Vitals signs and nursing note reviewed. Constitutional:       Appearance: She is well-developed. HENT:      Right Ear: External ear normal.      Left Ear: External ear normal.   Neck:      Thyroid: No thyromegaly. Comments: Neck- marked decrease in rom. Ext- gross motor intact  Cardiovascular:      Rate and Rhythm: Normal rate and regular rhythm. Heart sounds: Normal heart sounds. Pulmonary:      Breath sounds: Normal breath sounds. No wheezing. Abdominal:      General: Bowel sounds are normal. There is no distension. Palpations: Abdomen is soft. There is no mass. Tenderness: There is no tenderness. Lymphadenopathy:      Cervical: No cervical adenopathy. ASSESSMENT and PLAN  Orders Placed This Encounter    XR SPINE CERV PA LAT ODONT 3 V MAX    METABOLIC PANEL, BASIC    traMADol (ULTRAM) 50 mg tablet     Diagnoses and all orders for this visit:    1. Neck pain  -     traMADol (ULTRAM) 50 mg tablet; Take 1 Tab by mouth every eight (8) hours as needed for Pain for up to 30 days. Max Daily Amount: 150 mg. Take with one tylenol arthritis  -     XR SPINE CERV PA LAT ODONT 3 V MAX; Future    2. Hypotension due to hypovolemia  -     METABOLIC PANEL, BASIC    3. Acute diastolic congestive heart failure ([address]      Follow-up and Dispositions    · Return in about 2 months (around 3/15/2020).

## 2020-01-15 NOTE — PROGRESS NOTES
Chief Complaint   Patient presents with    Follow-up         1. Have you been to the ER, urgent care clinic since your last visit? Hospitalized since your last visit? No    2. Have you seen or consulted any other health care providers outside of the [address] [place] [address] since your last visit? Include any pap smears or colon screening.    Yes  12/20/2019  [facility] at [facility], Corina Stafford MD   Cardiology   Chronic combined systolic and diastolic CHF (congestive heart failure) ([facility] [address] +5 more   Dx   Hypertension     Reason for Visit

## 2020-01-16 ENCOUNTER — HOME CARE VISIT (OUTPATIENT)
Dept: SCHEDULING | Facility: HOME HEALTH | Age: 85
End: 2020-01-16
Payer: MEDICARE

## 2020-01-16 VITALS
SYSTOLIC BLOOD PRESSURE: 130 MMHG | HEART RATE: 83 BPM | DIASTOLIC BLOOD PRESSURE: 60 MMHG | OXYGEN SATURATION: 97 % | TEMPERATURE: 98.3 F

## 2020-01-16 LAB
BUN SERPL-MCNC: 14 MG/DL (ref 8–27)
BUN/CREAT SERPL: 14 (ref 12–28)
CALCIUM SERPL-MCNC: 9.2 MG/DL (ref 8.7–10.3)
CHLORIDE SERPL-SCNC: 102 MMOL/L (ref 96–106)
CO2 SERPL-SCNC: 19 MMOL/L (ref 20–29)
CREAT SERPL-MCNC: 1.03 MG/DL (ref 0.57–1)
GLUCOSE SERPL-MCNC: 117 MG/DL (ref 65–99)
INTERPRETATION: NORMAL
POTASSIUM SERPL-SCNC: 3.6 MMOL/L (ref 3.5–5.2)
SODIUM SERPL-SCNC: 139 MMOL/L (ref 134–144)

## 2020-01-16 PROCEDURE — G0300 HHS/HOSPICE OF LPN EA 15 MIN: HCPCS

## 2020-01-23 ENCOUNTER — HOME CARE VISIT (OUTPATIENT)
Dept: SCHEDULING | Facility: HOME HEALTH | Age: 85
End: 2020-01-23
Payer: MEDICARE

## 2020-01-23 VITALS
HEART RATE: 81 BPM | OXYGEN SATURATION: 96 % | SYSTOLIC BLOOD PRESSURE: 122 MMHG | TEMPERATURE: 98.2 F | DIASTOLIC BLOOD PRESSURE: 70 MMHG

## 2020-01-23 PROCEDURE — G0300 HHS/HOSPICE OF LPN EA 15 MIN: HCPCS

## 2020-01-28 ENCOUNTER — HOME CARE VISIT (OUTPATIENT)
Dept: SCHEDULING | Facility: HOME HEALTH | Age: 85
End: 2020-01-28
Payer: MEDICARE

## 2020-01-28 VITALS
BODY MASS INDEX: 23.27 KG/M2 | TEMPERATURE: 97.2 F | RESPIRATION RATE: 18 BRPM | WEIGHT: 127.2 LBS | HEART RATE: 72 BPM | DIASTOLIC BLOOD PRESSURE: 68 MMHG | OXYGEN SATURATION: 97 % | SYSTOLIC BLOOD PRESSURE: 122 MMHG

## 2020-01-28 PROCEDURE — G0299 HHS/HOSPICE OF RN EA 15 MIN: HCPCS

## 2020-05-27 ENCOUNTER — VIRTUAL VISIT (OUTPATIENT)
Dept: FAMILY MEDICINE CLINIC | Age: 85
End: 2020-05-27

## 2020-05-27 DIAGNOSIS — R42 DIZZINESS: Primary | ICD-10-CM

## 2020-05-27 DIAGNOSIS — R53.1 WEAKNESS: ICD-10-CM

## 2020-05-27 DIAGNOSIS — D64.9 ANEMIA, UNSPECIFIED TYPE: ICD-10-CM

## 2020-05-27 RX ORDER — FUROSEMIDE 40 MG/1
40 TABLET ORAL DAILY
COMMUNITY
Start: 2020-05-03 | End: 2020-10-14

## 2020-05-27 RX ORDER — TRAZODONE HYDROCHLORIDE 150 MG/1
TABLET ORAL
COMMUNITY
Start: 2020-04-08 | End: 2020-05-27 | Stop reason: SDUPTHER

## 2020-05-27 NOTE — PROGRESS NOTES
HISTORY OF PRESENT ILLNESS  Sho Gonzalez is a 80 y.o. female. HPI . Patient was at home while conducting this encounter. CONSENT:  Sheand/or @ HIS@ healthcare decision maker is aware that this patient-initiated Telehealth encounter is a billable service,with coverage as determined by @ HIS @insurance carrier. @SUSI Candelaria is aware that @HE @may receive a bill and has provided verbal consent to precede. Yes    THIS VIRTUAL VISIT WAS CONDUCTED TELEPHONE ENCOUNTER. I AFFIRM THIS A PATIENT INITIATED EPISODE WITH AN ESTABLISHED PATIENT WHO HAS NOT HAD A RELATED APPOINTMENT WITHIN MY DEPARTMENT IN THE PAST SEVEN (7) DAYS OR SCHEDULED WITHIN THE NEXT 24 HOURS. NOTE: THIS ENCOUNTER IS NOT BILLABLE IF THIS CALL SERVES TO TRIAGE THE PATIENT INTO AN APPOINTMENT FOR THE RELEVANT CONCERN. TOTAL TIME: minutes: 5-10 minutes. has been feeling tired for the last month. Often has to sit down. No fever, chills, chest pain, abdominal pain, dyspnea, weight loss edema, stress. No new meds. Did have dose of potassium decreased recently. ROS    Physical Exam    ASSESSMENT and PLAN  Orders Placed This Encounter    CBC WITH AUTOMATED DIFF    METABOLIC PANEL, COMPREHENSIVE    TSH 3RD GENERATION    T4,FREE(DIRECT)     Diagnoses and all orders for this visit:    1. Dizziness    2. Anemia, unspecified type  -     CBC WITH AUTOMATED DIFF    3.  Weakness  -     METABOLIC PANEL, COMPREHENSIVE  -     TSH 3RD GENERATION  -     T4,FREE(DIRECT)

## 2020-05-27 NOTE — PROGRESS NOTES
Chief Complaint   Patient presents with    Fatigue     1. Have you been to the ER, urgent care clinic since your last visit? Hospitalized since your last visit? No    2. Have you seen or consulted any other health care providers outside of the [address] [place] [address] since your last visit? Include any pap smears or colon screening. No       Patient notes feeling extremely tired.

## 2020-05-29 LAB
ALBUMIN SERPL-MCNC: 4.2 G/DL (ref 3.6–4.6)
ALBUMIN/GLOB SERPL: 1.1 {RATIO} (ref 1.2–2.2)
ALP SERPL-CCNC: 66 IU/L (ref 39–117)
ALT SERPL-CCNC: 10 IU/L (ref 0–32)
AST SERPL-CCNC: 21 IU/L (ref 0–40)
BASOPHILS # BLD AUTO: 0 X10E3/UL (ref 0–0.2)
BASOPHILS NFR BLD AUTO: 0 %
BILIRUB SERPL-MCNC: 0.3 MG/DL (ref 0–1.2)
BUN SERPL-MCNC: 23 MG/DL (ref 8–27)
BUN/CREAT SERPL: 18 (ref 12–28)
CALCIUM SERPL-MCNC: 8.8 MG/DL (ref 8.7–10.3)
CHLORIDE SERPL-SCNC: 101 MMOL/L (ref 96–106)
CO2 SERPL-SCNC: 17 MMOL/L (ref 20–29)
CREAT SERPL-MCNC: 1.26 MG/DL (ref 0.57–1)
EOSINOPHIL # BLD AUTO: 0.1 X10E3/UL (ref 0–0.4)
EOSINOPHIL NFR BLD AUTO: 2 %
ERYTHROCYTE [DISTWIDTH] IN BLOOD BY AUTOMATED COUNT: 17.2 % (ref 11.7–15.4)
GLOBULIN SER CALC-MCNC: 3.8 G/DL (ref 1.5–4.5)
GLUCOSE SERPL-MCNC: 81 MG/DL (ref 65–99)
HCT VFR BLD AUTO: 29.9 % (ref 34–46.6)
HGB BLD-MCNC: 8.9 G/DL (ref 11.1–15.9)
IMM GRANULOCYTES # BLD AUTO: 0 X10E3/UL (ref 0–0.1)
IMM GRANULOCYTES NFR BLD AUTO: 0 %
INTERPRETATION: NORMAL
LYMPHOCYTES # BLD AUTO: 2 X10E3/UL (ref 0.7–3.1)
LYMPHOCYTES NFR BLD AUTO: 35 %
MCH RBC QN AUTO: 23.1 PG (ref 26.6–33)
MCHC RBC AUTO-ENTMCNC: 29.8 G/DL (ref 31.5–35.7)
MCV RBC AUTO: 78 FL (ref 79–97)
MONOCYTES # BLD AUTO: 0.8 X10E3/UL (ref 0.1–0.9)
MONOCYTES NFR BLD AUTO: 14 %
NEUTROPHILS # BLD AUTO: 2.8 X10E3/UL (ref 1.4–7)
NEUTROPHILS NFR BLD AUTO: 49 %
PLATELET # BLD AUTO: 171 X10E3/UL (ref 150–450)
POTASSIUM SERPL-SCNC: 4.1 MMOL/L (ref 3.5–5.2)
PROT SERPL-MCNC: 8 G/DL (ref 6–8.5)
RBC # BLD AUTO: 3.85 X10E6/UL (ref 3.77–5.28)
SODIUM SERPL-SCNC: 136 MMOL/L (ref 134–144)
T4 FREE SERPL-MCNC: 1.34 NG/DL (ref 0.82–1.77)
TSH SERPL DL<=0.005 MIU/L-ACNC: 0.32 UIU/ML (ref 0.45–4.5)
WBC # BLD AUTO: 5.8 X10E3/UL (ref 3.4–10.8)

## 2020-06-08 ENCOUNTER — TELEPHONE (OUTPATIENT)
Dept: FAMILY MEDICINE CLINIC | Age: 85
End: 2020-06-08

## 2020-06-08 RX ORDER — FERROUS SULFATE 325(65) MG
325 TABLET, DELAYED RELEASE (ENTERIC COATED) ORAL 2 TIMES DAILY
Qty: 60 TAB | Refills: 2 | Status: SHIPPED | OUTPATIENT
Start: 2020-06-08 | End: 2021-01-27 | Stop reason: ALTCHOICE

## 2020-06-08 NOTE — TELEPHONE ENCOUNTER
Patients daughter would like a return call regarding lab results  Please give her a call @ [phone]

## 2020-06-08 NOTE — TELEPHONE ENCOUNTER
pc with pt. Will start ferrous sulfate. Had nl colonoscopy 3 years ago, and EGD showing an esophageal ulcer and gastritis. Continue protonix. Hgb same now as it was then.

## 2020-08-27 ENCOUNTER — TELEPHONE (OUTPATIENT)
Dept: FAMILY MEDICINE CLINIC | Age: 85
End: 2020-08-27

## 2020-08-27 NOTE — TELEPHONE ENCOUNTER
----- Message from Alyssa Maria sent at 8/27/2020  9:01 AM EDT -----  Regarding: Dr. Kayla Brush  Appointment not available    Caller's first and last name and relationship to patient (if not the patient): Anthony Morales (Daughter)      Best contact number: [phone]      Preferred date and time: as soon as possible; virtual phone visit only; [phone]      Scheduled appointment date and time: 9/25/20 @8am      Reason for appointment: weak in legs and check potassium level      Details to clarify the request: Stated the pt isn't as active as usual and fatigue. Pt advised to seek  appropriate medical care; attempted transfer, psr Justice Van said the pcp will call the pt.       Alyssa Maria

## 2020-09-01 ENCOUNTER — TELEPHONE (OUTPATIENT)
Dept: FAMILY MEDICINE CLINIC | Age: 85
End: 2020-09-01

## 2020-09-01 DIAGNOSIS — R25.2 LEG CRAMPING: Primary | ICD-10-CM

## 2020-09-01 NOTE — TELEPHONE ENCOUNTER
----- Message from Malick Garcia sent at 8/31/2020  5:41 PM EDT -----  Regarding: Dr. Diaz Covert: [phone]  Caller's first and last name: Km  Reason for call: Caregiver thinks the pt's potassium is low and would like to see about going to the lab to have it tested. Would prefer to go on Tuesday or Wednesday of this week.     Callback required yes/no and why: yes  Best contact number(s): [phone]  Details to clarify the request: n/a

## 2020-09-01 NOTE — TELEPHONE ENCOUNTER
Patient is having leg cramps and daughter thinks her potassium might be low, they are going on a trip on 9/11/20, daughter wants to make sure she is ok to travel

## 2020-09-03 ENCOUNTER — APPOINTMENT (OUTPATIENT)
Dept: FAMILY MEDICINE CLINIC | Age: 85
End: 2020-09-03

## 2020-09-03 DIAGNOSIS — R25.2 LEG CRAMPING: ICD-10-CM

## 2020-09-04 LAB
BUN SERPL-MCNC: 14 MG/DL (ref 8–27)
BUN/CREAT SERPL: 12 (ref 12–28)
CALCIUM SERPL-MCNC: 8.8 MG/DL (ref 8.7–10.3)
CHLORIDE SERPL-SCNC: 106 MMOL/L (ref 96–106)
CO2 SERPL-SCNC: 22 MMOL/L (ref 20–29)
CREAT SERPL-MCNC: 1.14 MG/DL (ref 0.57–1)
GLUCOSE SERPL-MCNC: 108 MG/DL (ref 65–99)
INTERPRETATION: NORMAL
MAGNESIUM SERPL-MCNC: 1.8 MG/DL (ref 1.6–2.3)
POTASSIUM SERPL-SCNC: 3.8 MMOL/L (ref 3.5–5.2)
SODIUM SERPL-SCNC: 141 MMOL/L (ref 134–144)

## 2020-09-09 ENCOUNTER — TELEPHONE (OUTPATIENT)
Dept: FAMILY MEDICINE CLINIC | Age: 85
End: 2020-09-09

## 2020-09-09 RX ORDER — LANOLIN ALCOHOL/MO/W.PET/CERES
400 CREAM (GRAM) TOPICAL DAILY
Qty: 30 TAB | Refills: 5 | Status: SHIPPED | OUTPATIENT
Start: 2020-09-09 | End: 2021-01-27 | Stop reason: SDUPTHER

## 2020-09-09 NOTE — TELEPHONE ENCOUNTER
----- Message from Alexa Mesa sent at 9/9/2020 10:55 AM EDT -----  Regarding: Dr Fly Barbour telephone  Caller's first and last name: Donta Vivas/ daughter  Reason for call: Lab Results  Callback required yes/no and why: yes  Best contact number(s):   Details to clarify the request: Ms. Priscilla Elam request call back of mother's lab results including Potassium. Ms. Priscilla Elam advises Ms Concepcion Dent will be going out of town on Friday.

## 2020-09-22 RX ORDER — CIPROFLOXACIN 500 MG/1
500 TABLET ORAL 2 TIMES DAILY
Qty: 10 TAB | Refills: 0 | Status: SHIPPED | OUTPATIENT
Start: 2020-09-22 | End: 2020-09-27

## 2020-09-22 NOTE — TELEPHONE ENCOUNTER
Patient wants to get something called in for a uti.   Please give the daughter a call @ [phone]

## 2020-09-22 NOTE — TELEPHONE ENCOUNTER
Daughter states patient is having burning, pressure, urine is dark in color, going in scant amounts, requesting antibiotic, made her appointment in October for routine follow lup

## 2020-10-14 ENCOUNTER — OFFICE VISIT (OUTPATIENT)
Dept: FAMILY MEDICINE CLINIC | Age: 85
End: 2020-10-14
Payer: MEDICARE

## 2020-10-14 VITALS
RESPIRATION RATE: 16 BRPM | DIASTOLIC BLOOD PRESSURE: 74 MMHG | HEIGHT: 62 IN | TEMPERATURE: 97.7 F | HEART RATE: 59 BPM | OXYGEN SATURATION: 98 % | SYSTOLIC BLOOD PRESSURE: 145 MMHG | BODY MASS INDEX: 25.28 KG/M2 | WEIGHT: 137.4 LBS

## 2020-10-14 DIAGNOSIS — D50.9 IRON DEFICIENCY ANEMIA, UNSPECIFIED IRON DEFICIENCY ANEMIA TYPE: ICD-10-CM

## 2020-10-14 DIAGNOSIS — Z00.00 ENCOUNTER FOR MEDICARE ANNUAL WELLNESS EXAM: Primary | ICD-10-CM

## 2020-10-14 PROCEDURE — G8510 SCR DEP NEG, NO PLAN REQD: HCPCS | Performed by: FAMILY MEDICINE

## 2020-10-14 PROCEDURE — 1090F PRES/ABSN URINE INCON ASSESS: CPT | Performed by: FAMILY MEDICINE

## 2020-10-14 PROCEDURE — 85025 COMPLETE CBC W/AUTO DIFF WBC: CPT | Performed by: FAMILY MEDICINE

## 2020-10-14 PROCEDURE — G8419 CALC BMI OUT NRM PARAM NOF/U: HCPCS | Performed by: FAMILY MEDICINE

## 2020-10-14 PROCEDURE — G8536 NO DOC ELDER MAL SCRN: HCPCS | Performed by: FAMILY MEDICINE

## 2020-10-14 PROCEDURE — 99213 OFFICE O/P EST LOW 20 MIN: CPT | Performed by: FAMILY MEDICINE

## 2020-10-14 PROCEDURE — G8427 DOCREV CUR MEDS BY ELIG CLIN: HCPCS | Performed by: FAMILY MEDICINE

## 2020-10-14 PROCEDURE — 36415 COLL VENOUS BLD VENIPUNCTURE: CPT | Performed by: FAMILY MEDICINE

## 2020-10-14 PROCEDURE — 1101F PT FALLS ASSESS-DOCD LE1/YR: CPT | Performed by: FAMILY MEDICINE

## 2020-10-14 RX ORDER — ATENOLOL 25 MG/1
25 TABLET ORAL DAILY
Qty: 90 TAB | Refills: 3 | Status: SHIPPED | OUTPATIENT
Start: 2020-10-14 | End: 2021-09-13

## 2020-10-14 RX ORDER — FUROSEMIDE 20 MG/1
TABLET ORAL
Qty: 90 TAB | Refills: 3 | Status: SHIPPED | OUTPATIENT
Start: 2020-10-14 | End: 2021-09-13

## 2020-10-14 NOTE — PROGRESS NOTES
HISTORY OF PRESENT ILLNESS  Maggie Gamble is a 80 y.o. female. HPI Pt. Comes in for blood pressure check. Needs medicare wellness exam. Not seeing any other doctors. UTD on immunizations. Would be reasonable to take shingrix. Had colonoscopy and egd 3 years ago. Dylan Steve Has some chronic diarrhea. Had esophageal ulcer then  Would want children to make medical decisions for her if became incapacitated. We gave her some papers at last visit. Review of Systems   HENT: Positive for hearing loss (thinks has wax in ears. ). Neurological:        No fallls, canes. Independent in all adls. Memory ok. Psychiatric/Behavioral: Negative for depression. No alcohol       Physical Exam  Vitals signs and nursing note reviewed. Constitutional:       Appearance: She is well-developed. HENT:      Head:      Comments: R ear- cerumen impaction. L ear- nl tm     Right Ear: External ear normal.      Left Ear: External ear normal.   Neck:      Thyroid: No thyromegaly. Cardiovascular:      Rate and Rhythm: Normal rate and regular rhythm. Heart sounds: Normal heart sounds. Pulmonary:      Breath sounds: Normal breath sounds. No wheezing. Abdominal:      General: Bowel sounds are normal. There is no distension. Palpations: Abdomen is soft. There is no mass. Tenderness: There is no abdominal tenderness. Lymphadenopathy:      Cervical: No cervical adenopathy. ASSESSMENT and PLAN  Orders Placed This Encounter    IRON PROFILE    FERRITIN    CELIAC ANTIBODY PROFILE    AMB POC COMPLETE CBC, AUTOMATED     Diagnoses and all orders for this visit:    1.  Iron deficiency anemia, unspecified iron deficiency anemia type  -     AMB POC COMPLETE CBC, AUTOMATED  -     IRON PROFILE  -     FERRITIN  -     CELIAC ANTIBODY PROFILE

## 2020-10-14 NOTE — PROGRESS NOTES
Chief Complaint   Patient presents with    Follow Up Chronic Condition       1. Have you been to the ER, urgent care clinic since your last visit? Hospitalized since your last visit? No    2. Have you seen or consulted any other health care providers outside of the [address] [place] [address] since your last visit? Include any pap smears or colon screening.  No

## 2020-10-16 ENCOUNTER — OFFICE VISIT (OUTPATIENT)
Dept: FAMILY MEDICINE CLINIC | Age: 85
End: 2020-10-16
Payer: MEDICARE

## 2020-10-16 ENCOUNTER — DOCUMENTATION ONLY (OUTPATIENT)
Dept: FAMILY MEDICINE CLINIC | Age: 85
End: 2020-10-16

## 2020-10-16 VITALS
HEART RATE: 59 BPM | DIASTOLIC BLOOD PRESSURE: 58 MMHG | SYSTOLIC BLOOD PRESSURE: 135 MMHG | BODY MASS INDEX: 24.88 KG/M2 | OXYGEN SATURATION: 98 % | TEMPERATURE: 97.1 F | RESPIRATION RATE: 16 BRPM | HEIGHT: 62 IN | WEIGHT: 135.2 LBS

## 2020-10-16 DIAGNOSIS — M79.602 PAIN OF LEFT UPPER EXTREMITY: ICD-10-CM

## 2020-10-16 DIAGNOSIS — T50.B95A ADVERSE EFFECT OF VIRAL VACCINES, INITIAL ENCOUNTER: Primary | ICD-10-CM

## 2020-10-16 PROCEDURE — 99213 OFFICE O/P EST LOW 20 MIN: CPT | Performed by: STUDENT IN AN ORGANIZED HEALTH CARE EDUCATION/TRAINING PROGRAM

## 2020-10-16 PROCEDURE — G8427 DOCREV CUR MEDS BY ELIG CLIN: HCPCS | Performed by: STUDENT IN AN ORGANIZED HEALTH CARE EDUCATION/TRAINING PROGRAM

## 2020-10-16 PROCEDURE — G8420 CALC BMI NORM PARAMETERS: HCPCS | Performed by: STUDENT IN AN ORGANIZED HEALTH CARE EDUCATION/TRAINING PROGRAM

## 2020-10-16 PROCEDURE — 1101F PT FALLS ASSESS-DOCD LE1/YR: CPT | Performed by: STUDENT IN AN ORGANIZED HEALTH CARE EDUCATION/TRAINING PROGRAM

## 2020-10-16 PROCEDURE — G8432 DEP SCR NOT DOC, RNG: HCPCS | Performed by: STUDENT IN AN ORGANIZED HEALTH CARE EDUCATION/TRAINING PROGRAM

## 2020-10-16 PROCEDURE — 1090F PRES/ABSN URINE INCON ASSESS: CPT | Performed by: STUDENT IN AN ORGANIZED HEALTH CARE EDUCATION/TRAINING PROGRAM

## 2020-10-16 PROCEDURE — G8536 NO DOC ELDER MAL SCRN: HCPCS | Performed by: STUDENT IN AN ORGANIZED HEALTH CARE EDUCATION/TRAINING PROGRAM

## 2020-10-16 NOTE — PROGRESS NOTES
Chief Complaint   Patient presents with    Allergic Reaction       1. Have you been to the ER, urgent care clinic since your last visit? Hospitalized since your last visit? No    2. Have you seen or consulted any other health care providers outside of the [address] [place] [address] since your last visit? Include any pap smears or colon screening. No       Patient got shingles vaccine IM left deltoid. Site is red and warm to touch.

## 2020-10-16 NOTE — PROGRESS NOTES
Chief Complaint: Left arm redness    Subjective  Shayy Montalvo is a 80 y.o. female who presents for evaluation of left arm pain. Pt states that she had shingle shot 2 days ago and later developed a localized reaction around the area. Initially painful but pt states that the pain subsided. Pt has not had any fever, chills, sweats, nausea or vomiting. Pt just wanted to make sure it is not infected. Allergies - reviewed: Allergies   Allergen Reactions    Seroquel [Quetiapine] Other (comments)     PSYCOLOGICAL REACTION Pt didn't feel like them selves but felt like a different person, iT DEEPLY DISTURBED PT.  PT BECAME IRRITABLE.  Ambien [Zolpidem] Other (comments)     Couldn't move         Medications - reviewed:   Current Outpatient Medications   Medication Sig    furosemide (LASIX) 20 mg tablet TAKE ONE  TABLET DAILY    atenoloL (TENORMIN) 25 mg tablet Take 1 Tab by mouth daily.  magnesium oxide (MAG-OX) 400 mg tablet Take 1 Tab by mouth daily. For leg cramps    dabigatran etexilate (Pradaxa) 75 mg capsule TAKE ONE CAPSULE BY MOUTH EVERY 12 HOURS    ferrous sulfate (IRON) 325 mg (65 mg iron) EC tablet Take 1 Tab by mouth two (2) times a day.  lisinopril (PRINIVIL, ZESTRIL) 5 mg tablet Take 1 Tab by mouth daily. For heart    potassium chloride (K-DUR, KLOR-CON) 20 mEq tablet TAKE 1 TABLET BY MOUTH once daily    pantoprazole (PROTONIX) 40 mg tablet Take 1 Tab by mouth daily. To keep stomach from bleeding.  albuterol (PROVENTIL HFA, VENTOLIN HFA, PROAIR HFA) 90 mcg/actuation inhaler Take 2 Puffs by inhalation every four (4) hours as needed for Wheezing or Shortness of Breath.  levothyroxine (SYNTHROID) 100 mcg tablet TAKE ONE TABLET DAILY FOR THYROID    traZODone (DESYREL) 150 mg tablet TAKE 1 TABLET BY MOUTH NIGHTLY FOR SLEEP.  fluticasone propionate (FLONASE) 50 mcg/actuation nasal spray 2 Sprays by Both Nostrils route daily.     acetaminophen (TYLENOL ARTHRITIS PAIN) 650 mg TbER Take 650 mg by mouth every eight (8) hours.  CALCIUM 600 + D tablet TAKE 1 TABLET TWICE A DAY. (CALCIUM SUPPLEMENT)     No current facility-administered medications for this visit. Family History - reviewed:  Family History   Problem Relation Age of Onset    Heart Disease Father     Cancer Son     Heart Disease Son          Past Medical History - reviewed:  Past Medical History:   Diagnosis Date    Arrhythmia     atrial fibrillation, chronic. Stress test 2011 essent. normal.  Mild-mod AR on ECHO    Atrial fibrillation ([address]     Atrial flutter with rapid ventricular response ([address] 11/21/2019    Chronic atrial fibrillation ([address] 11/21/2019    GI bleeding     Hypertension     Refusal of blood transfusions as patient is Hinduism     Thyroid disease     hypothyroid       Review of systems:  Items bolded if positive. Constitutional: Fever, chills, night sweats  Cardiovascular: Chest pain, palpitations, syncope, lower extremity edema, orthopnea  Pulmonary: Shortness of breath, dyspnea on exertion, cough  Skin:  Left arm redness    Physical Exam  Visit Vitals  BP (!) 135/58 (BP 1 Location: Right arm, BP Patient Position: Sitting)   Pulse (!) 59   Temp 97.1 °F (36.2 °C) (Skin)   Resp 16   Ht 5' 2\" (1.575 m)   Wt 135 lb 3.2 oz (61.3 kg)   SpO2 98%   BMI 24.73 kg/m²       General: Alert and oriented, in no acute distress. Well nourished. LUNGS: Respirations unlabored; clear to auscultation bilaterally. CARDIOVASCULAR: Regular, rate, and rhythm without murmurs, gallops or rubs. SKIN: Localized redness on the left arm. No tenderness or induration. Assessment/Plan    ICD-10-CM ICD-9-CM    1. Adverse effect of viral vaccines, initial encounter  T50. B95A E949.6    2. Pain of left upper extremity  M79.602 729.5        Local reaction to vaccine. No fever, chills, sweats, nausea or vomiting.  Educated patient and daughter on the common local reactions to vaccines and the importance of avoiding unnecessary antibiotic therapy. - Cold compressing  - Tylenol or NSAIDs prn for pain  - RTC should symptoms worsen, or fail to improve as anticipated      We discussed the expected course, resolution and complications of the diagnosis(es) in detail. Medication risks, benefits, costs, interactions, and alternatives were discussed as indicated. I advised him to contact the office if his condition worsens, changes or fails to improve as anticipated. He expressed understanding with the diagnosis(es) and plan. Patient understands that this encounter was a temporary measure, and the importance of further follow up and examination was emphasized. Patient verbalized understanding.       Signed By: Sam Celaya MD     October 16, 2020

## 2020-10-17 LAB
FERRITIN SERPL-MCNC: 28 NG/ML (ref 15–150)
GLIADIN PEPTIDE IGA SER-ACNC: 8 UNITS (ref 0–19)
GLIADIN PEPTIDE IGG SER-ACNC: 5 UNITS (ref 0–19)
IGA SERPL-MCNC: 536 MG/DL (ref 64–422)
IRON SATN MFR SERPL: 11 % (ref 15–55)
IRON SERPL-MCNC: 37 UG/DL (ref 27–139)
TIBC SERPL-MCNC: 338 UG/DL (ref 250–450)
TTG IGA SER-ACNC: <2 U/ML (ref 0–3)
TTG IGG SER-ACNC: 2 U/ML (ref 0–5)
UIBC SERPL-MCNC: 301 UG/DL (ref 118–369)

## 2020-11-02 ENCOUNTER — TELEPHONE (OUTPATIENT)
Dept: FAMILY MEDICINE CLINIC | Age: 85
End: 2020-11-02

## 2020-11-02 ENCOUNTER — APPOINTMENT (OUTPATIENT)
Dept: GENERAL RADIOLOGY | Age: 85
End: 2020-11-02
Attending: PHYSICIAN ASSISTANT
Payer: MEDICARE

## 2020-11-02 ENCOUNTER — APPOINTMENT (OUTPATIENT)
Dept: CT IMAGING | Age: 85
End: 2020-11-02
Attending: PHYSICIAN ASSISTANT
Payer: MEDICARE

## 2020-11-02 ENCOUNTER — HOSPITAL ENCOUNTER (EMERGENCY)
Age: 85
Discharge: HOME | End: 2020-11-02
Attending: EMERGENCY MEDICINE
Payer: MEDICARE

## 2020-11-02 VITALS
SYSTOLIC BLOOD PRESSURE: 136 MMHG | DIASTOLIC BLOOD PRESSURE: 76 MMHG | TEMPERATURE: 97.8 F | RESPIRATION RATE: 15 BRPM | HEART RATE: 78 BPM | OXYGEN SATURATION: 100 %

## 2020-11-02 DIAGNOSIS — S60.221A CONTUSION OF RIGHT HAND, INITIAL ENCOUNTER: ICD-10-CM

## 2020-11-02 DIAGNOSIS — S51.811A SKIN TEAR OF RIGHT FOREARM WITHOUT COMPLICATION, INITIAL ENCOUNTER: ICD-10-CM
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
## 2020-11-02 PROCEDURE — 73130 X-RAY EXAM OF HAND: CPT

## 2020-11-02 PROCEDURE — 70450 CT HEAD/BRAIN W/O DYE: CPT

## 2020-11-02 PROCEDURE — 99284 EMERGENCY DEPT VISIT MOD MDM: CPT

## 2020-11-02 NOTE — DISCHARGE INSTRUCTIONS
Patient Education        Hand Bruises: Care Instructions  Your Care Instructions  Bruises, or contusions, can happen as a result of an impact or fall. Most people think of a bruise as a black-and-blue spot. This happens when small blood vessels get torn and leak blood under the skin. The bruise may turn purplish black, reddish blue, or yellowish green as it heals. But bones and muscles can also get bruised. This may damage the hand but not cause a bruise that you can see. Most bruises aren't serious and will go away on their own in 2 to 4 weeks. But sometimes a more serious hand injury might not heal on its own. Tell your doctor if you have new symptoms or your injury is not getting better over time. You may have tests to see if you have bone or nerve damage. These tests may include X-rays, a CT scan, or an MRI. If you damaged bones or muscles, you may need more treatment. The doctor has checked you carefully, but problems can develop later. If you notice any problems or new symptoms, get medical treatment right away. Follow-up care is a key part of your treatment and safety. Be sure to make and go to all appointments, and call your doctor if you are having problems. It's also a good idea to know your test results and keep a list of the medicines you take. How can you care for yourself at home? · Put ice or a cold pack on the hand for 10 to 20 minutes at a time. Put a thin cloth between the ice and your skin. · Prop up your hand on a pillow when you ice it or anytime you sit or lie down during the next 3 days. Try to keep your hand above the level of your heart. This will help reduce swelling. · Be safe with medicines. Read and follow all instructions on the label. ? If the doctor gave you a prescription medicine for pain, take it as prescribed. ? If you are not taking a prescription pain medicine, ask your doctor if you can take an over-the-counter medicine.   · Be sure to follow your doctor's advice about moving and exercising your injured hand. When should you call for help? Call your doctor now or seek immediate medical care if:    · Your pain gets worse.     · You have new or worse swelling.     · You have tingling, weakness, or numbness in the area near the bruise.     · The area near the bruise is cold or pale.     · You have symptoms of infection, such as:  ? Increased pain, swelling, warmth, or redness. ? Red streaks leading from the area. ? Pus draining from the area. ? A fever. Watch closely for changes in your health, and be sure to contact your doctor if:    · You do not get better as expected. Where can you learn more? Go to http://www.gray.com/  Enter W240 in the search box to learn more about \"Hand Bruises: Care Instructions. \"  Current as of: June 26, 2019               Content Version: 12.6  © 4638-2097 [employer]. Care instructions adapted under license by [employer] (which disclaims liability or warranty for this information). If you have questions about a medical condition or this instruction, always ask your healthcare professional. [place] [address] any warranty or liability for your use of this information.

## 2020-11-02 NOTE — TELEPHONE ENCOUNTER
----- Message from Skemaz sent at 11/2/2020 11:21 AM EST -----  Regarding: Dr. Linnea Hill  Appointment not available    Caller's first and last name and relationship to patient (if not the patient):Margaret Nguyen; daughter      Best contact RFRVRJ:[phone]      Preferred date and time: 11/2/20       Scheduled appointment date and time: n/a      Reason for appointment: Same day/fell on 11/2/20; bruised right arm; bruised under and over the right eye; both knees hurt; stated pt is real nervous      Details to clarify the request: Brittny Crowe 13.

## 2020-11-02 NOTE — ED NOTES
Patient came in after post fall at home. She stated she felt dizzy. Small open scrap on RUE. Alert and orientated. No dizziness or SOB at this time. Denies being in pain.

## 2020-11-02 NOTE — ED NOTES
Patient was cleared for discharge per dr. Sagar Walters. Addressed wound. Patient had no questions on discharge instructions and follow up care. Alert and orientated. Vitals WNL. Was escorted via wheel chair with family member to ED entrance with all belongings.

## 2020-11-02 NOTE — TELEPHONE ENCOUNTER
Daughter states she called rescue squad and is in route to ED [facility]. Patient became very dizzy and fell. Did not trip on anything. Will give message to pcp for notification.

## 2020-11-02 NOTE — ED PROVIDER NOTES
EMERGENCY DEPARTMENT HISTORY AND PHYSICAL EXAM      Date: 11/2/2020  Patient Name: Kobe Emery  Patient Age and Sex: 80 y.o. female     History of Presenting Illness     Chief Complaint   Patient presents with    Fall     pt fell and hit her head this morning cause she slipped while cleaning. Reports she did hit her head small abrasion to forehead, denies LOC is on elequis. pt has no complaints and reports she is getting checked out per her family request    Skin Problem     skin tear to R forearm    Finger Pain     2nd digit R hand is swollen       History Provided By: Patient    HPI: Kobe Emery is an 70-year-old female presenting with ground-level fall. Patient states that earlier this morning, she bent down to get a rag and got up too quickly got slightly dizzy on getting up and fell forward. States that this has happened to her before. Denies hitting her head to me or any loss of consciousness. States that she did hit her right hand and forearm causing a skin tear of her right forearm and hand. Patient denies any pain. She is on Eliquis so her family wanted to get her checked out. There are no other complaints, changes, or physical findings at this time. PCP: Helena Taylor MD    No current facility-administered medications on file prior to encounter. Current Outpatient Medications on File Prior to Encounter   Medication Sig Dispense Refill    furosemide (LASIX) 20 mg tablet TAKE ONE  TABLET DAILY 90 Tab 3    atenoloL (TENORMIN) 25 mg tablet Take 1 Tab by mouth daily. 90 Tab 3    magnesium oxide (MAG-OX) 400 mg tablet Take 1 Tab by mouth daily. For leg cramps 30 Tab 5    dabigatran etexilate (Pradaxa) 75 mg capsule TAKE ONE CAPSULE BY MOUTH EVERY 12 HOURS 60 Cap 5    ferrous sulfate (IRON) 325 mg (65 mg iron) EC tablet Take 1 Tab by mouth two (2) times a day. 60 Tab 2    lisinopril (PRINIVIL, ZESTRIL) 5 mg tablet Take 1 Tab by mouth daily.  For heart 90 Tab 3    potassium chloride (K-DUR, KLOR-CON) 20 mEq tablet TAKE 1 TABLET BY MOUTH once daily 60 Tab 12    pantoprazole (PROTONIX) 40 mg tablet Take 1 Tab by mouth daily. To keep stomach from bleeding. 30 Tab 5    albuterol (PROVENTIL HFA, VENTOLIN HFA, PROAIR HFA) 90 mcg/actuation inhaler Take 2 Puffs by inhalation every four (4) hours as needed for Wheezing or Shortness of Breath. 1 Inhaler 5    levothyroxine (SYNTHROID) 100 mcg tablet TAKE ONE TABLET DAILY FOR THYROID 30 Tab 12    traZODone (DESYREL) 150 mg tablet TAKE 1 TABLET BY MOUTH NIGHTLY FOR SLEEP. 30 Tab 12    fluticasone propionate (FLONASE) 50 mcg/actuation nasal spray 2 Sprays by Both Nostrils route daily.  acetaminophen (TYLENOL ARTHRITIS PAIN) 650 mg TbER Take 650 mg by mouth every eight (8) hours.  CALCIUM 600 + D tablet TAKE 1 TABLET TWICE A DAY. (CALCIUM SUPPLEMENT) 60 Tab 0       Past History     Past Medical History:  Past Medical History:   Diagnosis Date    Arrhythmia     atrial fibrillation, chronic. Stress test 2011 essent.  normal.  Mild-mod AR on ECHO    Atrial fibrillation ([address]     Atrial flutter with rapid ventricular response ([address] 11/21/2019    Chronic atrial fibrillation ([address] 11/21/2019    GI bleeding     Hypertension     Refusal of blood transfusions as patient is [employer]     Thyroid disease     hypothyroid       Past Surgical History:  Past Surgical History:   Procedure Laterality Date    COLONOSCOPY N/A 5/2/2017    COLONOSCOPY performed by Susan Strange MD at [facility]    ECHO TRANSESOPHAGEAL (DENISE) W OR WO CONTR  4/27/2011         HX GYN      vaginal deliveries x10    HX TUBAL LIGATION      MT CARDIOVERSION ELECTIVE ARRHYTHMIA EXTERNAL  6/14/2011            Family History:  Family History   Problem Relation Age of Onset    Heart Disease Father     Cancer Son     Heart Disease Son        Social History:  Social History     Tobacco Use    Smoking status: Never Smoker    Smokeless tobacco: Never Used Substance Use Topics    Alcohol use: No    Drug use: No       Allergies: Allergies   Allergen Reactions    Seroquel [Quetiapine] Other (comments)     PSYCOLOGICAL REACTION Pt didn't feel like them selves but felt like a different person, iT DEEPLY DISTURBED PT.  PT BECAME IRRITABLE.  Ambien [Zolpidem] Other (comments)     Couldn't move         Review of Systems   Review of Systems   Constitutional: Negative for chills and fever. Respiratory: Negative for cough and shortness of breath. Cardiovascular: Negative for chest pain. Gastrointestinal: Negative for abdominal pain, constipation, diarrhea, nausea and vomiting. Genitourinary: Negative for dysuria, frequency and hematuria. Musculoskeletal: Positive for arthralgias. Skin: Positive for wound. Neurological: Negative for weakness and numbness. All other systems reviewed and are negative. Physical Exam   Physical Exam  Constitutional:       General: She is not in acute distress. Appearance: She is well-developed. HENT:      Head: Normocephalic and atraumatic. Nose: Nose normal.      Mouth/Throat:      Mouth: Mucous membranes are moist.   Eyes:      Extraocular Movements: Extraocular movements intact. Conjunctiva/sclera: Conjunctivae normal.   Neck:      Musculoskeletal: Normal range of motion. Cardiovascular:      Comments: Well perfused  Pulmonary:      Effort: Pulmonary effort is normal. No respiratory distress. Musculoskeletal: Normal range of motion. Comments: Patient has a hematoma over her index finger but able to move without any difficulty. Able to make a  with her right hand completely. Able to range all of her upper and lower extremities without any problem. Has some skin tears over her dorsum of the right forearm. Also has a small skin tear over her left wrist.  No bleeding   Neurological:      General: No focal deficit present.       Mental Status: She is alert and oriented to person, place, and time. Psychiatric:         Mood and Affect: Mood normal.          Diagnostic Study Results     Labs -   No results found for this or any previous visit (from the past 12 hour(s)). Radiologic Studies -   CT HEAD WO CONT   Final Result   IMPRESSION: No acute changes. XR HAND RT MIN 3 V    (Results Pending)     CT Results  (Last 48 hours)               11/02/20 1314  CT HEAD WO CONT Final result    Impression:  IMPRESSION: No acute changes. Narrative:  EXAM: CT HEAD WO CONT       INDICATION: head injury today after ground level fall, on eliquis       COMPARISON: None. CONTRAST: None. TECHNIQUE: Unenhanced CT of the head was performed using 5 mm images. Brain and   bone windows were generated. Coronal and sagittal reformats. CT dose reduction   was achieved through use of a standardized protocol tailored for this   examination and automatic exposure control for dose modulation. FINDINGS:   No extra-axial fluid collection, hemorrhage or shift. There is atrophy and white   matter disease. CXR Results  (Last 48 hours)    None            Medical Decision Making   I am the first provider for this patient. I reviewed the vital signs, available nursing notes, past medical history, past surgical history, family history and social history. Vital Signs-Reviewed the patient's vital signs. Patient Vitals for the past 12 hrs:   Temp Pulse Resp BP SpO2   11/02/20 1427     100 %   11/02/20 1425 97.8 °F (36.6 °C) 78 15 136/76 98 %   11/02/20 1236 97.7 °F (36.5 °C) 68 15 (!) 128/56 100 %       Records Reviewed: Nursing Notes and Old Medical Records    Provider Notes (Medical Decision Making):   Patient presenting with ground-level fall and questionable trauma. She states she did not hit her head however different story in triage. Will get x-ray of the hand and CT of the head. Low suspicion for anything acute. Will dress skin tears.     ED Course:   Initial assessment performed. The patients presenting problems have been discussed, and they are in agreement with the care plan formulated and outlined with them. I have encouraged them to ask questions as they arise throughout their visit. Critical Care Time:   0    Disposition:  Discharge Note:  The patient has been re-evaluated and is ready for discharge. Reviewed available results with patient. Counseled patient on diagnosis and care plan. Patient has expressed understanding, and all questions have been answered. Patient agrees with plan and agrees to follow up as recommended, or to return to the ED if their symptoms worsen. Discharge instructions have been provided and explained to the patient, along with reasons to return to the ED. PLAN:  Current Discharge Medication List      1.   2.   Follow-up Information     Follow up With Specialties Details Why Contact Info    Reese Holt MD Family Medicine  As needed Jannette Zamora.  [phone]          3. Return to ED if worse     Diagnosis     Clinical Impression:   1. Fall from ground level    2. Skin tear of right forearm without complication, initial encounter    3. Contusion of right hand, initial encounter        Attestations:    Bang Gonzalez M.D. Please note that this dictation was completed with [employer], the computer voice recognition software. Quite often unanticipated grammatical, syntax, homophones, and other interpretive errors are inadvertently transcribed by the computer software. Please disregard these errors. Please excuse any errors that have escaped final proofreading. Thank you.

## 2020-11-03 ENCOUNTER — PATIENT OUTREACH (OUTPATIENT)
Dept: CASE MANAGEMENT | Age: 85
End: 2020-11-03

## 2020-11-03 ENCOUNTER — OFFICE VISIT (OUTPATIENT)
Dept: FAMILY MEDICINE CLINIC | Age: 85
End: 2020-11-03
Payer: MEDICARE

## 2020-11-03 VITALS
DIASTOLIC BLOOD PRESSURE: 55 MMHG | HEIGHT: 63 IN | HEART RATE: 67 BPM | SYSTOLIC BLOOD PRESSURE: 130 MMHG | WEIGHT: 133.3 LBS | OXYGEN SATURATION: 95 % | RESPIRATION RATE: 16 BRPM | BODY MASS INDEX: 23.62 KG/M2 | TEMPERATURE: 97.1 F

## 2020-11-03 DIAGNOSIS — M79.642 LEFT HAND PAIN: ICD-10-CM

## 2020-11-03 DIAGNOSIS — M25.532 LEFT WRIST PAIN: Primary | ICD-10-CM

## 2020-11-03 PROCEDURE — 1090F PRES/ABSN URINE INCON ASSESS: CPT | Performed by: FAMILY MEDICINE

## 2020-11-03 PROCEDURE — G8536 NO DOC ELDER MAL SCRN: HCPCS | Performed by: FAMILY MEDICINE

## 2020-11-03 PROCEDURE — G8420 CALC BMI NORM PARAMETERS: HCPCS | Performed by: FAMILY MEDICINE

## 2020-11-03 PROCEDURE — 99213 OFFICE O/P EST LOW 20 MIN: CPT | Performed by: FAMILY MEDICINE

## 2020-11-03 PROCEDURE — G8510 SCR DEP NEG, NO PLAN REQD: HCPCS | Performed by: FAMILY MEDICINE

## 2020-11-03 PROCEDURE — 1101F PT FALLS ASSESS-DOCD LE1/YR: CPT | Performed by: FAMILY MEDICINE

## 2020-11-03 PROCEDURE — G8427 DOCREV CUR MEDS BY ELIG CLIN: HCPCS | Performed by: FAMILY MEDICINE

## 2020-11-03 RX ORDER — ACETAMINOPHEN 325 MG/1
650 TABLET ORAL
Qty: 60 TAB | Refills: 1
Start: 2020-11-03 | End: 2022-04-22 | Stop reason: SDUPTHER

## 2020-11-03 NOTE — PROGRESS NOTES
HISTORY OF PRESENT ILLNESS  Martha Plummer is a 80 y.o. female. HPI Got dizzy yesterday am, fell in kitchen. Went to ER, had xray of R arm and Ct of head. When camehome began having pain in L wrist and L thumb. ROS    Physical Exam  Vitals signs and nursing note reviewed. Constitutional:       Appearance: She is well-developed. HENT:      Right Ear: External ear normal.      Left Ear: External ear normal.   Neck:      Thyroid: No thyromegaly. Cardiovascular:      Rate and Rhythm: Normal rate and regular rhythm. Heart sounds: Normal heart sounds. Pulmonary:      Breath sounds: Normal breath sounds. No wheezing. Abdominal:      General: Bowel sounds are normal. There is no distension. Palpations: Abdomen is soft. There is no mass. Tenderness: There is no abdominal tenderness. Musculoskeletal:      Comments: L wrist mild swelling over carpal bones on radial side. Mild tenderness 1st cmc joint. Lymphadenopathy:      Cervical: No cervical adenopathy. ASSESSMENT and PLAN  Orders Placed This Encounter    XR WRIST LT AP/LAT    XR HAND LT AP/LAT    acetaminophen (TYLENOL) 325 mg tablet     Diagnoses and all orders for this visit:    1. Left wrist pain  -     XR WRIST LT AP/LAT; Future    2. Left hand pain  -     XR HAND LT AP/LAT; Future    Other orders  -     acetaminophen (TYLENOL) 325 mg tablet; Take 2 Tabs by mouth every six (6) hours as needed for Pain.

## 2020-11-03 NOTE — ACP (ADVANCE CARE PLANNING)
Advance Care Planning:   Does patient have an Advance Directive: yes; reviewed and current. Document was signed on 2011. Pt stated that Luda Muñoz, primary SDM, and Patrick De León, secondary SDM are still in the picture and will be speaking at her . In addition, Pt stated her daughters, Valentino Paola and Stephanie Lover, and Luda Muñoz and Patrick De León know her healthcare wishes. Pt stated her daughters will be at the hospital if she gets sick.

## 2020-11-03 NOTE — PROGRESS NOTES
Patient contacted regarding recent discharge and COVID-19 risk. [facility] ED 20 dx-GLF, skin tear Rt Forearm, contusion Rt hand (fall, skin problem, finger pain)    Discussed COVID-19 related testing which was not done at this time. Test results were not done. Patient informed of results, if available? N/a    Pt stated her right hand is sore and swollen. She is seeing PCP today. Care Transition Nurse/ Ambulatory Care Manager/ LPN Care Coordinator contacted the patient by telephone to perform post discharge assessment. Verified name and  with patient as identifiers. Patient has following risk factors of: heart failure, chronic kidney disease and HTN, older adult. CTN/ACM/LPN reviewed discharge instructions, medical action plan and red flags related to discharge diagnosis. Reviewed and educated them on any new and changed medications related to discharge diagnosis. Advised obtaining a 90-day supply of all daily and as-needed medications. Advance Care Planning:   Does patient have an Advance Directive: yes; reviewed and current     Education provided regarding infection prevention, and signs and symptoms of COVID-19 and when to seek medical attention with patient who verbalized understanding. Discussed exposure protocols and quarantine from [address] you at higher risk for severe illness 2019 and given an opportunity for questions and concerns. The patient agrees to contact the COVID-19 hotline [phone] or PCP office for questions related to their healthcare. CTN/ACM/LPN provided contact information for future reference. From CDC: Are you at higher risk for severe illness?  Wash your hands often.  Avoid close contact (6 feet, which is about two arm lengths) with people who are sick.  Put distance between yourself and other people if COVID-19 is spreading in your community.  Clean and disinfect frequently touched surfaces.    Avoid all cruise travel and non-essential air travel.  Call your healthcare professional if you have concerns about COVID-19 and your underlying condition or if you are sick. For more information on steps you can take to protect yourself, see CDC's How to Protect Yourself      Patient/family/caregiver given information for [employer] and agrees to enroll no    Plan for follow-up call in 7-14 days based on severity of symptoms and risk factors.

## 2020-11-03 NOTE — PROGRESS NOTES
Chief Complaint   Patient presents with    Follow-up     Here for follow up to fall she had yesterday. She went to ED and had x-rayed to right arm, but did not xray the left arm and it is hurting her today. She also has a wound from the fall on the right arm.

## 2020-11-15 RX ORDER — LEVOTHYROXINE SODIUM 100 UG/1
TABLET ORAL
Qty: 30 TAB | Refills: 0 | Status: SHIPPED | OUTPATIENT
Start: 2020-11-15 | End: 2020-12-18

## 2020-11-17 ENCOUNTER — PATIENT OUTREACH (OUTPATIENT)
Dept: CASE MANAGEMENT | Age: 85
End: 2020-11-17

## 2020-11-17 NOTE — PROGRESS NOTES
Patient resolved from Transition of Care episode on 11/17/20 - f/u-[facility] ED 11/2/20 dx-GLF, skin tear Rt Forearm, contusion Rt hand (fall, skin problem, finger pain)    Noted COVID-19 related testing which was not done at this time. Test results were not done. Patient informed of results, if available? n/a     Patient/family has been provided the following resources and education related to COVID-19:                         Signs, symptoms and red flags related to COVID-19            CDC exposure and quarantine guidelines            [facility] exposure contact - [phone]            Contact for their local Department of Health                 Patient currently reports that the following symptoms have improved:  skin problem, finger pain. No further outreach scheduled with this CTN/ACM/LPN/HC/ MA. Episode of Care resolved. Patient has this CTN/ACM/LPN/HC/MA contact information if future needs arise. Shortness of breath

## 2020-11-20 RX ORDER — TRAZODONE HYDROCHLORIDE 150 MG/1
TABLET ORAL
Qty: 30 TAB | Refills: 0 | Status: SHIPPED | OUTPATIENT
Start: 2020-11-20 | End: 2020-12-17

## 2020-12-17 RX ORDER — TRAZODONE HYDROCHLORIDE 150 MG/1
TABLET ORAL
Qty: 30 TAB | Refills: 0 | Status: SHIPPED | OUTPATIENT
Start: 2020-12-17 | End: 2021-01-24

## 2020-12-18 RX ORDER — POTASSIUM CHLORIDE 20 MEQ/1
TABLET, EXTENDED RELEASE ORAL
Qty: 60 TAB | Refills: 0 | Status: SHIPPED | OUTPATIENT
Start: 2020-12-18 | End: 2021-01-27 | Stop reason: ALTCHOICE

## 2020-12-18 RX ORDER — PANTOPRAZOLE SODIUM 40 MG/1
TABLET, DELAYED RELEASE ORAL
Qty: 30 TAB | Refills: 0 | Status: SHIPPED | OUTPATIENT
Start: 2020-12-18 | End: 2021-01-15

## 2020-12-18 RX ORDER — LEVOTHYROXINE SODIUM 100 UG/1
TABLET ORAL
Qty: 30 TAB | Refills: 0 | Status: SHIPPED | OUTPATIENT
Start: 2020-12-18 | End: 2021-01-15

## 2021-01-02 RX ORDER — LISINOPRIL 5 MG/1
TABLET ORAL
Qty: 90 TAB | Refills: 0 | Status: SHIPPED | OUTPATIENT
Start: 2021-01-02 | End: 2021-03-29

## 2021-01-15 RX ORDER — LEVOTHYROXINE SODIUM 100 UG/1
TABLET ORAL
Qty: 30 TAB | Refills: 0 | Status: SHIPPED | OUTPATIENT
Start: 2021-01-15 | End: 2021-01-27 | Stop reason: SDUPTHER

## 2021-01-15 RX ORDER — PANTOPRAZOLE SODIUM 40 MG/1
TABLET, DELAYED RELEASE ORAL
Qty: 30 TAB | Refills: 0 | Status: SHIPPED | OUTPATIENT
Start: 2021-01-15 | End: 2021-01-27 | Stop reason: SDUPTHER

## 2021-01-24 RX ORDER — TRAZODONE HYDROCHLORIDE 150 MG/1
TABLET ORAL
Qty: 30 TAB | Refills: 0 | Status: SHIPPED | OUTPATIENT
Start: 2021-01-24 | End: 2021-01-27

## 2021-01-27 ENCOUNTER — OFFICE VISIT (OUTPATIENT)
Dept: FAMILY MEDICINE CLINIC | Age: 86
End: 2021-01-27
Payer: MEDICARE

## 2021-01-27 VITALS
HEIGHT: 63 IN | TEMPERATURE: 96.8 F | DIASTOLIC BLOOD PRESSURE: 52 MMHG | WEIGHT: 140 LBS | SYSTOLIC BLOOD PRESSURE: 124 MMHG | HEART RATE: 62 BPM | RESPIRATION RATE: 16 BRPM | BODY MASS INDEX: 24.8 KG/M2 | OXYGEN SATURATION: 94 %

## 2021-01-27 DIAGNOSIS — G62.9 NEUROPATHY: ICD-10-CM

## 2021-01-27 DIAGNOSIS — F51.01 PRIMARY INSOMNIA: Primary | ICD-10-CM

## 2021-01-27 PROCEDURE — 99213 OFFICE O/P EST LOW 20 MIN: CPT | Performed by: FAMILY MEDICINE

## 2021-01-27 PROCEDURE — G8510 SCR DEP NEG, NO PLAN REQD: HCPCS | Performed by: FAMILY MEDICINE

## 2021-01-27 PROCEDURE — 1101F PT FALLS ASSESS-DOCD LE1/YR: CPT | Performed by: FAMILY MEDICINE

## 2021-01-27 PROCEDURE — G8427 DOCREV CUR MEDS BY ELIG CLIN: HCPCS | Performed by: FAMILY MEDICINE

## 2021-01-27 PROCEDURE — 1090F PRES/ABSN URINE INCON ASSESS: CPT | Performed by: FAMILY MEDICINE

## 2021-01-27 PROCEDURE — G8536 NO DOC ELDER MAL SCRN: HCPCS | Performed by: FAMILY MEDICINE

## 2021-01-27 PROCEDURE — G8419 CALC BMI OUT NRM PARAM NOF/U: HCPCS | Performed by: FAMILY MEDICINE

## 2021-01-27 RX ORDER — PANTOPRAZOLE SODIUM 40 MG/1
TABLET, DELAYED RELEASE ORAL
Qty: 90 TAB | Refills: 3 | Status: SHIPPED | OUTPATIENT
Start: 2021-01-27 | End: 2022-02-16

## 2021-01-27 RX ORDER — LEVOTHYROXINE SODIUM 100 UG/1
TABLET ORAL
Qty: 90 TAB | Refills: 3 | Status: SHIPPED | OUTPATIENT
Start: 2021-01-27 | End: 2022-02-27

## 2021-01-27 RX ORDER — TRAZODONE HYDROCHLORIDE 300 MG/1
TABLET ORAL
Qty: 30 TAB | Refills: 5 | Status: SHIPPED | OUTPATIENT
Start: 2021-01-27 | End: 2021-11-08

## 2021-01-27 RX ORDER — METOPROLOL SUCCINATE 25 MG/1
25 TABLET, EXTENDED RELEASE ORAL DAILY
COMMUNITY
Start: 2020-10-23 | End: 2021-02-05 | Stop reason: SDUPTHER

## 2021-01-27 RX ORDER — AMITRIPTYLINE HYDROCHLORIDE 25 MG/1
25 TABLET, FILM COATED ORAL
Qty: 30 TAB | Refills: 5 | Status: SHIPPED | OUTPATIENT
Start: 2021-01-27 | End: 2021-07-15

## 2021-01-27 RX ORDER — DABIGATRAN ETEXILATE 75 MG/1
CAPSULE ORAL
Qty: 180 CAP | Refills: 5 | Status: SHIPPED | OUTPATIENT
Start: 2021-01-27 | End: 2022-02-11

## 2021-01-27 RX ORDER — LANOLIN ALCOHOL/MO/W.PET/CERES
400 CREAM (GRAM) TOPICAL DAILY
Qty: 90 TAB | Refills: 3 | Status: SHIPPED | OUTPATIENT
Start: 2021-01-27 | End: 2022-02-16

## 2021-01-27 NOTE — PROGRESS NOTES
HISTORY OF PRESENT ILLNESS  Meghan Gonzalez is a 80 y.o. female. HPI Has been having some numbness in both feet for the last 6 weeks. No real back pain. Also isnt sleeping very well. Trazodone 150 mg- not helping her sleep. ROS    Physical Exam  Vitals signs and nursing note reviewed. Constitutional:       Appearance: She is well-developed. HENT:      Right Ear: External ear normal.      Left Ear: External ear normal.   Neck:      Thyroid: No thyromegaly. Cardiovascular:      Rate and Rhythm: Normal rate and regular rhythm. Heart sounds: Normal heart sounds. Pulmonary:      Breath sounds: Normal breath sounds. No wheezing. Abdominal:      General: Bowel sounds are normal. There is no distension. Palpations: Abdomen is soft. There is no mass. Tenderness: There is no abdominal tenderness. Lymphadenopathy:      Cervical: No cervical adenopathy. Neurological:      Comments: Gross motor intact. Decreased sensation of both feet. ASSESSMENT and PLAN  Orders Placed This Encounter    traZODone (DESYREL) 300 mg tablet    amitriptyline (ELAVIL) 25 mg tablet    dabigatran etexilate (Pradaxa) 75 mg capsule    magnesium oxide (MAG-OX) 400 mg tablet    pantoprazole (PROTONIX) 40 mg tablet    levothyroxine (SYNTHROID) 100 mcg tablet     Diagnoses and all orders for this visit:    1. Primary insomnia    2. Neuropathy    Other orders  -     traZODone (DESYREL) 300 mg tablet; One po qhs prn sleep  -     amitriptyline (ELAVIL) 25 mg tablet; Take 1 Tab by mouth nightly. For numbness in feet. -     dabigatran etexilate (Pradaxa) 75 mg capsule; TAKE ONE CAPSULE BY MOUTH EVERY 12 HOURS  -     magnesium oxide (MAG-OX) 400 mg tablet; Take 1 Tab by mouth daily. For leg cramps  -     pantoprazole (PROTONIX) 40 mg tablet; TAKE ONE TABLET BY MOUTH EVERY DAY, TO KEEP STOMACH FROM BLEEDING  -     levothyroxine (SYNTHROID) 100 mcg tablet;  One po daily for thyroid

## 2021-02-04 NOTE — PROGRESS NOTES
[facility] @ [facility]    Tyler Antis DNP, ANP-BC  Subjective/HPI:     Alysia Bhakta is a 80 y.o. female recently followed by Dr. Tyesha Matta for history of chronic atrial fibrillation anticoagulated with Pradaxa, hypertension, systolic diastolic heart failure is here for routine f/u. The patient denies chest pain/ shortness of breath, orthopnea, PND, LE edema, palpitations, syncope, presyncope or fatigue. Patient reports feeling well in her usual state of health she has received her first Covid vaccination. Regarding anticoagulation tolerating Pradaxa denies any falls lightheadedness dizziness. ECHO 11/2019  Interpretation Summary    · Left Ventricle: Normal cavity size. Upper normal wall thickness. Mild systolic dysfunction. Estimated left ventricular ejection fraction is 41 - 45%. Visually measured ejection fraction. Left ventricular global hypokinesis. · Aortic Valve: Aortic valve leaflet calcification present. Aortic valve mean gradient is 10.6 mmHg. Aortic valve area is 1.1 cm2. Mild aortic valve stenosis is present. Moderate aortic valve regurgitation is present. · Mitral Valve: Mild mitral annular calcification. Moderate mitral valve regurgitation is present. · Right Ventricle: Not well visualized. Borderline low systolic function. · Tricuspid Valve: Mild to moderate tricuspid valve regurgitation is present     NST 2017  Impression:   Myocardial perfusion imaging is normal. Overall left ventricular systolic function was normal. Compared to the study from 03/20/2011, the current study reveals more prominent attenuation artifact. These test results indicate low likelihood for the presence of angiographically significant coronary artery disease.       PCP Provider  Jeni Montez MD  Past Medical History:   Diagnosis Date    Arrhythmia     atrial fibrillation, chronic. Stress test 2011 essent.  normal.  Mild-mod AR on ECHO    Atrial fibrillation ([address]     Atrial flutter with rapid ventricular response [facility] 11/21/2019    Chronic atrial fibrillation ([facility] [address] 11/21/2019    GI bleeding     Hypertension     Refusal of blood transfusions as patient is Christian     Thyroid disease     hypothyroid      Past Surgical History:   Procedure Laterality Date    COLONOSCOPY N/A 5/2/2017    COLONOSCOPY performed by Polina Callejas [facility] at [facility] ENDOSCOPY    ECHO TRANSESOPHAGEAL (DENISE) W OR WO CONTR  4/27/2011         HX GYN      vaginal deliveries x10    HX TUBAL LIGATION      [facility] CARDIOVERSION ELECTIVE ARRHYTHMIA EXTERNAL  6/14/2011          Allergies   Allergen Reactions    Seroquel [Quetiapine] Other (comments)     PSYCOLOGICAL REACTION Pt didn't feel like them selves but felt like a different person, iT DEEPLY DISTURBED PT.  PT BECAME IRRITABLE.  Ambien [Zolpidem] Other (comments)     Couldn't move      Family History   Problem Relation Age of Onset    Heart Disease Father     Cancer Son     Heart Disease Son       Current Outpatient Medications   Medication Sig    traZODone (DESYREL) 150 mg tablet Take 0.5 Tabs by mouth nightly.  amitriptyline (ELAVIL) 25 mg tablet Take 1 Tab by mouth nightly. For numbness in feet.  dabigatran etexilate (Pradaxa) 75 mg capsule TAKE ONE CAPSULE BY MOUTH EVERY 12 HOURS    magnesium oxide (MAG-OX) 400 mg tablet Take 1 Tab by mouth daily. For leg cramps    pantoprazole (PROTONIX) 40 mg tablet TAKE ONE TABLET BY MOUTH EVERY DAY, TO KEEP STOMACH FROM BLEEDING    levothyroxine (SYNTHROID) 100 mcg tablet One po daily for thyroid    lisinopriL (PRINIVIL, ZESTRIL) 5 mg tablet TAKE ONE TABLET BY MOUTH EVERY DAY FOR THE HEART    acetaminophen (TYLENOL) 325 mg tablet Take 2 Tabs by mouth every six (6) hours as needed for Pain.  furosemide (LASIX) 20 mg tablet TAKE ONE  TABLET DAILY    atenoloL (TENORMIN) 25 mg tablet Take 1 Tab by mouth daily.     fluticasone propionate (FLONASE) 50 mcg/actuation nasal spray 2 Sprays by Both Nostrils route daily.  acetaminophen (TYLENOL ARTHRITIS PAIN) 650 mg TbER Take 650 mg by mouth every eight (8) hours.  CALCIUM 600 + D tablet TAKE 1 TABLET TWICE A DAY. (CALCIUM SUPPLEMENT)    traZODone (DESYREL) 300 mg tablet One po qhs prn sleep    albuterol (PROVENTIL HFA, VENTOLIN HFA, PROAIR HFA) 90 mcg/actuation inhaler Take 2 Puffs by inhalation every four (4) hours as needed for Wheezing or Shortness of Breath. No current facility-administered medications for this visit.        Vitals:    02/05/21 1416 02/05/21 1423   BP: 118/70 122/64   Pulse: (!) 59    Resp: 16    Temp: 97 °F (36.1 °C)    TempSrc: Temporal    SpO2: 96%    Weight: 137 lb 9.6 oz (62.4 kg)    Height: 5' 2.5\" (1.588 m)      Social History     Socioeconomic History    Marital status:      Spouse name: Not on file    Number of children: Not on file    Years of education: Not on file    Highest education level: Not on file   Occupational History    Not on file   Social Needs    Financial resource strain: Not on file    Food insecurity     Worry: Not on file     Inability: Not on file    Transportation needs     Medical: Not on file     Non-medical: Not on file   Tobacco Use    Smoking status: Never Smoker    Smokeless tobacco: Never Used   Substance and Sexual Activity    Alcohol use: No    Drug use: No    Sexual activity: Never   Lifestyle    Physical activity     Days per week: Not on file     Minutes per session: Not on file    Stress: Not on file   Relationships    Social connections     Talks on phone: Not on file     Gets together: Not on file     Attends Jew service: Not on file     Active member of club or organization: Not on file     Attends meetings of clubs or organizations: Not on file     Relationship status: Not on file    Intimate partner violence     Fear of current or ex partner: Not on file     Emotionally abused: Not on file     Physically abused: Not on file     Forced sexual activity: Not on file   Other Topics Concern    Not on file   Social History Narrative    Not on file       I have reviewed the nurses notes, vitals, problem list, allergy list, medical history, family, social history and medications. Review of Symptoms:  11 systems reviewed, negative other than as stated in the HPI      Physical Exam:      General: Well developed, in no acute distress, cooperative and alert  HEENT: No carotid bruits, no JVD, trach is midline. Neck Supple, PERRL, EOM intact. Heart:  Normal S1/S2 negative S3 or S4. Regular, 1/6 systolic murmur, no gallop or rub. Respiratory: Clear bilaterally x 4, no wheezing or rales  Abdomen:   Soft, non-tender, no masses, bowel sounds are active. Extremities:  No edema, normal cap refill, no cyanosis, atraumatic. Neuro: A&Ox3, speech clear, gait is slow and cautious  Skin: Skin color is normal.  Diffuse lower extremity spider varicosities no rashes or lesions. Non diaphoretic  Vascular: 2+ pulses symmetric in all extremities    Cardiographics    ECG: Rate controlled atrial fibrillation        Cardiology Labs:  Lab Results   Component Value Date/Time    Cholesterol, total 94 (L) 02/17/2017 09:52 AM    HDL Cholesterol 30 (L) 02/17/2017 09:52 AM    LDL, calculated 46 02/17/2017 09:52 AM    Triglyceride 90 02/17/2017 09:52 AM    CHOL/HDL Ratio 3.1 04/20/2010 01:10 AM       Lab Results   Component Value Date/Time    Sodium 141 09/03/2020 03:09 PM    Potassium 3.8 09/03/2020 03:09 PM    Chloride 106 09/03/2020 03:09 PM    CO2 22 09/03/2020 03:09 PM    Anion gap 7 12/09/2019 03:52 AM    Glucose 108 (H) 09/03/2020 03:09 PM    BUN 14 09/03/2020 03:09 PM    Creatinine 1.14 (H) 09/03/2020 03:09 PM    BUN/Creatinine ratio 12 09/03/2020 03:09 PM    GFR est AA 49 (L) 09/03/2020 03:09 PM    GFR est non-AA 43 (L) 09/03/2020 03:09 PM    Calcium 8.8 09/03/2020 03:09 PM    Bilirubin, total 0.3 05/28/2020 10:35 AM    Alk.  phosphatase 66 05/28/2020 10:35 AM    Protein, total 8.0 05/28/2020 10:35 AM    Albumin 4.2 05/28/2020 10:35 AM    Globulin 5.0 (H) 12/08/2019 12:28 PM    A-G Ratio 1.1 (L) 05/28/2020 10:35 AM    ALT (SGPT) 10 05/28/2020 10:35 AM           Assessment:     Assessment:     Diagnoses and all orders for this visit:    1. Chronic atrial fibrillation (HCC)  -     AMB POC EKG ROUTINE W/ 12 LEADS, INTER & REP    2. Essential hypertension    3. Chronic systolic congestive heart failure (HCC)    4. Stage 3 chronic kidney disease, unspecified whether stage 3a or 3b CKD    5. Nonrheumatic aortic valve stenosis    6. Nonrheumatic mitral valve regurgitation    7. Nonrheumatic aortic valve insufficiency        ICD-10-CM ICD-9-CM    1. Chronic atrial fibrillation (HCC)  I48.20 427.31 AMB POC EKG ROUTINE W/ 12 LEADS, INTER & REP   2. Essential hypertension  I10 401.9    3. Chronic systolic congestive heart failure (HCC)  I50.22 428.22      428.0    4. Stage 3 chronic kidney disease, unspecified whether stage 3a or 3b CKD  N18.30 585.3    5. Nonrheumatic aortic valve stenosis  I35.0 424.1    6. Nonrheumatic mitral valve regurgitation  I34.0 424.0    7. Nonrheumatic aortic valve insufficiency  I35.1 424.1      Orders Placed This Encounter    AMB POC EKG ROUTINE W/ 12 LEADS, INTER & REP     Order Specific Question:   Reason for Exam:     Answer:   screen        Plan:     1. Chronic rate controlled atrial fibrillation: Maintain beta-blocker anticoagulated with Pradaxa 75 mg twice a day. Age 80, weight 60 kg range,  2. Hypertension: Controlled 122/64  3. Systolic heart failure: Ejection fraction 40-45% 2019. On atenolol, ACE inhibitor, low-dose diuretic. (Creatinine 1.14 9/2020 ) [place] heart class 1  4. Aortic stenosis/regurgitation: Mild aortic stenosis moderate aortic regurgitation 2019 echo, on ACE inhibitor will check echo prior to in the year, clinically asymptomatic  5.   Moderate mitral regurgitation: Clinically asymptomatic blood pressure controlled on ACE inhibitor  49-year-old female with a history of chronic rate controlled atrial fibrillation, systolic heart failure ejection fraction 40-45% clinically presents euvolemic no dyspnea on exertion orthopnea  [place] heart class I.  Stable from cardiac perspective follow-up in 6 months. Plan for echo 1 week prior to 6-month follow-up. Santi Herrera, NP      Please note that this dictation was completed with [employer], the computer voice recognition software. Quite often unanticipated grammatical, syntax, homophones, and other interpretive errors are inadvertently transcribed by the computer software. Please disregard these errors. Please excuse any errors that have escaped final proofreading. Thank you.

## 2021-02-05 ENCOUNTER — OFFICE VISIT (OUTPATIENT)
Dept: CARDIOLOGY CLINIC | Age: 86
End: 2021-02-05
Payer: MEDICARE

## 2021-02-05 VITALS
WEIGHT: 137.6 LBS | BODY MASS INDEX: 24.38 KG/M2 | OXYGEN SATURATION: 96 % | SYSTOLIC BLOOD PRESSURE: 122 MMHG | HEART RATE: 59 BPM | HEIGHT: 63 IN | RESPIRATION RATE: 16 BRPM | DIASTOLIC BLOOD PRESSURE: 64 MMHG | TEMPERATURE: 97 F

## 2021-02-05 DIAGNOSIS — I35.1 NONRHEUMATIC AORTIC VALVE INSUFFICIENCY: ICD-10-CM

## 2021-02-05 DIAGNOSIS — I34.0 NONRHEUMATIC MITRAL VALVE REGURGITATION: ICD-10-CM

## 2021-02-05 DIAGNOSIS — I48.20 CHRONIC ATRIAL FIBRILLATION (HCC): Primary | ICD-10-CM

## 2021-02-05 DIAGNOSIS — I50.22 CHRONIC SYSTOLIC CONGESTIVE HEART FAILURE (HCC): ICD-10-CM

## 2021-02-05 DIAGNOSIS — I10 ESSENTIAL HYPERTENSION: ICD-10-CM

## 2021-02-05 DIAGNOSIS — N18.30 STAGE 3 CHRONIC KIDNEY DISEASE, UNSPECIFIED WHETHER STAGE 3A OR 3B CKD (HCC): ICD-10-CM

## 2021-02-05 DIAGNOSIS — I35.0 NONRHEUMATIC AORTIC VALVE STENOSIS: ICD-10-CM

## 2021-02-05 PROCEDURE — 1101F PT FALLS ASSESS-DOCD LE1/YR: CPT | Performed by: NURSE PRACTITIONER

## 2021-02-05 PROCEDURE — 93000 ELECTROCARDIOGRAM COMPLETE: CPT | Performed by: NURSE PRACTITIONER

## 2021-02-05 PROCEDURE — G8536 NO DOC ELDER MAL SCRN: HCPCS | Performed by: NURSE PRACTITIONER

## 2021-02-05 PROCEDURE — G8432 DEP SCR NOT DOC, RNG: HCPCS | Performed by: NURSE PRACTITIONER

## 2021-02-05 PROCEDURE — 99214 OFFICE O/P EST MOD 30 MIN: CPT | Performed by: NURSE PRACTITIONER

## 2021-02-05 PROCEDURE — 1090F PRES/ABSN URINE INCON ASSESS: CPT | Performed by: NURSE PRACTITIONER

## 2021-02-05 PROCEDURE — G8427 DOCREV CUR MEDS BY ELIG CLIN: HCPCS | Performed by: NURSE PRACTITIONER

## 2021-02-05 PROCEDURE — G8420 CALC BMI NORM PARAMETERS: HCPCS | Performed by: NURSE PRACTITIONER

## 2021-02-05 RX ORDER — TRAZODONE HYDROCHLORIDE 150 MG/1
75 TABLET ORAL
Qty: 45 TAB | Refills: 3 | Status: SHIPPED | OUTPATIENT
Start: 2021-02-05 | End: 2022-03-01 | Stop reason: ALTCHOICE

## 2021-02-05 NOTE — TELEPHONE ENCOUNTER
Patient daughter is requesting RX refill dabigatran etexilate (Pradaxa) 75 mg capsule and also she need a refill traZODone (DESYREL) 150 mg tablet  can he drop it down to 75  Mg please give daughter Fernanda Chua a call @ [phone]

## 2021-02-05 NOTE — TELEPHONE ENCOUNTER
Daughter states Trazodone 300 mg along with Amitriptyline 25 mg is too much, patient sleeps all night and then all day. Daughter had some 150's left so she has been cutting them in half, patient has been sleeping with no problem and then able to get up next day. Requesting a decrease in medicine.

## 2021-02-05 NOTE — PROGRESS NOTES
Identified pt with two pt identifiers(name and ). Reviewed record in preparation for visit and have obtained necessary documentation. Chief Complaint   Patient presents with    Follow-up        Health Maintenance Due   Topic    COVID-19 Vaccine (1 of 2)    GLAUCOMA SCREENING Q2Y     Shingrix Vaccine Age 50> (2 of 2)        Visit Vitals  /64 (BP 1 Location: Right upper arm, BP Patient Position: Sitting, BP Cuff Size: Adult)   Pulse (!) 59   Temp 97 °F (36.1 °C) (Temporal)   Resp 16   Ht 5' 2.5\" (1.588 m)   Wt 137 lb 9.6 oz (62.4 kg)   SpO2 96%   BMI 24.77 kg/m²     Pain Scale: 0 - No pain/10    Coordination of Care Questionnaire:  :   1. Have you been to the ER, urgent care clinic since your last visit? Hospitalized since your last visit? yes    2. Have you seen or consulted any other health care providers outside of the [address] [place] [address] since your last visit? Include any pap smears or colon screening.  No

## 2021-02-23 RX ORDER — POTASSIUM CHLORIDE 20 MEQ/1
TABLET, EXTENDED RELEASE ORAL
Qty: 60 TAB | Refills: 0 | Status: SHIPPED | OUTPATIENT
Start: 2021-02-23 | End: 2021-04-02 | Stop reason: SDUPTHER

## 2021-03-29 RX ORDER — LISINOPRIL 5 MG/1
TABLET ORAL
Qty: 90 TAB | Refills: 0 | Status: SHIPPED | OUTPATIENT
Start: 2021-03-29 | End: 2021-04-02 | Stop reason: SDUPTHER

## 2021-04-02 ENCOUNTER — OFFICE VISIT (OUTPATIENT)
Dept: FAMILY MEDICINE CLINIC | Age: 86
End: 2021-04-02
Payer: MEDICARE

## 2021-04-02 VITALS
OXYGEN SATURATION: 93 % | SYSTOLIC BLOOD PRESSURE: 149 MMHG | DIASTOLIC BLOOD PRESSURE: 56 MMHG | RESPIRATION RATE: 16 BRPM | HEART RATE: 74 BPM | TEMPERATURE: 96.9 F | HEIGHT: 63 IN | BODY MASS INDEX: 25.69 KG/M2 | WEIGHT: 145 LBS

## 2021-04-02 DIAGNOSIS — E03.9 ACQUIRED HYPOTHYROIDISM: Primary | ICD-10-CM

## 2021-04-02 DIAGNOSIS — I10 ESSENTIAL HYPERTENSION: ICD-10-CM

## 2021-04-02 LAB
ALBUMIN SERPL-MCNC: 3.5 G/DL (ref 3.5–5)
ALBUMIN/GLOB SERPL: 0.7 {RATIO} (ref 1.1–2.2)
ALP SERPL-CCNC: 58 U/L (ref 45–117)
ALT SERPL-CCNC: 13 U/L (ref 12–78)
ANION GAP SERPL CALC-SCNC: 5 MMOL/L (ref 5–15)
AST SERPL-CCNC: 21 U/L (ref 15–37)
BASOPHILS # BLD: 0 K/UL (ref 0–0.1)
BASOPHILS NFR BLD: 1 % (ref 0–1)
BILIRUB SERPL-MCNC: 0.3 MG/DL (ref 0.2–1)
BUN SERPL-MCNC: 15 MG/DL (ref 6–20)
BUN/CREAT SERPL: 12 (ref 12–20)
CALCIUM SERPL-MCNC: 8.2 MG/DL (ref 8.5–10.1)
CHLORIDE SERPL-SCNC: 105 MMOL/L (ref 97–108)
CO2 SERPL-SCNC: 26 MMOL/L (ref 21–32)
CREAT SERPL-MCNC: 1.22 MG/DL (ref 0.55–1.02)
DIFFERENTIAL METHOD BLD: ABNORMAL
EOSINOPHIL # BLD: 0.1 K/UL (ref 0–0.4)
EOSINOPHIL NFR BLD: 2 % (ref 0–7)
ERYTHROCYTE [DISTWIDTH] IN BLOOD BY AUTOMATED COUNT: 17.2 % (ref 11.5–14.5)
GLOBULIN SER CALC-MCNC: 5 G/DL (ref 2–4)
GLUCOSE SERPL-MCNC: 86 MG/DL (ref 65–100)
HCT VFR BLD AUTO: 33.9 % (ref 35–47)
HGB BLD-MCNC: 10 G/DL (ref 11.5–16)
IMM GRANULOCYTES # BLD AUTO: 0 K/UL (ref 0–0.04)
IMM GRANULOCYTES NFR BLD AUTO: 0 % (ref 0–0.5)
LYMPHOCYTES # BLD: 2.2 K/UL (ref 0.8–3.5)
LYMPHOCYTES NFR BLD: 33 % (ref 12–49)
MCH RBC QN AUTO: 26.7 PG (ref 26–34)
MCHC RBC AUTO-ENTMCNC: 29.5 G/DL (ref 30–36.5)
MCV RBC AUTO: 90.6 FL (ref 80–99)
MONOCYTES # BLD: 0.7 K/UL (ref 0–1)
MONOCYTES NFR BLD: 11 % (ref 5–13)
NEUTS SEG # BLD: 3.6 K/UL (ref 1.8–8)
NEUTS SEG NFR BLD: 53 % (ref 32–75)
NRBC # BLD: 0 K/UL (ref 0–0.01)
NRBC BLD-RTO: 0 PER 100 WBC
PLATELET # BLD AUTO: 150 K/UL (ref 150–400)
PMV BLD AUTO: 11.7 FL (ref 8.9–12.9)
POTASSIUM SERPL-SCNC: 4.3 MMOL/L (ref 3.5–5.1)
PROT SERPL-MCNC: 8.5 G/DL (ref 6.4–8.2)
RBC # BLD AUTO: 3.74 M/UL (ref 3.8–5.2)
SODIUM SERPL-SCNC: 136 MMOL/L (ref 136–145)
TSH SERPL DL<=0.05 MIU/L-ACNC: 0.82 UIU/ML (ref 0.36–3.74)
WBC # BLD AUTO: 6.6 K/UL (ref 3.6–11)

## 2021-04-02 PROCEDURE — 1090F PRES/ABSN URINE INCON ASSESS: CPT | Performed by: FAMILY MEDICINE

## 2021-04-02 PROCEDURE — G8427 DOCREV CUR MEDS BY ELIG CLIN: HCPCS | Performed by: FAMILY MEDICINE

## 2021-04-02 PROCEDURE — G8419 CALC BMI OUT NRM PARAM NOF/U: HCPCS | Performed by: FAMILY MEDICINE

## 2021-04-02 PROCEDURE — 99213 OFFICE O/P EST LOW 20 MIN: CPT | Performed by: FAMILY MEDICINE

## 2021-04-02 PROCEDURE — G8510 SCR DEP NEG, NO PLAN REQD: HCPCS | Performed by: FAMILY MEDICINE

## 2021-04-02 PROCEDURE — G8536 NO DOC ELDER MAL SCRN: HCPCS | Performed by: FAMILY MEDICINE

## 2021-04-02 PROCEDURE — 1101F PT FALLS ASSESS-DOCD LE1/YR: CPT | Performed by: FAMILY MEDICINE

## 2021-04-02 RX ORDER — LISINOPRIL 5 MG/1
TABLET ORAL
Qty: 90 TAB | Refills: 3 | Status: SHIPPED | OUTPATIENT
Start: 2021-04-02 | End: 2022-03-01 | Stop reason: SDUPTHER

## 2021-04-02 RX ORDER — POTASSIUM CHLORIDE 20 MEQ/1
TABLET, EXTENDED RELEASE ORAL
Qty: 60 TAB | Refills: 12 | Status: SHIPPED | OUTPATIENT
Start: 2021-04-02 | End: 2021-07-16

## 2021-04-02 RX ORDER — SPIRONOLACTONE 25 MG
TABLET ORAL
COMMUNITY
Start: 2021-03-29 | End: 2021-04-02 | Stop reason: SDUPTHER

## 2021-04-02 RX ORDER — CLOTRIMAZOLE AND BETAMETHASONE DIPROPIONATE 10; .64 MG/G; MG/G
CREAM TOPICAL
Qty: 45 G | Refills: 5 | Status: SHIPPED | OUTPATIENT
Start: 2021-04-02 | End: 2022-04-22

## 2021-04-02 NOTE — PROGRESS NOTES
HISTORY OF PRESENT ILLNESS  Reanna Terry is a 80 y.o. female. HPI In for checkup. Has had one dose of the covid vaccine. Needs blood pressure checked. No complaints of chest pain, shortness of breath, TIAs, claudication or edema. Has had a nighttime cough at times, takes cough syrup. nonsmoker    ROS    Physical Exam  Vitals signs and nursing note reviewed. Constitutional:       Appearance: She is well-developed. HENT:      Right Ear: External ear normal.      Left Ear: External ear normal.   Neck:      Thyroid: No thyromegaly. Cardiovascular:      Rate and Rhythm: Normal rate and regular rhythm. Heart sounds: Normal heart sounds. Pulmonary:      Breath sounds: Normal breath sounds. No wheezing. Abdominal:      General: Bowel sounds are normal. There is no distension. Palpations: Abdomen is soft. There is no mass. Tenderness: There is no abdominal tenderness. Lymphadenopathy:      Cervical: No cervical adenopathy. ASSESSMENT and PLAN  Orders Placed This Encounter    CBC WITH AUTOMATED DIFF    METABOLIC PANEL, COMPREHENSIVE    TSH 3RD GENERATION    T4,FREE(DIRECT)    clotrimazole-betamethasone (LOTRISONE) topical cream    potassium chloride (K-DUR, KLOR-CON) 20 mEq tablet    lisinopriL (PRINIVIL, ZESTRIL) 5 mg tablet     Diagnoses and all orders for this visit:    1. Acquired hypothyroidism  -     TSH 3RD GENERATION; Future  -     T4,FREE(DIRECT); Future    2. Essential hypertension  -     CBC WITH AUTOMATED DIFF; Future  -     METABOLIC PANEL, COMPREHENSIVE; Future    Other orders  -     clotrimazole-betamethasone (LOTRISONE) topical cream; Apply to affected area bid  -     potassium chloride (K-DUR, KLOR-CON) 20 mEq tablet; TAKE 1 TABLET BY MOUTH TWICE A DAY. (POTASSIUM)  -     lisinopriL (PRINIVIL, ZESTRIL) 5 mg tablet; TAKE ONE TABLET BY MOUTH EVERY DAY FOR THE HEART      Follow-up and Dispositions    · Return in about 6 months (around 10/2/2021).

## 2021-04-12 ENCOUNTER — OFFICE VISIT (OUTPATIENT)
Dept: FAMILY MEDICINE CLINIC | Age: 86
End: 2021-04-12
Payer: MEDICARE

## 2021-04-12 VITALS
WEIGHT: 143 LBS | BODY MASS INDEX: 25.34 KG/M2 | TEMPERATURE: 97.8 F | HEIGHT: 63 IN | DIASTOLIC BLOOD PRESSURE: 72 MMHG | SYSTOLIC BLOOD PRESSURE: 144 MMHG | HEART RATE: 69 BPM | OXYGEN SATURATION: 95 % | RESPIRATION RATE: 16 BRPM

## 2021-04-12 DIAGNOSIS — R42 DIZZINESS: ICD-10-CM

## 2021-04-12 DIAGNOSIS — M72.0 DUPUYTREN CONTRACTURE: Primary | ICD-10-CM

## 2021-04-12 PROCEDURE — 1101F PT FALLS ASSESS-DOCD LE1/YR: CPT | Performed by: STUDENT IN AN ORGANIZED HEALTH CARE EDUCATION/TRAINING PROGRAM

## 2021-04-12 PROCEDURE — 99213 OFFICE O/P EST LOW 20 MIN: CPT | Performed by: STUDENT IN AN ORGANIZED HEALTH CARE EDUCATION/TRAINING PROGRAM

## 2021-04-12 PROCEDURE — G8432 DEP SCR NOT DOC, RNG: HCPCS | Performed by: STUDENT IN AN ORGANIZED HEALTH CARE EDUCATION/TRAINING PROGRAM

## 2021-04-12 PROCEDURE — 1090F PRES/ABSN URINE INCON ASSESS: CPT | Performed by: STUDENT IN AN ORGANIZED HEALTH CARE EDUCATION/TRAINING PROGRAM

## 2021-04-12 PROCEDURE — G8419 CALC BMI OUT NRM PARAM NOF/U: HCPCS | Performed by: STUDENT IN AN ORGANIZED HEALTH CARE EDUCATION/TRAINING PROGRAM

## 2021-04-12 PROCEDURE — G8536 NO DOC ELDER MAL SCRN: HCPCS | Performed by: STUDENT IN AN ORGANIZED HEALTH CARE EDUCATION/TRAINING PROGRAM

## 2021-04-12 PROCEDURE — G8427 DOCREV CUR MEDS BY ELIG CLIN: HCPCS | Performed by: STUDENT IN AN ORGANIZED HEALTH CARE EDUCATION/TRAINING PROGRAM

## 2021-04-12 NOTE — PROGRESS NOTES
[address] Q[address]ada[address] Leonid, [address] [place] [address]  [phone]    Chief Complaint:Dizziness and Right hand contracture    Subjective  Vicki Simon is a [address] y.[address] female , established patient, here for evaluation of the above    Right hand Contractures: On the rossi aspect of the right hand. Present for a long time. Does not bother her with daily activities. Dizziness: Intermittent. Only present when she abruptly stands from a sitting position which pt admits to doing a lot. States that she as always been very active throughout her entire life. No falls. No headache, chest pain, sob. Admits to not drinking enough water. Currently without symptoms. Allergies - reviewed: Allergies   Allergen Reactions    Seroquel [Quetiapine] Other (comments)     PSYCOLOGICAL REACTION Pt didn't feel like them selves but felt like a different person, iT DEEPLY DISTURBED PT.  PT BECAME IRRITABLE.  Ambien [Zolpidem] Other (comments)     Couldn't move       Medications - reviewed:   Current Outpatient Medications   Medication Sig    clotrimazole-betamethasone (LOTRISONE) topical cream Apply to affected area bid    potassium chloride (K-DUR, KLOR-CON) 20 mEq tablet TAKE 1 TABLET BY MOUTH TWICE A DAY. (POTASSIUM)    lisinopriL (PRINIVIL, ZESTRIL) 5 mg tablet TAKE ONE TABLET BY MOUTH EVERY DAY FOR THE HEART    traZODone (DESYREL) 150 mg tablet Take 0.5 Tabs by mouth nightly.  amitriptyline (ELAVIL) 25 mg tablet Take 1 Tab by mouth nightly. For numbness in feet.  dabigatran etexilate (Pradaxa) 75 mg capsule TAKE ONE CAPSULE BY MOUTH EVERY 12 HOURS    magnesium oxide (MAG-OX) 400 mg tablet Take 1 Tab by mouth daily.  For leg cramps    pantoprazole (PROTONIX) 40 mg tablet TAKE ONE TABLET BY MOUTH EVERY DAY, TO KEEP STOMACH FROM BLEEDING    levothyroxine (SYNTHROID) 100 mcg tablet One po daily for thyroid    acetaminophen (TYLENOL) 325 mg tablet Take 2 Tabs by mouth every six (6) hours as needed for Pain.  furosemide (LASIX) 20 mg tablet TAKE ONE  TABLET DAILY    atenoloL (TENORMIN) 25 mg tablet Take 1 Tab by mouth daily.  fluticasone propionate (FLONASE) 50 mcg/actuation nasal spray 2 Sprays by Both Nostrils route daily.  acetaminophen (TYLENOL ARTHRITIS PAIN) 650 mg TbER Take 650 mg by mouth every eight (8) hours.  CALCIUM 600 + D tablet TAKE 1 TABLET TWICE A DAY. (CALCIUM SUPPLEMENT)    traZODone (DESYREL) 300 mg tablet One po qhs prn sleep     No current facility-administered medications for this visit. Family History - reviewed:  Family History   Problem Relation Age of Onset    Heart Disease Father     Cancer Son     Heart Disease Son        Past Medical History - reviewed:  Past Medical History:   Diagnosis Date    Arrhythmia     atrial fibrillation, chronic. Stress test 2011 essent. normal.  Mild-mod AR on ECHO    Atrial fibrillation ([address]     Atrial flutter with rapid ventricular response ([address] 11/21/2019    Chronic atrial fibrillation ([address] 11/21/2019    GI bleeding     Hypertension     Refusal of blood transfusions as patient is Sikhism     Thyroid disease     hypothyroid       Review of systems:     A comprehensive review of systems was negative except for that written in the History of Present Illness. Physical Exam  Visit Vitals  BP (!) 144/72 (BP 1 Location: Right upper arm, BP Patient Position: Sitting, BP Cuff Size: Adult)   Pulse 69   Temp 97.8 °F (36.6 °C) (Temporal)   Resp 16   Ht 5' 2.5\" (1.588 m)   Wt 143 lb (64.9 kg)   SpO2 95%   BMI 25.74 kg/m²       General: Alert and oriented, in no acute distress. Well nourished. SKIN: No rash. No suspicious lesions or moles. EYE: PERRL. Sclera and conjuctival clear. Extraocular movements intact. EARS: External normal, canals clear, tympanic membranes normal.   NOSE: Mucosa healthy without drainage or ulceration.   OROPHARYNX: No suspicious lesions, normal dentition, pharynx, tongue and tonsils normal.  NECK: Supple; no masses; thyroid normal.  LUNGS: Respirations unlabored; clear to auscultation bilaterally. CARDIOVASCULAR: Regular, rate, and rhythm without murmurs, gallops or rubs. EXT: No edema. Neurovascularlly intact. Normal gait. Neurological: Alert and oriented X 3, normal strength and tone. Normal symmetric reflexes. Normal coordination and gait  MSK:   RIGHT HAND: palpable discrete nodules along the course of the flexor tendons near the distal palmar crease or the 3 middle fingers    Assessment/Plan    ICD-10-CM ICD-9-CM    1. Dupuytren contracture  M72.0 728.6    2. Dizziness  R42 780.4        1. Dupuytren contracture, right hand:  Reassured pt that this may spontaneously subside over a few months. She is currently without any pain. Discussed possible steroid injections if persistent. 2. Dizziness  Likely Orthostatics hypotension based on history. Reviewed recent labs (4/2/2021). - Encouraged plenty of fluids  - To take her time while standing from a sitting position   - consider a walker or cane if needed    Follow up: with pcp. RTC sooner if persistent, failure to improve as anticipated or worsening symptoms. We discussed the expected course, resolution and complications of the diagnosis(es) in detail. Medication risks, benefits, costs, interactions, and alternatives were discussed as indicated. I advised him to contact the office if his condition worsens, changes or fails to improve as anticipated. He expressed understanding with the diagnosis(es) and plan. Patient understands that this encounter was a temporary measure, and the importance of further follow up and examination was emphasized. Patient verbalized understanding.       Signed By: Yesica Story MD     April 12, 2021

## 2021-04-12 NOTE — PROGRESS NOTES
Name and  Verified. Patient of Dr. Renata Gunter    Chief Complaint   Patient presents with    Hand Pain     Right for a while a small knot    Dizziness     3-4 months       1. Have you been to the ER, urgent care clinic since your last visit? Hospitalized since your last visit? No    2. Have you seen or consulted any other health care providers outside of the [address] [place] [address] since your last visit? Include any pap smears or colon screening.  No

## 2021-04-26 RX ORDER — METOPROLOL SUCCINATE 25 MG/1
TABLET, EXTENDED RELEASE ORAL
Qty: 90 TAB | Refills: 0 | Status: SHIPPED | OUTPATIENT
Start: 2021-04-26 | End: 2021-10-19

## 2021-04-27 NOTE — PROGRESS NOTES
0730: Received report from [place]ito Verde, [facility] and Fabiana Tran RN. Report included SBAR, Kardex, MAR, Recent results, and I/O.    0830: Holding lasix due to elevated creatinine and potassium 3.3. Paged Dr. Chandrika Naidu for further orders. 1200: Received verbal order from Dr. Emy Thomas to hold lasix. Text SUPPORT1 to 52952 to learn if you may be eligible for financial support with your medication(s). Msg & Data Rates May Apply. Msg freq varies. Terms apply. Text HELP for help. Text STOP to end.    Oxybutynin extended-release tablets  Brand Name: Martell Dash problems like dicyclomine, hyoscyamine  · certain medicines for travel sickness like scopolamine  · clarithromycin  · erythromycin  · ipratropium  · medicines for fungal infections, like fluconazole, itraconazole, ketoconazole or voriconazole    What if I you wear contact lenses, you may feel some discomfort. Lubricating drops may help. See your eyecare professional if the problem does not go away or is severe. You may notice the shells of the tablets in your stool from time to time.  This is normal.  Avoid

## 2021-07-09 ENCOUNTER — ANCILLARY PROCEDURE (OUTPATIENT)
Dept: CARDIOLOGY CLINIC | Age: 86
End: 2021-07-09
Payer: MEDICARE

## 2021-07-09 VITALS
BODY MASS INDEX: 25.34 KG/M2 | DIASTOLIC BLOOD PRESSURE: 72 MMHG | WEIGHT: 143 LBS | SYSTOLIC BLOOD PRESSURE: 144 MMHG | HEIGHT: 63 IN

## 2021-07-09 DIAGNOSIS — I50.22 CHRONIC SYSTOLIC CONGESTIVE HEART FAILURE (HCC): ICD-10-CM

## 2021-07-09 DIAGNOSIS — I34.0 NONRHEUMATIC MITRAL VALVE REGURGITATION: ICD-10-CM

## 2021-07-09 DIAGNOSIS — I35.0 NONRHEUMATIC AORTIC VALVE STENOSIS: ICD-10-CM

## 2021-07-09 DIAGNOSIS — I10 ESSENTIAL HYPERTENSION: ICD-10-CM

## 2021-07-09 DIAGNOSIS — I35.1 NONRHEUMATIC AORTIC VALVE INSUFFICIENCY: ICD-10-CM

## 2021-07-09 DIAGNOSIS — N18.30 STAGE 3 CHRONIC KIDNEY DISEASE, UNSPECIFIED WHETHER STAGE 3A OR 3B CKD (HCC): ICD-10-CM

## 2021-07-09 DIAGNOSIS — I48.20 CHRONIC ATRIAL FIBRILLATION (HCC): ICD-10-CM

## 2021-07-09 PROCEDURE — 93306 TTE W/DOPPLER COMPLETE: CPT | Performed by: INTERNAL MEDICINE

## 2021-07-12 LAB
ECHO AO ASC DIAM: 3.84 CM
ECHO AO ROOT DIAM: 2.99 CM
ECHO AV AREA PEAK VELOCITY: 1.04 CM2
ECHO AV AREA VTI: 1.03 CM2
ECHO AV AREA/BSA PEAK VELOCITY: 0.6 CM2/M2
ECHO AV AREA/BSA VTI: 0.6 CM2/M2
ECHO AV MEAN GRADIENT: 18.14 MMHG
ECHO AV PEAK GRADIENT: 34.63 MMHG
ECHO AV PEAK VELOCITY: 294.22 CM/S
ECHO AV VTI: 59.52 CM
ECHO EST RA PRESSURE: 3 MMHG
ECHO LA AREA 4C: 22.03 CM2
ECHO LA MAJOR AXIS: 4.11 CM
ECHO LA MINOR AXIS: 2.46 CM
ECHO LA VOL 2C: 86.18 ML (ref 22–52)
ECHO LA VOL 4C: 62.7 ML (ref 22–52)
ECHO LA VOL BP: 78.96 ML (ref 22–52)
ECHO LA VOL/BSA BIPLANE: 47.28 ML/M2 (ref 16–28)
ECHO LA VOLUME INDEX A2C: 51.6 ML/M2 (ref 16–28)
ECHO LA VOLUME INDEX A4C: 37.54 ML/M2 (ref 16–28)
ECHO LV E' LATERAL VELOCITY: 12.67 CM/S
ECHO LV EDV A2C: 33.85 ML
ECHO LV EDV A4C: 35.91 ML
ECHO LV EDV BP: 35.45 ML (ref 56–104)
ECHO LV EDV INDEX A4C: 21.5 ML/M2
ECHO LV EDV INDEX BP: 21.2 ML/M2
ECHO LV EDV NDEX A2C: 20.3 ML/M2
ECHO LV EJECTION FRACTION A2C: 53 PERCENT
ECHO LV EJECTION FRACTION A4C: 56 PERCENT
ECHO LV EJECTION FRACTION BIPLANE: 55.3 PERCENT (ref 55–100)
ECHO LV ESV A2C: 15.86 ML
ECHO LV ESV A4C: 15.78 ML
ECHO LV ESV BP: 15.85 ML (ref 19–49)
ECHO LV ESV INDEX A2C: 9.5 ML/M2
ECHO LV ESV INDEX A4C: 9.4 ML/M2
ECHO LV ESV INDEX BP: 9.5 ML/M2
ECHO LV GLOBAL LONGITUDINAL STRAIN (GLS): -11 PERCENT
ECHO LV INTERNAL DIMENSION DIASTOLIC: 4.57 CM (ref 3.9–5.3)
ECHO LV INTERNAL DIMENSION SYSTOLIC: 3.29 CM
ECHO LV ISOVOLUMETRIC RELAXATION TIME (IVRT): 47.57 MS
ECHO LV IVSD: 1.08 CM (ref 0.6–0.9)
ECHO LV MASS 2D: 179.4 G (ref 67–162)
ECHO LV MASS INDEX 2D: 107.4 G/M2 (ref 43–95)
ECHO LV POSTERIOR WALL DIASTOLIC: 1.12 CM (ref 0.6–0.9)
ECHO LVOT DIAM: 2.04 CM
ECHO LVOT PEAK GRADIENT: 3.52 MMHG
ECHO LVOT PEAK VELOCITY: 93.86 CM/S
ECHO LVOT SV: 61.2 ML
ECHO LVOT VTI: 18.76 CM
ECHO MV "A" WAVE DURATION: 121.79 MS
ECHO MV A VELOCITY: 54.59 CM/S
ECHO MV E DECELERATION TIME (DT): 172.9 MS
ECHO MV E VELOCITY: 134.11 CM/S
ECHO MV E/A RATIO: 2.46
ECHO MV E/E' LATERAL: 10.58
ECHO RIGHT VENTRICULAR SYSTOLIC PRESSURE (RVSP): 40.67 MMHG
ECHO TV REGURGITANT MAX VELOCITY: 306.87 CM/S
ECHO TV REGURGITANT PEAK GRADIENT: 37.67 MMHG
GLOBAL LONGITUDINAL STRAIN 2 CHAMBER: -11.2 PERCENT
GLOBAL LONGITUDINAL STRAIN 4 CHAMBER: -12.3 PERCENT
GLOBAL LONGITUDINAL STRAIN LONG AXIS: -9.5 PERCENT
LA VOL DISK BP: 76.63 ML (ref 22–52)
LVOT MG: 2.01 MMHG

## 2021-07-13 ENCOUNTER — TELEPHONE (OUTPATIENT)
Dept: CARDIOLOGY CLINIC | Age: 86
End: 2021-07-13

## 2021-07-13 NOTE — PROGRESS NOTES
Rubi: Please call patient ultrasound of the heart looks good, pumping strength of her heart has improved since the last time we checked it is now in normal range, aortic valve has some mild stenosis and mild leakage which is a little bit better than the last echo, has moderate amount of leakage in the mitral valve, as expected for 80 years young. Overall stable looking echo. Needs a 6-month follow-up visit from the last time I saw her I don't see one booked.  Thanks

## 2021-07-13 NOTE — TELEPHONE ENCOUNTER
----- Message from Shannan Padron NP sent at 7/13/2021  8:26 AM EDT -----  Zach Dickerson: Please call patient ultrasound of the heart looks good, pumping strength of her heart has improved since the last time we checked it is now in normal range, aortic valve has some mild stenosis and mild leakage which is a little bit better than the last echo, has moderate amount of leakage in the mitral valve, as expected for 80 years young. Overall stable looking echo. Needs a 6-month follow-up visit from the last time I saw her I don't see one booked.  Thanks

## 2021-07-13 NOTE — TELEPHONE ENCOUNTER
Spoke with patient. Verified patient with two patient identifiers. Discussed result note below from provider with patient in detail. Made aware of needing a f/u appt in 6mos from last visit, so August 2021. Patient verbalized understanding. Patient stated she had no questions at this time. Will ask PSR to scheduled a f/u appt.

## 2021-07-15 RX ORDER — AMITRIPTYLINE HYDROCHLORIDE 25 MG/1
25 TABLET, FILM COATED ORAL
Qty: 30 TABLET | Refills: 0 | Status: SHIPPED | OUTPATIENT
Start: 2021-07-15 | End: 2021-08-16

## 2021-07-16 RX ORDER — POTASSIUM CHLORIDE 20 MEQ/1
TABLET, EXTENDED RELEASE ORAL
Qty: 30 TABLET | Refills: 12
Start: 2021-07-16 | End: 2022-03-01 | Stop reason: ALTCHOICE

## 2021-07-20 ENCOUNTER — APPOINTMENT (OUTPATIENT)
Dept: CT IMAGING | Age: 86
End: 2021-07-20
Attending: EMERGENCY MEDICINE
Payer: MEDICARE

## 2021-07-20 ENCOUNTER — APPOINTMENT (OUTPATIENT)
Dept: GENERAL RADIOLOGY | Age: 86
End: 2021-07-20
Attending: EMERGENCY MEDICINE
Payer: MEDICARE

## 2021-07-20 ENCOUNTER — HOSPITAL ENCOUNTER (EMERGENCY)
Age: 86
Discharge: HOME | End: 2021-07-20
Attending: EMERGENCY MEDICINE
Payer: MEDICARE

## 2021-07-20 VITALS
BODY MASS INDEX: 25.48 KG/M2 | HEIGHT: 62 IN | WEIGHT: 138.45 LBS | DIASTOLIC BLOOD PRESSURE: 53 MMHG | OXYGEN SATURATION: 94 % | SYSTOLIC BLOOD PRESSURE: 103 MMHG | HEART RATE: 78 BPM | TEMPERATURE: 99.4 F | RESPIRATION RATE: 18 BRPM

## 2021-07-20 DIAGNOSIS — N30.00 ACUTE CYSTITIS WITHOUT HEMATURIA: Primary | ICD-10-CM

## 2021-07-20 DIAGNOSIS — S09.90XA CLOSED HEAD INJURY, INITIAL ENCOUNTER: ICD-10-CM

## 2021-07-20 LAB
ALBUMIN SERPL-MCNC: 3.2 G/DL (ref 3.5–5)
ALBUMIN/GLOB SERPL: 0.6 {RATIO} (ref 1.1–2.2)
ALP SERPL-CCNC: 66 U/L (ref 45–117)
ALT SERPL-CCNC: 30 U/L (ref 12–78)
ANION GAP SERPL CALC-SCNC: 7 MMOL/L (ref 5–15)
APPEARANCE UR: ABNORMAL
AST SERPL-CCNC: 71 U/L (ref 15–37)
BACTERIA URNS QL MICRO: ABNORMAL /HPF
BASOPHILS # BLD: 0 K/UL (ref 0–0.1)
BASOPHILS NFR BLD: 1 % (ref 0–1)
BILIRUB SERPL-MCNC: 0.2 MG/DL (ref 0.2–1)
BILIRUB UR QL: NEGATIVE
BUN SERPL-MCNC: 11 MG/DL (ref 6–20)
BUN/CREAT SERPL: 8 (ref 12–20)
CALCIUM SERPL-MCNC: 8.4 MG/DL (ref 8.5–10.1)
CHLORIDE SERPL-SCNC: 108 MMOL/L (ref 97–108)
CO2 SERPL-SCNC: 24 MMOL/L (ref 21–32)
COLOR UR: ABNORMAL
CREAT SERPL-MCNC: 1.39 MG/DL (ref 0.55–1.02)
DIFFERENTIAL METHOD BLD: ABNORMAL
EOSINOPHIL # BLD: 0.1 K/UL (ref 0–0.4)
EOSINOPHIL NFR BLD: 3 % (ref 0–7)
EPITH CASTS URNS QL MICRO: ABNORMAL /LPF
ERYTHROCYTE [DISTWIDTH] IN BLOOD BY AUTOMATED COUNT: 16.8 % (ref 11.5–14.5)
GLOBULIN SER CALC-MCNC: 5.7 G/DL (ref 2–4)
GLUCOSE SERPL-MCNC: 109 MG/DL (ref 65–100)
GLUCOSE UR STRIP.AUTO-MCNC: NEGATIVE MG/DL
HCT VFR BLD AUTO: 35.6 % (ref 35–47)
HGB BLD-MCNC: 11.1 G/DL (ref 11.5–16)
HGB UR QL STRIP: NEGATIVE
IMM GRANULOCYTES # BLD AUTO: 0 K/UL (ref 0–0.04)
IMM GRANULOCYTES NFR BLD AUTO: 0 % (ref 0–0.5)
KETONES UR QL STRIP.AUTO: NEGATIVE MG/DL
LEUKOCYTE ESTERASE UR QL STRIP.AUTO: ABNORMAL
LYMPHOCYTES # BLD: 2.5 K/UL (ref 0.8–3.5)
LYMPHOCYTES NFR BLD: 48 % (ref 12–49)
MCH RBC QN AUTO: 28.2 PG (ref 26–34)
MCHC RBC AUTO-ENTMCNC: 31.2 G/DL (ref 30–36.5)
MCV RBC AUTO: 90.6 FL (ref 80–99)
MONOCYTES # BLD: 0.6 K/UL (ref 0–1)
MONOCYTES NFR BLD: 12 % (ref 5–13)
MUCOUS THREADS URNS QL MICRO: ABNORMAL /LPF
NEUTS SEG # BLD: 1.9 K/UL (ref 1.8–8)
NEUTS SEG NFR BLD: 36 % (ref 32–75)
NITRITE UR QL STRIP.AUTO: NEGATIVE
NRBC # BLD: 0 K/UL (ref 0–0.01)
NRBC BLD-RTO: 0 PER 100 WBC
PH UR STRIP: 5.5 [PH] (ref 5–8)
PLATELET # BLD AUTO: 141 K/UL (ref 150–400)
PMV BLD AUTO: 10.9 FL (ref 8.9–12.9)
POTASSIUM SERPL-SCNC: 4 MMOL/L (ref 3.5–5.1)
PROT SERPL-MCNC: 8.9 G/DL (ref 6.4–8.2)
PROT UR STRIP-MCNC: ABNORMAL MG/DL
RBC # BLD AUTO: 3.93 M/UL (ref 3.8–5.2)
RBC #/AREA URNS HPF: ABNORMAL /HPF (ref 0–5)
SODIUM SERPL-SCNC: 139 MMOL/L (ref 136–145)
SP GR UR REFRACTOMETRY: 1.02 (ref 1–1.03)
TROPONIN I SERPL-MCNC: <0.05 NG/ML
UA: UC IF INDICATED,UAUC: ABNORMAL
UROBILINOGEN UR QL STRIP.AUTO: 0.2 EU/DL (ref 0.2–1)
WBC # BLD AUTO: 5.2 K/UL (ref 3.6–11)
WBC URNS QL MICRO: ABNORMAL /HPF (ref 0–4)

## 2021-07-20 PROCEDURE — 81001 URINALYSIS AUTO W/SCOPE: CPT

## 2021-07-20 PROCEDURE — 99285 EMERGENCY DEPT VISIT HI MDM: CPT

## 2021-07-20 PROCEDURE — 93005 ELECTROCARDIOGRAM TRACING: CPT

## 2021-07-20 PROCEDURE — 70450 CT HEAD/BRAIN W/O DYE: CPT

## 2021-07-20 PROCEDURE — 84484 ASSAY OF TROPONIN QUANT: CPT

## 2021-07-20 PROCEDURE — 36415 COLL VENOUS BLD VENIPUNCTURE: CPT

## 2021-07-20 PROCEDURE — 87086 URINE CULTURE/COLONY COUNT: CPT

## 2021-07-20 PROCEDURE — 87186 SC STD MICRODIL/AGAR DIL: CPT

## 2021-07-20 PROCEDURE — 85025 COMPLETE CBC W/AUTO DIFF WBC: CPT

## 2021-07-20 PROCEDURE — 74011250637 HC RX REV CODE- 250/637: Performed by: EMERGENCY MEDICINE

## 2021-07-20 PROCEDURE — 71045 X-RAY EXAM CHEST 1 VIEW: CPT

## 2021-07-20 PROCEDURE — 87077 CULTURE AEROBIC IDENTIFY: CPT

## 2021-07-20 PROCEDURE — 80053 COMPREHEN METABOLIC PANEL: CPT

## 2021-07-20 RX ORDER — CEPHALEXIN 250 MG/1
500 CAPSULE ORAL
Status: COMPLETED | OUTPATIENT
Start: 2021-07-20 | End: 2021-07-20

## 2021-07-20 RX ORDER — CEPHALEXIN 500 MG/1
500 CAPSULE ORAL 4 TIMES DAILY
Qty: 28 CAPSULE | Refills: 0 | Status: SHIPPED | OUTPATIENT
Start: 2021-07-20 | End: 2021-07-27

## 2021-07-20 RX ADMIN — CEPHALEXIN 500 MG: 250 CAPSULE ORAL at 23:05

## 2021-07-21 LAB
ATRIAL RATE: 91 BPM
CALCULATED R AXIS, ECG10: -68 DEGREES
CALCULATED T AXIS, ECG11: 49 DEGREES
DIAGNOSIS, 93000: NORMAL
Q-T INTERVAL, ECG07: 414 MS
QRS DURATION, ECG06: 142 MS
QTC CALCULATION (BEZET), ECG08: 498 MS
VENTRICULAR RATE, ECG03: 87 BPM

## 2021-07-21 NOTE — ED PROVIDER NOTES
EMERGENCY DEPARTMENT HISTORY AND PHYSICAL EXAM     ------------------------------------------------------------------------------------------------------  Please note that this dictation was completed with [employer] the [employer] voice recognition software. Quite often unanticipated grammatical, syntax, homophones, and other interpretive errors are inadvertently transcribed by the computer software. Please disregard these errors. Please excuse any errors that have escaped final proofreading.  -----------------------------------------------------------------------------------------------------------------    Date: 7/20/2021  Patient Name: Nathalie Will    History of Presenting Illness     Chief Complaint   Patient presents with    Altered mental status     Ambulatory into the ED accompanied by her daughter, due to confusion and unsteady gait x several weeks. Today, she told her daughter she hit the top of her head on a cabinet door a few weeks ago - large indention to the area, but no wound. Alert and interacting appropriately in triage. Ambulatory with standby assist. Daughter is concerned that the head injury may be the cause of her sx over the last few weeks.  Head Injury       History Provided By: Patient    HPI: Nathalie Will is a 80 y.o. female, with significant pmhx of chronic atrial fibrillation, osteopenia, hypothyroidism, hypertension, who presents via private vehicle  to the ED with c/o confusion and having struck her head on a cabinet. Patient's family notes that she has her head on occasion and most notably hit her head several days ago on the back of her head. Patient has no complaints of my evaluation. Family notes that she is \"annoyed that she told the family that she had hit her head\" prompting them to bring her to the emergency department. Currently on Pradaxa twice daily for history of chronic atrial fibrillation.   Pt also specifically denies any recent fevers, chills, CP, SOB, nausea, vomiting, diarrhea, abd pain, changes in BM, urinary sxs, or headache. PCP: Patricia Milton MD    Social Hx: denies tobacco, denies EtOH, denies recreational/ Illicit Drugs     There are no other complaints, changes, or physical findings at this time. Allergies   Allergen Reactions    Seroquel [Quetiapine] Other (comments)     PSYCOLOGICAL REACTION Pt didn't feel like them selves but felt like a different person, iT DEEPLY DISTURBED PT.  PT BECAME IRRITABLE.  Ambien [Zolpidem] Other (comments)     Couldn't move         Current Facility-Administered Medications   Medication Dose Route Frequency Provider Last Rate Last Admin    cephALEXin (KEFLEX) capsule 500 mg  500 mg Oral NOW Yvette Jarvis MD         Current Outpatient Medications   Medication Sig Dispense Refill    cephALEXin (Keflex) 500 mg capsule Take 1 Capsule by mouth four (4) times daily for 7 days. 28 Capsule 0    potassium chloride (K-DUR, KLOR-CON) 20 mEq tablet TAKE 1 TABLET BY MOUTH once daily. (POTASSIUM) 30 Tablet 12    amitriptyline (ELAVIL) 25 mg tablet Take 1 Tab by mouth nightly. For numbness in feet. 30 Tablet 0    metoprolol succinate (TOPROL-XL) 25 mg XL tablet TAKE ONE TABLET BY MOUTH EVERY DAY FOR THE HEART 90 Tab 0    clotrimazole-betamethasone (LOTRISONE) topical cream Apply to affected area bid 45 g 5    lisinopriL (PRINIVIL, ZESTRIL) 5 mg tablet TAKE ONE TABLET BY MOUTH EVERY DAY FOR THE HEART 90 Tab 3    traZODone (DESYREL) 150 mg tablet Take 0.5 Tabs by mouth nightly. 45 Tab 3    traZODone (DESYREL) 300 mg tablet One po qhs prn sleep 30 Tab 5    dabigatran etexilate (Pradaxa) 75 mg capsule TAKE ONE CAPSULE BY MOUTH EVERY 12 HOURS 180 Cap 5    magnesium oxide (MAG-OX) 400 mg tablet Take 1 Tab by mouth daily.  For leg cramps 90 Tab 3    pantoprazole (PROTONIX) 40 mg tablet TAKE ONE TABLET BY MOUTH EVERY DAY, TO KEEP STOMACH FROM BLEEDING 90 Tab 3    levothyroxine (SYNTHROID) 100 mcg tablet One po daily for thyroid 90 Tab 3    acetaminophen (TYLENOL) 325 mg tablet Take 2 Tabs by mouth every six (6) hours as needed for Pain. 60 Tab 1    furosemide (LASIX) 20 mg tablet TAKE ONE  TABLET DAILY 90 Tab 3    atenoloL (TENORMIN) 25 mg tablet Take 1 Tab by mouth daily. 90 Tab 3    fluticasone propionate (FLONASE) 50 mcg/actuation nasal spray 2 Sprays by Both Nostrils route daily.  acetaminophen (TYLENOL ARTHRITIS PAIN) 650 mg TbER Take 650 mg by mouth every eight (8) hours.  CALCIUM 600 + D tablet TAKE 1 TABLET TWICE A DAY. (CALCIUM SUPPLEMENT) 60 Tab 0       Past History     Past Medical History:  Past Medical History:   Diagnosis Date    Arrhythmia     atrial fibrillation, chronic. Stress test 2011 essent. normal.  Mild-mod AR on ECHO    Atrial fibrillation ([address]     Atrial flutter with rapid ventricular response ([address] 11/21/2019    Chronic atrial fibrillation ([address] 11/21/2019    GI bleeding     Hypertension     Refusal of blood transfusions as patient is Jewish     Thyroid disease     hypothyroid       Past Surgical History:  Past Surgical History:   Procedure Laterality Date    COLONOSCOPY N/A 5/2/2017    COLON[facility]ab[facility] END[facility]COPY    ECHO TRANSESOPHAGEAL (DENISE) W OR WO CONTR  4/27/2011         HX GYN      vaginal deliveries x10    HX TUBAL LIGATION      UT CARDIOVERSION ELECTIVE ARRHYTHMIA EXTERNAL  6/14/2011            Family History:  Family History   Problem Relation Age of Onset    Heart Disease Father     Cancer Son     Heart Disease Son        Social History:  Social History     Tobacco Use    Smoking status: Never Smoker    Smokeless tobacco: Never Used   Substance Use Topics    Alcohol use: No    Drug use: No       Allergies: Allergies   Allergen Reactions    Seroquel [Quetiapine] Other (comments)     PSYCOLOGICAL REACTION Pt didn't feel like them selves but felt like a different person, iT DEEPLY DISTURBED PT.  PT BECAME IRRITABLE.  Ambien [Zolpidem] Other (comments)     Couldn't move         Review of Systems   Review of Systems   Constitutional: Negative. Negative for fever. Eyes: Negative. Respiratory: Negative. Negative for shortness of breath. Cardiovascular: Negative for chest pain. Gastrointestinal: Negative for abdominal pain, nausea and vomiting. Endocrine: Negative. Genitourinary: Negative. Negative for difficulty urinating, dysuria and hematuria. Musculoskeletal: Negative. Skin: Negative. Neurological: Negative. Psychiatric/Behavioral: Negative for suicidal ideas. All other systems reviewed and are negative. Physical Exam   Physical Exam  Vitals and nursing note reviewed. Constitutional:       General: She is not in acute distress. Appearance: She is well-developed. She is not diaphoretic. HENT:      Head:        Comments: Area of scalp that feels more flat than other areas without crepitus, erythema, wound or tenderness     Nose: Nose normal.   Eyes:      General: No scleral icterus. Conjunctiva/sclera: Conjunctivae normal.   Neck:      Trachea: No tracheal deviation. Cardiovascular:      Rate and Rhythm: Normal rate and regular rhythm. Heart sounds: Normal heart sounds. No murmur heard. No friction rub. Pulmonary:      Effort: Pulmonary effort is normal. No respiratory distress. Breath sounds: Normal breath sounds. No stridor. No wheezing or rales. Abdominal:      General: Bowel sounds are normal. There is no distension. Palpations: Abdomen is soft. Tenderness: There is no abdominal tenderness. There is no rebound. Musculoskeletal:         General: No tenderness. Normal range of motion. Cervical back: Normal range of motion. Skin:     General: Skin is warm and dry. Findings: No rash.       Comments: Small circular areas to bilateral lower extremities previously diagnosed as ringworm and currently being treated with topical ointment by primary care   Neurological:      Mental Status: She is alert and oriented to person, place, and time. Cranial Nerves: No cranial nerve deficit. Psychiatric:         Speech: Speech normal.         Behavior: Behavior normal.         Thought Content: Thought content normal.         Judgment: Judgment normal.           Diagnostic Study Results     Labs -     Recent Results (from the past 12 hour(s))   EKG, 12 LEAD, INITIAL    Collection Time: 07/20/21  8:43 PM   Result Value Ref Range    Ventricular Rate 87 BPM    Atrial Rate 91 BPM    QRS Duration 142 ms    Q-T Interval 414 ms    QTC Calculation (Bezet) 498 ms    Calculated R Axis -68 degrees    Calculated T Axis 49 degrees    Diagnosis       Atrial fibrillation  Left axis deviation  Right bundle branch block  When compared with ECG of 08-DEC-2019 11:54,  Right bundle branch block is now present     CBC WITH AUTOMATED DIFF    Collection Time: 07/20/21  8:46 PM   Result Value Ref Range    WBC 5.2 3.6 - 11.0 K/uL    RBC 3.93 3.80 - 5.20 M/uL    HGB 11.1 (L) 11.5 - 16.0 g/dL    HCT 35.6 35.0 - 47.0 %    MCV 90.6 80.0 - 99.0 FL    MCH 28.2 26.0 - 34.0 PG    MCHC 31.2 30.0 - 36.5 g/dL    RDW 16.8 (H) 11.5 - 14.5 %    PLATELET 534 (L) 466 - 400 K/uL    MPV 10.9 8.9 - 12.9 FL    NRBC 0.0 0  WBC    ABSOLUTE NRBC 0.00 0.00 - 0.01 K/uL    NEUTROPHILS 36 32 - 75 %    LYMPHOCYTES 48 12 - 49 %    MONOCYTES 12 5 - 13 %    EOSINOPHILS 3 0 - 7 %    BASOPHILS 1 0 - 1 %    IMMATURE GRANULOCYTES 0 0.0 - 0.5 %    ABS. NEUTROPHILS 1.9 1.8 - 8.0 K/UL    ABS. LYMPHOCYTES 2.5 0.8 - 3.5 K/UL    ABS. MONOCYTES 0.6 0.0 - 1.0 K/UL    ABS. EOSINOPHILS 0.1 0.0 - 0.4 K/UL    ABS. BASOPHILS 0.0 0.0 - 0.1 K/UL    ABS. IMM.  GRANS. 0.0 0.00 - 0.04 K/UL    DF AUTOMATED     METABOLIC PANEL, COMPREHENSIVE    Collection Time: 07/20/21  8:46 PM   Result Value Ref Range    Sodium 139 136 - 145 mmol/L    Potassium 4.0 3.5 - 5.1 mmol/L    Chloride 108 97 - 108 mmol/L    CO2 24 21 - 32 mmol/L Anion gap 7 5 - 15 mmol/L    Glucose 109 (H) 65 - 100 mg/dL    BUN 11 6 - 20 MG/DL    Creatinine 1.39 (H) 0.55 - 1.02 MG/DL    BUN/Creatinine ratio 8 (L) 12 - 20      GFR est AA 43 (L) >60 ml/min/1.73m2    GFR est non-AA 36 (L) >60 ml/min/1.73m2    Calcium 8.4 (L) 8.5 - 10.1 MG/DL    Bilirubin, total 0.2 0.2 - 1.0 MG/DL    ALT (SGPT) 30 12 - 78 U/L    AST (SGOT) 71 (H) 15 - 37 U/L    Alk. phosphatase 66 45 - 117 U/L    Protein, total 8.9 (H) 6.4 - 8.2 g/dL    Albumin 3.2 (L) 3.5 - 5.0 g/dL    Globulin 5.7 (H) 2.0 - 4.0 g/dL    A-G Ratio 0.6 (L) 1.1 - 2.2     TROPONIN I    Collection Time: 07/20/21  8:46 PM   Result Value Ref Range    Troponin-I, Qt. <0.05 <0.05 ng/mL   URINALYSIS W/ REFLEX CULTURE    Collection Time: 07/20/21  9:21 PM    Specimen: Urine   Result Value Ref Range    Color YELLOW/STRAW      Appearance CLOUDY (A) CLEAR      Specific gravity 1.019 1.003 - 1.030      pH (UA) 5.5 5.0 - 8.0      Protein TRACE (A) NEG mg/dL    Glucose Negative NEG mg/dL    Ketone Negative NEG mg/dL    Bilirubin Negative NEG      Blood Negative NEG      Urobilinogen 0.2 0.2 - 1.0 EU/dL    Nitrites Negative NEG      Leukocyte Esterase MODERATE (A) NEG      WBC 20-50 0 - 4 /hpf    RBC 0-5 0 - 5 /hpf    Epithelial cells FEW FEW /lpf    Bacteria 1+ (A) NEG /hpf    UA:UC IF INDICATED URINE CULTURE ORDERED (A) CNI      Mucus 1+ (A) NEG /lpf       Radiologic Studies -   CT HEAD WO CONT   Final Result   1. No evidence of acute infarct or intracranial hemorrhage. 2. Periventricular white matter disease is likely secondary to chronic small   vessel ischemic changes. XR CHEST PORT   Final Result   No acute cardiopulmonary process. CT Results  (Last 48 hours)               07/20/21 2144  CT HEAD WO CONT Final result    Impression:  1. No evidence of acute infarct or intracranial hemorrhage. 2. Periventricular white matter disease is likely secondary to chronic small   vessel ischemic changes.            Narrative: Indication:  trauma, ams        Comparison: CT November 2020       Findings: 5 mm axial images were obtained from the skull base through the   vertex. CT dose reduction was achieved through the use of a standardized protocol   tailored for this examination and automatic exposure control for dose   modulation. The ventricles and cortical sulci are prominent, compatible with age related   volume loss. There is no evidence of intracranial hemorrhage, mass, mass effect,   or acute infarct. 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 There is   periventricular white matter disease. No extra-axial fluid collections are seen. The visualized paranasal sinuses and mastoid air cells are clear. The orbital   structures are unremarkable. No osseous abnormalities are seen. CXR Results  (Last 48 hours)               07/20/21 2124  XR CHEST PORT Final result    Impression:  No acute cardiopulmonary process. Narrative:  EXAM: XR CHEST PORT       HISTORY: ams. COMPARISON: 12/8/2019       FINDINGS: Single view(s) of the chest. The lungs are well inflated. No focal   consolidation, pleural effusion, or pneumothorax. The cardiomediastinal   silhouette is unremarkable. The visualized osseous structures are unremarkable. Medical Decision Making   I am the first provider for this patient. I reviewed the vital signs, available nursing notes, past medical history, past surgical history, family history and social history. Vital Signs-Reviewed the patient's vital signs.   Patient Vitals for the past 12 hrs:   Temp Pulse Resp BP SpO2   07/20/21 2240 -- 78 18 (!) 103/53 94 %   07/20/21 2056 -- 89 16 114/63 93 %   07/20/21 2039 99.4 °F (37.4 °C) 99 12 137/79 96 %       Pulse Oximetry Analysis - 93% on RA Normal    Records Reviewed/Interpretted: Nursing Notes from triage and Old Medical Records, any previous primary care and cardiology evaluations for chronic medical conditions    Provider Notes (Medical Decision Making):     DDX:  Close head injury, skull fracture, intracranial bleed, UTI, electrolyte abnormality    Plan:  Head CT, labs, UA    Impression:  Uti, closed head injury    ED Course:   Initial assessment performed. The patients presenting problems have been discussed, and they are in agreement with the care plan formulated and outlined with them. I have encouraged them to ask questions as they arise throughout their visit. I reviewed our electronic medical record system for any past medical records that were available that may contribute to the patients current condition, the nursing notes and and vital signs from today's visit  Nursing notes will be reviewed as they become available in realtime while the pt has been in the ED. Demetrius Sánchez MD        I personally reviewed/interpreted pt's imaging. Agree with official read by radiology as noted above. Demetrius Sánchez MD      10:43 PM   Progress note:  Pt noted to be feeling better, ready for discharge. Discussed lab and imaging findings with pt, specifically noting no ich,+ UTI. Pt will follow up with pcp as instructed. All questions have been answered, pt voiced understanding and agreement with plan. Abx were prescribed, pt advised that diarrhea and rash are possible side effects of the medications. Specific return precautions provided in addition to instructions for pt to return to the ED immediately should sx worsen at any time. Demetrius Sánchez MD             Critical Care Time:     none      Diagnosis     Clinical Impression:   1. Acute cystitis without hematuria    2. Closed head injury, initial encounter        PLAN:  1. Current Discharge Medication List      START taking these medications    Details   cephALEXin (Keflex) 500 mg capsule Take 1 Capsule by mouth four (4) times daily for 7 days. Qty: 28 Capsule, Refills: 0  Start date: 7/20/2021, End date: 7/27/2021           2.    Follow-up Information     Follow up With Specialties Details Why Contact Info    Sandra Granados MD [facility] Medicine Schedule an appointment as soon as possible for a visit in 2 days  [address]           Return to ED if worse     Disposition:    10:43 PM    The patient's results have been reviewed with family and/or caregiver. They verbally convey their understanding and agreement of the patient's signs, symptoms, diagnosis, treatment and prognosis and additionally agree to follow up as recommended in the discharge instructions or to return to the Emergency Room should the patient's condition change prior to their follow-up appointment. The family and/or caregiver verbally agrees with the care-plan and all of their questions have been answered. The discharge instructions have also been provided to the them with educational information regarding the patient's diagnosis as well a list of reasons why the patient would want to return to the ER prior to their follow-up appointment should their condition change.   Tiny Laguna MD

## 2021-07-21 NOTE — ED NOTES
Patient was provided with discharge instructions. Instructions and any medications were reviewed with the patient &/or family by Orquidea De Jesus. Questions and concerns addressed by the provider. Patient discharged in stable condition via ambulatory and was escorted by daughter.

## 2021-07-21 NOTE — DISCHARGE INSTRUCTIONS
It was a pleasure taking care of you in our Emergency Department today. We know that when you come to [facility], you are entrusting us with your health, comfort, and safety. Our physicians and nurses honor that trust, and truly appreciate the opportunity to care for you and your loved ones. We also value your feedback. If you receive a survey about your Emergency Department experience today, please fill it out. We care about our patients' feedback, and we listen to what you have to say. Thank you!       Dr. Gladys Day MD.

## 2021-07-23 LAB
BACTERIA SPEC CULT: ABNORMAL
BACTERIA SPEC CULT: ABNORMAL
CC UR VC: ABNORMAL
SERVICE CMNT-IMP: ABNORMAL

## 2021-08-16 RX ORDER — AMITRIPTYLINE HYDROCHLORIDE 25 MG/1
25 TABLET, FILM COATED ORAL
Qty: 30 TABLET | Refills: 0 | Status: SHIPPED | OUTPATIENT
Start: 2021-08-16 | End: 2021-09-13

## 2021-08-17 ENCOUNTER — OFFICE VISIT (OUTPATIENT)
Dept: FAMILY MEDICINE CLINIC | Age: 86
End: 2021-08-17
Payer: MEDICARE

## 2021-08-17 VITALS
WEIGHT: 140.1 LBS | DIASTOLIC BLOOD PRESSURE: 55 MMHG | RESPIRATION RATE: 16 BRPM | OXYGEN SATURATION: 94 % | HEART RATE: 82 BPM | SYSTOLIC BLOOD PRESSURE: 132 MMHG | HEIGHT: 62 IN | TEMPERATURE: 97.2 F | BODY MASS INDEX: 25.78 KG/M2

## 2021-08-17 DIAGNOSIS — R29.6 FALLS FREQUENTLY: Primary | ICD-10-CM

## 2021-08-17 PROCEDURE — G8419 CALC BMI OUT NRM PARAM NOF/U: HCPCS | Performed by: FAMILY MEDICINE

## 2021-08-17 PROCEDURE — 1090F PRES/ABSN URINE INCON ASSESS: CPT | Performed by: FAMILY MEDICINE

## 2021-08-17 PROCEDURE — 99213 OFFICE O/P EST LOW 20 MIN: CPT | Performed by: FAMILY MEDICINE

## 2021-08-17 PROCEDURE — G8510 SCR DEP NEG, NO PLAN REQD: HCPCS | Performed by: FAMILY MEDICINE

## 2021-08-17 PROCEDURE — G8427 DOCREV CUR MEDS BY ELIG CLIN: HCPCS | Performed by: FAMILY MEDICINE

## 2021-08-17 PROCEDURE — 1101F PT FALLS ASSESS-DOCD LE1/YR: CPT | Performed by: FAMILY MEDICINE

## 2021-08-17 PROCEDURE — G8536 NO DOC ELDER MAL SCRN: HCPCS | Performed by: FAMILY MEDICINE

## 2021-08-17 NOTE — PROGRESS NOTES
HISTORY OF PRESENT ILLNESS  Melani Brady is a 80 y.o. female. HPI Has been having some weakness in both legs for a few months, intermittently. Has had several falls, often when first gets up. Doesn't use a cane or walker. Refuses to use assistive devices. Has also been tired, is moving to [employer]. No headaches, back pains. Says that eyesight is ok. Daughter also says that she has been getting forgetful. Had a CT of the head 2 weeks ago in [place].     ROS    Physical Exam  Vitals and nursing note reviewed. Constitutional:       Appearance: She is well-developed. HENT:      Right Ear: External ear normal.      Left Ear: External ear normal.   Neck:      Thyroid: No thyromegaly. Cardiovascular:      Rate and Rhythm: Normal rate and regular rhythm. Heart sounds: Normal heart sounds. Pulmonary:      Breath sounds: Normal breath sounds. No wheezing. Abdominal:      General: Bowel sounds are normal. There is no distension. Palpations: Abdomen is soft. There is no mass. Tenderness: There is no abdominal tenderness. Lymphadenopathy:      Cervical: No cervical adenopathy. Neurological:      Cranial Nerves: No cranial nerve deficit. Comments: Cer- fnf intact  Gross motor intact  Gait- slow, unsteady. Nearly fell when got off exam table. Ref- 1+ and symmetrical  Feet- sensation intact to monofilament  Heel to toe - unable to do           ASSESSMENT and PLAN  Orders Placed This Encounter    REFERRAL TO PHYSICAL THERAPY     Diagnoses and all orders for this visit:    1. Falls frequently  -     REFERRAL TO PHYSICAL THERAPY          Has just had ct head in [place] 2 weeks ago.

## 2021-08-17 NOTE — PROGRESS NOTES
Chief Complaint   Patient presents with    Fatigue    Leg Problem     weakness    Memory Loss     1. Have you been to the ER, urgent care clinic since your last visit? Hospitalized since your last visit? No    2. Have you seen or consulted any other health care providers outside of the [address] [place] [address] since your last visit? Include any pap smears or colon screening. Yes cardiology      Patient has been having leg weakness for \"a while now\". Was getting in car this morning and legs gave out while getting in car. Hit right hip.

## 2021-08-18 NOTE — PROGRESS NOTES
[facility] @ [facility]    Dock Close DNP, ANP-BC  Subjective/HPI:     Ambrocio Greene is a 80 y.o. female with a history of chronic atrial fibrillation anticoagulated with Pradaxa, hypertension, systolic diastolic heart failure  is here for routine f/u. The patient denies chest pain/ shortness of breath, orthopnea, PND, LE edema, palpitations, syncope, presyncope. So far this year patient has had 2 falls, most recent 1 yesterday   while trying to get into her daughter's car. Was seen in July at [address] ER, had a indentation posterior scalp she reports hitting her head on a cabinet. Head CT was negative. Denies bruising or bleeding. ECHO 7/2021  Interpretation Summary    · LV: Calculated LVEF is 55%. Biplane method used to measure ejection fraction. Normal cavity size, systolic function (ejection fraction normal) and diastolic function. Mild concentric hypertrophy. Wall motion: normal.  · LA: Mildly dilated left atrium. · RA: Mildly dilated right atrium. · AV: Aortic valve leaflet calcification present with reduced excursion. Mild aortic valve stenosis is present. Mild aortic valve regurgitation is present. · MV: Mitral valve leaflet calcification. Moderate mitral valve regurgitation is present. · TV: Mild to moderate tricuspid valve regurgitation is present. 2/2021 Visit  1. Chronic rate controlled atrial fibrillation: Maintain beta-blocker anticoagulated with Pradaxa 75 mg twice a day. Age 80, weight 60 kg range,  2. Hypertension: Controlled 122/64  3. Systolic heart failure: Ejection fraction 40-45% 2019. On atenolol, ACE inhibitor, low-dose diuretic. (Creatinine 1.14 9/2020 ) [place] heart class 1  4. Aortic stenosis/regurgitation: Mild aortic stenosis moderate aortic regurgitation 2019 echo, on ACE inhibitor will check echo prior to in the year, clinically asymptomatic  5.   Moderate mitral regurgitation: Clinically asymptomatic blood pressure controlled on ACE inhibitor  80-year-old female with a history of chronic rate controlled atrial fibrillation, systolic heart failure ejection fraction 40-45% clinically presents euvolemic no dyspnea on exertion orthopnea  [place] heart class I.  Stable from cardiac perspective follow-up in 6 months. Plan for echo 1 week prior to 6-month follow-up. PCP Provider  Johnathan Sahu MD  Past Medical History:   Diagnosis Date    Arrhythmia     atrial fibrillation, chronic. Stress test 2011 essent. normal.  Mild-mod AR on ECHO    Atrial fibrillation ([address]     Atrial flutter with rapid ventricular response ([address] 11/21/2019    Chronic atrial fibrillation ([address] 11/21/2019    GI bleeding     Hypertension     Refusal of blood transfusions as patient is Catholic     Thyroid disease     hypothyroid      Past Surgical History:   Procedure Laterality Date    COLONOSCOPY N/A 5/2/2017    COLONOSCOPY performed by Jerry Reynoso MD at [facility] ENDOSCOPY    ECHO TRANSESOPHAGEAL (DENISE) W OR WO CONTR  4/27/2011         HX GYN      vaginal deliveries x10    HX TUBAL LIGATION      OK CARDIOVERSION ELECTIVE ARRHYTHMIA EXTERNAL  6/14/2011          Allergies   Allergen Reactions    Seroquel [Quetiapine] Other (comments)     PSYCOLOGICAL REACTION Pt didn't feel like them selves but felt like a different person, iT DEEPLY DISTURBED PT.  PT BECAME IRRITABLE.  Ambien [Zolpidem] Other (comments)     Couldn't move      Family History   Problem Relation Age of Onset    Heart Disease Father     Cancer Son     Heart Disease Son       Current Outpatient Medications   Medication Sig    amitriptyline (ELAVIL) 25 mg tablet Take 1 Tab by mouth nightly. For numbness in feet.  potassium chloride (K-DUR, KLOR-CON) 20 mEq tablet TAKE 1 TABLET BY MOUTH once daily. (POTASSIUM)    metoprolol succinate (TOPROL-XL) 25 mg XL tablet TAKE ONE TABLET BY MOUTH EVERY DAY FOR THE HEART    clotrimazole-betamethasone (LOTRISONE) topical cream Apply to affected area bid    lisinopriL (PRINIVIL, ZESTRIL) 5 mg tablet TAKE ONE TABLET BY MOUTH EVERY DAY FOR THE HEART    traZODone (DESYREL) 150 mg tablet Take 0.5 Tabs by mouth nightly.  dabigatran etexilate (Pradaxa) 75 mg capsule TAKE ONE CAPSULE BY MOUTH EVERY 12 HOURS    magnesium oxide (MAG-OX) 400 mg tablet Take 1 Tab by mouth daily. For leg cramps    pantoprazole (PROTONIX) 40 mg tablet TAKE ONE TABLET BY MOUTH EVERY DAY, TO KEEP STOMACH FROM BLEEDING    levothyroxine (SYNTHROID) 100 mcg tablet One po daily for thyroid    furosemide (LASIX) 20 mg tablet TAKE ONE  TABLET DAILY    atenoloL (TENORMIN) 25 mg tablet Take 1 Tab by mouth daily.  fluticasone propionate (FLONASE) 50 mcg/actuation nasal spray 2 Sprays by Both Nostrils route daily.  acetaminophen (TYLENOL ARTHRITIS PAIN) 650 mg TbER Take 650 mg by mouth every eight (8) hours.  CALCIUM 600 + D tablet TAKE 1 TABLET TWICE A DAY. (CALCIUM SUPPLEMENT)    traZODone (DESYREL) 300 mg tablet One po qhs prn sleep (Patient not taking: Reported on 8/19/2021)    acetaminophen (TYLENOL) 325 mg tablet Take 2 Tabs by mouth every six (6) hours as needed for Pain. (Patient not taking: Reported on 8/19/2021)     No current facility-administered medications for this visit.       Vitals:    08/19/21 1500   BP: 122/60   Pulse: 85   Resp: 16   SpO2: 95%   Weight: 135 lb (61.2 kg)   Height: 5' 2\" (1.575 m)     Social History     Socioeconomic History    Marital status:      Spouse name: Not on file    Number of children: Not on file    Years of education: Not on file    Highest education level: Not on file   Occupational History    Not on file   Tobacco Use    Smoking status: Never Smoker    Smokeless tobacco: Never Used   Vaping Use    Vaping Use: Never used   Substance and Sexual Activity    Alcohol use: No    Drug use: No    Sexual activity: Never   Other Topics Concern    Not on file   Social History Narrative    Not on file     Social Determinants of Health     Financial Resource Strain:     Difficulty of Paying Living Expenses:    Food Insecurity:     Worried About Running Out of Food in the Last Year:     [address] in the Last Year:    Transportation Needs:     Lack of Transportation (Medical):  Lack of Transportation (Non-Medical):    Physical Activity:     Days of Exercise per Week:     Minutes of Exercise per Session:    Stress:     Feeling of Stress :    Social Connections:     Frequency of Communication with Friends and Family:     Frequency of Social Gatherings with Friends and Family:     Attends Nondenominational Services:     Active Member of Clubs or Organizations:     Attends Club or Organization Meetings:     Marital Status:    Intimate Partner Violence:     Fear of Current or Ex-Partner:     Emotionally Abused:     Physically Abused:     Sexually Abused:        I have reviewed the nurses notes, vitals, problem list, allergy list, medical history, family, social history and medications. Review of Symptoms:  11 systems reviewed, negative other than as stated in the HPI      Physical Exam:      General: Well developed, thin in no acute distress, cooperative and alert  HEENT: No carotid bruits, no JVD, trach is midline. Neck Supple, PERRL,   Heart:   Irregular regular 1/6 diastolic murmur  Respiratory: Clear bilaterally x 4, no wheezing or rales  Abdomen:   Soft, non-tender, no masses, bowel sounds are active. Extremities:  No edema, normal cap refill, no cyanosis, atraumatic. Neuro: A&Ox3, speech clear, gait slow and cautious  Skin: Skin color is normal. No rashes or lesions.  Non diaphoretic  Vascular: 2+ pulses symmetric in all extremities    Cardiographics    ECG: ER EKG 7/2021        Cardiology Labs:  Lab Results   Component Value Date/Time    Cholesterol, total 94 (L) 02/17/2017 09:52 AM    HDL Cholesterol 30 (L) 02/17/2017 09:52 AM    LDL, calculated 46 02/17/2017 09:52 AM    Triglyceride 90 02/17/2017 09:52 AM    CHOL/HDL Ratio 3.1 04/20/2010 01:10 AM       Lab Results   Component Value Date/Time    Sodium 139 07/20/2021 08:46 PM    Potassium 4.0 07/20/2021 08:46 PM    Chloride 108 07/20/2021 08:46 PM    CO2 24 07/20/2021 08:46 PM    Anion gap 7 07/20/2021 08:46 PM    Glucose 109 (H) 07/20/2021 08:46 PM    BUN 11 07/20/2021 08:46 PM    Creatinine 1.39 (H) 07/20/2021 08:46 PM    BUN/Creatinine ratio 8 (L) 07/20/2021 08:46 PM    GFR est AA 43 (L) 07/20/2021 08:46 PM    GFR est non-AA 36 (L) 07/20/2021 08:46 PM    Calcium 8.4 (L) 07/20/2021 08:46 PM    Bilirubin, total 0.2 07/20/2021 08:46 PM    Alk. phosphatase 66 07/20/2021 08:46 PM    Protein, total 8.9 (H) 07/20/2021 08:46 PM    Albumin 3.2 (L) 07/20/2021 08:46 PM    Globulin 5.7 (H) 07/20/2021 08:46 PM    A-G Ratio 0.6 (L) 07/20/2021 08:46 PM    ALT (SGPT) 30 07/20/2021 08:46 PM           Assessment:     Assessment:     Diagnoses and all orders for this visit:    1. Chronic systolic congestive heart failure ([facility]    2. Chronic atrial fibrillation (HCC)    3. Essential hypertension    4. Stage 3 chronic kidney disease, unspecified whether stage 3a or 3b CKD ([facility]    5. HX: long term anticoagulant use--Pradaxa stopped 5/17 for chronic atrial fib        ICD-10-CM ICD-9-CM    1. Chronic systolic congestive heart failure (HCC)  I50.22 428.22      428.0    2. Chronic atrial fibrillation (HCC)  I48.20 427.31    3. Essential hypertension  I10 401.9    4. Stage 3 chronic kidney disease, unspecified whether stage 3a or 3b CKD ([facility])  N18.30 585.3    5. HX: long term anticoagulant use--Pradaxa stopped 5/17 for chronic atrial fib  Z92.29 V87.49      No orders of the defined types were placed in this encounter. Plan:     1. Chronic rate controlled atrial fibrillation: Maintain beta-blocker anticoagulated with Pradaxa 75 mg twice a day. Age 80, weight 60 kg range. She has had 2 falls this year and one episode where she hit her head on a cabinet.   She is awaiting physical therapy call referred by Dr. Pastor Dior to work on balance. Counseled patient on the need to use either a cane or a walker each time she is up and ambulating. Discussed the risk versus benefit of anticoagulation at this time she is under caution, if any recurrent falls will then transition off anticoagulation to aspirin vs Watchman placement. In discussion patient was open to the idea of Watchman if needed to be taken off [address]. 2.  Hypertension:  Controlled 122/60  3. Systolic heart failure: Ejection fraction 40-45% 2019 proving to 55% 7/2021. .  On atenolol, ACE inhibitor, low-dose diuretic. [place] heart class I.  4.  Aortic stenosis/regurgitation: Mild aortic stenosis moderate aortic regurgitation 7/2021 echo, on ACE inhibitor   5. Moderate mitral regurgitation: Clinically asymptomatic blood pressure controlled on ACE inhibitor  Follow-up in 6 months    Dot Amaya NP      Please note that this dictation was completed with [employer], the computer voice recognition software. Quite often unanticipated grammatical, syntax, homophones, and other interpretive errors are inadvertently transcribed by the computer software. Please disregard these errors. Please excuse any errors that have escaped final proofreading. Thank you.

## 2021-08-19 ENCOUNTER — OFFICE VISIT (OUTPATIENT)
Dept: CARDIOLOGY CLINIC | Age: 86
End: 2021-08-19
Payer: MEDICARE

## 2021-08-19 VITALS
HEART RATE: 85 BPM | SYSTOLIC BLOOD PRESSURE: 122 MMHG | BODY MASS INDEX: 24.84 KG/M2 | DIASTOLIC BLOOD PRESSURE: 60 MMHG | WEIGHT: 135 LBS | HEIGHT: 62 IN | RESPIRATION RATE: 16 BRPM | OXYGEN SATURATION: 95 %

## 2021-08-19 DIAGNOSIS — I48.20 CHRONIC ATRIAL FIBRILLATION (HCC): ICD-10-CM

## 2021-08-19 DIAGNOSIS — Z92.29 HX: LONG TERM ANTICOAGULANT USE: ICD-10-CM

## 2021-08-19 DIAGNOSIS — I10 ESSENTIAL HYPERTENSION: ICD-10-CM

## 2021-08-19 DIAGNOSIS — N18.30 STAGE 3 CHRONIC KIDNEY DISEASE, UNSPECIFIED WHETHER STAGE 3A OR 3B CKD (HCC): ICD-10-CM

## 2021-08-19 DIAGNOSIS — I50.22 CHRONIC SYSTOLIC CONGESTIVE HEART FAILURE (HCC): Primary | ICD-10-CM

## 2021-08-19 PROCEDURE — 1101F PT FALLS ASSESS-DOCD LE1/YR: CPT | Performed by: NURSE PRACTITIONER

## 2021-08-19 PROCEDURE — G8536 NO DOC ELDER MAL SCRN: HCPCS | Performed by: NURSE PRACTITIONER

## 2021-08-19 PROCEDURE — G8432 DEP SCR NOT DOC, RNG: HCPCS | Performed by: NURSE PRACTITIONER

## 2021-08-19 PROCEDURE — 99214 OFFICE O/P EST MOD 30 MIN: CPT | Performed by: NURSE PRACTITIONER

## 2021-08-19 PROCEDURE — G8420 CALC BMI NORM PARAMETERS: HCPCS | Performed by: NURSE PRACTITIONER

## 2021-08-19 PROCEDURE — 1090F PRES/ABSN URINE INCON ASSESS: CPT | Performed by: NURSE PRACTITIONER

## 2021-08-19 PROCEDURE — G8427 DOCREV CUR MEDS BY ELIG CLIN: HCPCS | Performed by: NURSE PRACTITIONER

## 2021-08-19 NOTE — PROGRESS NOTES
Chief Complaint   Patient presents with    Irregular Heart Beat     6m f/u, Pt has no complaints today. 1. Have you been to the ER, urgent care clinic since your last visit? Hospitalized since your last visit? ER, something felt wrong with her head. 2. Have you seen or consulted any other health care providers outside of the [address] [place] [address] Juanjose since your last visit? Include any pap smears or colon screening. No    Pt states she fell yesterday while trying to get into her daughters car. Pt states her daughter purchased a chair walker to help with walking.

## 2021-09-13 RX ORDER — ATENOLOL 25 MG/1
25 TABLET ORAL DAILY
Qty: 90 TABLET | Refills: 0 | Status: SHIPPED | OUTPATIENT
Start: 2021-09-13 | End: 2021-12-16

## 2021-09-13 RX ORDER — AMITRIPTYLINE HYDROCHLORIDE 25 MG/1
25 TABLET, FILM COATED ORAL
Qty: 30 TABLET | Refills: 0 | Status: SHIPPED | OUTPATIENT
Start: 2021-09-13 | End: 2022-03-01 | Stop reason: ALTCHOICE

## 2021-09-13 RX ORDER — FUROSEMIDE 20 MG/1
TABLET ORAL
Qty: 90 TABLET | Refills: 0 | Status: SHIPPED | OUTPATIENT
Start: 2021-09-13 | End: 2021-11-10

## 2021-10-04 ENCOUNTER — OFFICE VISIT (OUTPATIENT)
Dept: FAMILY MEDICINE CLINIC | Age: 86
End: 2021-10-04
Payer: MEDICARE

## 2021-10-04 VITALS
TEMPERATURE: 96.8 F | OXYGEN SATURATION: 99 % | DIASTOLIC BLOOD PRESSURE: 52 MMHG | RESPIRATION RATE: 16 BRPM | HEIGHT: 62 IN | BODY MASS INDEX: 24.55 KG/M2 | WEIGHT: 133.4 LBS | SYSTOLIC BLOOD PRESSURE: 137 MMHG | HEART RATE: 65 BPM

## 2021-10-04 DIAGNOSIS — Z23 ENCOUNTER FOR IMMUNIZATION: ICD-10-CM

## 2021-10-04 DIAGNOSIS — Z23 NEEDS FLU SHOT: ICD-10-CM

## 2021-10-04 DIAGNOSIS — I10 ESSENTIAL HYPERTENSION: ICD-10-CM

## 2021-10-04 DIAGNOSIS — Z00.00 ENCOUNTER FOR MEDICARE ANNUAL WELLNESS EXAM: Primary | ICD-10-CM

## 2021-10-04 LAB
ANION GAP SERPL CALC-SCNC: 5 MMOL/L (ref 5–15)
BUN SERPL-MCNC: 10 MG/DL (ref 6–20)
BUN/CREAT SERPL: 8 (ref 12–20)
CALCIUM SERPL-MCNC: 8.7 MG/DL (ref 8.5–10.1)
CHLORIDE SERPL-SCNC: 110 MMOL/L (ref 97–108)
CO2 SERPL-SCNC: 23 MMOL/L (ref 21–32)
CREAT SERPL-MCNC: 1.28 MG/DL (ref 0.55–1.02)
GLUCOSE SERPL-MCNC: 107 MG/DL (ref 65–100)
POTASSIUM SERPL-SCNC: 3.6 MMOL/L (ref 3.5–5.1)
SODIUM SERPL-SCNC: 138 MMOL/L (ref 136–145)

## 2021-10-04 PROCEDURE — G0008 ADMIN INFLUENZA VIRUS VAC: HCPCS | Performed by: FAMILY MEDICINE

## 2021-10-04 PROCEDURE — 1101F PT FALLS ASSESS-DOCD LE1/YR: CPT | Performed by: FAMILY MEDICINE

## 2021-10-04 PROCEDURE — G8427 DOCREV CUR MEDS BY ELIG CLIN: HCPCS | Performed by: FAMILY MEDICINE

## 2021-10-04 PROCEDURE — G0439 PPPS, SUBSEQ VISIT: HCPCS | Performed by: FAMILY MEDICINE

## 2021-10-04 PROCEDURE — G8536 NO DOC ELDER MAL SCRN: HCPCS | Performed by: FAMILY MEDICINE

## 2021-10-04 PROCEDURE — G8420 CALC BMI NORM PARAMETERS: HCPCS | Performed by: FAMILY MEDICINE

## 2021-10-04 PROCEDURE — 90694 VACC AIIV4 NO PRSRV 0.5ML IM: CPT | Performed by: FAMILY MEDICINE

## 2021-10-04 PROCEDURE — 99213 OFFICE O/P EST LOW 20 MIN: CPT | Performed by: FAMILY MEDICINE

## 2021-10-04 PROCEDURE — G8432 DEP SCR NOT DOC, RNG: HCPCS | Performed by: FAMILY MEDICINE

## 2021-10-04 PROCEDURE — 1090F PRES/ABSN URINE INCON ASSESS: CPT | Performed by: FAMILY MEDICINE

## 2021-10-04 NOTE — PROGRESS NOTES
HISTORY OF PRESENT ILLNESS  Homero Anderson is a 80 y.o. female. HPIIN for annual wellness exam. Has been doing reasonably well. Sees Benito Gama for cardiology. Has had covid shot. Would want children to be her mpoas. Would like to be dnr. Review of Systems   HENT: Negative for hearing loss. Neurological:        Occasional fall. Uses a walker. Independent in adls. Memory ok. Psychiatric/Behavioral: Negative for depression. No alcohol       Physical Exam  Vitals and nursing note reviewed. Constitutional:       Appearance: She is well-developed. HENT:      Right Ear: External ear normal.      Left Ear: External ear normal.   Neck:      Thyroid: No thyromegaly. Cardiovascular:      Rate and Rhythm: Normal rate and regular rhythm. Heart sounds: Normal heart sounds. Pulmonary:      Breath sounds: Normal breath sounds. No wheezing. Abdominal:      General: Bowel sounds are normal. There is no distension. Palpations: Abdomen is soft. There is no mass. Tenderness: There is no abdominal tenderness. Lymphadenopathy:      Cervical: No cervical adenopathy. ASSESSMENT and PLAN  Orders Placed This Encounter    Influenza Vaccine, QUAD, 65 Yrs +  IM  (Fluad 62436 )    METABOLIC PANEL, BASIC    Administration fee () for Medicare insured patients     Diagnoses and all orders for this visit:    1. Encounter for Medicare annual wellness exam    2. Encounter for immunization  -     ADMIN INFLUENZA VIRUS VAC    3. Needs flu shot  -     FLU (FLUAD QUAD INFLUENZA VACCINE,QUAD,ADJUVANTED)    4. Essential hypertension  -     METABOLIC PANEL, BASIC; Future      Follow-up and Dispositions    · Return in about 6 months (around 4/4/2022).

## 2021-10-04 NOTE — PROGRESS NOTES
Chief Complaint   Patient presents with   [facility] Annual Wellness Visit     1. Have you been to the ER, urgent care clinic since your last visit? Hospitalized since your last visit? No    2. Have you seen or consulted any other health care providers outside of the [address] [place] [address] since your last visit? Include any pap smears or colon screening. Yes Reason for visit: Dr. Pat Johns -cardiology 8/19/21    Verbal Order received from Dr. Kayley Chauhan  for fluad  given IM  in left  arm.

## 2021-10-04 NOTE — PATIENT INSTRUCTIONS
Vaccine Information Statement    Influenza (Flu) Vaccine (Inactivated or Recombinant): What You Need to Know    Many vaccine information statements are available in Sinhala and other languages. See www.immunize.org/vis. Hojas de información sobre vacunas están disponibles en español y en muchos otros idiomas. Visite www.immunize.org/vis. 1. Why get vaccinated? Influenza vaccine can prevent influenza (flu). Flu is a contagious disease that spreads around the [place] [facility] every year, usually between October and May. Anyone can get the flu, but it is more dangerous for some people. Infants and young children, people 72 years and older, pregnant people, and people with certain health conditions or a weakened immune system are at greatest risk of flu complications. Pneumonia, bronchitis, sinus infections, and ear infections are examples of flu-related complications. If you have a medical condition, such as heart disease, cancer, or diabetes, flu can make it worse. Flu can cause fever and chills, sore throat, muscle aches, fatigue, cough, headache, and runny or stuffy nose. Some people may have vomiting and diarrhea, though this is more common in children than adults. In an average year, thousands of people in the [facility] die from flu, and many more are hospitalized. Flu vaccine prevents millions of illnesses and flu-related visits to the doctor each year. 2. Influenza vaccines     CDC recommends everyone 6 months and older get vaccinated every flu season. Children 6 months through 6years of age may need 2 doses during a single flu season. Everyone else needs only 1 dose each flu season. It takes about 2 weeks for protection to develop after vaccination. There are many flu viruses, and they are always changing. Each year a new flu vaccine is made to protect against the influenza viruses believed to be likely to cause disease in the upcoming flu season.  Even when the vaccine doesnt exactly match these viruses, it may still provide some protection. Influenza vaccine does not cause flu. Influenza vaccine may be given at the same time as other vaccines. 3. Talk with your health care provider    Tell your vaccination provider if the person getting the vaccine:   Has had an allergic reaction after a previous dose of influenza vaccine, or has any severe, life-threatening allergies    Has ever had Guillain-Barré Syndrome (also called GBS)    In some cases, your health care provider may decide to postpone influenza vaccination until a future visit. Influenza vaccine can be administered at any time during pregnancy. People who are or will be pregnant during influenza season should receive inactivated influenza vaccine. People with minor illnesses, such as a cold, may be vaccinated. People who are moderately or severely ill should usually wait until they recover before getting influenza vaccine. Your health care provider can give you more information. 4. Risks of a vaccine reaction     Soreness, redness, and swelling where the shot is given, fever, muscle aches, and headache can happen after influenza vaccination.  There may be a very small increased risk of Guillain-Barré Syndrome (GBS) after inactivated influenza vaccine (the flu shot). Angie Canela children who get the flu shot along with pneumococcal vaccine (PCV13) and/or DTaP vaccine at the same time might be slightly more likely to have a seizure caused by fever. Tell your health care provider if a child who is getting flu vaccine has ever had a seizure. People sometimes faint after medical procedures, including vaccination. Tell your provider if you feel dizzy or have vision changes or ringing in the ears. As with any medicine, there is a very remote chance of a vaccine causing a severe allergic reaction, other serious injury, or death. 5. What if there is a serious problem?     An allergic reaction could occur after the vaccinated person leaves the clinic. If you see signs of a severe allergic reaction (hives, swelling of the face and throat, difficulty breathing, a fast heartbeat, dizziness, or weakness), call 9-1-1 and get the person to the nearest hospital.    For other signs that concern you, call your health care provider. Adverse reactions should be reported to the Vaccine Adverse Event Reporting System (VAERS). Your health care provider will usually file this report, or you can do it yourself. Visit the VAERS website at www.vaers. [facility].gov or call 1-279.867.3720. VAERS is only for reporting reactions, and VAERS staff members do not give medical advice. 6. The National Vaccine Injury Compensation Program    The [facility] Vaccine Injury Compensation Program (VICP) is a federal program that was created to compensate people who may have been injured by certain vaccines. Claims regarding alleged injury or death due to vaccination have a time limit for filing, which may be as short as two years. Visit the VICP website at www.[facility].gov/vaccinecompensation or call 4-300.433.7946 to learn about the program and about filing a claim. 7. How can I learn more?  Ask your health care provider.  Call your local or state health department.  Visit the website of the Food and Drug Administration (FDA) for vaccine package inserts and additional information at www.fda.gov/vaccines-blood-biologics/vaccines.  Contact the Centers for Disease Control and Prevention (CDC):  - Call 0-699.677.5577 (1-800-CDC-INFO) or  - Visit CDCs influenza website at www.cdc.gov/flu. Vaccine Information Statement   Inactivated Influenza Vaccine   8/6/2021  42 ESTHER Bermudez [id number]   Department of Health and Human Services  Centers for Disease Control and Prevention    Office Use Only

## 2021-10-19 RX ORDER — METOPROLOL SUCCINATE 25 MG/1
TABLET, EXTENDED RELEASE ORAL
Qty: 90 TABLET | Refills: 0 | Status: SHIPPED | OUTPATIENT
Start: 2021-10-19 | End: 2022-01-27

## 2021-11-01 ENCOUNTER — TELEPHONE (OUTPATIENT)
Dept: FAMILY MEDICINE CLINIC | Age: 86
End: 2021-11-01

## 2021-11-01 NOTE — TELEPHONE ENCOUNTER
Patient is calling, because [facility] and [facility]. She is taking antibiotics, but wants to have Dr. Ely Lopez recheck her and make sure she is doing ok. Please call @[phone]. There is no appt soon.

## 2021-11-05 ENCOUNTER — OFFICE VISIT (OUTPATIENT)
Dept: FAMILY MEDICINE CLINIC | Age: 86
End: 2021-11-05
Payer: MEDICARE

## 2021-11-05 VITALS
RESPIRATION RATE: 16 BRPM | OXYGEN SATURATION: 99 % | WEIGHT: 137 LBS | TEMPERATURE: 97.7 F | SYSTOLIC BLOOD PRESSURE: 127 MMHG | BODY MASS INDEX: 25.21 KG/M2 | HEART RATE: 60 BPM | DIASTOLIC BLOOD PRESSURE: 50 MMHG | HEIGHT: 62 IN

## 2021-11-05 DIAGNOSIS — N39.0 URINARY TRACT INFECTION WITHOUT HEMATURIA, SITE UNSPECIFIED: Primary | ICD-10-CM

## 2021-11-05 PROCEDURE — G8536 NO DOC ELDER MAL SCRN: HCPCS | Performed by: FAMILY MEDICINE

## 2021-11-05 PROCEDURE — 1090F PRES/ABSN URINE INCON ASSESS: CPT | Performed by: FAMILY MEDICINE

## 2021-11-05 PROCEDURE — 1101F PT FALLS ASSESS-DOCD LE1/YR: CPT | Performed by: FAMILY MEDICINE

## 2021-11-05 PROCEDURE — G8432 DEP SCR NOT DOC, RNG: HCPCS | Performed by: FAMILY MEDICINE

## 2021-11-05 PROCEDURE — G8419 CALC BMI OUT NRM PARAM NOF/U: HCPCS | Performed by: FAMILY MEDICINE

## 2021-11-05 PROCEDURE — G8427 DOCREV CUR MEDS BY ELIG CLIN: HCPCS | Performed by: FAMILY MEDICINE

## 2021-11-05 PROCEDURE — 99213 OFFICE O/P EST LOW 20 MIN: CPT | Performed by: FAMILY MEDICINE

## 2021-11-05 RX ORDER — CEFUROXIME AXETIL 250 MG/1
250 TABLET ORAL 2 TIMES DAILY
COMMUNITY
End: 2022-03-01 | Stop reason: ALTCHOICE

## 2021-11-05 NOTE — PROGRESS NOTES
Chief Complaint   Patient presents with    Follow-up     [facility] - UTI     1. Have you been to the ER, urgent care clinic since your last visit? Hospitalized since your last visit? Yes [facility] on 10/31/21    2. Have you seen or consulted any other health care providers outside of the [address] [place] [address] since your last visit? Include any pap smears or colon screening.  No

## 2021-11-05 NOTE — PROGRESS NOTES
HISTORY OF PRESENT ILLNESS  Francisco Bush is a 80 y.o. female. HPI Brought in by son in law Ramirez. Was having a UTI last week, [facility], was put on Cefuroxime  . NO difficulty voiding at present. No dysuria, urgency, frequency, hematuria. No fever, chills. ROS    Physical Exam  Vitals and nursing note reviewed. Constitutional:       Appearance: She is well-developed. HENT:      Right Ear: External ear normal.      Left Ear: External ear normal.   Neck:      Thyroid: No thyromegaly. Cardiovascular:      Rate and Rhythm: Normal rate and regular rhythm. Heart sounds: Normal heart sounds. Pulmonary:      Breath sounds: Normal breath sounds. No wheezing. Abdominal:      General: Bowel sounds are normal. There is no distension. Palpations: Abdomen is soft. There is no mass. Tenderness: There is no abdominal tenderness. Lymphadenopathy:      Cervical: No cervical adenopathy. ASSESSMENT and PLAN  Orders Placed This Encounter    CULTURE, URINE     Diagnoses and all orders for this visit:    1. Urinary tract infection without hematuria, site unspecified  -     CULTURE, URINE;  Future

## 2021-11-08 RX ORDER — TRAZODONE HYDROCHLORIDE 300 MG/1
TABLET ORAL
Qty: 30 TABLET | Refills: 0 | Status: SHIPPED | OUTPATIENT
Start: 2021-11-08 | End: 2021-12-11

## 2021-11-17 RX ORDER — FLUTICASONE PROPIONATE 50 MCG
SPRAY, SUSPENSION (ML) NASAL
Qty: 16 G | Status: SHIPPED | OUTPATIENT
Start: 2021-11-17

## 2021-12-11 RX ORDER — TRAZODONE HYDROCHLORIDE 300 MG/1
TABLET ORAL
Qty: 30 TABLET | Refills: 0 | Status: SHIPPED | OUTPATIENT
Start: 2021-12-11 | End: 2022-01-09

## 2021-12-16 RX ORDER — ATENOLOL 25 MG/1
25 TABLET ORAL DAILY
Qty: 90 TABLET | Refills: 0 | Status: SHIPPED | OUTPATIENT
Start: 2021-12-16 | End: 2022-03-10 | Stop reason: SDUPTHER

## 2022-01-07 ENCOUNTER — TELEPHONE (OUTPATIENT)
Dept: FAMILY MEDICINE CLINIC | Age: 87
End: 2022-01-07

## 2022-01-07 NOTE — TELEPHONE ENCOUNTER
Mel[facility]EV[facility] [employer] Office Pool  Subject: Appointment Request     Reason for Call: Semi-Routine Cough, Cold Symptoms     QUESTIONS   Type of Appointment? Established Patient   Reason for appointment request? No appointments available during search   Additional Information for Provider? 80year old patient has cold   symptoms-screened red-they would like an appt with Connie yun-i did not   see anything for today or tomorrow. Please call Georgina George at [phone]   ---------------------------------------------------------------------------   --------------   Roxana LAO   What is the best way for the office to contact you? OK to leave message on   voicemail   Preferred Call Back Phone Number?  [phone]   ---------------------------------------------------------------------------

## 2022-01-09 RX ORDER — TRAZODONE HYDROCHLORIDE 300 MG/1
TABLET ORAL
Qty: 30 TABLET | Refills: 0 | Status: SHIPPED | OUTPATIENT
Start: 2022-01-09 | End: 2022-02-07

## 2022-01-27 RX ORDER — METOPROLOL SUCCINATE 25 MG/1
TABLET, EXTENDED RELEASE ORAL
Qty: 90 TABLET | Refills: 0 | Status: SHIPPED | OUTPATIENT
Start: 2022-01-27 | End: 2022-03-01 | Stop reason: SDUPTHER

## 2022-02-07 RX ORDER — TRAZODONE HYDROCHLORIDE 300 MG/1
TABLET ORAL
Qty: 30 TABLET | Refills: 0 | Status: SHIPPED | OUTPATIENT
Start: 2022-02-07 | End: 2022-03-01 | Stop reason: SDUPTHER

## 2022-02-11 RX ORDER — DABIGATRAN ETEXILATE 75 MG/1
CAPSULE ORAL
Qty: 180 CAPSULE | Refills: 0 | Status: SHIPPED | OUTPATIENT
Start: 2022-02-11 | End: 2022-05-31 | Stop reason: SDUPTHER

## 2022-02-16 RX ORDER — LANOLIN ALCOHOL/MO/W.PET/CERES
400 CREAM (GRAM) TOPICAL DAILY
Qty: 90 TABLET | Refills: 0 | Status: SHIPPED | OUTPATIENT
Start: 2022-02-16 | End: 2022-04-22

## 2022-02-16 RX ORDER — PANTOPRAZOLE SODIUM 40 MG/1
TABLET, DELAYED RELEASE ORAL
Qty: 90 TABLET | Refills: 0 | Status: SHIPPED | OUTPATIENT
Start: 2022-02-16 | End: 2022-05-23

## 2022-02-22 ENCOUNTER — HOSPITAL ENCOUNTER (EMERGENCY)
Age: 87
Discharge: HOME | End: 2022-02-22
Attending: EMERGENCY MEDICINE
Payer: MEDICARE

## 2022-02-22 ENCOUNTER — NURSE TRIAGE (OUTPATIENT)
Dept: OTHER | Facility: CLINIC | Age: 87
End: 2022-02-22

## 2022-02-22 VITALS
TEMPERATURE: 98.5 F | SYSTOLIC BLOOD PRESSURE: 103 MMHG | HEIGHT: 62 IN | BODY MASS INDEX: 24.34 KG/M2 | RESPIRATION RATE: 23 BRPM | HEART RATE: 61 BPM | DIASTOLIC BLOOD PRESSURE: 46 MMHG | OXYGEN SATURATION: 95 % | WEIGHT: 132.28 LBS

## 2022-02-22 DIAGNOSIS — E86.0 DEHYDRATION: Primary | ICD-10-CM

## 2022-02-22 DIAGNOSIS — R53.81 MALAISE AND FATIGUE: ICD-10-CM

## 2022-02-22 DIAGNOSIS — N17.9 AKI (ACUTE KIDNEY INJURY) (HCC): ICD-10-CM

## 2022-02-22 DIAGNOSIS — R53.83 MALAISE AND FATIGUE: ICD-10-CM

## 2022-02-22 LAB
ALBUMIN SERPL-MCNC: 3.6 G/DL (ref 3.5–5)
ALBUMIN/GLOB SERPL: 0.7 {RATIO} (ref 1.1–2.2)
ALP SERPL-CCNC: 62 U/L (ref 45–117)
ALT SERPL-CCNC: 15 U/L (ref 12–78)
ANION GAP SERPL CALC-SCNC: 4 MMOL/L (ref 5–15)
APPEARANCE UR: CLEAR
AST SERPL-CCNC: 21 U/L (ref 15–37)
ATRIAL RATE: 70 BPM
BACTERIA URNS QL MICRO: NEGATIVE /HPF
BASOPHILS # BLD: 0 K/UL (ref 0–0.1)
BASOPHILS NFR BLD: 1 % (ref 0–1)
BILIRUB SERPL-MCNC: 0.3 MG/DL (ref 0.2–1)
BILIRUB UR QL: NEGATIVE
BUN SERPL-MCNC: 34 MG/DL (ref 6–20)
BUN/CREAT SERPL: 18 (ref 12–20)
CALCIUM SERPL-MCNC: 8.4 MG/DL (ref 8.5–10.1)
CALCULATED R AXIS, ECG10: -51 DEGREES
CALCULATED T AXIS, ECG11: 87 DEGREES
CHLORIDE SERPL-SCNC: 111 MMOL/L (ref 97–108)
CO2 SERPL-SCNC: 21 MMOL/L (ref 21–32)
COLOR UR: ABNORMAL
CREAT SERPL-MCNC: 1.86 MG/DL (ref 0.55–1.02)
DIAGNOSIS, 93000: NORMAL
DIFFERENTIAL METHOD BLD: ABNORMAL
EOSINOPHIL # BLD: 0.1 K/UL (ref 0–0.4)
EOSINOPHIL NFR BLD: 4 % (ref 0–7)
EPITH CASTS URNS QL MICRO: ABNORMAL /LPF
ERYTHROCYTE [DISTWIDTH] IN BLOOD BY AUTOMATED COUNT: 17.1 % (ref 11.5–14.5)
FOLATE SERPL-MCNC: 64 NG/ML (ref 5–21)
GLOBULIN SER CALC-MCNC: 5.1 G/DL (ref 2–4)
GLUCOSE SERPL-MCNC: 114 MG/DL (ref 65–100)
GLUCOSE UR STRIP.AUTO-MCNC: NEGATIVE MG/DL
HCT VFR BLD AUTO: 31.3 % (ref 35–47)
HGB BLD-MCNC: 9.7 G/DL (ref 11.5–16)
HGB UR QL STRIP: NEGATIVE
IMM GRANULOCYTES # BLD AUTO: 0 K/UL (ref 0–0.04)
IMM GRANULOCYTES NFR BLD AUTO: 0 % (ref 0–0.5)
KETONES UR QL STRIP.AUTO: NEGATIVE MG/DL
LEUKOCYTE ESTERASE UR QL STRIP.AUTO: ABNORMAL
LYMPHOCYTES # BLD: 1.4 K/UL (ref 0.8–3.5)
LYMPHOCYTES NFR BLD: 39 % (ref 12–49)
MCH RBC QN AUTO: 28.3 PG (ref 26–34)
MCHC RBC AUTO-ENTMCNC: 31 G/DL (ref 30–36.5)
MCV RBC AUTO: 91.3 FL (ref 80–99)
MONOCYTES # BLD: 0.4 K/UL (ref 0–1)
MONOCYTES NFR BLD: 11 % (ref 5–13)
NEUTS SEG # BLD: 1.7 K/UL (ref 1.8–8)
NEUTS SEG NFR BLD: 45 % (ref 32–75)
NITRITE UR QL STRIP.AUTO: NEGATIVE
NRBC # BLD: 0 K/UL (ref 0–0.01)
NRBC BLD-RTO: 0 PER 100 WBC
PH UR STRIP: 5.5 [PH] (ref 5–8)
PLATELET # BLD AUTO: 92 K/UL (ref 150–400)
PMV BLD AUTO: 10.9 FL (ref 8.9–12.9)
POTASSIUM SERPL-SCNC: 4.6 MMOL/L (ref 3.5–5.1)
PROT SERPL-MCNC: 8.7 G/DL (ref 6.4–8.2)
PROT UR STRIP-MCNC: 30 MG/DL
Q-T INTERVAL, ECG07: 394 MS
QRS DURATION, ECG06: 132 MS
QTC CALCULATION (BEZET), ECG08: 418 MS
RBC # BLD AUTO: 3.43 M/UL (ref 3.8–5.2)
RBC #/AREA URNS HPF: ABNORMAL /HPF (ref 0–5)
SODIUM SERPL-SCNC: 136 MMOL/L (ref 136–145)
SP GR UR REFRACTOMETRY: 1.02 (ref 1–1.03)
TROPONIN-HIGH SENSITIVITY: 17 NG/L (ref 0–51)
UA: UC IF INDICATED,UAUC: ABNORMAL
UROBILINOGEN UR QL STRIP.AUTO: 0.2 EU/DL (ref 0.2–1)
VENTRICULAR RATE, ECG03: 68 BPM
VIT B12 SERPL-MCNC: 208 PG/ML (ref 193–986)
WBC # BLD AUTO: 3.7 K/UL (ref 3.6–11)
WBC URNS QL MICRO: ABNORMAL /HPF (ref 0–4)

## 2022-02-22 PROCEDURE — 82607 VITAMIN B-12: CPT

## 2022-02-22 PROCEDURE — 99285 EMERGENCY DEPT VISIT HI MDM: CPT

## 2022-02-22 PROCEDURE — 87086 URINE CULTURE/COLONY COUNT: CPT

## 2022-02-22 PROCEDURE — 85025 COMPLETE CBC W/AUTO DIFF WBC: CPT

## 2022-02-22 PROCEDURE — 84484 ASSAY OF TROPONIN QUANT: CPT

## 2022-02-22 PROCEDURE — 93005 ELECTROCARDIOGRAM TRACING: CPT

## 2022-02-22 PROCEDURE — 74011250636 HC RX REV CODE- 250/636: Performed by: EMERGENCY MEDICINE

## 2022-02-22 PROCEDURE — 82746 ASSAY OF FOLIC ACID SERUM: CPT

## 2022-02-22 PROCEDURE — 36415 COLL VENOUS BLD VENIPUNCTURE: CPT

## 2022-02-22 PROCEDURE — 80053 COMPREHEN METABOLIC PANEL: CPT

## 2022-02-22 PROCEDURE — 96361 HYDRATE IV INFUSION ADD-ON: CPT

## 2022-02-22 PROCEDURE — 96360 HYDRATION IV INFUSION INIT: CPT

## 2022-02-22 PROCEDURE — 81001 URINALYSIS AUTO W/SCOPE: CPT

## 2022-02-22 RX ADMIN — SODIUM CHLORIDE 1000 ML: 9 INJECTION, SOLUTION INTRAVENOUS at 14:47

## 2022-02-22 NOTE — ED PROVIDER NOTES
EMERGENCY DEPARTMENT HISTORY AND PHYSICAL EXAM      Date: 2/22/2022  Patient Name: Mauricio Quesada  Patient Age and Sex: 80 y.o. female     History of Presenting Illness     Chief Complaint   Patient presents with    Fatigue     Pt ambulatory into triage with a cc of weakness x 11 days; pts daughter states that pt just finished antibiotics for kidney infection       History Provided By: Patient    HPI: Mauricio Quesada is a 19-year-old female with a history of GERD, atrial fibrillation presenting with fatigue. According to patient for the last 2 months has been feeling weak but the fatigue has been just worsening. States that she does get urinary tract infections and just completed antibiotics for a kidney infection. Denies any fevers, nausea, vomiting, chest pain, abdominal pain, diarrhea or urinary symptoms. States that they tried to call Dr. Zac Lopez office today but they were directed here. Daughter states the patient does not eat well and does not drink enough water. No numbness or weakness or any syncopal episodes    There are no other complaints, changes, or physical findings at this time. PCP: Danni Rodriguez MD    No current facility-administered medications on file prior to encounter. Current Outpatient Medications on File Prior to Encounter   Medication Sig Dispense Refill    magnesium oxide (MAG-OX) 400 mg tablet Take 1 Tab by mouth daily. For leg cramps 90 Tablet 0    pantoprazole (PROTONIX) 40 mg tablet TAKE ONE TABLET BY MOUTH EVERY DAY, TO KEEP STOMACH FROM BLEEDING 90 Tablet 0    dabigatran etexilate (Pradaxa) 75 mg capsule TAKE ONE CAPSULE BY MOUTH EVERY 12 HOURS 180 Capsule 0    traZODone (DESYREL) 300 mg tablet TAKE ONE TABLET BY MOUTH AT BEDTIME AS NEEDED 30 Tablet 0    metoprolol succinate (TOPROL-XL) 25 mg XL tablet TAKE ONE TABLET BY MOUTH EVERY DAY FOR THE HEART 90 Tablet 0    atenoloL (TENORMIN) 25 mg tablet Take 1 Tab by mouth daily.  90 Tablet 0    fluticasone propionate (FLONASE) 50 mcg/actuation nasal spray USE 1-2 SPRAYS IN EACH NOSTRIL ONCE DAILY. 16 g PRN    furosemide (LASIX) 20 mg tablet 1-2 tabs po daily as needed for fluid 180 Tablet 2    cefUROXime (CEFTIN) 250 mg tablet Take 250 mg by mouth two (2) times a day.  amitriptyline (ELAVIL) 25 mg tablet Take 1 Tab by mouth nightly. For numbness in feet. (Patient not taking: Reported on 10/4/2021) 30 Tablet 0    potassium chloride (K-DUR, KLOR-CON) 20 mEq tablet TAKE 1 TABLET BY MOUTH once daily. (POTASSIUM) 30 Tablet 12    clotrimazole-betamethasone (LOTRISONE) topical cream Apply to affected area bid 45 g 5    lisinopriL (PRINIVIL, ZESTRIL) 5 mg tablet TAKE ONE TABLET BY MOUTH EVERY DAY FOR THE HEART 90 Tab 3    traZODone (DESYREL) 150 mg tablet Take 0.5 Tabs by mouth nightly. 45 Tab 3    levothyroxine (SYNTHROID) 100 mcg tablet One po daily for thyroid 90 Tab 3    acetaminophen (TYLENOL) 325 mg tablet Take 2 Tabs by mouth every six (6) hours as needed for Pain. 60 Tab 1    acetaminophen (TYLENOL ARTHRITIS PAIN) 650 mg TbER Take 650 mg by mouth every eight (8) hours.  CALCIUM 600 + D tablet TAKE 1 TABLET TWICE A DAY. (CALCIUM SUPPLEMENT) 60 Tab 0       Past History     Past Medical History:  Past Medical History:   Diagnosis Date    Arrhythmia     atrial fibrillation, chronic. Stress test 2011 essent.  normal.  Mild-mod AR on ECHO    Atrial fibrillation ([address]     Atrial flutter with rapid ventricular response ([address] 11/21/2019    Chronic atrial fibrillation ([address] 11/21/2019    GI bleeding     Hypertension     Refusal of blood transfusions as patient is [employer]     Thyroid disease     hypothyroid       Past Surgical History:  Past Surgical History:   Procedure Laterality Date    COLONOSCOPY N/A 5/2/2017    COLONOSCOPY performed [facility] Ofe Alonzo [facility] at [facility] ENDOSCOPY    ECHO TRANSESOPHAGEAL (DENISE) W OR WO CONTR  4/27/2011         HX GYN      vaginal deliveries x10    HX TUBAL LIGATION      TN CARDIOVERSION ELECTIVE ARRHYTHMIA EXTERNAL  6/14/2011            Family History:  Family History   Problem Relation Age of Onset    Heart Disease Father     Cancer Son     Heart Disease Son        Social History:  Social History     Tobacco Use    Smoking status: Never Smoker    Smokeless tobacco: Never Used   Vaping Use    Vaping Use: Never used   Substance Use Topics    Alcohol use: No    Drug use: No       Allergies: Allergies   Allergen Reactions    Seroquel [Quetiapine] Other (comments)     PSYCOLOGICAL REACTION Pt didn't feel like them selves but felt like a different person, iT DEEPLY DISTURBED PT.  PT BECAME IRRITABLE.  Ambien [Zolpidem] Other (comments)     Couldn't move         Review of Systems   Review of Systems   Constitutional: Positive for fatigue. Negative for chills and fever. Respiratory: Negative for cough and shortness of breath. Cardiovascular: Negative for chest pain. Gastrointestinal: Negative for abdominal pain, constipation, diarrhea, nausea and vomiting. Genitourinary: Negative for dysuria, frequency and hematuria. Neurological: Negative for weakness and numbness. All other systems reviewed and are negative. Physical Exam   Physical Exam  Vitals and nursing note reviewed. Constitutional:       Appearance: She is well-developed. HENT:      Head: Normocephalic and atraumatic. Nose: Nose normal.      Mouth/Throat:      Mouth: Mucous membranes are dry. Eyes:      Extraocular Movements: Extraocular movements intact. Conjunctiva/sclera: Conjunctivae normal.   Cardiovascular:      Rate and Rhythm: Normal rate and regular rhythm. Pulmonary:      Effort: Pulmonary effort is normal. No respiratory distress. Breath sounds: Normal breath sounds. Abdominal:      General: There is no distension. Palpations: Abdomen is soft. Tenderness: There is no abdominal tenderness.    Musculoskeletal:         General: Normal range of motion. Cervical back: Normal range of motion and neck supple. Skin:     General: Skin is warm and dry. Neurological:      General: No focal deficit present. Mental Status: She is alert and oriented to person, place, and time. Mental status is at baseline. Psychiatric:         Mood and Affect: Mood normal.          Diagnostic Study Results     Labs -     Recent Results (from the past 12 hour(s))   EKG, 12 LEAD, INITIAL    Collection Time: 02/22/22  1:10 PM   Result Value Ref Range    Ventricular Rate 68 BPM    Atrial Rate 70 BPM    QRS Duration 132 ms    Q-T Interval 394 ms    QTC Calculation (Bezet) 418 ms    Calculated R Axis -51 degrees    Calculated T Axis 87 degrees    Diagnosis       Atrial fibrillation  Left axis deviation  Left ventricular hypertrophy with QRS widening  When compared with ECG of 20-JUL-2021 20:43,  Right bundle branch block is no longer present  Confirmed by Ericka Chand MD, Lester Mauricio ([id number]) on 2/22/2022 3:07:12 PM     CBC WITH AUTOMATED DIFF    Collection Time: 02/22/22  1:21 PM   Result Value Ref Range    WBC 3.7 3.6 - 11.0 K/uL    RBC 3.43 (L) 3.80 - 5.20 M/uL    HGB 9.7 (L) 11.5 - 16.0 g/dL    HCT 31.3 (L) 35.0 - 47.0 %    MCV 91.3 80.0 - 99.0 FL    MCH 28.3 26.0 - 34.0 PG    MCHC 31.0 30.0 - 36.5 g/dL    RDW 17.1 (H) 11.5 - 14.5 %    PLATELET 92 (L) 842 - 400 K/uL    MPV 10.9 8.9 - 12.9 FL    NRBC 0.0 0  WBC    ABSOLUTE NRBC 0.00 0.00 - 0.01 K/uL    NEUTROPHILS 45 32 - 75 %    LYMPHOCYTES 39 12 - 49 %    MONOCYTES 11 5 - 13 %    EOSINOPHILS 4 0 - 7 %    BASOPHILS 1 0 - 1 %    IMMATURE GRANULOCYTES 0 0.0 - 0.5 %    ABS. NEUTROPHILS 1.7 (L) 1.8 - 8.0 K/UL    ABS. LYMPHOCYTES 1.4 0.8 - 3.5 K/UL    ABS. MONOCYTES 0.4 0.0 - 1.0 K/UL    ABS. EOSINOPHILS 0.1 0.0 - 0.4 K/UL    ABS. BASOPHILS 0.0 0.0 - 0.1 K/UL    ABS. IMM.  GRANS. 0.0 0.00 - 0.04 K/UL    DF AUTOMATED     METABOLIC PANEL, COMPREHENSIVE    Collection Time: 02/22/22  1:21 PM   Result Value Ref Range    Sodium 136 136 - 145 mmol/L    Potassium 4.6 3.5 - 5.1 mmol/L    Chloride 111 (H) 97 - 108 mmol/L    CO2 21 21 - 32 mmol/L    Anion gap 4 (L) 5 - 15 mmol/L    Glucose 114 (H) 65 - 100 mg/dL    BUN 34 (H) 6 - 20 MG/DL    Creatinine 1.86 (H) 0.55 - 1.02 MG/DL    BUN/Creatinine ratio 18 12 - 20      GFR est AA 31 (L) >60 ml/min/1.73m2    GFR est non-AA 25 (L) >60 ml/min/1.73m2    Calcium 8.4 (L) 8.5 - 10.1 MG/DL    Bilirubin, total 0.3 0.2 - 1.0 MG/DL    ALT (SGPT) 15 12 - 78 U/L    AST (SGOT) 21 15 - 37 U/L    Alk. phosphatase 62 45 - 117 U/L    Protein, total 8.7 (H) 6.4 - 8.2 g/dL    Albumin 3.6 3.5 - 5.0 g/dL    Globulin 5.1 (H) 2.0 - 4.0 g/dL    A-G Ratio 0.7 (L) 1.1 - 2.2     TROPONIN-HIGH SENSITIVITY    Collection Time: 02/22/22  1:21 PM   Result Value Ref Range    Troponin-High Sensitivity 17 0 - 51 ng/L   FOLATE    Collection Time: 02/22/22  1:21 PM   Result Value Ref Range    Folate 64.0 (H) 5.0 - 21.0 ng/mL   VITAMIN B12    Collection Time: 02/22/22  1:21 PM   Result Value Ref Range    Vitamin B12 208 193 - 986 pg/mL   URINALYSIS W/ REFLEX CULTURE    Collection Time: 02/22/22  2:53 PM    Specimen: Urine   Result Value Ref Range    Color YELLOW/STRAW      Appearance CLEAR CLEAR      Specific gravity 1.017 1.003 - 1.030      pH (UA) 5.5 5.0 - 8.0      Protein 30 (A) NEG mg/dL    Glucose Negative NEG mg/dL    Ketone Negative NEG mg/dL    Bilirubin Negative NEG      Blood Negative NEG      Urobilinogen 0.2 0.2 - 1.0 EU/dL    Nitrites Negative NEG      Leukocyte Esterase MODERATE (A) NEG      WBC 10-20 0 - 4 /hpf    RBC 0-5 0 - 5 /hpf    Epithelial cells FEW FEW /lpf    Bacteria Negative NEG /hpf    UA:UC IF INDICATED URINE CULTURE ORDERED (A) CNI         Radiologic Studies -   No orders to display     CT Results  (Last 48 hours)    None        CXR Results  (Last 48 hours)    None            Medical Decision Making   I am the first provider for this patient.     I reviewed the vital signs, available nursing notes, past medical history, past surgical history, family history and social history. Vital Signs-Reviewed the patient's vital signs. Patient Vitals for the past 12 hrs:   Temp Pulse Resp BP SpO2   02/22/22 1615 -- 61 23 -- 95 %   02/22/22 1600 -- 62 27 (!) 103/46 --   02/22/22 1545 -- (!) 56 18 (!) 100/40 94 %   02/22/22 1530 -- (!) 56 20 (!) 89/58 93 %   02/22/22 1515 -- (!) 57 16 (!) 106/57 --   02/22/22 1500 -- 60 19 (!) 109/53 93 %   02/22/22 1437 98.5 °F (36.9 °C) -- -- -- --   02/22/22 1305 98.4 °F (36.9 °C) 67 16 (!) 112/51 97 %       Records Reviewed: Nursing Notes and Old Medical Records    Provider Notes (Medical Decision Making):   Patient presenting with generalized fatigue. DDx: infection, anemia, electrolyte anomoly (hypo or hyperkalemia, hypomagnesemia), hypothyroid, dehydration, depression, CA, ACS. Will obtain EKG, UA, labwork for any urgent/emergent pathology. ED Course:   Initial assessment performed. The patients presenting problems have been discussed, and they are in agreement with the care plan formulated and outlined with them. I have encouraged them to ask questions as they arise throughout their visit. Critical Care Time:   0    Disposition:  Discharge Note:  The patient has been re-evaluated and is ready for discharge. Reviewed available results with patient. Counseled patient on diagnosis and care plan. Patient has expressed understanding, and all questions have been answered. Patient agrees with plan and agrees to follow up as recommended, or to return to the ED if their symptoms worsen. Discharge instructions have been provided and explained to the patient, along with reasons to return to the ED. PLAN:  Discharge Medication List as of 2/22/2022  3:08 PM        2. Follow-up Information     Follow up With Specialties Details Why Contact Info    Carlos Alberto Barros MD Family Medicine Go on 3/1/2022 at 11:00am for your PCP hospital follow up appointment.   [address]  [phone]          3. Return to ED if worse     Diagnosis     Clinical Impression:   1. Dehydration    2. VY (acute kidney injury) ([address]    3. Malaise and fatigue        Attestations:    Henrietta Silva M.D. Please note that this dictation was completed with [email or web address], the computer voice recognition software. Quite often unanticipated grammatical, syntax, homophones, and other interpretive errors are inadvertently transcribed by the computer software. Please disregard these errors. Please excuse any errors that have escaped final proofreading. Thank you.

## 2022-02-22 NOTE — PROGRESS NOTES
Hospital follow-up PCP transitional care appointment has been scheduled with Dr. Last Ortiz on 3/1/22 at 1100. Pending patient discharge.   Reyna Alves, Care Management Specialist

## 2022-02-22 NOTE — TELEPHONE ENCOUNTER
Received call from Jero at [facility] with [employer] Complaint. Subjective: Caller states \"11 days of sleeping. Daughter stated she won't eat much , had headache. Complains of increased weakness and fatigue, daughter stated her mother is sleeping most of every day for last 3 weeks. Daughter is concerned that her mother is dieing. \"     Current Symptoms: headache, not eating, over sleeping for 11 days. Onset: 3 weeks ago; gradual, worsening    Associated Symptoms: reduced activity, reduced appetite, reduced fluid intake, increased sleepiness    Pain Severity: not assessed; n/a; n/a    Temperature: not assessed. n/a    What has been tried: nothing    LMP: NA Pregnant: NA    Recommended disposition: Go to Office now. Care advice provided, patient verbalizes understanding; denies any other questions or concerns; instructed to call back for any new or worsening symptoms. Patient/Caller agrees with recommended disposition; writer provided warm transfer to [facility] at [facility] for appointment scheduling    Attention Provider: Thank you for allowing me to participate in the care of your patient. The patient was connected to triage in response to information provided to the [facility]. Please do not respond through this encounter as the response is not directed to a shared pool.         Reason for Disposition   MODERATE weakness (i.e., interferes with work, school, normal activities) and cause unknown (Exceptions: weakness with acute minor illness, or weakness from poor fluid intake)    Protocols used: WEAKNESS (GENERALIZED) AND FATIGUE-ADULT-OH

## 2022-02-23 LAB
BACTERIA SPEC CULT: NORMAL
CC UR VC: NORMAL
SERVICE CMNT-IMP: NORMAL

## 2022-02-27 RX ORDER — LEVOTHYROXINE SODIUM 100 UG/1
TABLET ORAL
Qty: 90 TABLET | Refills: 0 | Status: SHIPPED | OUTPATIENT
Start: 2022-02-27 | End: 2022-05-23

## 2022-03-01 ENCOUNTER — OFFICE VISIT (OUTPATIENT)
Dept: FAMILY MEDICINE CLINIC | Age: 87
End: 2022-03-01
Payer: MEDICARE

## 2022-03-01 VITALS
WEIGHT: 132 LBS | SYSTOLIC BLOOD PRESSURE: 113 MMHG | DIASTOLIC BLOOD PRESSURE: 56 MMHG | OXYGEN SATURATION: 98 % | TEMPERATURE: 98 F | HEIGHT: 62 IN | HEART RATE: 63 BPM | RESPIRATION RATE: 16 BRPM | BODY MASS INDEX: 24.29 KG/M2

## 2022-03-01 DIAGNOSIS — E53.8 B12 DEFICIENCY: ICD-10-CM

## 2022-03-01 DIAGNOSIS — R63.4 WEIGHT LOSS: ICD-10-CM

## 2022-03-01 DIAGNOSIS — N18.30 CRF (CHRONIC RENAL FAILURE), STAGE 3 (MODERATE) (HCC): ICD-10-CM

## 2022-03-01 DIAGNOSIS — D69.6 THROMBOCYTOPENIA, UNSPECIFIED (HCC): ICD-10-CM

## 2022-03-01 DIAGNOSIS — N30.00 ACUTE CYSTITIS WITHOUT HEMATURIA: Primary | ICD-10-CM

## 2022-03-01 DIAGNOSIS — E03.9 ACQUIRED HYPOTHYROIDISM: ICD-10-CM

## 2022-03-01 DIAGNOSIS — D64.9 ANEMIA, UNSPECIFIED TYPE: ICD-10-CM

## 2022-03-01 DIAGNOSIS — R10.9 LEFT FLANK PAIN: ICD-10-CM

## 2022-03-01 DIAGNOSIS — Z77.22 SECOND HAND SMOKE EXPOSURE: ICD-10-CM

## 2022-03-01 DIAGNOSIS — R05.9 COUGH: ICD-10-CM

## 2022-03-01 PROCEDURE — G8432 DEP SCR NOT DOC, RNG: HCPCS | Performed by: FAMILY MEDICINE

## 2022-03-01 PROCEDURE — 1101F PT FALLS ASSESS-DOCD LE1/YR: CPT | Performed by: FAMILY MEDICINE

## 2022-03-01 PROCEDURE — 99214 OFFICE O/P EST MOD 30 MIN: CPT | Performed by: FAMILY MEDICINE

## 2022-03-01 PROCEDURE — G8420 CALC BMI NORM PARAMETERS: HCPCS | Performed by: FAMILY MEDICINE

## 2022-03-01 PROCEDURE — G8427 DOCREV CUR MEDS BY ELIG CLIN: HCPCS | Performed by: FAMILY MEDICINE

## 2022-03-01 PROCEDURE — G8536 NO DOC ELDER MAL SCRN: HCPCS | Performed by: FAMILY MEDICINE

## 2022-03-01 PROCEDURE — 1090F PRES/ABSN URINE INCON ASSESS: CPT | Performed by: FAMILY MEDICINE

## 2022-03-01 RX ORDER — TRAZODONE HYDROCHLORIDE 300 MG/1
TABLET ORAL
Qty: 30 TABLET | Refills: 3 | Status: SHIPPED | OUTPATIENT
Start: 2022-03-01 | End: 2022-07-21

## 2022-03-01 RX ORDER — METOPROLOL SUCCINATE 25 MG/1
TABLET, EXTENDED RELEASE ORAL
Qty: 90 TABLET | Refills: 3 | Status: SHIPPED | OUTPATIENT
Start: 2022-03-01

## 2022-03-01 RX ORDER — CIPROFLOXACIN 500 MG/1
500 TABLET ORAL 2 TIMES DAILY
Qty: 14 TABLET | Refills: 0 | Status: SHIPPED | OUTPATIENT
Start: 2022-03-01 | End: 2022-03-08

## 2022-03-01 RX ORDER — LISINOPRIL 5 MG/1
TABLET ORAL
Qty: 90 TABLET | Refills: 3 | Status: SHIPPED | OUTPATIENT
Start: 2022-03-01 | End: 2022-03-01 | Stop reason: ALTCHOICE

## 2022-03-01 NOTE — PROGRESS NOTES
HISTORY OF PRESENT ILLNESS  Lucila Roberts is a 80 y.o. female. HPI in for followup. Has been lying in bed a lot, very tired. Seen in ER a few days ago. Dizzy when stands up. No dyspphagia, abdominal pain, diarrhea, hematuria, cough. Seen in ER recently- noted to be dehydrated and treated for a UTI. Has had some mild anemia recently. No melena, hematochezia. ROS    Physical Exam  Vitals and nursing note reviewed. Constitutional:       Appearance: She is well-developed. HENT:      Right Ear: External ear normal.      Left Ear: External ear normal.   Neck:      Thyroid: No thyromegaly. Cardiovascular:      Rate and Rhythm: Normal rate and regular rhythm. Heart sounds: Normal heart sounds. Pulmonary:      Breath sounds: Normal breath sounds. No wheezing. Abdominal:      General: Bowel sounds are normal. There is no distension. Palpations: Abdomen is soft. There is no mass. Tenderness: There is no abdominal tenderness. Lymphadenopathy:      Cervical: No cervical adenopathy. ASSESSMENT and PLAN  Orders Placed This Encounter    CULTURE, URINE    CT ABD W CONT AND PELVIS W WO CONT    CT CHEST W WO CONT    CBC WITH AUTOMATED DIFF    VITAMIN B12    METHYLMALONIC ACID    IRON PROFILE    FERRITIN    PROTEIN ELECTROPHORESIS    RETICULOCYTE COUNT    METABOLIC PANEL, COMPREHENSIVE    TSH 3RD GENERATION    T4, FREE    ciprofloxacin HCl (CIPRO) 500 mg tablet    traZODone (DESYREL) 300 mg tablet    metoprolol succinate (TOPROL-XL) 25 mg XL tablet    DISCONTD: lisinopriL (PRINIVIL, ZESTRIL) 5 mg tablet     Diagnoses and all orders for this visit:    1. Acute cystitis without hematuria  -     CULTURE, URINE; Future    2. B12 deficiency  -     VITAMIN B12; Future  -     METHYLMALONIC ACID; Future    3. Anemia, unspecified type  -     CBC WITH AUTOMATED DIFF; Future  -     IRON PROFILE;  Future  -     FERRITIN; Future  -     PROTEIN ELECTROPHORESIS; Future  -     RETICULOCYTE COUNT; Future    4. CRF (chronic renal failure), stage 3 (moderate) (HCC)  -     METABOLIC PANEL, COMPREHENSIVE; Future    5. Acquired hypothyroidism  -     TSH 3RD GENERATION; Future  -     T4, FREE; Future    6. Thrombocytopenia, unspecified    7. Left flank pain  -     CT ABD W CONT AND PELVIS W WO CONT; Future    8. Weight loss  -     CT ABD W CONT AND PELVIS W WO CONT; Future  -     CT CHEST W WO CONT; Future    9. Cough  -     CT CHEST W WO CONT; Future    10. Second hand smoke exposure  -     CT CHEST W WO CONT; Future    Other orders  -     ciprofloxacin HCl (CIPRO) 500 mg tablet; Take 1 Tablet by mouth two (2) times a day for 7 days. For uti  -     traZODone (DESYREL) 300 mg tablet;  One po qhs  -     metoprolol succinate (TOPROL-XL) 25 mg XL tablet; TAKE ONE TABLET BY MOUTH EVERY DAY FOR THE HEART

## 2022-03-01 NOTE — PROGRESS NOTES
Chief Complaint   Patient presents with    Follow-up     ER - [facility]-2/22/22     1. \"Have you been to the ER, urgent care clinic since your last visit? Hospitalized since your last visit? \" Yes 2/22/22 - Dehydration, fatige    2. \"Have you seen or consulted any other health care providers outside of the [address] [place] [address] since your last visit? \" No     3. For patients aged 39-70: Has the patient had a colonoscopy / FIT/ Cologuard? NA - based on age      If the patient is female:    4. For patients aged 41-77: Has the patient had a mammogram within the past 2 years? NA - based on age or sex      11. For patients aged 21-65: Has the patient had a pap smear? NA - based on age or sex    Patient notes low back pain.

## 2022-03-02 LAB
ALBUMIN SERPL-MCNC: 3.5 G/DL (ref 3.5–5)
ALBUMIN/GLOB SERPL: 0.7 {RATIO} (ref 1.1–2.2)
ALP SERPL-CCNC: 66 U/L (ref 45–117)
ALT SERPL-CCNC: 13 U/L (ref 12–78)
ANION GAP SERPL CALC-SCNC: 4 MMOL/L (ref 5–15)
AST SERPL-CCNC: 21 U/L (ref 15–37)
BASOPHILS # BLD: 0 K/UL (ref 0–0.1)
BASOPHILS NFR BLD: 1 % (ref 0–1)
BILIRUB SERPL-MCNC: 0.2 MG/DL (ref 0.2–1)
BUN SERPL-MCNC: 18 MG/DL (ref 6–20)
BUN/CREAT SERPL: 13 (ref 12–20)
CALCIUM SERPL-MCNC: 8.8 MG/DL (ref 8.5–10.1)
CHLORIDE SERPL-SCNC: 107 MMOL/L (ref 97–108)
CO2 SERPL-SCNC: 22 MMOL/L (ref 21–32)
CREAT SERPL-MCNC: 1.39 MG/DL (ref 0.55–1.02)
DIFFERENTIAL METHOD BLD: ABNORMAL
EOSINOPHIL # BLD: 0.2 K/UL (ref 0–0.4)
EOSINOPHIL NFR BLD: 3 % (ref 0–7)
ERYTHROCYTE [DISTWIDTH] IN BLOOD BY AUTOMATED COUNT: 16.6 % (ref 11.5–14.5)
FERRITIN SERPL-MCNC: 108 NG/ML (ref 26–388)
GLOBULIN SER CALC-MCNC: 5.2 G/DL (ref 2–4)
GLUCOSE SERPL-MCNC: 97 MG/DL (ref 65–100)
HCT VFR BLD AUTO: 31.4 % (ref 35–47)
HGB BLD-MCNC: 9.5 G/DL (ref 11.5–16)
IMM GRANULOCYTES # BLD AUTO: 0 K/UL (ref 0–0.04)
IMM GRANULOCYTES NFR BLD AUTO: 0 % (ref 0–0.5)
IRON SATN MFR SERPL: 10 % (ref 20–50)
IRON SERPL-MCNC: 32 UG/DL (ref 35–150)
LYMPHOCYTES # BLD: 2 K/UL (ref 0.8–3.5)
LYMPHOCYTES NFR BLD: 36 % (ref 12–49)
MCH RBC QN AUTO: 28.6 PG (ref 26–34)
MCHC RBC AUTO-ENTMCNC: 30.3 G/DL (ref 30–36.5)
MCV RBC AUTO: 94.6 FL (ref 80–99)
MONOCYTES # BLD: 0.8 K/UL (ref 0–1)
MONOCYTES NFR BLD: 14 % (ref 5–13)
NEUTS SEG # BLD: 2.6 K/UL (ref 1.8–8)
NEUTS SEG NFR BLD: 46 % (ref 32–75)
NRBC # BLD: 0 K/UL (ref 0–0.01)
NRBC BLD-RTO: 0 PER 100 WBC
PLATELET # BLD AUTO: 122 K/UL (ref 150–400)
PMV BLD AUTO: 11.5 FL (ref 8.9–12.9)
POTASSIUM SERPL-SCNC: 4 MMOL/L (ref 3.5–5.1)
PROT SERPL-MCNC: 8.7 G/DL (ref 6.4–8.2)
RBC # BLD AUTO: 3.32 M/UL (ref 3.8–5.2)
RETICS # AUTO: 0.03 M/UL (ref 0.02–0.08)
RETICS/RBC NFR AUTO: 1 % (ref 0.7–2.1)
SODIUM SERPL-SCNC: 133 MMOL/L (ref 136–145)
T4 FREE SERPL-MCNC: 1.1 NG/DL (ref 0.8–1.5)
TIBC SERPL-MCNC: 309 UG/DL (ref 250–450)
TSH SERPL DL<=0.05 MIU/L-ACNC: 1.25 UIU/ML (ref 0.36–3.74)
VIT B12 SERPL-MCNC: 267 PG/ML (ref 193–986)
WBC # BLD AUTO: 5.5 K/UL (ref 3.6–11)

## 2022-03-04 LAB
ALBUMIN SERPL ELPH-MCNC: 3.7 G/DL (ref 2.9–4.4)
ALBUMIN/GLOB SERPL: 0.8 {RATIO} (ref 0.7–1.7)
ALPHA1 GLOB SERPL ELPH-MCNC: 0.2 G/DL (ref 0–0.4)
ALPHA2 GLOB SERPL ELPH-MCNC: 0.9 G/DL (ref 0.4–1)
B-GLOBULIN SERPL ELPH-MCNC: 1.1 G/DL (ref 0.7–1.3)
BACTERIA SPEC CULT: ABNORMAL
CC UR VC: ABNORMAL
GAMMA GLOB SERPL ELPH-MCNC: 2.5 G/DL (ref 0.4–1.8)
GLOBULIN SER CALC-MCNC: 4.7 G/DL (ref 2.2–3.9)
M PROTEIN SERPL ELPH-MCNC: ABNORMAL G/DL
PROT SERPL-MCNC: 8.4 G/DL (ref 6–8.5)
SERVICE CMNT-IMP: ABNORMAL

## 2022-03-07 LAB — METHYLMALONATE SERPL-SCNC: 355 NMOL/L (ref 0–378)

## 2022-03-10 ENCOUNTER — TELEPHONE (OUTPATIENT)
Dept: FAMILY MEDICINE CLINIC | Age: 87
End: 2022-03-10

## 2022-03-10 RX ORDER — CEPHALEXIN 500 MG/1
500 CAPSULE ORAL 3 TIMES DAILY
Qty: 9 CAPSULE | Refills: 0 | Status: SHIPPED | OUTPATIENT
Start: 2022-03-10 | End: 2022-03-13

## 2022-03-10 RX ORDER — ATENOLOL 25 MG/1
25 TABLET ORAL DAILY
Qty: 90 TABLET | Refills: 0 | Status: SHIPPED | OUTPATIENT
Start: 2022-03-10 | End: 2022-04-22

## 2022-03-10 NOTE — TELEPHONE ENCOUNTER
Patient's daughter Annalee Avila) called. She is returning a call to the office. Daughter states that doctor called today, and she missed it. Please call [phone].

## 2022-03-10 NOTE — PROGRESS NOTES
pc with pt. Will send in 3 days worth of cephalexin for uti. Iron sat also low. Had egd and colonoscopy in 2017. Pt is scheduled for CT abdomen in 2 weeks and has appt in 4 weeks. Will consider sendint to gi after above studies are completed.

## 2022-03-11 ENCOUNTER — TELEPHONE (OUTPATIENT)
Dept: FAMILY MEDICINE CLINIC | Age: 87
End: 2022-03-11

## 2022-03-11 NOTE — TELEPHONE ENCOUNTER
Attempted to contact patient daughter. Call yesterday was not from pcp but a reminder call for upcoming appointment on 3/14/22.

## 2022-03-14 ENCOUNTER — HOSPITAL ENCOUNTER (OUTPATIENT)
Dept: CT IMAGING | Age: 87
End: 2022-03-14
Attending: FAMILY MEDICINE
Payer: MEDICARE

## 2022-03-14 ENCOUNTER — HOSPITAL ENCOUNTER (OUTPATIENT)
Dept: CT IMAGING | Age: 87
Discharge: HOME | End: 2022-03-14
Attending: FAMILY MEDICINE
Payer: MEDICARE

## 2022-03-14 DIAGNOSIS — R05.9 COUGH: ICD-10-CM

## 2022-03-14 DIAGNOSIS — R63.4 WEIGHT LOSS: ICD-10-CM

## 2022-03-14 DIAGNOSIS — R10.9 LEFT FLANK PAIN: ICD-10-CM

## 2022-03-14 DIAGNOSIS — Z77.22 SECOND HAND SMOKE EXPOSURE: ICD-10-CM

## 2022-03-14 PROCEDURE — 74177 CT ABD & PELVIS W/CONTRAST: CPT

## 2022-03-14 PROCEDURE — 74011000636 HC RX REV CODE- 636: Performed by: FAMILY MEDICINE

## 2022-03-14 PROCEDURE — 71260 CT THORAX DX C+: CPT

## 2022-03-14 RX ADMIN — IOPAMIDOL 100 ML: 755 INJECTION, SOLUTION INTRAVENOUS at 14:03

## 2022-03-14 RX ADMIN — IOHEXOL 50 ML: 240 INJECTION, SOLUTION INTRATHECAL; INTRAVASCULAR; INTRAVENOUS; ORAL at 14:04

## 2022-03-18 PROBLEM — I35.1 NONRHEUMATIC AORTIC VALVE INSUFFICIENCY: Status: ACTIVE | Noted: 2021-02-05

## 2022-03-18 PROBLEM — D69.6 THROMBOCYTOPENIA, UNSPECIFIED (HCC): Status: ACTIVE | Noted: 2022-03-01

## 2022-03-18 PROBLEM — E86.0 DEHYDRATION: Status: ACTIVE | Noted: 2019-12-08

## 2022-03-18 PROBLEM — I49.9 IRREGULAR HEART BEAT: Status: ACTIVE | Noted: 2018-06-04

## 2022-03-18 PROBLEM — I50.9 CHF (CONGESTIVE HEART FAILURE) (HCC): Status: ACTIVE | Noted: 2019-11-20

## 2022-03-18 PROBLEM — I48.20 CHRONIC ATRIAL FIBRILLATION (HCC): Status: ACTIVE | Noted: 2019-11-21

## 2022-03-19 PROBLEM — N18.30 CKD (CHRONIC KIDNEY DISEASE) STAGE 3, GFR 30-59 ML/MIN (HCC): Status: ACTIVE | Noted: 2017-07-05

## 2022-03-19 PROBLEM — R60.0 BILATERAL LEG EDEMA: Status: ACTIVE | Noted: 2017-07-05

## 2022-03-19 PROBLEM — E87.5 HYPERKALEMIA: Status: ACTIVE | Noted: 2017-04-27

## 2022-03-19 PROBLEM — J18.9 PNEUMONIA: Status: ACTIVE | Noted: 2019-11-20

## 2022-03-19 PROBLEM — Z79.01 HX: LONG TERM ANTICOAGULANT USE: Status: ACTIVE | Noted: 2017-07-05

## 2022-03-19 PROBLEM — I34.0 NONRHEUMATIC MITRAL VALVE REGURGITATION: Status: ACTIVE | Noted: 2021-02-05

## 2022-03-19 PROBLEM — E78.5 DYSLIPIDEMIA (HIGH LDL; LOW HDL): Status: ACTIVE | Noted: 2017-07-05

## 2022-03-19 PROBLEM — I42.9 CARDIOMYOPATHY, SECONDARY (HCC): Status: ACTIVE | Noted: 2017-07-05

## 2022-03-19 PROBLEM — Z92.29 HX: LONG TERM ANTICOAGULANT USE: Status: ACTIVE | Noted: 2017-07-05

## 2022-03-19 PROBLEM — I35.0 NONRHEUMATIC AORTIC VALVE STENOSIS: Status: ACTIVE | Noted: 2021-02-05

## 2022-03-19 PROBLEM — R06.09 DOE (DYSPNEA ON EXERTION): Status: ACTIVE | Noted: 2017-07-05

## 2022-03-19 PROBLEM — I95.9 HYPOTENSION: Status: ACTIVE | Noted: 2019-12-08

## 2022-03-19 PROBLEM — R07.9 CHEST PAIN WITH MODERATE RISK FOR CARDIAC ETIOLOGY: Status: ACTIVE | Noted: 2017-07-06

## 2022-03-19 PROBLEM — K21.00 GERD WITH ESOPHAGITIS: Status: ACTIVE | Noted: 2017-07-05

## 2022-03-20 PROBLEM — J96.01 ACUTE HYPOXEMIC RESPIRATORY FAILURE (HCC): Status: ACTIVE | Noted: 2019-11-20

## 2022-03-21 NOTE — TELEPHONE ENCOUNTER
pc with daughter Jody Wayne. only finding onCt was possible interstitiallung disease. Pt.is doing somewhat better. has appt on 4-4.  Will wait until then and will discuss pulmonary referal with pt

## 2022-04-04 ENCOUNTER — OFFICE VISIT (OUTPATIENT)
Dept: FAMILY MEDICINE CLINIC | Age: 87
End: 2022-04-04
Payer: MEDICARE

## 2022-04-04 VITALS
HEIGHT: 62 IN | SYSTOLIC BLOOD PRESSURE: 140 MMHG | TEMPERATURE: 97.7 F | BODY MASS INDEX: 24.92 KG/M2 | HEART RATE: 71 BPM | DIASTOLIC BLOOD PRESSURE: 60 MMHG | WEIGHT: 135.4 LBS | RESPIRATION RATE: 16 BRPM | OXYGEN SATURATION: 96 %

## 2022-04-04 DIAGNOSIS — I10 ESSENTIAL HYPERTENSION: Primary | ICD-10-CM

## 2022-04-04 DIAGNOSIS — R53.82 CHRONIC FATIGUE: ICD-10-CM

## 2022-04-04 PROCEDURE — G8536 NO DOC ELDER MAL SCRN: HCPCS | Performed by: FAMILY MEDICINE

## 2022-04-04 PROCEDURE — 1101F PT FALLS ASSESS-DOCD LE1/YR: CPT | Performed by: FAMILY MEDICINE

## 2022-04-04 PROCEDURE — G8420 CALC BMI NORM PARAMETERS: HCPCS | Performed by: FAMILY MEDICINE

## 2022-04-04 PROCEDURE — 1090F PRES/ABSN URINE INCON ASSESS: CPT | Performed by: FAMILY MEDICINE

## 2022-04-04 PROCEDURE — 99213 OFFICE O/P EST LOW 20 MIN: CPT | Performed by: FAMILY MEDICINE

## 2022-04-04 PROCEDURE — G8432 DEP SCR NOT DOC, RNG: HCPCS | Performed by: FAMILY MEDICINE

## 2022-04-04 PROCEDURE — G8427 DOCREV CUR MEDS BY ELIG CLIN: HCPCS | Performed by: FAMILY MEDICINE

## 2022-04-04 RX ORDER — LISINOPRIL 5 MG/1
TABLET ORAL
COMMUNITY
Start: 2022-03-01 | End: 2022-04-22

## 2022-04-04 RX ORDER — SPIRONOLACTONE 25 MG
TABLET ORAL
COMMUNITY
Start: 2022-03-27 | End: 2022-04-04 | Stop reason: SDUPTHER

## 2022-04-04 RX ORDER — POTASSIUM CHLORIDE 20 MEQ/1
20 TABLET, EXTENDED RELEASE ORAL DAILY
Qty: 90 TABLET | Refills: 3 | Status: SHIPPED | OUTPATIENT
Start: 2022-04-04 | End: 2022-10-17

## 2022-04-04 NOTE — PROGRESS NOTES
Chief Complaint   Patient presents with    Follow Up Chronic Condition     one month follow up        1. \"Have you been to the ER, urgent care clinic since your last visit? Hospitalized since your last visit? \" No    2. \"Have you seen or consulted any other health care providers outside of the [address] [place] [address] since your last visit? \" No     3. For patients aged 39-70: Has the patient had a colonoscopy / FIT/ Cologuard? NA - based on age      If the patient is female:    4. For patients aged 41-77: Has the patient had a mammogram within the past 2 years? NA - based on age or sex      11. For patients aged 21-65: Has the patient had a pap smear?  NA - based on age or sex    Health Maintenance Due   Topic Date Due    Shingrix Vaccine Age 49> (2 of 2) 12/09/2020

## 2022-04-04 NOTE — PROGRESS NOTES
HISTORY OF PRESENT ILLNESS  Karsten Caal is a 80 y.o. female. HPI In for followup. Overall feeling somewhat better than when seen 4 weeks ago. Doing some housework. Daughter says that she has been more alert and not as sleepy. Has been having some cramps in back of legs, gets better when she takes her potassium. Breathing seems to be doing ok. Some dry cough. Has gained 3 lbs since last seen. ROS    Physical Exam  Vitals and nursing note reviewed. Constitutional:       Appearance: She is well-developed. HENT:      Right Ear: External ear normal.      Left Ear: External ear normal.   Neck:      Thyroid: No thyromegaly. Cardiovascular:      Rate and Rhythm: Normal rate and regular rhythm. Heart sounds: Normal heart sounds. Pulmonary:      Breath sounds: Normal breath sounds. No wheezing. Abdominal:      General: Bowel sounds are normal. There is no distension. Palpations: Abdomen is soft. There is no mass. Tenderness: There is no abdominal tenderness. Lymphadenopathy:      Cervical: No cervical adenopathy. ASSESSMENT and PLAN  Orders Placed This Encounter    potassium chloride (K-DUR, KLOR-CON M20) 20 mEq tablet     Diagnoses and all orders for this visit:    1. Essential hypertension    2. Chronic fatigue    Other orders  -     potassium chloride (K-DUR, KLOR-CON M20) 20 mEq tablet; Take 1 Tablet by mouth daily. Follow-up and Dispositions    · Return in about 3 months (around 7/4/2022). Overall looks better.  Asymptomatic from ILD

## 2022-04-13 ENCOUNTER — APPOINTMENT (OUTPATIENT)
Dept: CT IMAGING | Age: 87
End: 2022-04-13
Attending: EMERGENCY MEDICINE
Payer: MEDICARE

## 2022-04-13 ENCOUNTER — APPOINTMENT (OUTPATIENT)
Dept: GENERAL RADIOLOGY | Age: 87
End: 2022-04-13
Attending: EMERGENCY MEDICINE
Payer: MEDICARE

## 2022-04-13 ENCOUNTER — HOSPITAL ENCOUNTER (EMERGENCY)
Age: 87
Discharge: HOME | End: 2022-04-13
Attending: EMERGENCY MEDICINE
Payer: MEDICARE

## 2022-04-13 VITALS
RESPIRATION RATE: 17 BRPM | WEIGHT: 135 LBS | BODY MASS INDEX: 24.84 KG/M2 | HEART RATE: 50 BPM | HEIGHT: 62 IN | SYSTOLIC BLOOD PRESSURE: 124 MMHG | DIASTOLIC BLOOD PRESSURE: 52 MMHG | OXYGEN SATURATION: 95 % | TEMPERATURE: 98.2 F

## 2022-04-13 DIAGNOSIS — J84.9 INTERSTITIAL LUNG DISEASE (HCC): Primary | ICD-10-CM

## 2022-04-13 LAB
ALBUMIN SERPL-MCNC: 3.1 G/DL (ref 3.5–5)
ALBUMIN/GLOB SERPL: 0.6 {RATIO} (ref 1.1–2.2)
ALP SERPL-CCNC: 44 U/L (ref 45–117)
ALT SERPL-CCNC: 10 U/L (ref 12–78)
ANION GAP SERPL CALC-SCNC: 6 MMOL/L (ref 5–15)
AST SERPL-CCNC: 17 U/L (ref 15–37)
BASOPHILS # BLD: 0 K/UL (ref 0–0.1)
BASOPHILS NFR BLD: 1 % (ref 0–1)
BILIRUB SERPL-MCNC: 0.3 MG/DL (ref 0.2–1)
BNP SERPL-MCNC: 1557 PG/ML
BUN SERPL-MCNC: 21 MG/DL (ref 6–20)
BUN/CREAT SERPL: 17 (ref 12–20)
CALCIUM SERPL-MCNC: 8.6 MG/DL (ref 8.5–10.1)
CHLORIDE SERPL-SCNC: 106 MMOL/L (ref 97–108)
CO2 SERPL-SCNC: 26 MMOL/L (ref 21–32)
CREAT SERPL-MCNC: 1.26 MG/DL (ref 0.55–1.02)
D DIMER PPP FEU-MCNC: 0.83 MG/L FEU (ref 0–0.65)
DIFFERENTIAL METHOD BLD: ABNORMAL
EOSINOPHIL # BLD: 0.2 K/UL (ref 0–0.4)
EOSINOPHIL NFR BLD: 4 % (ref 0–7)
ERYTHROCYTE [DISTWIDTH] IN BLOOD BY AUTOMATED COUNT: 16.4 % (ref 11.5–14.5)
GLOBULIN SER CALC-MCNC: 5 G/DL (ref 2–4)
GLUCOSE SERPL-MCNC: 101 MG/DL (ref 65–100)
HCT VFR BLD AUTO: 30.1 % (ref 35–47)
HGB BLD-MCNC: 9.1 G/DL (ref 11.5–16)
IMM GRANULOCYTES # BLD AUTO: 0 K/UL (ref 0–0.04)
IMM GRANULOCYTES NFR BLD AUTO: 0 % (ref 0–0.5)
LYMPHOCYTES # BLD: 1.5 K/UL (ref 0.8–3.5)
LYMPHOCYTES NFR BLD: 32 % (ref 12–49)
MCH RBC QN AUTO: 28.6 PG (ref 26–34)
MCHC RBC AUTO-ENTMCNC: 30.2 G/DL (ref 30–36.5)
MCV RBC AUTO: 94.7 FL (ref 80–99)
MONOCYTES # BLD: 0.5 K/UL (ref 0–1)
MONOCYTES NFR BLD: 11 % (ref 5–13)
NEUTS SEG # BLD: 2.5 K/UL (ref 1.8–8)
NEUTS SEG NFR BLD: 52 % (ref 32–75)
NRBC # BLD: 0 K/UL (ref 0–0.01)
NRBC BLD-RTO: 0 PER 100 WBC
PLATELET # BLD AUTO: 113 K/UL (ref 150–400)
PMV BLD AUTO: 10.5 FL (ref 8.9–12.9)
POTASSIUM SERPL-SCNC: 3.9 MMOL/L (ref 3.5–5.1)
PROT SERPL-MCNC: 8.1 G/DL (ref 6.4–8.2)
RBC # BLD AUTO: 3.18 M/UL (ref 3.8–5.2)
SODIUM SERPL-SCNC: 138 MMOL/L (ref 136–145)
TROPONIN-HIGH SENSITIVITY: 17 NG/L (ref 0–51)
WBC # BLD AUTO: 4.8 K/UL (ref 3.6–11)

## 2022-04-13 PROCEDURE — 94760 N-INVAS EAR/PLS OXIMETRY 1: CPT

## 2022-04-13 PROCEDURE — 71046 X-RAY EXAM CHEST 2 VIEWS: CPT

## 2022-04-13 PROCEDURE — 84484 ASSAY OF TROPONIN QUANT: CPT

## 2022-04-13 PROCEDURE — 80053 COMPREHEN METABOLIC PANEL: CPT

## 2022-04-13 PROCEDURE — 83880 ASSAY OF NATRIURETIC PEPTIDE: CPT

## 2022-04-13 PROCEDURE — 74011000636 HC RX REV CODE- 636: Performed by: EMERGENCY MEDICINE

## 2022-04-13 PROCEDURE — 85025 COMPLETE CBC W/AUTO DIFF WBC: CPT

## 2022-04-13 PROCEDURE — 85379 FIBRIN DEGRADATION QUANT: CPT

## 2022-04-13 PROCEDURE — 71275 CT ANGIOGRAPHY CHEST: CPT

## 2022-04-13 PROCEDURE — 99285 EMERGENCY DEPT VISIT HI MDM: CPT

## 2022-04-13 PROCEDURE — 93005 ELECTROCARDIOGRAM TRACING: CPT

## 2022-04-13 PROCEDURE — 36415 COLL VENOUS BLD VENIPUNCTURE: CPT

## 2022-04-13 RX ORDER — METHYLPREDNISOLONE 4 MG/1
TABLET ORAL
Qty: 1 DOSE PACK | Refills: 0 | Status: SHIPPED | OUTPATIENT
Start: 2022-04-13 | End: 2022-04-22

## 2022-04-13 RX ADMIN — IOPAMIDOL 100 ML: 755 INJECTION, SOLUTION INTRAVENOUS at 13:25

## 2022-04-13 NOTE — ED PROVIDER NOTES
EMERGENCY DEPARTMENT HISTORY AND PHYSICAL EXAM      Date: 4/13/2022  Patient Name: Prerna Barillas  Patient Age and Sex: 80 y.o. female     History of Presenting Illness     Chief Complaint   Patient presents with    Shortness of Breath     Pt wheeled into triage with a cc of shortness of breath x 3-5 days; pt has hx of lung disease; pt denies chest pain     History Provided By: Patient    HPI: Prerna Barillas is a 70-year-old history atrial fibrillation, hypertension, interstitial lung disease, CHF presenting with dyspnea. Patient states she has had dyspnea for the past 2 days worse with exertion worse with lying flat associated dry cough. She has chronic bilateral lower extremity swelling that is unchanged from her baseline. No chest pain. No palpitations no syncope no lightheadedness. There are no other complaints, changes, or physical findings at this time. PCP: Darell Hinojosa MD    No current facility-administered medications on file prior to encounter. Current Outpatient Medications on File Prior to Encounter   Medication Sig Dispense Refill    lisinopriL (PRINIVIL, ZESTRIL) 5 mg tablet  (Patient not taking: Reported on 4/4/2022)      potassium chloride (K-DUR, KLOR-CON M20) 20 mEq tablet Take 1 Tablet by mouth daily. 90 Tablet 3    atenoloL (TENORMIN) 25 mg tablet Take 1 Tablet by mouth daily. 90 Tablet 0    traZODone (DESYREL) 300 mg tablet One po qhs 30 Tablet 3    metoprolol succinate (TOPROL-XL) 25 mg XL tablet TAKE ONE TABLET BY MOUTH EVERY DAY FOR THE HEART 90 Tablet 3    levothyroxine (SYNTHROID) 100 mcg tablet TAKE 1 TABLET , DAILY, FOR THYROID 90 Tablet 0    magnesium oxide (MAG-OX) 400 mg tablet Take 1 Tab by mouth daily.  For leg cramps 90 Tablet 0    pantoprazole (PROTONIX) 40 mg tablet TAKE ONE TABLET BY MOUTH EVERY DAY, TO KEEP STOMACH FROM BLEEDING 90 Tablet 0    dabigatran etexilate (Pradaxa) 75 mg capsule TAKE ONE CAPSULE BY MOUTH EVERY 12 HOURS 180 Capsule 0    fluticasone propionate (FLONASE) 50 mcg/actuation nasal spray USE 1-2 SPRAYS IN EACH NOSTRIL ONCE DAILY. 16 g PRN    furosemide (LASIX) 20 mg tablet 1-2 tabs po daily as needed for fluid 180 Tablet 2    clotrimazole-betamethasone (LOTRISONE) topical cream Apply to affected area bid 45 g 5    acetaminophen (TYLENOL) 325 mg tablet Take 2 Tabs by mouth every six (6) hours as needed for Pain. 60 Tab 1    acetaminophen (TYLENOL ARTHRITIS PAIN) 650 mg TbER Take 650 mg by mouth every eight (8) hours.  CALCIUM 600 + D tablet TAKE 1 TABLET TWICE A DAY. (CALCIUM SUPPLEMENT) 60 Tab 0       Past History     Past Medical History:  Past Medical History:   Diagnosis Date    Arrhythmia     atrial fibrillation, chronic. Stress test 2011 essent. normal.  Mild-mod AR on ECHO    Atrial fibrillation ([address]     Atrial flutter with rapid ventricular response ([address] 11/21/2019    Chronic atrial fibrillation ([address] 11/21/2019    GI bleeding     Hypertension     Refusal of blood transfusions as patient is Denominational     Thyroid disease     hypothyroid       Past Surgical History:  Past Surgical History:   Procedure Laterality Date    COLONOSCOPY N/A 5/2/2017    COLON[facility]COPY [facility]ata Wu [facility] END[facility]COPY    ECHO TRANSESOPHAGEAL (DENISE) W OR WO CONTR  4/27/2011         HX GYN      vaginal deliveries x10    HX TUBAL LIGATION      [place] CARDIOVERSION ELECTIVE ARRHYTHMIA EXTERNAL  6/14/2011            Family History:  Family History   Problem Relation Age of Onset    Heart Disease Father     Cancer Son     Heart Disease Son        Social History:  Social History     Tobacco Use    Smoking status: Never Smoker    Smokeless tobacco: Never Used   Vaping Use    Vaping Use: Never used   Substance Use Topics    Alcohol use: No    Drug use: No       Allergies:   Allergies   Allergen Reactions    Seroquel [Quetiapine] Other (comments)     PSYCOLOGICAL REACTION Pt didn't feel like them selves but felt like a different person, iT DEEPLY DISTURBED PT.  PT BECAME IRRITABLE.  Ambien [Zolpidem] Other (comments)     Couldn't move       Review of Systems   Review of Systems   Constitutional: Negative for chills and fever. HENT: Negative for congestion and rhinorrhea. Respiratory: Positive for shortness of breath. Cardiovascular: Negative for chest pain. Gastrointestinal: Negative for abdominal pain, nausea and vomiting. Genitourinary: Negative for dysuria and frequency. Musculoskeletal: Negative for myalgias. All other systems reviewed and are negative. Physical Exam   Physical Exam  Vitals and nursing note reviewed. Constitutional:       General: She is not in acute distress. Appearance: Normal appearance. She is not ill-appearing. HENT:      Head: Normocephalic. Mouth/Throat:      Mouth: Mucous membranes are moist.   Eyes:      Conjunctiva/sclera: Conjunctivae normal.   Cardiovascular:      Rate and Rhythm: Normal rate and regular rhythm. Pulses: Normal pulses. Pulmonary:      Effort: Pulmonary effort is normal.      Breath sounds: Examination of the right-middle field reveals rales. Examination of the left-middle field reveals rales. Examination of the right-lower field reveals rales. Examination of the left-lower field reveals rales. Rales present. Abdominal:      General: Abdomen is flat. Palpations: Abdomen is soft. Musculoskeletal:         General: No deformity. Skin:     General: Skin is warm and dry. Neurological:      Mental Status: She is alert and oriented to person, place, and time. Mental status is at baseline. Psychiatric:         Behavior: Behavior normal.         Thought Content:  Thought content normal.        Diagnostic Study Results     Labs  Recent Results (from the past 12 hour(s))   EKG, 12 LEAD, INITIAL    Collection Time: 04/13/22 10:44 AM   Result Value Ref Range    Ventricular Rate 63 BPM    Atrial Rate 90 BPM    QRS Duration 152 ms Q-T Interval 462 ms    QTC Calculation (Bezet) 472 ms    Calculated R Axis -64 degrees    Calculated T Axis 17 degrees    Diagnosis       Undetermined rhythm  Right bundle branch block  Left anterior fascicular block  ** Bifascicular block **  Septal infarct , age undetermined  When compared with ECG of 22-FEB-2022 13:10,  Current undetermined rhythm precludes rhythm comparison, needs review  (RBBB and left anterior fascicular block) is now present     CBC WITH AUTOMATED DIFF    Collection Time: 04/13/22 10:59 AM   Result Value Ref Range    WBC 4.8 3.6 - 11.0 K/uL    RBC 3.18 (L) 3.80 - 5.20 M/uL    HGB 9.1 (L) 11.5 - 16.0 g/dL    HCT 30.1 (L) 35.0 - 47.0 %    MCV 94.7 80.0 - 99.0 FL    MCH 28.6 26.0 - 34.0 PG    MCHC 30.2 30.0 - 36.5 g/dL    RDW 16.4 (H) 11.5 - 14.5 %    PLATELET 455 (L) 791 - 400 K/uL    MPV 10.5 8.9 - 12.9 FL    NRBC 0.0 0  WBC    ABSOLUTE NRBC 0.00 0.00 - 0.01 K/uL    NEUTROPHILS 52 32 - 75 %    LYMPHOCYTES 32 12 - 49 %    MONOCYTES 11 5 - 13 %    EOSINOPHILS 4 0 - 7 %    BASOPHILS 1 0 - 1 %    IMMATURE GRANULOCYTES 0 0.0 - 0.5 %    ABS. NEUTROPHILS 2.5 1.8 - 8.0 K/UL    ABS. LYMPHOCYTES 1.5 0.8 - 3.5 K/UL    ABS. MONOCYTES 0.5 0.0 - 1.0 K/UL    ABS. EOSINOPHILS 0.2 0.0 - 0.4 K/UL    ABS. BASOPHILS 0.0 0.0 - 0.1 K/UL    ABS. IMM. GRANS. 0.0 0.00 - 0.04 K/UL    DF AUTOMATED     METABOLIC PANEL, COMPREHENSIVE    Collection Time: 04/13/22 10:59 AM   Result Value Ref Range    Sodium 138 136 - 145 mmol/L    Potassium 3.9 3.5 - 5.1 mmol/L    Chloride 106 97 - 108 mmol/L    CO2 26 21 - 32 mmol/L    Anion gap 6 5 - 15 mmol/L    Glucose 101 (H) 65 - 100 mg/dL    BUN 21 (H) 6 - 20 MG/DL    Creatinine 1.26 (H) 0.55 - 1.02 MG/DL    BUN/Creatinine ratio 17 12 - 20      GFR est AA 48 (L) >60 ml/min/1.73m2    GFR est non-AA 40 (L) >60 ml/min/1.73m2    Calcium 8.6 8.5 - 10.1 MG/DL    Bilirubin, total 0.3 0.2 - 1.0 MG/DL    ALT (SGPT) 10 (L) 12 - 78 U/L    AST (SGOT) 17 15 - 37 U/L    Alk.  phosphatase 44 (L) 45 - 117 U/L    Protein, total 8.1 6.4 - 8.2 g/dL    Albumin 3.1 (L) 3.5 - 5.0 g/dL    Globulin 5.0 (H) 2.0 - 4.0 g/dL    A-G Ratio 0.6 (L) 1.1 - 2.2     TROPONIN-HIGH SENSITIVITY    Collection Time: 04/13/22 10:59 AM   Result Value Ref Range    Troponin-High Sensitivity 17 0 - 51 ng/L   NT-PRO BNP    Collection Time: 04/13/22 10:59 AM   Result Value Ref Range    NT pro-BNP 1,557 (H) <450 PG/ML   D DIMER    Collection Time: 04/13/22 12:12 PM   Result Value Ref Range    D-dimer 0.83 (H) 0.00 - 0.65 mg/L FEU     Radiologic Studies  CTA CHEST W OR W WO CONT   Final Result   1. No evidence of pulmonary embolus. 2.  Chronic, marked cardiomegaly. 3. Chronic interstitial lung disease. 4. Trace right pleural effusion. XR CHEST PA LAT   Final Result    impression: No acute changes. CT Results  (Last 48 hours)               04/13/22 1325  CTA CHEST W OR W WO CONT Final result    Impression:  1. No evidence of pulmonary embolus. 2.  Chronic, marked cardiomegaly. 3. Chronic interstitial lung disease. 4. Trace right pleural effusion. Narrative:  INDICATION:   rule out PE, likely ILD        COMPARISON:  CT chest March 14, 2022       TECHNIQUE:  Following the uneventful intravenous administration of 100 cc Isovue   366, thin helical axial images were obtained through the chest. Postprocessing   was performed. 3D image postprocessing was performed. CT dose reduction was achieved through the use of a standardized protocol   tailored for this examination and automatic exposure control for dose   modulation. FINDINGS:       There are multiple prominent but not pathologically enlarged mediastinal lymph   nodes. The heart is enlarged. There is no pericardial effusion. No filling defect is seen within the pulmonary arterial system to suggest   pulmonary embolus. The aorta is normal in caliber.        Scattered bilateral groundglass opacities and areas of air trapping persist,   similar to prior study. No pulmonary nodule or mass is seen. There is a trace   right pleural effusion. Limited evaluation of the upper abdomen demonstrates numerous gallstones in the   gallbladder. Extensive vascular calcifications are noted in the upper abdomen. There is a hyperdense left renal cyst. The osseous structures are unremarkable. CXR Results  (Last 48 hours)               04/13/22 1124  XR CHEST PA LAT Final result    Impression:   impression: No acute changes. Narrative:  Clinical indication: Shortness of breath. Frontal and lateral views of the chest obtained, comparison July 20, 2021. Prominent heart size is stable. Mild interstitial disease is also unchanged. There is no focal consolidation. Medical Decision Making   I am the first provider for this patient. I reviewed the vital signs, available nursing notes, past medical history, past surgical history, family history and social history. Vital Signs-Reviewed the patient's vital signs. Patient Vitals for the past 12 hrs:   Temp Pulse Resp BP SpO2   04/13/22 1234 -- (!) 50 17 (!) 124/52 95 %   04/13/22 1149 -- (!) 49 22 (!) 116/57 93 %   04/13/22 1045 -- 67 13 (!) 149/55 92 %   04/13/22 1034 98.2 °F (36.8 °C) (!) 58 16 (!) 140/65 95 %     Records Reviewed: Nursing Notes and Old Medical Records    Provider Notes (Medical Decision Making):   Differential includes worsening interstitial lung disease, heart failure exacerbation, symptomatic dysrhythmia    Will obtain x-ray BNP troponin EKG CBC CMP. If all negative, consider D-dimer to evaluate for PE.    ED Course:   Initial assessment performed. The patients presenting problems have been discussed, and they are in agreement with the care plan formulated and outlined with them. I have encouraged them to ask questions as they arise throughout their visit.     ED Course as of 04/13/22 1914   Wed Apr 13, 2022   1053 EKG demonstrates indeterminate rhythm, rate of 63 left right bundle branch block with left anterior fascicular block [WB]   1100 EKG with likely atrial fibrillation [WB]   1122 CBC stable anemia hemoglobin 9.1 white count normal 4.8 platelets stable well at 113 with normal differential [WB]   1153 BNP elevated at 1500 creatinine 1.26 LFTs unremarkable troponin XVII [WB]   3771 Chest x-ray with mild interstitial disease unchanged [WB]   1154 Will obtain D-dimer [WB]   1247 D-dimer is elevated at 0.83, age-adjusted normal [WB]   1347 Observed patient ambulate with minimal assistance, saturating 93% [WB]   1405 CTA demonstrating chronic marked cardiomegaly chronic interstitial and trace right pleural effusion [WB]   1413 Patient with bradycardia while sleeping at approximately 50 unlikely etiology of the patient's dyspnea [WB]   1413 Ongoing scattered bilateral groundglass opacities likely interstitial lung disease [WB]   5026 Will discharge with acute worsening of interstitial lung disease with brief course of steroids [WB]      ED Course User Index  [WB] Erin Darnell MD     Disposition:  Discharge Note:  The patient has been re-evaluated and is ready for discharge. Reviewed available results with patient. Counseled patient on diagnosis and care plan. Patient has expressed understanding, and all questions have been answered. Patient agrees with plan and agrees to follow up as recommended, or to return to the ED if their symptoms worsen. Discharge instructions have been provided and explained to the patient, along with reasons to return to the ED.       PLAN:  Discharge Medication List as of 4/13/2022  2:20 PM      START taking these medications    Details   methylPREDNISolone (Medrol, Urban,) 4 mg tablet Take as directed, Normal, Disp-1 Dose Pack, R-0         CONTINUE these medications which have NOT CHANGED    Details   lisinopriL (PRINIVIL, ZESTRIL) 5 mg tablet Historical Med      potassium chloride (K-DUR, KLOR-CON M20) 20 mEq tablet Take 1 Tablet by mouth daily. , Normal, Disp-90 Tablet, R-3      atenoloL (TENORMIN) 25 mg tablet Take 1 Tablet by mouth daily. , Normal, Disp-90 Tablet, R-0      traZODone (DESYREL) 300 mg tablet One po qhs, Normal, Disp-30 Tablet, R-3      metoprolol succinate (TOPROL-XL) 25 mg XL tablet TAKE ONE TABLET BY MOUTH EVERY DAY FOR THE HEART, Normal, Disp-90 Tablet, R-3      levothyroxine (SYNTHROID) 100 mcg tablet TAKE 1 TABLET , DAILY, FOR THYROID, Normal, Disp-90 Tablet, R-0      magnesium oxide (MAG-OX) 400 mg tablet Take 1 Tab by mouth daily. For leg cramps, Normal, Disp-90 Tablet, R-0      pantoprazole (PROTONIX) 40 mg tablet TAKE ONE TABLET BY MOUTH EVERY DAY, TO KEEP STOMACH FROM BLEEDING, Normal, Disp-90 Tablet, R-0      dabigatran etexilate (Pradaxa) 75 mg capsule TAKE ONE CAPSULE BY MOUTH EVERY 12 HOURS, Normal, Disp-180 Capsule, R-0      fluticasone propionate (FLONASE) 50 mcg/actuation nasal spray USE 1-2 SPRAYS IN EACH NOSTRIL ONCE DAILY., Normal, Disp-16 g, R-PRN      furosemide (LASIX) 20 mg tablet 1-2 tabs po daily as needed for fluid, Normal, Disp-180 Tablet, R-2      clotrimazole-betamethasone (LOTRISONE) topical cream Apply to affected area bid, Normal, Disp-45 g, R-5      acetaminophen (TYLENOL) 325 mg tablet Take 2 Tabs by mouth every six (6) hours as needed for Pain., No Print, Disp-60 Tab,R-1      acetaminophen (TYLENOL ARTHRITIS PAIN) 650 mg TbER Take 650 mg by mouth every eight (8) hours. , Historical Med      CALCIUM 600 + D tablet TAKE 1 TABLET TWICE A DAY. (CALCIUM SUPPLEMENT), Normal, Disp-60 Tab, R-0           2.    Follow-up Information     Follow up With Specialties Details Why Contact Info    Sarah Dominguez MD [facility] Medicine Schedule an appointment as soon as possible for a visit   [address] [place] [id number] [phone]      Korene Cushing, MD Internal Medicine, Pulmonary Disease Schedule an appointment as soon as possible for a visit   [facility]  [phone]          3. Return to ED if worse     Diagnosis     Clinical Impression:   1. Interstitial lung disease (HCC)        Attestations:    Michael Gaviria M.D. Please note that this dictation was completed with myThings, the computer voice recognition software. Quite often unanticipated grammatical, syntax, homophones, and other interpretive errors are inadvertently transcribed by the computer software. Please disregard these errors. Please excuse any errors that have escaped final proofreading. Thank you.

## 2022-04-14 LAB
ATRIAL RATE: 90 BPM
CALCULATED R AXIS, ECG10: -64 DEGREES
CALCULATED T AXIS, ECG11: 17 DEGREES
DIAGNOSIS, 93000: NORMAL
Q-T INTERVAL, ECG07: 462 MS
QRS DURATION, ECG06: 152 MS
QTC CALCULATION (BEZET), ECG08: 472 MS
VENTRICULAR RATE, ECG03: 63 BPM

## 2022-04-15 ENCOUNTER — TELEPHONE (OUTPATIENT)
Dept: FAMILY MEDICINE CLINIC | Age: 87
End: 2022-04-15

## 2022-04-15 NOTE — TELEPHONE ENCOUNTER
----- Message from Jw Carlos sent at 4/13/2022  4:33 PM EDT -----  Subject: Message to Provider    QUESTIONS  Information for Provider? ECC received call from Ochoa Jolly who is   requesting a phone call back from Dr. Adalid White Otmychal Juradored regarding PT   being in hospital due to trouble breathing. Please return call to PT.  ---------------------------------------------------------------------------  --------------  CALL BACK INFO  What is the best way for the office to contact you? Do not leave any   message, patient will call back for answer  Preferred Call Back Phone Number? [phone]  ---------------------------------------------------------------------------  --------------  SCRIPT ANSWERS  Relationship to Patient?  Self

## 2022-04-15 NOTE — TELEPHONE ENCOUNTER
Patient seen in er for breathing . appt given for 4/18/22 at 10:40 a.m. for follow up. appt information given to patient's daughter Martha Arellano.

## 2022-04-15 NOTE — TELEPHONE ENCOUNTER
----- Message from Audrey Plasencia sent at 4/14/2022 10:39 AM EDT -----  Subject: Message to Provider    QUESTIONS  Information for Provider? Patient would like to discuss course of care   with provider, she recently was seen at ER 4/13 and would like for   nurse/provider to contact daughter Gerson, she also stated ER stated there   was a crackling sound in lungs. ---------------------------------------------------------------------------  --------------  Peyton LAO  What is the best way for the office to contact you? Do not leave any   message, patient will call back for answer  Preferred Call Back Phone Number? [phone]  ---------------------------------------------------------------------------  --------------  SCRIPT ANSWERS  Relationship to Patient? Other  Representative Name? Sahil Darius   Is the Representative on the appropriate HIPAA document in Epic?  Yes

## 2022-04-18 ENCOUNTER — OFFICE VISIT (OUTPATIENT)
Dept: FAMILY MEDICINE CLINIC | Age: 87
End: 2022-04-18
Payer: MEDICARE

## 2022-04-18 VITALS
WEIGHT: 132 LBS | OXYGEN SATURATION: 96 % | DIASTOLIC BLOOD PRESSURE: 65 MMHG | TEMPERATURE: 96.6 F | SYSTOLIC BLOOD PRESSURE: 158 MMHG | RESPIRATION RATE: 16 BRPM | HEART RATE: 63 BPM | BODY MASS INDEX: 24.29 KG/M2 | HEIGHT: 62 IN

## 2022-04-18 DIAGNOSIS — R06.02 SHORTNESS OF BREATH: Primary | ICD-10-CM

## 2022-04-18 PROCEDURE — 1101F PT FALLS ASSESS-DOCD LE1/YR: CPT | Performed by: FAMILY MEDICINE

## 2022-04-18 PROCEDURE — 99213 OFFICE O/P EST LOW 20 MIN: CPT | Performed by: FAMILY MEDICINE

## 2022-04-18 PROCEDURE — G8420 CALC BMI NORM PARAMETERS: HCPCS | Performed by: FAMILY MEDICINE

## 2022-04-18 PROCEDURE — G8427 DOCREV CUR MEDS BY ELIG CLIN: HCPCS | Performed by: FAMILY MEDICINE

## 2022-04-18 PROCEDURE — 1090F PRES/ABSN URINE INCON ASSESS: CPT | Performed by: FAMILY MEDICINE

## 2022-04-18 PROCEDURE — G8432 DEP SCR NOT DOC, RNG: HCPCS | Performed by: FAMILY MEDICINE

## 2022-04-18 PROCEDURE — G8536 NO DOC ELDER MAL SCRN: HCPCS | Performed by: FAMILY MEDICINE

## 2022-04-18 RX ORDER — FLUTICASONE PROPIONATE AND SALMETEROL 100; 50 UG/1; UG/1
1 POWDER RESPIRATORY (INHALATION) 2 TIMES DAILY
Qty: 60 EACH | Refills: 5 | Status: SHIPPED | OUTPATIENT
Start: 2022-04-18

## 2022-04-18 NOTE — PROGRESS NOTES
HISTORY OF PRESENT ILLNESS  Ardith Comment is a 80 y.o. female. HPI In for followup. Breathing seems to be worse at night. Minimal cough,. Was seen in ER 5 days ago with dyspnea, treated with medrol- seems to be helping. Echo last summer shows moderate mitral regurg, CT scans of chest shows perihilar ground glass deformities suggestive of ILD. Pro bnp 1557 in ER last week    ROS    Physical Exam  Vitals and nursing note reviewed. Constitutional:       Appearance: She is well-developed. HENT:      Right Ear: External ear normal.      Left Ear: External ear normal.   Neck:      Thyroid: No thyromegaly. Cardiovascular:      Rate and Rhythm: Normal rate and regular rhythm. Heart sounds: Normal heart sounds. Pulmonary:      Breath sounds: Normal breath sounds. No wheezing. Comments: Basilar rales bilaterally  Abdominal:      General: Bowel sounds are normal. There is no distension. Palpations: Abdomen is soft. There is no mass. Tenderness: There is no abdominal tenderness. Lymphadenopathy:      Cervical: No cervical adenopathy. ASSESSMENT and PLAN  Orders Placed This Encounter    AMB POC PULSE OXIMETRY, CONTINUOUS    fluticasone propion-salmeteroL (Wixela Inhub) 100-50 mcg/dose diskus inhaler     Diagnoses and all orders for this visit:    1. Shortness of breath  -     AMB POC PULSE OXIMETRY, CONTINUOUS    Other orders  -     fluticasone propion-salmeteroL (Wixela Inhub) 100-50 mcg/dose diskus inhaler; Take 1 Puff by inhalation two (2) times a day. for breathing      Follow-up and Dispositions    · Return in about 2 months (around 6/18/2022). Still not clear if dyspnea is secondary to chf and/or ILD . Has appt with both pulmonary and crdiology in the next mnth. Will do overnight pulse ox in the meantime.

## 2022-04-18 NOTE — PROGRESS NOTES
No chief complaint on file. 1. \"Have you been to the ER, urgent care clinic since your last visit? Hospitalized since your last visit? \" Yes [facility] -er    2. \"Have you seen or consulted any other health care providers outside of the [address] [place] [address] since your last visit? \" No     3. For patients aged 39-70: Has the patient had a colonoscopy / FIT/ Cologuard? NA - based on age      If the patient is female:    4. For patients aged 41-77: Has the patient had a mammogram within the past 2 years? NA - based on age or sex      11. For patients aged 21-65: Has the patient had a pap smear?  NA - based on age or sex    Health Maintenance Due   Topic Date Due    Shingrix Vaccine Age 49> (2 of 2) 12/09/2020

## 2022-04-21 PROBLEM — D69.6 THROMBOCYTOPENIA, UNSPECIFIED (HCC): Status: RESOLVED | Noted: 2022-03-01 | Resolved: 2022-04-21

## 2022-04-21 NOTE — PROGRESS NOTES
[facility] @ [facility]    Damian Soulier DNP, ANP-BC  Subjective/HPI:     Neisha Post is a 80 y.o. female with a history of chronic atrial fibrillation anticoagulated with Pradaxa, hypertension, systolic diastolic heart failure and interstitial lung disease  is here for routine f/u. Recent chart review shows she was in emergency room on 4/18/2022 for increasing episodes of dyspnea. H&H stable 9.1/30, mild proBNP elevation 1557. CTA chest negative for PE, was treated for worsening of her interstitial lung disease with a course of prednisone. She reports breathing improved after prednisone and use of inhaler. 4/18/22  FINDINGS:     There are multiple prominent but not pathologically enlarged mediastinal lymph  nodes. The heart is enlarged. There is no pericardial effusion.     No filling defect is seen within the pulmonary arterial system to suggest  pulmonary embolus. The aorta is normal in caliber.     Scattered bilateral groundglass opacities and areas of air trapping persist,  similar to prior study. No pulmonary nodule or mass is seen. There is a trace  right pleural effusion.     Limited evaluation of the upper abdomen demonstrates numerous gallstones in the  gallbladder. Extensive vascular calcifications are noted in the upper abdomen. There is a hyperdense left renal cyst. The osseous structures are unremarkable.     IMPRESSION  1. No evidence of pulmonary embolus. 2.  Chronic, marked cardiomegaly. 3. Chronic interstitial lung disease. 4. Trace right pleural effusion.       ECHO 7/2021  Interpretation Summary    · LV: Calculated LVEF is 55%. Biplane method used to measure ejection fraction. Normal cavity size, systolic function (ejection fraction normal) and diastolic function. Mild concentric hypertrophy. Wall motion: normal.  · LA: Mildly dilated left atrium. · RA: Mildly dilated right atrium.   · AV: Aortic valve leaflet calcification present with reduced excursion. Mild aortic valve stenosis is present. Mild aortic valve regurgitation is present. · MV: Mitral valve leaflet calcification. Moderate mitral valve regurgitation is present. · TV: Mild to moderate tricuspid valve regurgitation is present. PCP Provider  Mary Banegas MD  Past Medical History:   Diagnosis Date    Arrhythmia     atrial fibrillation, chronic. Stress test 2011 essent. normal.  Mild-mod AR on ECHO    Atrial fibrillation ([address]     Atrial flutter with rapid ventricular response ([address] 11/21/2019    Chronic atrial fibrillation ([address] 11/21/2019    GI bleeding     Hypertension     Refusal of blood transfusions as patient is Sikhism     Thyroid disease     hypothyroid      Past Surgical History:   Procedure Laterality Date    COLONOSCOPY N/A 5/2/2017    COLONOSCOPY performed by Yash Han [facility] at [facility] ENDOSCOPY    ECHO TRANSESOPHAGEAL (DENISE) W OR WO CONTR  4/27/2011         HX GYN      vaginal deliveries x10    HX TUBAL LIGATION      SC CARDIOVERSION ELECTIVE ARRHYTHMIA EXTERNAL  6/14/2011          Allergies   Allergen Reactions    Seroquel [Quetiapine] Other (comments)     PSYCOLOGICAL REACTION Pt didn't feel like them selves but felt like a different person, iT DEEPLY DISTURBED PT.  PT BECAME IRRITABLE.  Ambien [Zolpidem] Other (comments)     Couldn't move      Family History   Problem Relation Age of Onset    Heart Disease Father     Cancer Son     Heart Disease Son       Current Outpatient Medications   Medication Sig    fluticasone propion-salmeteroL (Wixela Inhub) 100-50 mcg/dose diskus inhaler Take 1 Puff by inhalation two (2) times a day. for breathing    methylPREDNISolone (Medrol, Urban,) 4 mg tablet Take as directed    lisinopriL (PRINIVIL, ZESTRIL) 5 mg tablet  (Patient not taking: Reported on 4/4/2022)    potassium chloride (K-DUR, KLOR-CON M20) 20 mEq tablet Take 1 Tablet by mouth daily.     atenoloL (TENORMIN) 25 mg tablet Take 1 Tablet by mouth daily.  traZODone (DESYREL) 300 mg tablet One po qhs    metoprolol succinate (TOPROL-XL) 25 mg XL tablet TAKE ONE TABLET BY MOUTH EVERY DAY FOR THE HEART    levothyroxine (SYNTHROID) 100 mcg tablet TAKE 1 TABLET , DAILY, FOR THYROID    magnesium oxide (MAG-OX) 400 mg tablet Take 1 Tab by mouth daily. For leg cramps    pantoprazole (PROTONIX) 40 mg tablet TAKE ONE TABLET BY MOUTH EVERY DAY, TO KEEP STOMACH FROM BLEEDING    dabigatran etexilate (Pradaxa) 75 mg capsule TAKE ONE CAPSULE BY MOUTH EVERY 12 HOURS    fluticasone propionate (FLONASE) 50 mcg/actuation nasal spray USE 1-2 SPRAYS IN EACH NOSTRIL ONCE DAILY.  furosemide (LASIX) 20 mg tablet 1-2 tabs po daily as needed for fluid    clotrimazole-betamethasone (LOTRISONE) topical cream Apply to affected area bid    acetaminophen (TYLENOL) 325 mg tablet Take 2 Tabs by mouth every six (6) hours as needed for Pain.  acetaminophen (TYLENOL ARTHRITIS PAIN) 650 mg TbER Take 650 mg by mouth every eight (8) hours.  CALCIUM 600 + D tablet TAKE 1 TABLET TWICE A DAY. (CALCIUM SUPPLEMENT)     No current facility-administered medications for this visit. There were no vitals filed for this visit.   Social History     Socioeconomic History    Marital status:      Spouse name: Not on file    Number of children: Not on file    Years of education: Not on file    Highest education level: Not on file   Occupational History    Not on file   Tobacco Use    Smoking status: Never Smoker    Smokeless tobacco: Never Used   Vaping Use    Vaping Use: Never used   Substance and Sexual Activity    Alcohol use: No    Drug use: No    Sexual activity: Never   Other Topics Concern    Not on file   Social History Narrative    Not on file     Social Determinants of Health     Financial Resource Strain:     Difficulty of Paying Living Expenses: Not on file   Food Insecurity:     Worried About Running Out of Food in the Last Year: Not on file    Ran Out of Food in the Last Year: Not on file   Transportation Needs:     Lack of Transportation (Medical): Not on file    Lack of Transportation (Non-Medical): Not on file   Physical Activity:     Days of Exercise per Week: Not on file    Minutes of Exercise per Session: Not on file   Stress:     Feeling of Stress : Not on file   Social Connections:     Frequency of Communication with Friends and Family: Not on file    Frequency of Social Gatherings with Friends and Family: Not on file    Attends Yarsani Services: Not on file    Active Member of [address] [place] [address] or Organizations: Not on file    Attends Club or Organization Meetings: Not on file    Marital Status: Not on file   Intimate Partner Violence:     Fear of Current or Ex-Partner: Not on file    Emotionally Abused: Not on file    Physically Abused: Not on file    Sexually Abused: Not on file   Housing Stability:     Unable to Pay for Housing in the Last Year: Not on file    Number of [place] in the Last Year: Not on file    Unstable Housing in the Last Year: Not on file       I have reviewed the nurses notes, vitals, problem list, allergy list, medical history, family, social history and medications. Review of Symptoms:  11 systems reviewed, negative other than as stated in the HPI      Physical Exam:      General: Well developed, thin in no acute distress, cooperative and alert  HEENT: No carotid bruits, no JVD, trach is midline. Neck Supple, PERRL,   Heart:   Irregular regular 1/6 diastolic murmur  Respiratory: Clear bilaterally x 4, no wheezing or rales  Abdomen:   Soft, non-tender, no masses, bowel sounds are active. Extremities:  No edema, normal cap refill, no cyanosis, atraumatic. Neuro: A&Ox3, speech clear, gait slow and cautious  Skin: Skin color is normal. No rashes or lesions.  Non diaphoretic  Vascular: 2+ pulses symmetric in all extremities    Cardiographics    ECG: Atrial shunt    Cardiology Labs:  Lab Results   Component Value Date/Time    Cholesterol, total 94 (L) 02/17/2017 09:52 AM    HDL Cholesterol 30 (L) 02/17/2017 09:52 AM    LDL, calculated 46 02/17/2017 09:52 AM    Triglyceride 90 02/17/2017 09:52 AM    CHOL/HDL Ratio 3.1 04/20/2010 01:10 AM       Lab Results   Component Value Date/Time    Sodium 138 04/13/2022 10:59 AM    Potassium 3.9 04/13/2022 10:59 AM    Chloride 106 04/13/2022 10:59 AM    CO2 26 04/13/2022 10:59 AM    Anion gap 6 04/13/2022 10:59 AM    Glucose 101 (H) 04/13/2022 10:59 AM    BUN 21 (H) 04/13/2022 10:59 AM    Creatinine 1.26 (H) 04/13/2022 10:59 AM    BUN/Creatinine ratio 17 04/13/2022 10:59 AM    GFR est AA 48 (L) 04/13/2022 10:59 AM    GFR est non-AA 40 (L) 04/13/2022 10:59 AM    Calcium 8.6 04/13/2022 10:59 AM    Bilirubin, total 0.3 04/13/2022 10:59 AM    Alk. phosphatase 44 (L) 04/13/2022 10:59 AM    Protein, total 8.1 04/13/2022 10:59 AM    Albumin 3.1 (L) 04/13/2022 10:59 AM    Globulin 5.0 (H) 04/13/2022 10:59 AM    A-G Ratio 0.6 (L) 04/13/2022 10:59 AM    ALT (SGPT) 10 (L) 04/13/2022 10:59 AM           Assessment:     Assessment:     Diagnoses and all orders for this visit:    1. Chronic atrial fibrillation ([facility]    2. Chronic systolic congestive heart failure ([facility]    3. Primary hypertension    4. Thrombocytopenia, unspecified        ICD-10-CM ICD-9-CM    1. Chronic atrial fibrillation (HCC)  I48.20 427.31    2. Chronic systolic congestive heart failure (HCC)  I50.22 428.22      428.0    3. Primary hypertension  I10 401.9    4. Thrombocytopenia, unspecified  D69.6 287.5      No orders of the defined types were placed in this encounter. Plan:     1. Chronic rate controlled atrial fibrillation: Maintain beta-blocker anticoagulated with Pradaxa 75 mg twice a day. Age 80, weight 60 kg range. No falls since last visit, recommend she consider ambulating regularly with a cane or walker. Given her advanced age and interstitial lung disease she would be a high candidate for watchman.   At this time continue Pradaxa but will need frequent reassessment of risk versus benefit. 2.  Hypertension:  Normotensive 130/80  3. Systolic heart failure: Ejection fraction 40-45% 2019 improving to 55% 7/2021. Clinically euvolemic, has been taking furosemide 20 mg daily, mild proBNP elevation with recent ER visit. Reasonable to continue low-dose loop diuretic. On medication record it appears metoprolol and atenolol are on the list, patient is unsure if she is taking 1 or both and will call back. She will only need to be on one type of beta-blocker. Lisinopril had been discontinued due to hypotension. 4.  Aortic stenosis/regurgitation: Mild aortic stenosis moderate aortic regurgitation 7/2021 echo,   5. Moderate mitral regurgitation: Clinically asymptomatic blood pressure controlled bitor  Follow-up in 6 months    Elliot Shrestha NP      Please note that this dictation was completed with [employer], the computer voice recognition software. Quite often unanticipated grammatical, syntax, homophones, and other interpretive errors are inadvertently transcribed by the computer software. Please disregard these errors. Please excuse any errors that have escaped final proofreading. Thank you.

## 2022-04-22 ENCOUNTER — OFFICE VISIT (OUTPATIENT)
Dept: CARDIOLOGY CLINIC | Age: 87
End: 2022-04-22
Payer: MEDICARE

## 2022-04-22 VITALS
HEART RATE: 76 BPM | OXYGEN SATURATION: 96 % | SYSTOLIC BLOOD PRESSURE: 130 MMHG | DIASTOLIC BLOOD PRESSURE: 80 MMHG | WEIGHT: 129.2 LBS | RESPIRATION RATE: 16 BRPM | HEIGHT: 62 IN | BODY MASS INDEX: 23.77 KG/M2

## 2022-04-22 DIAGNOSIS — I10 PRIMARY HYPERTENSION: ICD-10-CM

## 2022-04-22 DIAGNOSIS — I48.20 CHRONIC ATRIAL FIBRILLATION (HCC): Primary | ICD-10-CM

## 2022-04-22 DIAGNOSIS — D69.6 THROMBOCYTOPENIA, UNSPECIFIED (HCC): ICD-10-CM

## 2022-04-22 DIAGNOSIS — I50.22 CHRONIC SYSTOLIC CONGESTIVE HEART FAILURE (HCC): ICD-10-CM

## 2022-04-22 PROCEDURE — G8536 NO DOC ELDER MAL SCRN: HCPCS | Performed by: NURSE PRACTITIONER

## 2022-04-22 PROCEDURE — G8432 DEP SCR NOT DOC, RNG: HCPCS | Performed by: NURSE PRACTITIONER

## 2022-04-22 PROCEDURE — 99214 OFFICE O/P EST MOD 30 MIN: CPT | Performed by: NURSE PRACTITIONER

## 2022-04-22 PROCEDURE — 1090F PRES/ABSN URINE INCON ASSESS: CPT | Performed by: NURSE PRACTITIONER

## 2022-04-22 PROCEDURE — G8420 CALC BMI NORM PARAMETERS: HCPCS | Performed by: NURSE PRACTITIONER

## 2022-04-22 PROCEDURE — 1101F PT FALLS ASSESS-DOCD LE1/YR: CPT | Performed by: NURSE PRACTITIONER

## 2022-04-22 PROCEDURE — G8427 DOCREV CUR MEDS BY ELIG CLIN: HCPCS | Performed by: NURSE PRACTITIONER

## 2022-04-22 NOTE — PROGRESS NOTES
Chief Complaint   Patient presents with    Follow-up    Cardiomyopathy    Irregular Heart Beat    Cholesterol Problem    Hypertension    Valvular Heart Disease     1. Have you been to the ER, urgent care clinic since your last visit? Yes Hospitalized since your last visit? No    2. Have you seen or consulted any other health care providers outside of the [address] [place] [address] since your last visit? No  Include any pap smears or colon screening. No    Patient C/O SOB.

## 2022-04-25 ENCOUNTER — OFFICE VISIT (OUTPATIENT)
Dept: FAMILY MEDICINE CLINIC | Age: 87
End: 2022-04-25
Payer: MEDICARE

## 2022-04-25 DIAGNOSIS — R06.02 SHORTNESS OF BREATH: Primary | ICD-10-CM

## 2022-04-25 PROCEDURE — 94010 BREATHING CAPACITY TEST: CPT | Performed by: FAMILY MEDICINE

## 2022-04-25 NOTE — PROGRESS NOTES
Spirometry done- looks good. Will do overnight pulse ox test, if this looks ok and symptoms continue , consider thinking about a mitral clip. Will also see what pulmonary thinks.

## 2022-05-18 ENCOUNTER — TELEPHONE (OUTPATIENT)
Dept: FAMILY MEDICINE CLINIC | Age: 87
End: 2022-05-18

## 2022-05-18 NOTE — TELEPHONE ENCOUNTER
pc with pt nd daughter Malcolm Roberts- will arrange for home oxygen desatted to less than 88% for over 6 minutes.

## 2022-05-19 ENCOUNTER — TELEPHONE (OUTPATIENT)
Dept: FAMILY MEDICINE CLINIC | Age: 87
End: 2022-05-19

## 2022-05-19 NOTE — TELEPHONE ENCOUNTER
Esperanza Hurst, from [facility] called.   She wants to know if patient needs to redo overnight oximetry test.  Please call @[phone]

## 2022-05-23 RX ORDER — LEVOTHYROXINE SODIUM 100 UG/1
TABLET ORAL
Qty: 90 TABLET | Refills: 0 | Status: SHIPPED | OUTPATIENT
Start: 2022-05-23 | End: 2022-10-05 | Stop reason: SDUPTHER

## 2022-05-23 RX ORDER — PANTOPRAZOLE SODIUM 40 MG/1
TABLET, DELAYED RELEASE ORAL
Qty: 90 TABLET | Refills: 0 | Status: SHIPPED | OUTPATIENT
Start: 2022-05-23 | End: 2022-10-05 | Stop reason: SDUPTHER

## 2022-05-23 RX ORDER — LANOLIN ALCOHOL/MO/W.PET/CERES
CREAM (GRAM) TOPICAL
Qty: 90 TABLET | Refills: 0 | Status: SHIPPED | OUTPATIENT
Start: 2022-05-23 | End: 2022-10-05 | Stop reason: SDUPTHER

## 2022-05-24 NOTE — TELEPHONE ENCOUNTER
Have  Discussed  With pt  The need for  Continued  Oxygen therapy. She has ILD on imaging and still  Has  Moderate to  Marked dyspnea throughout the day. Her oxygen has dropped to below 88% for over 5 minutes  On overnight pulse ox study. I have explained to  Her the benefit of continued  Oxygen use  And she  Is  Agreeable to using it.

## 2022-05-31 ENCOUNTER — OFFICE VISIT (OUTPATIENT)
Dept: FAMILY MEDICINE CLINIC | Age: 87
End: 2022-05-31
Payer: MEDICARE

## 2022-05-31 VITALS
HEART RATE: 80 BPM | RESPIRATION RATE: 16 BRPM | DIASTOLIC BLOOD PRESSURE: 68 MMHG | OXYGEN SATURATION: 96 % | WEIGHT: 134 LBS | HEIGHT: 62 IN | SYSTOLIC BLOOD PRESSURE: 136 MMHG | TEMPERATURE: 96.2 F | BODY MASS INDEX: 24.66 KG/M2

## 2022-05-31 DIAGNOSIS — R06.02 SHORTNESS OF BREATH: Primary | ICD-10-CM

## 2022-05-31 PROBLEM — N18.30 CHRONIC RENAL DISEASE, STAGE III (HCC): Status: ACTIVE | Noted: 2022-05-31

## 2022-05-31 PROCEDURE — 99213 OFFICE O/P EST LOW 20 MIN: CPT | Performed by: FAMILY MEDICINE

## 2022-05-31 PROCEDURE — 1123F ACP DISCUSS/DSCN MKR DOCD: CPT | Performed by: FAMILY MEDICINE

## 2022-05-31 PROCEDURE — 1101F PT FALLS ASSESS-DOCD LE1/YR: CPT | Performed by: FAMILY MEDICINE

## 2022-05-31 PROCEDURE — 1090F PRES/ABSN URINE INCON ASSESS: CPT | Performed by: FAMILY MEDICINE

## 2022-05-31 PROCEDURE — G8427 DOCREV CUR MEDS BY ELIG CLIN: HCPCS | Performed by: FAMILY MEDICINE

## 2022-05-31 PROCEDURE — G8510 SCR DEP NEG, NO PLAN REQD: HCPCS | Performed by: FAMILY MEDICINE

## 2022-05-31 PROCEDURE — G8536 NO DOC ELDER MAL SCRN: HCPCS | Performed by: FAMILY MEDICINE

## 2022-05-31 PROCEDURE — G8420 CALC BMI NORM PARAMETERS: HCPCS | Performed by: FAMILY MEDICINE

## 2022-05-31 RX ORDER — DABIGATRAN ETEXILATE 75 MG/1
CAPSULE ORAL
Qty: 180 CAPSULE | Refills: 3 | Status: SHIPPED | OUTPATIENT
Start: 2022-05-31

## 2022-05-31 RX ORDER — ALBUTEROL SULFATE 90 UG/1
2 AEROSOL, METERED RESPIRATORY (INHALATION)
Qty: 18 G | Refills: 5 | Status: SHIPPED | OUTPATIENT
Start: 2022-05-31

## 2022-05-31 NOTE — PROGRESS NOTES
HISTORY OF PRESENT ILLNESS  Geoff Cifuentes is a 80 y.o. female. HPI In for followup. Tries to go to sleep, wakes up. Does better if takes her sleeping pill and goes to bed. Has also noted some swelling in both ankles. No real cough, chest tightness. Now has home oxygen. ROS    Physical Exam  Vitals and nursing note reviewed. Constitutional:       Appearance: She is well-developed. HENT:      Right Ear: External ear normal.      Left Ear: External ear normal.   Neck:      Thyroid: No thyromegaly. Cardiovascular:      Rate and Rhythm: Normal rate and regular rhythm. Heart sounds: Normal heart sounds. Pulmonary:      Breath sounds: Normal breath sounds. No wheezing. Abdominal:      General: Bowel sounds are normal. There is no distension. Palpations: Abdomen is soft. There is no mass. Tenderness: There is no abdominal tenderness. Lymphadenopathy:      Cervical: No cervical adenopathy. ASSESSMENT and PLAN  Orders Placed This Encounter    albuterol (PROVENTIL HFA, VENTOLIN HFA, PROAIR HFA) 90 mcg/actuation inhaler    dabigatran etexilate (Pradaxa) 75 mg capsule     Diagnoses and all orders for this visit:    1. Shortness of breath    Other orders  -     albuterol (PROVENTIL HFA, VENTOLIN HFA, PROAIR HFA) 90 mcg/actuation inhaler; Take 2 Puffs by inhalation every four (4) hours as needed for Wheezing.  -     dabigatran etexilate (Pradaxa) 75 mg capsule; TAKE ONE CAPSULE BY MOUTH EVERY 12 HOURS      Follow-up and Dispositions    · Return in about 6 weeks (around 7/12/2022).

## 2022-07-05 ENCOUNTER — OFFICE VISIT (OUTPATIENT)
Dept: FAMILY MEDICINE CLINIC | Age: 87
End: 2022-07-05
Payer: MEDICARE

## 2022-07-05 VITALS
HEIGHT: 62 IN | WEIGHT: 134 LBS | TEMPERATURE: 97.7 F | OXYGEN SATURATION: 95 % | DIASTOLIC BLOOD PRESSURE: 52 MMHG | RESPIRATION RATE: 16 BRPM | HEART RATE: 63 BPM | SYSTOLIC BLOOD PRESSURE: 131 MMHG | BODY MASS INDEX: 24.66 KG/M2

## 2022-07-05 DIAGNOSIS — J84.9 ILD (INTERSTITIAL LUNG DISEASE) (HCC): ICD-10-CM

## 2022-07-05 DIAGNOSIS — R06.02 SHORTNESS OF BREATH: Primary | ICD-10-CM

## 2022-07-05 PROCEDURE — 1090F PRES/ABSN URINE INCON ASSESS: CPT | Performed by: FAMILY MEDICINE

## 2022-07-05 PROCEDURE — G8420 CALC BMI NORM PARAMETERS: HCPCS | Performed by: FAMILY MEDICINE

## 2022-07-05 PROCEDURE — G8427 DOCREV CUR MEDS BY ELIG CLIN: HCPCS | Performed by: FAMILY MEDICINE

## 2022-07-05 PROCEDURE — G8432 DEP SCR NOT DOC, RNG: HCPCS | Performed by: FAMILY MEDICINE

## 2022-07-05 PROCEDURE — 99213 OFFICE O/P EST LOW 20 MIN: CPT | Performed by: FAMILY MEDICINE

## 2022-07-05 PROCEDURE — 1101F PT FALLS ASSESS-DOCD LE1/YR: CPT | Performed by: FAMILY MEDICINE

## 2022-07-05 PROCEDURE — G8536 NO DOC ELDER MAL SCRN: HCPCS | Performed by: FAMILY MEDICINE

## 2022-07-05 PROCEDURE — 1123F ACP DISCUSS/DSCN MKR DOCD: CPT | Performed by: FAMILY MEDICINE

## 2022-07-05 RX ORDER — SPIRONOLACTONE 25 MG
TABLET ORAL
COMMUNITY
Start: 2022-06-04 | End: 2022-07-05 | Stop reason: SDUPTHER

## 2022-07-05 NOTE — PROGRESS NOTES
No chief complaint on file. 1. \"Have you been to the ER, urgent care clinic since your last visit? Hospitalized since your last visit? \" No    2. \"Have you seen or consulted any other health care providers outside of the [address] [place] [address] since your last visit? \" Yes [facility]    3. For patients aged 39-70: Has the patient had a colonoscopy / FIT/ Cologuard? NA - based on age      If the patient is female:    4. For patients aged 41-77: Has the patient had a mammogram within the past 2 years? NA - based on age or sex      11. For patients aged 21-65: Has the patient had a pap smear?  NA - based on age or sex    Health Maintenance Due   Topic Date Due    Shingrix Vaccine Age 49> (2 of 2) 12/09/2020

## 2022-07-05 NOTE — PROGRESS NOTES
HISTORY OF PRESENT ILLNESS  Marcy Doll is a 80 y.o. female. HPI Comes in with daughter Darrin Solis. Takes trazodone - helps her sleep fairly well. Gets some swelling in feet at times. Uses oxygen at home at times, for the last month. Still has moderate cough    ROS    Physical Exam  Vitals and nursing note reviewed. Constitutional:       Appearance: She is well-developed. HENT:      Right Ear: External ear normal.      Left Ear: External ear normal.   Neck:      Thyroid: No thyromegaly. Cardiovascular:      Rate and Rhythm: Normal rate and regular rhythm. Heart sounds: Normal heart sounds. Pulmonary:      Breath sounds: No wheezing. Comments: Bilateral basilar rales and bilateral rhonchi,  Abdominal:      General: Bowel sounds are normal. There is no distension. Palpations: Abdomen is soft. There is no mass. Tenderness: There is no abdominal tenderness. Lymphadenopathy:      Cervical: No cervical adenopathy. ASSESSMENT and PLAN  Orders Placed This Encounter    XR CHEST PA LAT     Diagnoses and all orders for this visit:    1. Shortness of breath  -     XR CHEST PA LAT; Future    2. ILD (interstitial lung disease) ([facility]      Follow-up and Dispositions    · Return in about 3 months (around 10/5/2022). Continue current dose of lasix- not sure  If rales and edema are secondary to chf or interstitial lung disease. Either way, she is not terribly symptomatic at present. Continue rx, recheck in 3 months.

## 2022-07-20 ENCOUNTER — TELEPHONE (OUTPATIENT)
Dept: FAMILY MEDICINE CLINIC | Age: 87
End: 2022-07-20

## 2022-07-20 NOTE — TELEPHONE ENCOUNTER
Patient's daughter called. She is coughing and having trouble breathing. Patient wants to speak to doctor. Please call @[phone]. She wants a call asap.

## 2022-07-21 RX ORDER — TRAZODONE HYDROCHLORIDE 300 MG/1
TABLET ORAL
Qty: 30 TABLET | Refills: 0 | Status: SHIPPED | OUTPATIENT
Start: 2022-07-21 | End: 2022-08-18

## 2022-09-19 ENCOUNTER — TRANSCRIBE ORDER (OUTPATIENT)
Dept: REGISTRATION | Age: 87
End: 2022-09-19

## 2022-09-19 ENCOUNTER — HOSPITAL ENCOUNTER (OUTPATIENT)
Dept: GENERAL RADIOLOGY | Age: 87
Discharge: HOME | End: 2022-09-19
Payer: MEDICARE

## 2022-09-19 DIAGNOSIS — J84.9 INTERSTITIAL LUNG DISEASE (HCC): ICD-10-CM

## 2022-09-19 DIAGNOSIS — J84.9 INTERSTITIAL LUNG DISEASE (HCC): Primary | ICD-10-CM

## 2022-09-19 PROCEDURE — 71046 X-RAY EXAM CHEST 2 VIEWS: CPT

## 2022-10-05 ENCOUNTER — OFFICE VISIT (OUTPATIENT)
Dept: FAMILY MEDICINE CLINIC | Age: 87
End: 2022-10-05
Payer: MEDICARE

## 2022-10-05 VITALS
SYSTOLIC BLOOD PRESSURE: 124 MMHG | HEART RATE: 64 BPM | HEIGHT: 62 IN | DIASTOLIC BLOOD PRESSURE: 55 MMHG | WEIGHT: 135.2 LBS | TEMPERATURE: 97.8 F | BODY MASS INDEX: 24.88 KG/M2 | RESPIRATION RATE: 16 BRPM | OXYGEN SATURATION: 98 %

## 2022-10-05 DIAGNOSIS — Z00.00 ENCOUNTER FOR MEDICARE ANNUAL WELLNESS EXAM: Primary | ICD-10-CM

## 2022-10-05 DIAGNOSIS — I10 ESSENTIAL HYPERTENSION: ICD-10-CM

## 2022-10-05 DIAGNOSIS — Z23 NEEDS FLU SHOT: ICD-10-CM

## 2022-10-05 DIAGNOSIS — D64.9 ANEMIA, UNSPECIFIED TYPE: ICD-10-CM

## 2022-10-05 PROCEDURE — 90694 VACC AIIV4 NO PRSRV 0.5ML IM: CPT | Performed by: FAMILY MEDICINE

## 2022-10-05 PROCEDURE — G8536 NO DOC ELDER MAL SCRN: HCPCS | Performed by: FAMILY MEDICINE

## 2022-10-05 PROCEDURE — G0008 ADMIN INFLUENZA VIRUS VAC: HCPCS | Performed by: FAMILY MEDICINE

## 2022-10-05 PROCEDURE — G8510 SCR DEP NEG, NO PLAN REQD: HCPCS | Performed by: FAMILY MEDICINE

## 2022-10-05 PROCEDURE — G0439 PPPS, SUBSEQ VISIT: HCPCS | Performed by: FAMILY MEDICINE

## 2022-10-05 PROCEDURE — 1101F PT FALLS ASSESS-DOCD LE1/YR: CPT | Performed by: FAMILY MEDICINE

## 2022-10-05 PROCEDURE — G8420 CALC BMI NORM PARAMETERS: HCPCS | Performed by: FAMILY MEDICINE

## 2022-10-05 PROCEDURE — G8427 DOCREV CUR MEDS BY ELIG CLIN: HCPCS | Performed by: FAMILY MEDICINE

## 2022-10-05 PROCEDURE — 1123F ACP DISCUSS/DSCN MKR DOCD: CPT | Performed by: FAMILY MEDICINE

## 2022-10-05 RX ORDER — LANOLIN ALCOHOL/MO/W.PET/CERES
CREAM (GRAM) TOPICAL
Qty: 90 TABLET | Refills: 3 | Status: SHIPPED | OUTPATIENT
Start: 2022-10-05

## 2022-10-05 RX ORDER — TRIAMCINOLONE ACETONIDE 1 MG/G
CREAM TOPICAL 2 TIMES DAILY
Qty: 45 G | Refills: 2 | Status: SHIPPED | OUTPATIENT
Start: 2022-10-05

## 2022-10-05 RX ORDER — PANTOPRAZOLE SODIUM 40 MG/1
TABLET, DELAYED RELEASE ORAL
Qty: 90 TABLET | Refills: 3 | Status: SHIPPED | OUTPATIENT
Start: 2022-10-05

## 2022-10-05 RX ORDER — LEVOTHYROXINE SODIUM 100 UG/1
TABLET ORAL
Qty: 90 TABLET | Refills: 3 | Status: SHIPPED | OUTPATIENT
Start: 2022-10-05

## 2022-10-05 NOTE — PROGRESS NOTES
Chief Complaint   Patient presents with    Annual Wellness Visit       1. \"Have you been to the ER, urgent care clinic since your last visit? Hospitalized since your last visit? \" No    2. \"Have you seen or consulted any other health care providers outside of the [address] [place] [address] since your last visit? \" Yes Pulmonology       3. For patients aged 39-70: Has the patient had a colonoscopy / FIT/ Cologuard? NA - based on age      If the patient is female:    4. For patients aged 41-77: Has the patient had a mammogram within the past 2 years? NA - based on age or sex      11. For patients aged 21-65: Has the patient had a pap smear? NA - based on age or sex    Health Maintenance Due   Topic Date Due    Shingrix Vaccine Age 49> (2 of 2) 12/09/2020    COVID-19 Vaccine (4 - Booster for Moderna series) 03/19/2022    Flu Vaccine (1) 08/01/2022    Medicare Yearly Exam  10/05/2022     Verbal Order received from Dr. Hamida Etienne  for fluad given IM  in left  arm.

## 2022-10-05 NOTE — PROGRESS NOTES
HISTORY OF PRESENT ILLNESS  Boyd Lugo is a 80 y.o. female. HPIin for medicare wellness exam. Sees Dr. Whitley Le (cardiology), Dr. Isaiah Wells (pulmonary). Needs flu and covid boosters, but wants to stick with modernea. Would want daughter Monico Guzman to be her mPOA if she became incapacitated. Review of Systems   HENT:  Positive for hearing loss. Neurological:         No falls, canes. INdependent in all adls. Memory ok. Psychiatric/Behavioral:  Negative for depression. No alcohol. Physical Exam  Vitals and nursing note reviewed. Constitutional:       Appearance: She is well-developed. HENT:      Right Ear: External ear normal.      Left Ear: External ear normal.   Neck:      Thyroid: No thyromegaly. Cardiovascular:      Rate and Rhythm: Normal rate and regular rhythm. Heart sounds: Normal heart sounds. Pulmonary:      Breath sounds: Normal breath sounds. No wheezing. Abdominal:      General: Bowel sounds are normal. There is no distension. Palpations: Abdomen is soft. There is no mass. Tenderness: There is no abdominal tenderness. Lymphadenopathy:      Cervical: No cervical adenopathy. ASSESSMENT and PLAN  Orders Placed This Encounter    Influenza Vaccine, QUAD, 65 Yrs +  IM  (Fluad 21716 )    CBC WITH AUTOMATED DIFF    METABOLIC PANEL, COMPREHENSIVE    triamcinolone acetonide (KENALOG) 0.1 % topical cream    levothyroxine (SYNTHROID) 100 mcg tablet    magnesium oxide (MAG-OX) 400 mg tablet    pantoprazole (PROTONIX) 40 mg tablet     Diagnoses and all orders for this visit:    1. Encounter for Medicare annual wellness exam    2. Needs flu shot  -     INFLUENZA, FLUAD, (AGE 65 Y+), IM, PF, 0.5 ML    3. Anemia, unspecified type  -     CBC WITH AUTOMATED DIFF; Future    4. Essential hypertension  -     METABOLIC PANEL, COMPREHENSIVE; Future    Other orders  -     triamcinolone acetonide (KENALOG) 0.1 % topical cream; Apply  to affected area two (2) times a day.  use thin layer  -     levothyroxine (SYNTHROID) 100 mcg tablet; One po daily for thyroid  -     magnesium oxide (MAG-OX) 400 mg tablet; Take 1 Tab by mouth daily. For leg cramps  -     pantoprazole (PROTONIX) 40 mg tablet; One po daily for stomach      Follow-up and Dispositions    Return in about 6 months (around 4/5/2023).

## 2022-10-05 NOTE — PATIENT INSTRUCTIONS
Vaccine Information Statement    Influenza (Flu) Vaccine (Inactivated or Recombinant): What You Need to Know    Many vaccine information statements are available in Greek and other languages. See www.immunize.org/vis. Hojas de información sobre vacunas están disponibles en español y en muchos otros idiomas. Visite www.immunize.org/vis. 1. Why get vaccinated? Influenza vaccine can prevent influenza (flu). Flu is a contagious disease that spreads around the [facility] every year, usually between October and May. Anyone can get the flu, but it is more dangerous for some people. Infants and young children, people 72 years and older, pregnant people, and people with certain health conditions or a weakened immune system are at greatest risk of flu complications. Pneumonia, bronchitis, sinus infections, and ear infections are examples of flu-related complications. If you have a medical condition, such as heart disease, cancer, or diabetes, flu can make it worse. Flu can cause fever and chills, sore throat, muscle aches, fatigue, cough, headache, and runny or stuffy nose. Some people may have vomiting and diarrhea, though this is more common in children than adults. In an average year, thousands of people in the [facility] die from flu, and many more are hospitalized. Flu vaccine prevents millions of illnesses and flu-related visits to the doctor each year. 2. Influenza vaccines     CDC recommends everyone 6 months and older get vaccinated every flu season. Children 6 months through 6years of age may need 2 doses during a single flu season. Everyone else needs only 1 dose each flu season. It takes about 2 weeks for protection to develop after vaccination. There are many flu viruses, and they are always changing. Each year a new flu vaccine is made to protect against the influenza viruses believed to be likely to cause disease in the upcoming flu season.  Even when the vaccine doesnt exactly match these viruses, it may still provide some protection. Influenza vaccine does not cause flu. Influenza vaccine may be given at the same time as other vaccines. 3. Talk with your health care provider    Tell your vaccination provider if the person getting the vaccine:  Has had an allergic reaction after a previous dose of influenza vaccine, or has any severe, life-threatening allergies   Has ever had Guillain-Barré Syndrome (also called GBS)    In some cases, your health care provider may decide to postpone influenza vaccination until a future visit. Influenza vaccine can be administered at any time during pregnancy. People who are or will be pregnant during influenza season should receive inactivated influenza vaccine. People with minor illnesses, such as a cold, may be vaccinated. People who are moderately or severely ill should usually wait until they recover before getting influenza vaccine. Your health care provider can give you more information. 4. Risks of a vaccine reaction    Soreness, redness, and swelling where the shot is given, fever, muscle aches, and headache can happen after influenza vaccination. There may be a very small increased risk of Guillain-Barré Syndrome (GBS) after inactivated influenza vaccine (the flu shot)Saad Davis children who get the flu shot along with pneumococcal vaccine (PCV13) and/or DTaP vaccine at the same time might be slightly more likely to have a seizure caused by fever. Tell your health care provider if a child who is getting flu vaccine has ever had a seizure. People sometimes faint after medical procedures, including vaccination. Tell your provider if you feel dizzy or have vision changes or ringing in the ears. As with any medicine, there is a very remote chance of a vaccine causing a severe allergic reaction, other serious injury, or death. 5. What if there is a serious problem?     An allergic reaction could occur after the vaccinated person leaves the clinic. If you see signs of a severe allergic reaction (hives, swelling of the face and throat, difficulty breathing, a fast heartbeat, dizziness, or weakness), call 9-1-1 and get the person to the nearest hospital.    For other signs that concern you, call your health care provider. Adverse reactions should be reported to the Vaccine Adverse Event Reporting System (VAERS). Your health care provider will usually file this report, or you can do it yourself. Visit the VAERS website at www.vaers. [facility].gov or call 1-200.215.4103. VAERS is only for reporting reactions, and VAERS staff members do not give medical advice. 6. The National Vaccine Injury Compensation Program    The [facility], located within [facility] Vaccine Injury Compensation Program (VICP) is a federal program that was created to compensate people who may have been injured by certain vaccines. Claims regarding alleged injury or death due to vaccination have a time limit for filing, which may be as short as two years. Visit the VICP website at www.[facility].gov/vaccinecompensation or call 7-998.199.2077 to learn about the program and about filing a claim. 7. How can I learn more? Ask your health care provider. Call your local or state health department. Visit the website of the Food and Drug Administration (FDA) for vaccine package inserts and additional information at www.fda.gov/vaccines-blood-biologics/vaccines. Contact the Centers for Disease Control and Prevention (CDC): Call 3-484.904.8302 (9-894-JFQ-INFO) or  Visit CDCs influenza website at www.cdc.gov/flu. Vaccine Information Statement   Inactivated Influenza Vaccine   8/6/2021  42 ESTHER Carrasco [id number]   Department of Health and Human Services  Centers for Disease Control and Prevention    Office Use Only

## 2022-10-06 LAB
ALBUMIN SERPL-MCNC: 3.5 G/DL (ref 3.5–5)
ALBUMIN/GLOB SERPL: 0.7 {RATIO} (ref 1.1–2.2)
ALP SERPL-CCNC: 75 U/L (ref 45–117)
ALT SERPL-CCNC: 12 U/L (ref 12–78)
ANION GAP SERPL CALC-SCNC: 5 MMOL/L (ref 5–15)
AST SERPL-CCNC: 19 U/L (ref 15–37)
BASOPHILS # BLD: 0 K/UL (ref 0–0.1)
BASOPHILS NFR BLD: 1 % (ref 0–1)
BILIRUB SERPL-MCNC: 0.3 MG/DL (ref 0.2–1)
BUN SERPL-MCNC: 21 MG/DL (ref 6–20)
BUN/CREAT SERPL: 19 (ref 12–20)
CALCIUM SERPL-MCNC: 8.9 MG/DL (ref 8.5–10.1)
CHLORIDE SERPL-SCNC: 107 MMOL/L (ref 97–108)
CO2 SERPL-SCNC: 27 MMOL/L (ref 21–32)
CREAT SERPL-MCNC: 1.13 MG/DL (ref 0.55–1.02)
DIFFERENTIAL METHOD BLD: ABNORMAL
EOSINOPHIL # BLD: 0.2 K/UL (ref 0–0.4)
EOSINOPHIL NFR BLD: 4 % (ref 0–7)
ERYTHROCYTE [DISTWIDTH] IN BLOOD BY AUTOMATED COUNT: 16.5 % (ref 11.5–14.5)
GLOBULIN SER CALC-MCNC: 5.1 G/DL (ref 2–4)
GLUCOSE SERPL-MCNC: 109 MG/DL (ref 65–100)
HCT VFR BLD AUTO: 34.4 % (ref 35–47)
HGB BLD-MCNC: 10.5 G/DL (ref 11.5–16)
IMM GRANULOCYTES # BLD AUTO: 0 K/UL (ref 0–0.04)
IMM GRANULOCYTES NFR BLD AUTO: 0 % (ref 0–0.5)
LYMPHOCYTES # BLD: 2.4 K/UL (ref 0.8–3.5)
LYMPHOCYTES NFR BLD: 42 % (ref 12–49)
MCH RBC QN AUTO: 27.4 PG (ref 26–34)
MCHC RBC AUTO-ENTMCNC: 30.5 G/DL (ref 30–36.5)
MCV RBC AUTO: 89.8 FL (ref 80–99)
MONOCYTES # BLD: 0.5 K/UL (ref 0–1)
MONOCYTES NFR BLD: 10 % (ref 5–13)
NEUTS SEG # BLD: 2.3 K/UL (ref 1.8–8)
NEUTS SEG NFR BLD: 43 % (ref 32–75)
NRBC # BLD: 0 K/UL (ref 0–0.01)
NRBC BLD-RTO: 0 PER 100 WBC
PLATELET # BLD AUTO: 130 K/UL (ref 150–400)
PMV BLD AUTO: 12.2 FL (ref 8.9–12.9)
POTASSIUM SERPL-SCNC: 3.4 MMOL/L (ref 3.5–5.1)
PROT SERPL-MCNC: 8.6 G/DL (ref 6.4–8.2)
RBC # BLD AUTO: 3.83 M/UL (ref 3.8–5.2)
SODIUM SERPL-SCNC: 139 MMOL/L (ref 136–145)
WBC # BLD AUTO: 5.5 K/UL (ref 3.6–11)

## 2022-10-17 ENCOUNTER — TELEPHONE (OUTPATIENT)
Dept: FAMILY MEDICINE CLINIC | Age: 87
End: 2022-10-17

## 2022-10-17 RX ORDER — POTASSIUM CHLORIDE 20 MEQ/1
20 TABLET, EXTENDED RELEASE ORAL 2 TIMES DAILY
Qty: 180 TABLET | Refills: 3 | Status: SHIPPED | OUTPATIENT
Start: 2022-10-17

## 2022-12-19 RX ORDER — TRAZODONE HYDROCHLORIDE 300 MG/1
TABLET ORAL
Qty: 30 TABLET | Refills: 0 | Status: SHIPPED | OUTPATIENT
Start: 2022-12-19

## 2023-01-05 RX ORDER — FUROSEMIDE 20 MG/1
TABLET ORAL
Qty: 180 TABLET | Refills: 0 | Status: SHIPPED | OUTPATIENT
Start: 2023-01-05

## 2023-01-09 RX ORDER — TRAZODONE HYDROCHLORIDE 300 MG/1
TABLET ORAL
Qty: 30 TABLET | Refills: 0 | Status: SHIPPED | OUTPATIENT
Start: 2023-01-09

## 2023-01-12 NOTE — PROGRESS NOTES
Chief Complaint   Patient presents with   [facility]     12/8-12/10     1. Have you been to the ER, urgent care clinic since your last visit? Hospitalized since your last visit? No    2. Have you seen or consulted any other health care providers outside of the [address] [place] [address] since your last visit? Include any pap smears or colon screening.  No    Health Maintenance Due   Topic Date Due    Shingrix Vaccine Age 49> (1 of 2) 12/05/1980    GLAUCOMA SCREENING Q2Y  02/17/2019 Pathology finalized, please advise

## 2023-01-21 RX ORDER — METOPROLOL SUCCINATE 25 MG/1
TABLET, EXTENDED RELEASE ORAL
Qty: 90 TABLET | Refills: 0 | Status: SHIPPED | OUTPATIENT
Start: 2023-01-21

## 2023-02-07 ENCOUNTER — OFFICE VISIT (OUTPATIENT)
Dept: FAMILY MEDICINE CLINIC | Age: 88
End: 2023-02-07
Payer: MEDICARE

## 2023-02-07 VITALS
HEART RATE: 96 BPM | BODY MASS INDEX: 24.66 KG/M2 | WEIGHT: 134 LBS | SYSTOLIC BLOOD PRESSURE: 116 MMHG | RESPIRATION RATE: 16 BRPM | DIASTOLIC BLOOD PRESSURE: 64 MMHG | OXYGEN SATURATION: 98 % | HEIGHT: 62 IN | TEMPERATURE: 98 F

## 2023-02-07 DIAGNOSIS — J30.89 NON-SEASONAL ALLERGIC RHINITIS DUE TO OTHER ALLERGIC TRIGGER: Primary | ICD-10-CM

## 2023-02-07 DIAGNOSIS — E87.6 HYPOKALEMIA: ICD-10-CM

## 2023-02-07 DIAGNOSIS — D64.9 ANEMIA, UNSPECIFIED TYPE: ICD-10-CM

## 2023-02-07 PROCEDURE — 1123F ACP DISCUSS/DSCN MKR DOCD: CPT | Performed by: FAMILY MEDICINE

## 2023-02-07 PROCEDURE — 1101F PT FALLS ASSESS-DOCD LE1/YR: CPT | Performed by: FAMILY MEDICINE

## 2023-02-07 PROCEDURE — G8536 NO DOC ELDER MAL SCRN: HCPCS | Performed by: FAMILY MEDICINE

## 2023-02-07 PROCEDURE — G8420 CALC BMI NORM PARAMETERS: HCPCS | Performed by: FAMILY MEDICINE

## 2023-02-07 PROCEDURE — 99213 OFFICE O/P EST LOW 20 MIN: CPT | Performed by: FAMILY MEDICINE

## 2023-02-07 PROCEDURE — G8427 DOCREV CUR MEDS BY ELIG CLIN: HCPCS | Performed by: FAMILY MEDICINE

## 2023-02-07 PROCEDURE — G8432 DEP SCR NOT DOC, RNG: HCPCS | Performed by: FAMILY MEDICINE

## 2023-02-07 PROCEDURE — 1090F PRES/ABSN URINE INCON ASSESS: CPT | Performed by: FAMILY MEDICINE

## 2023-02-07 RX ORDER — LORATADINE 10 MG/1
10 TABLET ORAL
Qty: 30 TABLET | Refills: 2
Start: 2023-02-07

## 2023-02-07 NOTE — PROGRESS NOTES
Chief Complaint   Patient presents with    Follow-up     [facility]       1. \"Have you been to the ER, urgent care clinic since your last visit? Hospitalized since your last visit? \" Yes [facility]    2. \"Have you seen or consulted any other health care providers outside of the [address] [place] [address] since your last visit? \" No     3. For patients aged 39-70: Has the patient had a colonoscopy / FIT/ Cologuard? NA - based on age      If the patient is female:    4. For patients aged 41-77: Has the patient had a mammogram within the past 2 years? NA - based on age or sex      11. For patients aged 21-65: Has the patient had a pap smear?  NA - based on age or sex    Health Maintenance Due   Topic Date Due    Shingles Vaccine (2 of 2) 12/09/2020    COVID-19 Vaccine (4 - Booster for Moderna series) 01/14/2022

## 2023-02-07 NOTE — PROGRESS NOTES
HISTORY OF PRESENT ILLNESS  Emy San is a 80 y.o. female. HPI Brought in by son in law Ramirez. Had a postnasal drainage and cough, went to [facility]. Was put on prednisone and doxycycline. Still has some sinus drainage, cough is doing slightly better. No pains anywhere. ROS    Physical Exam  Vitals and nursing note reviewed. Constitutional:       Appearance: She is well-developed. HENT:      Right Ear: External ear normal.      Left Ear: External ear normal.   Neck:      Thyroid: No thyromegaly. Cardiovascular:      Rate and Rhythm: Normal rate and regular rhythm. Heart sounds: Normal heart sounds. Pulmonary:      Breath sounds: Normal breath sounds. No wheezing. Abdominal:      General: Bowel sounds are normal. There is no distension. Palpations: Abdomen is soft. There is no mass. Tenderness: There is no abdominal tenderness. Lymphadenopathy:      Cervical: No cervical adenopathy. ASSESSMENT and PLAN  Orders Placed This Encounter    METABOLIC PANEL, BASIC    CBC WITH AUTOMATED DIFF    loratadine (Claritin) 10 mg tablet     Diagnoses and all orders for this visit:    1. Non-seasonal allergic rhinitis due to other allergic trigger    2. Hypokalemia  -     METABOLIC PANEL, BASIC; Future    3. Anemia, unspecified type  -     CBC WITH AUTOMATED DIFF; Future    Other orders  -     loratadine (Claritin) 10 mg tablet; Take 1 Tablet by mouth daily as needed for Allergies. Follow-up and Dispositions    Return in about 4 months (around 6/7/2023).

## 2023-02-08 LAB
ANION GAP SERPL CALC-SCNC: 8 MMOL/L (ref 5–15)
BASOPHILS # BLD: 0 K/UL (ref 0–0.1)
BASOPHILS NFR BLD: 0 % (ref 0–1)
BUN SERPL-MCNC: 35 MG/DL (ref 6–20)
BUN/CREAT SERPL: 29 (ref 12–20)
CALCIUM SERPL-MCNC: 8.5 MG/DL (ref 8.5–10.1)
CHLORIDE SERPL-SCNC: 106 MMOL/L (ref 97–108)
CO2 SERPL-SCNC: 24 MMOL/L (ref 21–32)
CREAT SERPL-MCNC: 1.19 MG/DL (ref 0.55–1.02)
DIFFERENTIAL METHOD BLD: ABNORMAL
EOSINOPHIL # BLD: 0.1 K/UL (ref 0–0.4)
EOSINOPHIL NFR BLD: 1 % (ref 0–7)
ERYTHROCYTE [DISTWIDTH] IN BLOOD BY AUTOMATED COUNT: 18 % (ref 11.5–14.5)
GLUCOSE SERPL-MCNC: 114 MG/DL (ref 65–100)
HCT VFR BLD AUTO: 37.4 % (ref 35–47)
HGB BLD-MCNC: 11.5 G/DL (ref 11.5–16)
IMM GRANULOCYTES # BLD AUTO: 0 K/UL (ref 0–0.04)
IMM GRANULOCYTES NFR BLD AUTO: 0 % (ref 0–0.5)
LYMPHOCYTES # BLD: 2.2 K/UL (ref 0.8–3.5)
LYMPHOCYTES NFR BLD: 24 % (ref 12–49)
MCH RBC QN AUTO: 28 PG (ref 26–34)
MCHC RBC AUTO-ENTMCNC: 30.7 G/DL (ref 30–36.5)
MCV RBC AUTO: 91 FL (ref 80–99)
MONOCYTES # BLD: 1 K/UL (ref 0–1)
MONOCYTES NFR BLD: 11 % (ref 5–13)
NEUTS SEG # BLD: 5.8 K/UL (ref 1.8–8)
NEUTS SEG NFR BLD: 64 % (ref 32–75)
NRBC # BLD: 0 K/UL (ref 0–0.01)
NRBC BLD-RTO: 0 PER 100 WBC
PLATELET # BLD AUTO: 174 K/UL (ref 150–400)
PMV BLD AUTO: 11.5 FL (ref 8.9–12.9)
POTASSIUM SERPL-SCNC: 3.5 MMOL/L (ref 3.5–5.1)
RBC # BLD AUTO: 4.11 M/UL (ref 3.8–5.2)
SODIUM SERPL-SCNC: 138 MMOL/L (ref 136–145)
WBC # BLD AUTO: 9.1 K/UL (ref 3.6–11)

## 2023-02-20 RX ORDER — PANTOPRAZOLE SODIUM 40 MG/1
TABLET, DELAYED RELEASE ORAL
Qty: 90 TABLET | Refills: 0 | Status: SHIPPED | OUTPATIENT
Start: 2023-02-20

## 2023-02-20 RX ORDER — TRAZODONE HYDROCHLORIDE 300 MG/1
TABLET ORAL
Qty: 30 TABLET | Refills: 0 | Status: SHIPPED | OUTPATIENT
Start: 2023-02-20

## 2023-03-17 ENCOUNTER — HOSPITAL ENCOUNTER (INPATIENT)
Age: 88
LOS: 3 days | Discharge: HOME | DRG: 196 | End: 2023-03-21
Attending: EMERGENCY MEDICINE | Admitting: STUDENT IN AN ORGANIZED HEALTH CARE EDUCATION/TRAINING PROGRAM
Payer: MEDICARE

## 2023-03-17 ENCOUNTER — APPOINTMENT (OUTPATIENT)
Dept: GENERAL RADIOLOGY | Age: 88
DRG: 196 | End: 2023-03-17
Attending: EMERGENCY MEDICINE
Payer: MEDICARE

## 2023-03-17 DIAGNOSIS — I48.20 CHRONIC ATRIAL FIBRILLATION WITH RAPID VENTRICULAR RESPONSE (HCC): ICD-10-CM

## 2023-03-17 DIAGNOSIS — J96.01 ACUTE HYPOXEMIC RESPIRATORY FAILURE (HCC): ICD-10-CM

## 2023-03-17 DIAGNOSIS — I50.9 ACUTE ON CHRONIC CONGESTIVE HEART FAILURE, UNSPECIFIED HEART FAILURE TYPE (HCC): Primary | ICD-10-CM

## 2023-03-17 DIAGNOSIS — J84.9 ILD (INTERSTITIAL LUNG DISEASE) (HCC): ICD-10-CM

## 2023-03-17 DIAGNOSIS — I50.33 ACUTE ON CHRONIC DIASTOLIC CONGESTIVE HEART FAILURE (HCC): ICD-10-CM

## 2023-03-17 LAB
ALBUMIN SERPL-MCNC: 2.8 G/DL (ref 3.5–5)
ALBUMIN/GLOB SERPL: 0.6 (ref 1.1–2.2)
ALP SERPL-CCNC: 63 U/L (ref 45–117)
ALT SERPL-CCNC: 12 U/L (ref 12–78)
ANION GAP SERPL CALC-SCNC: 5 MMOL/L (ref 5–15)
AST SERPL-CCNC: 18 U/L (ref 15–37)
BASOPHILS # BLD: 0 K/UL (ref 0–0.1)
BASOPHILS NFR BLD: 1 % (ref 0–1)
BILIRUB SERPL-MCNC: 0.3 MG/DL (ref 0.2–1)
BNP SERPL-MCNC: 1195 PG/ML
BUN SERPL-MCNC: 11 MG/DL (ref 6–20)
BUN/CREAT SERPL: 11 (ref 12–20)
CALCIUM SERPL-MCNC: 8.4 MG/DL (ref 8.5–10.1)
CHLORIDE SERPL-SCNC: 105 MMOL/L (ref 97–108)
CO2 SERPL-SCNC: 27 MMOL/L (ref 21–32)
CREAT SERPL-MCNC: 0.96 MG/DL (ref 0.55–1.02)
DIFFERENTIAL METHOD BLD: ABNORMAL
EOSINOPHIL # BLD: 0.2 K/UL (ref 0–0.4)
EOSINOPHIL NFR BLD: 4 % (ref 0–7)
ERYTHROCYTE [DISTWIDTH] IN BLOOD BY AUTOMATED COUNT: 16.4 % (ref 11.5–14.5)
GLOBULIN SER CALC-MCNC: 4.4 G/DL (ref 2–4)
GLUCOSE SERPL-MCNC: 128 MG/DL (ref 65–100)
HCT VFR BLD AUTO: 32.7 % (ref 35–47)
HGB BLD-MCNC: 10.3 G/DL (ref 11.5–16)
IMM GRANULOCYTES # BLD AUTO: 0 K/UL (ref 0–0.04)
IMM GRANULOCYTES NFR BLD AUTO: 0 % (ref 0–0.5)
LYMPHOCYTES # BLD: 2.2 K/UL (ref 0.8–3.5)
LYMPHOCYTES NFR BLD: 36 % (ref 12–49)
MCH RBC QN AUTO: 28.8 PG (ref 26–34)
MCHC RBC AUTO-ENTMCNC: 31.5 G/DL (ref 30–36.5)
MCV RBC AUTO: 91.3 FL (ref 80–99)
MONOCYTES # BLD: 0.6 K/UL (ref 0–1)
MONOCYTES NFR BLD: 10 % (ref 5–13)
NEUTS SEG # BLD: 3 K/UL (ref 1.8–8)
NEUTS SEG NFR BLD: 49 % (ref 32–75)
NRBC # BLD: 0 K/UL (ref 0–0.01)
NRBC BLD-RTO: 0 PER 100 WBC
PLATELET # BLD AUTO: 139 K/UL (ref 150–400)
PMV BLD AUTO: 10.3 FL (ref 8.9–12.9)
POTASSIUM SERPL-SCNC: 3.5 MMOL/L (ref 3.5–5.1)
PROT SERPL-MCNC: 7.2 G/DL (ref 6.4–8.2)
RBC # BLD AUTO: 3.58 M/UL (ref 3.8–5.2)
SODIUM SERPL-SCNC: 137 MMOL/L (ref 136–145)
TROPONIN I SERPL HS-MCNC: 14 NG/L (ref 0–51)
WBC # BLD AUTO: 6.1 K/UL (ref 3.6–11)

## 2023-03-17 PROCEDURE — 36415 COLL VENOUS BLD VENIPUNCTURE: CPT

## 2023-03-17 PROCEDURE — 84484 ASSAY OF TROPONIN QUANT: CPT

## 2023-03-17 PROCEDURE — 85025 COMPLETE CBC W/AUTO DIFF WBC: CPT

## 2023-03-17 PROCEDURE — 74011250637 HC RX REV CODE- 250/637: Performed by: EMERGENCY MEDICINE

## 2023-03-17 PROCEDURE — 96375 TX/PRO/DX INJ NEW DRUG ADDON: CPT

## 2023-03-17 PROCEDURE — 83880 ASSAY OF NATRIURETIC PEPTIDE: CPT

## 2023-03-17 PROCEDURE — 71046 X-RAY EXAM CHEST 2 VIEWS: CPT

## 2023-03-17 PROCEDURE — 94640 AIRWAY INHALATION TREATMENT: CPT

## 2023-03-17 PROCEDURE — 93005 ELECTROCARDIOGRAM TRACING: CPT

## 2023-03-17 PROCEDURE — 80053 COMPREHEN METABOLIC PANEL: CPT

## 2023-03-17 PROCEDURE — 99285 EMERGENCY DEPT VISIT HI MDM: CPT

## 2023-03-17 PROCEDURE — 74011250636 HC RX REV CODE- 250/636: Performed by: EMERGENCY MEDICINE

## 2023-03-17 PROCEDURE — 96374 THER/PROPH/DIAG INJ IV PUSH: CPT

## 2023-03-17 PROCEDURE — 74011000250 HC RX REV CODE- 250: Performed by: EMERGENCY MEDICINE

## 2023-03-17 PROCEDURE — 94761 N-INVAS EAR/PLS OXIMETRY MLT: CPT

## 2023-03-17 RX ORDER — ALBUTEROL SULFATE 90 UG/1
6 AEROSOL, METERED RESPIRATORY (INHALATION) ONCE
Status: COMPLETED | OUTPATIENT
Start: 2023-03-17 | End: 2023-03-17

## 2023-03-17 RX ORDER — FUROSEMIDE 10 MG/ML
40 INJECTION INTRAMUSCULAR; INTRAVENOUS
Status: COMPLETED | OUTPATIENT
Start: 2023-03-17 | End: 2023-03-17

## 2023-03-17 RX ORDER — DILTIAZEM HYDROCHLORIDE 5 MG/ML
10 INJECTION INTRAVENOUS
Status: COMPLETED | OUTPATIENT
Start: 2023-03-17 | End: 2023-03-17

## 2023-03-17 RX ADMIN — DILTIAZEM HYDROCHLORIDE 10 MG: 5 INJECTION INTRAVENOUS at 23:56

## 2023-03-17 RX ADMIN — ALBUTEROL SULFATE 6 PUFF: 90 AEROSOL, METERED RESPIRATORY (INHALATION) at 22:55

## 2023-03-17 RX ADMIN — FUROSEMIDE 40 MG: 10 INJECTION, SOLUTION INTRAMUSCULAR; INTRAVENOUS at 19:25

## 2023-03-17 RX ADMIN — METHYLPREDNISOLONE SODIUM SUCCINATE 125 MG: 125 INJECTION, POWDER, FOR SOLUTION INTRAMUSCULAR; INTRAVENOUS at 23:55

## 2023-03-17 NOTE — ED NOTES
Report given to [facility] ([facility])

## 2023-03-17 NOTE — ED PROVIDER NOTES
[facility] EMERGENCY DEPT  EMERGENCY DEPARTMENT ENCOUNTER       Pt Name: Denisse Brooks  MRN: [id number]  [place] 12/5/1930  Date of evaluation: 3/17/2023  Provider: Zo Yeung DO   PCP: Merwin Nageotte, MD  Note Started: 6:07 PM 3/17/23     CHIEF COMPLAINT       Chief Complaint   Patient presents with    Shortness of Breath     Patient ambulatory into ED c/o sob, dry cough, body aches, and headache x 1 week. Covid negative yesterday at urgent care. HISTORY OF PRESENT ILLNESS: 1 or more elements      History From: Patient, History limited by: none     Denisse Brooks is a 80 y.o. female with past medical history significant for chronic atrial fibrillation, chronic interstitial lung disease presenting emergency department complaining of shortness of breath, worse with exertion, admits to dry cough, no chest pain, no unilateral leg pain or leg swelling. No prior history of DVT or PE       Please See MDM for Additional Details of the HPI/PMH  Nursing Notes were all reviewed and agreed with or any disagreements were addressed in the HPI. REVIEW OF SYSTEMS        Positives and Pertinent negatives as per HPI. PAST HISTORY     Past Medical History:  Past Medical History:   Diagnosis Date    Arrhythmia     atrial fibrillation, chronic. Stress test 2011 essent.  normal.  Mild-mod AR on ECHO    Atrial fibrillation (HCC)     Atrial flutter with rapid ventricular response (HCC) 11/21/2019    Chronic atrial fibrillation (HCC) 11/21/2019    Chronic kidney disease     Congestive heart failure (HCC)     Dyspnea     FEV1 1.33 (98% predicted), FVC 1.59 (89% predicted)    GI bleeding     Hyperlipidemia     Hypertension     Long term current use of anticoagulant therapy     Murmur     Refusal of blood transfusions as patient is Yazidism     Thyroid disease     hypothyroid    Valvular heart disease        Past Surgical History:  Past Surgical History:   Procedure Laterality Date    COLONOSCOPY N/A 5/2/2017 COLONOSCOPY performed by Perla Casper MD at [facility] ENDOSCOPY    ECHO TRANSESOPHAGEAL (DENISE) W OR WO CONTR  4/27/2011         HX GYN      vaginal deliveries x10    HX TUBAL LIGATION      MO CARDIOVERSION ELECTIVE ARRHYTHMIA EXTERNAL  6/14/2011            Family History:  Family History   Problem Relation Age of Onset    Heart Disease Father     Cancer Son     Heart Disease Son        Social History:  Social History     Tobacco Use    Smoking status: Never    Smokeless tobacco: Never   Vaping Use    Vaping Use: Never used   Substance Use Topics    Alcohol use: No    Drug use: No       Allergies: Allergies   Allergen Reactions    Seroquel [Quetiapine] Other (comments)     PSYCOLOGICAL REACTION Pt didn't feel like them selves but felt like a different person, iT DEEPLY DISTURBED PT.  PT BECAME IRRITABLE. Ambien [Zolpidem] Other (comments)     Couldn't move       CURRENT MEDICATIONS      Previous Medications    ACETAMINOPHEN (TYLENOL) 650 MG TBER    Take 650 mg by mouth every eight (8) hours. Indications: headache    ALBUTEROL (PROVENTIL HFA, VENTOLIN HFA, PROAIR HFA) 90 MCG/ACTUATION INHALER    Take 2 Puffs by inhalation every four (4) hours as needed for Wheezing. CALCIUM 600 + D TABLET    TAKE 1 TABLET TWICE A DAY. (CALCIUM SUPPLEMENT)    DABIGATRAN ETEXILATE (PRADAXA) 75 MG CAPSULE    TAKE ONE CAPSULE BY MOUTH EVERY 12 HOURS    FLUTICASONE PROPION-SALMETEROL (WIXELA INHUB) 100-50 MCG/DOSE DISKUS INHALER    Take 1 Puff by inhalation two (2) times a day. for breathing    FLUTICASONE PROPIONATE (FLONASE) 50 MCG/ACTUATION NASAL SPRAY    USE 1-2 SPRAYS IN EACH NOSTRIL ONCE DAILY. FUROSEMIDE (LASIX) 20 MG TABLET    TAKE 1 TO 2 TABLETS BY MOUTH DAILY AS NEEDED FOR FLUID    LEVOTHYROXINE (SYNTHROID) 100 MCG TABLET    One po daily for thyroid    LORATADINE (CLARITIN) 10 MG TABLET    Take 1 Tablet by mouth daily as needed for Allergies. MAGNESIUM OXIDE (MAG-OX) 400 MG TABLET    Take 1 Tab by mouth daily.  For leg cramps    METOPROLOL SUCCINATE (TOPROL-XL) 25 MG XL TABLET    TAKE ONE TABLET BY MOUTH EVERY DAY FOR THE HEART    PANTOPRAZOLE (PROTONIX) 40 MG TABLET    TAKE ONE TABLET BY MOUTH EVERY DAY    POTASSIUM CHLORIDE (K-DUR, KLOR-CON M20) 20 MEQ TABLET    Take 1 Tablet by mouth two (2) times a day. TRAZODONE (DESYREL) 300 MG TABLET    TAKE ONE TABLET BY MOUTH AT BEDTIME    TRIAMCINOLONE ACETONIDE (KENALOG) 0.1 % TOPICAL CREAM    Apply  to affected area two (2) times a day. use thin layer       SCREENINGS               No data recorded         PHYSICAL EXAM      ED Triage Vitals [03/17/23 1553]   ED Encounter Vitals Group      /80      Pulse (Heart Rate) (!) 115      Resp Rate 16      Temp 98.4 °F (36.9 °C)      Temp src       O2 Sat (%) 95 %      Weight       Height         Physical Exam  Vitals and nursing note reviewed. Constitutional:       Appearance: She is well-developed. HENT:      Head: Normocephalic and atraumatic. Eyes:      General:         Right eye: No discharge. Left eye: No discharge. Conjunctiva/sclera: Conjunctivae normal.      Pupils: Pupils are equal, round, and reactive to light. Neck:      Trachea: No tracheal deviation. Cardiovascular:      Rate and Rhythm: Tachycardia present. Rhythm irregular. Heart sounds: Normal heart sounds. No murmur heard. Pulmonary:      Effort: Pulmonary effort is normal. No respiratory distress. Breath sounds: Examination of the right-lower field reveals rales. Examination of the left-lower field reveals rales. Rales present. No wheezing. Abdominal:      General: Bowel sounds are normal.      Palpations: Abdomen is soft. Tenderness: There is no abdominal tenderness. There is no guarding or rebound. Musculoskeletal:         General: No tenderness or deformity. Normal range of motion. Cervical back: Normal range of motion and neck supple. Skin:     General: Skin is warm and dry. Findings: No erythema or rash. Neurological:      Mental Status: She is alert and oriented to person, place, and time. Psychiatric:         Behavior: Behavior normal.        DIAGNOSTIC RESULTS   LABS:     Recent Results (from the past 12 hour(s))   EKG, 12 LEAD, INITIAL    Collection Time: 03/17/23  4:00 PM   Result Value Ref Range    Ventricular Rate 105 BPM    Atrial Rate 241 BPM    QRS Duration 126 ms    Q-T Interval 352 ms    QTC Calculation (Bezet) 465 ms    Calculated R Axis -48 degrees    Calculated T Axis 101 degrees    Diagnosis       Atrial fibrillation with rapid ventricular response  Left axis deviation  Nonspecific intraventricular block  Cannot rule out Anterior infarct (cited on or before 17-MAR-2023)  T wave abnormality, consider lateral ischemia  When compared with ECG of 13-APR-2022 10:44,  Vent. rate has increased BY  42 BPM  Nonspecific intraventricular block has replaced Right bundle branch block  Questionable change in initial forces of Anterior leads     CBC WITH AUTOMATED DIFF    Collection Time: 03/17/23  4:16 PM   Result Value Ref Range    WBC 6.1 3.6 - 11.0 K/uL    RBC 3.58 (L) 3.80 - 5.20 M/uL    HGB 10.3 (L) 11.5 - 16.0 g/dL    HCT 32.7 (L) 35.0 - 47.0 %    MCV 91.3 80.0 - 99.0 FL    MCH 28.8 26.0 - 34.0 PG    MCHC 31.5 30.0 - 36.5 g/dL    RDW 16.4 (H) 11.5 - 14.5 %    PLATELET 572 (L) 201 - 400 K/uL    MPV 10.3 8.9 - 12.9 FL    NRBC 0.0 0  WBC    ABSOLUTE NRBC 0.00 0.00 - 0.01 K/uL    NEUTROPHILS 49 32 - 75 %    LYMPHOCYTES 36 12 - 49 %    MONOCYTES 10 5 - 13 %    EOSINOPHILS 4 0 - 7 %    BASOPHILS 1 0 - 1 %    IMMATURE GRANULOCYTES 0 0.0 - 0.5 %    ABS. NEUTROPHILS 3.0 1.8 - 8.0 K/UL    ABS. LYMPHOCYTES 2.2 0.8 - 3.5 K/UL    ABS. MONOCYTES 0.6 0.0 - 1.0 K/UL    ABS. EOSINOPHILS 0.2 0.0 - 0.4 K/UL    ABS. BASOPHILS 0.0 0.0 - 0.1 K/UL    ABS. IMM.  GRANS. 0.0 0.00 - 0.04 K/UL    DF AUTOMATED     METABOLIC PANEL, COMPREHENSIVE    Collection Time: 03/17/23  4:16 PM   Result Value Ref Range    Sodium 137 136 - 145 mmol/L    Potassium 3.5 3.5 - 5.1 mmol/L    Chloride 105 97 - 108 mmol/L    CO2 27 21 - 32 mmol/L    Anion gap 5 5 - 15 mmol/L    Glucose 128 (H) 65 - 100 mg/dL    BUN 11 6 - 20 MG/DL    Creatinine 0.96 0.55 - 1.02 MG/DL    BUN/Creatinine ratio 11 (L) 12 - 20      eGFR 56 (L) >60 ml/min/1.73m2    Calcium 8.4 (L) 8.5 - 10.1 MG/DL    Bilirubin, total 0.3 0.2 - 1.0 MG/DL    ALT (SGPT) 12 12 - 78 U/L    AST (SGOT) 18 15 - 37 U/L    Alk. phosphatase 63 45 - 117 U/L    Protein, total 7.2 6.4 - 8.2 g/dL    Albumin 2.8 (L) 3.5 - 5.0 g/dL    Globulin 4.4 (H) 2.0 - 4.0 g/dL    A-G Ratio 0.6 (L) 1.1 - 2.2     TROPONIN-HIGH SENSITIVITY    Collection Time: 03/17/23  4:16 PM   Result Value Ref Range    Troponin-High Sensitivity 14 0 - 51 ng/L   NT-PRO BNP    Collection Time: 03/17/23  4:16 PM   Result Value Ref Range    NT pro-BNP 1,195 (H) <450 PG/ML        EKG: If performed, independent interpretation documented below in the MDM section     RADIOLOGY:  Non-plain film images such as CT, Ultrasound and MRI are read by the radiologist. Plain radiographic images are visualized and preliminarily interpreted by the ED Provider with the findings documented in the MDM section. Interpretation per the Radiologist below, if available at the time of this note:     XR CHEST PA LAT    Result Date: 3/17/2023  Indication:  Shortness of Breath Exam: PA and lateral views of the chest. Direct comparison is made to prior CXR dated September 2022. Findings: Cardiomediastinal silhouette is within normal limits. There are bilateral increased interstitial markings, not significantly changed. There is no focal airspace process. There is no pleural fluid. There is no pneumothorax. Bilateral increased interstitial markings. No focal airspace process. PROCEDURES   Unless otherwise noted below, none  Procedures     CRITICAL CARE TIME   I have spent 35 minutes of critical care time in evaluating and treating this patient.   This includes time spent at bedside, time with family and decision makers, documentation, review of labs and imaging, and/or consultation with specialists. It does not include time spent on separately billed procedures. This patient presents with a critical illness or injury that acutely impairs one or more vital organ systems such that there is a high probability of imminent or life threatening deterioration in the patient's condition. This case involved decision making of high complexity to assess, manipulate, and support vital organ system failure and/or to prevent further life threatening deterioration of the patient's condition. Failure to initiate these interventions on an urgent basis would likely result in sudden, clinically significant or life threatening deterioration in the patient's condition. Abnormal findings supporting critical care: Atrial fibrillation with RVR, acute on chronic congestive heart failure  Interventions to support critical care: IV diuretics, IV rate control, hospitalization  Failure to intervene may result in: Decompensation, worsening heart failure, worsening hypoxia    EMERGENCY DEPARTMENT COURSE and DIFFERENTIAL DIAGNOSIS/MDM   Vitals:    Vitals:    03/17/23 1859 03/17/23 1914 03/17/23 1925 03/17/23 2259   BP: (!) 149/69 (!) 158/79 (!) 158/79    Pulse: 88 94 (!) 107    Resp: 23 22     Temp:       SpO2: 93% 94%  94%        Patient was given the following medications:  Medications   methylPREDNISolone (PF) (Solu-MEDROL) injection 125 mg (has no administration in time range)   dilTIAZem (CARDIZEM) injection 10 mg (has no administration in time range)   furosemide (LASIX) injection 40 mg (40 mg IntraVENous Given 3/17/23 1925)   albuterol (PROVENTIL HFA, VENTOLIN HFA, PROAIR HFA) inhaler 6 Puff (6 Puffs Inhalation Given 3/17/23 2255)       Medical Decision Making  Amount and/or Complexity of Data Reviewed  Labs: ordered. Radiology: ordered.   ECG/medicine tests: ordered. Risk  Prescription drug management. Decision regarding hospitalization. FINAL IMPRESSION     1. Acute on chronic congestive heart failure, unspecified heart failure type ([facility] [address]    2. Chronic atrial fibrillation with rapid ventricular response (HCC)          DISPOSITION/PLAN   Elisabeth Blanco's  results have been reviewed with her. She has been counseled regarding her diagnosis, treatment, and plan. She verbally conveys understanding and agreement of the signs, symptoms, diagnosis, treatment and prognosis and additionally agrees to follow up as discussed. She also agrees with the care-plan and conveys that all of her questions have been answered. I have also provided discharge instructions for her that include: educational information regarding their diagnosis and treatment, and list of reasons why they would want to return to the ED prior to their follow-up appointment, should her condition change. CLINICAL IMPRESSION    Admit Note: Pt is being admitted by Dr. Ruben Means. The results of their tests and reason(s) for their admission have been discussed with pt and/or available family. They convey agreement and understanding for the need to be admitted and for the admission diagnosis. PATIENT REFERRED TO:  Follow-up Information    None           DISCHARGE MEDICATIONS:  Current Discharge Medication List            DISCONTINUED MEDICATIONS:  Current Discharge Medication List          I am the Primary Clinician of Record. Daljit Cuellar DO (electronically signed)    (Please note that parts of this dictation were completed with voice recognition software. Quite often unanticipated grammatical, syntax, homophones, and other interpretive errors are inadvertently transcribed by the computer software. Please disregards these errors.  Please excuse any errors that have escaped final proofreading.)

## 2023-03-18 PROBLEM — I50.9 CHF EXACERBATION (HCC): Status: ACTIVE | Noted: 2023-03-18

## 2023-03-18 LAB
ANION GAP SERPL CALC-SCNC: 8 MMOL/L (ref 5–15)
BASOPHILS # BLD: 0 K/UL (ref 0–0.1)
BASOPHILS NFR BLD: 1 % (ref 0–1)
BUN SERPL-MCNC: 12 MG/DL (ref 6–20)
BUN/CREAT SERPL: 12 (ref 12–20)
CALCIUM SERPL-MCNC: 8.7 MG/DL (ref 8.5–10.1)
CHLORIDE SERPL-SCNC: 102 MMOL/L (ref 97–108)
CO2 SERPL-SCNC: 28 MMOL/L (ref 21–32)
CREAT SERPL-MCNC: 1 MG/DL (ref 0.55–1.02)
DIFFERENTIAL METHOD BLD: ABNORMAL
EOSINOPHIL # BLD: 0.1 K/UL (ref 0–0.4)
EOSINOPHIL NFR BLD: 1 % (ref 0–7)
ERYTHROCYTE [DISTWIDTH] IN BLOOD BY AUTOMATED COUNT: 16.1 % (ref 11.5–14.5)
GLUCOSE SERPL-MCNC: 157 MG/DL (ref 65–100)
HCT VFR BLD AUTO: 34.9 % (ref 35–47)
HGB BLD-MCNC: 11.1 G/DL (ref 11.5–16)
IMM GRANULOCYTES # BLD AUTO: 0 K/UL (ref 0–0.04)
IMM GRANULOCYTES NFR BLD AUTO: 0 % (ref 0–0.5)
LYMPHOCYTES # BLD: 1.3 K/UL (ref 0.8–3.5)
LYMPHOCYTES NFR BLD: 21 % (ref 12–49)
MAGNESIUM SERPL-MCNC: 1.8 MG/DL (ref 1.6–2.4)
MCH RBC QN AUTO: 28.5 PG (ref 26–34)
MCHC RBC AUTO-ENTMCNC: 31.8 G/DL (ref 30–36.5)
MCV RBC AUTO: 89.7 FL (ref 80–99)
MONOCYTES # BLD: 0.3 K/UL (ref 0–1)
MONOCYTES NFR BLD: 4 % (ref 5–13)
NEUTS SEG # BLD: 4.5 K/UL (ref 1.8–8)
NEUTS SEG NFR BLD: 73 % (ref 32–75)
NRBC # BLD: 0 K/UL (ref 0–0.01)
NRBC BLD-RTO: 0 PER 100 WBC
PLATELET # BLD AUTO: 144 K/UL (ref 150–400)
PMV BLD AUTO: 10.7 FL (ref 8.9–12.9)
POTASSIUM SERPL-SCNC: 3.3 MMOL/L (ref 3.5–5.1)
RBC # BLD AUTO: 3.89 M/UL (ref 3.8–5.2)
SARS-COV-2: NOT DETECTED
SODIUM SERPL-SCNC: 138 MMOL/L (ref 136–145)
WBC # BLD AUTO: 6.1 K/UL (ref 3.6–11)

## 2023-03-18 PROCEDURE — 74011000250 HC RX REV CODE- 250: Performed by: FAMILY MEDICINE

## 2023-03-18 PROCEDURE — 85025 COMPLETE CBC W/AUTO DIFF WBC: CPT

## 2023-03-18 PROCEDURE — 74011000250 HC RX REV CODE- 250: Performed by: STUDENT IN AN ORGANIZED HEALTH CARE EDUCATION/TRAINING PROGRAM

## 2023-03-18 PROCEDURE — 94640 AIRWAY INHALATION TREATMENT: CPT

## 2023-03-18 PROCEDURE — 65270000046 HC RM TELEMETRY

## 2023-03-18 PROCEDURE — 74011250637 HC RX REV CODE- 250/637: Performed by: FAMILY MEDICINE

## 2023-03-18 PROCEDURE — 80048 BASIC METABOLIC PNL TOTAL CA: CPT

## 2023-03-18 PROCEDURE — 74011250637 HC RX REV CODE- 250/637: Performed by: STUDENT IN AN ORGANIZED HEALTH CARE EDUCATION/TRAINING PROGRAM

## 2023-03-18 PROCEDURE — 83735 ASSAY OF MAGNESIUM: CPT

## 2023-03-18 PROCEDURE — 99232 SBSQ HOSP IP/OBS MODERATE 35: CPT | Performed by: FAMILY MEDICINE

## 2023-03-18 PROCEDURE — 36415 COLL VENOUS BLD VENIPUNCTURE: CPT

## 2023-03-18 PROCEDURE — 94761 N-INVAS EAR/PLS OXIMETRY MLT: CPT

## 2023-03-18 PROCEDURE — 74011636637 HC RX REV CODE- 636/637: Performed by: STUDENT IN AN ORGANIZED HEALTH CARE EDUCATION/TRAINING PROGRAM

## 2023-03-18 PROCEDURE — 74011250637 HC RX REV CODE- 250/637: Performed by: INTERNAL MEDICINE

## 2023-03-18 RX ORDER — BUDESONIDE 0.25 MG/2ML
250 INHALANT ORAL
Status: DISCONTINUED | OUTPATIENT
Start: 2023-03-18 | End: 2023-03-21 | Stop reason: HOSPADM

## 2023-03-18 RX ORDER — BALSAM PERU/CASTOR OIL
OINTMENT (GRAM) TOPICAL 2 TIMES DAILY
Status: DISCONTINUED | OUTPATIENT
Start: 2023-03-18 | End: 2023-03-21 | Stop reason: HOSPADM

## 2023-03-18 RX ORDER — ACETAMINOPHEN 650 MG/1
650 SUPPOSITORY RECTAL
Status: DISCONTINUED | OUTPATIENT
Start: 2023-03-18 | End: 2023-03-21 | Stop reason: HOSPADM

## 2023-03-18 RX ORDER — ALBUTEROL SULFATE 0.83 MG/ML
2.5 SOLUTION RESPIRATORY (INHALATION)
Status: DISCONTINUED | OUTPATIENT
Start: 2023-03-18 | End: 2023-03-21 | Stop reason: HOSPADM

## 2023-03-18 RX ORDER — ACETAMINOPHEN 325 MG/1
650 TABLET ORAL
Status: DISCONTINUED | OUTPATIENT
Start: 2023-03-18 | End: 2023-03-21 | Stop reason: HOSPADM

## 2023-03-18 RX ORDER — METOPROLOL SUCCINATE 25 MG/1
25 TABLET, EXTENDED RELEASE ORAL DAILY
Status: DISCONTINUED | OUTPATIENT
Start: 2023-03-18 | End: 2023-03-21 | Stop reason: HOSPADM

## 2023-03-18 RX ORDER — IPRATROPIUM BROMIDE AND ALBUTEROL SULFATE 2.5; .5 MG/3ML; MG/3ML
3 SOLUTION RESPIRATORY (INHALATION)
Status: DISCONTINUED | OUTPATIENT
Start: 2023-03-18 | End: 2023-03-21 | Stop reason: HOSPADM

## 2023-03-18 RX ORDER — ONDANSETRON 4 MG/1
4 TABLET, ORALLY DISINTEGRATING ORAL
Status: DISCONTINUED | OUTPATIENT
Start: 2023-03-18 | End: 2023-03-21 | Stop reason: HOSPADM

## 2023-03-18 RX ORDER — ONDANSETRON 2 MG/ML
4 INJECTION INTRAMUSCULAR; INTRAVENOUS
Status: DISCONTINUED | OUTPATIENT
Start: 2023-03-18 | End: 2023-03-21 | Stop reason: HOSPADM

## 2023-03-18 RX ORDER — ALBUTEROL SULFATE 90 UG/1
2 AEROSOL, METERED RESPIRATORY (INHALATION)
Status: DISCONTINUED | OUTPATIENT
Start: 2023-03-18 | End: 2023-03-18

## 2023-03-18 RX ORDER — LEVOTHYROXINE SODIUM 100 UG/1
100 TABLET ORAL
Status: DISCONTINUED | OUTPATIENT
Start: 2023-03-18 | End: 2023-03-21 | Stop reason: HOSPADM

## 2023-03-18 RX ORDER — SODIUM CHLORIDE 0.9 % (FLUSH) 0.9 %
5-40 SYRINGE (ML) INJECTION AS NEEDED
Status: DISCONTINUED | OUTPATIENT
Start: 2023-03-18 | End: 2023-03-21 | Stop reason: HOSPADM

## 2023-03-18 RX ORDER — DABIGATRAN ETEXILATE 75 MG/1
75 CAPSULE ORAL 2 TIMES DAILY
Status: DISCONTINUED | OUTPATIENT
Start: 2023-03-18 | End: 2023-03-21 | Stop reason: HOSPADM

## 2023-03-18 RX ORDER — POTASSIUM CHLORIDE 750 MG/1
10 TABLET, FILM COATED, EXTENDED RELEASE ORAL 2 TIMES DAILY
Status: DISCONTINUED | OUTPATIENT
Start: 2023-03-18 | End: 2023-03-21 | Stop reason: HOSPADM

## 2023-03-18 RX ORDER — PREDNISONE 20 MG/1
40 TABLET ORAL
Status: DISCONTINUED | OUTPATIENT
Start: 2023-03-18 | End: 2023-03-21 | Stop reason: HOSPADM

## 2023-03-18 RX ORDER — PANTOPRAZOLE SODIUM 40 MG/1
40 TABLET, DELAYED RELEASE ORAL
Status: DISCONTINUED | OUTPATIENT
Start: 2023-03-18 | End: 2023-03-21 | Stop reason: HOSPADM

## 2023-03-18 RX ORDER — ARFORMOTEROL TARTRATE 15 UG/2ML
15 SOLUTION RESPIRATORY (INHALATION)
Status: DISCONTINUED | OUTPATIENT
Start: 2023-03-18 | End: 2023-03-21 | Stop reason: HOSPADM

## 2023-03-18 RX ORDER — TRAZODONE HYDROCHLORIDE 100 MG/1
300 TABLET ORAL
Status: DISCONTINUED | OUTPATIENT
Start: 2023-03-18 | End: 2023-03-21 | Stop reason: HOSPADM

## 2023-03-18 RX ORDER — SODIUM CHLORIDE 0.9 % (FLUSH) 0.9 %
5-40 SYRINGE (ML) INJECTION EVERY 8 HOURS
Status: DISCONTINUED | OUTPATIENT
Start: 2023-03-18 | End: 2023-03-21 | Stop reason: HOSPADM

## 2023-03-18 RX ORDER — FUROSEMIDE 40 MG/1
40 TABLET ORAL DAILY
Status: DISCONTINUED | OUTPATIENT
Start: 2023-03-18 | End: 2023-03-21 | Stop reason: HOSPADM

## 2023-03-18 RX ORDER — POLYETHYLENE GLYCOL 3350 17 G/17G
17 POWDER, FOR SOLUTION ORAL DAILY PRN
Status: DISCONTINUED | OUTPATIENT
Start: 2023-03-18 | End: 2023-03-21 | Stop reason: HOSPADM

## 2023-03-18 RX ADMIN — TRAZODONE HYDROCHLORIDE 300 MG: 100 TABLET ORAL at 21:48

## 2023-03-18 RX ADMIN — ALBUTEROL SULFATE 2.5 MG: 2.5 SOLUTION RESPIRATORY (INHALATION) at 03:41

## 2023-03-18 RX ADMIN — ALBUTEROL SULFATE 2.5 MG: 2.5 SOLUTION RESPIRATORY (INHALATION) at 09:11

## 2023-03-18 RX ADMIN — ALBUTEROL SULFATE 2.5 MG: 2.5 SOLUTION RESPIRATORY (INHALATION) at 20:05

## 2023-03-18 RX ADMIN — ARFORMOTEROL TARTRATE 15 MCG: 15 SOLUTION RESPIRATORY (INHALATION) at 20:10

## 2023-03-18 RX ADMIN — ALBUTEROL SULFATE 2.5 MG: 2.5 SOLUTION RESPIRATORY (INHALATION) at 13:31

## 2023-03-18 RX ADMIN — BUDESONIDE 250 MCG: 0.25 INHALANT RESPIRATORY (INHALATION) at 09:11

## 2023-03-18 RX ADMIN — FUROSEMIDE 40 MG: 40 TABLET ORAL at 09:37

## 2023-03-18 RX ADMIN — ACETAMINOPHEN 650 MG: 325 TABLET ORAL at 09:40

## 2023-03-18 RX ADMIN — POTASSIUM CHLORIDE 10 MEQ: 750 TABLET, FILM COATED, EXTENDED RELEASE ORAL at 18:21

## 2023-03-18 RX ADMIN — CASTOR OIL AND BALSAM, PERU: 788; 87 OINTMENT TOPICAL at 09:41

## 2023-03-18 RX ADMIN — PANTOPRAZOLE SODIUM 40 MG: 40 TABLET, DELAYED RELEASE ORAL at 09:37

## 2023-03-18 RX ADMIN — SODIUM CHLORIDE, PRESERVATIVE FREE 10 ML: 5 INJECTION INTRAVENOUS at 06:55

## 2023-03-18 RX ADMIN — POTASSIUM CHLORIDE 10 MEQ: 750 TABLET, FILM COATED, EXTENDED RELEASE ORAL at 09:36

## 2023-03-18 RX ADMIN — ARFORMOTEROL TARTRATE 15 MCG: 15 SOLUTION RESPIRATORY (INHALATION) at 09:11

## 2023-03-18 RX ADMIN — BUDESONIDE 250 MCG: 0.25 INHALANT RESPIRATORY (INHALATION) at 20:10

## 2023-03-18 RX ADMIN — METOPROLOL SUCCINATE 25 MG: 50 TABLET, EXTENDED RELEASE ORAL at 02:53

## 2023-03-18 RX ADMIN — DABIGATRAN ETEXILATE MESYLATE 75 MG: 75 CAPSULE ORAL at 09:43

## 2023-03-18 RX ADMIN — LEVOTHYROXINE SODIUM 100 MCG: 0.1 TABLET ORAL at 06:55

## 2023-03-18 RX ADMIN — SODIUM CHLORIDE, PRESERVATIVE FREE 10 ML: 5 INJECTION INTRAVENOUS at 18:21

## 2023-03-18 RX ADMIN — PREDNISONE 40 MG: 20 TABLET ORAL at 09:36

## 2023-03-18 RX ADMIN — CASTOR OIL AND BALSAM, PERU: 788; 87 OINTMENT TOPICAL at 21:50

## 2023-03-18 NOTE — ED NOTES
eTRANSFER SBAR NOTE    IP UNIT CALLED NOTE IS READY: Yes Spoke to [facility]     IF there are questions Call transferring nurse Lucia Cedillo RN at phone # [phone]    SITUATION/BACKGROUND:    Patient is being transferred to [address], Room# [phone]    Patient's Chief Complaint on arrival to ED was SOB and is admitted for Acute on chronic heart failure and Chronic a fib with rvr. CODE STATUS: DNR    VITAL SIGNS (MUST BE WITHIN 1 HOUR OF TRANSFER TO IP UNIT:    TEMP: 98.2  PULSE: 100  RESP: 18  BP: 122/67  PAIN SCORE: 0  MEWS:     ISOLATION/PRECAUTIONS: No   ISOLATION TYPE:     Called outstanding consults: No     Are there sign and held orders that need to be released? No   Are all STAT orders completed: Yes    STAT labs collected: Yes  REPEAT LACTIC ACID DUE? No  TIME DUE:   Critical Labs Results? No  What? Are there any titrating drips? No   If so what? The following personal items will be sent with the patient during transfer to the floor: All valuables:   ITEM:    ITEM: Visual Aid: Glasses  ITEM:           ASSESSMENT:    CIWA Assessment: No  Last Score:     NEURO:   NIH SCORE:        BRADLY SCREENING:          NEURO ASSESSMENT:        Is patient impulsive? No   Is patient oriented? Yes   Do they follow commands? Yes  Is the patient ambulatory? Yes Device need 1 person assist     FALL RISK? Yes   Is the Stir[place] 1 Assessment completed? Yes  INTERVENTIONS: Moderate Fall Risk    INTEGUMENTARY:   IS THE PATIENT UNDRESSED? Yes  WOUNDS PRESENT? No  On admit to ED No   ARE THE WOUNDS DOCUMENTED? No     RESPIRATORY:   Is patient on Oxygen? Yes   OXYGEN: Oxygen Therapy  O2 Device: Nasal cannula (03/18/23 0222)  O2 Flow Rate (L/min): 2 l/min (03/18/23 0222)  IS patient on VENT? No      CARDIAC:   Is cardiac monitoring ordered? Yes Last Rhythm: A fib  Patient to transfer with tele box on? Yes  Is patient using a LIFE VEST? No     LINE ACCESS:   Peripheral IV 03/17/23 Left; Outer Antecubital (Active)        /GI:   CONTINENT BOWEL/BLADDER? Yes  URINARY OUTPUT: voiding   Written Order for [place] Cath? No   CHRONIC OR ACUTE? If CHRONIC, is it 1days old, was it changed prior to specimen collection? No   WAS UA WITH REFLEX SENT TO LAB? No  IF NO,  COLLECT AND SEND PRIOR TO TRANSPORT TO INPATIENT AREA    RESTRAINTS IN USE: No      IS DOCUMENTATION COMPLETE: No   Is there a current Order? No  When does it ?      Additional details as Needed:

## 2023-03-18 NOTE — RT PROTOCOL NOTE
ADULT PROTOCOL: JET AEROSOL ASSESSMENT    Patient  Jorge Camilo     80 y.o.   female     3/18/2023  5:45 PM    Breath Sounds Pre Procedure: Right Breath Sounds: Expiratory wheezing, Crackles                               Left Breath Sounds: Crackles    Breath Sounds Post Procedure:                                        Breathing pattern: Pre procedure Breathing Pattern: Regular          Post procedure Breathing Pattern: Regular    Heart Rate: Pre procedure Pulse: 89           Post procedure Pulse: 72    Resp Rate: Pre procedure Respirations: 18           Post procedure Respirations: 18    Cough: Pre procedure Cough: Non-productive               Post procedure Cough: Non-productive    Oxygen: O2 Device: None (Room air)   O2 Flow Rate (L/min): 2 l/min         SpO2: Pre procedure SpO2: 92 %   with oxygen              Post procedure SpO2: 95 %  with oxygen    Nebulizer Therapy: Current medications Aerosolized Medications: Albuterol      Changed: NO    Smoking History:   Social History     Tobacco Use   Smoking Status Never   Smokeless Tobacco Never       Problem List:   Patient Active Problem List   Diagnosis Code    Rhabdomyolysis M62.82    Persistent atrial fibrillation (HCC) I48.19    HTN (hypertension) I10    Encephalopathy acute G93.40    Insomnia G47.00    Hypokalemia E87.6    Hypothyroidism E03.9    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    Hyperkalemia E87.5    GI bleeding K92.2    HILTON (dyspnea on exertion) R06.09    Bilateral leg edema R60.0    HX: long term anticoagulant use--Pradaxa stopped 5/17 for chronic atrial fib Z92.29    Cardiomyopathy, secondary ([facility]) I42.9    GERD with esophagitis K21.00    Dyslipidemia (high LDL; low HDL) E78.5    CKD (chronic kidney disease) stage 3, GFR 30-59 ml/min ([facility]) N18.30    Chest pain with moderate risk for cardiac etiology R07.9    Irregular heart beat I49.9    Acute hypoxemic respiratory failure ([facility]) J96.01    Pneumonia J18.9    CHF (congestive heart failure) ([facility]) I50.9    Chronic atrial fibrillation (HCC) I48.20    Dehydration E86.0    Hypotension I95.9    Nonrheumatic aortic valve insufficiency I35.1    Nonrheumatic mitral valve regurgitation I34.0    Nonrheumatic aortic valve stenosis I35.0    Chronic renal disease, stage III N18.30    CHF exacerbation (HCC) I50.9       Respiratory Therapist: Radha Stallworth, RT

## 2023-03-18 NOTE — PROGRESS NOTES
Bedside and Verbal shift change report given to Codey Garcia RN (oncoming nurse) by Anderson Prado RN (offgoing nurse). Report included the following information SBAR, Kardex, Intake/Output, MAR, and Recent Results. Pt did well throughout the shift and remained stable. Family stayed at bedside. No concerns to report.         Dennis Mcdaniel

## 2023-03-18 NOTE — H&P
This note is compiled to communicate with the medical care team. It may contain sensitive and protected information. It is not intended to serve as a source of communication with patients/families/friends; that communication occurs at the bedside or via alternative direct communications means (secure messaging, letters, video/telephone, etc.). Hospitalist Admission Note    NAME: Mejia Long   :  1930   MRN:  [id number]     Date/Time:  3/18/2023 2:43 AM    Patient PCP: Júnior Tyler MD  ______________________________________________________________________  Given the patient's current clinical presentation, I have a high level of concern for decompensation if discharged from the emergency department. Complex decision making was performed, which includes reviewing the patient's available past medical records, laboratory results, and x-ray films. My assessment of this patient's clinical condition and my plan of care is as follows. Subjective:   CHIEF COMPLAINT: Shortness of breath    ED TRIAGE SUMMARY:  Chief Complaint   Patient presents with    Shortness of Breath     Patient ambulatory into ED c/o sob, dry cough, body aches, and headache x 1 week. Covid negative yesterday at urgent care. HISTORY OF PRESENT ILLNESS:     Frank Flores is a 80 y.o.  female with pertinent past medical history of chronic ILD, chronic atrial fibrillation on Pradaxa, and CHF who presents with progressive shortness of breath over the past week most notable on exertion with baseline chronic dry cough. She denies chest pain or other associated symptoms. She denies fever/chills, myalgias, or generalized malaise. She reports no recent viral/URI symptoms. ROS otherwise negative. In the ED, patient afebrile and hemodynamically stable (tachycardic on exertion to the 120s with A-fib RVR), saturating mid 90s on 2 L/min via nasal cannula.   ECG demonstrates A-fib with RVR, CXR demonstrates bilateral interstitial markings without focal airspace disease. Labs demonstrate: High sensitive troponin 14, proBNP 1195 (similar to priors), WBC 6.1, hemoglobin 10.3, platelets 857, sodium 137, potassium 3.5, BUN 11, creatinine 0.96. Patient given albuterol, diltiazem, Lasix, and Solu-Medrol by ED provider. We were asked to admit for work up and evaluation of the above problems. Past Medical History:   Diagnosis Date    Arrhythmia     atrial fibrillation, chronic. Stress test 2011 essent. normal.  Mild-mod AR on ECHO    Atrial fibrillation (HCC)     Atrial flutter with rapid ventricular response (HCC) 11/21/2019    Chronic atrial fibrillation (HCC) 11/21/2019    Chronic kidney disease     Congestive heart failure (HCC)     Dyspnea     FEV1 1.33 (98% predicted), FVC 1.59 (89% predicted)    GI bleeding     Hyperlipidemia     Hypertension     Long term current use of anticoagulant therapy     Murmur     Refusal of blood transfusions as patient is Zoroast[employer]ism     Thyroid disease     hypothyroid    Valvular heart disease         Past Surgical History:   Procedure Laterality Date    COLONOSCOPY N/A 5/2/2017    COLONOSCOPY performed by Laurent Monaco MD at [facility] ENDOSCOPY    ECHO TRANSESOPHAGEAL (DENISE) W OR WO CONTR  4/27/2011         HX GYN      vaginal deliveries x10    HX TUBAL LIGATION      CO CARDIOVERSION ELECTIVE ARRHYTHMIA EXTERNAL  6/14/2011            Social History     Tobacco Use    Smoking status: Never    Smokeless tobacco: Never   Substance Use Topics    Alcohol use: No        Family History   Problem Relation Age of Onset    Heart Disease Father     Cancer Son     Heart Disease Son        Allergies   Allergen Reactions    Seroquel [Quetiapine] Other (comments)     PSYCOLOGICAL REACTION Pt didn't feel like them selves but felt like a different person, iT DEEPLY DISTURBED PT.  PT BECAME IRRITABLE.     Ambien [Zolpidem] Other (comments)     Couldn't move        Prior to Admission medications Medication Sig Start Date End Date Taking? Authorizing Provider   pantoprazole (PROTONIX) 40 mg tablet TAKE ONE TABLET BY MOUTH EVERY DAY 2/20/23   Renee Adkins MD   traZODone (DESYREL) 300 mg tablet TAKE ONE TABLET BY MOUTH AT BEDTIME 2/20/23   Renee Adkins MD   loratadine (Claritin) 10 mg tablet Take 1 Tablet by mouth daily as needed for Allergies. 2/7/23   Renee Adkins MD   metoprolol succinate (TOPROL-XL) 25 mg XL tablet TAKE ONE TABLET BY MOUTH EVERY DAY FOR THE HEART 1/21/23   Renee Adkins MD   furosemide (LASIX) 20 mg tablet TAKE 1 TO 2 TABLETS BY MOUTH DAILY AS NEEDED FOR FLUID 1/5/23   Renee Adkins MD   potassium chloride (K-DUR, KLOR-CON M20) 20 mEq tablet Take 1 Tablet by mouth two (2) times a day. 10/17/22   Renee Adkins MD   triamcinolone acetonide (KENALOG) 0.1 % topical cream Apply  to affected area two (2) times a day. use thin layer 10/5/22   Renee Adkins MD   levothyroxine (SYNTHROID) 100 mcg tablet One po daily for thyroid 10/5/22   Renee Adkins MD   magnesium oxide (MAG-OX) 400 mg tablet Take 1 Tab by mouth daily. For leg cramps 10/5/22   Renee Adkins MD   albuterol (PROVENTIL HFA, VENTOLIN HFA, PROAIR HFA) 90 mcg/actuation inhaler Take 2 Puffs by inhalation every four (4) hours as needed for Wheezing. 5/31/22   Renee Adkins MD   dabigatran etexilate (Pradaxa) 75 mg capsule TAKE ONE CAPSULE BY MOUTH EVERY 12 HOURS 5/31/22   Renee Adkins MD   fluticasone propion-salmeteroL (Wixela Inhub) 100-50 mcg/dose diskus inhaler Take 1 Puff by inhalation two (2) times a day. for breathing 4/18/22   Renee Adkins MD   fluticasone propionate (FLONASE) 50 mcg/actuation nasal spray USE 1-2 SPRAYS IN EACH NOSTRIL ONCE DAILY. 11/17/21   Renee Adkins MD   acetaminophen (TYLENOL) 650 mg TbER Take 650 mg by mouth every eight (8) hours. Indications: headache    Provider, Historical   CALCIUM 600 + D tablet TAKE 1 TABLET TWICE A DAY.  (CALCIUM SUPPLEMENT) 10/2/17   Renee Adkins MD       REVIEW OF SYSTEMS:       Comprehensive review of systems obtained with pertinent positives and negatives as documented in HPI. Objective:   VITALS:    Visit Vitals  /62   Pulse (!) 104   Temp 98.4 °F (36.9 °C)   Resp 20   SpO2 94%       PHYSICAL EXAM:    General:   Alert, cooperative, elderly frail-appearing  female in no apparent distress        HEENT:  Atraumatic, anicteric sclerae, pink conjunctivae, mucosa moist, dentition fair     Neck:  Supple, symmetrical, trachea midline     Lungs:   Scattered wheezes with rhonchorous cough and basilar crackles. Symmetric expansion. Good aeration. Normal respiratory effort. Chest wall:  No tenderness. No Accessory muscle use. Cardiovascular:   Irregularly irregular tachycardic rate and rhythm, No murmur/rub/gallop. Mild JVD. Radial/AT/DP pulse 2+     GI/:   Soft, non-tender. Not distended. Bowel sounds normal. No HSM     Extremities Trace pitting edema BLE. No cyanosis. Skin: No significant lesions noted. Not Jaundiced. No rashes      Neurologic: PERRL. EOMI. No facial asymmetry. No aphasia or slurred speech. Moves all extremities. No overt focal deficits appreciated     Psych:  Alert and oriented X 4. Normal affect. Good insight     Other:   N/A         LAB DATA REVIEWED:    Recent Results (from the past 24 hour(s))   EKG, 12 LEAD, INITIAL    Collection Time: 03/17/23  4:00 PM   Result Value Ref Range    Ventricular Rate 105 BPM    Atrial Rate 241 BPM    QRS Duration 126 ms    Q-T Interval 352 ms    QTC Calculation (Bezet) 465 ms    Calculated R Axis -48 degrees    Calculated T Axis 101 degrees    Diagnosis       Atrial fibrillation with rapid ventricular response  Left axis deviation  Nonspecific intraventricular block  Cannot rule out Anterior infarct (cited on or before 17-MAR-2023)  T wave abnormality, consider lateral ischemia  When compared with ECG of 13-APR-2022 10:44,  Vent.  rate has increased BY  42 BPM  Nonspecific intraventricular block has replaced Right bundle branch block  Questionable change in initial forces of Anterior leads     CBC WITH AUTOMATED DIFF    Collection Time: 03/17/23  4:16 PM   Result Value Ref Range    WBC 6.1 3.6 - 11.0 K/uL    RBC 3.58 (L) 3.80 - 5.20 M/uL    HGB 10.3 (L) 11.5 - 16.0 g/dL    HCT 32.7 (L) 35.0 - 47.0 %    MCV 91.3 80.0 - 99.0 FL    MCH 28.8 26.0 - 34.0 PG    MCHC 31.5 30.0 - 36.5 g/dL    RDW 16.4 (H) 11.5 - 14.5 %    PLATELET 529 (L) 235 - 400 K/uL    MPV 10.3 8.9 - 12.9 FL    NRBC 0.0 0  WBC    ABSOLUTE NRBC 0.00 0.00 - 0.01 K/uL    NEUTROPHILS 49 32 - 75 %    LYMPHOCYTES 36 12 - 49 %    MONOCYTES 10 5 - 13 %    EOSINOPHILS 4 0 - 7 %    BASOPHILS 1 0 - 1 %    IMMATURE GRANULOCYTES 0 0.0 - 0.5 %    ABS. NEUTROPHILS 3.0 1.8 - 8.0 K/UL    ABS. LYMPHOCYTES 2.2 0.8 - 3.5 K/UL    ABS. MONOCYTES 0.6 0.0 - 1.0 K/UL    ABS. EOSINOPHILS 0.2 0.0 - 0.4 K/UL    ABS. BASOPHILS 0.0 0.0 - 0.1 K/UL    ABS. IMM. GRANS. 0.0 0.00 - 0.04 K/UL    DF AUTOMATED     METABOLIC PANEL, COMPREHENSIVE    Collection Time: 03/17/23  4:16 PM   Result Value Ref Range    Sodium 137 136 - 145 mmol/L    Potassium 3.5 3.5 - 5.1 mmol/L    Chloride 105 97 - 108 mmol/L    CO2 27 21 - 32 mmol/L    Anion gap 5 5 - 15 mmol/L    Glucose 128 (H) 65 - 100 mg/dL    BUN 11 6 - 20 MG/DL    Creatinine 0.96 0.55 - 1.02 MG/DL    BUN/Creatinine ratio 11 (L) 12 - 20      eGFR 56 (L) >60 ml/min/1.73m2    Calcium 8.4 (L) 8.5 - 10.1 MG/DL    Bilirubin, total 0.3 0.2 - 1.0 MG/DL    ALT (SGPT) 12 12 - 78 U/L    AST (SGOT) 18 15 - 37 U/L    Alk.  phosphatase 63 45 - 117 U/L    Protein, total 7.2 6.4 - 8.2 g/dL    Albumin 2.8 (L) 3.5 - 5.0 g/dL    Globulin 4.4 (H) 2.0 - 4.0 g/dL    A-G Ratio 0.6 (L) 1.1 - 2.2     TROPONIN-HIGH SENSITIVITY    Collection Time: 03/17/23  4:16 PM   Result Value Ref Range    Troponin-High Sensitivity 14 0 - 51 ng/L   NT-PRO BNP    Collection Time: 03/17/23  4:16 PM   Result Value Ref Range    NT pro-BNP 1,195 (H) <450 PG/ML       IMAGING:  CXR Results  (Last 48 hours)                 03/17/23 1647  XR CHEST PA LAT Final result    Impression:  Bilateral increased interstitial markings. No focal airspace   process. Narrative: Indication:  Shortness of Breath        Exam: PA and lateral views of the chest.       Direct comparison is made to prior CXR dated September 2022. Findings: Cardiomediastinal silhouette is within normal limits. There are   bilateral increased interstitial markings, not significantly changed. There is   no focal airspace process. There is no pleural fluid. There is no pneumothorax.                    CT Results  (Last 48 hours)      None          EKG (my interpretation): Atrial fibrillation with rapid ventricular response no definitive ischemic change, rate 105, QTc 465  Assessment / Plan:  Acute hypoxemic respiratory failure  Exacerbation of chronic interstitial lung disease  Superimposed reactive airway disease  Superimposed mild CHF  Atrial fibrillation with rapid ventricular response on Pradaxa  GERD  Hypothyroidism    PLAN:    Acute hypoxemic respiratory failure  Exacerbation of chronic interstitial lung disease  Superimposed reactive airway disease  Superimposed mild CHF  Admit to telemetry monitoring  Continuous pulse oximetry and supplemental oxygen as needed to maintain saturations greater than 90%  Prednisone 40 mg x 5-day course  Agree with Lasix administered in ED, will repeat BMP prior to reinitiation to ensure renal tolerance  Pulmonary toilet  Formulary substitution arformoterol budesonide nebulizers  DuoNebs every 6 hours scheduled albuterol inhaler every 6 hours scheduled x4 doses  Every 4 hours as needed DuoNebs    Atrial fibrillation with rapid ventricular response on Pradaxa  Continue PTA Pradaxa  Continue PTA metoprolol succinate-administer dose now, can increase metoprolol if needed for rate control    GERD  Continue PTA Protonix    Hypothyroidism  Continue PTA Synthroid    Case discussed directly with ED provider. After discussion I am in agreement that acuity of patient's medical condition necessitates hospital stay. Code Status: DNR-documentation on file  DVT Prophylaxis: Pradaxa  GI Prophylaxis: Protonix  Diet: Cardiac       Care Plan discussed with:    Comments   Patient x    Family      RN     Care Manager                    Consultant:      _______________________________________________________________________    Expected  Disposition:   Home with Family    HH/PT/OT/RN    SNF/LTC    [facility]    ________________________________________________________________________    MEDICAL COMPLEXITY: high  TOBACCO CESSATION COUNSELING: NO        Comments    x Reviewed previous records   >50% of visit spent in counseling and coordination of care x Discussion with patient and/or family and questions answered       ________________________________________________________________________  Signed: Craig Turpin DO  3/18/2023 2:43 AM    Please note that this documentation was completed in part with Dragon dictation software. Occasionally unanticipated grammatical, syntax, homophones, and other interpretive errors are inadvertently transcribed by the computer software. Please excuse and disregard any errors that have escaped final proofreading. If in doubt, please do not hesitate to reach out to myself or the assigned hospitalist via [facility] paging system for clarification.

## 2023-03-18 NOTE — PROGRESS NOTES
General Daily Progress Note    Admit Date: 3/17/2023  Hospital day 2    Subjective:     Patient has no complaint of chest pain or edema. Has had 2 week hx of progrssive cough and dyspnea. Ct chests are suggestive of ILD. Bnp elevation suggests chf. Has home oxygen, but doesn't always use it. ..   Medication side effects: none    Current Facility-Administered Medications   Medication Dose Route Frequency    albuterol-ipratropium (DUO-NEB) 2.5 MG-0.5 MG/3 ML  3 mL Nebulization Q4H PRN    dabigatran etexilate (PRADAXA) capsule 75 mg  75 mg Oral BID    arformoteroL (BROVANA) neb solution 15 mcg  15 mcg Nebulization BID RT    And    budesonide (PULMICORT) 250 mcg/2ml nebulizer susp  250 mcg Nebulization BID RT    levothyroxine (SYNTHROID) tablet 100 mcg  100 mcg Oral 6am    metoprolol succinate (TOPROL-XL) XL tablet 25 mg  25 mg Oral DAILY    pantoprazole (PROTONIX) tablet 40 mg  40 mg Oral ACB    sodium chloride (NS) flush 5-40 mL  5-40 mL IntraVENous Q8H    sodium chloride (NS) flush 5-40 mL  5-40 mL IntraVENous PRN    acetaminophen (TYLENOL) tablet 650 mg  650 mg Oral Q6H PRN    Or    acetaminophen (TYLENOL) suppository 650 mg  650 mg Rectal Q6H PRN    polyethylene glycol (MIRALAX) packet 17 g  17 g Oral DAILY PRN    ondansetron (ZOFRAN ODT) tablet 4 mg  4 mg Oral Q8H PRN    Or    ondansetron (ZOFRAN) injection 4 mg  4 mg IntraVENous Q6H PRN    predniSONE (DELTASONE) tablet 40 mg  40 mg Oral DAILY WITH BREAKFAST    albuterol (PROVENTIL VENTOLIN) nebulizer solution 2.5 mg  2.5 mg Nebulization Q6H RT    balsam peru-castor oiL (VENELEX) ointment   Topical BID        Review of Systems  Pertinent items are noted in HPI.     Objective:     Patient Vitals for the past 8 hrs:   BP Temp Pulse Resp SpO2   03/18/23 0500 -- -- -- -- 93 %   03/18/23 0424 127/70 98.1 °F (36.7 °C) 93 18 93 %   03/18/23 0344 -- -- -- -- 95 %   03/18/23 0253 138/77 -- (!) 102 -- --   03/18/23 0222 127/62 -- (!) 104 20 94 %     No intake/output data recorded. 03/16 1901 - 03/18 0700  In: 100 [P.O.:100]  Out: -     Physical Exam: Visit Vitals  /70 (BP 1 Location: Right upper arm, BP Patient Position: Supine)   Pulse 93   Temp 98.1 °F (36.7 °C)   Resp 18   SpO2 93%     Lungs: diffuse velcro rales. Heart: regular rate and rhythm, S1, S2 normal, no murmur, click, rub or gallop  Abdomen: soft, non-tender. Bowel sounds normal. No masses,  no organomegaly  Extremities: extremities normal, atraumatic, no cyanosis or edema      ECG: normal sinus rhythm     Data Review   Recent Results (from the past 24 hour(s))   EKG, 12 LEAD, INITIAL    Collection Time: 03/17/23  4:00 PM   Result Value Ref Range    Ventricular Rate 105 BPM    Atrial Rate 241 BPM    QRS Duration 126 ms    Q-T Interval 352 ms    QTC Calculation (Bezet) 465 ms    Calculated R Axis -48 degrees    Calculated T Axis 101 degrees    Diagnosis       Atrial fibrillation with rapid ventricular response  Left axis deviation  Nonspecific intraventricular block  Cannot rule out Anterior infarct (cited on or before 17-MAR-2023)  T wave abnormality, consider lateral ischemia  When compared with ECG of 13-APR-2022 10:44,  Vent. rate has increased BY  42 BPM  Nonspecific intraventricular block has replaced Right bundle branch block  Questionable change in initial forces of Anterior leads     CBC WITH AUTOMATED DIFF    Collection Time: 03/17/23  4:16 PM   Result Value Ref Range    WBC 6.1 3.6 - 11.0 K/uL    RBC 3.58 (L) 3.80 - 5.20 M/uL    HGB 10.3 (L) 11.5 - 16.0 g/dL    HCT 32.7 (L) 35.0 - 47.0 %    MCV 91.3 80.0 - 99.0 FL    MCH 28.8 26.0 - 34.0 PG    MCHC 31.5 30.0 - 36.5 g/dL    RDW 16.4 (H) 11.5 - 14.5 %    PLATELET 990 (L) 199 - 400 K/uL    MPV 10.3 8.9 - 12.9 FL    NRBC 0.0 0  WBC    ABSOLUTE NRBC 0.00 0.00 - 0.01 K/uL    NEUTROPHILS 49 32 - 75 %    LYMPHOCYTES 36 12 - 49 %    MONOCYTES 10 5 - 13 %    EOSINOPHILS 4 0 - 7 %    BASOPHILS 1 0 - 1 %    IMMATURE GRANULOCYTES 0 0.0 - 0.5 %    ABS. NEUTROPHILS 3.0 1.8 - 8.0 K/UL    ABS. LYMPHOCYTES 2.2 0.8 - 3.5 K/UL    ABS. MONOCYTES 0.6 0.0 - 1.0 K/UL    ABS. EOSINOPHILS 0.2 0.0 - 0.4 K/UL    ABS. BASOPHILS 0.0 0.0 - 0.1 K/UL    ABS. IMM. GRANS. 0.0 0.00 - 0.04 K/UL    DF AUTOMATED     METABOLIC PANEL, COMPREHENSIVE    Collection Time: 03/17/23  4:16 PM   Result Value Ref Range    Sodium 137 136 - 145 mmol/L    Potassium 3.5 3.5 - 5.1 mmol/L    Chloride 105 97 - 108 mmol/L    CO2 27 21 - 32 mmol/L    Anion gap 5 5 - 15 mmol/L    Glucose 128 (H) 65 - 100 mg/dL    BUN 11 6 - 20 MG/DL    Creatinine 0.96 0.55 - 1.02 MG/DL    BUN/Creatinine ratio 11 (L) 12 - 20      eGFR 56 (L) >60 ml/min/1.73m2    Calcium 8.4 (L) 8.5 - 10.1 MG/DL    Bilirubin, total 0.3 0.2 - 1.0 MG/DL    ALT (SGPT) 12 12 - 78 U/L    AST (SGOT) 18 15 - 37 U/L    Alk. phosphatase 63 45 - 117 U/L    Protein, total 7.2 6.4 - 8.2 g/dL    Albumin 2.8 (L) 3.5 - 5.0 g/dL    Globulin 4.4 (H) 2.0 - 4.0 g/dL    A-G Ratio 0.6 (L) 1.1 - 2.2     TROPONIN-HIGH SENSITIVITY    Collection Time: 03/17/23  4:16 PM   Result Value Ref Range    Troponin-High Sensitivity 14 0 - 51 ng/L   NT-PRO BNP    Collection Time: 03/17/23  4:16 PM   Result Value Ref Range    NT pro-BNP 1,195 (H) <450 PG/ML   CBC WITH AUTOMATED DIFF    Collection Time: 03/18/23  2:44 AM   Result Value Ref Range    WBC 6.1 3.6 - 11.0 K/uL    RBC 3.89 3.80 - 5.20 M/uL    HGB 11.1 (L) 11.5 - 16.0 g/dL    HCT 34.9 (L) 35.0 - 47.0 %    MCV 89.7 80.0 - 99.0 FL    MCH 28.5 26.0 - 34.0 PG    MCHC 31.8 30.0 - 36.5 g/dL    RDW 16.1 (H) 11.5 - 14.5 %    PLATELET 497 (L) 901 - 400 K/uL    MPV 10.7 8.9 - 12.9 FL    NRBC 0.0 0  WBC    ABSOLUTE NRBC 0.00 0.00 - 0.01 K/uL    NEUTROPHILS 73 32 - 75 %    LYMPHOCYTES 21 12 - 49 %    MONOCYTES 4 (L) 5 - 13 %    EOSINOPHILS 1 0 - 7 %    BASOPHILS 1 0 - 1 %    IMMATURE GRANULOCYTES 0 0.0 - 0.5 %    ABS. NEUTROPHILS 4.5 1.8 - 8.0 K/UL    ABS. LYMPHOCYTES 1.3 0.8 - 3.5 K/UL    ABS.  MONOCYTES 0.3 0.0 - 1.0 K/UL ABS. EOSINOPHILS 0.1 0.0 - 0.4 K/UL    ABS. BASOPHILS 0.0 0.0 - 0.1 K/UL    ABS. IMM. GRANS. 0.0 0.00 - 0.04 K/UL    DF AUTOMATED     METABOLIC PANEL, BASIC    Collection Time: 03/18/23  2:44 AM   Result Value Ref Range    Sodium 138 136 - 145 mmol/L    Potassium 3.3 (L) 3.5 - 5.1 mmol/L    Chloride 102 97 - 108 mmol/L    CO2 28 21 - 32 mmol/L    Anion gap 8 5 - 15 mmol/L    Glucose 157 (H) 65 - 100 mg/dL    BUN 12 6 - 20 MG/DL    Creatinine 1.00 0.55 - 1.02 MG/DL    BUN/Creatinine ratio 12 12 - 20      eGFR 53 (L) >60 ml/min/1.73m2    Calcium 8.7 8.5 - 10.1 MG/DL   MAGNESIUM    Collection Time: 03/18/23  2:44 AM   Result Value Ref Range    Magnesium 1.8 1.6 - 2.4 mg/dL           Assessment:     Active Problems:    CHF exacerbation (HCC) (3/18/2023)        Plan:     Shortness of breath- ILD exacerbation and/or CHF- feeling much better this am. Will check echo and spirometry.  Continue prednisone, inhalers (brovanna, pulmicort, albuterol)   Hypokalemia- replace  A fib- continue pradaxa

## 2023-03-19 LAB
ANION GAP SERPL CALC-SCNC: 6 MMOL/L (ref 5–15)
ATRIAL RATE: 241 BPM
BASOPHILS # BLD: 0 K/UL (ref 0–0.1)
BASOPHILS NFR BLD: 0 % (ref 0–1)
BUN SERPL-MCNC: 18 MG/DL (ref 6–20)
BUN/CREAT SERPL: 15 (ref 12–20)
CALCIUM SERPL-MCNC: 8.7 MG/DL (ref 8.5–10.1)
CALCULATED R AXIS, ECG10: -48 DEGREES
CALCULATED T AXIS, ECG11: 101 DEGREES
CHLORIDE SERPL-SCNC: 103 MMOL/L (ref 97–108)
CO2 SERPL-SCNC: 27 MMOL/L (ref 21–32)
CREAT SERPL-MCNC: 1.2 MG/DL (ref 0.55–1.02)
DIAGNOSIS, 93000: NORMAL
DIFFERENTIAL METHOD BLD: ABNORMAL
EOSINOPHIL # BLD: 0 K/UL (ref 0–0.4)
EOSINOPHIL NFR BLD: 0 % (ref 0–7)
ERYTHROCYTE [DISTWIDTH] IN BLOOD BY AUTOMATED COUNT: 16.1 % (ref 11.5–14.5)
GLUCOSE SERPL-MCNC: 150 MG/DL (ref 65–100)
HCT VFR BLD AUTO: 31.7 % (ref 35–47)
HGB BLD-MCNC: 9.9 G/DL (ref 11.5–16)
IMM GRANULOCYTES # BLD AUTO: 0 K/UL (ref 0–0.04)
IMM GRANULOCYTES NFR BLD AUTO: 1 % (ref 0–0.5)
LYMPHOCYTES # BLD: 1 K/UL (ref 0.8–3.5)
LYMPHOCYTES NFR BLD: 12 % (ref 12–49)
MCH RBC QN AUTO: 28.2 PG (ref 26–34)
MCHC RBC AUTO-ENTMCNC: 31.2 G/DL (ref 30–36.5)
MCV RBC AUTO: 90.3 FL (ref 80–99)
MONOCYTES # BLD: 0.6 K/UL (ref 0–1)
MONOCYTES NFR BLD: 7 % (ref 5–13)
NEUTS SEG # BLD: 6.7 K/UL (ref 1.8–8)
NEUTS SEG NFR BLD: 80 % (ref 32–75)
NRBC # BLD: 0 K/UL (ref 0–0.01)
NRBC BLD-RTO: 0 PER 100 WBC
PLATELET # BLD AUTO: 143 K/UL (ref 150–400)
PMV BLD AUTO: 10.5 FL (ref 8.9–12.9)
POTASSIUM SERPL-SCNC: 3.5 MMOL/L (ref 3.5–5.1)
Q-T INTERVAL, ECG07: 352 MS
QRS DURATION, ECG06: 126 MS
QTC CALCULATION (BEZET), ECG08: 465 MS
RBC # BLD AUTO: 3.51 M/UL (ref 3.8–5.2)
SODIUM SERPL-SCNC: 136 MMOL/L (ref 136–145)
VENTRICULAR RATE, ECG03: 105 BPM
WBC # BLD AUTO: 8.3 K/UL (ref 3.6–11)

## 2023-03-19 PROCEDURE — 36415 COLL VENOUS BLD VENIPUNCTURE: CPT

## 2023-03-19 PROCEDURE — 74011000250 HC RX REV CODE- 250: Performed by: STUDENT IN AN ORGANIZED HEALTH CARE EDUCATION/TRAINING PROGRAM

## 2023-03-19 PROCEDURE — 85025 COMPLETE CBC W/AUTO DIFF WBC: CPT

## 2023-03-19 PROCEDURE — 99232 SBSQ HOSP IP/OBS MODERATE 35: CPT | Performed by: FAMILY MEDICINE

## 2023-03-19 PROCEDURE — 74011250637 HC RX REV CODE- 250/637: Performed by: STUDENT IN AN ORGANIZED HEALTH CARE EDUCATION/TRAINING PROGRAM

## 2023-03-19 PROCEDURE — 74011636637 HC RX REV CODE- 636/637: Performed by: STUDENT IN AN ORGANIZED HEALTH CARE EDUCATION/TRAINING PROGRAM

## 2023-03-19 PROCEDURE — 94761 N-INVAS EAR/PLS OXIMETRY MLT: CPT

## 2023-03-19 PROCEDURE — 74011000250 HC RX REV CODE- 250: Performed by: FAMILY MEDICINE

## 2023-03-19 PROCEDURE — 74011250637 HC RX REV CODE- 250/637: Performed by: FAMILY MEDICINE

## 2023-03-19 PROCEDURE — 65270000046 HC RM TELEMETRY

## 2023-03-19 PROCEDURE — 80048 BASIC METABOLIC PNL TOTAL CA: CPT

## 2023-03-19 PROCEDURE — 94640 AIRWAY INHALATION TREATMENT: CPT

## 2023-03-19 RX ADMIN — BUDESONIDE 250 MCG: 0.25 INHALANT RESPIRATORY (INHALATION) at 08:30

## 2023-03-19 RX ADMIN — PREDNISONE 40 MG: 20 TABLET ORAL at 08:36

## 2023-03-19 RX ADMIN — CASTOR OIL AND BALSAM, PERU: 788; 87 OINTMENT TOPICAL at 17:47

## 2023-03-19 RX ADMIN — BUDESONIDE 250 MCG: 0.25 INHALANT RESPIRATORY (INHALATION) at 19:41

## 2023-03-19 RX ADMIN — ALBUTEROL SULFATE 2.5 MG: 2.5 SOLUTION RESPIRATORY (INHALATION) at 08:29

## 2023-03-19 RX ADMIN — SODIUM CHLORIDE, PRESERVATIVE FREE 10 ML: 5 INJECTION INTRAVENOUS at 05:47

## 2023-03-19 RX ADMIN — CASTOR OIL AND BALSAM, PERU: 788; 87 OINTMENT TOPICAL at 20:58

## 2023-03-19 RX ADMIN — POTASSIUM CHLORIDE 10 MEQ: 750 TABLET, FILM COATED, EXTENDED RELEASE ORAL at 17:31

## 2023-03-19 RX ADMIN — SODIUM CHLORIDE, PRESERVATIVE FREE 10 ML: 5 INJECTION INTRAVENOUS at 13:39

## 2023-03-19 RX ADMIN — ACETAMINOPHEN 650 MG: 325 TABLET ORAL at 17:31

## 2023-03-19 RX ADMIN — METOPROLOL SUCCINATE 25 MG: 50 TABLET, EXTENDED RELEASE ORAL at 08:36

## 2023-03-19 RX ADMIN — DABIGATRAN ETEXILATE MESYLATE 75 MG: 75 CAPSULE ORAL at 08:44

## 2023-03-19 RX ADMIN — ARFORMOTEROL TARTRATE 15 MCG: 15 SOLUTION RESPIRATORY (INHALATION) at 08:29

## 2023-03-19 RX ADMIN — PANTOPRAZOLE SODIUM 40 MG: 40 TABLET, DELAYED RELEASE ORAL at 07:59

## 2023-03-19 RX ADMIN — SODIUM CHLORIDE, PRESERVATIVE FREE 10 ML: 5 INJECTION INTRAVENOUS at 21:03

## 2023-03-19 RX ADMIN — DABIGATRAN ETEXILATE MESYLATE 75 MG: 75 CAPSULE ORAL at 17:46

## 2023-03-19 RX ADMIN — ALBUTEROL SULFATE 2.5 MG: 2.5 SOLUTION RESPIRATORY (INHALATION) at 19:36

## 2023-03-19 RX ADMIN — ARFORMOTEROL TARTRATE 15 MCG: 15 SOLUTION RESPIRATORY (INHALATION) at 19:41

## 2023-03-19 RX ADMIN — FUROSEMIDE 40 MG: 40 TABLET ORAL at 08:36

## 2023-03-19 RX ADMIN — POTASSIUM CHLORIDE 10 MEQ: 750 TABLET, FILM COATED, EXTENDED RELEASE ORAL at 08:36

## 2023-03-19 RX ADMIN — LEVOTHYROXINE SODIUM 100 MCG: 0.1 TABLET ORAL at 05:48

## 2023-03-19 RX ADMIN — ACETAMINOPHEN 650 MG: 325 TABLET ORAL at 08:48

## 2023-03-19 RX ADMIN — TRAZODONE HYDROCHLORIDE 300 MG: 100 TABLET ORAL at 21:03

## 2023-03-19 RX ADMIN — ALBUTEROL SULFATE 2.5 MG: 2.5 SOLUTION RESPIRATORY (INHALATION) at 02:26

## 2023-03-19 RX ADMIN — SODIUM CHLORIDE, PRESERVATIVE FREE 10 ML: 5 INJECTION INTRAVENOUS at 03:55

## 2023-03-19 RX ADMIN — ALBUTEROL SULFATE 2.5 MG: 2.5 SOLUTION RESPIRATORY (INHALATION) at 14:14

## 2023-03-19 NOTE — PROGRESS NOTES
Bedside and Verbal shift change report given to [address] [place] [address] (oncoming nurse) by Jovanna Handler RN (offgoing nurse). Report included the following information SBAR, Kardex, MAR, and Cardiac Rhythm A-fib . Patient remains stable on room air.

## 2023-03-19 NOTE — RT PROTOCOL NOTE
ADULT PROTOCOL: JET AEROSOL ASSESSMENT    Patient  Jorge Camilo     80 y.o.   female     3/19/2023  2:20 PM    Breath Sounds Pre Procedure: Right Breath Sounds: Scattered wheezing                               Left Breath Sounds: Scattered wheezing    Breath Sounds Post Procedure: Right Breath Sounds: Diminished, Clear                                 Left Breath Sounds: Diminished, Clear    Breathing pattern: Pre procedure Breathing Pattern: Regular          Post procedure Breathing Pattern: Regular    Heart Rate: Pre procedure Pulse: 83           Post procedure Pulse: 96    Resp Rate: Pre procedure Respirations: 18           Post procedure Respirations: 18    Cough: Pre procedure Cough: Non-productive               Post procedure Cough: Non-productive    Oxygen: O2 Device: None (Room air)   O2 Flow Rate (L/min): 2 l/min         SpO2: Pre procedure SpO2: 96 %   without oxygen              Post procedure SpO2: 93 %  without oxygen    Nebulizer Therapy: Current medications Aerosolized Medications: Albuterol      Changed: NO    Smoking History:   Social History     Tobacco Use   Smoking Status Never   Smokeless Tobacco Never       Problem List:   Patient Active Problem List   Diagnosis Code    Rhabdomyolysis M62.82    Persistent atrial fibrillation (HCC) I48.19    HTN (hypertension) I10    Encephalopathy acute G93.40    Insomnia G47.00    Hypokalemia E87.6    Hypothyroidism E03.9    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    Hyperkalemia E87.5    GI bleeding K92.2    HILTON (dyspnea on exertion) R06.09    Bilateral leg edema R60.0    HX: long term anticoagulant use--Pradaxa stopped 5/17 for chronic atrial fib Z92.29    Cardiomyopathy, 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    GERD with esophagitis K21.00    Dyslipidemia (high LDL; low HDL) E78.5    CKD (chronic kidney disease) stage 3, GFR 30-59 ml/min (HCC) N18.30    Chest pain with moderate risk for cardiac etiology R07.9    Irregular heart beat I49.9    Acute hypoxemic respiratory failure ([facility]) J96.01    Pneumonia J18.9    CHF (congestive heart failure) ([facility]) I50.9    Chronic atrial fibrillation ([facility]) I48.20    Dehydration E86.0    Hypotension I95.9    Nonrheumatic aortic valve insufficiency I35.1    Nonrheumatic mitral valve regurgitation I34.0    Nonrheumatic aortic valve stenosis I35.0    Chronic renal disease, stage III N18.30    CHF exacerbation ([facility]) I50.9       Respiratory Therapist: Tomasa Og, RT

## 2023-03-19 NOTE — PROGRESS NOTES
End of Shift Note    Bedside shift change report given to Rustam (oncoming nurse) by Melissa Monae RN (offgoing nurse). Report included the following information SBAR, Kardex, Intake/Output, MAR, Accordion, Recent Results, and Med Rec Status    Shift worked:  7pm-7am     Shift summary and any significant changes:     Pt   received trazodone 300 mg prn for  bedtime No significant changes duration of the shift. Concerns for physician to address:  Please  order trazodone 300 mg  for bedtime.  Pt takes it at home     [employer] phone for oncoming shift:          Melissa Monae RN

## 2023-03-19 NOTE — PROGRESS NOTES
General Daily Progress Note    Admit Date: 3/17/2023  Hospital day 3    Subjective:     Patient has no complaint of shortness of breath this am. Overall feels better from admission. Much less dyspnea and cough. Some shakiness this am.   Medication side effects: none    Current Facility-Administered Medications   Medication Dose Route Frequency    albuterol-ipratropium (DUO-NEB) 2.5 MG-0.5 MG/3 ML  3 mL Nebulization Q4H PRN    dabigatran etexilate (PRADAXA) capsule 75 mg  75 mg Oral BID    arformoteroL (BROVANA) neb solution 15 mcg  15 mcg Nebulization BID RT    And    budesonide (PULMICORT) 250 mcg/2ml nebulizer susp  250 mcg Nebulization BID RT    levothyroxine (SYNTHROID) tablet 100 mcg  100 mcg Oral 6am    metoprolol succinate (TOPROL-XL) XL tablet 25 mg  25 mg Oral DAILY    pantoprazole (PROTONIX) tablet 40 mg  40 mg Oral ACB    sodium chloride (NS) flush 5-40 mL  5-40 mL IntraVENous Q8H    sodium chloride (NS) flush 5-40 mL  5-40 mL IntraVENous PRN    acetaminophen (TYLENOL) tablet 650 mg  650 mg Oral Q6H PRN    Or    acetaminophen (TYLENOL) suppository 650 mg  650 mg Rectal Q6H PRN    polyethylene glycol (MIRALAX) packet 17 g  17 g Oral DAILY PRN    ondansetron (ZOFRAN ODT) tablet 4 mg  4 mg Oral Q8H PRN    Or    ondansetron (ZOFRAN) injection 4 mg  4 mg IntraVENous Q6H PRN    predniSONE (DELTASONE) tablet 40 mg  40 mg Oral DAILY WITH BREAKFAST    albuterol (PROVENTIL VENTOLIN) nebulizer solution 2.5 mg  2.5 mg Nebulization Q6H RT    balsam peru-castor oiL (VENELEX) ointment   Topical BID    potassium chloride SR (KLOR-CON 10) tablet 10 mEq  10 mEq Oral BID    furosemide (LASIX) tablet 40 mg  40 mg Oral DAILY    traZODone (DESYREL) tablet 300 mg  300 mg Oral QHS        Review of Systems  Pertinent items are noted in HPI.     Objective:     Patient Vitals for the past 8 hrs:   BP Temp Pulse Resp SpO2   03/19/23 0830 -- -- -- -- 96 %   03/19/23 0827 134/67 97.7 °F (36.5 °C) (!) 105 15 97 %   03/19/23 0226 -- -- -- -- 95 %   03/19/23 0138 112/63 97.7 °F (36.5 °C) (!) 109 18 96 %     No intake/output data recorded. 03/17 1901 - 03/19 0700  In: 100 [P.O.:100]  Out: -     Physical Exam: Visit Vitals  /67   Pulse (!) 105   Temp 97.7 °F (36.5 °C)   Resp 15   SpO2 96%     Lungs: bilateral rales heard. Heart: regular rate and rhythm, S1, S2 normal, no murmur, click, rub or gallop  Abdomen: soft, non-tender. Bowel sounds normal. No masses,  no organomegaly  Extremities: edema , tr bilaterally      ECG: atrial fibrillation, rate 100     Data Review   Recent Results (from the past 24 hour(s))   METABOLIC PANEL, BASIC    Collection Time: 03/19/23  1:04 AM   Result Value Ref Range    Sodium 136 136 - 145 mmol/L    Potassium 3.5 3.5 - 5.1 mmol/L    Chloride 103 97 - 108 mmol/L    CO2 27 21 - 32 mmol/L    Anion gap 6 5 - 15 mmol/L    Glucose 150 (H) 65 - 100 mg/dL    BUN 18 6 - 20 MG/DL    Creatinine 1.20 (H) 0.55 - 1.02 MG/DL    BUN/Creatinine ratio 15 12 - 20      eGFR 42 (L) >60 ml/min/1.73m2    Calcium 8.7 8.5 - 10.1 MG/DL   CBC WITH AUTOMATED DIFF    Collection Time: 03/19/23  1:04 AM   Result Value Ref Range    WBC 8.3 3.6 - 11.0 K/uL    RBC 3.51 (L) 3.80 - 5.20 M/uL    HGB 9.9 (L) 11.5 - 16.0 g/dL    HCT 31.7 (L) 35.0 - 47.0 %    MCV 90.3 80.0 - 99.0 FL    MCH 28.2 26.0 - 34.0 PG    MCHC 31.2 30.0 - 36.5 g/dL    RDW 16.1 (H) 11.5 - 14.5 %    PLATELET 165 (L) 840 - 400 K/uL    MPV 10.5 8.9 - 12.9 FL    NRBC 0.0 0  WBC    ABSOLUTE NRBC 0.00 0.00 - 0.01 K/uL    NEUTROPHILS 80 (H) 32 - 75 %    LYMPHOCYTES 12 12 - 49 %    MONOCYTES 7 5 - 13 %    EOSINOPHILS 0 0 - 7 %    BASOPHILS 0 0 - 1 %    IMMATURE GRANULOCYTES 1 (H) 0.0 - 0.5 %    ABS. NEUTROPHILS 6.7 1.8 - 8.0 K/UL    ABS. LYMPHOCYTES 1.0 0.8 - 3.5 K/UL    ABS. MONOCYTES 0.6 0.0 - 1.0 K/UL    ABS. EOSINOPHILS 0.0 0.0 - 0.4 K/UL    ABS. BASOPHILS 0.0 0.0 - 0.1 K/UL    ABS. IMM.  GRANS. 0.0 0.00 - 0.04 K/UL    DF AUTOMATED             Assessment:     Active Problems:    CHF exacerbation ([facility] [address] (3/18/2023)        Plan:     Shortness of breath- ILD exacerbation and/or CHF- feeling much better this am. Will check echo and spirometry. Continue prednisone, inhalers (brovanna, pulmicort, albuterol) . Pt is also on lasix 40 mg daily. O2 sat stable on room air    Hypokalemia- replace, up to 3.5 this am  A fib- continue pradaxa. On metoprolol for rate control. Pulse around 100.

## 2023-03-19 NOTE — PROGRESS NOTES
CM attempted to speak with patient vis room phone, continues to ring. Call placed to patient's daughter Biju Files [phone] vm left / daughter Wei Felder [phone] (disc).       10:17 AM  BLANCA Mccartney

## 2023-03-20 ENCOUNTER — APPOINTMENT (OUTPATIENT)
Dept: NON INVASIVE DIAGNOSTICS | Age: 88
DRG: 196 | End: 2023-03-20
Attending: FAMILY MEDICINE
Payer: MEDICARE

## 2023-03-20 ENCOUNTER — APPOINTMENT (OUTPATIENT)
Dept: CT IMAGING | Age: 88
DRG: 196 | End: 2023-03-20
Attending: INTERNAL MEDICINE
Payer: MEDICARE

## 2023-03-20 LAB
COMMENT, HOLDF: NORMAL
ECHO AO ROOT DIAM: 3 CM
ECHO AO ROOT INDEX: 1.86 CM/M2
ECHO AR MAX VEL PISA: 3.7 M/S
ECHO AV AREA PEAK VELOCITY: 0.9 CM2
ECHO AV AREA VTI: 1 CM2
ECHO AV AREA/BSA PEAK VELOCITY: 0.6 CM2/M2
ECHO AV AREA/BSA VTI: 0.6 CM2/M2
ECHO AV MEAN GRADIENT: 17 MMHG
ECHO AV MEAN VELOCITY: 2 M/S
ECHO AV PEAK GRADIENT: 32 MMHG
ECHO AV PEAK VELOCITY: 2.8 M/S
ECHO AV REGURGITANT PHT: 529.5 MILLISECOND
ECHO AV VELOCITY RATIO: 0.29
ECHO AV VTI: 58.9 CM
ECHO LA DIAMETER INDEX: 2.67 CM/M2
ECHO LA DIAMETER: 4.3 CM
ECHO LA TO AORTIC ROOT RATIO: 1.43
ECHO LV E' LATERAL VELOCITY: 8 CM/S
ECHO LV E' SEPTAL VELOCITY: 7 CM/S
ECHO LV EDV A4C: 96 ML
ECHO LV EDV INDEX A4C: 60 ML/M2
ECHO LV EJECTION FRACTION A4C: 59 %
ECHO LV ESV A4C: 39 ML
ECHO LV ESV INDEX A4C: 24 ML/M2
ECHO LV FRACTIONAL SHORTENING: 36 % (ref 28–44)
ECHO LV INTERNAL DIMENSION DIASTOLE INDEX: 2.92 CM/M2
ECHO LV INTERNAL DIMENSION DIASTOLIC: 4.7 CM (ref 3.9–5.3)
ECHO LV INTERNAL DIMENSION SYSTOLIC INDEX: 1.86 CM/M2
ECHO LV INTERNAL DIMENSION SYSTOLIC: 3 CM
ECHO LV IVSD: 1.2 CM (ref 0.6–0.9)
ECHO LV MASS 2D: 212 G (ref 67–162)
ECHO LV MASS INDEX 2D: 131.7 G/M2 (ref 43–95)
ECHO LV POSTERIOR WALL DIASTOLIC: 1.2 CM (ref 0.6–0.9)
ECHO LV RELATIVE WALL THICKNESS RATIO: 0.51
ECHO LVOT AREA: 3.1 CM2
ECHO LVOT AV VTI INDEX: 0.33
ECHO LVOT DIAM: 2 CM
ECHO LVOT MEAN GRADIENT: 1 MMHG
ECHO LVOT PEAK GRADIENT: 3 MMHG
ECHO LVOT PEAK VELOCITY: 0.8 M/S
ECHO LVOT STROKE VOLUME INDEX: 37.4 ML/M2
ECHO LVOT SV: 60.3 ML
ECHO LVOT VTI: 19.2 CM
ECHO MV AREA VTI: 2.2 CM2
ECHO MV LVOT VTI INDEX: 1.43
ECHO MV MAX VELOCITY: 1.5 M/S
ECHO MV MEAN GRADIENT: 3 MMHG
ECHO MV MEAN VELOCITY: 0.8 M/S
ECHO MV PEAK GRADIENT: 8 MMHG
ECHO MV REGURGITANT ALIASING (NYQUIST) VELOCITY: 44 CM/S
ECHO MV REGURGITANT VELOCITY PISA: 5.4 M/S
ECHO MV REGURGITANT VTIA: 173.1 CM
ECHO MV VTI: 27.5 CM
ECHO PULMONARY ARTERY END DIASTOLIC PRESSURE: 5 MMHG
ECHO PV MAX VELOCITY: 0.8 M/S
ECHO PV MEAN GRADIENT: 1 MMHG
ECHO PV MEAN VELOCITY: 0.6 M/S
ECHO PV PEAK GRADIENT: 3 MMHG
ECHO PV REGURGITANT MAX VELOCITY: 1.1 M/S
ECHO TV REGURGITANT MAX VELOCITY: 2.8 M/S
ECHO TV REGURGITANT PEAK GRADIENT: 31 MMHG
FERRITIN SERPL-MCNC: 52 NG/ML (ref 8–252)
HEMOCCULT STL QL: POSITIVE
IRON SATN MFR SERPL: 9 % (ref 20–50)
IRON SERPL-MCNC: 26 UG/DL (ref 35–150)
RETICS # AUTO: 0.05 M/UL (ref 0.02–0.08)
RETICS/RBC NFR AUTO: 1.5 % (ref 0.7–2.1)
RHEUMATOID FACT SERPL-ACNC: <10 IU/ML
SAMPLES BEING HELD,HOLD: NORMAL
TIBC SERPL-MCNC: 289 UG/DL (ref 250–450)
VIT B12 SERPL-MCNC: 237 PG/ML (ref 193–986)

## 2023-03-20 PROCEDURE — 94640 AIRWAY INHALATION TREATMENT: CPT

## 2023-03-20 PROCEDURE — 65270000046 HC RM TELEMETRY

## 2023-03-20 PROCEDURE — 82728 ASSAY OF FERRITIN: CPT

## 2023-03-20 PROCEDURE — 97165 OT EVAL LOW COMPLEX 30 MIN: CPT

## 2023-03-20 PROCEDURE — 85045 AUTOMATED RETICULOCYTE COUNT: CPT

## 2023-03-20 PROCEDURE — 74011000250 HC RX REV CODE- 250: Performed by: FAMILY MEDICINE

## 2023-03-20 PROCEDURE — 97161 PT EVAL LOW COMPLEX 20 MIN: CPT | Performed by: PHYSICAL THERAPIST

## 2023-03-20 PROCEDURE — 74011000250 HC RX REV CODE- 250: Performed by: STUDENT IN AN ORGANIZED HEALTH CARE EDUCATION/TRAINING PROGRAM

## 2023-03-20 PROCEDURE — 82164 ANGIOTENSIN I ENZYME TEST: CPT

## 2023-03-20 PROCEDURE — 74011636637 HC RX REV CODE- 636/637: Performed by: STUDENT IN AN ORGANIZED HEALTH CARE EDUCATION/TRAINING PROGRAM

## 2023-03-20 PROCEDURE — 86431 RHEUMATOID FACTOR QUANT: CPT

## 2023-03-20 PROCEDURE — 36415 COLL VENOUS BLD VENIPUNCTURE: CPT

## 2023-03-20 PROCEDURE — 86200 CCP ANTIBODY: CPT

## 2023-03-20 PROCEDURE — 83540 ASSAY OF IRON: CPT

## 2023-03-20 PROCEDURE — 74011250637 HC RX REV CODE- 250/637: Performed by: STUDENT IN AN ORGANIZED HEALTH CARE EDUCATION/TRAINING PROGRAM

## 2023-03-20 PROCEDURE — 97116 GAIT TRAINING THERAPY: CPT | Performed by: PHYSICAL THERAPIST

## 2023-03-20 PROCEDURE — 86606 ASPERGILLUS ANTIBODY: CPT

## 2023-03-20 PROCEDURE — 99232 SBSQ HOSP IP/OBS MODERATE 35: CPT | Performed by: FAMILY MEDICINE

## 2023-03-20 PROCEDURE — 93306 TTE W/DOPPLER COMPLETE: CPT

## 2023-03-20 PROCEDURE — 86038 ANTINUCLEAR ANTIBODIES: CPT

## 2023-03-20 PROCEDURE — 97530 THERAPEUTIC ACTIVITIES: CPT | Performed by: PHYSICAL THERAPIST

## 2023-03-20 PROCEDURE — 71250 CT THORAX DX C-: CPT

## 2023-03-20 PROCEDURE — 97530 THERAPEUTIC ACTIVITIES: CPT

## 2023-03-20 PROCEDURE — 94761 N-INVAS EAR/PLS OXIMETRY MLT: CPT

## 2023-03-20 PROCEDURE — 82272 OCCULT BLD FECES 1-3 TESTS: CPT

## 2023-03-20 PROCEDURE — 82607 VITAMIN B-12: CPT

## 2023-03-20 PROCEDURE — 74011250637 HC RX REV CODE- 250/637: Performed by: FAMILY MEDICINE

## 2023-03-20 PROCEDURE — 84155 ASSAY OF PROTEIN SERUM: CPT

## 2023-03-20 RX ADMIN — FUROSEMIDE 40 MG: 40 TABLET ORAL at 09:02

## 2023-03-20 RX ADMIN — ALBUTEROL SULFATE 2.5 MG: 2.5 SOLUTION RESPIRATORY (INHALATION) at 02:12

## 2023-03-20 RX ADMIN — ARFORMOTEROL TARTRATE 15 MCG: 15 SOLUTION RESPIRATORY (INHALATION) at 09:16

## 2023-03-20 RX ADMIN — SODIUM CHLORIDE, PRESERVATIVE FREE 10 ML: 5 INJECTION INTRAVENOUS at 21:24

## 2023-03-20 RX ADMIN — DABIGATRAN ETEXILATE MESYLATE 75 MG: 75 CAPSULE ORAL at 09:02

## 2023-03-20 RX ADMIN — SODIUM CHLORIDE, PRESERVATIVE FREE 10 ML: 5 INJECTION INTRAVENOUS at 06:18

## 2023-03-20 RX ADMIN — ARFORMOTEROL TARTRATE 15 MCG: 15 SOLUTION RESPIRATORY (INHALATION) at 19:46

## 2023-03-20 RX ADMIN — ALBUTEROL SULFATE 2.5 MG: 2.5 SOLUTION RESPIRATORY (INHALATION) at 19:46

## 2023-03-20 RX ADMIN — CASTOR OIL AND BALSAM, PERU: 788; 87 OINTMENT TOPICAL at 21:00

## 2023-03-20 RX ADMIN — BUDESONIDE 250 MCG: 0.25 INHALANT RESPIRATORY (INHALATION) at 19:46

## 2023-03-20 RX ADMIN — POTASSIUM CHLORIDE 10 MEQ: 750 TABLET, FILM COATED, EXTENDED RELEASE ORAL at 17:50

## 2023-03-20 RX ADMIN — POTASSIUM CHLORIDE 10 MEQ: 750 TABLET, FILM COATED, EXTENDED RELEASE ORAL at 09:03

## 2023-03-20 RX ADMIN — TRAZODONE HYDROCHLORIDE 300 MG: 100 TABLET ORAL at 21:21

## 2023-03-20 RX ADMIN — METOPROLOL SUCCINATE 25 MG: 50 TABLET, EXTENDED RELEASE ORAL at 09:02

## 2023-03-20 RX ADMIN — ALBUTEROL SULFATE 2.5 MG: 2.5 SOLUTION RESPIRATORY (INHALATION) at 13:57

## 2023-03-20 RX ADMIN — SODIUM CHLORIDE, PRESERVATIVE FREE 10 ML: 5 INJECTION INTRAVENOUS at 13:22

## 2023-03-20 RX ADMIN — PREDNISONE 40 MG: 20 TABLET ORAL at 09:02

## 2023-03-20 RX ADMIN — LEVOTHYROXINE SODIUM 100 MCG: 0.1 TABLET ORAL at 06:18

## 2023-03-20 RX ADMIN — ACETAMINOPHEN 650 MG: 325 TABLET ORAL at 09:02

## 2023-03-20 RX ADMIN — CASTOR OIL AND BALSAM, PERU: 788; 87 OINTMENT TOPICAL at 09:08

## 2023-03-20 RX ADMIN — DABIGATRAN ETEXILATE MESYLATE 75 MG: 75 CAPSULE ORAL at 17:50

## 2023-03-20 RX ADMIN — ALBUTEROL SULFATE 2.5 MG: 2.5 SOLUTION RESPIRATORY (INHALATION) at 09:16

## 2023-03-20 RX ADMIN — PANTOPRAZOLE SODIUM 40 MG: 40 TABLET, DELAYED RELEASE ORAL at 08:53

## 2023-03-20 RX ADMIN — BUDESONIDE 250 MCG: 0.25 INHALANT RESPIRATORY (INHALATION) at 09:16

## 2023-03-20 NOTE — PROGRESS NOTES
Problem: Pressure Injury - Risk of  Goal: *Prevention of pressure injury  Description: Document Tanner Scale and appropriate interventions in the flowsheet. Outcome: Progressing Towards Goal  Note: Pressure Injury Interventions:  Sensory Interventions: Assess changes in LOC    Moisture Interventions: Absorbent underpads, Limit adult briefs    Activity Interventions: PT/OT evaluation, Increase time out of bed    Mobility Interventions: HOB 30 degrees or less, PT/OT evaluation    Nutrition Interventions: Document food/fluid/supplement intake    Friction and Shear Interventions: Apply protective barrier, creams and emollients, Minimize layers                Problem: Patient Education: Go to Patient Education Activity  Goal: Patient/Family Education  Outcome: Progressing Towards Goal     Problem: Falls - Risk of  Goal: *Absence of Falls  Description: Document Den Fall Risk and appropriate interventions in the flowsheet.   Outcome: Progressing Towards Goal  Note: Fall Risk Interventions:                                Problem: Patient Education: Go to Patient Education Activity  Goal: Patient/Family Education  Outcome: Progressing Towards Goal     Problem: Patient Education: Go to Patient Education Activity  Goal: Patient/Family Education  Outcome: Progressing Towards Goal

## 2023-03-20 NOTE — PROGRESS NOTES
End of Shift Note    Bedside shift change report given to Rustam (oncoming nurse) by Slime Bailey, RN (offgoing nurse). Report included the following information SBAR, Kardex, Intake/Output, MAR, Accordion, Recent Results, and Med Rec Status    Shift worked:  7PM-7AM     Shift summary and any significant changes:     Pt received  tradozone for  sleep and rested well.      Concerns for physician to address:         [employer] phone for oncoming shift:          Slime Bailey, RN

## 2023-03-20 NOTE — PROGRESS NOTES
Problem: Self Care Deficits Care Plan (Adult)  Goal: *Acute Goals and Plan of Care (Insert Text)  Description: FUNCTIONAL STATUS PRIOR TO ADMISSION: Patient was independent and active without use of DME.     HOME SUPPORT: The patient lived with daughter in mother-in law apartment on back of granddaughter's house but did not require assist. 24/7 supervision available at home. Occupational Therapy Goals  Initiated 3/20/2023  1. Patient will perform grooming standing at sink without LOB for 3 minutes with modified independence within 7 day(s). 2.  Patient will perform upper body dressing and lower body dressing with modified independence within 7 day(s). 3.  Patient will perform toileting with modified independence within 7 day(s). 4.  Patient will perform toilet transfers with modified independence within 7 day(s). 5.  Patient will perform all aspects of toileting with modified independence within 7 day(s). 6.  Patient will participate in upper extremity therapeutic exercise/activities with modified independence for 8 minutes within 7 day(s). 7.  Patient will utilize energy conservation techniques during functional activities with verbal and visual cues within 7 day(s). Outcome: Not Met   OCCUPATIONAL THERAPY EVALUATION  Patient: Lisa White (72 y.o. female)  Date: 3/20/2023  Primary Diagnosis: CHF exacerbation ([facility] [I50.9]       Precautions:        ASSESSMENT  Based on the objective data described below, the patient presents with overall decreased activity tolerance from baseline, and increased dyspnea on exertion today though O2 sats on RA >90%. At baseline patient is independent for mobility and transfers and has supervision for bathing but is otherwise independent with other ADLs. She has home O2 and occasionally wears 1.5L at night but had been needing it intermittently for last week on 2L and continued to be SOB. Today during activity patient on RA and stable >90%.  Patient has CHF and a-fib history and portable HR monitor reading high 80's to low 100's throughout though reading in afib. Required CGA with transfers especially brie> sits for controlled descent and safety dues. At end of session patient able to ambulate without AD to wheelchair to go to CT. Will continue to follow while inpatient to progress patient back to baseline prior to return home. Recommend HHOT vs none and return with family support at medically stable DC. Current Level of Function Impacting Discharge (ADLs/self-care): CGA tranfers, SBA/setup LB ADLs    Functional Outcome Measure: The patient scored Total: 65/100 on the Barthel Index outcome measure      Other factors to consider for discharge: PLOF     Patient will benefit from skilled therapy intervention to address the above noted impairments. PLAN :  Recommendations and Planned Interventions: self care training, functional mobility training, therapeutic exercise, balance training, therapeutic activities, endurance activities, patient education, home safety training, and family training/education    Frequency/Duration: Patient will be followed by occupational therapy 3 times a week to address goals. Recommendation for discharge: (in order for the patient to meet his/her long term goals)  To be determined: HHOT vs None    This discharge recommendation:  Has been made in collaboration with the attending provider and/or case management    IF patient discharges home will need the following DME: patient owns DME required for discharge       SUBJECTIVE:   Patient stated I have that stuff but I don't use it, I do everything for myself.  re: available DME in the home    OBJECTIVE DATA SUMMARY:   HISTORY:   Past Medical History:   Diagnosis Date    Arrhythmia     atrial fibrillation, chronic. Stress test 2011 [facility].  normal.  Mild-mod AR on ECHO    Atrial fibrillation (HCC)     Atrial flutter with rapid ventricular response ([address] 11/21/2019    Chronic atrial fibrillation ([facility] 11/21/2019    Chronic kidney disease     Congestive heart failure (HCC)     Dyspnea     FEV1 1.33 (98% predicted), FVC 1.59 (89% predicted)    GI bleeding     Hyperlipidemia     Hypertension     Long term current use of anticoagulant therapy     Murmur     Refusal of blood transfusions as patient is Mandaen     Thyroid disease     hypothyroid    Valvular heart disease      Past Surgical History:   Procedure Laterality Date    COLONOSCOPY N/A 5/2/2017    COLONOSCOPY performed by Ravin Bernstein MD at [facility]OSCOPY    ECHO TRANSESOPHAGEAL (DENISE) W OR WO CONTR  4/27/2011         HX GYN      vaginal deliveries x10    HX TUBAL LIGATION      MT CARDIOVERSION ELECTIVE ARRHYTHMIA EXTERNAL  6/14/2011            Expanded or extensive additional review of patient history:     Home Situation  Home Environment: Private residence (motherin law apartment aat back of granddaughters house)  # Steps to Enter: 0  One/Two Story Residence: One story  Living Alone: No  Support Systems: Child(ricardo), Other Family Member(s)  Patient Expects to be Discharged to[de-identified] Home  Current DME Used/Available at Home: Arnette Flaming, rollator, Walker, rolling, [employer], straight, Shower chair, Grab bars, Oxygen, portable (pulse ox)  Tub or Shower Type: Shower    Hand dominance: Right    EXAMINATION OF PERFORMANCE DEFICITS:  Cognitive/Behavioral Status:  Neurologic State: Alert; Appropriate for age  Orientation Level: Oriented X4  Cognition: Appropriate decision making; Appropriate for age attention/concentration; Appropriate safety awareness; Follows commands  Perception: Appears intact  Perseveration: No perseveration noted  Safety/Judgement: Awareness of environment;Good awareness of safety precautions; Fall prevention; Insight into deficits    Skin: WNL    Edema: none noted    Hearing:   Auditory  Auditory Impairment: None    Vision/Perceptual:    Tracking: Able to track stimulus in all quadrants w/o difficulty                      Acuity: Within Defined Limits    Corrective Lenses: Reading glasses    Range of Motion:  AROM: Within functional limits  PROM: Within functional limits                      Strength:  Strength: Generally decreased, functional                Coordination:  Coordination: Within functional limits  Fine Motor Skills-Upper: Left Intact; Right Intact    Gross Motor Skills-Upper: Left Intact; Right Intact    Tone & Sensation:  Tone: Normal  Sensation: Impaired (peripheral neuropathy inB feet L>R)                      Balance:  Sitting: Intact  Standing: Intact    Functional Mobility and Transfers for ADLs:  Bed Mobility:  Supine to Sit: Supervision    Transfers:  Sit to Stand: Contact guard assistance  Stand to Sit: Contact guard assistance  Bed to Chair: Contact guard assistance    ADL Assessment:  Feeding: Setup    Oral Facial Hygiene/Grooming: Setup    Bathing: Stand-by assistance         Upper Body Dressing: Setup    Lower Body Dressing: Setup    Toileting: Contact guard assistance                ADL Intervention and task modifications:                                Lower Body Dressing Assistance  Socks: Stand-by assistance  Leg Crossed Method Used: No  Position Performed: Seated in chair;Bending forward method         Cognitive Retraining  Safety/Judgement: Awareness of environment;Good awareness of safety precautions; Fall prevention; Insight into deficits     Functional Measure:    Barthel Index:  Bathin  Bladder: 5  Bowels: 10  Groomin  Dressing: 10  Feeding: 10  Mobility: 10  Stairs: 0  Toilet Use: 5  Transfer (Bed to Chair and Back): 10  Total: 65/100      The Barthel ADL Index: Guidelines  1. The index should be used as a record of what a patient does, not as a record of what a patient could do. 2. The main aim is to establish degree of independence from any help, physical or verbal, however minor and for whatever reason. 3. The need for supervision renders the patient not independent.   4. A patient's performance should be established using the best available evidence. Asking the patient, friends/relatives and nurses are the usual sources, but direct observation and common sense are also important. However direct testing is not needed. 5. Usually the patient's performance over the preceding 24-48 hours is important, but occasionally longer periods will be relevant. 6. Middle categories imply that the patient supplies over 50 per cent of the effort. 7. Use of aids to be independent is allowed. Score Interpretation (from [facility])    Independent   60-79 Minimally independent   40-59 Partially dependent   20-39 Very dependent   <20 Totally dependent     -Derrick Rondon, Barthel, D.W. (1965). Functional evaluation: the Barthel Index. [address] ([address] [facility] [address] (1997). The Barthel activities of daily living index: self-reporting versus actual performance in the old (> or = 75 years). Journal of [address] [place] [address] 45(7), 14 [facility], [employer], Sonali Santos., Queta Tolentino. (1999). Measuring the change in disability after inpatient rehabilitation; comparison of the responsiveness of the Barthel Index and Functional [place] Measure. Journal of Neurology, Neurosurgery, and Psychiatry, 66(4), 851-703. Haroldo Franco, N.J.A, GATO Almonte, & Scott Michael, MSaadA. (2004) Assessment of post-stroke quality of life in cost-effectiveness studies: The usefulness of the Barthel Index and the EuroQoL-5D.  Quality of Life Research, 15, 257-15     Occupational Therapy Evaluation Charge Determination   History Examination Decision-Making   LOW Complexity : Brief history review  LOW Complexity : 1-3 performance deficits relating to physical, cognitive , or psychosocial skils that result in activity limitations and / or participation restrictions  LOW Complexity : No comorbidities that affect functional and no verbal or physical assistance needed to complete eval tasks       Based on the above components, the patient evaluation is determined to be of the following complexity level: LOW   Pain Rating:  No complaints, SOB resolving per pt    Activity Tolerance:   Fair, tolerates ADLs without rest breaks, and SpO2 stable on RA    After treatment patient left in no apparent distress: With transport being taken to CT    COMMUNICATION/EDUCATION:   The patients plan of care was discussed with: Physical therapist and Registered nurse. Home safety education was provided and the patient/caregiver indicated understanding., Patient/family have participated as able in goal setting and plan of care. , and Patient/family agree to work toward stated goals and plan of care. This patients plan of care is appropriate for delegation to [facility].     Thank you for this referral.  Juliana Peñaloza OT  Time Calculation: 24 mins

## 2023-03-20 NOTE — PROGRESS NOTES
General Daily Progress Note    Admit Date: 3/17/2023  Hospital day 4    Subjective:     Patient has no complaint of chest pain. Overall thinks that breathing has improved since being admitted. No longer on oxygen. Has had progressive cough and dyspnea for several weeks. Became very short of breath with cough on Thursday night- daughter that she lives with thought she was going to die. Brought to ER   Medication side effects: none    Current Facility-Administered Medications   Medication Dose Route Frequency    albuterol-ipratropium (DUO-NEB) 2.5 MG-0.5 MG/3 ML  3 mL Nebulization Q4H PRN    dabigatran etexilate (PRADAXA) capsule 75 mg  75 mg Oral BID    arformoteroL (BROVANA) neb solution 15 mcg  15 mcg Nebulization BID RT    And    budesonide (PULMICORT) 250 mcg/2ml nebulizer susp  250 mcg Nebulization BID RT    levothyroxine (SYNTHROID) tablet 100 mcg  100 mcg Oral 6am    metoprolol succinate (TOPROL-XL) XL tablet 25 mg  25 mg Oral DAILY    pantoprazole (PROTONIX) tablet 40 mg  40 mg Oral ACB    sodium chloride (NS) flush 5-40 mL  5-40 mL IntraVENous Q8H    sodium chloride (NS) flush 5-40 mL  5-40 mL IntraVENous PRN    acetaminophen (TYLENOL) tablet 650 mg  650 mg Oral Q6H PRN    Or    acetaminophen (TYLENOL) suppository 650 mg  650 mg Rectal Q6H PRN    polyethylene glycol (MIRALAX) packet 17 g  17 g Oral DAILY PRN    ondansetron (ZOFRAN ODT) tablet 4 mg  4 mg Oral Q8H PRN    Or    ondansetron (ZOFRAN) injection 4 mg  4 mg IntraVENous Q6H PRN    predniSONE (DELTASONE) tablet 40 mg  40 mg Oral DAILY WITH BREAKFAST    albuterol (PROVENTIL VENTOLIN) nebulizer solution 2.5 mg  2.5 mg Nebulization Q6H RT    balsam peru-castor oiL (VENELEX) ointment   Topical BID    potassium chloride SR (KLOR-CON 10) tablet 10 mEq  10 mEq Oral BID    furosemide (LASIX) tablet 40 mg  40 mg Oral DAILY    traZODone (DESYREL) tablet 300 mg  300 mg Oral QHS        Review of Systems  Pertinent items are noted in HPI.     Objective:     Patient Vitals for the past 8 hrs:   SpO2   03/20/23 0211 97 %     No intake/output data recorded. 03/18 0701 - 03/19 1900  In: 100 [P.O.:100]  Out: -     Physical Exam: Visit Vitals  /68 (BP 1 Location: Right upper arm, BP Patient Position: Supine)   Pulse (!) 103   Temp 97.3 °F (36.3 °C)   Resp 19   SpO2 97%     Lungs: bilateral rhonchi and wheezes. Heart: irregularly irregular rhythm  Abdomen: soft, non-tender. Bowel sounds normal. No masses,  no organomegaly  Extremities: extremities normal, atraumatic, no cyanosis or edema      ECG: atrial fibrillation, rate 10     Data Review No results found for this or any previous visit (from the past 24 hour(s)). Assessment:     Active Problems:    CHF exacerbation ([address] (3/18/2023)        Plan:     Shortness of breath- ILD exacerbation and/or CHF- feeling much better this am. Apparently really looked bad on Thursday night according to daughter- severe cough and very short of breath. Will check echo and spirometry . Continue prednisone, inhalers (brovanna, pulmicort, albuterol) . Pt is also on lasix 40 mg daily. O2 sat stable on room air. Will ask both pulmonary and cardiology to see today- hopefully can sort out whether dyspnea is secondary to her ILD vs CHF. Last echo in 2021 showed EF 55%, mild AS and moderate MR. Hypokalemia- replace, up to 3.5 this am  A fib- continue pradaxa. On metoprolol for rate control. Pulse around 100.    Mild anemia- will order studies

## 2023-03-20 NOTE — PROGRESS NOTES
PHYSICAL THERAPY EVALUATION/DISCHARGE  Patient: Robles Christensen (68 y.o. female)  Date: 3/20/2023  Primary Diagnosis: CHF exacerbation ([facility] [I50.9]             ASSESSMENT  Based on the objective data described below, the patient presents with independence with overall bed mobility and transfers. Patient able to ambulate 200 feet x 2 without any AD with supervision. Patient demonstrates widened base of support and occasional path deviations but no overt LOB noted. O2 sats remained 94-99% on RA throughout tx session, but HR noted to increase to 126 during activity. Patient states she is at her baseline for mobility. She lives with her daughter who provides 24 hour supervision. No further acute care therapy needs identified. Recommend patient ambulate in halls 3x/day with family or nursing staff. Will sign off. Other factors to consider for discharge: excellent family support     Further skilled acute physical therapy is not indicated at this time. PLAN :  Recommendation for discharge: (in order for the patient to meet his/her long term goals)  No skilled physical therapy/ follow up rehabilitation needs identified at this time. This discharge recommendation:  Has been made in collaboration with the attending provider and/or case management    IF patient discharges home will need the following DME: patient owns DME required for discharge       SUBJECTIVE:   Patient stated I like to be up moving. I don't sit still at home.     OBJECTIVE DATA SUMMARY:   HISTORY:    Past Medical History:   Diagnosis Date    Arrhythmia     atrial fibrillation, chronic. Stress test 2011 [facility]  normal.  Mild-mod AR on ECHO    Atrial fibrillation (HCC)     Atrial flutter with rapid ventricular response ([facility] 11/21/2019    Chronic atrial fibrillation (HCC) 11/21/2019    Chronic kidney disease     Congestive heart failure (HCC)     Dyspnea     FEV1 1.33 (98% predicted), FVC 1.59 (89% predicted)    GI bleeding Hyperlipidemia     Hypertension     Long term current use of anticoagulant therapy     Murmur     Refusal of blood transfusions as patient is [place]     Thyroid disease     hypothyroid    Valvular heart disease      Past Surgical History:   Procedure Laterality Date    COLONOSCOPY N/A 5/2/2017    COLONOSCOPY performed by Rebecca Sierra MD at [facility] ENDOSCOPY    ECHO TRANSESOPHAGEAL (DENISE) W OR WO CONTR  4/27/2011         HX GYN      vaginal deliveries x10    HX TUBAL LIGATION      [place] CARDIOVERSION ELECTIVE ARRHYTHMIA EXTERNAL  6/14/2011            Prior level of function: patient reports complete independence with all mobility and ADLs      Home Situation  Home Environment: Private residence (motherin law apartment aat back of granddaughters house)  # Steps to Enter: 0  One/Two Story Residence: One story  Living Alone: No  Support Systems: Child(ricardo), Other Family Member(s)  Patient Expects to be Discharged to[de-identified] Home  Current DME Used/Available at Home: Ama Wood, rollator, Walker, rolling, [address], [address], straight, Shower chair, Grab bars, Oxygen, portable (pulse ox)  Tub or Shower Type: Shower    EXAMINATION/PRESENTATION/DECISION MAKING:   Critical Behavior:  Neurologic State: Alert, Appropriate for age  Orientation Level: Oriented X4  Cognition: Appropriate decision making, Appropriate for age attention/concentration, Appropriate safety awareness, Follows commands  Safety/Judgement: Awareness of environment, Good awareness of safety precautions, Fall prevention, Insight into deficits  Hearing:   Auditory  Auditory Impairment: None    Range Of Motion:  AROM: Within functional limits           PROM: Within functional limits           Strength:    Strength: Generally decreased, functional                    Tone & Sensation:   Tone: Normal              Sensation: Impaired (peripheral neuropathy inB feet L>R)               Coordination:  Coordination: Within functional limits  Vision:   Tracking: Able to track stimulus in all quadrants w/o difficulty  Acuity: Within Defined Limits  Corrective Lenses: Reading glasses  Functional Mobility:  Bed Mobility:     Supine to Sit: Independent     Scooting: Independent  Transfers:  Sit to Stand: Independent  Stand to Sit: Independent        Bed to Chair: Contact guard assistance              Balance:   Sitting: Intact  Standing: Intact  Ambulation/Gait Training:  Distance (ft): 200 Feet (ft) (x2)  Assistive Device:  (none)  Ambulation - Level of Assistance: Supervision        Gait Abnormalities: Decreased step clearance; Path deviations (occasional)        Base of Support: Widened     Speed/Vanessa: Pace decreased (<100 feet/min)  Step Length: Left shortened;Right shortened                 Activity Tolerance:   Good and observed SOB with activity-mild  O2 sats remained 94-99% on RA throughout tx session; HR 91 at rest and increased to 126 during ambulation      After treatment patient left in no apparent distress:   Sitting in chair, Call bell within reach, and Caregiver / family present    COMMUNICATION/EDUCATION:   The patients plan of care was discussed with: Occupational therapist, Registered nurse, and Case management. Fall prevention education was provided and the patient/caregiver indicated understanding., Patient/family have participated as able in goal setting and plan of care. , and Patient/family agree to work toward stated goals and plan of care.     Thank you for this referral.  Radha Pepper, PT   Time Calculation: 29 mins

## 2023-03-20 NOTE — PROGRESS NOTES
ADULT PROTOCOL: JET AEROSOL ASSESSMENT    Patient  Nicky Ferreira     80 y.o.   female     3/20/2023  11:17 AM    Breath Sounds Pre Procedure: Right Breath Sounds: Diminished, Crackles                               Left Breath Sounds: Diminished, Crackles    Breath Sounds Post Procedure: Right Breath Sounds: Diminished, Crackles                                 Left Breath Sounds: Diminished, Crackles    Breathing pattern: Pre procedure Breathing Pattern: Regular          Post procedure Breathing Pattern: Regular    Heart Rate: Pre procedure Pulse: 70           Post procedure Pulse: 74    Resp Rate: Pre procedure Respirations: 20           Post procedure Respirations: 16    Cough: Pre procedure Cough: Non-productive               Post procedure Cough: Non-productive    Oxygen: O2 Device: None (Room air)   O2 Flow Rate (L/min): 2 l/min         SpO2: Pre procedure SpO2: 99 %   without oxygen              Post procedure SpO2: 98 %  without oxygen    Nebulizer Therapy: Current medications Aerosolized Medications: Albuterol, Brovana, Pulmicort      Changed: NO    Smoking History:   Social History     Tobacco Use   Smoking Status Never   Smokeless Tobacco Never       Problem List:   Patient Active Problem List   Diagnosis Code    Rhabdomyolysis M62.82    Persistent atrial fibrillation (HCC) I48.19    HTN (hypertension) I10    Encephalopathy acute G93.40    Insomnia G47.00    Hypokalemia E87.6    Hypothyroidism E03.9    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    Hyperkalemia E87.5    GI bleeding K92.2    HILTON (dyspnea on exertion) R06.09    Bilateral leg edema R60.0    HX: long term anticoagulant use--Pradaxa stopped 5/17 for chronic atrial fib Z92.29    Cardiomyopathy, 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    GERD with esophagitis K21.00    Dyslipidemia (high LDL; low HDL) E78.5    CKD (chronic kidney disease) stage 3, GFR 30-59 ml/min (HCC) N18.30    Chest pain with moderate risk for cardiac etiology R07.9    Irregular heart beat I49.9 Acute hypoxemic respiratory failure (HCC) J96.01    Pneumonia J18.9    CHF (congestive heart failure) (HCC) I50.9    Chronic atrial fibrillation (HCC) I48.20    Dehydration E86.0    Hypotension I95.9    Nonrheumatic aortic valve insufficiency I35.1    Nonrheumatic mitral valve regurgitation I34.0    Nonrheumatic aortic valve stenosis I35.0    Chronic renal disease, stage III N18.30    CHF exacerbation ([address] I50.9       Respiratory Therapist: Alexander Ordaz, RT

## 2023-03-20 NOTE — CONSULTS
Pulmonary, Critical Care, and Sleep Medicine      Name: Henrietta Farooq MRN: [id number]   : 1930 Hospital: [facility]   Date: 3/20/2023  Admission date: 3/17/2023 Hospital Day: 4       History:   Pt is unstable and acutely ill. Medical records and data reviewed. Pt seen in Consultation    Hospital Problems  Date Reviewed: 2022            Codes Class Noted POA    CHF exacerbation ([address] ICD-10-CM: I50.9  ICD-9-CM: 428.0  3/18/2023 Unknown           IMPRESSION:   ILD-has seen Dr. Dipesh Jaramillo in the office. Progressive interstitial lung disease that has developed after ; consider underlying connective tissue disorder or hypersensitivity pneumonitis  Mild restrictive lung disease on PFTs 2022  Dyspnea  Chronic atrial fibrillation  Quaker  Chronic anticoagulation  Hypertension  History of nonischemic cardiomyopathy now resolved echocardiogram  left ventricular ejection fraction 55%  Moderate mitral regurgitation  Mild aortic stenosis  Anemia  Body mass index is 24.51 kg/m². RECOMMENDATIONS/PLAN:   CT scan of the chest  Serology to look for hypersensitivity pneumonitis, autoimmune disorders  Patient will need O2 challenge before discharge home  Aspiration precautions  Labs to follow electrolytes, renal function and and blood counts  Bronchial hygiene with respiratory therapy techniques, bronchodilators  DVT prophylaxis  Prescription drug management with home med reconciliation reviewed          [x] High complexity decision making was performed  [x] See my orders for details      Subjective/Initial History:     I was asked by Stuart Tolentino DO to see Henrietta Farooq  a 80 y.o.    female in consultation for a chief complaint of ILD    Excerpts from admission 3/17/2023 or consult notes as follows:     \"  Vance Hernandez is a 80 y.o.  female with pertinent past medical history of chronic ILD, chronic atrial fibrillation on Pradaxa, and CHF who presents with progressive shortness of breath over the past week most notable on exertion with baseline chronic dry cough. She denies chest pain or other associated symptoms. She denies fever/chills, myalgias, or generalized malaise. She reports no recent viral/URI symptoms. ROS otherwise negative. In the ED, patient afebrile and hemodynamically stable (tachycardic on exertion to the 120s with A-fib RVR), saturating mid 90s on 2 L/min via nasal cannula. ECG demonstrates A-fib with RVR, CXR demonstrates bilateral interstitial markings without focal airspace disease. Labs demonstrate: High sensitive troponin 14, proBNP 1195 (similar to priors), WBC 6.1, hemoglobin 10.3, platelets 526, sodium 137, potassium 3.5, BUN 11, creatinine 0.96. Patient given albuterol, diltiazem, Lasix, and Solu-Medrol by ED provider. \"    We are asked see the patient for possible worsening of her pulmonary fibrosis interstitial lung disease. Patient is a lifelong non-smoker. Originally from [place]. Her father and  worked in the [employer].  passed away 31 years ago. She had 13 children. 6 her surviving. Now living with one of her daughters who is here in the room with her today. Past few days noticed increasing shortness of breath. She might of had pneumonia years ago. 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 She recalls having many birds flock in her fields. She has rare indigestion. Does have some arthritis. Turns out the patient has home oxygen but never uses it. She saw Dr. Avelino Lopez in the office November 2022. She had PFTs done that only showed mild restrictive lung disease. Patient does not have much of a cough. No sputum production. Denies chest pain. Does not believe she has any heart trouble. Looking at the hospital records she has seen cardiology. She has not been on chronic amiodarone.   She does not get urinary tract infections often and does not recall getting Macrodantin. Daughter says she has a pulse oximeter at home. She does get winded with a lot of activity. They have not checked her oxygen saturations when she is been up and around and active. Patient has had no contact with birds most recently. Chest x-ray images in the computer system reviewed personally  Chest x-ray March 14, 2011 showed few scattered granulomas but clear lung fields  Chest x-ray April 27, 2017 mild perihilar fullness and poor lung expansion with interstitial edema pattern  Chest x-ray 11/20/2019 showed bilateral interstitial prominence with large right perihilar infiltrate  Chest x-ray 12/8/2019 lung well-expanded with resolution of infiltrates  CT of chest 3/14/2022 few scattered groundglass opacities bilaterally. Mosaic form upper lobe predominance. Calcified granuloma right upper lobe  CTA of chest 4/13/2022 no pulmonary emboli seen; pulmonary fibrosis diffuse interstitial lung disease. No UIP pattern  Chest x-ray 7/20/2021  Chest x-ray 4/13/2022      Allergies   Allergen Reactions    Seroquel [Quetiapine] Other (comments)     PSYCOLOGICAL REACTION Pt didn't feel like them selves but felt like a different person, iT DEEPLY DISTURBED PT.  PT BECAME IRRITABLE.     Ambien [Zolpidem] Other (comments)     Couldn't move        MAR reviewed and pertinent medications noted or modified as needed       Current Facility-Administered Medications   Medication    albuterol-ipratropium (DUO-NEB) 2.5 MG-0.5 MG/3 ML    dabigatran etexilate (PRADAXA) capsule 75 mg    arformoteroL (BROVANA) neb solution 15 mcg    And    budesonide (PULMICORT) 250 mcg/2ml nebulizer susp    levothyroxine (SYNTHROID) tablet 100 mcg    metoprolol succinate (TOPROL-XL) XL tablet 25 mg    pantoprazole (PROTONIX) tablet 40 mg    sodium chloride (NS) flush 5-40 mL    sodium chloride (NS) flush 5-40 mL    acetaminophen (TYLENOL) tablet 650 mg    Or    acetaminophen (TYLENOL) suppository 650 mg    polyethylene glycol (MIRALAX) packet 17 g    ondansetron (ZOFRAN ODT) tablet 4 mg    Or    ondansetron (ZOFRAN) injection 4 mg    predniSONE (DELTASONE) tablet 40 mg    albuterol (PROVENTIL VENTOLIN) nebulizer solution 2.5 mg    balsam peru-castor oiL (VENELEX) ointment    potassium chloride SR (KLOR-CON 10) tablet 10 mEq    furosemide (LASIX) tablet 40 mg    traZODone (DESYREL) tablet 300 mg      Patient PCP: Kamila Fish MD  PMH:  has a past medical history of Arrhythmia, Atrial fibrillation [facility]), Atrial flutter with rapid ventricular response ([address] (11/21/2019), Chronic atrial fibrillation ([address] (11/21/2019), Chronic kidney disease, Congestive heart failure ([address] Dyspnea, GI bleeding, Hyperlipidemia, Hypertension, Long term current use of anticoagulant therapy, Murmur, Refusal of blood transfusions as patient is Latter-day, Thyroid disease, and Valvular heart disease. She has no past medical history of Asthma, CAD (coronary artery disease), Cancer ([address] Carotid artery disease ([address] Chronic obstructive pulmonary disease ([address] Clotting disorder ([address] Diabetes ([address] Difficult intubation, Glaucoma, Hepatitis, HIV (human immunodeficiency virus infection) ([address] ICD (implantable cardioverter-defibrillator) in place, Malignant hyperthermia due to anesthesia, Myocardial infarction ([address] Nausea & vomiting, Pacemaker, Pseudocholinesterase deficiency, Pulmonary embolism ([address] Rheumatic fever, Seizures ([address] Stroke ([address] Syncope, or Unspecified adverse effect of anesthesia. PSH:   has a past surgical history that includes echo transesophageal (fabiano) w or wo contr (4/27/2011); pr cardioversion elective arrhythmia external (6/14/2011); hx tubal ligation; hx gyn; and colonoscopy (N/A, 5/2/2017). FHX: family history includes Cancer in her son; Heart Disease in her father and son. SHX:  reports that she has never smoked.  She has never used smokeless tobacco. She reports that she does not drink alcohol and does not use drugs. ROS:A comprehensive review of systems was negative except for that written in the HPI. Objective:     Vital Signs: Telemetry:    AFIB Intake/Output:   Visit Vitals  BP (!) 142/70   Pulse 74   Temp 97.8 °F (36.6 °C)   Resp 18   Ht 5' 2\" (1.575 m)   Wt 60.8 kg (134 lb)   SpO2 98%   BMI 24.51 kg/m²       Temp (24hrs), Av.6 °F (36.4 °C), Min:97.3 °F (36.3 °C), Max:97.8 °F (36.6 °C)        O2 Device: None (Room air) O2 Flow Rate (L/min): 2 l/min       Wt Readings from Last 4 Encounters:   23 60.8 kg (134 lb)   23 60.8 kg (134 lb)   10/05/22 61.3 kg (135 lb 3.2 oz)   22 60.8 kg (134 lb)          Intake/Output Summary (Last 24 hours) at 3/20/2023 1447  Last data filed at 3/20/2023 0902  Gross per 24 hour   Intake 100 ml   Output --   Net 100 ml       Last shift:       07 -  190  In: 100 [P.O.:100]  Out: -   Last 3 shifts: 1901 -  0700  In: 100 [P.O.:100]  Out: -        Physical Exam:   General:  female; in no respiratory distress and acyanotic and cooperative;  NC;  HEENT: NCAT, dentures dentition, lips and mucosa dry  Eyes: anicteric; conjunctiva clear  Neck: no nodes, no accessory MM use. Chest: no deformity,   Cardiac: IR regular rate, rhythm; no murmur  Lungs: distant breath sounds; no wheezes, few rales, no rhonchi  Abd: soft, NT, hypoactive BS, OBESE,  Ext: no edema; no joint swelling;  No clubbing  : No centeno,   Neuro: fluent,  follows commands; sits up unassisted  Psych- no agitation, oriented to person;   Skin: warm, dry, no cyanosis;   Pulses: 1-2+ Bilateral pedal, radial  Capillary: brisk; pale    Labs:    Recent Labs     23  0104 23  0244 23  1616   WBC 8.3 6.1 6.1   HGB 9.9* 11.1* 10.3*   * 144* 139*     Recent Labs     23  0104 23  0244 23  1616    138 137   K 3.5 3.3* 3.5    102 105   CO2 27 28 27   * 157* 128*   BUN 18 12 11   CREA 1.20* 1. 00 0.96   CA 8.7 8.7 8.4*   MG  --  1.8  --    ALB  --   --  2.8*   ALT  --   --  12     No results for input(s): PH, PCO2, PO2, HCO3, FIO2 in the last 72 hours. No results for input(s): CPK, CKNDX, TROIQ in the last 72 hours. No lab exists for component: CPKMB  No results found for: BNPP, BNP   Lab Results   Component Value Date/Time    Culture result: KLEBSIELLA PNEUMONIAE (A) 03/01/2022 01:04 PM    Culture result: MIXED UROGENITAL DILCIA ISOLATED 02/22/2022 02:53 PM    Culture result: Morganella morganii ssp morganii (A) 11/05/2021 11:33 AM    Culture result: ENTEROCOCCUS FAECIUM (A) 11/05/2021 11:33 AM     Lab Results   Component Value Date/Time    TSH 1.25 03/01/2022 01:04 PM       Imaging:    CXR Results  (Last 48 hours)      None          Results from [place] encounter on 03/17/23    XR CHEST PA LAT    Narrative  Indication:  Shortness of Breath    Exam: PA and lateral views of the chest.    Direct comparison is made to prior CXR dated September 2022. Findings: Cardiomediastinal silhouette is within normal limits. There are  bilateral increased interstitial markings, not significantly changed. There is  no focal airspace process. There is no pleural fluid. There is no pneumothorax. Impression  Bilateral increased interstitial markings. No focal airspace  process. Results from [place] encounter on 09/19/22    XR CHEST PA LAT    Narrative  Indication:  Interstitial pulmonary disease, unspecified. Exam: PA and lateral views of the chest.    Direct comparison is made to prior CXR dated    Findings: Cardiomediastinal silhouette is stable. There are diffuse increased  interstitial markings. There is no pleural fluid. There is no pneumothorax. Osseous structures are diffusely demineralized. Impression  Diffuse bilateral increased interstitial markings.       Results from Appointment encounter on 07/05/22    XR CHEST PA LAT    Narrative  INDICATION: Shortness of breath. EXAM: CXR 2 Views. COMPARISON: 4/13/2022 and prior. FINDINGS: Frontal and lateral views of the chest show the lungs are free of  acute disease. There is stable interstitial lung disease. Heart size is top  normal. There is no overt pulmonary edema. There is no evident pneumothorax or  pleural effusion. Impression  No acute disease. No significant interval change. Results from Hospital Encounter encounter on 03/17/23    CT CHEST WO CONT    Narrative  Clinical history: ILD  INDICATION:   ILD  COMPARISON:  4/13/2022    TECHNIQUE:  Thin axial images were obtained through the chest. Coronal and sagittal  reconstructions were generated. Oral contrast was not administered. CT dose reduction was achieved through use of a standardized protocol tailored  for this examination and automatic exposure control for dose modulation. Adaptive statistical iterative reconstruction (ASIR) was utilized. FINDINGS:    SUPRACLAVICULAR REGION: No supraclavicular adenopathy. MEDIASTINUM: Cardiomegaly, coronary artery disease  No hilar or mediastinal  lymphadenopathy. No endotracheal or endobronchial mass. Coronary artery calcifications: present. PLEURA/LUNGS[de-identified] There is no evidence of significant fibrosis. Minimal chronic  interstitial changes are redemonstrated and stable. Groundglass opacities are  improved. Dependent atelectasis. SOFT TISSUE/ AXILLA: No axillary adenopathy. No chest wall mass. UPPER ABDOMEN: Cholelithiasis. There is no intrahepatic duct dilatation. The CBD  is not dilated. BONES: No destructive bone lesion. . No lytic or blastic lesions. No evidence of  fracture or dislocation. Impression  Minimal chronic interstitial change is not significantly progressed compared to  the 2022 examination. Basilar atelectasis is slightly increased. Groundglass opacities are resolved. The remainder the examination is also unchanged.       Please note that this dictation was completed with Gabriel the computer voice recognition software. Quite often unanticipated grammatical, syntax, homophones, and other interpretive errors are inadvertently transcribed by the computer software. Please disregard these errors. Please excuse any errors that have escaped final proofreading  ______________________________________________________________________      Thank you for allowing us to participate in the care of this patient.       This care involved high complexity medical decision making: I personally:  Reviewed the flowsheet and previous days notes  Reviewed and summarized records or history from previous days note or discussions with staff, family  High Risk Drug therapy requiring intensive monitoring for toxicity: eg steroids, antibiotics  Reviewed and/or ordered Clinical lab tests  Reviewed images and/or ordered Radiology tests  Reviewed the patients / Telemetry  discussed my assessment/management with : Nursing, family for coordination of care      Pramod Castellano MD

## 2023-03-20 NOTE — CONSULTS
[facility]  [address]  [phone]  [email or web address]       CARDIOLOGY CONSULTATION    PLEASE NOTE THAT WE CONFIRMED WITH THE REFERRING PHYSICIAN PRIOR TO SEEING THE PATIENT THAT THE PATIENT IS BEING REFERRED FOR [address] EVALUATION AND FOR LONG-TERM ONGOING CARDIAC CARE     Date of  Admission: 3/17/2023  4:03 PM     Admission type:Emergency   Primary Care Aristides Han MD     Attending Provider: Deirdre Disla MD  Cardiology Provider: Dr. Zita Ring: SOB related to CHF vs ILD     Subjective:     Edu Mirza is a 80 y.o. female admitted for CHF exacerbation ([address] [I50.9]. She has a PMHx of  chronic AF, HTN, combined systolic and diastolic heart failure and ILD. Admitted for CHF exacerbation. Noted to have elevated BNP ~1100, EKG with A fib which is chronic, with tachycardia. CXR with interstitial markings without pulmonary edema or pleural effusions. Trops neg. Cardiology consulted for further eval of SOB. Currently, she feels better. Has received steroids, nebulizer and diuretics since admission. Reports progressive SOB on exertion in the past week. Got significantly OOB PTA, felt like she was going to die. Denies complaints of chest pains. Pt known to our practice. Has chronic AF, prev hx of NICM which had resolved. Had echo done in our office in 11/2022 with showed normal LVEF, mild AS.       Patient Active Problem List    Diagnosis Date Noted    CHF exacerbation ([address] 03/18/2023    Chronic renal disease, stage III 05/31/2022    Nonrheumatic aortic valve insufficiency 02/05/2021    Nonrheumatic mitral valve regurgitation 02/05/2021    Nonrheumatic aortic valve stenosis 02/05/2021    Dehydration 12/08/2019    Hypotension 12/08/2019    Chronic atrial fibrillation ([address] 11/21/2019    Acute hypoxemic respiratory failure ([address] 11/20/2019    Pneumonia 11/20/2019    CHF (congestive heart failure) ([facility] 11/20/2019    Irregular heart beat 06/04/2018    Chest pain with moderate risk for cardiac etiology 07/06/2017    HILTON (dyspnea on exertion) 07/05/2017    Bilateral leg edema 07/05/2017    HX: long term anticoagulant use--Pradaxa stopped 5/17 for chronic atrial fib 07/05/2017    Cardiomyopathy, secondary ([facility] 07/05/2017    GERD with esophagitis 07/05/2017    Dyslipidemia (high LDL; low HDL) 07/05/2017    CKD (chronic kidney disease) stage 3, GFR 30-59 ml/min ([facility]) 07/05/2017    GI bleeding     Hyperkalemia 04/27/2017    Hypothyroidism 04/27/2011    Hyperthyroidism--iatrogenic 04/27/2011    Insomnia 03/16/2011    Hypokalemia 03/16/2011    Encephalopathy acute 03/15/2011    Rhabdomyolysis 03/14/2011    Persistent atrial fibrillation ([facility] 03/14/2011    HTN (hypertension) 03/14/2011      Grady Cintron MD  Past Medical History:   Diagnosis Date    Arrhythmia     atrial fibrillation, chronic. Stress test 2011 essent.  normal.  Mild-mod AR on ECHO    Atrial fibrillation (HCC)     Atrial flutter with rapid ventricular response ([facility]) 11/21/2019    Chronic atrial fibrillation (HCC) 11/21/2019    Chronic kidney disease     Congestive heart failure (HCC)     Dyspnea     FEV1 1.33 (98% predicted), FVC 1.59 (89% predicted)    GI bleeding     Hyperlipidemia     Hypertension     Long term current use of anticoagulant therapy     Murmur     Refusal of blood transfusions as patient is Druze     Thyroid disease     hypothyroid    Valvular heart disease       Social History     Socioeconomic History    Marital status:    Tobacco Use    Smoking status: Never    Smokeless tobacco: Never   Vaping Use    Vaping Use: Never used   Substance and Sexual Activity    Alcohol use: No    Drug use: No    Sexual activity: Not Currently     Partners: Male     Social Determinants of Health     Financial Resource Strain: Medium Risk    Difficulty of Paying Living Expenses: Somewhat hard   Food Insecurity: No Food Insecurity    Worried About Running Out of Food in the Last Year: Never true    Ran Out of Food in the Last Year: Never true     Allergies   Allergen Reactions    Seroquel [Quetiapine] Other (comments)     PSYCOLOGICAL REACTION Pt didn't feel like them selves but felt like a different person, iT DEEPLY DISTURBED PT.  PT BECAME IRRITABLE.     Ambien [Zolpidem] Other (comments)     Couldn't move      Family History   Problem Relation Age of Onset    Heart Disease Father     Cancer Son     Heart Disease Son       Current Facility-Administered Medications   Medication Dose Route Frequency    albuterol-ipratropium (DUO-NEB) 2.5 MG-0.5 MG/3 ML  3 mL Nebulization Q4H PRN    dabigatran etexilate (PRADAXA) capsule 75 mg  75 mg Oral BID    arformoteroL (BROVANA) neb solution 15 mcg  15 mcg Nebulization BID RT    And    budesonide (PULMICORT) 250 mcg/2ml nebulizer susp  250 mcg Nebulization BID RT    levothyroxine (SYNTHROID) tablet 100 mcg  100 mcg Oral 6am    metoprolol succinate (TOPROL-XL) XL tablet 25 mg  25 mg Oral DAILY    pantoprazole (PROTONIX) tablet 40 mg  40 mg Oral ACB    sodium chloride (NS) flush 5-40 mL  5-40 mL IntraVENous Q8H    sodium chloride (NS) flush 5-40 mL  5-40 mL IntraVENous PRN    acetaminophen (TYLENOL) tablet 650 mg  650 mg Oral Q6H PRN    Or    acetaminophen (TYLENOL) suppository 650 mg  650 mg Rectal Q6H PRN    polyethylene glycol (MIRALAX) packet 17 g  17 g Oral DAILY PRN    ondansetron (ZOFRAN ODT) tablet 4 mg  4 mg Oral Q8H PRN    Or    ondansetron (ZOFRAN) injection 4 mg  4 mg IntraVENous Q6H PRN    predniSONE (DELTASONE) tablet 40 mg  40 mg Oral DAILY WITH BREAKFAST    albuterol (PROVENTIL VENTOLIN) nebulizer solution 2.5 mg  2.5 mg Nebulization Q6H RT    balsam peru-castor oiL (VENELEX) ointment   Topical BID    potassium chloride SR (KLOR-CON 10) tablet 10 mEq  10 mEq Oral BID    furosemide (LASIX) tablet 40 mg  40 mg Oral DAILY    traZODone (DESYREL) tablet 300 mg  300 mg Oral QHS        Review of Symptoms:   11 systems reviewed, negative other than as stated in the HPI        Objective:      Visit Vitals  BP (!) 142/70   Pulse 74   Temp 97.3 °F (36.3 °C)   Resp 19   SpO2 99%         Physical Exam:  General: Well developed, in no acute distress, cooperative and alert  HEENT: No carotid bruits, PEERL, EOM intact. Heart:  reg rate and rhythm; normal S1/S2; no murmurs  Respiratory: Clear bilaterally x 4, no wheezing or rales  Abdomen:   Soft, non-tender, no distention, no masses. + BS. Extremities:  Normal cap refill, no cyanosis, atraumatic. No edema. Neuro: A&Ox3, speech clear  Skin: Skin color is normal. Non diaphoretic  Vascular: 2+ pulses symmetric in all extremities    Data Review:   Recent Labs     03/19/23  0104 03/18/23  0244 03/17/23  1616   WBC 8.3 6.1 6.1   HGB 9.9* 11.1* 10.3*   HCT 31.7* 34.9* 32.7*   * 144* 139*     Recent Labs     03/19/23 0104 03/18/23  0244 03/17/23  1616    138 137   K 3.5 3.3* 3.5    102 105   CO2 27 28 27   * 157* 128*   BUN 18 12 11   CREA 1.20* 1.00 0.96   CA 8.7 8.7 8.4*   MG  --  1.8  --    ALB  --   --  2.8*   TBILI  --   --  0.3   ALT  --   --  12       No results for input(s): TROIQ, CPK, CKMB in the last 72 hours. No intake or output data in the 24 hours ending 03/20/23 1057     Cardiographics    Telemetry: atrial fibrillation, rates 80s    ECG: atrial fibrillation with vr 105 bpm    Echocardiogram (11/28/22 at [address] [place] [address]):      Radiology Results in the past 24 hours:  No results found. Assessment:       Active Problems:    CHF exacerbation ([address] (3/18/2023)         Plan:     SOB  CHF vs ILD -- favoring more pulmonary process based on clinical picture  Echo is pending  Last echo done in our office in 11/2022 with preserved LVEF 55-60%, mild AS  Continue diuresis  Chest CT pending    2. Chronic AF  Rates controlled  Continue BB  On Pradaxa for stroke prevention    3.   Hx of NICM  Hx of dilated CM, resolved  Echo in our office in 11/2022 with preserved LVEF 55-60%  Echo pending this admission    4. Aortic stenosis  Mild AS noted on echo in 11/2022  Repeat echo pending    Thank you for this consultation. We will continue to follow with you.     ERMA Greco MD

## 2023-03-20 NOTE — PROGRESS NOTES
[facility]  [address]  [phone]  www. [email or web address]      Interpretation Summary    Result status: Final result       Left Ventricle: Normal left ventricular systolic function with a visually estimated EF of 55 - 60%. Left ventricle size is normal. Normal wall thickness. Normal wall motion. Normal diastolic function. Aortic Valve: Mild regurgitation. Mild stenosis of the aortic valve. Mitral Valve: Mild to moderate regurgitation. EF normal, mild aortic stenosis. Unchanged from 11/2022 ECHO. No further recommendations from Cardiology will sign off. Follow up out patient 2-3 weeks post discharge.      Lum Alert DNP, ANP-BC

## 2023-03-20 NOTE — PROGRESS NOTES
Bedside and Verbal shift change report given to [address] [place] [address] LPN (oncoming nurse) by Mendez Arechiga RN (offgoing nurse). Report included the following information SBAR, Kardex, MAR, and Cardiac Rhythm A-fib .

## 2023-03-20 NOTE — PROGRESS NOTES
Transition of Care Plan:    RUR: 14%  Disposition: home, lives with daughter   If SNF or IPR: Date FOC offered:  Date FOC received:  Date authorization started with reference number:  Date authorization received and expires:  Accepting facility:  Follow up appointments: PCP, specialists as indicated   DME needed: N/A, has a RW if needed   Transportation at Discharge: family   [place] or means to access home:yes      IM Medicare Letter: to be reviewed prior to d/c   Is patient a [place] and connected with the [place]? No               If yes, was [place] transfer form completed and VA notified? Caregiver Contact: Deneen Abbott (daughter)   Discharge Caregiver contacted prior to discharge? Yes at bedside   Care Conference needed?: No    Reason for Admission:  CHF exacerbation                      RUR Score:  14%                   Plan for utilizing home health:  No       PCP: First and Last name:  Ester Juarez MD     Name of Practice:    Are you a current patient: Yes/No: yes    Approximate date of last visit: 2/6/23   Can you participate in a virtual visit with your PCP: yes with family assistance                     Current Advanced Directive/Advance Care Plan: DNR      ACP on file ,pt is DNR                    Transition of Care Plan:    home with daughter    Chart reviewed. CM met with pt and family at bedside to introduce self and role. Verified contact information and demographics. Pt lives with her daughter, Jody Esquivel, in a one level home with no steps to enter. Pt is typically independent with ADLs and IADLs. She ambulates without a device but has a RW if needed. She does not drive. Pt had 13 children, 6 are still living. [facility]. No prior hx of SNF/rehab stays. Hx of [address] in 2019. Pt has home O2 provided by [employer] that she uses when needed. Family will transport home at time of d/c. Will continue to follow. Care Management Interventions  PCP Verified by CM:  Yes  Palliative Care Criteria Met (RRAT>21 & CHF Dx)?: No  Mode of Transport at Discharge:  Other (see comment) (family )  Transition of Care Consult (CM Consult): Discharge Planning  Discharge Durable Medical Equipment: No  Physical Therapy Consult: Yes  Occupational Therapy Consult: Yes  Speech Therapy Consult: No  Support Systems: Child(ricardo), Friend/Neighbor  Confirm Follow Up Transport: Family  The Patient and/or Patient Representative was Provided with a Choice of Provider and Agrees with the Discharge Plan?: Yes  Freedom of Choice List was Provided with Basic Dialogue that Supports the Patient's Individualized Plan of Care/Goals, Treatment Preferences and Shares the Quality Data Associated with the Providers?: Yes  Discharge Location  Patient Expects to be Discharged to[de-identified] [address] [facility]  [phone]

## 2023-03-21 VITALS
DIASTOLIC BLOOD PRESSURE: 76 MMHG | RESPIRATION RATE: 18 BRPM | OXYGEN SATURATION: 96 % | WEIGHT: 134 LBS | TEMPERATURE: 97.5 F | SYSTOLIC BLOOD PRESSURE: 157 MMHG | BODY MASS INDEX: 24.66 KG/M2 | HEART RATE: 80 BPM | HEIGHT: 62 IN

## 2023-03-21 LAB
ACE SERPL-CCNC: 26 U/L (ref 14–82)
ANA SER QL: NEGATIVE

## 2023-03-21 PROCEDURE — 94640 AIRWAY INHALATION TREATMENT: CPT

## 2023-03-21 PROCEDURE — 74011636637 HC RX REV CODE- 636/637: Performed by: STUDENT IN AN ORGANIZED HEALTH CARE EDUCATION/TRAINING PROGRAM

## 2023-03-21 PROCEDURE — 94761 N-INVAS EAR/PLS OXIMETRY MLT: CPT

## 2023-03-21 PROCEDURE — 74011250637 HC RX REV CODE- 250/637: Performed by: FAMILY MEDICINE

## 2023-03-21 PROCEDURE — 74011000250 HC RX REV CODE- 250: Performed by: STUDENT IN AN ORGANIZED HEALTH CARE EDUCATION/TRAINING PROGRAM

## 2023-03-21 PROCEDURE — 74011250637 HC RX REV CODE- 250/637: Performed by: STUDENT IN AN ORGANIZED HEALTH CARE EDUCATION/TRAINING PROGRAM

## 2023-03-21 PROCEDURE — 99239 HOSP IP/OBS DSCHRG MGMT >30: CPT | Performed by: FAMILY MEDICINE

## 2023-03-21 PROCEDURE — 74011000250 HC RX REV CODE- 250: Performed by: FAMILY MEDICINE

## 2023-03-21 RX ORDER — FUROSEMIDE 20 MG/1
TABLET ORAL
Qty: 180 TABLET | Refills: 0 | Status: SHIPPED | OUTPATIENT
Start: 2023-03-21

## 2023-03-21 RX ORDER — PREDNISONE 20 MG/1
TABLET ORAL
Qty: 15 TABLET | Refills: 0 | Status: SHIPPED | OUTPATIENT
Start: 2023-03-21

## 2023-03-21 RX ORDER — AZITHROMYCIN 250 MG/1
TABLET, FILM COATED ORAL
Qty: 6 TABLET | Refills: 0 | Status: SHIPPED | OUTPATIENT
Start: 2023-03-21 | End: 2023-03-26

## 2023-03-21 RX ORDER — POTASSIUM CHLORIDE 20 MEQ/1
20 TABLET, EXTENDED RELEASE ORAL DAILY
Qty: 180 TABLET | Refills: 3 | Status: SHIPPED | OUTPATIENT
Start: 2023-03-21

## 2023-03-21 RX ORDER — PROMETHAZINE HYDROCHLORIDE AND DEXTROMETHORPHAN HYDROBROMIDE 6.25; 15 MG/5ML; MG/5ML
5 SYRUP ORAL
Qty: 240 ML | Refills: 2 | Status: SHIPPED | OUTPATIENT
Start: 2023-03-21

## 2023-03-21 RX ADMIN — LEVOTHYROXINE SODIUM 100 MCG: 0.1 TABLET ORAL at 07:42

## 2023-03-21 RX ADMIN — ALBUTEROL SULFATE 2.5 MG: 2.5 SOLUTION RESPIRATORY (INHALATION) at 01:14

## 2023-03-21 RX ADMIN — ARFORMOTEROL TARTRATE 15 MCG: 15 SOLUTION RESPIRATORY (INHALATION) at 08:16

## 2023-03-21 RX ADMIN — SODIUM CHLORIDE, PRESERVATIVE FREE 10 ML: 5 INJECTION INTRAVENOUS at 09:26

## 2023-03-21 RX ADMIN — FUROSEMIDE 40 MG: 40 TABLET ORAL at 09:25

## 2023-03-21 RX ADMIN — METOPROLOL SUCCINATE 25 MG: 50 TABLET, EXTENDED RELEASE ORAL at 09:25

## 2023-03-21 RX ADMIN — ALBUTEROL SULFATE 2.5 MG: 2.5 SOLUTION RESPIRATORY (INHALATION) at 08:16

## 2023-03-21 RX ADMIN — PANTOPRAZOLE SODIUM 40 MG: 40 TABLET, DELAYED RELEASE ORAL at 07:42

## 2023-03-21 RX ADMIN — PREDNISONE 40 MG: 20 TABLET ORAL at 09:25

## 2023-03-21 RX ADMIN — DABIGATRAN ETEXILATE MESYLATE 75 MG: 75 CAPSULE ORAL at 09:25

## 2023-03-21 RX ADMIN — BUDESONIDE 250 MCG: 0.25 INHALANT RESPIRATORY (INHALATION) at 08:16

## 2023-03-21 RX ADMIN — POTASSIUM CHLORIDE 10 MEQ: 750 TABLET, FILM COATED, EXTENDED RELEASE ORAL at 09:25

## 2023-03-21 NOTE — PROGRESS NOTES
Doing better. O2 sat 995 on room air- will dc home this am, recheck in office next week, dictate discharge summary later today .  Over 30 minutes spent in pt care this am .

## 2023-03-21 NOTE — PROGRESS NOTES
PCP hospital follow-up transitional care appointment has been scheduled with Dr. Lauren Pruitt on 3/28/23 at 1240. Dr. Alli Claudio requested for patient to be seen next week and this is the first available appt. Pending patient discharge.   Ren Field, Care Management Assistant

## 2023-03-21 NOTE — PROGRESS NOTES
Transition of Care Plan:     RUR: 14%  Disposition: home, lives with daughter   If SNF or IPR: Date FOC offered:  Date FOC received:  Date authorization started with reference number:  Date authorization received and expires:  Accepting facility:  Follow up appointments: PCP, specialists as indicated   DME needed: N/A, has a RW if needed   Transportation at Discharge: family   [place] or means to access home:yes      IM Medicare Letter: 1st IM given on 3/20/23  Is patient a  and connected with the [place]? No               If yes, was [employer] transfer form completed and VA notified? Caregiver Contact: Ivet Mcduffie (daughter)   Discharge Caregiver contacted prior to discharge? Yes at bedside   Care Conference needed?: No    Chart reviewed. CM aware of discharge order. Pt's daughter present at bedside and will transport pt home today. Patient has been cleared by PT/OT with no therapy needs identified. Pt is doing well on room air. She does have home O2 supply if needed. 1st IMM provided on 3/20/23. PCP appointment made. See AVS for details. Pt ready for d/c from CM standpoint. Informed assigned RN. Care Management Interventions  PCP Verified by CM: Yes  Palliative Care Criteria Met (RRAT>21 & CHF Dx)?: No  Mode of Transport at Discharge:  Other (see comment)  Transition of Care Consult (CM Consult): Discharge Planning  Discharge Durable Medical Equipment: No  Physical Therapy Consult: Yes  Occupational Therapy Consult: Yes  Speech Therapy Consult: No  Support Systems: Child(ricardo), Other Family Member(s), Friend/Neighbor  Confirm Follow Up Transport: Family  The Patient and/or Patient Representative was Provided with a Choice of Provider and Agrees with the Discharge Plan?: Yes  Freedom of Choice List was Provided with Basic Dialogue that Supports the Patient's Individualized Plan of Care/Goals, Treatment Preferences and Shares the Quality Data Associated with the Providers?: Yes  Discharge Location  Patient Expects to be Discharged to[de-identified] Home with family assistance     Mckinley Krabbe, [address], ED [facility]  [phone]

## 2023-03-21 NOTE — CONSULTS
Pulmonary, Critical Care, and Sleep Medicine      Name: Tigre Luis MRN: [id number]   : 1930 Hospital: [address]   Date: 3/21/2023  Admission date: 3/17/2023 Hospital Day: 5       History:   3/21/23: Pt discharged by Dr. Louie Prado. Medical records and data reviewed. CT scan from 3/20 personally reviewed. No pleural effusions. No significant fibrosis with some chronic interstitail changes. GGO from last CT much better. Mild RLL dependent cylindrical bronchiectasis. Labs for connective tissue disorder, HSP pending      IMPRESSION:   ILD-has seen Dr. Liliana Birmingham in the office. Progressive interstitial lung disease that has developed after ; consider underlying connective tissue disorder or hypersensitivity pneumonitis  Mild restrictive lung disease on PFTs 2022  Dyspnea  Chronic atrial fibrillation  Caodaism  Chronic anticoagulation  Hypertension  History of nonischemic cardiomyopathy now resolved echocardiogram  left ventricular ejection fraction 55%  Moderate mitral regurgitation  Mild aortic stenosis  Anemia  Body mass index is 24.51 kg/m². RECOMMENDATIONS/PLAN:   Followup serology to look for hypersensitivity pneumonitis, autoimmune disorders  Followup with  and Dr. Margarette Morrison drug management with home med reconciliation reviewed          [x] High complexity decision making was performed  [x] See my orders for details      Subjective/Initial History:     I was asked by Juanito Wild DO to see Tigre Luis  a 80 y.o.  female in consultation for a chief complaint of ILD    Excerpts from admission 3/17/2023 or consult notes as follows:     \"  Iain Gurrola is a 80 y.o.  female with pertinent past medical history of chronic ILD, chronic atrial fibrillation on Pradaxa, and CHF who presents with progressive shortness of breath over the past week most notable on exertion with baseline chronic dry cough.   She denies chest pain or other associated symptoms. She denies fever/chills, myalgias, or generalized malaise. She reports no recent viral/URI symptoms. ROS otherwise negative. In the ED, patient afebrile and hemodynamically stable (tachycardic on exertion to the 120s with A-fib RVR), saturating mid 90s on 2 L/min via nasal cannula. ECG demonstrates A-fib with RVR, CXR demonstrates bilateral interstitial markings without focal airspace disease. Labs demonstrate: High sensitive troponin 14, proBNP 1195 (similar to priors), WBC 6.1, hemoglobin 10.3, platelets 088, sodium 137, potassium 3.5, BUN 11, creatinine 0.96. Patient given albuterol, diltiazem, Lasix, and Solu-Medrol by ED provider. \"    We are asked see the patient for possible worsening of her pulmonary fibrosis interstitial lung disease. Patient is a lifelong non-smoker. Originally from [place]. Her father and  worked in the [employer].  passed away 31 years ago. She had 13 children. 6 her surviving. Now living with one of her daughters who is here in the room with her today. Past few days noticed increasing shortness of breath. She might of had pneumonia years ago. 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 She recalls having many birds flock in her fields. She has rare indigestion. Does have some arthritis. Turns out the patient has home oxygen but never uses it. She saw Dr. Malcolm Howell in the office November 2022. She had PFTs done that only showed mild restrictive lung disease. Patient does not have much of a cough. No sputum production. Denies chest pain. Does not believe she has any heart trouble. Looking at the hospital records she has seen cardiology. She has not been on chronic amiodarone. She does not get urinary tract infections often and does not recall getting Macrodantin. Daughter says she has a pulse oximeter at home.   She does get winded with a lot of activity. They have not checked her oxygen saturations when she is been up and around and active. Patient has had no contact with birds most recently. Chest x-ray images in the computer system reviewed personally  Chest x-ray March 14, 2011 showed few scattered granulomas but clear lung fields  Chest x-ray April 27, 2017 mild perihilar fullness and poor lung expansion with interstitial edema pattern  Chest x-ray 11/20/2019 showed bilateral interstitial prominence with large right perihilar infiltrate  Chest x-ray 12/8/2019 lung well-expanded with resolution of infiltrates  CT of chest 3/14/2022 few scattered groundglass opacities bilaterally. Mosaic form upper lobe predominance. Calcified granuloma right upper lobe  CTA of chest 4/13/2022 no pulmonary emboli seen; pulmonary fibrosis diffuse interstitial lung disease. No UIP pattern  Chest x-ray 7/20/2021  Chest x-ray 4/13/2022      Allergies   Allergen Reactions    Seroquel [Quetiapine] Other (comments)     PSYCOLOGICAL REACTION Pt didn't feel like them selves but felt like a different person, iT DEEPLY DISTURBED PT.  PT BECAME IRRITABLE.     Ambien [Zolpidem] Other (comments)     Couldn't move        MAR reviewed and pertinent medications noted or modified as needed       Current Facility-Administered Medications   Medication    albuterol-ipratropium (DUO-NEB) 2.5 MG-0.5 MG/3 ML    dabigatran etexilate (PRADAXA) capsule 75 mg    arformoteroL (BROVANA) neb solution 15 mcg    And    budesonide (PULMICORT) 250 mcg/2ml nebulizer susp    levothyroxine (SYNTHROID) tablet 100 mcg    metoprolol succinate (TOPROL-XL) XL tablet 25 mg    pantoprazole (PROTONIX) tablet 40 mg    sodium chloride (NS) flush 5-40 mL    sodium chloride (NS) flush 5-40 mL    acetaminophen (TYLENOL) tablet 650 mg    Or    acetaminophen (TYLENOL) suppository 650 mg    polyethylene glycol (MIRALAX) packet 17 g    ondansetron (ZOFRAN ODT) tablet 4 mg    Or    ondansetron (ZOFRAN) injection 4 mg predniSONE (DELTASONE) tablet 40 mg    albuterol (PROVENTIL VENTOLIN) nebulizer solution 2.5 mg    balsam peru-castor oiL (VENELEX) ointment    potassium chloride SR (KLOR-CON 10) tablet 10 mEq    furosemide (LASIX) tablet 40 mg    traZODone (DESYREL) tablet 300 mg     Current Outpatient Medications   Medication Sig    furosemide (LASIX) 20 mg tablet TAKE  2 TABLETS BY MOUTH DAILY AS NEEDED FOR swelling in feet. potassium chloride (K-DUR, KLOR-CON M20) 20 mEq tablet Take 1 Tablet by mouth daily. predniSONE (DELTASONE) 20 mg tablet 2 tabs po- days 1-5 then one tab po - days 6-10    promethazine-dextromethorphan (PROMETHAZINE-DM) 6.25-15 mg/5 mL syrup Take 5 mL by mouth four (4) times daily as needed for Cough. azithromycin (ZITHROMAX) 250 mg tablet Take 2 tablets today, then take 1 tablet daily    pantoprazole (PROTONIX) 40 mg tablet TAKE ONE TABLET BY MOUTH EVERY DAY    traZODone (DESYREL) 300 mg tablet TAKE ONE TABLET BY MOUTH AT BEDTIME    loratadine (Claritin) 10 mg tablet Take 1 Tablet by mouth daily as needed for Allergies. metoprolol succinate (TOPROL-XL) 25 mg XL tablet TAKE ONE TABLET BY MOUTH EVERY DAY FOR THE HEART    triamcinolone acetonide (KENALOG) 0.1 % topical cream Apply  to affected area two (2) times a day. use thin layer    levothyroxine (SYNTHROID) 100 mcg tablet One po daily for thyroid    magnesium oxide (MAG-OX) 400 mg tablet Take 1 Tab by mouth daily. For leg cramps    albuterol (PROVENTIL HFA, VENTOLIN HFA, PROAIR HFA) 90 mcg/actuation inhaler Take 2 Puffs by inhalation every four (4) hours as needed for Wheezing. dabigatran etexilate (Pradaxa) 75 mg capsule TAKE ONE CAPSULE BY MOUTH EVERY 12 HOURS    fluticasone propion-salmeteroL (Wixela Inhub) 100-50 mcg/dose diskus inhaler Take 1 Puff by inhalation two (2) times a day. for breathing    fluticasone propionate (FLONASE) 50 mcg/actuation nasal spray USE 1-2 SPRAYS IN EACH NOSTRIL ONCE DAILY.     acetaminophen (TYLENOL) 650 mg TbER Take 650 mg by mouth every eight (8) hours. Indications: headache    CALCIUM 600 + D tablet TAKE 1 TABLET TWICE A DAY. (CALCIUM SUPPLEMENT)      Patient PCP: Gita Chase MD  PMH:  has a past medical history of Arrhythmia, Atrial fibrillation [facility]), Atrial flutter with rapid ventricular response ([address] (11/21/2019), Chronic atrial fibrillation ([address] (11/21/2019), Chronic kidney disease, Congestive heart failure ([address] Dyspnea, GI bleeding, Hyperlipidemia, Hypertension, Long term current use of anticoagulant therapy, Murmur, Refusal of blood transfusions as patient is Yazidism, Thyroid disease, and Valvular heart disease. She has no past medical history of Asthma, CAD (coronary artery disease), Cancer ([address] Carotid artery disease ([address] Chronic obstructive pulmonary disease ([address] Clotting disorder ([address] Diabetes ([address] Difficult intubation, Glaucoma, Hepatitis, HIV (human immunodeficiency virus infection) ([address] ICD (implantable cardioverter-defibrillator) in place, Malignant hyperthermia due to anesthesia, Myocardial infarction ([address] Nausea & vomiting, Pacemaker, Pseudocholinesterase deficiency, Pulmonary embolism ([address] Rheumatic fever, Seizures ([address] Stroke ([address] Syncope, or Unspecified adverse effect of anesthesia. PSH:   has a past surgical history that includes echo transesophageal (fabiano) w or wo contr (4/27/2011); pr cardioversion elective arrhythmia external (6/14/2011); hx tubal ligation; hx gyn; and colonoscopy (N/A, 5/2/2017). FHX: family history includes Cancer in her son; Heart Disease in her father and son. SHX:  reports that she has never smoked. She has never used smokeless tobacco. She reports that she does not drink alcohol and does not use drugs. ROS:A comprehensive review of systems was negative except for that written in the HPI.     Objective:     Vital Signs: Telemetry:    AFIB Intake/Output:   Visit Vitals  BP (!) 157/76 (BP 1 Location: Right upper arm, BP Patient Position: Semi fowlers)   Pulse 80   Temp 97.5 °F (36.4 °C)   Resp 18   Ht 5' 2\" (1.575 m)   Wt 60.8 kg (134 lb)   SpO2 96%   BMI 24.51 kg/m²       Temp (24hrs), Av.6 °F (36.4 °C), Min:97.3 °F (36.3 °C), Max:97.9 °F (36.6 °C)        O2 Device: None (Room air) O2 Flow Rate (L/min): 2 l/min       Wt Readings from Last 4 Encounters:   23 60.8 kg (134 lb)   23 60.8 kg (134 lb)   10/05/22 61.3 kg (135 lb 3.2 oz)   22 60.8 kg (134 lb)        No intake or output data in the 24 hours ending 23 1149      Last shift:      No intake/output data recorded. Last 3 shifts:  1901 -  0700  In: 100 [P.O.:100]  Out: -        Physical Exam:   General:  female; in no respiratory distress and acyanotic and cooperative;  NC;  HEENT: NCAT, dentures dentition, lips and mucosa dry  Eyes: anicteric; conjunctiva clear  Neck: no nodes, no accessory MM use. Chest: no deformity,   Cardiac: IR regular rate, rhythm; no murmur  Lungs: distant breath sounds; no wheezes, few rales, no rhonchi  Abd: soft, NT, hypoactive BS, OBESE,  Ext: no edema; no joint swelling; No clubbing  : No centeno,   Neuro: fluent,  follows commands; sits up unassisted  Psych- no agitation, oriented to person;   Skin: warm, dry, no cyanosis;   Pulses: 1-2+ Bilateral pedal, radial  Capillary: brisk; pale    Labs:    Recent Labs     23  010   WBC 8.3   HGB 9.9*   *       Recent Labs     23  0104      K 3.5      CO2 27   *   BUN 18   CREA 1.20*   CA 8.7       No results for input(s): PH, PCO2, PO2, HCO3, FIO2 in the last 72 hours. No results for input(s): CPK, CKNDX, TROIQ in the last 72 hours.     No lab exists for component: CPKMB  No results found for: BNPP, BNP   Lab Results   Component Value Date/Time    Culture result: KLEBSIELLA PNEUMONIAE (A) 2022 01:04 PM    Culture result: MIXED UROGENITAL DILCIA ISOLATED 2022 02:53 PM    Culture result: Morganella morganii ssp morganii (A) 11/05/2021 11:33 AM    Culture result: ENTEROCOCCUS FAECIUM (A) 11/05/2021 11:33 AM     Lab Results   Component Value Date/Time    TSH 1.25 03/01/2022 01:04 PM       Imaging:    CXR Results  (Last 48 hours)      None          Results from [place] encounter on 03/17/23    XR CHEST PA LAT    Narrative  Indication:  Shortness of Breath    Exam: PA and lateral views of the chest.    Direct comparison is made to prior CXR dated September 2022. Findings: Cardiomediastinal silhouette is within normal limits. There are  bilateral increased interstitial markings, not significantly changed. There is  no focal airspace process. There is no pleural fluid. There is no pneumothorax. Impression  Bilateral increased interstitial markings. No focal airspace  process. Results from [place] encounter on 09/19/22    XR CHEST PA LAT    Narrative  Indication:  Interstitial pulmonary disease, unspecified. Exam: PA and lateral views of the chest.    Direct comparison is made to prior CXR dated    Findings: Cardiomediastinal silhouette is stable. There are diffuse increased  interstitial markings. There is no pleural fluid. There is no pneumothorax. Osseous structures are diffusely demineralized. Impression  Diffuse bilateral increased interstitial markings. Results from Appointment encounter on 07/05/22    XR CHEST PA LAT    Narrative  INDICATION: Shortness of breath. EXAM: CXR 2 Views. COMPARISON: 4/13/2022 and prior. FINDINGS: Frontal and lateral views of the chest show the lungs are free of  acute disease. There is stable interstitial lung disease. Heart size is top  normal. There is no overt pulmonary edema. There is no evident pneumothorax or  pleural effusion. Impression  No acute disease. No significant interval change.     Results from Hospital Encounter encounter on 03/17/23    CT CHEST WO CONT    Narrative  Clinical history: ILD  INDICATION:   ILD  COMPARISON: 4/13/2022    TECHNIQUE:  Thin axial images were obtained through the chest. Coronal and sagittal  reconstructions were generated. Oral contrast was not administered. CT dose reduction was achieved through use of a standardized protocol tailored  for this examination and automatic exposure control for dose modulation. Adaptive statistical iterative reconstruction (ASIR) was utilized. FINDINGS:    SUPRACLAVICULAR REGION: No supraclavicular adenopathy. MEDIASTINUM: Cardiomegaly, coronary artery disease  No hilar or mediastinal  lymphadenopathy. No endotracheal or endobronchial mass. Coronary artery calcifications: present. PLEURA/LUNGS[de-identified] There is no evidence of significant fibrosis. Minimal chronic  interstitial changes are redemonstrated and stable. Groundglass opacities are  improved. Dependent atelectasis. SOFT TISSUE/ AXILLA: No axillary adenopathy. No chest wall mass. UPPER ABDOMEN: Cholelithiasis. There is no intrahepatic duct dilatation. The CBD  is not dilated. BONES: No destructive bone lesion. . No lytic or blastic lesions. No evidence of  fracture or dislocation. Impression  Minimal chronic interstitial change is not significantly progressed compared to  the 2022 examination. Basilar atelectasis is slightly increased. Groundglass opacities are resolved. The remainder the examination is also unchanged. Please note that this dictation was completed with [employer], the computer voice recognition software. Quite often unanticipated grammatical, syntax, homophones, and other interpretive errors are inadvertently transcribed by the computer software. Please disregard these errors. Please excuse any errors that have escaped final proofreading  ______________________________________________________________________      Thank you for allowing us to participate in the care of this patient.       This care involved high complexity medical decision making: I personally:  Reviewed the flowsheet and previous days notes  Reviewed and summarized records or history from previous days note or discussions with staff, family  High Risk Drug therapy requiring intensive monitoring for toxicity: eg steroids, antibiotics  Reviewed and/or ordered Clinical lab tests  Reviewed images and/or ordered Radiology tests  Reviewed the patients / Telemetry  discussed my assessment/management with : Nursing, family for coordination of care      Rupa Lares MD

## 2023-03-22 ENCOUNTER — PATIENT OUTREACH (OUTPATIENT)
Dept: CASE MANAGEMENT | Age: 88
End: 2023-03-22

## 2023-03-22 LAB
ALBUMIN SERPL ELPH-MCNC: 3 G/DL (ref 2.9–4.4)
ALBUMIN/GLOB SERPL: 0.8 (ref 0.7–1.7)
ALPHA1 GLOB SERPL ELPH-MCNC: 0.2 G/DL (ref 0–0.4)
ALPHA2 GLOB SERPL ELPH-MCNC: 0.9 G/DL (ref 0.4–1)
B-GLOBULIN SERPL ELPH-MCNC: 0.9 G/DL (ref 0.7–1.3)
CCP IGA+IGG SERPL IA-ACNC: 40 UNITS (ref 0–19)
GAMMA GLOB SERPL ELPH-MCNC: 1.6 G/DL (ref 0.4–1.8)
GLOBULIN SER CALC-MCNC: 3.6 G/DL (ref 2.2–3.9)
M PROTEIN SERPL ELPH-MCNC: NORMAL G/DL
PROT SERPL-MCNC: 6.6 G/DL (ref 6–8.5)

## 2023-03-22 NOTE — PROGRESS NOTES
Care Transitions Initial Call    Call within 2 business days of discharge: Yes     Patient Current Location: [place]    Patient: Santi Maharaj Patient : 1930 MRN: [id number]    Last Discharge  Street       Date Complaint Diagnosis Description Type Department Provider    3/17/23 Shortness of Breath Acute on chronic congestive heart failure, unspecified heart failure type ([facility] [address] . .. ED to Hosp-Admission (Discharged) (ADMIT) Derrick Bedoya MD; Jeffrey Otero. .. Was this an external facility discharge? No Discharge Facility: [facility] 3/17-3/21     Challenges to be reviewed by the provider   Additional needs identified to be addressed with provider: yes  [facility] 3/17-3/21 CHF exacerbation   3/28 at 12:40pm.  Wants to see another cardio, daughter to schedule. Still some cough, less sob, no edema. Method of communication with provider : chart routing    Discussed [phone] related testing which was not done at this time. Advance Care Planning:   Does patient have an Advance Directive: not on file, does have DNR. Inpatient Readmission Risk score: Unplanned Readmit Risk Score: 13.7    Was this a readmission? no   Patient stated reason for the admission: sob, cough    Patients top risk factors for readmission: medical condition-80year old female, CHF exacerbation, h/o Afib, HTN,HF,ILD    Interventions to address risk factors: Scheduled appointment with PCP- 3/28 at 12:40pm, Scheduled appointment with Specialist-cardio, daughter wants to schedule appt with new cardio, Obtained and reviewed discharge summary and/or continuity of care documents, and Education of patient/family/caregiver/guardian to support self-management-encouraged daughter to check weight q am at sandrita time, same clothing- record weights. If gains > 3 lb in 2 days or > 5 lb in one week, to notify MD for recs. Encourage low salt heart healthy diet.     Care Transition Nurse (CTN) contacted the family by telephone to perform post hospital discharge assessment. Verified name and  with family as identifiers. Provided introduction to self, and explanation of the CTN role. Spoke with daughter, Coleman Mckenzie, patient lives with her. Reports she slept well yesterday . Some coughing during the night. Taking the cough syrup prn. Less sob, no edema noted in legs and feet. Advised to start checking weight q am and recording. See above for parameters of when to notify cardio for recs. CTN reviewed discharge instructions, medical action plan and red flags with family who verbalized understanding. Were discharge instructions available to patient? yes. Reviewed appropriate site of care based on symptoms and resources available to patient including: PCP, Specialist, Urgent Care Clinics, When to call 911, and [employer] Mess[facility]. Family given an opportunity to ask questions and does not have any further questions or concerns at this time. The family agrees to contact the PCP office for questions related to their healthcare. Medication reconciliation was performed with family, who verbalizes understanding of administration of home medications. Advised obtaining a 90-day supply of all daily and as-needed medications. Referral to Pharm D needed: no     Home Health/Outpatient orders at discharge: [address]: na  Date of initial visit: na    Durable Medical Equipment ordered at discharge: None  [address]: na  Durable Medical Equipment received: na    Was patient discharged with a pulse oximeter? no    Discussed follow-up appointments. If no appointment was previously scheduled, appointment scheduling offered: yes.  Is follow up appointment scheduled within 7 days of discharge? no. (First available with pcp)  [address] follow up appointment(s):   Future Appointments   Date Time Provider Davide Steele   3/28/2023 12:40 PM Benja Churchill MD [facility] BS AMB   2023  2:00 PM Benja Churchill MD [facility] BS AMB     Non-[facility] follow up appointment(s): cardio- daughter to schedule; pulmonary, appt already scheduled. Plan for follow-up call in 5-7 days based on severity of symptoms and risk factors. Plan for next call: symptom management-assess current symptoms, self management-following discharge instructions, follow up appointment-saw pcp for MICHELLE visit, and medication management-taking meds as ordered. CTN provided contact information for future needs. Goals Addressed                   This Visit's Progress     Reduce risk of CHF exacerbations and complications. [id number]F exacerbation         Reduce risk of CHF exacerbations and complications. [facility] CHF exacerbation  Reviewed discharge instructions with daughter, Tasia Claude, using teach back. Reviewed meds, education provided on new meds, using teach back. Reviewed red flags: fever, increased sob/cough, nausea,vomiting,diarrhea,chest pain, weight gain of > 3 lb in 2 days or > 5 lb in one week, edema in LE. MICHELLE with pcp, , 3/28. Advised daughter to schedule f/u with cardio, . Daughter plans to call her own cardio and have mother switch to new cardio for f/u. Given CTN contact info if questions/concerns. Encouraged low salt heart healthy diet.   CTN to check back in about 5-7 days,sooner prn.mbt

## 2023-03-24 LAB
A FUMIGATUS1 AB SER QL ID: NEGATIVE
A PULLULANS AB SER QL: NEGATIVE
LACEYELLA SACCHARI AB SER QL: NEGATIVE
PIGEON SERUM AB QL ID: NEGATIVE
S RECTIVIRGULA IGG SER QL ID: NEGATIVE
T VULGARIS AB SER QL ID: NEGATIVE

## 2023-03-24 NOTE — PROGRESS NOTES
Physician Progress Note      PATIENT:               Francisco Lott  CSN #:                  [id number]  :                       1930  ADMIT DATE:       3/17/2023 4:03 PM  [address] [place] DATE:        3/21/2023 11:00 AM  RESPONDING  PROVIDER #:        Po Lott MD          QUERY TEXT:    Pt presented with progressive SOB over the past week most notable on exertion with baseline chronic dry cough, and has CHF documented. Please document in progress notes and discharge summary further specificity regarding the type and acuity of CHF:    The medical record reflects the following:    Risk Factors: 80 y.o w/ chronic interstitial lung disease, and h/o combined systolic and diastolic heart failure ( cardio pn)    Clinical Indicators:  ED PN; bilateral rales present, H&P mild JVD, proBNP 1,195. on admission HR up to 120, RR up to 25  -3/17 CXR Bilateral increased interstitial markings. No focal airspace process. -ECHO estimated EF of 55 - 60%. 3/20 cardio pn: \"EF normal, mild aortic stenosis. Unchanged from 2022 ECHO. -cardio 3/20 pn: SOB; CHF vs ILD -- favoring more pulmonary process based on clinical picture    Treatment: BMP monitoring, CXR, ECHO, Lasix 40 mg IV x on 3/17 then switched to 40 mg po qd    Thank you,  Betito Feliz RN, CDI  Options provided:  -- Acute on Chronic Diastolic CHF/HFpEF present on admission  -- Chronic Diastolic CHF/HFpEF  -- Other - I will add my own diagnosis  -- Disagree - Not applicable / Not valid  -- Disagree - Clinically unable to determine / Unknown  -- Refer to Clinical Documentation Reviewer    PROVIDER RESPONSE TEXT:    This patient has chronic diastolic CHF/HFpEF.     Query created by: Gentry Tai on 3/23/2023 4:09 PM      QUERY TEXT:    Pt presented with progressive SOB over the past week most notable on exertion with baseline chronic dry cough, and has \"Shortness of breath- ILD exacerbation and/or CHF\" on Dr Dee Oneill 3/20 pn    Please document in progress notes and discharge summary the etiology of the SOB:    The medical record reflects the following:    Risk Factors: 80 y.o w/ chronic interstitial lung disease, and h/o combined systolic and diastolic heart failure (2/70 cardio pn)    Clinical Indicators:  ED PN; bilateral rales present, H&P mild JVD, proBNP 1,195. on admission HR up to 120, RR up to 25  -3/17 CXR Bilateral increased interstitial markings. No focal airspace process. -ECHO estimated EF of 55 - 60%. 3/20 cardio pn: \"EF normal, mild aortic stenosis. Unchanged from 11/2022 ECHO. -cardio 3/20 pn: SOB; CHF vs ILD -- favoring more pulmonary process based on clinical picture  -3/20 Pulm pn: Progressive interstitial lung disease    Treatment: BMP monitoring, CXR, ECHO, Lasix 40 mg IV x on 3/17 then switched to 40 mg po qd. Albuterol Nebs q6h, DuoNeb q4h prn, Brovana Neb bid, Pulmicort Neb bid, Solu-Medrol IV x1    Thank you,  Berta Brown RN, CDI  Options provided:  -- SOB due to CHF  -- SOB due to exacerbation of interstitial lung disease  -- Other - I will add my own diagnosis  -- Disagree - Not applicable / Not valid  -- Disagree - Clinically unable to determine / Unknown  -- Refer to Clinical Documentation Reviewer    PROVIDER RESPONSE TEXT:    This patient had SOB due to exacerbation of interstitial lung disease.     Query created by: Tatiana Abebe on 3/23/2023 4:15 PM      Electronically signed by:  Noemy Padilla MD 3/24/2023 7:14 AM

## 2023-03-28 ENCOUNTER — OFFICE VISIT (OUTPATIENT)
Dept: FAMILY MEDICINE CLINIC | Age: 88
End: 2023-03-28
Payer: MEDICARE

## 2023-03-28 VITALS
HEIGHT: 62 IN | HEART RATE: 63 BPM | WEIGHT: 137.8 LBS | SYSTOLIC BLOOD PRESSURE: 122 MMHG | BODY MASS INDEX: 25.36 KG/M2 | DIASTOLIC BLOOD PRESSURE: 60 MMHG | TEMPERATURE: 98.5 F | OXYGEN SATURATION: 98 % | RESPIRATION RATE: 16 BRPM

## 2023-03-28 DIAGNOSIS — F51.01 PRIMARY INSOMNIA: ICD-10-CM

## 2023-03-28 DIAGNOSIS — J84.9 ILD (INTERSTITIAL LUNG DISEASE) (HCC): Primary | ICD-10-CM

## 2023-03-28 PROCEDURE — G8432 DEP SCR NOT DOC, RNG: HCPCS | Performed by: FAMILY MEDICINE

## 2023-03-28 PROCEDURE — 1111F DSCHRG MED/CURRENT MED MERGE: CPT | Performed by: FAMILY MEDICINE

## 2023-03-28 PROCEDURE — G8417 CALC BMI ABV UP PARAM F/U: HCPCS | Performed by: FAMILY MEDICINE

## 2023-03-28 PROCEDURE — 1101F PT FALLS ASSESS-DOCD LE1/YR: CPT | Performed by: FAMILY MEDICINE

## 2023-03-28 PROCEDURE — 1090F PRES/ABSN URINE INCON ASSESS: CPT | Performed by: FAMILY MEDICINE

## 2023-03-28 PROCEDURE — 99213 OFFICE O/P EST LOW 20 MIN: CPT | Performed by: FAMILY MEDICINE

## 2023-03-28 PROCEDURE — G8427 DOCREV CUR MEDS BY ELIG CLIN: HCPCS | Performed by: FAMILY MEDICINE

## 2023-03-28 PROCEDURE — G8536 NO DOC ELDER MAL SCRN: HCPCS | Performed by: FAMILY MEDICINE

## 2023-03-28 PROCEDURE — 1123F ACP DISCUSS/DSCN MKR DOCD: CPT | Performed by: FAMILY MEDICINE

## 2023-03-28 RX ORDER — TRAZODONE HYDROCHLORIDE 300 MG/1
300 TABLET ORAL
Qty: 30 TABLET | Refills: 12 | Status: SHIPPED | OUTPATIENT
Start: 2023-03-28

## 2023-03-28 NOTE — PROGRESS NOTES
HISTORY OF PRESENT ILLNESS  Benita Mcrae is a 80 y.o. female. HPI In for hospital followup. Was admitted for severe dyspnea, thought to have ILD and/or CHF. Breathing is doing much better now. Tapering off of prednisone. ROS    Physical Exam  Vitals and nursing note reviewed. Constitutional:       Appearance: She is well-developed. HENT:      Right Ear: External ear normal.      Left Ear: External ear normal.   Neck:      Thyroid: No thyromegaly. Cardiovascular:      Rate and Rhythm: Normal rate and regular rhythm. Heart sounds: Normal heart sounds. Pulmonary:      Breath sounds: Normal breath sounds. No wheezing. Abdominal:      General: Bowel sounds are normal. There is no distension. Palpations: Abdomen is soft. There is no mass. Tenderness: There is no abdominal tenderness. Lymphadenopathy:      Cervical: No cervical adenopathy. ASSESSMENT and PLAN  Orders Placed This Encounter    traZODone (DESYREL) 300 mg tablet     Diagnoses and all orders for this visit:    1. ILD (interstitial lung disease) ([address]    2. Primary insomnia    Other orders  -     traZODone (DESYREL) 300 mg tablet; Take 1 Tablet by mouth nightly.

## 2023-03-28 NOTE — PROGRESS NOTES
Physician Progress Note      PATIENT:               Danna Guillen  CSN #:                  [id number]  :                       1930  ADMIT DATE:       3/17/2023 4:03 PM  [address] [place] DATE:        3/21/2023 11:00 AM  RESPONDING  PROVIDER #:        Brianna MORENO MD          QUERY TEXT:    Patient admitted with SOB due to exacerbation of interstitial lung disease (validated in Dr Sergio Gale query response). Noted documentation of acute hypoxemic respiratory failure in H&P with SAT >91% on RA. In order to support the diagnosis of acute respiratory failure, please include additional clinical indicators in your documentation. Or please document if the diagnosis of acute respiratory failure has been ruled out after further study. The medical record reflects the following:    Risk Factors: ILD    Clinical Indicators:  RR up to 26, SAT 92-94% on RA->placed on 2L NC SAT 93-96%  H&P: Rales present, No Accessory muscle use, Mild JVD  3/21 Pulmonary pn: ILD, dyspnea. In the ED saturating mid 90s on 2 L/min via nasal cannula    Treatment: SPO2 monitoring, supplemental O2, pulmonary consult    Acute Respiratory Failure Clinical Indicators per  MS-DRG Training Guide and Quick Reference Guide:  pO2 < 60 mmHg or SpO2 (pulse oximetry) < 91% breathing room air  pCO2 > 50 and pH < 7.35  P/F ratio (pO2 / FIO2) < 300  pO2 decrease or pCO2 increase by 10 mmHg from baseline (if known)  Supplemental oxygen of 40% or more  Presence of respiratory distress, tachypnea, dyspnea, shortness of breath, wheezing  Unable to speak in complete sentences  Use of accessory muscles to breathe  Extreme anxiety and feeling of impending doom  Tripod position  Confusion/altered mental status/obtunded    Thank you,  Dominguez Haji RN, CDI  Options provided:  -- Acute Respiratory Failure as evidenced by, Please document evidence.   -- Acute Respiratory Failure ruled out after study  -- Other - I will add my own diagnosis  -- Disagree - Not applicable / Not valid  -- Disagree - Clinically unable to determine / Unknown  -- Refer to Clinical Documentation Reviewer    PROVIDER RESPONSE TEXT:    Acute Respiratory Failure has been ruled out after study.     Query created by: Myra Koehler on 3/28/2023 12:00 PM      Electronically signed by:  Kolton Dent MD 3/28/2023 2:26 PM

## 2023-03-28 NOTE — PROGRESS NOTES
Chief Complaint   Patient presents with    Hospital Follow Up     ED [facility] 3/17-3/21/23       1. \"Have you been to the ER, urgent care clinic since your last visit? Hospitalized since your last visit? \" Yes ED [facility] 3/17-3/21/23    2. \"Have you seen or consulted any other health care providers outside of the [address] [place] [address] since your last visit? \" No     3. For patients aged 39-70: Has the patient had a colonoscopy / FIT/ Cologuard? NA - based on age      If the patient is female:    4. For patients aged 41-77: Has the patient had a mammogram within the past 2 years? NA - based on age or sex      11. For patients aged 21-65: Has the patient had a pap smear?  NA - based on age or sex    Health Maintenance Due   Topic Date Due    Shingles Vaccine (2 of 2) 12/09/2020    COVID-19 Vaccine (4 - Booster for Moderna series) 01/14/2022

## 2023-03-29 ENCOUNTER — PATIENT OUTREACH (OUTPATIENT)
Dept: CASE MANAGEMENT | Age: 88
End: 2023-03-29

## 2023-04-02 NOTE — DISCHARGE SUMMARY
DuyenSaad Arredondo    Name:  Kentrell Kincaid  MR#:  [id number]  :  1930  ACCOUNT #:  [de-identified]  ADMIT DATE:  2023  DISCHARGE DATE:  2023    Please note that over 30 minutes of time was spent on direct patient care on the day of discharge. FINAL DIAGNOSES:  1. Shortness of breath. 2.  Congestive heart failure. 3.  Interstitial lung disease. 4.  Hypokalemia. 5.  Atrial fibrillation. 6.  Mild anemia. DISCHARGE MEDICATIONS:  1. Furosemide 20 mg 2 tablets daily. 2.  Potassium chloride 20 mEq one daily. 3.  Prednisone in a tapering dose. 4.  Trazodone 300 mg at bedtime. 5.  Protonix 40 mg daily. 6.  Claritin 10 mg daily. 7.  Metoprolol 25 mg daily. 8.  Synthroid 100 mcg daily. 9.  Magnesium 400 mg daily. 10.  Albuterol inhaler p.r.n. 11.  Pradaxa 75 mg b.i.d.  12.  Wixela 100/50 one inhalation twice daily. 13.  Fluticasone nasal spray. 14.  Tylenol p.r.n.  15.  Caltrate D twice daily. FOLLOWUP:  Follow up with me in my office in one week. HISTORY OF PRESENT ILLNESS:  The patient is a 58-year-old female with past medical history of chronic interstitial lung disease, chronic atrial fibrillation, on Pradaxa, and heart failure, came to the emergency room with shortness of breath over the past week. It was more noticeable on exertion. The patient has baseline chronic dry cough. She denies chest pain. She denies fevers, chills, myalgias, generalized malaise. No recent abdominal symptoms. Review of systems otherwise normal.  The patient was afebrile in the emergency room. Heart rate was in the 120s with atrial fibrillation and rapid ventricular response. O2 sat was mid 90s on 2 L of oxygen. Chest x-ray showed bilateral interstitial markings without focal airspace disease. Troponin was 14. ProBNP was 1195. White count 6100, hemoglobin 10.3, platelets 818,997. Potassium 3.5, BUN 11, creatinine 0.96.   The patient was given albuterol, diltiazem, Lasix, Solu-Medrol in the emergency room. PAST MEDICAL HISTORY:  Significant for atrial fibrillation, history of heart failure, FEV1 is 1.33, 98% of predicted, FVC of 89% of predicted at 1.59, history of GI bleed, history of hypertension, history of anticoagulant therapy, history of heart murmur, as Jehovah witness refuses blood transfusions, hypothyroidism. PAST SURGERY:  Includes tubal ligation, colonoscopy. HABITS:  Smoking is negative although there was lots of secondhand smoke exposure. Alcohol use is negative. ALLERGIES:  SEROQUEL AND AMBIEN. MEDICATIONS ON ADMISSION:  1. Protonix 40 mg daily. 2.  Trazodone 300 mg daily. 3.  Claritin 10 mg daily. 4.  Metoprolol XL 25 mg daily. 5.  Furosemide 20 mg one or two tablets daily. 6.  Potassium 20 mEq b.i.d.  7.  Synthroid 100 mcg daily. 8.  Magnesium 400 mg daily. 9.  Albuterol inhaler p.r.n.  10.  Pradaxa 75 mg b.i.d.  11.  Wixela 100/50 one inhalation twice daily. 12.  Flonase nasal spray. 13.  Calcium. REVIEW OF SYSTEMS:  Please see HPI. PHYSICAL EXAMINATION ON ADMISSION:  GENERAL:  Alert, cooperative, elderly, frail appearing white female, no apparent distress. NECK:  Supple, symmetrical.  LUNGS:  Scattered wheezes. Some rhonchi. CARDIOVASCULAR:  Irregularly irregular rhythm. No murmurs, thrills, rubs, or gallops. EXTREMITIES:  Trace pitting edema bilaterally. ABDOMEN:  Soft, nontender, nondistended. PSYCH:  Alert and oriented x4. Chest x-ray showed normal cardiac size, bilateral increased interstitial markings not significantly changed from prior chest x-ray. There is no focal airspace disease. No pleural fluid. No pneumothorax. HOSPITAL COURSE:  The patient was admitted. Treated with prednisone 40 mg a day for five days, given Lasix, given DuoNeb every 6 hours. The patient had a repeat echo while in the hospital, shows ejection fraction of 20-17%, normal diastolic function.   Mild stenosis of the aortic valve, mild aortic regurg, mild to moderate mitral regurg. Also note on laboratory data showed a proBNP of 1196, troponin was 14. Iron saturation 9%, ferritin was 52, B12 was 337. Hemoglobin was 9.9. The patient improved on the IV diuretics and prednisone. Was seen in consultation both by Cardiology and Pulmonary. Serology was ordered for hypersensitivity pneumonitis and autoimmune disorders. Also seen by Cardiology who felt symptoms are more related to pulmonary process. CT scan of the lungs was ordered showed some chronic interstitial lung changes not  progressive since 2002. Nevertheless, the patient was feeling better on the above treatment. She was anxious to go home. She was stable and discharged home on 03/21/2023.       Jayna Whelan MD      [id number]  D:  04/01/2023 5:25  T:  04/02/2023 2:12  JOB #:  [id number]

## 2023-04-05 ENCOUNTER — PATIENT OUTREACH (OUTPATIENT)
Dept: CASE MANAGEMENT | Age: 88
End: 2023-04-05

## 2023-04-05 ENCOUNTER — OFFICE VISIT (OUTPATIENT)
Dept: FAMILY MEDICINE CLINIC | Age: 88
End: 2023-04-05
Payer: MEDICARE

## 2023-04-05 PROCEDURE — 1101F PT FALLS ASSESS-DOCD LE1/YR: CPT | Performed by: FAMILY MEDICINE

## 2023-04-05 PROCEDURE — G8536 NO DOC ELDER MAL SCRN: HCPCS | Performed by: FAMILY MEDICINE

## 2023-04-05 PROCEDURE — 1090F PRES/ABSN URINE INCON ASSESS: CPT | Performed by: FAMILY MEDICINE

## 2023-04-05 PROCEDURE — 1111F DSCHRG MED/CURRENT MED MERGE: CPT | Performed by: FAMILY MEDICINE

## 2023-04-05 PROCEDURE — G8432 DEP SCR NOT DOC, RNG: HCPCS | Performed by: FAMILY MEDICINE

## 2023-04-05 PROCEDURE — G8420 CALC BMI NORM PARAMETERS: HCPCS | Performed by: FAMILY MEDICINE

## 2023-04-05 PROCEDURE — 1123F ACP DISCUSS/DSCN MKR DOCD: CPT | Performed by: FAMILY MEDICINE

## 2023-04-05 PROCEDURE — G8427 DOCREV CUR MEDS BY ELIG CLIN: HCPCS | Performed by: FAMILY MEDICINE

## 2023-04-05 PROCEDURE — 99213 OFFICE O/P EST LOW 20 MIN: CPT | Performed by: FAMILY MEDICINE

## 2023-04-05 NOTE — PROGRESS NOTES
Care Transitions Follow Up Call    Challenges to be reviewed by the provider   Additional needs identified to be addressed with provider: no  Daughter reports mother sleeps a lot. Method of communication with provider : chart routing    Care Transition Nurse (CTN) contacted the family by telephone to follow up after admission on 3/17/23. Verified name and  with family as identifiers. Addressed changes since last contact:  improving slowly. Follow up appointment completed? yes. Was follow up appointment scheduled within 7 days of discharge? yes. Advance Care Planning:   Does patient have an Advance Directive:  currently not on file; patient declined education    CTN reviewed discharge instructions, medical action plan and red flags with family and discussed any barriers to care and/or understanding of plan of care after discharge. Discussed appropriate site of care based on symptoms and resources available to patient including: PCP, Specialist, Urgent Care Clinics, When to call 911, and [facility]. The family agrees to contact the PCP office for questions related to their healthcare. Patients top risk factors for readmission: medical condition-CHF exacerbation, h/o HTN,HF,ILD    Interventions to address risk factors: Education of patient/family/caregiver/guardian to support self-management-on way to see pcp for f/u appt today, continue to monitor weight q am and record- notify MD if gains > 3 lb in 2 days or > 5 lb in one week. [facility] follow up appointment(s):   Future Appointments   Date Time Provider Davide Steele   2023  2:00 PM Mariposa Baptiste MD [facility]     Non-[facility] follow up appointment(s): ,cardio- per daughter, maybe 1-2 weeks. CTN provided contact information for future needs. Plan for follow-up call in 5-7 days based on severity of symptoms and risk factors.   Plan for next call: symptom management-assess current symptoms and self management-following discharge instructions. Goals Addressed                   This Visit's Progress     Reduce risk of CHF exacerbations and complications. 3/22/23 [id number] 3/17-3/21 CHF exacerbation  Reviewed discharge instructions with daughter, Rylee March, using teach back. Reviewed meds, education provided on new meds, using teach back. Reviewed red flags: fever, increased sob/cough, nausea,vomiting,diarrhea,chest pain, weight gain of > 3 lb in 2 days or > 5 lb in one week, edema in LE. MICHELLE with pcp, , 3/28. Advised daughter to schedule f/u with cardio, . Daughter plans to call her own cardio and have mother switch to new cardio for f/u. Given CTN contact info if questions/concerns. Encouraged low salt heart healthy diet. CTN to check back in about 5-7 days,sooner prn.mbt  4/5/23  Adriendisha Orozco, reports mother is doing ok, is sleeping a lot. On way to see  for f/u appt. Has been eating and drinking fluids without problem. Checks her weight q am and pm. Recommended just am weight check needed. Daughter says weight stable. No edema in LE, slight sob at times with activity. Advised CTN to check back in about 5-7 days. mbt

## 2023-04-05 NOTE — PROGRESS NOTES
HISTORY OF PRESENT ILLNESS  Jose Kimball is a 80 y.o. female. HPI In for followup. Wears her oxygen at night. Not very active during the day. Co some numbness in both feet. No difficulty walking. No falls, but gait is wobbly at times. Still has a chronic cough. ROS    Physical Exam  Vitals and nursing note reviewed. Constitutional:       Appearance: She is well-developed. HENT:      Right Ear: External ear normal.      Left Ear: External ear normal.   Neck:      Thyroid: No thyromegaly. Cardiovascular:      Rate and Rhythm: Normal rate and regular rhythm. Heart sounds: Normal heart sounds. Pulmonary:      Breath sounds: Normal breath sounds. No wheezing. Abdominal:      General: Bowel sounds are normal. There is no distension. Palpations: Abdomen is soft. There is no mass. Tenderness: There is no abdominal tenderness. Lymphadenopathy:      Cervical: No cervical adenopathy. Neurological:      Comments: Decreased sensation lateral soles. reflexes 1+ and symmetrical.        ASSESSMENT and PLAN  Orders Placed This Encounter    METABOLIC PANEL, BASIC     Diagnoses and all orders for this visit:    1. Cor pulmonale (chronic) (HCC)  -     METABOLIC PANEL, BASIC; Future    2. CRF (chronic renal failure), stage 3 (moderate) (HCC)    3. [facility]      Follow-up and Dispositions    Return in about 2 months (around 6/5/2023).

## 2023-04-21 ENCOUNTER — PATIENT OUTREACH (OUTPATIENT)
Dept: CASE MANAGEMENT | Age: 88
End: 2023-04-21

## 2023-04-21 NOTE — PROGRESS NOTES
Patient has graduated from the Transitions of Care Coordination  program on 4/21/23. Patient/family has the ability to self-manage at this time Care management goals have been completed. Patient was not referred to the [facility] team for further management. Goals Addressed                   This Visit's Progress     COMPLETED: Reduce risk of CHF exacerbations and complications. 3/22/23 [address] 3/17-3/21 CHF exacerbation  Reviewed discharge instructions with daughter, Jagdish Lr, using teach back. Reviewed meds, education provided on new meds, using teach back. Reviewed red flags: fever, increased sob/cough, nausea,vomiting,diarrhea,chest pain, weight gain of > 3 lb in 2 days or > 5 lb in one week, edema in LE. MICHELLE with pcp, , 3/28. Advised daughter to schedule f/u with cardio, . Daughter plans to call her own cardio and have mother switch to new cardio for f/u. Given CTN contact info if questions/concerns. Encouraged low salt heart healthy diet. CTN to check back in about 5-7 days,sooner prn.mbt  4/5/23  Pedro Lockney, reports mother is doing ok, is sleeping a lot. On way to see  for f/u appt. Has been eating and drinking fluids without problem. Checks her weight q am and pm. Recommended just am weight check needed. Daughter says weight stable. No edema in LE, slight sob at times with activity. Advised CTN to check back in about 5-7 days. mbt  4/13/23  Jagdish Lr reports mother doing ok. Still sleeps a lot. Saw pcp, goes back to f/u 5/18. Labs done by pcp ok. No sob, less coughing noted. Some edema in legs few days ago, started wearing her compression stockings, edema has gone down. Using her O2 at night. Advised elevate legs when at rest.  Monitor for edema and sob. CTN to check back in about 5-7 days. mbt  4/21/23  Spoke with daughterPat. Mother doing well now. No sob, did have some edema in legs/feet yesterday.   Advised to elevate legs, wear compression stockings, low salt diet. F/u with pcp and specialists. Wished her well.mbt              Patient has Care Transition Nurse's contact information for any further questions, concerns, or needs.   Patients upcoming visits:    Future Appointments   Date Time Provider Davide Steele   5/18/2023  2:40 PM Brittany Escobar MD [facility] BS AMB

## 2023-04-24 RX ORDER — METOPROLOL SUCCINATE 25 MG/1
TABLET, EXTENDED RELEASE ORAL
Qty: 30 TABLET | Refills: 0 | Status: SHIPPED | OUTPATIENT
Start: 2023-04-24

## 2023-04-24 RX ORDER — FLUTICASONE PROPIONATE AND SALMETEROL 100; 50 UG/1; UG/1
1 POWDER RESPIRATORY (INHALATION) 2 TIMES DAILY
Qty: 1 EACH | Refills: 0 | Status: SHIPPED | OUTPATIENT
Start: 2023-04-24

## 2023-05-01 RX ORDER — DEXTROMETHORPHAN HYDROBROMIDE AND PROMETHAZINE HYDROCHLORIDE 15; 6.25 MG/5ML; MG/5ML
5 SYRUP ORAL 4 TIMES DAILY PRN
COMMUNITY
Start: 2023-03-21

## 2023-05-01 RX ORDER — PREDNISONE 20 MG/1
TABLET ORAL
COMMUNITY
Start: 2023-03-21

## 2023-05-18 ENCOUNTER — OFFICE VISIT (OUTPATIENT)
Age: 88
End: 2023-05-18
Payer: MEDICARE

## 2023-05-18 VITALS
HEART RATE: 73 BPM | OXYGEN SATURATION: 95 % | DIASTOLIC BLOOD PRESSURE: 52 MMHG | TEMPERATURE: 97.8 F | SYSTOLIC BLOOD PRESSURE: 119 MMHG | HEIGHT: 62 IN | WEIGHT: 137 LBS | RESPIRATION RATE: 16 BRPM | BODY MASS INDEX: 25.21 KG/M2

## 2023-05-18 DIAGNOSIS — I48.20 CHRONIC ATRIAL FIBRILLATION, UNSPECIFIED (HCC): ICD-10-CM

## 2023-05-18 DIAGNOSIS — D64.9 ANEMIA, UNSPECIFIED TYPE: ICD-10-CM

## 2023-05-18 DIAGNOSIS — I50.22 CHRONIC SYSTOLIC (CONGESTIVE) HEART FAILURE (HCC): ICD-10-CM

## 2023-05-18 DIAGNOSIS — J84.9 INTERSTITIAL PULMONARY DISEASE, UNSPECIFIED (HCC): Primary | ICD-10-CM

## 2023-05-18 DIAGNOSIS — D69.6 THROMBOCYTOPENIA, UNSPECIFIED (HCC): ICD-10-CM

## 2023-05-18 PROCEDURE — G8419 CALC BMI OUT NRM PARAM NOF/U: HCPCS | Performed by: FAMILY MEDICINE

## 2023-05-18 PROCEDURE — 1090F PRES/ABSN URINE INCON ASSESS: CPT | Performed by: FAMILY MEDICINE

## 2023-05-18 PROCEDURE — 99213 OFFICE O/P EST LOW 20 MIN: CPT | Performed by: FAMILY MEDICINE

## 2023-05-18 PROCEDURE — G8427 DOCREV CUR MEDS BY ELIG CLIN: HCPCS | Performed by: FAMILY MEDICINE

## 2023-05-18 PROCEDURE — 1123F ACP DISCUSS/DSCN MKR DOCD: CPT | Performed by: FAMILY MEDICINE

## 2023-05-18 PROCEDURE — 1036F TOBACCO NON-USER: CPT | Performed by: FAMILY MEDICINE

## 2023-05-18 SDOH — ECONOMIC STABILITY: INCOME INSECURITY: HOW HARD IS IT FOR YOU TO PAY FOR THE VERY BASICS LIKE FOOD, HOUSING, MEDICAL CARE, AND HEATING?: NOT VERY HARD

## 2023-05-18 SDOH — ECONOMIC STABILITY: FOOD INSECURITY: WITHIN THE PAST 12 MONTHS, THE FOOD YOU BOUGHT JUST DIDN'T LAST AND YOU DIDN'T HAVE MONEY TO GET MORE.: NEVER TRUE

## 2023-05-18 SDOH — ECONOMIC STABILITY: HOUSING INSECURITY
IN THE LAST 12 MONTHS, WAS THERE A TIME WHEN YOU DID NOT HAVE A STEADY PLACE TO SLEEP OR SLEPT IN A SHELTER (INCLUDING NOW)?: NO

## 2023-05-18 SDOH — ECONOMIC STABILITY: FOOD INSECURITY: WITHIN THE PAST 12 MONTHS, YOU WORRIED THAT YOUR FOOD WOULD RUN OUT BEFORE YOU GOT MONEY TO BUY MORE.: NEVER TRUE

## 2023-05-18 ASSESSMENT — PATIENT HEALTH QUESTIONNAIRE - PHQ9
SUM OF ALL RESPONSES TO PHQ QUESTIONS 1-9: 0
SUM OF ALL RESPONSES TO PHQ9 QUESTIONS 1 & 2: 0
1. LITTLE INTEREST OR PLEASURE IN DOING THINGS: 0
SUM OF ALL RESPONSES TO PHQ QUESTIONS 1-9: 0
2. FEELING DOWN, DEPRESSED OR HOPELESS: 0
SUM OF ALL RESPONSES TO PHQ QUESTIONS 1-9: 0
SUM OF ALL RESPONSES TO PHQ QUESTIONS 1-9: 0

## 2023-05-18 NOTE — PROGRESS NOTES
Chief Complaint   Patient presents with    Follow-up     6 week follow up    Fatigue         1. \"Have you been to the ER, urgent care clinic since your last visit? Hospitalized since your last visit? \" no    2. \"Have you seen or consulted any other health care providers outside of the [address] [place] [address] since your last visit? \"      3. For patients aged 39-70: Has the patient had a colonoscopy / FIT/ Cologuard? na      If the patient is female:    4. For patients aged 41-77: Has the patient had a mammogram within the past 2 years? na      5.  For patients aged 21-65: Has the patient had a pap smear? na      Health Maintenance Due   Topic Date Due    DTaP/Tdap/Td vaccine (1 - Tdap) Never done    Shingles vaccine (2 of 2) 12/09/2020    COVID-19 Vaccine (4 - Booster for Lavone Carol series) 01/14/2022

## 2023-05-18 NOTE — PROGRESS NOTES
Phyllis Uribe (:  1930) is a 80 y.o. female,Established patient, here for evaluation of the following chief complaint(s):  Follow-up (6 week follow up) and Fatigue         ASSESSMENT/PLAN:  1. Interstitial pulmonary disease, unspecified ([address]  2. Anemia, unspecified type  -     CBC with Auto Differential; Future  3. Chronic atrial fibrillation, unspecified ([address]  4. Thrombocytopenia, unspecified ([address]  5. Chronic systolic (congestive) heart failure      No follow-ups on file. Subjective   SUBJECTIVE/OBJECTIVE:  HPI In for followup. Gets tired easily when goes in stores. Also gets a tired feeling in legs. Some dyspnea when is out walking. Heart doctor thinks that she has a lung problem. Gets short of breath walking from car to the door. Still has soreness and pain in feet secondary to neuropathy. Uses oxygen at home. Sees lung doctor later this month. Review of Systems       Objective   Physical Exam  Cardiovascular:      Rate and Rhythm: Normal rate and regular rhythm. Heart sounds: Normal heart sounds. No murmur heard. Pulmonary:      Effort: Pulmonary effort is normal.      Breath sounds: Normal breath sounds. Comments: Basilar rales bilaterally  Abdominal:      General: Abdomen is flat. Bowel sounds are normal.      Palpations: Abdomen is soft. Musculoskeletal:      Right lower leg: No edema. Left lower leg: No edema. An electronic signature was used to authenticate this note.     --Basia Quintero MD

## 2023-05-19 LAB
BASOPHILS # BLD: 0 K/UL (ref 0–0.1)
BASOPHILS NFR BLD: 1 % (ref 0–1)
DIFFERENTIAL METHOD BLD: ABNORMAL
EOSINOPHIL # BLD: 0.3 K/UL (ref 0–0.4)
EOSINOPHIL NFR BLD: 4 % (ref 0–7)
ERYTHROCYTE [DISTWIDTH] IN BLOOD BY AUTOMATED COUNT: 16.7 % (ref 11.5–14.5)
HCT VFR BLD AUTO: 34.5 % (ref 35–47)
HGB BLD-MCNC: 10.6 G/DL (ref 11.5–16)
IMM GRANULOCYTES # BLD AUTO: 0 K/UL (ref 0–0.04)
IMM GRANULOCYTES NFR BLD AUTO: 0 % (ref 0–0.5)
LYMPHOCYTES # BLD: 2.2 K/UL (ref 0.8–3.5)
LYMPHOCYTES NFR BLD: 36 % (ref 12–49)
MCH RBC QN AUTO: 28.2 PG (ref 26–34)
MCHC RBC AUTO-ENTMCNC: 30.7 G/DL (ref 30–36.5)
MCV RBC AUTO: 91.8 FL (ref 80–99)
MONOCYTES # BLD: 0.6 K/UL (ref 0–1)
MONOCYTES NFR BLD: 9 % (ref 5–13)
NEUTS SEG # BLD: 3 K/UL (ref 1.8–8)
NEUTS SEG NFR BLD: 49 % (ref 32–75)
NRBC # BLD: 0 K/UL (ref 0–0.01)
NRBC BLD-RTO: 0 PER 100 WBC
PLATELET # BLD AUTO: 154 K/UL (ref 150–400)
RBC # BLD AUTO: 3.76 M/UL (ref 3.8–5.2)
WBC # BLD AUTO: 6.1 K/UL (ref 3.6–11)

## 2023-05-23 ENCOUNTER — TELEPHONE (OUTPATIENT)
Age: 88
End: 2023-05-23

## 2023-06-01 ENCOUNTER — TELEPHONE (OUTPATIENT)
Age: 88
End: 2023-06-01

## 2023-06-01 NOTE — TELEPHONE ENCOUNTER
Patient daughter Gwen Michael states that her mother need something for constipation [facility] told her she was constipated please give her a call @ [phone] and [phone]

## 2023-06-09 RX ORDER — PANTOPRAZOLE SODIUM 40 MG/1
TABLET, DELAYED RELEASE ORAL
Qty: 90 TABLET | Refills: 3 | Status: SHIPPED | OUTPATIENT
Start: 2023-06-09

## 2023-06-09 NOTE — TELEPHONE ENCOUNTER
Last appointment: 5/18/23  Next appointment: 9/18/23  Previous refill encounter(s): 2/20/23 #90    Requested Prescriptions     Pending Prescriptions Disp Refills    pantoprazole (PROTONIX) 40 MG tablet [Pharmacy Med Name: PANTOPRAZOLE SOD DR 40 MG TAB] 90 tablet 3     Sig: TAKE ONE TABLET BY MOUTH EVERY DAY         For Pharmacy Admin Tracking Only    Program: Medication Refill  CPA in place:    Recommendation Provided To:    Intervention Detail: New Rx: 1, reason: Patient Preference  Intervention Accepted By:   Carmen Martell Closed?:    Time Spent (min): 5

## 2023-06-22 RX ORDER — DABIGATRAN ETEXILATE MESYLATE 75 MG/1
CAPSULE ORAL
Qty: 180 CAPSULE | Refills: 3 | Status: SHIPPED | OUTPATIENT
Start: 2023-06-22

## 2023-06-22 NOTE — TELEPHONE ENCOUNTER
Last appointment: 5/18/23  Next appointment: 9/18/23  Previous refill encounter(s): 5/31/22 #180 with 3 refills    Requested Prescriptions     Pending Prescriptions Disp Refills    PRADAXA 75 MG capsule [Pharmacy Med Name: Sury Pimentel 75 MG CAPSULE] 180 capsule 3     Sig: TAKE ONE CAPSULE BY MOUTH EVERY 12 HOURS         For Pharmacy Admin Tracking Only    Program: Medication Refill  CPA in place:    Recommendation Provided To:    Intervention Detail: New Rx: 1, reason: Patient Preference  Intervention Accepted By: Yemi Jesus?:    Time Spent (min): 5

## 2023-06-24 ENCOUNTER — HOSPITAL ENCOUNTER (EMERGENCY)
Facility: HOSPITAL | Age: 88
Discharge: HOME | End: 2023-06-24
Attending: EMERGENCY MEDICINE
Payer: MEDICARE

## 2023-06-24 ENCOUNTER — APPOINTMENT (OUTPATIENT)
Facility: HOSPITAL | Age: 88
End: 2023-06-24
Payer: MEDICARE

## 2023-06-24 VITALS
SYSTOLIC BLOOD PRESSURE: 151 MMHG | BODY MASS INDEX: 22.92 KG/M2 | HEIGHT: 65 IN | RESPIRATION RATE: 16 BRPM | TEMPERATURE: 97.5 F | DIASTOLIC BLOOD PRESSURE: 60 MMHG | WEIGHT: 137.57 LBS | OXYGEN SATURATION: 94 % | HEART RATE: 74 BPM

## 2023-06-24 DIAGNOSIS — N30.01 ACUTE CYSTITIS WITH HEMATURIA: ICD-10-CM

## 2023-06-24 DIAGNOSIS — R33.9 URINARY RETENTION: Primary | ICD-10-CM

## 2023-06-24 LAB
ALBUMIN SERPL-MCNC: 3.5 G/DL (ref 3.5–5)
ALBUMIN/GLOB SERPL: 0.7 (ref 1.1–2.2)
ALP SERPL-CCNC: 64 U/L (ref 45–117)
ALT SERPL-CCNC: 12 U/L (ref 12–78)
ANION GAP SERPL CALC-SCNC: 4 MMOL/L (ref 5–15)
APPEARANCE UR: CLEAR
AST SERPL-CCNC: 17 U/L (ref 15–37)
BACTERIA URNS QL MICRO: NEGATIVE /HPF
BASOPHILS # BLD: 0 K/UL (ref 0–0.1)
BASOPHILS NFR BLD: 0 % (ref 0–1)
BILIRUB SERPL-MCNC: 0.3 MG/DL (ref 0.2–1)
BILIRUB UR QL: NEGATIVE
BUN SERPL-MCNC: 13 MG/DL (ref 6–20)
BUN/CREAT SERPL: 12 (ref 12–20)
CALCIUM SERPL-MCNC: 9 MG/DL (ref 8.5–10.1)
CHLORIDE SERPL-SCNC: 108 MMOL/L (ref 97–108)
CO2 SERPL-SCNC: 25 MMOL/L (ref 21–32)
COLOR UR: ABNORMAL
CREAT SERPL-MCNC: 1.08 MG/DL (ref 0.55–1.02)
DIFFERENTIAL METHOD BLD: ABNORMAL
EOSINOPHIL # BLD: 0.2 K/UL (ref 0–0.4)
EOSINOPHIL NFR BLD: 3 % (ref 0–7)
EPITH CASTS URNS QL MICRO: ABNORMAL /LPF
ERYTHROCYTE [DISTWIDTH] IN BLOOD BY AUTOMATED COUNT: 16.3 % (ref 11.5–14.5)
GLOBULIN SER CALC-MCNC: 4.7 G/DL (ref 2–4)
GLUCOSE SERPL-MCNC: 104 MG/DL (ref 65–100)
GLUCOSE UR STRIP.AUTO-MCNC: NEGATIVE MG/DL
HCT VFR BLD AUTO: 34.4 % (ref 35–47)
HGB BLD-MCNC: 10.6 G/DL (ref 11.5–16)
HGB UR QL STRIP: ABNORMAL
HYALINE CASTS URNS QL MICRO: ABNORMAL /LPF (ref 0–2)
IMM GRANULOCYTES # BLD AUTO: 0 K/UL (ref 0–0.04)
IMM GRANULOCYTES NFR BLD AUTO: 0 % (ref 0–0.5)
KETONES UR QL STRIP.AUTO: NEGATIVE MG/DL
LEUKOCYTE ESTERASE UR QL STRIP.AUTO: ABNORMAL
LYMPHOCYTES # BLD: 1.9 K/UL (ref 0.8–3.5)
LYMPHOCYTES NFR BLD: 28 % (ref 12–49)
MCH RBC QN AUTO: 28 PG (ref 26–34)
MCHC RBC AUTO-ENTMCNC: 30.8 G/DL (ref 30–36.5)
MCV RBC AUTO: 91 FL (ref 80–99)
MONOCYTES # BLD: 0.6 K/UL (ref 0–1)
MONOCYTES NFR BLD: 9 % (ref 5–13)
NEUTS SEG # BLD: 4.1 K/UL (ref 1.8–8)
NEUTS SEG NFR BLD: 60 % (ref 32–75)
NITRITE UR QL STRIP.AUTO: NEGATIVE
NRBC # BLD: 0 K/UL (ref 0–0.01)
NRBC BLD-RTO: 0 PER 100 WBC
PH UR STRIP: 5 (ref 5–8)
PLATELET # BLD AUTO: 151 K/UL (ref 150–400)
PMV BLD AUTO: 10.3 FL (ref 8.9–12.9)
POTASSIUM SERPL-SCNC: 3.7 MMOL/L (ref 3.5–5.1)
PROT SERPL-MCNC: 8.2 G/DL (ref 6.4–8.2)
PROT UR STRIP-MCNC: NEGATIVE MG/DL
RBC # BLD AUTO: 3.78 M/UL (ref 3.8–5.2)
RBC #/AREA URNS HPF: ABNORMAL /HPF (ref 0–5)
SODIUM SERPL-SCNC: 137 MMOL/L (ref 136–145)
SP GR UR REFRACTOMETRY: 1.01
URINE CULTURE IF INDICATED: ABNORMAL
UROBILINOGEN UR QL STRIP.AUTO: 0.2 EU/DL (ref 0.2–1)
WBC # BLD AUTO: 6.8 K/UL (ref 3.6–11)
WBC URNS QL MICRO: ABNORMAL /HPF (ref 0–4)

## 2023-06-24 PROCEDURE — 74176 CT ABD & PELVIS W/O CONTRAST: CPT

## 2023-06-24 PROCEDURE — 80053 COMPREHEN METABOLIC PANEL: CPT

## 2023-06-24 PROCEDURE — 87086 URINE CULTURE/COLONY COUNT: CPT

## 2023-06-24 PROCEDURE — 36415 COLL VENOUS BLD VENIPUNCTURE: CPT

## 2023-06-24 PROCEDURE — 51702 INSERT TEMP BLADDER CATH: CPT

## 2023-06-24 PROCEDURE — 85025 COMPLETE CBC W/AUTO DIFF WBC: CPT

## 2023-06-24 PROCEDURE — 99284 EMERGENCY DEPT VISIT MOD MDM: CPT

## 2023-06-24 PROCEDURE — 51798 US URINE CAPACITY MEASURE: CPT

## 2023-06-24 PROCEDURE — 81001 URINALYSIS AUTO W/SCOPE: CPT

## 2023-06-24 RX ORDER — TAMSULOSIN HYDROCHLORIDE 0.4 MG/1
0.4 CAPSULE ORAL DAILY
Qty: 30 CAPSULE | Refills: 0 | Status: SHIPPED | OUTPATIENT
Start: 2023-06-24

## 2023-06-24 RX ORDER — CEPHALEXIN 500 MG/1
500 CAPSULE ORAL 4 TIMES DAILY
Qty: 20 CAPSULE | Refills: 0 | Status: SHIPPED | OUTPATIENT
Start: 2023-06-24

## 2023-06-24 ASSESSMENT — PAIN - FUNCTIONAL ASSESSMENT: PAIN_FUNCTIONAL_ASSESSMENT: 0-10

## 2023-06-24 ASSESSMENT — PAIN SCALES - GENERAL: PAINLEVEL_OUTOF10: 0

## 2023-06-25 LAB
BACTERIA SPEC CULT: NORMAL
SERVICE CMNT-IMP: NORMAL

## 2023-07-10 ENCOUNTER — TELEPHONE (OUTPATIENT)
Age: 88
End: 2023-07-10

## 2023-07-10 NOTE — TELEPHONE ENCOUNTER
Patients daughter Janina Alvarado is calling regarding getting a portable oxygen tank.   Please give the daughter a call @ [phone]

## 2023-07-12 RX ORDER — FUROSEMIDE 20 MG/1
TABLET ORAL
Qty: 180 TABLET | Refills: 1 | Status: SHIPPED | OUTPATIENT
Start: 2023-07-12

## 2023-07-12 NOTE — TELEPHONE ENCOUNTER
Called [employer] to inquire about changing pt to the portable oxygen compressor. Per their representative, pt needs to have an oxygen conserving device evaluation prior to getting the portable concentrator because it is not continuous oxygen. Pts daughter, Silke (on HIPAA form) advised. She will call pts pulmonary MD to get the test ordered.

## 2023-07-12 NOTE — TELEPHONE ENCOUNTER
Last appointment: 5/18/23  Next appointment: 9/18/23  Previous refill encounter(s): 3/21/23 #180    Requested Prescriptions     Pending Prescriptions Disp Refills    furosemide (LASIX) 20 MG tablet [Pharmacy Med Name: FUROSEMIDE 20 MG TABLET] 180 tablet 1     Sig: TAKE 2 TABLETS BY MOUTH DAILY AS NEEDED FOR swelling in feet. For Pharmacy Admin Tracking Only    Program: Medication Refill  CPA in place:    Recommendation Provided To:    Intervention Detail: New Rx: 1, reason: Patient Preference  Intervention Accepted By:   Wendy Kiran Closed?:    Time Spent (min): 5

## 2023-07-17 RX ORDER — FLUTICASONE PROPIONATE AND SALMETEROL 100; 50 UG/1; UG/1
POWDER RESPIRATORY (INHALATION)
Qty: 1 EACH | Refills: 1 | Status: SHIPPED | OUTPATIENT
Start: 2023-07-17

## 2023-07-17 RX ORDER — METOPROLOL SUCCINATE 25 MG/1
TABLET, EXTENDED RELEASE ORAL
Qty: 90 TABLET | Refills: 0 | Status: SHIPPED | OUTPATIENT
Start: 2023-07-17

## 2023-07-22 ENCOUNTER — APPOINTMENT (OUTPATIENT)
Facility: HOSPITAL | Age: 88
End: 2023-07-22
Payer: MEDICARE

## 2023-07-22 ENCOUNTER — HOSPITAL ENCOUNTER (EMERGENCY)
Facility: HOSPITAL | Age: 88
Discharge: HOME | End: 2023-07-22
Attending: EMERGENCY MEDICINE
Payer: MEDICARE

## 2023-07-22 VITALS
HEIGHT: 65 IN | HEART RATE: 76 BPM | SYSTOLIC BLOOD PRESSURE: 130 MMHG | TEMPERATURE: 98.4 F | DIASTOLIC BLOOD PRESSURE: 96 MMHG | RESPIRATION RATE: 18 BRPM | WEIGHT: 136.02 LBS | OXYGEN SATURATION: 97 % | BODY MASS INDEX: 22.66 KG/M2

## 2023-07-22 DIAGNOSIS — J20.9 ACUTE BRONCHITIS, UNSPECIFIED ORGANISM: ICD-10-CM

## 2023-07-22 DIAGNOSIS — J06.9 ACUTE UPPER RESPIRATORY INFECTION: Primary | ICD-10-CM

## 2023-07-22 LAB
FLUAV AG NPH QL IA: NEGATIVE
FLUBV AG NOSE QL IA: NEGATIVE
SARS-COV-2 RDRP RESP QL NAA+PROBE: NOT DETECTED
SOURCE: NORMAL

## 2023-07-22 PROCEDURE — 87635 SARS-COV-2 COVID-19 AMP PRB: CPT

## 2023-07-22 PROCEDURE — 87804 INFLUENZA ASSAY W/OPTIC: CPT

## 2023-07-22 PROCEDURE — 71046 X-RAY EXAM CHEST 2 VIEWS: CPT

## 2023-07-22 PROCEDURE — 99284 EMERGENCY DEPT VISIT MOD MDM: CPT

## 2023-07-22 RX ORDER — ALBUTEROL SULFATE 90 UG/1
2 AEROSOL, METERED RESPIRATORY (INHALATION) EVERY 4 HOURS PRN
Qty: 54 G | Refills: 0 | Status: SHIPPED | OUTPATIENT
Start: 2023-07-22

## 2023-07-22 RX ORDER — BENZONATATE 100 MG/1
100 CAPSULE ORAL 2 TIMES DAILY PRN
Qty: 14 CAPSULE | Refills: 0 | Status: SHIPPED | OUTPATIENT
Start: 2023-07-22 | End: 2023-07-29

## 2023-07-22 ASSESSMENT — PAIN SCALES - GENERAL: PAINLEVEL_OUTOF10: 4

## 2023-07-22 NOTE — ED PROVIDER NOTES
6/14/2011         COLONOSCOPY N/A 5/2/2017    COLONOSCOPY performed by Kedar Bowen MD at [facility] ENDOSCOPY    ECHO TRANSESOPHAGEAL (LUIS) W OR WO CONTR   4/27/2011         GYN      vaginal deliveries x10    TUBAL LIGATION         Family History:  Family History   Problem Relation Age of Onset    Heart Disease Father     Heart Disease Son     Cancer Son        Social History:  Social History     Tobacco Use    Smoking status: Never    Smokeless tobacco: Never   Substance Use Topics    Alcohol use: No    Drug use: No       Allergies: Allergies   Allergen Reactions    Quetiapine Other (See Comments)     PSYCOLOGICAL REACTION Pt didn't feel like them selves but felt like a different person, iT DEEPLY DISTURBED PT.  PT BECAME IRRITABLE. Zolpidem Other (See Comments)     Couldn't move       CURRENT MEDICATIONS      Previous Medications    ACETAMINOPHEN (TYLENOL) 650 MG EXTENDED RELEASE TABLET    Take 1 tablet by mouth in the morning and 1 tablet at noon and 1 tablet in the evening. ALBUTEROL SULFATE HFA (PROVENTIL;VENTOLIN;PROAIR) 108 (90 BASE) MCG/ACT INHALER    Inhale 2 puffs into the lungs every 4 hours as needed    CALCIUM CARB-CHOLECALCIFEROL 600-10 MG-MCG TABS PER TAB    TAKE 1 TABLET TWICE A DAY. (CALCIUM SUPPLEMENT)    CEPHALEXIN (KEFLEX) 500 MG CAPSULE    Take 1 capsule by mouth 4 times daily    FLUTICASONE (FLONASE) 50 MCG/ACT NASAL SPRAY    USE 1-2 SPRAYS IN EACH NOSTRIL ONCE DAILY. FLUTICASONE-SALMETEROL (ADVAIR) 100-50 MCG/ACT AEPB DISKUS INHALER    Take 1 Puff by inhalation two (2) times a day. for breathing    FUROSEMIDE (LASIX) 20 MG TABLET    TAKE 2 TABLETS BY MOUTH DAILY AS NEEDED FOR swelling in feet. LEVOTHYROXINE (SYNTHROID) 100 MCG TABLET    One po daily for thyroid    LORATADINE (CLARITIN) 10 MG TABLET    Take 1 tablet by mouth daily as needed    MAGNESIUM OXIDE (MAG-OX) 400 MG TABLET    Take 1 Tab by mouth daily.  For leg cramps    METOPROLOL SUCCINATE (TOPROL XL) 25 MG EXTENDED [address] [place] [address], [address] [place] [address]      Phone: [phone]   albuterol sulfate  (90 Base) MCG/ACT inhaler  benzonatate 100 MG capsule           DISCONTINUED MEDICATIONS:  Current Discharge Medication List          I am the Primary Clinician of Record. Mary Kumar MD (electronically signed)      (Please note that parts of this dictation were completed with voice recognition software. Quite often unanticipated grammatical, syntax, homophones, and other interpretive errors are inadvertently transcribed by the computer software. Please disregards these errors.  Please excuse any errors that have escaped final proofreading.)         Leidy Escobar MD  07/23/23 2054

## 2023-07-24 ENCOUNTER — OFFICE VISIT (OUTPATIENT)
Age: 88
End: 2023-07-24
Payer: MEDICARE

## 2023-07-24 VITALS — RESPIRATION RATE: 16 BRPM | BODY MASS INDEX: 25.03 KG/M2 | WEIGHT: 136 LBS | HEIGHT: 62 IN

## 2023-07-24 DIAGNOSIS — J44.1 COPD EXACERBATION (HCC): Primary | ICD-10-CM

## 2023-07-24 PROCEDURE — 1123F ACP DISCUSS/DSCN MKR DOCD: CPT | Performed by: FAMILY MEDICINE

## 2023-07-24 PROCEDURE — 1036F TOBACCO NON-USER: CPT | Performed by: FAMILY MEDICINE

## 2023-07-24 PROCEDURE — 99213 OFFICE O/P EST LOW 20 MIN: CPT | Performed by: FAMILY MEDICINE

## 2023-07-24 PROCEDURE — G8427 DOCREV CUR MEDS BY ELIG CLIN: HCPCS | Performed by: FAMILY MEDICINE

## 2023-07-24 PROCEDURE — 1090F PRES/ABSN URINE INCON ASSESS: CPT | Performed by: FAMILY MEDICINE

## 2023-07-24 PROCEDURE — G8420 CALC BMI NORM PARAMETERS: HCPCS | Performed by: FAMILY MEDICINE

## 2023-07-24 PROCEDURE — 3023F SPIROM DOC REV: CPT | Performed by: FAMILY MEDICINE

## 2023-07-24 RX ORDER — MIRABEGRON 50 MG/1
50 TABLET, FILM COATED, EXTENDED RELEASE ORAL DAILY
COMMUNITY
Start: 2023-06-29

## 2023-07-24 RX ORDER — PREDNISONE 20 MG/1
TABLET ORAL
Qty: 7 TABLET | Refills: 0 | Status: SHIPPED | OUTPATIENT
Start: 2023-07-24

## 2023-07-24 RX ORDER — AZITHROMYCIN 250 MG/1
250 TABLET, FILM COATED ORAL SEE ADMIN INSTRUCTIONS
Qty: 6 TABLET | Refills: 0 | Status: SHIPPED | OUTPATIENT
Start: 2023-07-24 | End: 2023-07-29

## 2023-07-24 RX ORDER — LANOLIN ALCOHOL/MO/W.PET/CERES
400 CREAM (GRAM) TOPICAL DAILY
COMMUNITY
Start: 2023-05-30 | End: 2023-07-24 | Stop reason: SDUPTHER

## 2023-07-24 RX ORDER — ALBUTEROL SULFATE 2.5 MG/3ML
2.5 SOLUTION RESPIRATORY (INHALATION) 4 TIMES DAILY PRN
Qty: 60 EACH | Refills: 3 | Status: SHIPPED | OUTPATIENT
Start: 2023-07-24

## 2023-07-24 NOTE — PROGRESS NOTES
Chief Complaint   Patient presents with    Follow-up     ER         1. \"Have you been to the ER, urgent care clinic since your last visit? Hospitalized since your last visit? \" 7/22/23 [facility]  and 6/24/23    2. \"Have you seen or consulted any other health care providers outside of the [address] [place] [address] since your last visit? \" no     3. For patients aged 43-73: Has the patient had a colonoscopy / FIT/ Cologuard? na      If the patient is female:    4. For patients aged 43-66: Has the patient had a mammogram within the past 2 years? na      5.  For patients aged 21-65: Has the patient had a pap smear? na      Health Maintenance Due   Topic Date Due    DTaP/Tdap/Td vaccine (1 - Tdap) Never done    Shingles vaccine (2 of 2) 12/09/2020    COVID-19 Vaccine (4 - Booster for Alexa Halt series) 01/14/2022

## 2023-07-24 NOTE — PROGRESS NOTES
Jannet Strauss (:  1930) is a 80 y.o. female,Established patient, here for evaluation of the following chief complaint(s):  Follow-up (ER), Nasal Congestion, and Cough (Non productive)         ASSESSMENT/PLAN:  1. COPD exacerbation (HCC)  -     azithromycin (ZITHROMAX) 250 MG tablet; Take 1 tablet by mouth See Admin Instructions for 5 days 2 tabs po - day 1, then one tab po daily - days 2-5, Disp-6 tablet, R-0Normal  -     predniSONE (DELTASONE) 20 MG tablet; 2 tabs po- days 1-5, then 1 tab po - days 6-10, Disp-7 tablet, R-0Normal  -     Nebulizers (COMPRESSOR/NEBULIZER) MISC; Disp-1 each, R-3, NormalUse as directed  -     albuterol (PROVENTIL) (2.5 MG/3ML) 0.083% nebulizer solution; Take 3 mLs by nebulization 4 times daily as needed for Wheezing, Disp-60 each, R-3Normal      Return in about 3 months (around 10/24/2023). Subjective   SUBJECTIVE/OBJECTIVE:  HPI has had cough, sore throat, hoarseness for one week. Went to ER 2 days ago, had a cxr taken. Was given a pill for cough and an inhaler. Trying to get a prescription for a nebulizer machine. Some chills, no fever. Cough has been minimally productive. Review of Systems       Objective   Physical Exam  Cardiovascular:      Rate and Rhythm: Normal rate and regular rhythm. Heart sounds: Normal heart sounds. No murmur heard. Pulmonary:      Effort: Pulmonary effort is normal.      Breath sounds: Normal breath sounds. Comments: Basilar rhonchi bilaterally  Abdominal:      General: Abdomen is flat. Bowel sounds are normal.      Palpations: Abdomen is soft. Musculoskeletal:      Right lower leg: No edema. Left lower leg: No edema. An electronic signature was used to authenticate this note.     --Epi Keene MD

## 2023-07-28 RX ORDER — METOPROLOL SUCCINATE 25 MG/1
TABLET, EXTENDED RELEASE ORAL
Qty: 90 TABLET | Refills: 2 | Status: SHIPPED | OUTPATIENT
Start: 2023-07-28

## 2023-09-18 ENCOUNTER — OFFICE VISIT (OUTPATIENT)
Age: 88
End: 2023-09-18
Payer: MEDICARE

## 2023-09-18 VITALS
BODY MASS INDEX: 24.66 KG/M2 | HEART RATE: 76 BPM | WEIGHT: 134 LBS | DIASTOLIC BLOOD PRESSURE: 57 MMHG | RESPIRATION RATE: 16 BRPM | HEIGHT: 62 IN | TEMPERATURE: 97.6 F | OXYGEN SATURATION: 94 % | SYSTOLIC BLOOD PRESSURE: 112 MMHG

## 2023-09-18 DIAGNOSIS — Z00.00 ENCOUNTER FOR MEDICARE ANNUAL WELLNESS EXAM: Primary | ICD-10-CM

## 2023-09-18 DIAGNOSIS — R60.9 EDEMA, UNSPECIFIED TYPE: ICD-10-CM

## 2023-09-18 DIAGNOSIS — C44.629 SQUAMOUS CELL CANCER OF SKIN OF LEFT SHOULDER: ICD-10-CM

## 2023-09-18 PROCEDURE — G0439 PPPS, SUBSEQ VISIT: HCPCS | Performed by: FAMILY MEDICINE

## 2023-09-18 PROCEDURE — G8420 CALC BMI NORM PARAMETERS: HCPCS | Performed by: FAMILY MEDICINE

## 2023-09-18 PROCEDURE — 99213 OFFICE O/P EST LOW 20 MIN: CPT | Performed by: FAMILY MEDICINE

## 2023-09-18 PROCEDURE — 1036F TOBACCO NON-USER: CPT | Performed by: FAMILY MEDICINE

## 2023-09-18 PROCEDURE — G8427 DOCREV CUR MEDS BY ELIG CLIN: HCPCS | Performed by: FAMILY MEDICINE

## 2023-09-18 PROCEDURE — 1090F PRES/ABSN URINE INCON ASSESS: CPT | Performed by: FAMILY MEDICINE

## 2023-09-18 PROCEDURE — 1123F ACP DISCUSS/DSCN MKR DOCD: CPT | Performed by: FAMILY MEDICINE

## 2023-09-18 NOTE — PROGRESS NOTES
Chief Complaint   Patient presents with    Hypertension    Follow-up Chronic Condition         1. \"Have you been to the ER, urgent care clinic since your last visit? Hospitalized since your last visit? \" no    2. \"Have you seen or consulted any other health care providers outside of the [address] [place] [address] since your last visit? \" PULMONOLOGY - DR. Bairon JIMENEZ    3. For patients aged 43-73: Has the patient had a colonoscopy / FIT/ Cologuard? N/A      If the patient is female:    4. For patients aged 43-66: Has the patient had a mammogram within the past 2 years? N/A      5. For patients aged 21-65: Has the patient had a pap smear?  N/A      Health Maintenance Due   Topic Date Due    DTaP/Tdap/Td vaccine (1 - Tdap) Never done    Shingles vaccine (2 of 2) 12/09/2020    COVID-19 Vaccine (4 - Moderna series) 01/14/2022    Flu vaccine (1) 08/01/2023    Annual Wellness Visit (AWV)  10/06/2023

## 2023-09-18 NOTE — PROGRESS NOTES
Pina Malik (:  1930) is a 80 y.o. female,Established patient, here for evaluation of the following chief complaint(s):  Hypertension and Follow-up Chronic Condition         ASSESSMENT/PLAN:  1. Encounter for Medicare annual wellness exam  2. Edema, unspecified type  -     Basic Metabolic Panel; Future  3. Squamous cell cancer of skin of left shoulder  -     External Referral To Dermatology      No follow-ups on file. Subjective   SUBJECTIVE/OBJECTIVE:  Hypertension     In for blood pressure check. Some weakness in legs, and gait is somewhat wobbly. No complaints of chest pain, shortness of breath, TIAs, claudication or edema. Needs medicare wellness exam. Sees Dr. Alan Saleh), cardiology. UTD on pneumococcal vaccines. Would want her children Cheli Lopez and Silke to be her mPOAs if she became incapacitated. Review of Systems   HENT:  Positive for hearing loss. Neurological:         Occ fall. No canes or walkers. Independent in all adls. Memory fair. Psychiatric/Behavioral:          Not depressed. No alcohol. Objective   Physical Exam  Cardiovascular:      Rate and Rhythm: Normal rate and regular rhythm. Heart sounds: Normal heart sounds. No murmur heard. Pulmonary:      Effort: Pulmonary effort is normal.      Breath sounds: Normal breath sounds. Comments: Rales in lower lobes bilaterally  Abdominal:      General: Abdomen is flat. Bowel sounds are normal.      Palpations: Abdomen is soft. Musculoskeletal:      Right lower leg: No edema. Left lower leg: No edema. Comments: 1+ edema bilaterally                  An electronic signature was used to authenticate this note.     --Robina Godwin MD

## 2023-09-19 LAB
ANION GAP SERPL CALC-SCNC: 4 MMOL/L (ref 5–15)
BUN SERPL-MCNC: 12 MG/DL (ref 6–20)
BUN/CREAT SERPL: 10 (ref 12–20)
CALCIUM SERPL-MCNC: 8.7 MG/DL (ref 8.5–10.1)
CHLORIDE SERPL-SCNC: 107 MMOL/L (ref 97–108)
CO2 SERPL-SCNC: 28 MMOL/L (ref 21–32)
CREAT SERPL-MCNC: 1.16 MG/DL (ref 0.55–1.02)
GLUCOSE SERPL-MCNC: 106 MG/DL (ref 65–100)
POTASSIUM SERPL-SCNC: 3.4 MMOL/L (ref 3.5–5.1)
SODIUM SERPL-SCNC: 139 MMOL/L (ref 136–145)

## 2023-10-03 RX ORDER — POTASSIUM CHLORIDE 20 MEQ/1
TABLET, EXTENDED RELEASE ORAL
Qty: 90 TABLET | Refills: 12 | Status: SHIPPED | OUTPATIENT
Start: 2023-10-03

## 2023-10-03 NOTE — PROGRESS NOTES
Pc with pt. Will increase potassium 20 meq to 3 a day. normal appearance , without tenderness upon palpation , no deformities , trachea midline , Thyroid normal size , no thyroid nodules , no masses , no JVD , thyroid nontender

## 2023-11-10 RX ORDER — LEVOTHYROXINE SODIUM 0.1 MG/1
100 TABLET ORAL DAILY
Qty: 90 TABLET | Refills: 3 | Status: SHIPPED | OUTPATIENT
Start: 2023-11-10

## 2023-11-10 RX ORDER — LANOLIN ALCOHOL/MO/W.PET/CERES
400 CREAM (GRAM) TOPICAL DAILY
Qty: 90 TABLET | Refills: 3 | Status: SHIPPED | OUTPATIENT
Start: 2023-11-10

## 2023-11-10 NOTE — TELEPHONE ENCOUNTER
Last appointment: 9/18/23  Next appointment: 2/19/24  Previous refill encounter(s): 10/5/22    Requested Prescriptions     Pending Prescriptions Disp Refills    magnesium oxide (MAG-OX) 400 (240 Mg) MG tablet [Pharmacy Med Name: MAGNESIUM OXIDE 400 MG TABLET] 90 tablet 3     Sig: TAKE ONE TABLET BY MOUTH EVERY DAY    levothyroxine (SYNTHROID) 100 MCG tablet [Pharmacy Med Name: LEVOTHYROXINE 100 MCG TABLET] 90 tablet 3     Sig: TAKE ONE TABLET BY MOUTH EVERY DAY         For Pharmacy Admin Tracking Only    Program: Medication Refill  CPA in place:    Recommendation Provided To:    Intervention Detail: New Rx: 2, reason: Patient Preference  Intervention Accepted By:   Mancel Seats Closed?:    Time Spent (min): 5

## 2024-02-02 RX ORDER — FLUTICASONE PROPIONATE AND SALMETEROL 100; 50 UG/1; UG/1
POWDER RESPIRATORY (INHALATION)
Qty: 3 EACH | Refills: 3 | Status: SHIPPED | OUTPATIENT
Start: 2024-02-02

## 2024-02-02 NOTE — TELEPHONE ENCOUNTER
Last appointment: 9/18/23  Next appointment: 2/19/24  Previous refill encounter(s): 7/17/23 #1 with 1 refill    Requested Prescriptions     Pending Prescriptions Disp Refills    fluticasone-salmeterol (ADVAIR) 100-50 MCG/ACT AEPB diskus inhaler [Pharmacy Med Name: FLUTCSNE/SALMTRL 100/50 UCG] 3 each 3     Sig: Take 1 Puff by inhalation two (2) times a day. for breathing         For Pharmacy Admin Tracking Only    Program: Medication Refill  CPA in place:    Recommendation Provided To:   Intervention Detail: New Rx: 1, reason: Patient Preference  Intervention Accepted By:   Gap Closed?:    Time Spent (min): 5

## 2024-02-19 ENCOUNTER — OFFICE VISIT (OUTPATIENT)
Age: 89
End: 2024-02-19
Payer: MEDICARE

## 2024-02-19 VITALS
TEMPERATURE: 98 F | BODY MASS INDEX: 25.21 KG/M2 | HEIGHT: 62 IN | WEIGHT: 137 LBS | OXYGEN SATURATION: 95 % | SYSTOLIC BLOOD PRESSURE: 143 MMHG | DIASTOLIC BLOOD PRESSURE: 63 MMHG | HEART RATE: 74 BPM | RESPIRATION RATE: 6 BRPM

## 2024-02-19 DIAGNOSIS — J44.1 COPD EXACERBATION (HCC): ICD-10-CM

## 2024-02-19 DIAGNOSIS — H61.21 IMPACTED CERUMEN OF RIGHT EAR: ICD-10-CM

## 2024-02-19 DIAGNOSIS — I48.20 CHRONIC ATRIAL FIBRILLATION, UNSPECIFIED (HCC): ICD-10-CM

## 2024-02-19 DIAGNOSIS — Z00.00 ENCOUNTER FOR MEDICARE ANNUAL WELLNESS EXAM: Primary | ICD-10-CM

## 2024-02-19 DIAGNOSIS — I50.22 CHRONIC SYSTOLIC (CONGESTIVE) HEART FAILURE (HCC): ICD-10-CM

## 2024-02-19 DIAGNOSIS — J84.9 INTERSTITIAL LUNG DISEASE (HCC): ICD-10-CM

## 2024-02-19 DIAGNOSIS — D69.6 THROMBOCYTOPENIA, UNSPECIFIED (HCC): ICD-10-CM

## 2024-02-19 DIAGNOSIS — R05.1 ACUTE COUGH: ICD-10-CM

## 2024-02-19 PROCEDURE — 1036F TOBACCO NON-USER: CPT | Performed by: FAMILY MEDICINE

## 2024-02-19 PROCEDURE — 1123F ACP DISCUSS/DSCN MKR DOCD: CPT | Performed by: FAMILY MEDICINE

## 2024-02-19 PROCEDURE — 3023F SPIROM DOC REV: CPT | Performed by: FAMILY MEDICINE

## 2024-02-19 PROCEDURE — G8427 DOCREV CUR MEDS BY ELIG CLIN: HCPCS | Performed by: FAMILY MEDICINE

## 2024-02-19 PROCEDURE — G8484 FLU IMMUNIZE NO ADMIN: HCPCS | Performed by: FAMILY MEDICINE

## 2024-02-19 PROCEDURE — 99213 OFFICE O/P EST LOW 20 MIN: CPT | Performed by: FAMILY MEDICINE

## 2024-02-19 PROCEDURE — G8419 CALC BMI OUT NRM PARAM NOF/U: HCPCS | Performed by: FAMILY MEDICINE

## 2024-02-19 PROCEDURE — 1090F PRES/ABSN URINE INCON ASSESS: CPT | Performed by: FAMILY MEDICINE

## 2024-02-19 PROCEDURE — G0439 PPPS, SUBSEQ VISIT: HCPCS | Performed by: FAMILY MEDICINE

## 2024-02-19 RX ORDER — WARFARIN SODIUM 2 MG/1
2 TABLET ORAL DAILY
Qty: 30 TABLET | Refills: 5 | Status: SHIPPED | OUTPATIENT
Start: 2024-02-19

## 2024-02-19 ASSESSMENT — PATIENT HEALTH QUESTIONNAIRE - PHQ9
SUM OF ALL RESPONSES TO PHQ QUESTIONS 1-9: 2
SUM OF ALL RESPONSES TO PHQ9 QUESTIONS 1 & 2: 2
2. FEELING DOWN, DEPRESSED OR HOPELESS: 1
SUM OF ALL RESPONSES TO PHQ QUESTIONS 1-9: 2
1. LITTLE INTEREST OR PLEASURE IN DOING THINGS: 1

## 2024-02-19 ASSESSMENT — LIFESTYLE VARIABLES
HOW MANY STANDARD DRINKS CONTAINING ALCOHOL DO YOU HAVE ON A TYPICAL DAY: PATIENT DOES NOT DRINK
HOW OFTEN DO YOU HAVE A DRINK CONTAINING ALCOHOL: NEVER

## 2024-02-19 NOTE — PROGRESS NOTES
Krystin Reed (:  1930) is a 89 y.o. female,Established patient, here for evaluation of the following chief complaint(s):  Medicare AWV         ASSESSMENT/PLAN:  1. Encounter for Medicare annual wellness exam  2. Acute cough  -     XR CHEST (2 VIEWS); Future  3. COPD exacerbation (HCC)  4. Chronic systolic (congestive) heart failure (HCC)  5. Chronic atrial fibrillation, unspecified (HCC)  6. Thrombocytopenia, unspecified (HCC)  7. Impacted cerumen of right ear  8. Interstitial lung disease (HCC)      No follow-ups on file.       R ear irrigated  Subjective   SUBJECTIVE/OBJECTIVE:  HPI Brought in by son in law Leopoldo Sheriff,  to [place]. Ms. Reed has moved in with them this month. Had been living with Kamryn the last few years. Needs medicare wellness exam. Sees Dr. Cedeno (pulmonary), GUI [place] (cardiology). UTD on immunizations. Would want daughters Dolores and Kamryn to be her mPOAs if she became incapacitated. Breathing has been doing reasonably well. Also needs ears checked and a CXR. Hx of mild interstitial lung disease.     Review of Systems   HENT:  Positive for hearing loss (mild hearing loss).    Neurological:         No falls, canes. Independent in all adls. Memory fair.    Psychiatric/Behavioral:          Not depressed. No alcohol.           Objective   Physical Exam  HENT:      Ears:      Comments: Some cerumen in R ear.   Cardiovascular:      Rate and Rhythm: Normal rate and regular rhythm.      Heart sounds: Normal heart sounds. No murmur heard.  Pulmonary:      Effort: Pulmonary effort is normal.      Breath sounds: Normal breath sounds.      Comments: A few basilar rales.   Abdominal:      General: Abdomen is flat. Bowel sounds are normal.      Palpations: Abdomen is soft.   Musculoskeletal:      Right lower leg: No edema.      Left lower leg: No edema.                An electronic signature was used to authenticate this note.    --Yann Hopper MD

## 2024-02-19 NOTE — PROGRESS NOTES
Chief Complaint   Patient presents with    Medicare AWV     FAMILY REQUESTS CHEST XRAY AS DR. TSANG IS UNABLE AND WANTS DONE PRIOR TON NEXT VISIT.    1. \"Have you been to the ER, urgent care clinic since your last visit?  Hospitalized since your last visit?\" NO    2. \"Have you seen or consulted any other health care providers outside of the [facility] since your last visit?\" PODIATRY-Dr. Irizarry and Pulmonology- DR. DEWEY Upcoming on 2/28/24    3. For patients aged 45-75: Has the patient had a colonoscopy / FIT/ Cologuard? N/A      If the patient is female:    4. For patients aged 40-74: Has the patient had a mammogram within the past 2 years? N/A      5. For patients aged 21-65: Has the patient had a pap smear? N/A      Health Maintenance Due   Topic Date Due    DTaP/Tdap/Td vaccine (1 - Tdap) Never done    Respiratory Syncytial Virus (RSV) Pregnant or age 60 yrs+ (1 - 1-dose 60+ series) Never done    Shingles vaccine (2 of 2) 12/09/2020    Flu vaccine (1) 08/01/2023    COVID-19 Vaccine (4 - 2023-24 season) 09/01/2023    Annual Wellness Visit (Medicare Advantage)  01/01/2024

## 2024-02-27 ENCOUNTER — TELEPHONE (OUTPATIENT)
Age: 89
End: 2024-02-27

## 2024-02-27 NOTE — TELEPHONE ENCOUNTER
[phone] - Patient is requesting DMV disabled parking placard documents be completed. Please call Leopoldo Sheriff when form is ready to be picked up> Form ws placed in providers box

## 2024-03-01 ENCOUNTER — TELEPHONE (OUTPATIENT)
Age: 89
End: 2024-03-01

## 2024-03-01 NOTE — TELEPHONE ENCOUNTER
Patient daughter Dolores states that she need to get a call from  regarding her mother Eliquis 2.5 mg she can be reached @ [phone]

## 2024-03-07 ENCOUNTER — CLINICAL DOCUMENTATION (OUTPATIENT)
Age: 89
End: 2024-03-07

## 2024-03-07 NOTE — PROGRESS NOTES
Called daughter back to clarify message regarding eliquis; call went to voice mail and message was left for her to return call to office.

## 2024-04-03 RX ORDER — TRAZODONE HYDROCHLORIDE 300 MG/1
300 TABLET ORAL NIGHTLY
Qty: 90 TABLET | Refills: 3 | Status: SHIPPED | OUTPATIENT
Start: 2024-04-03

## 2024-04-03 RX ORDER — APIXABAN 2.5 MG/1
TABLET, FILM COATED ORAL
Qty: 60 TABLET | Refills: 0 | Status: SHIPPED | OUTPATIENT
Start: 2024-04-03

## 2024-04-03 NOTE — TELEPHONE ENCOUNTER
Eliquis is listed as historical. Please sign if appropriate.    Last appointment: 2/19/24  Next appointment: 8/21/24  Previous refill encounter(s): 3/28/23 Desyrel #30 with 12 refills    Requested Prescriptions     Pending Prescriptions Disp Refills    ELIQUIS 2.5 MG TABS tablet [Pharmacy Med Name: ELIQUIS 2.5 MG TABLET] 60 tablet 0     Sig: TAKE ONE TABLET BY MOUTH 2 TIMES A DAY FOR STROKE PREVENTION    traZODone (DESYREL) 300 MG tablet [Pharmacy Med Name: TRAZODONE 300 MG TABLET] 90 tablet 3     Sig: TAKE ONE TABLET BY MOUTH EVERY NIGHT         For Pharmacy Admin Tracking Only    Program: Medication Refill  CPA in place:    Recommendation Provided To:   Intervention Detail: New Rx: 2, reason: Patient Preference  Intervention Accepted By:   Gap Closed?:    Time Spent (min): 5

## 2024-04-26 RX ORDER — APIXABAN 2.5 MG/1
TABLET, FILM COATED ORAL
Qty: 60 TABLET | Refills: 3 | Status: SHIPPED | OUTPATIENT
Start: 2024-04-26

## 2024-04-26 NOTE — TELEPHONE ENCOUNTER
Last appointment: 2/19/24  Next appointment: 8/21/24  Previous refill encounter(s): 4/3/24 #60    Requested Prescriptions     Pending Prescriptions Disp Refills    ELIQUIS 2.5 MG TABS tablet [Pharmacy Med Name: ELIQUIS 2.5 MG TABLET] 60 tablet 3     Sig: TAKE ONE TABLET BY MOUTH 2 TIMES A DAY FOR STROKE PREVENTION         For Pharmacy Admin Tracking Only    Program: Medication Refill  CPA in place:    Recommendation Provided To:   Intervention Detail: New Rx: 1, reason: Patient Preference  Intervention Accepted By:   Gap Closed?:    Time Spent (min): 5

## 2024-05-02 RX ORDER — PANTOPRAZOLE SODIUM 40 MG/1
TABLET, DELAYED RELEASE ORAL
Qty: 90 TABLET | Refills: 3 | Status: SHIPPED | OUTPATIENT
Start: 2024-05-02

## 2024-05-02 NOTE — TELEPHONE ENCOUNTER
Last appointment: 2/19/24  Next appointment: 8/21/24  Previous refill encounter(s): 6/9/23 #90 with 3 refills    Requested Prescriptions     Pending Prescriptions Disp Refills    pantoprazole (PROTONIX) 40 MG tablet [Pharmacy Med Name: PANTOPRAZOLE SOD DR 40 MG TAB] 90 tablet 3     Sig: TAKE ONE TABLET BY MOUTH EVERY DAY         For Pharmacy Admin Tracking Only    Program: Medication Refill  CPA in place:    Recommendation Provided To:   Intervention Detail: New Rx: 1, reason: Patient Preference  Intervention Accepted By:   Gap Closed?:    Time Spent (min): 5

## 2024-05-16 RX ORDER — PANTOPRAZOLE SODIUM 40 MG/1
TABLET, DELAYED RELEASE ORAL
Qty: 90 TABLET | Refills: 0 | OUTPATIENT
Start: 2024-05-16

## 2024-05-16 RX ORDER — FUROSEMIDE 20 MG/1
TABLET ORAL
Qty: 180 TABLET | Refills: 3 | Status: SHIPPED | OUTPATIENT
Start: 2024-05-16

## 2024-05-16 RX ORDER — METOPROLOL SUCCINATE 25 MG/1
TABLET, EXTENDED RELEASE ORAL
Qty: 90 TABLET | Refills: 3 | Status: SHIPPED | OUTPATIENT
Start: 2024-05-16

## 2024-05-16 NOTE — TELEPHONE ENCOUNTER
Protonix was sent on 5/2/24 for #90 with 3 refills.    Last appointment: 2/19/24  Next appointment: 8/21/24  Previous refill encounter(s): 12/22/23 Lasix #180 with 1 refill, 7/28/23 Toprol #90 with 2 refills    Requested Prescriptions     Pending Prescriptions Disp Refills    furosemide (LASIX) 20 MG tablet [Pharmacy Med Name: FUROSEMIDE 20 MG TABLET] 180 tablet 3     Sig: TAKE 1 TO 2 TABLETS BY MOUTH DAILY AS NEEDED FOR FLUID    metoprolol succinate (TOPROL XL) 25 MG extended release tablet [Pharmacy Med Name: METOPROLOL SUCC ER 25 MG TAB] 90 tablet 3     Sig: TAKE ONE TABLET BY MOUTH EVERY DAY FOR THE HEART     Refused Prescriptions Disp Refills    pantoprazole (PROTONIX) 40 MG tablet [Pharmacy Med Name: PANTOPRAZOLE SOD DR 40 MG TAB] 90 tablet 0     Sig: TAKE ONE TABLET BY MOUTH EVERY DAY         For Pharmacy Admin Tracking Only    Program: Medication Refill  CPA in place:    Recommendation Provided To:   Intervention Detail: Discontinued Rx: 1, reason: Duplicate Therapy and New Rx: 2, reason: Patient Preference  Intervention Accepted By:   Gap Closed?:    Time Spent (min): 5

## 2024-06-12 RX ORDER — FLUTICASONE PROPIONATE AND SALMETEROL 100; 50 UG/1; UG/1
POWDER RESPIRATORY (INHALATION)
Qty: 3 EACH | Refills: 3 | Status: SHIPPED | OUTPATIENT
Start: 2024-06-12

## 2024-06-27 NOTE — PROGRESS NOTES
MICHELLE:   1) Home with HH and f.u appts     10:22 AM- [address] can accept. AVS updated. Pt is cleared to d/c from CM perspective. RN informed. 9:44 AM- CM spoke with Diaz Bowman with [address], still waiting to see if they can accept. Pt's dtr will make pt's f.u appt that she missed due to being in the hospital.     8:43 AM- D/C order noted. CM met with pt and pt dtr at bedside regarding [facility] services. Pt and pt dtr requesting [address], [address] completed, referral sent, waiting on response.  CM notified NN of hospital visit and d/c today and to assist with AMD.     MONICA Hood, [address] [facility]    [phone] Yes

## 2024-07-29 RX ORDER — TRAZODONE HYDROCHLORIDE 300 MG/1
300 TABLET ORAL NIGHTLY
Qty: 90 TABLET | Refills: 3 | Status: SHIPPED | OUTPATIENT
Start: 2024-07-29

## 2024-08-21 ENCOUNTER — OFFICE VISIT (OUTPATIENT)
Age: 89
End: 2024-08-21
Payer: MEDICARE

## 2024-08-21 VITALS
TEMPERATURE: 99 F | HEART RATE: 78 BPM | WEIGHT: 139.2 LBS | OXYGEN SATURATION: 98 % | BODY MASS INDEX: 25.62 KG/M2 | DIASTOLIC BLOOD PRESSURE: 85 MMHG | SYSTOLIC BLOOD PRESSURE: 116 MMHG | HEIGHT: 62 IN | RESPIRATION RATE: 16 BRPM

## 2024-08-21 DIAGNOSIS — N18.30 STAGE 3 CHRONIC KIDNEY DISEASE, UNSPECIFIED WHETHER STAGE 3A OR 3B CKD (HCC): ICD-10-CM

## 2024-08-21 DIAGNOSIS — Z71.89 ACP (ADVANCE CARE PLANNING): ICD-10-CM

## 2024-08-21 DIAGNOSIS — J84.9 INTERSTITIAL LUNG DISEASE (HCC): ICD-10-CM

## 2024-08-21 DIAGNOSIS — E03.9 ACQUIRED HYPOTHYROIDISM: ICD-10-CM

## 2024-08-21 DIAGNOSIS — N39.44 NOCTURNAL ENURESIS: Primary | ICD-10-CM

## 2024-08-21 PROBLEM — J96.01 ACUTE HYPOXEMIC RESPIRATORY FAILURE (HCC): Status: RESOLVED | Noted: 2019-11-20 | Resolved: 2024-08-21

## 2024-08-21 LAB
ALBUMIN SERPL-MCNC: 3.6 G/DL (ref 3.5–5)
ALBUMIN/GLOB SERPL: 0.8 (ref 1.1–2.2)
ALP SERPL-CCNC: 80 U/L (ref 45–117)
ALT SERPL-CCNC: 14 U/L (ref 12–78)
ANION GAP SERPL CALC-SCNC: 2 MMOL/L (ref 5–15)
APPEARANCE UR: CLEAR
AST SERPL-CCNC: 24 U/L (ref 15–37)
BACTERIA URNS QL MICRO: NEGATIVE /HPF
BASOPHILS # BLD: 0.1 K/UL (ref 0–0.1)
BASOPHILS NFR BLD: 1 % (ref 0–1)
BILIRUB SERPL-MCNC: 0.4 MG/DL (ref 0.2–1)
BILIRUB UR QL: NEGATIVE
BUN SERPL-MCNC: 18 MG/DL (ref 6–20)
BUN/CREAT SERPL: 14 (ref 12–20)
CALCIUM SERPL-MCNC: 9.1 MG/DL (ref 8.5–10.1)
CHLORIDE SERPL-SCNC: 108 MMOL/L (ref 97–108)
CO2 SERPL-SCNC: 30 MMOL/L (ref 21–32)
COLOR UR: ABNORMAL
CREAT SERPL-MCNC: 1.29 MG/DL (ref 0.55–1.02)
DIFFERENTIAL METHOD BLD: ABNORMAL
EOSINOPHIL # BLD: 0.4 K/UL (ref 0–0.4)
EOSINOPHIL NFR BLD: 5 % (ref 0–7)
EPITH CASTS URNS QL MICRO: ABNORMAL /LPF
ERYTHROCYTE [DISTWIDTH] IN BLOOD BY AUTOMATED COUNT: 14.6 % (ref 11.5–14.5)
GLOBULIN SER CALC-MCNC: 4.8 G/DL (ref 2–4)
GLUCOSE SERPL-MCNC: 105 MG/DL (ref 65–100)
GLUCOSE UR STRIP.AUTO-MCNC: NEGATIVE MG/DL
HCT VFR BLD AUTO: 35.2 % (ref 35–47)
HGB BLD-MCNC: 10.4 G/DL (ref 11.5–16)
HGB UR QL STRIP: NEGATIVE
IMM GRANULOCYTES # BLD AUTO: 0 K/UL (ref 0–0.04)
IMM GRANULOCYTES NFR BLD AUTO: 0 % (ref 0–0.5)
KETONES UR QL STRIP.AUTO: NEGATIVE MG/DL
LEUKOCYTE ESTERASE UR QL STRIP.AUTO: ABNORMAL
LYMPHOCYTES # BLD: 2.2 K/UL (ref 0.8–3.5)
LYMPHOCYTES NFR BLD: 31 % (ref 12–49)
MCH RBC QN AUTO: 29.1 PG (ref 26–34)
MCHC RBC AUTO-ENTMCNC: 29.5 G/DL (ref 30–36.5)
MCV RBC AUTO: 98.3 FL (ref 80–99)
MONOCYTES # BLD: 0.8 K/UL (ref 0–1)
MONOCYTES NFR BLD: 11 % (ref 5–13)
NEUTS SEG # BLD: 3.7 K/UL (ref 1.8–8)
NEUTS SEG NFR BLD: 52 % (ref 32–75)
NITRITE UR QL STRIP.AUTO: NEGATIVE
NRBC # BLD: 0 K/UL (ref 0–0.01)
NRBC BLD-RTO: 0 PER 100 WBC
PH UR STRIP: 5 (ref 5–8)
PLATELET # BLD AUTO: 116 K/UL (ref 150–400)
POTASSIUM SERPL-SCNC: 4 MMOL/L (ref 3.5–5.1)
PROT SERPL-MCNC: 8.4 G/DL (ref 6.4–8.2)
PROT UR STRIP-MCNC: NEGATIVE MG/DL
RBC # BLD AUTO: 3.58 M/UL (ref 3.8–5.2)
RBC #/AREA URNS HPF: ABNORMAL /HPF (ref 0–5)
RBC MORPH BLD: ABNORMAL
RBC MORPH BLD: ABNORMAL
SODIUM SERPL-SCNC: 140 MMOL/L (ref 136–145)
SP GR UR REFRACTOMETRY: 1.02 (ref 1–1.03)
T4 FREE SERPL-MCNC: 1 NG/DL (ref 0.8–1.5)
TSH SERPL DL<=0.05 MIU/L-ACNC: 1.44 UIU/ML (ref 0.36–3.74)
UROBILINOGEN UR QL STRIP.AUTO: 0.2 EU/DL (ref 0.2–1)
WBC # BLD AUTO: 7.2 K/UL (ref 3.6–11)
WBC URNS QL MICRO: ABNORMAL /HPF (ref 0–4)

## 2024-08-21 PROCEDURE — G8419 CALC BMI OUT NRM PARAM NOF/U: HCPCS | Performed by: FAMILY MEDICINE

## 2024-08-21 PROCEDURE — 1036F TOBACCO NON-USER: CPT | Performed by: FAMILY MEDICINE

## 2024-08-21 PROCEDURE — 99213 OFFICE O/P EST LOW 20 MIN: CPT | Performed by: FAMILY MEDICINE

## 2024-08-21 PROCEDURE — 1090F PRES/ABSN URINE INCON ASSESS: CPT | Performed by: FAMILY MEDICINE

## 2024-08-21 PROCEDURE — 0509F URINE INCON PLAN DOCD: CPT | Performed by: FAMILY MEDICINE

## 2024-08-21 PROCEDURE — G8427 DOCREV CUR MEDS BY ELIG CLIN: HCPCS | Performed by: FAMILY MEDICINE

## 2024-08-21 PROCEDURE — 1123F ACP DISCUSS/DSCN MKR DOCD: CPT | Performed by: FAMILY MEDICINE

## 2024-08-21 RX ORDER — NITROFURANTOIN 25; 75 MG/1; MG/1
100 CAPSULE ORAL 2 TIMES DAILY
COMMUNITY
Start: 2024-08-15

## 2024-08-21 SDOH — ECONOMIC STABILITY: FOOD INSECURITY: WITHIN THE PAST 12 MONTHS, THE FOOD YOU BOUGHT JUST DIDN'T LAST AND YOU DIDN'T HAVE MONEY TO GET MORE.: NEVER TRUE

## 2024-08-21 SDOH — ECONOMIC STABILITY: FOOD INSECURITY: WITHIN THE PAST 12 MONTHS, YOU WORRIED THAT YOUR FOOD WOULD RUN OUT BEFORE YOU GOT MONEY TO BUY MORE.: NEVER TRUE

## 2024-08-21 SDOH — ECONOMIC STABILITY: INCOME INSECURITY: HOW HARD IS IT FOR YOU TO PAY FOR THE VERY BASICS LIKE FOOD, HOUSING, MEDICAL CARE, AND HEATING?: SOMEWHAT HARD

## 2024-08-21 NOTE — PROGRESS NOTES
Krystin Reed (:  1930) is a 89 y.o. female,Established patient, here for evaluation of the following chief complaint(s):  Hypertension, Thyroid Problem, and Follow-up Chronic Condition         Assessment & Plan  Stage 3 chronic kidney disease, unspecified whether stage 3a or 3b CKD (HCC)   Well-controlled, continue current medications    Orders:    Comprehensive Metabolic Panel; Future    Nocturnal enuresis       Orders:    Urinalysis with Microscopic; Future    Culture, Urine; Future  followup with urogynecology   Interstitial lung disease (HCC)   Continue inhalers. Doing reasonably well.     Orders:    CBC with Auto Differential; Future    Acquired hypothyroidism       Orders:    T4, Free; Future    TSH; Future      No follow-ups on file.       Subjective   HPI Brought in today by son-in-law Leopoldo. Has been having some problems with urinary incontinence while sleeping. Has been using depens. Does ok during the day. Has appt with a urogynecologist on Oct. 1. Has also had a few UTIs. Gets occasional swelling in legs. Breathing has been doing ok. Uses oxygen at night. Mild cough attimes. Occasional mild swelling in legs. No chest pains.     Review of Systems       Objective   Physical Exam  Cardiovascular:      Rate and Rhythm: Normal rate and regular rhythm.      Heart sounds: Normal heart sounds. No murmur heard.  Pulmonary:      Effort: Pulmonary effort is normal.      Breath sounds: Normal breath sounds.   Abdominal:      General: Abdomen is flat. Bowel sounds are normal.      Palpations: Abdomen is soft.   Musculoskeletal:      Right lower leg: No edema.      Left lower leg: No edema.                  An electronic signature was used to authenticate this note.    --Yann Hopper MD   Advance Care Planning   Discussed the patient’s choices for care and treatment preferences in case of a health event that adversely affects decision-making abilities or is life-limiting. Recommended the patient

## 2024-08-21 NOTE — PATIENT INSTRUCTIONS

## 2024-08-21 NOTE — ASSESSMENT & PLAN NOTE
Well-controlled, continue current medications    Orders:    Comprehensive Metabolic Panel; Future

## 2024-08-21 NOTE — PROGRESS NOTES
Chief Complaint   Patient presents with    Hypertension    Thyroid Problem    Follow-up Chronic Condition       \"Have you been to the ER, urgent care clinic since your last visit?  Hospitalized since your last visit?\"    NO    “Have you seen or consulted any other health care providers outside of [facility] since your last visit?”    NO            Click Here for Release of Records Request       Vitals:    24 0915   BP: 116/85   Pulse: 78   Resp: 16   Temp: 99 °F (37.2 °C)   SpO2: 98%      Health Maintenance Due   Topic Date Due    DTaP/Tdap/Td vaccine (1 - Tdap) Never done    Respiratory Syncytial Virus (RSV) Pregnant or age 60 yrs+ (1 - 1-dose 60+ series) Never done    Shingles vaccine (2 of 2) 2020    COVID-19 Vaccine (4 - - season) 2023    Flu vaccine (1) 2024        The patient, Krystin Reed, identity was verified by name and .

## 2024-08-22 LAB
BACTERIA SPEC CULT: NORMAL
SERVICE CMNT-IMP: NORMAL

## 2024-08-29 DIAGNOSIS — I48.20 CHRONIC ATRIAL FIBRILLATION (HCC): Primary | ICD-10-CM

## 2024-08-29 RX ORDER — LANOLIN ALCOHOL/MO/W.PET/CERES
400 CREAM (GRAM) TOPICAL DAILY
Qty: 90 TABLET | Refills: 3 | Status: SHIPPED | OUTPATIENT
Start: 2024-08-29

## 2024-08-29 RX ORDER — APIXABAN 2.5 MG/1
TABLET, FILM COATED ORAL
Qty: 180 TABLET | Refills: 3 | Status: SHIPPED | OUTPATIENT
Start: 2024-08-29

## 2024-09-24 RX ORDER — LEVOTHYROXINE SODIUM 100 UG/1
100 TABLET ORAL DAILY
Qty: 90 TABLET | Refills: 3 | Status: SHIPPED | OUTPATIENT
Start: 2024-09-24

## 2024-10-31 RX ORDER — POTASSIUM CHLORIDE 1500 MG/1
TABLET, EXTENDED RELEASE ORAL
Qty: 270 TABLET | Refills: 3 | Status: SHIPPED | OUTPATIENT
Start: 2024-10-31

## 2024-10-31 NOTE — TELEPHONE ENCOUNTER
Last appointment: 8/21/24  Next appointment: 12/23/24  Previous refill encounter(s): 10/3/23    Requested Prescriptions     Pending Prescriptions Disp Refills    potassium chloride (KLOR-CON M) 20 MEQ extended release tablet [Pharmacy Med Name: POTASSIUM CL ER 20 MEQ TABLET] 270 tablet 3     Sig: TAKE TWO TABLETS BY MOUTH EVERY MORNING AND 1 TABLET EVERY EVENING         For Pharmacy Admin Tracking Only    Program: Medication Refill  CPA in place:    Recommendation Provided To:   Intervention Detail: New Rx: 1, reason: Patient Preference  Intervention Accepted By:   Gap Closed?:    Time Spent (min): 5

## 2024-11-25 RX ORDER — TRAZODONE HYDROCHLORIDE 300 MG/1
300 TABLET ORAL NIGHTLY
Qty: 30 TABLET | Refills: 0 | OUTPATIENT
Start: 2024-11-25

## 2024-11-25 NOTE — TELEPHONE ENCOUNTER
Helenel was sent on 7/29/24 for #90 with 3 refills    For Pharmacy Admin Tracking Only    Program: Medication Refill  CPA in place:    Recommendation Provided To:   Intervention Detail: Discontinued Rx: 1, reason: Duplicate Therapy  Intervention Accepted By:   Gap Closed?:    Time Spent (min): 5

## 2024-12-02 RX ORDER — TRAZODONE HYDROCHLORIDE 300 MG/1
300 TABLET ORAL NIGHTLY
Qty: 30 TABLET | Refills: 0 | OUTPATIENT
Start: 2024-12-02

## 2024-12-02 NOTE — TELEPHONE ENCOUNTER
Last office visit with pcp 8/21/24.  Next office visit  with pcp NONE SCHEDULED. Due back around 12/21/24

## 2024-12-03 RX ORDER — METOPROLOL SUCCINATE 25 MG/1
TABLET, EXTENDED RELEASE ORAL
Qty: 90 TABLET | Refills: 0 | Status: SHIPPED | OUTPATIENT
Start: 2024-12-03

## 2024-12-03 RX ORDER — TRAZODONE HYDROCHLORIDE 300 MG/1
300 TABLET ORAL NIGHTLY
Qty: 30 TABLET | Refills: 0 | Status: SHIPPED | OUTPATIENT
Start: 2024-12-03

## 2024-12-20 DIAGNOSIS — I48.20 CHRONIC ATRIAL FIBRILLATION (HCC): ICD-10-CM

## 2024-12-23 ENCOUNTER — OFFICE VISIT (OUTPATIENT)
Age: 88
End: 2024-12-23
Payer: MEDICARE

## 2024-12-23 VITALS
SYSTOLIC BLOOD PRESSURE: 132 MMHG | HEIGHT: 65 IN | TEMPERATURE: 96.1 F | WEIGHT: 144.6 LBS | RESPIRATION RATE: 16 BRPM | HEART RATE: 72 BPM | BODY MASS INDEX: 24.09 KG/M2 | OXYGEN SATURATION: 94 % | DIASTOLIC BLOOD PRESSURE: 63 MMHG

## 2024-12-23 DIAGNOSIS — N18.30 STAGE 3 CHRONIC KIDNEY DISEASE, UNSPECIFIED WHETHER STAGE 3A OR 3B CKD (HCC): Primary | ICD-10-CM

## 2024-12-23 DIAGNOSIS — J44.1 COPD EXACERBATION (HCC): ICD-10-CM

## 2024-12-23 LAB
ALBUMIN SERPL-MCNC: 3.3 G/DL (ref 3.5–5)
ALBUMIN/GLOB SERPL: 0.8 (ref 1.1–2.2)
ALP SERPL-CCNC: 58 U/L (ref 45–117)
ALT SERPL-CCNC: 10 U/L (ref 12–78)
ANION GAP SERPL CALC-SCNC: 5 MMOL/L (ref 2–12)
AST SERPL-CCNC: 17 U/L (ref 15–37)
BASOPHILS # BLD: 0.1 K/UL (ref 0–0.1)
BASOPHILS NFR BLD: 1 % (ref 0–1)
BILIRUB SERPL-MCNC: 0.2 MG/DL (ref 0.2–1)
BUN SERPL-MCNC: 23 MG/DL (ref 6–20)
BUN/CREAT SERPL: 18 (ref 12–20)
CALCIUM SERPL-MCNC: 9 MG/DL (ref 8.5–10.1)
CHLORIDE SERPL-SCNC: 106 MMOL/L (ref 97–108)
CO2 SERPL-SCNC: 29 MMOL/L (ref 21–32)
CREAT SERPL-MCNC: 1.3 MG/DL (ref 0.55–1.02)
DIFFERENTIAL METHOD BLD: ABNORMAL
EOSINOPHIL # BLD: 0.3 K/UL (ref 0–0.4)
EOSINOPHIL NFR BLD: 5 % (ref 0–7)
ERYTHROCYTE [DISTWIDTH] IN BLOOD BY AUTOMATED COUNT: 14.8 % (ref 11.5–14.5)
GLOBULIN SER CALC-MCNC: 4.4 G/DL (ref 2–4)
GLUCOSE SERPL-MCNC: 106 MG/DL (ref 65–100)
HCT VFR BLD AUTO: 33.6 % (ref 35–47)
HGB BLD-MCNC: 10.1 G/DL (ref 11.5–16)
IMM GRANULOCYTES # BLD AUTO: 0 K/UL (ref 0–0.04)
IMM GRANULOCYTES NFR BLD AUTO: 0 % (ref 0–0.5)
LYMPHOCYTES # BLD: 1.9 K/UL (ref 0.8–3.5)
LYMPHOCYTES NFR BLD: 31 % (ref 12–49)
MCH RBC QN AUTO: 29.4 PG (ref 26–34)
MCHC RBC AUTO-ENTMCNC: 30.1 G/DL (ref 30–36.5)
MCV RBC AUTO: 97.7 FL (ref 80–99)
MONOCYTES # BLD: 0.6 K/UL (ref 0–1)
MONOCYTES NFR BLD: 10 % (ref 5–13)
NEUTS SEG # BLD: 3.3 K/UL (ref 1.8–8)
NEUTS SEG NFR BLD: 53 % (ref 32–75)
NRBC # BLD: 0 K/UL (ref 0–0.01)
NRBC BLD-RTO: 0 PER 100 WBC
PLATELET # BLD AUTO: 118 K/UL (ref 150–400)
POTASSIUM SERPL-SCNC: 4.4 MMOL/L (ref 3.5–5.1)
PROT SERPL-MCNC: 7.7 G/DL (ref 6.4–8.2)
RBC # BLD AUTO: 3.44 M/UL (ref 3.8–5.2)
SODIUM SERPL-SCNC: 140 MMOL/L (ref 136–145)
WBC # BLD AUTO: 6.1 K/UL (ref 3.6–11)

## 2024-12-23 PROCEDURE — 1123F ACP DISCUSS/DSCN MKR DOCD: CPT | Performed by: FAMILY MEDICINE

## 2024-12-23 PROCEDURE — 1159F MED LIST DOCD IN RCRD: CPT | Performed by: FAMILY MEDICINE

## 2024-12-23 PROCEDURE — 99213 OFFICE O/P EST LOW 20 MIN: CPT | Performed by: FAMILY MEDICINE

## 2024-12-23 PROCEDURE — 1126F AMNT PAIN NOTED NONE PRSNT: CPT | Performed by: FAMILY MEDICINE

## 2024-12-23 PROCEDURE — 1090F PRES/ABSN URINE INCON ASSESS: CPT | Performed by: FAMILY MEDICINE

## 2024-12-23 PROCEDURE — G8484 FLU IMMUNIZE NO ADMIN: HCPCS | Performed by: FAMILY MEDICINE

## 2024-12-23 PROCEDURE — G8420 CALC BMI NORM PARAMETERS: HCPCS | Performed by: FAMILY MEDICINE

## 2024-12-23 PROCEDURE — 1036F TOBACCO NON-USER: CPT | Performed by: FAMILY MEDICINE

## 2024-12-23 PROCEDURE — G8427 DOCREV CUR MEDS BY ELIG CLIN: HCPCS | Performed by: FAMILY MEDICINE

## 2024-12-23 PROCEDURE — 3023F SPIROM DOC REV: CPT | Performed by: FAMILY MEDICINE

## 2024-12-23 RX ORDER — APIXABAN 2.5 MG/1
TABLET, FILM COATED ORAL
Qty: 60 TABLET | Refills: 0 | Status: SHIPPED | OUTPATIENT
Start: 2024-12-23

## 2024-12-23 RX ORDER — ALBUTEROL SULFATE 0.83 MG/ML
2.5 SOLUTION RESPIRATORY (INHALATION) 4 TIMES DAILY PRN
Qty: 60 EACH | Refills: 3 | Status: CANCELLED | OUTPATIENT
Start: 2024-12-23

## 2024-12-23 RX ORDER — FLUTICASONE PROPIONATE AND SALMETEROL 100; 50 UG/1; UG/1
1 POWDER RESPIRATORY (INHALATION) 2 TIMES DAILY
Qty: 3 EACH | Refills: 3 | Status: SHIPPED | OUTPATIENT
Start: 2024-12-23

## 2024-12-23 RX ORDER — FLUTICASONE PROPIONATE AND SALMETEROL 100; 50 UG/1; UG/1
POWDER RESPIRATORY (INHALATION)
Qty: 1 EACH | Refills: 5 | Status: SHIPPED | OUTPATIENT
Start: 2024-12-23

## 2024-12-23 RX ORDER — TRAZODONE HYDROCHLORIDE 300 MG/1
300 TABLET ORAL NIGHTLY
Qty: 30 TABLET | Refills: 0 | Status: SHIPPED | OUTPATIENT
Start: 2024-12-23

## 2024-12-23 NOTE — ASSESSMENT & PLAN NOTE
Orders:    CBC with Auto Differential; Future    Comprehensive Metabolic Panel; Future    Comprehensive Metabolic Panel    CBC with Auto Differential

## 2024-12-23 NOTE — PROGRESS NOTES
Chief Complaint   Patient presents with    Follow-up Chronic Condition       \"Have you been to the ER, urgent care clinic since your last visit?  Hospitalized since your last visit?\"    NO    “Have you seen or consulted any other health care providers outside of [facility] since your last visit?”    NO            Click Here for Release of Records Request       Vitals:    24 0853   BP: 132/63   Pulse: 72   Resp: 16   Temp: (!) 96.1 °F (35.6 °C)   SpO2: 94%      Health Maintenance Due   Topic Date Due    DTaP/Tdap/Td vaccine (1 - Tdap) Never done    Respiratory Syncytial Virus (RSV) Pregnant or age 60 yrs+ (1 - 1-dose 75+ series) Never done    Flu vaccine (1) 2024    COVID-19 Vaccine ( season) 2024        The patient, Krystin Reed, identity was verified by name and .

## 2024-12-23 NOTE — PROGRESS NOTES
Krystin Reed (:  1930) is a 89 y.o. female,Established patient, here for evaluation of the following chief complaint(s):  Follow-up Chronic Condition         Assessment & Plan  COPD exacerbation (HCC)   Copd is stable. Refill wixela.     Orders:    fluticasone-salmeterol (ADVAIR) 100-50 MCG/ACT AEPB diskus inhaler; Inhale 1 puff into the lungs 2 times daily    Stage 3 chronic kidney disease, unspecified whether stage 3a or 3b CKD (HCC)       Orders:    CBC with Auto Differential; Future    Comprehensive Metabolic Panel; Future    Comprehensive Metabolic Panel    CBC with Auto Differential      No follow-ups on file.     Recheck in 6 months  Subjective   HPI In for blood pressure check. Has had a few basal cell cancers removed. No complaints of chest pain, shortness of breath, TIAs, claudication or edema. Had flu shot at pharmacy       Review of Systems       Objective   Physical Exam  Cardiovascular:      Rate and Rhythm: Normal rate and regular rhythm.      Heart sounds: Normal heart sounds. No murmur heard.  Pulmonary:      Effort: Pulmonary effort is normal.      Breath sounds: Normal breath sounds.      Comments: A few bilateral rhonchi  Abdominal:      General: Abdomen is flat. Bowel sounds are normal.      Palpations: Abdomen is soft.   Musculoskeletal:      Right lower leg: No edema.      Left lower leg: No edema.                  An electronic signature was used to authenticate this note.    --Yann Hopper MD

## 2024-12-23 NOTE — ASSESSMENT & PLAN NOTE
Copd is stable. Refill wixela.     Orders:    fluticasone-salmeterol (ADVAIR) 100-50 MCG/ACT AEPB diskus inhaler; Inhale 1 puff into the lungs 2 times daily

## 2024-12-26 DIAGNOSIS — I48.20 CHRONIC ATRIAL FIBRILLATION (HCC): ICD-10-CM

## 2024-12-27 RX ORDER — TRAZODONE HYDROCHLORIDE 300 MG/1
300 TABLET ORAL NIGHTLY
Qty: 30 TABLET | Refills: 0 | OUTPATIENT
Start: 2024-12-27

## 2024-12-27 RX ORDER — APIXABAN 2.5 MG/1
TABLET, FILM COATED ORAL
Qty: 60 TABLET | Refills: 0 | OUTPATIENT
Start: 2024-12-27

## 2024-12-27 NOTE — TELEPHONE ENCOUNTER
Frandy was sent on 12/23/24    For Pharmacy Admin Tracking Only    Program: Medication Refill  CPA in place:    Recommendation Provided To:   Intervention Detail: Discontinued Rx: 1, reason: Duplicate Therapy  Intervention Accepted By:   Gap Closed?:    Time Spent (min): 5

## 2024-12-27 NOTE — TELEPHONE ENCOUNTER
Eliquis was sent on 12/23/24    For Pharmacy Admin Tracking Only    Program: Medication Refill  CPA in place:    Recommendation Provided To:   Intervention Detail: Discontinued Rx: 1, reason: Duplicate Therapy  Intervention Accepted By:   Gap Closed?:    Time Spent (min): 5

## 2025-01-06 ENCOUNTER — TELEPHONE (OUTPATIENT)
Age: 89
End: 2025-01-06

## 2025-01-06 NOTE — TELEPHONE ENCOUNTER
Patient's daughter Dolores  would like a call back at [phone] to discuss   patient  having post nasal drip and cough for past 3 days. Pharmacy is  [phone].

## 2025-01-07 ENCOUNTER — TELEPHONE (OUTPATIENT)
Age: 89
End: 2025-01-07

## 2025-01-07 NOTE — TELEPHONE ENCOUNTER
----- Message from Tarah ABBOTT sent at 1/6/2025 11:19 AM EST -----  Regarding: ECC Escalation To Practice  ECC Escalation To Practice      Type of Escalation: Acute Care Symptom  --------------------------------------------------------------------------------------------------------------------------    Information for Provider:  Patient is looking for appointment for: Symptom : cough and sneezing   Reasons for Message: Unable to reach practice     Additional Information : Patient is throwing up today and feeling sick , also she is experiencing cough and sneezing for couple of days   --------------------------------------------------------------------------------------------------------------------------    Relationship to Patient: Other - Daughter  john saldaña     Call Back Info: OK to leave message on voicemail  Preferred Call Back Number: Phone [phone]   Robinson Mejia; MPH)  Pediatrics  [address] [place] [address]  Phone: [phone]  Fax: [phone]  Follow Up Time: 1-3 days

## 2025-01-09 ENCOUNTER — TELEPHONE (OUTPATIENT)
Age: 89
End: 2025-01-09

## 2025-01-10 ENCOUNTER — TELEPHONE (OUTPATIENT)
Age: 89
End: 2025-01-10

## 2025-01-10 NOTE — TELEPHONE ENCOUNTER
Patient daughter was contacted and stated mother tested positive on Tuesday evening for COVID. Patient was prescribed Tessalon and she believes medication is causing mom to be very tired. Stated patient has been unable to sleep at night due to pharmacist recommended not to take Trazadone with Tessalon due to drowsiness. Daughter is requesting to stop medication and restart Trazadone. Dr. Hopper gave verbal order to discontinue medication and resume Trazadone. Daughter voiced understanding.

## 2025-01-13 ENCOUNTER — TELEPHONE (OUTPATIENT)
Age: 89
End: 2025-01-13

## 2025-01-13 NOTE — TELEPHONE ENCOUNTER
Pt's daughter Dolores would like to get a call back from Evonne at [phone] or [phone] if she doesn't answer the first number as she said that Evonne told her that she would possibly be able to squeeze the pt in for an appointment this week if she was needing to be seen.

## 2025-01-15 ENCOUNTER — TELEPHONE (OUTPATIENT)
Age: 89
End: 2025-01-15

## 2025-01-15 NOTE — TELEPHONE ENCOUNTER
Patient states she is having trouble sleeping.  Was instructed to not take tessalon capsules with trazodone.  Stopped the tessalon capsules three days ago and restarted trazodone.  Can patient take honey with warm water to help sleep.  Can patient take melatonin with trazodone.   Patient goes to bed around 7 pm.  Told daughter she will wake up but does not get up and will stay in bed around 11 hours per daughter.  Will ask Dr. Hopper for recommendations.

## 2025-01-15 NOTE — TELEPHONE ENCOUNTER
Patients daughter Dolores would like a return call regarding her mother not sleeping, she said it just started with the problem.  Please give her a call @ [phone]

## 2025-01-15 NOTE — TELEPHONE ENCOUNTER
Per Dr. Hopper patient ok to try honey w/warm water. May even try melatonin prn .  Pcp aware of trazodone at bedtime.

## 2025-01-16 ENCOUNTER — CLINICAL DOCUMENTATION (OUTPATIENT)
Age: 89
End: 2025-01-16

## 2025-01-16 NOTE — PROGRESS NOTES
Patient daughter contacted and stated mom is doing better today. Appointment was scheduled with Dr. Hopper for 2/26/25.

## 2025-01-28 DIAGNOSIS — I48.20 CHRONIC ATRIAL FIBRILLATION (HCC): ICD-10-CM

## 2025-01-29 RX ORDER — APIXABAN 2.5 MG/1
TABLET, FILM COATED ORAL
Qty: 180 TABLET | Refills: 1 | Status: SHIPPED | OUTPATIENT
Start: 2025-01-29

## 2025-01-29 RX ORDER — TRAZODONE HYDROCHLORIDE 300 MG/1
300 TABLET ORAL NIGHTLY
Qty: 90 TABLET | Refills: 1 | Status: SHIPPED | OUTPATIENT
Start: 2025-01-29

## 2025-01-29 NOTE — TELEPHONE ENCOUNTER
Last appointment: 12/23/24  Next appointment: 2/26/25, 6/23/25  Previous refill encounter(s): 12/23/24 30 d/s    Requested Prescriptions     Pending Prescriptions Disp Refills    apixaban (ELIQUIS) 2.5 MG TABS tablet [Pharmacy Med Name: ELIQUIS 2.5 MG TABLET] 180 tablet 1     Sig: TAKE ONE TABLET BY MOUTH 2 TIMES A DAY FOR STROKE PREVENTION    traZODone (DESYREL) 300 MG tablet [Pharmacy Med Name: TRAZODONE 300 MG TABLET] 90 tablet 1     Sig: TAKE ONE TABLET BY MOUTH EVERY NIGHT         For Pharmacy Admin Tracking Only    Program: Medication Refill  CPA in place:    Recommendation Provided To:   Intervention Detail: New Rx: 2, reason: Patient Preference  Intervention Accepted By:   Gap Closed?:    Time Spent (min): 5

## 2025-02-21 PROBLEM — R60.0 BILATERAL LEG EDEMA: Status: RESOLVED | Noted: 2017-07-05 | Resolved: 2025-02-21

## 2025-02-21 PROBLEM — I49.9 IRREGULAR HEART BEAT: Status: RESOLVED | Noted: 2018-06-04 | Resolved: 2025-02-21

## 2025-02-21 PROBLEM — N18.30 CKD (CHRONIC KIDNEY DISEASE) STAGE 3, GFR 30-59 ML/MIN (HCC): Status: RESOLVED | Noted: 2017-07-05 | Resolved: 2025-02-21

## 2025-02-21 PROBLEM — I50.9 CHF (CONGESTIVE HEART FAILURE) (HCC): Status: RESOLVED | Noted: 2019-11-20 | Resolved: 2025-02-21

## 2025-02-21 PROBLEM — J18.9 PNEUMONIA: Status: RESOLVED | Noted: 2019-11-20 | Resolved: 2025-02-21

## 2025-02-21 PROBLEM — I50.9 CHF EXACERBATION (HCC): Status: RESOLVED | Noted: 2023-03-18 | Resolved: 2025-02-21

## 2025-02-21 PROBLEM — E86.0 DEHYDRATION: Status: RESOLVED | Noted: 2019-12-08 | Resolved: 2025-02-21

## 2025-02-21 PROBLEM — R07.9 CHEST PAIN WITH MODERATE RISK FOR CARDIAC ETIOLOGY: Status: RESOLVED | Noted: 2017-07-06 | Resolved: 2025-02-21

## 2025-02-21 PROBLEM — N18.32 STAGE 3B CHRONIC KIDNEY DISEASE (HCC): Status: ACTIVE | Noted: 2022-05-31

## 2025-02-21 PROBLEM — J44.1 COPD EXACERBATION (HCC): Status: RESOLVED | Noted: 2024-02-19 | Resolved: 2025-02-21

## 2025-02-25 RX ORDER — POTASSIUM CHLORIDE 1500 MG/1
TABLET, EXTENDED RELEASE ORAL
Qty: 90 TABLET | Refills: 3 | Status: SHIPPED | OUTPATIENT
Start: 2025-02-25

## 2025-02-26 ENCOUNTER — OFFICE VISIT (OUTPATIENT)
Age: 89
End: 2025-02-26
Payer: MEDICARE

## 2025-02-26 VITALS
WEIGHT: 145.4 LBS | RESPIRATION RATE: 16 BRPM | BODY MASS INDEX: 26.76 KG/M2 | OXYGEN SATURATION: 96 % | DIASTOLIC BLOOD PRESSURE: 55 MMHG | HEART RATE: 72 BPM | HEIGHT: 62 IN | TEMPERATURE: 98 F | SYSTOLIC BLOOD PRESSURE: 148 MMHG

## 2025-02-26 DIAGNOSIS — N18.32 STAGE 3B CHRONIC KIDNEY DISEASE (HCC): ICD-10-CM

## 2025-02-26 DIAGNOSIS — F51.01 PRIMARY INSOMNIA: ICD-10-CM

## 2025-02-26 DIAGNOSIS — I48.20 CHRONIC ATRIAL FIBRILLATION, UNSPECIFIED (HCC): ICD-10-CM

## 2025-02-26 DIAGNOSIS — Z00.00 ENCOUNTER FOR MEDICARE ANNUAL WELLNESS EXAM: Primary | ICD-10-CM

## 2025-02-26 PROCEDURE — 99213 OFFICE O/P EST LOW 20 MIN: CPT | Performed by: FAMILY MEDICINE

## 2025-02-26 RX ORDER — MIRTAZAPINE 15 MG/1
15 TABLET, FILM COATED ORAL NIGHTLY
Qty: 30 TABLET | Refills: 5 | Status: SHIPPED | OUTPATIENT
Start: 2025-02-26

## 2025-02-26 SDOH — ECONOMIC STABILITY: FOOD INSECURITY: WITHIN THE PAST 12 MONTHS, YOU WORRIED THAT YOUR FOOD WOULD RUN OUT BEFORE YOU GOT MONEY TO BUY MORE.: NEVER TRUE

## 2025-02-26 SDOH — ECONOMIC STABILITY: FOOD INSECURITY: WITHIN THE PAST 12 MONTHS, THE FOOD YOU BOUGHT JUST DIDN'T LAST AND YOU DIDN'T HAVE MONEY TO GET MORE.: NEVER TRUE

## 2025-02-26 ASSESSMENT — PATIENT HEALTH QUESTIONNAIRE - PHQ9
SUM OF ALL RESPONSES TO PHQ QUESTIONS 1-9: 2
2. FEELING DOWN, DEPRESSED OR HOPELESS: SEVERAL DAYS
SUM OF ALL RESPONSES TO PHQ9 QUESTIONS 1 & 2: 2
SUM OF ALL RESPONSES TO PHQ QUESTIONS 1-9: 2
SUM OF ALL RESPONSES TO PHQ QUESTIONS 1-9: 2
1. LITTLE INTEREST OR PLEASURE IN DOING THINGS: SEVERAL DAYS
SUM OF ALL RESPONSES TO PHQ QUESTIONS 1-9: 2

## 2025-02-26 ASSESSMENT — LIFESTYLE VARIABLES
HOW OFTEN DO YOU HAVE A DRINK CONTAINING ALCOHOL: NEVER
HOW MANY STANDARD DRINKS CONTAINING ALCOHOL DO YOU HAVE ON A TYPICAL DAY: PATIENT DOES NOT DRINK

## 2025-02-26 NOTE — PROGRESS NOTES
Krystin Reed (:  1930) is a 89 y.o. female,Established patient, here for evaluation of the following chief complaint(s):  Medicare AWV         Assessment & Plan  Chronic atrial fibrillation, unspecified (HCC)    Continue eliquis         Stage 3b chronic kidney disease (HCC)    Last gfr 38, recheck lab in 3 months.          Primary insomnia       Orders:    mirtazapine (REMERON) 15 MG tablet; Take 1 tablet by mouth nightly    Encounter for Medicare annual wellness exam              Return in about 3 months (around 2025).       Subjective   HPI In for followup. Saw Dr. Cedeno yesterday. Breathing has been doing fairly good.   Needs medicare wellness exam. Sees Dr. Cedeno(pulmonary), GUI Mera (cardiology), Dr. Vigil (urology) UTD on flu shots. Pneumococcal shots, zoster vaccine. Hasn't had rsv vaccine. Recommended this to pt and family. Would want daughter Dolores and son in law Leopoldo to be her mPOAs if she became incapacitated. Also co trazodone not helping her sleep. This is her biggest complaint.     Review of Systems   HENT:  Positive for hearing loss.    Neurological:         Fell one time. No cane. Independent in all adls. Lives with daughter and son-in-law. Memory fair.l    Psychiatric/Behavioral:          Not depressed., no alcohol.           Objective   Physical Exam  Cardiovascular:      Rate and Rhythm: Normal rate and regular rhythm.      Heart sounds: Normal heart sounds. No murmur heard.  Pulmonary:      Effort: Pulmonary effort is normal.      Breath sounds: Normal breath sounds.   Abdominal:      General: Abdomen is flat. Bowel sounds are normal.      Palpations: Abdomen is soft.   Musculoskeletal:      Right lower leg: No edema.      Left lower leg: No edema.                  An electronic signature was used to authenticate this note.    --Yann Hopper MD

## 2025-03-25 DIAGNOSIS — I48.20 CHRONIC ATRIAL FIBRILLATION (HCC): ICD-10-CM

## 2025-03-25 RX ORDER — APIXABAN 2.5 MG/1
TABLET, FILM COATED ORAL
Qty: 60 TABLET | Refills: 5 | Status: SHIPPED | OUTPATIENT
Start: 2025-03-25

## 2025-04-29 RX ORDER — METOPROLOL SUCCINATE 25 MG/1
TABLET, EXTENDED RELEASE ORAL
Qty: 90 TABLET | Refills: 3 | Status: SHIPPED | OUTPATIENT
Start: 2025-04-29

## 2025-04-29 RX ORDER — FUROSEMIDE 20 MG/1
TABLET ORAL
Qty: 180 TABLET | Refills: 3 | Status: SHIPPED | OUTPATIENT
Start: 2025-04-29

## 2025-04-29 RX ORDER — PANTOPRAZOLE SODIUM 40 MG/1
40 TABLET, DELAYED RELEASE ORAL DAILY
Qty: 90 TABLET | Refills: 3 | Status: SHIPPED | OUTPATIENT
Start: 2025-04-29

## 2025-04-29 NOTE — TELEPHONE ENCOUNTER
Last appointment: 2/26/25  Next appointment: 5/28/25  Previous refill encounter(s): 12/3/24 Toprol #90, 5/16/24 Lasix, 5/2/24 Protonix    Requested Prescriptions     Pending Prescriptions Disp Refills    pantoprazole (PROTONIX) 40 MG tablet [Pharmacy Med Name: PANTOPRAZOLE SOD DR 40 MG TAB] 90 tablet 3     Sig: TAKE ONE TABLET BY MOUTH EVERY DAY    furosemide (LASIX) 20 MG tablet [Pharmacy Med Name: FUROSEMIDE 20 MG TABLET] 180 tablet 3     Sig: TAKE 1 TO 2 TABLETS BY MOUTH DAILY AS NEEDED FOR FLUID    metoprolol succinate (TOPROL XL) 25 MG extended release tablet [Pharmacy Med Name: METOPROLOL SUCC ER 25 MG TAB] 90 tablet 3     Sig: TAKE ONE TABLET BY MOUTH EVERY DAY FOR THE HEART         For Pharmacy Admin Tracking Only    Program: Medication Refill  CPA in place:    Recommendation Provided To:   Intervention Detail: New Rx: 3, reason: Patient Preference  Intervention Accepted By:   Gap Closed?:    Time Spent (min): 5

## 2025-05-08 NOTE — PROGRESS NOTES
Called patient with results,informed as per MD result note and patient verbalizes understanding.    TRANSFER - IN REPORT:    Verbal report received from [address] [place] [address] RN(name) on [address]  being received from ED(unit) for routine progression of care      Report consisted of patients Situation, Background, Assessment and   Recommendations(SBAR). Information from the following report(s) SBAR, Kardex, ED Summary, Intake/Output, MAR and Cardiac Rhythm AFib was reviewed with the receiving nurse. Opportunity for questions and clarification was provided. Assessment completed upon patients arrival to unit and care assumed.

## 2025-05-14 ENCOUNTER — HOSPITAL ENCOUNTER (INPATIENT)
Facility: HOSPITAL | Age: 89
LOS: 1 days | Discharge: HOME | DRG: 069 | End: 2025-05-15
Attending: EMERGENCY MEDICINE | Admitting: STUDENT IN AN ORGANIZED HEALTH CARE EDUCATION/TRAINING PROGRAM
Payer: MEDICARE

## 2025-05-14 ENCOUNTER — APPOINTMENT (OUTPATIENT)
Facility: HOSPITAL | Age: 89
DRG: 069 | End: 2025-05-14
Payer: MEDICARE

## 2025-05-14 DIAGNOSIS — G45.9 TIA (TRANSIENT ISCHEMIC ATTACK): Primary | ICD-10-CM

## 2025-05-14 DIAGNOSIS — J44.1 COPD EXACERBATION (HCC): ICD-10-CM

## 2025-05-14 LAB
ALBUMIN SERPL-MCNC: 3.3 G/DL (ref 3.5–5)
ALBUMIN/GLOB SERPL: 0.7 (ref 1.1–2.2)
ALP SERPL-CCNC: 56 U/L (ref 45–117)
ALT SERPL-CCNC: 11 U/L (ref 12–78)
ANION GAP SERPL CALC-SCNC: 4 MMOL/L (ref 2–12)
AST SERPL-CCNC: 20 U/L (ref 15–37)
BASOPHILS # BLD: 0.03 K/UL (ref 0–0.1)
BASOPHILS NFR BLD: 0.8 % (ref 0–1)
BILIRUB SERPL-MCNC: 0.3 MG/DL (ref 0.2–1)
BUN SERPL-MCNC: 18 MG/DL (ref 6–20)
BUN/CREAT SERPL: 14 (ref 12–20)
CALCIUM SERPL-MCNC: 9.2 MG/DL (ref 8.5–10.1)
CHLORIDE SERPL-SCNC: 101 MMOL/L (ref 97–108)
CO2 SERPL-SCNC: 32 MMOL/L (ref 21–32)
CREAT SERPL-MCNC: 1.28 MG/DL (ref 0.55–1.02)
DIFFERENTIAL METHOD BLD: ABNORMAL
EKG DIAGNOSIS: NORMAL
EKG Q-T INTERVAL: 484 MS
EKG QRS DURATION: 146 MS
EKG QTC CALCULATION (BAZETT): 475 MS
EKG R AXIS: -60 DEGREES
EKG T AXIS: 15 DEGREES
EKG VENTRICULAR RATE: 58 BPM
EOSINOPHIL # BLD: 0.2 K/UL (ref 0–0.4)
EOSINOPHIL NFR BLD: 5.4 % (ref 0–7)
ERYTHROCYTE [DISTWIDTH] IN BLOOD BY AUTOMATED COUNT: 14 % (ref 11.5–14.5)
GLOBULIN SER CALC-MCNC: 4.8 G/DL (ref 2–4)
GLUCOSE BLD STRIP.AUTO-MCNC: 104 MG/DL (ref 65–117)
GLUCOSE SERPL-MCNC: 116 MG/DL (ref 65–100)
HCT VFR BLD AUTO: 35.6 % (ref 35–47)
HGB BLD-MCNC: 10.9 G/DL (ref 11.5–16)
IMM GRANULOCYTES # BLD AUTO: 0.01 K/UL (ref 0–0.04)
IMM GRANULOCYTES NFR BLD AUTO: 0.3 % (ref 0–0.5)
INR PPP: 1.2 (ref 0.9–1.1)
LYMPHOCYTES # BLD: 1.45 K/UL (ref 0.8–3.5)
LYMPHOCYTES NFR BLD: 39.3 % (ref 12–49)
MCH RBC QN AUTO: 29.5 PG (ref 26–34)
MCHC RBC AUTO-ENTMCNC: 30.6 G/DL (ref 30–36.5)
MCV RBC AUTO: 96.2 FL (ref 80–99)
MONOCYTES # BLD: 0.44 K/UL (ref 0–1)
MONOCYTES NFR BLD: 11.9 % (ref 5–13)
NEUTS SEG # BLD: 1.56 K/UL (ref 1.8–8)
NEUTS SEG NFR BLD: 42.3 % (ref 32–75)
NRBC # BLD: 0 K/UL (ref 0–0.01)
NRBC BLD-RTO: 0 PER 100 WBC
PLATELET # BLD AUTO: 111 K/UL (ref 150–400)
PMV BLD AUTO: 11.5 FL (ref 8.9–12.9)
POTASSIUM SERPL-SCNC: 4.1 MMOL/L (ref 3.5–5.1)
PROT SERPL-MCNC: 8.1 G/DL (ref 6.4–8.2)
PROTHROMBIN TIME: 12.4 SEC (ref 9.2–11.2)
RBC # BLD AUTO: 3.7 M/UL (ref 3.8–5.2)
SERVICE CMNT-IMP: NORMAL
SODIUM SERPL-SCNC: 137 MMOL/L (ref 136–145)
WBC # BLD AUTO: 3.7 K/UL (ref 3.6–11)

## 2025-05-14 PROCEDURE — 85610 PROTHROMBIN TIME: CPT

## 2025-05-14 PROCEDURE — 70450 CT HEAD/BRAIN W/O DYE: CPT

## 2025-05-14 PROCEDURE — 80053 COMPREHEN METABOLIC PANEL: CPT

## 2025-05-14 PROCEDURE — 36415 COLL VENOUS BLD VENIPUNCTURE: CPT

## 2025-05-14 PROCEDURE — 6360000004 HC RX CONTRAST MEDICATION: Performed by: EMERGENCY MEDICINE

## 2025-05-14 PROCEDURE — 70496 CT ANGIOGRAPHY HEAD: CPT

## 2025-05-14 PROCEDURE — 99285 EMERGENCY DEPT VISIT HI MDM: CPT

## 2025-05-14 PROCEDURE — 82962 GLUCOSE BLOOD TEST: CPT

## 2025-05-14 PROCEDURE — 85025 COMPLETE CBC W/AUTO DIFF WBC: CPT

## 2025-05-14 PROCEDURE — 93005 ELECTROCARDIOGRAM TRACING: CPT | Performed by: EMERGENCY MEDICINE

## 2025-05-14 RX ORDER — IOPAMIDOL 755 MG/ML
100 INJECTION, SOLUTION INTRAVASCULAR
Status: COMPLETED | OUTPATIENT
Start: 2025-05-14 | End: 2025-05-14

## 2025-05-14 RX ADMIN — IOPAMIDOL 100 ML: 755 INJECTION, SOLUTION INTRAVENOUS at 22:30

## 2025-05-14 ASSESSMENT — PAIN - FUNCTIONAL ASSESSMENT: PAIN_FUNCTIONAL_ASSESSMENT: NONE - DENIES PAIN

## 2025-05-15 ENCOUNTER — APPOINTMENT (OUTPATIENT)
Facility: HOSPITAL | Age: 89
DRG: 069 | End: 2025-05-15
Payer: MEDICARE

## 2025-05-15 VITALS
BODY MASS INDEX: 27.02 KG/M2 | OXYGEN SATURATION: 93 % | DIASTOLIC BLOOD PRESSURE: 55 MMHG | SYSTOLIC BLOOD PRESSURE: 103 MMHG | HEART RATE: 75 BPM | WEIGHT: 146.83 LBS | RESPIRATION RATE: 16 BRPM | TEMPERATURE: 98.1 F | HEIGHT: 62 IN

## 2025-05-15 PROBLEM — G45.9 TIA (TRANSIENT ISCHEMIC ATTACK): Status: ACTIVE | Noted: 2025-05-15

## 2025-05-15 PROBLEM — R42 DIZZINESS: Status: ACTIVE | Noted: 2025-05-15

## 2025-05-15 PROBLEM — I67.9 INTRACRANIAL VASCULAR STENOSIS: Status: ACTIVE | Noted: 2025-05-15

## 2025-05-15 PROBLEM — R47.81 SLURRED SPEECH: Status: ACTIVE | Noted: 2025-05-15

## 2025-05-15 LAB
ANION GAP SERPL CALC-SCNC: 5 MMOL/L (ref 2–12)
APPEARANCE UR: ABNORMAL
BACTERIA URNS QL MICRO: ABNORMAL /HPF
BILIRUB UR QL: NEGATIVE
BUN SERPL-MCNC: 17 MG/DL (ref 6–20)
BUN/CREAT SERPL: 14 (ref 12–20)
CALCIUM SERPL-MCNC: 8.9 MG/DL (ref 8.5–10.1)
CHLORIDE SERPL-SCNC: 101 MMOL/L (ref 97–108)
CHOLEST SERPL-MCNC: 90 MG/DL
CO2 SERPL-SCNC: 28 MMOL/L (ref 21–32)
COLOR UR: ABNORMAL
CREAT SERPL-MCNC: 1.18 MG/DL (ref 0.55–1.02)
EPITH CASTS URNS QL MICRO: ABNORMAL /LPF
EST. AVERAGE GLUCOSE BLD GHB EST-MCNC: 111 MG/DL
GLUCOSE SERPL-MCNC: 88 MG/DL (ref 65–100)
GLUCOSE UR STRIP.AUTO-MCNC: NEGATIVE MG/DL
HBA1C MFR BLD: 5.5 % (ref 4–5.6)
HDLC SERPL-MCNC: 39 MG/DL
HDLC SERPL: 2.3 (ref 0–5)
HGB UR QL STRIP: NEGATIVE
HYALINE CASTS URNS QL MICRO: ABNORMAL /LPF (ref 0–2)
KETONES UR QL STRIP.AUTO: NEGATIVE MG/DL
LDLC SERPL CALC-MCNC: 37.2 MG/DL (ref 0–100)
LEUKOCYTE ESTERASE UR QL STRIP.AUTO: ABNORMAL
NITRITE UR QL STRIP.AUTO: NEGATIVE
PH UR STRIP: 5 (ref 5–8)
POTASSIUM SERPL-SCNC: 4.2 MMOL/L (ref 3.5–5.1)
PROT UR STRIP-MCNC: NEGATIVE MG/DL
RBC #/AREA URNS HPF: ABNORMAL /HPF (ref 0–5)
SODIUM SERPL-SCNC: 134 MMOL/L (ref 136–145)
SP GR UR REFRACTOMETRY: >1.03 (ref 1–1.03)
TRIGL SERPL-MCNC: 69 MG/DL
URINE CULTURE IF INDICATED: ABNORMAL
UROBILINOGEN UR QL STRIP.AUTO: 0.2 EU/DL (ref 0.2–1)
VLDLC SERPL CALC-MCNC: 13.8 MG/DL
WBC URNS QL MICRO: ABNORMAL /HPF (ref 0–4)

## 2025-05-15 PROCEDURE — 94640 AIRWAY INHALATION TREATMENT: CPT

## 2025-05-15 PROCEDURE — 99223 1ST HOSP IP/OBS HIGH 75: CPT | Performed by: PSYCHIATRY & NEUROLOGY

## 2025-05-15 PROCEDURE — 97165 OT EVAL LOW COMPLEX 30 MIN: CPT | Performed by: OCCUPATIONAL THERAPIST

## 2025-05-15 PROCEDURE — 92610 EVALUATE SWALLOWING FUNCTION: CPT

## 2025-05-15 PROCEDURE — 81001 URINALYSIS AUTO W/SCOPE: CPT

## 2025-05-15 PROCEDURE — 87186 SC STD MICRODIL/AGAR DIL: CPT

## 2025-05-15 PROCEDURE — 87088 URINE BACTERIA CULTURE: CPT

## 2025-05-15 PROCEDURE — 87086 URINE CULTURE/COLONY COUNT: CPT

## 2025-05-15 PROCEDURE — 80048 BASIC METABOLIC PNL TOTAL CA: CPT

## 2025-05-15 PROCEDURE — 36415 COLL VENOUS BLD VENIPUNCTURE: CPT

## 2025-05-15 PROCEDURE — 83036 HEMOGLOBIN GLYCOSYLATED A1C: CPT

## 2025-05-15 PROCEDURE — 97116 GAIT TRAINING THERAPY: CPT

## 2025-05-15 PROCEDURE — 2700000000 HC OXYGEN THERAPY PER DAY

## 2025-05-15 PROCEDURE — 1100000000 HC RM PRIVATE

## 2025-05-15 PROCEDURE — 97535 SELF CARE MNGMENT TRAINING: CPT | Performed by: OCCUPATIONAL THERAPIST

## 2025-05-15 PROCEDURE — 6360000002 HC RX W HCPCS: Performed by: STUDENT IN AN ORGANIZED HEALTH CARE EDUCATION/TRAINING PROGRAM

## 2025-05-15 PROCEDURE — 2500000003 HC RX 250 WO HCPCS: Performed by: STUDENT IN AN ORGANIZED HEALTH CARE EDUCATION/TRAINING PROGRAM

## 2025-05-15 PROCEDURE — 70551 MRI BRAIN STEM W/O DYE: CPT

## 2025-05-15 PROCEDURE — 94761 N-INVAS EAR/PLS OXIMETRY MLT: CPT

## 2025-05-15 PROCEDURE — 6370000000 HC RX 637 (ALT 250 FOR IP): Performed by: STUDENT IN AN ORGANIZED HEALTH CARE EDUCATION/TRAINING PROGRAM

## 2025-05-15 PROCEDURE — 80061 LIPID PANEL: CPT

## 2025-05-15 PROCEDURE — 97162 PT EVAL MOD COMPLEX 30 MIN: CPT

## 2025-05-15 RX ORDER — POLYETHYLENE GLYCOL 3350 17 G/17G
17 POWDER, FOR SOLUTION ORAL DAILY PRN
Status: DISCONTINUED | OUTPATIENT
Start: 2025-05-15 | End: 2025-05-15 | Stop reason: HOSPADM

## 2025-05-15 RX ORDER — ASPIRIN 300 MG/1
300 SUPPOSITORY RECTAL DAILY
Status: DISCONTINUED | OUTPATIENT
Start: 2025-05-15 | End: 2025-05-15

## 2025-05-15 RX ORDER — ASPIRIN 81 MG/1
81 TABLET, CHEWABLE ORAL DAILY
Qty: 90 TABLET | Refills: 3 | Status: SHIPPED
Start: 2025-05-16

## 2025-05-15 RX ORDER — MINERAL OIL AND WHITE PETROLATUM 150; 830 MG/G; MG/G
OINTMENT OPHTHALMIC PRN
Status: DISCONTINUED | OUTPATIENT
Start: 2025-05-15 | End: 2025-05-15 | Stop reason: HOSPADM

## 2025-05-15 RX ORDER — TROSPIUM CHLORIDE 20 MG/1
20 TABLET, FILM COATED ORAL NIGHTLY
Status: DISCONTINUED | OUTPATIENT
Start: 2025-05-15 | End: 2025-05-15 | Stop reason: HOSPADM

## 2025-05-15 RX ORDER — ASPIRIN 81 MG/1
81 TABLET, CHEWABLE ORAL DAILY
Status: DISCONTINUED | OUTPATIENT
Start: 2025-05-15 | End: 2025-05-15 | Stop reason: HOSPADM

## 2025-05-15 RX ORDER — ALBUTEROL SULFATE 0.83 MG/ML
2.5 SOLUTION RESPIRATORY (INHALATION) 4 TIMES DAILY PRN
Status: DISCONTINUED | OUTPATIENT
Start: 2025-05-15 | End: 2025-05-15 | Stop reason: HOSPADM

## 2025-05-15 RX ORDER — SODIUM CHLORIDE 9 MG/ML
INJECTION, SOLUTION INTRAVENOUS PRN
Status: DISCONTINUED | OUTPATIENT
Start: 2025-05-15 | End: 2025-05-15 | Stop reason: HOSPADM

## 2025-05-15 RX ORDER — ONDANSETRON 4 MG/1
4 TABLET, ORALLY DISINTEGRATING ORAL EVERY 8 HOURS PRN
Status: DISCONTINUED | OUTPATIENT
Start: 2025-05-15 | End: 2025-05-15 | Stop reason: HOSPADM

## 2025-05-15 RX ORDER — ROSUVASTATIN CALCIUM 40 MG/1
40 TABLET, COATED ORAL NIGHTLY
Qty: 90 TABLET | Refills: 3 | Status: SHIPPED | OUTPATIENT
Start: 2025-05-15

## 2025-05-15 RX ORDER — TRAZODONE HYDROCHLORIDE 100 MG/1
300 TABLET ORAL NIGHTLY
Status: DISCONTINUED | OUTPATIENT
Start: 2025-05-15 | End: 2025-05-15 | Stop reason: HOSPADM

## 2025-05-15 RX ORDER — METOPROLOL SUCCINATE 25 MG/1
25 TABLET, EXTENDED RELEASE ORAL DAILY
Status: DISCONTINUED | OUTPATIENT
Start: 2025-05-15 | End: 2025-05-15 | Stop reason: HOSPADM

## 2025-05-15 RX ORDER — I-VITE, TAB 1000-60-2MG (60/BT) 300MCG-200
1 TAB ORAL DAILY
Status: DISCONTINUED | OUTPATIENT
Start: 2025-05-15 | End: 2025-05-15

## 2025-05-15 RX ORDER — M-VIT,TX,IRON,MINS/CALC/FOLIC 27MG-0.4MG
1 TABLET ORAL DAILY
Status: DISCONTINUED | OUTPATIENT
Start: 2025-05-15 | End: 2025-05-15 | Stop reason: HOSPADM

## 2025-05-15 RX ORDER — ROSUVASTATIN CALCIUM 40 MG/1
40 TABLET, COATED ORAL NIGHTLY
Status: DISCONTINUED | OUTPATIENT
Start: 2025-05-15 | End: 2025-05-15 | Stop reason: HOSPADM

## 2025-05-15 RX ORDER — SODIUM CHLORIDE 0.9 % (FLUSH) 0.9 %
5-40 SYRINGE (ML) INJECTION PRN
Status: DISCONTINUED | OUTPATIENT
Start: 2025-05-15 | End: 2025-05-15 | Stop reason: HOSPADM

## 2025-05-15 RX ORDER — LEVOTHYROXINE SODIUM 50 UG/1
100 TABLET ORAL DAILY
Status: DISCONTINUED | OUTPATIENT
Start: 2025-05-15 | End: 2025-05-15 | Stop reason: HOSPADM

## 2025-05-15 RX ORDER — SODIUM CHLORIDE 0.9 % (FLUSH) 0.9 %
5-40 SYRINGE (ML) INJECTION EVERY 12 HOURS SCHEDULED
Status: DISCONTINUED | OUTPATIENT
Start: 2025-05-15 | End: 2025-05-15 | Stop reason: HOSPADM

## 2025-05-15 RX ORDER — PANTOPRAZOLE SODIUM 40 MG/1
40 TABLET, DELAYED RELEASE ORAL
Status: DISCONTINUED | OUTPATIENT
Start: 2025-05-15 | End: 2025-05-15 | Stop reason: HOSPADM

## 2025-05-15 RX ORDER — ANTIOX #8/OM3/DHA/EPA/LUT/ZEAX 250-2.5 MG
1 CAPSULE ORAL DAILY
Status: DISCONTINUED | OUTPATIENT
Start: 2025-05-15 | End: 2025-05-15

## 2025-05-15 RX ORDER — ONDANSETRON 2 MG/ML
4 INJECTION INTRAMUSCULAR; INTRAVENOUS EVERY 6 HOURS PRN
Status: DISCONTINUED | OUTPATIENT
Start: 2025-05-15 | End: 2025-05-15 | Stop reason: HOSPADM

## 2025-05-15 RX ORDER — LANOLIN ALCOHOL/MO/W.PET/CERES
400 CREAM (GRAM) TOPICAL DAILY
Status: DISCONTINUED | OUTPATIENT
Start: 2025-05-15 | End: 2025-05-15 | Stop reason: HOSPADM

## 2025-05-15 RX ADMIN — ASPIRIN 81 MG: 81 TABLET, CHEWABLE ORAL at 10:22

## 2025-05-15 RX ADMIN — WATER 1000 MG: 1 INJECTION INTRAMUSCULAR; INTRAVENOUS; SUBCUTANEOUS at 06:25

## 2025-05-15 RX ADMIN — SODIUM CHLORIDE, PRESERVATIVE FREE 10 ML: 5 INJECTION INTRAVENOUS at 10:28

## 2025-05-15 RX ADMIN — Medication 400 MG: at 10:21

## 2025-05-15 RX ADMIN — METOPROLOL SUCCINATE 25 MG: 25 TABLET, EXTENDED RELEASE ORAL at 10:22

## 2025-05-15 RX ADMIN — Medication 1 TABLET: at 10:21

## 2025-05-15 RX ADMIN — LEVOTHYROXINE SODIUM 100 MCG: 0.05 TABLET ORAL at 10:21

## 2025-05-15 RX ADMIN — APIXABAN 2.5 MG: 2.5 TABLET, FILM COATED ORAL at 10:22

## 2025-05-15 RX ADMIN — ARFORMOTEROL TARTRATE: 15 SOLUTION RESPIRATORY (INHALATION) at 11:08

## 2025-05-15 RX ADMIN — PANTOPRAZOLE SODIUM 40 MG: 40 TABLET, DELAYED RELEASE ORAL at 06:25

## 2025-05-15 RX ADMIN — Medication 1 TABLET: at 10:22

## 2025-05-15 NOTE — PROGRESS NOTES
[facility] Inpatient Care Management Remote Patient Monitoring Assessment Note    Received referral from [facility] Care Transitions Team to assess patient for consideration to enroll in the [facility] Remote Patient Monitoring program once discharged to home. Remote patient monitoring provides care teams and patients with the tools to act on acute changes in chronic conditions, achieve optimal health status, and improve quality of life.     Date of assessment: 05/15/25    Krystin Reed is a 89 y.o. female inpatient at [facility] admitted on  5/14/2025.  Primary Diagnosis  Intracranial vascular stenosis    Confirmed patient still utilizing [facility] PCP. Yes -   Condition(s) that qualify this patient for RPM: COPD.    RPM Status:   Patient accepts RPM services.  CM  notified of acceptance and need for CTN name and kit assignment..        Maricruz Prabhakar     1:59 p.m.  CM met with pt and daughters at bedside to discuss JAMMIE.  CM offered HH and pt declined.  CM offered rollator and pt in agreement.  CM will send referral.    2:26 p.m.  Darcy Riley [phone] is the assigned CM for pt's RPM.  CM will place on AVS.    2:59 p.m. RPM kit delivered to pt at bedside. Kit # [id number].    4:06 p.m.  CM called [employer] and spoke with Bess who stated pt's rollator will be delivered in 45 minutes.        05/15/25 1602   Services At/After Discharge   Transition of Care Consult (CM Consult) Discharge Planning   Services At/After Discharge None   Mode of Transport at Discharge Other (see comment)  (daughter)       Maricruz Prabhakar, JILL, LCSW  Care Management, [facility]  [phone]    
OCCUPATIONAL THERAPY EVALUATION/DISCHARGE  Patient: Krystin Reed (89 y.o. female)  Date: 5/15/2025  Primary Diagnosis: TIA (transient ischemic attack) [G45.9]         Precautions:                    ASSESSMENT :  Based on the objective data below, the patient demonstrates mildly decreased dynamic standing balance at baseline, but is otherwise without any acute decline in her strength, balance, coordination, ROM, cognition or activity tolerance. She is currently performing her ADLs and functional mobility, including ambulating without an AD, at her independent to mod I baseline. The patient is without further OT needs acutely and after discharge, but she does reside with an attentive daughter and NINFA who can provide her with any needed support.        PLAN :  Discharge from Acute OT    Recommendation for discharge: (in order for the patient to meet his/her long term goals):   No skilled occupational therapy needs indicated.     Other factors to consider for discharge: no additional factors    IF patient discharges home will need the following DME: patient owns DME required for discharge           OBJECTIVE DATA SUMMARY:     Past Medical History:   Diagnosis Date    Arrhythmia     atrial fibrillation, chronic.  Stress test 2011 essent. normal.  Mild-mod AR on ECHO    Atrial fibrillation ([facility])     Atrial flutter with rapid ventricular response ([facility]) 11/21/2019    Bilateral leg edema 07/05/2017    Chest pain with moderate risk for cardiac etiology 07/06/2017    CHF (congestive heart failure) ([facility]) 11/20/2019    CHF exacerbation ([facility]) 03/18/2023    Chronic atrial fibrillation ([facility]) 11/21/2019    Chronic kidney disease     CKD (chronic kidney disease) stage 3, GFR 30-59 ml/min ([facility]) 07/05/2017    Congestive heart failure ([facility])     COPD exacerbation ([facility]) 02/19/2024    Dehydration 12/08/2019    Dyspnea     FEV1 1.33 (98% predicted), FVC 1.59 (89% predicted)    Encephalopathy acute 03/15/2011    GI bleeding     
PHYSICAL THERAPY EVALUATION/DISCHARGE    Patient: Krystin Reed (89 y.o. female)  Date: 5/15/2025  Primary Diagnosis:   TIA (transient ischemic attack) [G45.9]       Precautions:  (falls)                      ASSESSMENT AND RECOMMENDATIONS:    Pt tolerated PT services well and presents at or near baseline status. Pt received in bed with multiple family present (two dtrs, NINFA). Reporting DPT introduced self/role and all amenable to therapy services. Pt presents as AO x4 and confirms independent and ambulatory without dme at baseline. Pt/family admit ~ furniture touch and benefit of rollator/rw use, but Pt has declined use in the past. Family always nearby when Pt is mobilizing and resides with her dtr/NINFA in a 1SH with few stairs to enter.    Excellent effort, active engagement and high motivation for therapy services t/o.  With increased time/effort, most tasks performed with close sba/light cga including functional transfers, unit ambulation ~ 175ft, simulated stairs (via march in place). Occasional furniture/wall touch for balance, but otherwise unremarkable without LOB. Seated rest prior to DURANT BALANCE outcome measure completion. Pt tolerated well and family confirm Pt presents near baseline status. Pt/family education re benefit of HHPT vs OP PT to maintain functional gains and conditioning. Pt's daughter reports others have recommended, but Pt has declined in the past. All made aware that undersigned would recommend notwithstanding so that option if available.    BEFAST education provided and Pt/family demo good understanding. Therapist showed Pt/family where placard located in room for further review and reinforcement.     Pt was assisted with return to bedside chair and positioned for comfort. Pt/family education provided re pending dc from [facility] PT services with recommended dc to home with resumption of familial support. All present agreeable to dc from [facility] once they have the opportunity to speak with MD 
Patient discharged home with all belongings. Discharge education and follow up information reviewed with patient. Patient verbalized understanding. LDAs removed. Patient arranged transportation.   
Speech LAnguage Pathology EVALUATION/DISCHARGE    Patient: Krystin Reed (89 y.o. female)  Date: 5/15/2025  Primary Diagnosis: TIA (transient ischemic attack) [G45.9]       Precautions:                     ASSESSMENT :  Patient presented to ED with slurred speech and AMS.  Family at beside notes that patient quickly resolved within the time of getting to the hospital.  Patient passed her swallow screen, NIH: 0, YAJAIRA with 1. No acute intracranial abnormality. 2. Mild chronic microvascular ischemic disease. Small chronic infarcts as above. 3. Loss of the normal right vertebral artery flow-void, consistent with distal right vertebral artery occlusion as seen on CTA.  RN with no concerns.    Patient observed with puree, solid, and thin liquids with slow but adequate mastication, bolus formation and full oral clearance with cough noted x1 with successive sips that was not reduplicated with single sips.  Oropharyngeal swallow appears grossly intact.  Reinforced to patient single sips and upright with all p.o. intake.  No skilled SLP Treatment indicated at this time.    Patient will be discharged from skilled speech-language pathology services at this time.     PLAN :  Recommendations and Planned Interventions:  Diet: Regular and thin liquids  --meds as tolerated       Acute SLP Services: No, patient will be discharged from acute skilled speech-language pathology at this time.  Discharge Recommendations: No, additional SLP treatment not indicated at discharge     SUBJECTIVE:   Patient stated, “I have a cough drop every morning.”    OBJECTIVE:     Past Medical History:   Diagnosis Date    Arrhythmia     atrial fibrillation, chronic.  Stress test 2011 essent. normal.  Mild-mod AR on ECHO    Atrial fibrillation ([facility])     Atrial flutter with rapid ventricular response ([facility]) 11/21/2019    Bilateral leg edema 07/05/2017    Chest pain with moderate risk for cardiac etiology 07/06/2017    CHF (congestive heart failure) ([facility]) 
11/21/2019    Chronic kidney disease     CKD (chronic kidney disease) stage 3, GFR 30-59 ml/min ([facility]) 07/05/2017    Congestive heart failure ([facility])     COPD exacerbation ([facility]) 02/19/2024    Dehydration 12/08/2019    Dyspnea     FEV1 1.33 (98% predicted), FVC 1.59 (89% predicted)    Encephalopathy acute 03/15/2011    GI bleeding     Hyperlipidemia     Hypertension     Irregular heart beat 06/04/2018    Long term current use of anticoagulant therapy     Murmur     Pneumonia 11/20/2019    Refusal of blood transfusions as patient is Jehovah's witness     Rhabdomyolysis 03/14/2011    Thyroid disease     hypothyroid    Valvular heart disease        Core Measures:  CVA: yes  CHF: no  PNA: no    Activity:Level of Assistance: Minimal assist, patient does 75% or more  Number times ambulated in hallways past shift: 1  Number of times OOB to chair past shift: 1    Cardiac:   Cardiac Monitoring: yes, afib, box 011    Access:   Current line(s): PIV    Respiratory:   O2 Device: Nasal cannula    GI:  Last BM (including prior to admit): 05/14/25  Current diet:  ADULT DIET; Regular  Tolerating current diet: Yes    Pain Management:   Patient states pain is manageable on current regimen: yes    Skin:  Tate Scale Score: 20  Interventions: dual skin   Pressure injury: no    Patient Safety:  Fall Score: Davey Total Score: 35  Interventions: stay with me program, bed alarm  Self-release roll belt: No  Dexterity to release roll belt: N/A   (must document dexterity  here by stating Yes or No here, otherwise this is a restraint and must follow restraint documentation policy.)    DVT prophylaxis:  DVT prophylaxis: meds    Active Consults:  IP CONSULT TO NEUROLOGY  IP CONSULT TO CASE MANAGEMENT    Length of Stay:  Expected LOS: 3  Actual LOS: 0    Elaine Pa RN

## 2025-05-15 NOTE — CONSULTS
Neurology Note    Patient ID:  Krystin Reed  [id number]  89 y.o.  12/5/1930      Date of Consultation:  May 15, 2025    Referring Physician: Dr. Machuca    Reason for Consultation:  speech changes and dizziness      Assessment and Plan:    The patient is a pleasant 89-year-old female who presented to the hospital with transient lightheadedness, dizziness and slurred speech.  Her current neurological examination reveals no focal sensory or motor deficits    Transient neurological symptoms including lightheadedness, dizziness and speech difficulties:  Differential includes tia, stroke with improvement in symptoms, systemic impact of UTI  Head ct with no acute abnormality  CTA with no significant large vessel stenosis.  There was vertebral stenosis and intracranial stenosis  Risk factors for stroke include hypertension, atrial fibrillation  Continue anticoagulation  The patient should be on 81 mg aspirin due to intracranial and vertebral stenosis  Brain MRI ordered  Continue on telemetry  Hypertension: Aggressive control with goal blood pressure less than 140/90 but mindful to not drop blood pressure too low to ensure adequate cerebral perfusion  Updated lipid panel ordered  Goal LDL is less than 70.  Started on crestor for time being. Adjust dose accordingly.  Hemoglobin A1c pending  PT and OT consultation  I provided stroke education today in regards to risk factors for stroke and lifestyle modifications to help minimize the risk of future stroke.  This included medication compliance, regular follow up with primary care physician,  and healthy lifestyle habits (nutrition/exercise)      CTA with infundibulum versus 3 to 4 mm aneurysm:  Follow-up outpatient CTA can be obtained in 6 months time    UTI:  Started on ceftriaxone  hypothyroidism    Neurology will continue to follow closely in this complicated patient with ongoing neurological symptoms necessitating additional testing. Updated orders placed.  Care plan

## 2025-05-15 NOTE — ED NOTES
Report given to FRANCK Henry on [facility]STU for bed 143. Nurse was informed of reason for arrival, vitals, labs, medications, orders, procedures, results, anything left pending and current plan of action. Questions were asked and received prior to departure from the patient.

## 2025-05-15 NOTE — ED PROVIDER NOTES
[facility] EMERGENCY DEPARTMENT  EMERGENCY DEPARTMENT ENCOUNTER       Pt Name: Krystin Reed  MRN: [id number]  Birthdate 12/5/1930  Date of evaluation: 5/14/2025  Provider: Loan Isbell MD   PCP: Yann Hopper MD  Note Started: 11:09 PM EDT 5/14/25     CHIEF COMPLAINT       Chief Complaint   Patient presents with    Dizziness     BIBEMS; c/o dizziness; reports ~1955 she woke up after taking night times meds and was feeling dizzy, unsteady, and had slurred speech; denies this happening before; denies dizziness at this time and speech is clear; hx chf and takes eliquis     Back Pain     BIBEMS; c/o lower back pain; pt reports pain off and on and sometimes travels down to the left knee, but pain was worse this morning and has been applying ice hot; pt denies pain at this time;         HISTORY OF PRESENT ILLNESS: 1 or more elements      History From: patient, History limited by: none     Krystin Reed is a 89 y.o. female who presents with a chief complaint of dizziness and slurred speech that has since resolved.       Please See MDM for Additional Details of the HPI/PMH  Nursing Notes were all reviewed and agreed with or any disagreements were addressed in the HPI.     REVIEW OF SYSTEMS        Positives and Pertinent negatives as per HPI.    PAST HISTORY     Past Medical History:  Past Medical History:   Diagnosis Date    Arrhythmia     atrial fibrillation, chronic.  Stress test 2011 essent. normal.  Mild-mod AR on ECHO    Atrial fibrillation (H[facility])     Atrial flutter with rapid ventricular response ([facility]) 11/21/2019    Bilateral leg edema 07/05/2017    Chest pain with moderate risk for cardiac etiology 07/06/2017    CHF (congestive heart failure) (HCC) 11/20/2019    CHF exacerbation (HCC) 03/18/2023    Chronic atrial fibrillation (HCC) 11/21/2019    Chronic kidney disease     CKD (chronic kidney disease) stage 3, GFR 30-59 ml/min (HCC) 07/05/2017    Congestive heart failure (HCC)     COPD exacerbation

## 2025-05-15 NOTE — DISCHARGE SUMMARY
\"PBNP\"    Signed: Andrea Edmond Jr, MD    The patient is a pleasant 89-year-old female who presented to the hospital with transient lightheadedness, dizziness and slurred speech.  Her current neurological examination reveals no focal sensory or motor deficits     Transient neurological symptoms including lightheadedness, dizziness and speech difficulties:  Differential includes tia, stroke with improvement in symptoms, systemic impact of UTI  Head ct with no acute abnormality  CTA with no significant large vessel stenosis.  There was vertebral stenosis and intracranial stenosis  Risk factors for stroke include hypertension, atrial fibrillation  Continue anticoagulation  The patient should be on 81 mg aspirin due to intracranial and vertebral stenosis  Brain MRI ordered  Continue on telemetry  Hypertension: Aggressive control with goal blood pressure less than 140/90 but mindful to not drop blood pressure too low to ensure adequate cerebral perfusion  Updated lipid panel ordered  Goal LDL is less than 70.  Started on crestor for time being. Adjust dose accordingly.  Hemoglobin A1c pending  PT and OT consultation  I provided stroke education today in regards to risk factors for stroke and lifestyle modifications to help minimize the risk of future stroke.  This included medication compliance, regular follow up with primary care physician,  and healthy lifestyle habits (nutrition/exercise)        CTA with infundibulum versus 3 to 4 mm aneurysm:  Follow-up outpatient CTA can be obtained in 6 months time     UTI:  Started on ceftriaxone  hypothyroidism     Neurology will continue to follow closely in this complicated patient with ongoing neurological symptoms necessitating additional testing. Updated orders placed.  Care plan discussed with primary team, Dr. Edmond and patient's daughter

## 2025-05-15 NOTE — H&P
ECG  When compared with ECG of 17-MAR-2023 16:00,  Vent. rate has decreased BY  47 BPM  Right bundle branch block has replaced Nonspecific intraventricular block  Questionable change in initial forces of Anterolateral leads    independently interpreted and  Confirmed by Loan Isbell MD ([id number]) on 5/14/2025 9:27:48 PM     CBC with Auto Differential    Collection Time: 05/14/25  9:26 PM   Result Value Ref Range    WBC 3.7 3.6 - 11.0 K/uL    RBC 3.70 (L) 3.80 - 5.20 M/uL    Hemoglobin 10.9 (L) 11.5 - 16.0 g/dL    Hematocrit 35.6 35.0 - 47.0 %    MCV 96.2 80.0 - 99.0 FL    MCH 29.5 26.0 - 34.0 PG    MCHC 30.6 30.0 - 36.5 g/dL    RDW 14.0 11.5 - 14.5 %    Platelets 111 (L) 150 - 400 K/uL    MPV 11.5 8.9 - 12.9 FL    Nucleated RBCs 0.0 0  WBC    nRBC 0.00 0.00 - 0.01 K/uL    Neutrophils % 42.3 32.0 - 75.0 %    Lymphocytes % 39.3 12.0 - 49.0 %    Monocytes % 11.9 5.0 - 13.0 %    Eosinophils % 5.4 0.0 - 7.0 %    Basophils % 0.8 0.0 - 1.0 %    Immature Granulocytes % 0.3 0.0 - 0.5 %    Neutrophils Absolute 1.56 (L) 1.80 - 8.00 K/UL    Lymphocytes Absolute 1.45 0.80 - 3.50 K/UL    Monocytes Absolute 0.44 0.00 - 1.00 K/UL    Eosinophils Absolute 0.20 0.00 - 0.40 K/UL    Basophils Absolute 0.03 0.00 - 0.10 K/UL    Immature Granulocytes Absolute 0.01 0.00 - 0.04 K/UL    Differential Type AUTOMATED     Comprehensive Metabolic Panel    Collection Time: 05/14/25  9:26 PM   Result Value Ref Range    Sodium 137 136 - 145 mmol/L    Potassium 4.1 3.5 - 5.1 mmol/L    Chloride 101 97 - 108 mmol/L    CO2 32 21 - 32 mmol/L    Anion Gap 4 2 - 12 mmol/L    Glucose 116 (H) 65 - 100 mg/dL    BUN 18 6 - 20 MG/DL    Creatinine 1.28 (H) 0.55 - 1.02 MG/DL    BUN/Creatinine Ratio 14 12 - 20      Est, Glom Filt Rate 39 (L) >60 ml/min/1.73m2    Calcium 9.2 8.5 - 10.1 MG/DL    Total Bilirubin 0.3 0.2 - 1.0 MG/DL    ALT 11 (L) 12 - 78 U/L    AST 20 15 - 37 U/L    Alk Phosphatase 56 45 - 117 U/L    Total Protein 8.1 6.4 - 8.2 g/dL

## 2025-05-16 DIAGNOSIS — N18.32 STAGE 3B CHRONIC KIDNEY DISEASE (HCC): Primary | ICD-10-CM

## 2025-05-16 DIAGNOSIS — I1A.0 RESISTANT HYPERTENSION: ICD-10-CM

## 2025-05-16 DIAGNOSIS — J44.9 CHRONIC OBSTRUCTIVE PULMONARY DISEASE, UNSPECIFIED COPD TYPE (HCC): ICD-10-CM

## 2025-05-16 NOTE — PROGRESS NOTES
Remote Patient Monitoring Treatment Plan    Received request from ACM/CTN IP care management team  to order remote patient monitoring for in home monitoring of Kidney Disease, COPD and HTN; Condition managed by PCP.  and order completed. S/p recent TIA. Not in current COPD exacerbation. PMH: chronic Afib    Patient will be monitoring blood pressure   pulse ox .      Patient will engage in Remote Patient Monitoring each day to develop the skills necessary for self management.       RPM Care Team Responsibilities:   Alerts will be reviewed daily and addressed within 2-4 hours during operational hours (Monday -Friday 9 am-4 pm)  Alert response and intervention documented in patient medical record  Alert response escalated to PCP per protocol and documented in patient medical record  Patient monitored over approximately  days  Discharge from program based on self-management readiness    See care coordination encounters for additional details.

## 2025-05-17 LAB
BACTERIA SPEC CULT: ABNORMAL
CC UR VC: ABNORMAL
SERVICE CMNT-IMP: ABNORMAL

## 2025-05-19 ENCOUNTER — CARE COORDINATION (OUTPATIENT)
Facility: CLINIC | Age: 89
End: 2025-05-19

## 2025-05-19 NOTE — CARE COORDINATION
Patient received RPM kit [id number]. Profile and care plan assigned in HRS.     CCSS reached out to HRS to complete virtual install.

## 2025-05-20 ENCOUNTER — CARE COORDINATION (OUTPATIENT)
Dept: CASE MANAGEMENT | Age: 89
End: 2025-05-20

## 2025-05-20 NOTE — CARE COORDINATION
Remote Patient Monitoring Note      Date/Time:  2025 10:19 AM  Patient Current Location: Home: [address] [facility] [address]  LPN contacted patient by telephone regarding red alert received for pulse ox reading (89%). Verified patients name and  as identifiers.  Background: enrolled in RPM FOR KIDNEY DISEASE COPD HTN  Clinical Interventions: Reviewed and followed up on alerts and treatments- no return call from pt. NOTED pt rechecked pulse ox metrics. New reading 93%  all metrics green alert WNL  Plan/Follow Up: Will continue to review, monitor and address alerts with follow up based on severity of symptoms and risk factors.    Remote Patient Monitoring Welcome Note  Date/Time:  2025 10:20 AM  Patient Current Location: Home: [address] [facility] [address]  Verified patients name and  as identifiers.       Completed and confirmed the following: VERIFIED NAME  AND METRICS ENTERED TODAY  SPOKE TO ALLI HALL    [x] Patient received all RPM equipment (tablet, scale, blood pressure device and cuff, and pulse oximeter)  Cuff Size: regular (9.05\"-15.74\")    Weight Scale:  regular (<330lbs)                    [x] Instructed patient keep box for use when returning equipment                                                          [x] Reviewed Patient Welcome Letter with patient    [x]  Reviewed Consent Form  Copy of consent form in chart.                 [x] Reviewed expectations for patient and care team  Monitoring hours M-F 9-4pm  It is important to take your vitals every day, even on the weekends,to keep your care team aware of how you are doing every day of the week.  Completing monitoring by 12pm on  so that alerts can be responded to in the same day  Patient weighs self at same time every day (or after urinating and waking up)  Take blood pressure 1-2 hrs after medications   RPM team may have different phone area code (including [place], [place], [place] or [place])                              [x] Instructed

## 2025-05-20 NOTE — CARE COORDINATION
Remote Patient Monitoring Note      Date/Time:  2025 10:44 AM  Patient Current Location: Home: [address] [facility] [address]  LPN contacted family by telephone regarding red alert received for pulse ox reading (89%). Verified patients name and  as identifiers.  Background: ENROLLED IN RPM FOR KIDNEY DISEASE COPD HTN  Clinical Interventions: Reviewed and followed up on alerts and treatments-son in law Leopoldo returned this writers call. Education given on RPM. Leopoldo requests his number be added for contacting regarding metrics, as he is with pt during the day while his wife ( bear Bernal) is working. .     Plan/Follow Up: Will continue to review, monitor and address alerts with follow up based on severity of symptoms and risk factors.

## 2025-05-20 NOTE — CARE COORDINATION
Date/Time:  5/20/2025 9:45 AM  LPN attempted to reach patient by telephone regarding red alert in remote patient monitoring program. Left HIPAA compliant message requesting a return call. Will attempt to reach patient again.    RPM RED ALERT for pulse ox 89%      Message sent to the tablet  Message sent to patient via Patient Connect Portal for Remote Patient Monitoring     RPM Nurse message Return call requested Hello, Please see an important message from your RPM Team.  We have attempted to reach you to discuss your readings please reach out to us to discuss with the provided number left on your voicemail.     Please do not respond to this message; If you have a question or concern please call your Ambulatory Care Manager or your Primary Care Physician.      Message sent to patient via Patient Connect Portal for Remote Patient Monitoring     RPM Nurse message Reminder to keep tablet charged and connected Hello, Please see an important message from your RPM Team:  Please remember to keep the tablet charged and connected    Please do not respond to this message; If you have a question or concern please call your Ambulatory Care Manager or your Primary Care Physician.

## 2025-05-28 ENCOUNTER — OFFICE VISIT (OUTPATIENT)
Age: 89
End: 2025-05-28
Payer: MEDICARE

## 2025-05-28 VITALS
BODY MASS INDEX: 26.63 KG/M2 | DIASTOLIC BLOOD PRESSURE: 55 MMHG | SYSTOLIC BLOOD PRESSURE: 117 MMHG | TEMPERATURE: 98.8 F | RESPIRATION RATE: 18 BRPM | HEART RATE: 63 BPM | WEIGHT: 145.6 LBS | OXYGEN SATURATION: 95 %

## 2025-05-28 DIAGNOSIS — G45.9 TIA (TRANSIENT ISCHEMIC ATTACK): Primary | ICD-10-CM

## 2025-05-28 DIAGNOSIS — I50.22 CHRONIC SYSTOLIC (CONGESTIVE) HEART FAILURE (HCC): ICD-10-CM

## 2025-05-28 DIAGNOSIS — Z01.818 PREOP GENERAL PHYSICAL EXAM: ICD-10-CM

## 2025-05-28 DIAGNOSIS — H25.019 CORTICAL AGE-RELATED CATARACT, UNSPECIFIED LATERALITY: ICD-10-CM

## 2025-05-28 PROCEDURE — 1126F AMNT PAIN NOTED NONE PRSNT: CPT | Performed by: FAMILY MEDICINE

## 2025-05-28 PROCEDURE — G8427 DOCREV CUR MEDS BY ELIG CLIN: HCPCS | Performed by: FAMILY MEDICINE

## 2025-05-28 PROCEDURE — 99213 OFFICE O/P EST LOW 20 MIN: CPT | Performed by: FAMILY MEDICINE

## 2025-05-28 PROCEDURE — G8419 CALC BMI OUT NRM PARAM NOF/U: HCPCS | Performed by: FAMILY MEDICINE

## 2025-05-28 PROCEDURE — 1090F PRES/ABSN URINE INCON ASSESS: CPT | Performed by: FAMILY MEDICINE

## 2025-05-28 PROCEDURE — 1159F MED LIST DOCD IN RCRD: CPT | Performed by: FAMILY MEDICINE

## 2025-05-28 PROCEDURE — 1036F TOBACCO NON-USER: CPT | Performed by: FAMILY MEDICINE

## 2025-05-28 PROCEDURE — 1123F ACP DISCUSS/DSCN MKR DOCD: CPT | Performed by: FAMILY MEDICINE

## 2025-05-28 PROCEDURE — 1111F DSCHRG MED/CURRENT MED MERGE: CPT | Performed by: FAMILY MEDICINE

## 2025-05-28 RX ORDER — LORATADINE 10 MG/1
10 TABLET ORAL DAILY
COMMUNITY

## 2025-05-28 RX ORDER — FUROSEMIDE 20 MG/1
40 TABLET ORAL DAILY
COMMUNITY

## 2025-05-28 ASSESSMENT — PATIENT HEALTH QUESTIONNAIRE - PHQ9
SUM OF ALL RESPONSES TO PHQ QUESTIONS 1-9: 0
1. LITTLE INTEREST OR PLEASURE IN DOING THINGS: NOT AT ALL
SUM OF ALL RESPONSES TO PHQ QUESTIONS 1-9: 0
2. FEELING DOWN, DEPRESSED OR HOPELESS: NOT AT ALL

## 2025-05-28 NOTE — PROGRESS NOTES
Krystin Reed (:  1930) is a 89 y.o. female,Established patient, here for evaluation of the following chief complaint(s):  3 Month Follow-Up (Patient is here today for 3 month follow up.) and Pre-op Exam (Patient is scheduled for pre op exam for cataract surgery on 25.)         Assessment & Plan  TIA (transient ischemic attack)    Continue eliquis, aspirin and crestor for now.          Chronic systolic (congestive) heart failure (HCC)    Stable at present.          Cortical age-related cataract, unspecified laterality    Form completed. Clered for surgery.          Preop general physical exam              Return in about 4 months (around 2025).       Subjective   HPI wasrecently in hospital after an episode of dizziness and slurred speech. Is now on both eliquis and asa 81 mg daily.is also going for cataract surgery .was also started on a statin. Also has some problems with runny nose, worse when ac comes on.     Review of Systems       Objective   Physical Exam  Cardiovascular:      Rate and Rhythm: Normal rate and regular rhythm.      Heart sounds: Normal heart sounds. No murmur heard.     Gallop: tia.   Pulmonary:      Effort: Pulmonary effort is normal.      Breath sounds: Normal breath sounds.   Abdominal:      General: Abdomen is flat. Bowel sounds are normal.      Palpations: Abdomen is soft.   Musculoskeletal:      Right lower leg: No edema.      Left lower leg: No edema.                  An electronic signature was used to authenticate this note.    --Yann Hopper MD

## 2025-05-28 NOTE — PROGRESS NOTES
Chief Complaint   Patient presents with    3 Month Follow-Up     Patient is here today for 3 month follow up.    Pre-op Exam     Patient is scheduled for pre op exam for cataract surgery on 25.       \"Have you been to the ER, urgent care clinic since your last visit?  Hospitalized since your last visit?\"    YES - When: approximately 2  weeks ago.  Where and Why: [facility] for TIA.    “Have you seen or consulted any other health care providers outside of [facility] since your last visit?”    YES - When: approximately 1 days ago.  Where and Why: [facility] for Consultation for cataract surgery.            Click Here for Release of Records Request       There were no vitals filed for this visit.   Health Maintenance Due   Topic Date Due    DTaP/Tdap/Td vaccine (1 - Tdap) Never done    Respiratory Syncytial Virus (RSV) Pregnant or age 60 yrs+ (1 - 1-dose 75+ series) Never done    COVID-19 Vaccine (2024- season) 2024        The patient, Krystin Reed, identity was verified by name and .

## 2025-05-30 VITALS — HEART RATE: 63 BPM | SYSTOLIC BLOOD PRESSURE: 108 MMHG | DIASTOLIC BLOOD PRESSURE: 55 MMHG | OXYGEN SATURATION: 94 %

## 2025-06-03 ENCOUNTER — CARE COORDINATION (OUTPATIENT)
Dept: CARE COORDINATION | Age: 89
End: 2025-06-03

## 2025-06-03 NOTE — CARE COORDINATION
Remote Alert Monitoring Note      Date/Time:  6/3/2025 11:32 AM  Patient Current Location: Home: [address] [facility] [address]    LPN contacted patient and son in law by telephone regarding red alert received for pulse ox reading (90%). Verified patients name and  as identifiers.    Rpm alert to be reviewed by the provider                        Background: Kidney Disease, COPD, High Blood-Pressure     Refer to 911 immediately if:  Patient unresponsive or unable to provide history  Change in cognition or sudden confusion  Patient unable to respond in complete sentences  Intense chest pain/tightness  Any concern for any clinical emergency  Red Alert: Provider response time of 1 hr required for any red alert requiring intervention  Yellow Alert: Provider response time of 3hr required for any escalated yellow alert        Oxygen O2 Triage  Are you having any Chest Pain? no   Are you having any Shortness of Breath? no      Are you having any other health concerns or issues? no  .............................................................................................................................................................................................  Do you use oxygen? No      Patient/Caregiver educated on how to how to properly place pulse oximeter. Patient/Caregiver verbalizes understanding.       Clinical Interventions: Reviewed and followed up on alerts and treatments-. Pt is asymptomatic and in no apparent distress, speaking in full sentences.  Patient was able to warm hands and recheck while on the call. Updated reading 94%.      **For any new or worsening symptoms or you are concerned in anyway, please contact your Provider or report to the nearest Emergency Room.**    Plan/Follow Up: Will continue to review, monitor and address alerts with follow up based on severity of symptoms and risk factors.    THADDEUS Morales [facility]/ CTN/ Remote Patient

## 2025-06-04 ENCOUNTER — TELEPHONE (OUTPATIENT)
Age: 89
End: 2025-06-04

## 2025-06-05 ENCOUNTER — TELEPHONE (OUTPATIENT)
Age: 89
End: 2025-06-05

## 2025-06-05 ENCOUNTER — PATIENT MESSAGE (OUTPATIENT)
Age: 89
End: 2025-06-05

## 2025-06-06 ENCOUNTER — TELEPHONE (OUTPATIENT)
Age: 89
End: 2025-06-06

## 2025-06-06 RX ORDER — PREDNISONE 5 MG/1
TABLET ORAL
Qty: 10 TABLET | Refills: 0 | Status: SHIPPED | OUTPATIENT
Start: 2025-06-06

## 2025-06-06 NOTE — PROGRESS NOTES
Pc with daughter. Having pain and swelling in wrists. Will rx with prednisone 15 mg qd- days 1-4, then 10 mg qd- days 5-8, then 1 tab po qd- days 9-12

## 2025-06-06 NOTE — TELEPHONE ENCOUNTER
Directions do not match the quantity on this. Rx re-pended with updated quantity of 24.        For Pharmacy Admin Tracking Only    Program: Medication Refill  CPA in place:    Recommendation Provided To:   Intervention Detail: New Rx: 1, reason: Needs Additional Therapy  Intervention Accepted By:   Gap Closed?:    Time Spent (min): 5

## 2025-06-09 RX ORDER — PREDNISONE 5 MG/1
TABLET ORAL
Qty: 24 TABLET | Refills: 0 | Status: SHIPPED | OUTPATIENT
Start: 2025-06-09 | End: 2025-06-11 | Stop reason: SDUPTHER

## 2025-06-11 ENCOUNTER — TELEPHONE (OUTPATIENT)
Age: 89
End: 2025-06-11

## 2025-06-11 RX ORDER — PREDNISONE 5 MG/1
TABLET ORAL
Qty: 24 TABLET | Refills: 0 | Status: SHIPPED | OUTPATIENT
Start: 2025-06-11

## 2025-06-11 NOTE — PROGRESS NOTES
Pc with pts daughter. Still having bad pains in hands and shoulders. To hold crestor for 2 weeks. Pt sounds miserable. We are probbably nearing hospice.

## 2025-06-11 NOTE — TELEPHONE ENCOUNTER
Spoke with patient daughter and she is requesting to speak with Dr. Hopper regarding mom regarding Rosuvastatin medication. Daughter is requesting to lower dose or stop the medication. Message printed for provider.

## 2025-06-11 NOTE — TELEPHONE ENCOUNTER
Patient's daughter  Dolores, would like a call about taking the rosuvastatin.  Dolores can be reached at [phone].

## 2025-06-16 ENCOUNTER — TELEPHONE (OUTPATIENT)
Age: 89
End: 2025-06-16

## 2025-06-16 NOTE — TELEPHONE ENCOUNTER
Dr. Hopper made aware and stated he will call later today to discuss.   Metformin 1000mg         Last Written Prescription Date: 8/30/17  Last Fill Quantity: 60, # refills: 0  Last Office Visit with [facility], [facility] or  Health prescribing provider:  9/15/17   Next 5 appointments (look out 90 days)     Oct 25, 2017  1:30 PM CDT   Return Visit with Clara Corado MD   [facility]    [address]  [facility] [address] [phone]                   BP Readings from Last 3 Encounters:   09/28/17 113/52   09/15/17 138/70   09/08/17 118/80     Lab Results   Component Value Date    MICROL 7 09/07/2016     Lab Results   Component Value Date    UMALCR 6.64 09/07/2016     Creatinine   Date Value Ref Range Status   09/26/2017 0.88 0.52 - 1.04 mg/dL Final   ]  GFR Estimate   Date Value Ref Range Status   09/26/2017 63 >60 mL/min/1.7m2 Final     Comment:     Non  GFR Calc   09/25/2017 79 >60 mL/min/1.7m2 Final     Comment:     Non  GFR Calc   09/05/2017 58 (L) >60 mL/min/1.7m2 Final     Comment:     Non  GFR Calc     GFR Estimate If Black   Date Value Ref Range Status   09/26/2017 77 >60 mL/min/1.7m2 Final     Comment:      GFR Calc   09/25/2017 >90 >60 mL/min/1.7m2 Final     Comment:      GFR Calc   09/05/2017 70 >60 mL/min/1.7m2 Final     Comment:      GFR Calc     Lab Results   Component Value Date    CHOL 160 09/05/2017     Lab Results   Component Value Date    HDL 37 09/05/2017     Lab Results   Component Value Date    LDL 68 09/05/2017     Lab Results   Component Value Date    TRIG 276 09/05/2017     Lab Results   Component Value Date    CHOLHDLRATIO 3.1 08/03/2015     Lab Results   Component Value Date    AST 22 09/05/2017     Lab Results   Component Value Date    ALT 36 09/05/2017     Lab Results   Component Value Date    A1C 7.7 09/05/2017    A1C 7.5 03/17/2017    A1C 7.0 12/05/2016    A1C 6.8 09/07/2016    A1C 7.6 05/03/2016      Potassium   Date Value Ref Range Status   09/26/2017 4.1 3.4 - 5.3 mmol/L Final       Labs showing if normal/abnormal  Lab Results   Component Value Date    UCRR 98 09/07/2016    MICROL 7 09/07/2016     Lab Results   Component Value Date    CHOL 160 09/05/2017    TRIG 276 (H) 09/05/2017    HDL 37 (L) 09/05/2017    LDL 68 09/05/2017    VLDL 33 (H) 08/03/2015    CHOLHDLRATIO 3.1 08/03/2015     Lab Results   Component Value Date    A1C 7.7 (H) 09/05/2017    A1C 7.5 (H) 03/17/2017    A1C 7.0 (H) 12/05/2016

## 2025-06-16 NOTE — TELEPHONE ENCOUNTER
----- Message from THADDEUS HERNANDEZ LPN sent at 6/16/2025  1:25 PM EDT -----  Regarding: FW: follow up steroid completion and pain control    ----- Message -----  From: Allie Riley RN  Sent: 6/16/2025  12:53 PM EDT  To: #  Subject: follow up steroid completion and pain control    Patient  needs follow up appointment to address completion of steroids and pain management if not relieved by steroids and stopping Crestor. She will complete steroids on 6/21 and has stopped Crestor as of 6/11/25. If possible appt needed week of 6/23 - 6/27  Thank you,    Allie Riley RN,- Care Transition Nurse- [phone]

## 2025-06-16 NOTE — TELEPHONE ENCOUNTER
[phone] - Patient's daughter is requesting a return call to discuss pain management. States the patient is hurting severely and steroids previously prescribed are not helping.

## 2025-06-23 ENCOUNTER — TELEPHONE (OUTPATIENT)
Age: 89
End: 2025-06-23

## 2025-06-25 ENCOUNTER — OFFICE VISIT (OUTPATIENT)
Age: 89
End: 2025-06-25
Payer: MEDICARE

## 2025-06-25 VITALS
BODY MASS INDEX: 26.16 KG/M2 | WEIGHT: 143 LBS | TEMPERATURE: 98 F | DIASTOLIC BLOOD PRESSURE: 54 MMHG | HEART RATE: 70 BPM | SYSTOLIC BLOOD PRESSURE: 100 MMHG

## 2025-06-25 DIAGNOSIS — R53.1 WEAKNESS: Primary | ICD-10-CM

## 2025-06-25 DIAGNOSIS — M79.10 MYALGIA: ICD-10-CM

## 2025-06-25 DIAGNOSIS — R53.1 WEAKNESS: ICD-10-CM

## 2025-06-25 LAB
ALBUMIN SERPL-MCNC: 2.8 G/DL (ref 3.5–5)
ALBUMIN/GLOB SERPL: 0.6 (ref 1.1–2.2)
ALP SERPL-CCNC: 83 U/L (ref 45–117)
ALT SERPL-CCNC: 26 U/L (ref 12–78)
ANION GAP SERPL CALC-SCNC: 5 MMOL/L (ref 2–12)
APPEARANCE UR: CLEAR
AST SERPL-CCNC: 13 U/L (ref 15–37)
BACTERIA URNS QL MICRO: NEGATIVE /HPF
BASOPHILS # BLD: 0.02 K/UL (ref 0–0.1)
BASOPHILS NFR BLD: 0.2 % (ref 0–1)
BILIRUB SERPL-MCNC: 0.3 MG/DL (ref 0.2–1)
BILIRUB UR QL: NEGATIVE
BUN SERPL-MCNC: 34 MG/DL (ref 6–20)
BUN/CREAT SERPL: 26 (ref 12–20)
CALCIUM SERPL-MCNC: 9 MG/DL (ref 8.5–10.1)
CHLORIDE SERPL-SCNC: 105 MMOL/L (ref 97–108)
CK SERPL-CCNC: 12 U/L (ref 26–192)
CO2 SERPL-SCNC: 28 MMOL/L (ref 21–32)
COLOR UR: NORMAL
CREAT SERPL-MCNC: 1.33 MG/DL (ref 0.55–1.02)
CRP SERPL-MCNC: 4.58 MG/DL (ref 0–0.3)
DIFFERENTIAL METHOD BLD: ABNORMAL
EOSINOPHIL # BLD: 0.19 K/UL (ref 0–0.4)
EOSINOPHIL NFR BLD: 2.1 % (ref 0–7)
EPITH CASTS URNS QL MICRO: NORMAL /LPF
ERYTHROCYTE [DISTWIDTH] IN BLOOD BY AUTOMATED COUNT: 14.7 % (ref 11.5–14.5)
ERYTHROCYTE [SEDIMENTATION RATE] IN BLOOD: 83 MM/HR (ref 0–30)
GLOBULIN SER CALC-MCNC: 4.7 G/DL (ref 2–4)
GLUCOSE SERPL-MCNC: 92 MG/DL (ref 65–100)
GLUCOSE UR STRIP.AUTO-MCNC: NEGATIVE MG/DL
HCT VFR BLD AUTO: 34 % (ref 35–47)
HGB BLD-MCNC: 10.5 G/DL (ref 11.5–16)
HGB UR QL STRIP: NEGATIVE
HYALINE CASTS URNS QL MICRO: NORMAL /LPF (ref 0–5)
IMM GRANULOCYTES # BLD AUTO: 0.03 K/UL (ref 0–0.04)
IMM GRANULOCYTES NFR BLD AUTO: 0.3 % (ref 0–0.5)
KETONES UR QL STRIP.AUTO: NEGATIVE MG/DL
LEUKOCYTE ESTERASE UR QL STRIP.AUTO: NEGATIVE
LYMPHOCYTES # BLD: 2.21 K/UL (ref 0.8–3.5)
LYMPHOCYTES NFR BLD: 24.7 % (ref 12–49)
MCH RBC QN AUTO: 28.5 PG (ref 26–34)
MCHC RBC AUTO-ENTMCNC: 30.9 G/DL (ref 30–36.5)
MCV RBC AUTO: 92.4 FL (ref 80–99)
MONOCYTES # BLD: 0.75 K/UL (ref 0–1)
MONOCYTES NFR BLD: 8.4 % (ref 5–13)
NEUTS SEG # BLD: 5.76 K/UL (ref 1.8–8)
NEUTS SEG NFR BLD: 64.3 % (ref 32–75)
NITRITE UR QL STRIP.AUTO: NEGATIVE
NRBC # BLD: 0 K/UL (ref 0–0.01)
NRBC BLD-RTO: 0 PER 100 WBC
PH UR STRIP: 5 (ref 5–8)
PLATELET # BLD AUTO: 163 K/UL (ref 150–400)
PMV BLD AUTO: 12.7 FL (ref 8.9–12.9)
POTASSIUM SERPL-SCNC: 4.8 MMOL/L (ref 3.5–5.1)
PROT SERPL-MCNC: 7.5 G/DL (ref 6.4–8.2)
PROT UR STRIP-MCNC: NEGATIVE MG/DL
RBC # BLD AUTO: 3.68 M/UL (ref 3.8–5.2)
RBC #/AREA URNS HPF: NORMAL /HPF (ref 0–5)
SODIUM SERPL-SCNC: 138 MMOL/L (ref 136–145)
SP GR UR REFRACTOMETRY: 1.01 (ref 1–1.03)
SPECIMEN HOLD: NORMAL
T4 FREE SERPL-MCNC: 1.1 NG/DL (ref 0.8–1.5)
TSH SERPL DL<=0.05 MIU/L-ACNC: 0.74 UIU/ML (ref 0.36–3.74)
UROBILINOGEN UR QL STRIP.AUTO: 0.2 EU/DL (ref 0.2–1)
WBC # BLD AUTO: 9 K/UL (ref 3.6–11)
WBC URNS QL MICRO: NORMAL /HPF (ref 0–4)

## 2025-06-25 PROCEDURE — 99213 OFFICE O/P EST LOW 20 MIN: CPT | Performed by: FAMILY MEDICINE

## 2025-06-25 PROCEDURE — G8419 CALC BMI OUT NRM PARAM NOF/U: HCPCS | Performed by: FAMILY MEDICINE

## 2025-06-25 PROCEDURE — 1036F TOBACCO NON-USER: CPT | Performed by: FAMILY MEDICINE

## 2025-06-25 PROCEDURE — 1090F PRES/ABSN URINE INCON ASSESS: CPT | Performed by: FAMILY MEDICINE

## 2025-06-25 PROCEDURE — 1159F MED LIST DOCD IN RCRD: CPT | Performed by: FAMILY MEDICINE

## 2025-06-25 PROCEDURE — 1123F ACP DISCUSS/DSCN MKR DOCD: CPT | Performed by: FAMILY MEDICINE

## 2025-06-25 PROCEDURE — G8427 DOCREV CUR MEDS BY ELIG CLIN: HCPCS | Performed by: FAMILY MEDICINE

## 2025-06-25 RX ORDER — OFLOXACIN 3 MG/ML
SOLUTION/ DROPS OPHTHALMIC
COMMUNITY
Start: 2025-05-27

## 2025-06-25 RX ORDER — PREDNISONE 5 MG/1
5 TABLET ORAL 2 TIMES DAILY
Qty: 30 TABLET | Refills: 0 | Status: SHIPPED | OUTPATIENT
Start: 2025-06-25 | End: 2025-07-10

## 2025-06-25 NOTE — PROGRESS NOTES
Krystin Reed (:  1930) is a 89 y.o. female,Established patient, here for evaluation of the following chief complaint(s):  Follow-up and Discuss Labs         Assessment & Plan  Weakness       Orders:    CBC with Auto Differential; Future    Comprehensive Metabolic Panel; Future    Urinalysis with Microscopic; Future    T4, Free; Future    TSH; Future    Myalgia       Orders:    CK; Future    Sedimentation Rate; Future    C-Reactive Protein; Future    predniSONE (DELTASONE) 5 MG tablet; Take 1 tablet by mouth 2 times daily for 30 doses  continue to hold crestor. Recheck in 2 weeks.     Return in about 2 weeks (around 2025).       Subjective   HPI Has been having pains in both shoulders, which would radiate down arms to hands. Hands would swell. Has had these symptoms. For 3 weeks. Feels better now. We stopped her crestor 40 mg 2 weeks ago. Symptoms came on shortly after being admittedin May of this year for confusion, slurred speech. It was thought that pt had a TIA and had her crestor increased to 40 mg daily. Has also been having pains in knees also . Has also been sleeping all day when pain is bad. No fever, chills, increased cough. Was having some nocturia but this is better now. Has just finished prednisone which seemed to help.     Review of Systems       Objective   Physical Exam  Cardiovascular:      Rate and Rhythm: Normal rate and regular rhythm.      Heart sounds: Normal heart sounds. No murmur heard.  Pulmonary:      Effort: Pulmonary effort is normal.      Breath sounds: Normal breath sounds.   Abdominal:      General: Abdomen is flat. Bowel sounds are normal.      Palpations: Abdomen is soft.   Musculoskeletal:      Right lower leg: No edema.      Left lower leg: No edema.      Comments: Neck- full rom, minimal tenderness to palpation   Ext- gross motor intact.   Back- no tenderness in paravertebral lumbar muscles. sLR negative bilaterally.    Neurological:      General: No focal

## 2025-06-25 NOTE — PROGRESS NOTES
Chief Complaint   Patient presents with    Follow-up    Discuss Labs       \"Have you been to the ER, urgent care clinic since your last visit?  Hospitalized since your last visit?\"        “Have you seen or consulted any other health care providers outside of [facility] since your last visit?”                Click Here for Release of Records Request       Vitals:    25 1138   BP: (!) 100/54   Pulse: 70   Temp: 98 °F (36.7 °C)      Health Maintenance Due   Topic Date Due    DTaP/Tdap/Td vaccine (1 - Tdap) Never done    Respiratory Syncytial Virus (RSV) Pregnant or age 60 yrs+ (1 - 1-dose 75+ series) Never done    COVID-19 Vaccine ( season) 2024        The patient, Krystin Reed, identity was verified by name and .

## 2025-06-26 ENCOUNTER — TELEPHONE (OUTPATIENT)
Age: 89
End: 2025-06-26

## 2025-06-26 RX ORDER — TRAZODONE HYDROCHLORIDE 300 MG/1
300 TABLET ORAL NIGHTLY
Qty: 30 TABLET | Refills: 0 | Status: SHIPPED | OUTPATIENT
Start: 2025-06-26

## 2025-06-26 RX ORDER — POTASSIUM CHLORIDE 1500 MG/1
TABLET, EXTENDED RELEASE ORAL
Qty: 90 TABLET | Refills: 0 | Status: SHIPPED | OUTPATIENT
Start: 2025-06-26

## 2025-06-30 ENCOUNTER — PATIENT MESSAGE (OUTPATIENT)
Age: 89
End: 2025-06-30

## 2025-06-30 DIAGNOSIS — M79.10 MYALGIA: ICD-10-CM

## 2025-06-30 RX ORDER — PREDNISONE 5 MG/1
5 TABLET ORAL 2 TIMES DAILY
Qty: 30 TABLET | Refills: 0 | Status: SHIPPED | OUTPATIENT
Start: 2025-06-30 | End: 2025-07-15

## 2025-07-01 ENCOUNTER — PATIENT MESSAGE (OUTPATIENT)
Age: 89
End: 2025-07-01

## 2025-07-07 ENCOUNTER — PATIENT MESSAGE (OUTPATIENT)
Age: 89
End: 2025-07-07

## 2025-07-09 ENCOUNTER — RESULTS FOLLOW-UP (OUTPATIENT)
Age: 89
End: 2025-07-09

## 2025-07-09 NOTE — TELEPHONE ENCOUNTER
Pc with pts daughter. Overall is doing better, down to prednisone 5 mg daily. Daughter hopes to taper her off this medicine by next week. Myalgia secondary to crestor or polymyalgia rheumatica would both be in the differential diagnosis.

## 2025-07-15 ENCOUNTER — CARE COORDINATION (OUTPATIENT)
Dept: CARE COORDINATION | Age: 89
End: 2025-07-15

## 2025-07-15 NOTE — CARE COORDINATION
Remote Alert Monitoring Note      Date/Time:  7/15/2025 11:32 AM  Patient Current Location: Home: [address] [id number]    LPN contacted Caregiver Maxi by telephone regarding red alert received for pulse ox reading (91%). Verified patients name and  as identifiers.    Rpm alert to be reviewed by the provider                        Background: Kidney Disease, COPD, High Blood-Pressure     Refer to 911 immediately if:  Patient unresponsive or unable to provide history  Change in cognition or sudden confusion  Patient unable to respond in complete sentences  Intense chest pain/tightness  Any concern for any clinical emergency  Red Alert: Provider response time of 1 hr required for any red alert requiring intervention  Yellow Alert: Provider response time of 3hr required for any escalated yellow alert        Oxygen O2 Triage  Are you having any Chest Pain? no   Are you having any Shortness of Breath? no      Are you having any other health concerns or issues? no  .............................................................................................................................................................................................  Do you use oxygen? No       Patient/Caregiver educated on how to how to properly place pulse oximeter. Patient/Caregiver verbalizes understanding.       Clinical Interventions: Reviewed and followed up on alerts and treatments-. Pt is asymptomatic and in no apparent distress, speaking in full sentences.  Maxi was able to recheck SP02 while on the call and got a normal reading of 93%.    **For any new or worsening symptoms or you are concerned in anyway, please contact your Provider or report to the nearest Emergency Room.**    Plan/Follow Up: Will continue to review, monitor and address alerts with follow up based on severity of symptoms and risk factors.    THADDEUS Morales [facility]/ CTN/ Remote Patient monitoring  [phone]

## 2025-07-15 NOTE — CARE COORDINATION
Remote Patient Monitoring Note      Date/Time:  7/15/2025 10:38 AM    LPN attempted to contact patient by telephone regarding red alert received for pulse ox reading (91%).     Background: Kidney Disease, COPD, High Blood-Pressure     Clinical Interventions: HIPAA compliant message left on  requesting a return call.    Plan/Follow Up: Will continue to review, monitor and address alerts with follow up based on severity of symptoms and risk factors.       THADDEUS Morales [facility]/ CTN/ Remote Patient Monitoring  [phone]

## 2025-07-16 DIAGNOSIS — I1A.0 RESISTANT HYPERTENSION: Primary | ICD-10-CM

## 2025-07-16 DIAGNOSIS — N18.32 STAGE 3B CHRONIC KIDNEY DISEASE (HCC): ICD-10-CM

## 2025-07-16 DIAGNOSIS — J44.9 CHRONIC OBSTRUCTIVE PULMONARY DISEASE, UNSPECIFIED COPD TYPE (HCC): ICD-10-CM

## 2025-07-16 NOTE — PROGRESS NOTES
Remote Patient Order Discontinued    Received request from Darlin Owen RN   to discontinue order for remote patient monitoring of Kidney Disease , COPD, and HTN and order completed.

## 2025-07-17 ENCOUNTER — CARE COORDINATION (OUTPATIENT)
Facility: CLINIC | Age: 89
End: 2025-07-17

## 2025-07-17 ENCOUNTER — OFFICE VISIT (OUTPATIENT)
Age: 89
End: 2025-07-17
Payer: MEDICARE

## 2025-07-17 VITALS
HEIGHT: 62 IN | BODY MASS INDEX: 25.91 KG/M2 | DIASTOLIC BLOOD PRESSURE: 73 MMHG | SYSTOLIC BLOOD PRESSURE: 139 MMHG | HEART RATE: 69 BPM | OXYGEN SATURATION: 94 % | WEIGHT: 140.8 LBS | TEMPERATURE: 98.5 F | RESPIRATION RATE: 16 BRPM

## 2025-07-17 DIAGNOSIS — M79.10 MUSCLE ACHE: Primary | ICD-10-CM

## 2025-07-17 DIAGNOSIS — J84.9 INTERSTITIAL PULMONARY DISEASE, UNSPECIFIED (HCC): ICD-10-CM

## 2025-07-17 PROCEDURE — G8419 CALC BMI OUT NRM PARAM NOF/U: HCPCS | Performed by: FAMILY MEDICINE

## 2025-07-17 PROCEDURE — 1159F MED LIST DOCD IN RCRD: CPT | Performed by: FAMILY MEDICINE

## 2025-07-17 PROCEDURE — 1090F PRES/ABSN URINE INCON ASSESS: CPT | Performed by: FAMILY MEDICINE

## 2025-07-17 PROCEDURE — 99213 OFFICE O/P EST LOW 20 MIN: CPT | Performed by: FAMILY MEDICINE

## 2025-07-17 PROCEDURE — 1036F TOBACCO NON-USER: CPT | Performed by: FAMILY MEDICINE

## 2025-07-17 PROCEDURE — 1125F AMNT PAIN NOTED PAIN PRSNT: CPT | Performed by: FAMILY MEDICINE

## 2025-07-17 PROCEDURE — G8427 DOCREV CUR MEDS BY ELIG CLIN: HCPCS | Performed by: FAMILY MEDICINE

## 2025-07-17 PROCEDURE — 1123F ACP DISCUSS/DSCN MKR DOCD: CPT | Performed by: FAMILY MEDICINE

## 2025-07-17 RX ORDER — PREDNISONE 5 MG/1
5 TABLET ORAL DAILY
Qty: 30 TABLET | Refills: 2 | Status: SHIPPED | OUTPATIENT
Start: 2025-07-17 | End: 2025-10-15

## 2025-07-17 NOTE — PROGRESS NOTES
Krystin Reed (:  1930) is a 89 y.o. female,Established patient, here for evaluation of the following chief complaint(s):  Follow-up Chronic Condition (2 week)         Assessment & Plan  Muscle ache   Polymyalgia rheumatica vs myualgia from crestor. PMR seems to be more likely. Repeat sed rate , CRP- change prednisone to 5 mg daily and recheck in 4 weeks.     Orders:    C-Reactive Protein; Future    Sedimentation Rate; Future    predniSONE (DELTASONE) 5 MG tablet; Take 1 tablet by mouth daily    Interstitial pulmonary disease, unspecified (J84.9)              Return in about 4 weeks (around 2025).       Subjective   HPI no longer taking crestor. Didn't ache until starting this medicine. Remains  tired, wants to sleep all the time. . Took 2 tabs of prednisone 2 days ago, but none yesterday or today, and pain seems to be doing better.    Review of Systems       Objective   Physical Exam  Cardiovascular:      Rate and Rhythm: Normal rate and regular rhythm.      Heart sounds: Normal heart sounds. No murmur heard.  Pulmonary:      Effort: Pulmonary effort is normal.      Breath sounds: Normal breath sounds.   Abdominal:      General: Abdomen is flat. Bowel sounds are normal.      Palpations: Abdomen is soft.   Musculoskeletal:      Right lower leg: No edema.      Left lower leg: No edema.                  An electronic signature was used to authenticate this note.    --Yann Hopper MD

## 2025-07-17 NOTE — PROGRESS NOTES
Chief Complaint   Patient presents with    Follow-up Chronic Condition     2 week       \"Have you been to the ER, urgent care clinic since your last visit?  Hospitalized since your last visit?\"    NO    “Have you seen or consulted any other health care providers outside of [facility] since your last visit?”    NO            Click Here for Release of Records Request       There were no vitals filed for this visit.   Health Maintenance Due   Topic Date Due    DTaP/Tdap/Td vaccine (1 - Tdap) Never done    Respiratory Syncytial Virus (RSV) Pregnant or age 60 yrs+ (1 - 1-dose 75+ series) Never done    COVID-19 Vaccine (2024- season) 2024        The patient, Krystin Reed, identity was verified by name and .

## 2025-07-17 NOTE — CARE COORDINATION
Kit Return Krystin Reed  7/17/25    Care Coordination  placed call to Daughter  to arrange RPM kit  through [employer].     CCSS spoke to Daughter reviewed with Daughter how to pack equipment in original packing. Daughter aware [employer] will  equipment in 2-4 days.   All questions and concerns answered.

## 2025-07-18 ENCOUNTER — PATIENT MESSAGE (OUTPATIENT)
Age: 89
End: 2025-07-18

## 2025-07-18 LAB
CRP SERPL-MCNC: 3.1 MG/DL (ref 0–0.3)
ERYTHROCYTE [SEDIMENTATION RATE] IN BLOOD: 63 MM/HR (ref 0–30)

## 2025-07-21 RX ORDER — POTASSIUM CHLORIDE 1500 MG/1
TABLET, EXTENDED RELEASE ORAL
Qty: 270 TABLET | Refills: 3 | Status: ON HOLD | OUTPATIENT
Start: 2025-07-21

## 2025-07-21 RX ORDER — TRAZODONE HYDROCHLORIDE 300 MG/1
300 TABLET ORAL NIGHTLY
Qty: 30 TABLET | Refills: 5 | Status: ON HOLD | OUTPATIENT
Start: 2025-07-21

## 2025-07-21 RX ORDER — LEVOTHYROXINE SODIUM 100 UG/1
100 TABLET ORAL DAILY
Qty: 90 TABLET | Refills: 3 | Status: ON HOLD | OUTPATIENT
Start: 2025-07-21

## 2025-07-24 ENCOUNTER — APPOINTMENT (OUTPATIENT)
Facility: HOSPITAL | Age: 89
DRG: 194 | End: 2025-07-24
Payer: MEDICARE

## 2025-07-24 ENCOUNTER — OFFICE VISIT (OUTPATIENT)
Age: 89
End: 2025-07-24
Payer: MEDICARE

## 2025-07-24 ENCOUNTER — HOSPITAL ENCOUNTER (INPATIENT)
Facility: HOSPITAL | Age: 89
LOS: 5 days | Discharge: HOME | DRG: 194 | End: 2025-07-29
Attending: EMERGENCY MEDICINE | Admitting: INTERNAL MEDICINE
Payer: MEDICARE

## 2025-07-24 VITALS
OXYGEN SATURATION: 93 % | DIASTOLIC BLOOD PRESSURE: 61 MMHG | HEART RATE: 94 BPM | TEMPERATURE: 98 F | SYSTOLIC BLOOD PRESSURE: 96 MMHG | RESPIRATION RATE: 16 BRPM

## 2025-07-24 DIAGNOSIS — R06.89 ABNORMAL BREATH SOUNDS: ICD-10-CM

## 2025-07-24 DIAGNOSIS — E86.0 SEVERE DEHYDRATION: ICD-10-CM

## 2025-07-24 DIAGNOSIS — M35.3 PMR (POLYMYALGIA RHEUMATICA): ICD-10-CM

## 2025-07-24 DIAGNOSIS — R62.7 FAILURE TO THRIVE IN ADULT: ICD-10-CM

## 2025-07-24 DIAGNOSIS — J18.9 COMMUNITY ACQUIRED PNEUMONIA OF RIGHT UPPER LOBE OF LUNG: Primary | ICD-10-CM

## 2025-07-24 DIAGNOSIS — E86.1 HYPOTENSION DUE TO HYPOVOLEMIA: Primary | ICD-10-CM

## 2025-07-24 PROBLEM — J15.9 PNEUMONIA, BACTERIAL: Status: ACTIVE | Noted: 2025-07-24

## 2025-07-24 LAB
ALBUMIN SERPL-MCNC: 2.8 G/DL (ref 3.5–5)
ALBUMIN/GLOB SERPL: 0.5 (ref 1.1–2.2)
ALP SERPL-CCNC: 63 U/L (ref 45–117)
ALT SERPL-CCNC: 16 U/L (ref 12–78)
ANION GAP SERPL CALC-SCNC: 4 MMOL/L (ref 2–12)
AST SERPL-CCNC: 46 U/L (ref 15–37)
BASOPHILS # BLD: 0.08 K/UL (ref 0–0.1)
BASOPHILS NFR BLD: 0.9 % (ref 0–1)
BILIRUB SERPL-MCNC: 0.6 MG/DL (ref 0.2–1)
BUN SERPL-MCNC: 28 MG/DL (ref 6–20)
BUN/CREAT SERPL: 21 (ref 12–20)
CALCIUM SERPL-MCNC: 9.1 MG/DL (ref 8.5–10.1)
CHLORIDE SERPL-SCNC: 95 MMOL/L (ref 97–108)
CO2 SERPL-SCNC: 30 MMOL/L (ref 21–32)
COMMENT:: NORMAL
CREAT SERPL-MCNC: 1.36 MG/DL (ref 0.55–1.02)
DIFFERENTIAL METHOD BLD: ABNORMAL
EKG DIAGNOSIS: NORMAL
EKG Q-T INTERVAL: 390 MS
EKG QRS DURATION: 138 MS
EKG QTC CALCULATION (BAZETT): 469 MS
EKG R AXIS: -64 DEGREES
EKG T AXIS: -18 DEGREES
EKG VENTRICULAR RATE: 87 BPM
EOSINOPHIL # BLD: 0.22 K/UL (ref 0–0.4)
EOSINOPHIL NFR BLD: 2.4 % (ref 0–7)
ERYTHROCYTE [DISTWIDTH] IN BLOOD BY AUTOMATED COUNT: 16 % (ref 11.5–14.5)
GLOBULIN SER CALC-MCNC: 5.1 G/DL (ref 2–4)
GLUCOSE SERPL-MCNC: 126 MG/DL (ref 65–100)
HCT VFR BLD AUTO: 36.4 % (ref 35–47)
HGB BLD-MCNC: 11.4 G/DL (ref 11.5–16)
IMM GRANULOCYTES # BLD AUTO: 0.05 K/UL (ref 0–0.04)
IMM GRANULOCYTES NFR BLD AUTO: 0.5 % (ref 0–0.5)
LACTATE BLD-SCNC: 1.93 MMOL/L (ref 0.4–2)
LYMPHOCYTES # BLD: 1.8 K/UL (ref 0.8–3.5)
LYMPHOCYTES NFR BLD: 19.4 % (ref 12–49)
MAGNESIUM SERPL-MCNC: 1.9 MG/DL (ref 1.6–2.4)
MCH RBC QN AUTO: 28.8 PG (ref 26–34)
MCHC RBC AUTO-ENTMCNC: 31.3 G/DL (ref 30–36.5)
MCV RBC AUTO: 91.9 FL (ref 80–99)
MONOCYTES # BLD: 1.02 K/UL (ref 0–1)
MONOCYTES NFR BLD: 11 % (ref 5–13)
NEUTS SEG # BLD: 6.13 K/UL (ref 1.8–8)
NEUTS SEG NFR BLD: 65.8 % (ref 32–75)
NRBC # BLD: 0 K/UL (ref 0–0.01)
NRBC BLD-RTO: 0 PER 100 WBC
NT PRO BNP: 1739 PG/ML
PLATELET # BLD AUTO: 208 K/UL (ref 150–400)
PMV BLD AUTO: 11.6 FL (ref 8.9–12.9)
POTASSIUM SERPL-SCNC: 5.6 MMOL/L (ref 3.5–5.1)
PROCALCITONIN SERPL-MCNC: <0.05 NG/ML
PROT SERPL-MCNC: 7.9 G/DL (ref 6.4–8.2)
RBC # BLD AUTO: 3.96 M/UL (ref 3.8–5.2)
SODIUM SERPL-SCNC: 129 MMOL/L (ref 136–145)
SPECIMEN HOLD: NORMAL
TROPONIN I SERPL HS-MCNC: 15 NG/L (ref 0–51)
WBC # BLD AUTO: 9.3 K/UL (ref 3.6–11)

## 2025-07-24 PROCEDURE — 96361 HYDRATE IV INFUSION ADD-ON: CPT

## 2025-07-24 PROCEDURE — 84145 PROCALCITONIN (PCT): CPT

## 2025-07-24 PROCEDURE — 99213 OFFICE O/P EST LOW 20 MIN: CPT | Performed by: FAMILY MEDICINE

## 2025-07-24 PROCEDURE — 87449 NOS EACH ORGANISM AG IA: CPT

## 2025-07-24 PROCEDURE — G8427 DOCREV CUR MEDS BY ELIG CLIN: HCPCS | Performed by: FAMILY MEDICINE

## 2025-07-24 PROCEDURE — 2060000000 HC ICU INTERMEDIATE R&B

## 2025-07-24 PROCEDURE — 85025 COMPLETE CBC W/AUTO DIFF WBC: CPT

## 2025-07-24 PROCEDURE — 83735 ASSAY OF MAGNESIUM: CPT

## 2025-07-24 PROCEDURE — 36415 COLL VENOUS BLD VENIPUNCTURE: CPT

## 2025-07-24 PROCEDURE — 2700000000 HC OXYGEN THERAPY PER DAY

## 2025-07-24 PROCEDURE — P9047 ALBUMIN (HUMAN), 25%, 50ML: HCPCS | Performed by: EMERGENCY MEDICINE

## 2025-07-24 PROCEDURE — 6370000000 HC RX 637 (ALT 250 FOR IP): Performed by: INTERNAL MEDICINE

## 2025-07-24 PROCEDURE — 2500000003 HC RX 250 WO HCPCS: Performed by: INTERNAL MEDICINE

## 2025-07-24 PROCEDURE — 96365 THER/PROPH/DIAG IV INF INIT: CPT

## 2025-07-24 PROCEDURE — 93005 ELECTROCARDIOGRAM TRACING: CPT | Performed by: EMERGENCY MEDICINE

## 2025-07-24 PROCEDURE — 84484 ASSAY OF TROPONIN QUANT: CPT

## 2025-07-24 PROCEDURE — 83880 ASSAY OF NATRIURETIC PEPTIDE: CPT

## 2025-07-24 PROCEDURE — 83605 ASSAY OF LACTIC ACID: CPT

## 2025-07-24 PROCEDURE — 87040 BLOOD CULTURE FOR BACTERIA: CPT

## 2025-07-24 PROCEDURE — 99285 EMERGENCY DEPT VISIT HI MDM: CPT

## 2025-07-24 PROCEDURE — 1090F PRES/ABSN URINE INCON ASSESS: CPT | Performed by: FAMILY MEDICINE

## 2025-07-24 PROCEDURE — 1036F TOBACCO NON-USER: CPT | Performed by: FAMILY MEDICINE

## 2025-07-24 PROCEDURE — 2580000003 HC RX 258: Performed by: EMERGENCY MEDICINE

## 2025-07-24 PROCEDURE — 71045 X-RAY EXAM CHEST 1 VIEW: CPT

## 2025-07-24 PROCEDURE — 80053 COMPREHEN METABOLIC PANEL: CPT

## 2025-07-24 PROCEDURE — 1159F MED LIST DOCD IN RCRD: CPT | Performed by: FAMILY MEDICINE

## 2025-07-24 PROCEDURE — 2500000003 HC RX 250 WO HCPCS: Performed by: EMERGENCY MEDICINE

## 2025-07-24 PROCEDURE — G8419 CALC BMI OUT NRM PARAM NOF/U: HCPCS | Performed by: FAMILY MEDICINE

## 2025-07-24 PROCEDURE — 6360000002 HC RX W HCPCS: Performed by: EMERGENCY MEDICINE

## 2025-07-24 PROCEDURE — 94640 AIRWAY INHALATION TREATMENT: CPT

## 2025-07-24 PROCEDURE — 1123F ACP DISCUSS/DSCN MKR DOCD: CPT | Performed by: FAMILY MEDICINE

## 2025-07-24 PROCEDURE — 74018 RADEX ABDOMEN 1 VIEW: CPT

## 2025-07-24 PROCEDURE — 96375 TX/PRO/DX INJ NEW DRUG ADDON: CPT

## 2025-07-24 PROCEDURE — 6360000002 HC RX W HCPCS: Performed by: INTERNAL MEDICINE

## 2025-07-24 RX ORDER — ONDANSETRON 2 MG/ML
4 INJECTION INTRAMUSCULAR; INTRAVENOUS EVERY 6 HOURS PRN
Status: DISCONTINUED | OUTPATIENT
Start: 2025-07-24 | End: 2025-07-29 | Stop reason: HOSPADM

## 2025-07-24 RX ORDER — PREDNISONE 10 MG/1
5 TABLET ORAL DAILY
Status: DISCONTINUED | OUTPATIENT
Start: 2025-07-24 | End: 2025-07-24

## 2025-07-24 RX ORDER — ACETAMINOPHEN 325 MG/1
650 TABLET ORAL EVERY 6 HOURS PRN
Status: DISCONTINUED | OUTPATIENT
Start: 2025-07-24 | End: 2025-07-29 | Stop reason: HOSPADM

## 2025-07-24 RX ORDER — POTASSIUM CHLORIDE 1500 MG/1
40 TABLET, EXTENDED RELEASE ORAL PRN
Status: DISCONTINUED | OUTPATIENT
Start: 2025-07-24 | End: 2025-07-29 | Stop reason: HOSPADM

## 2025-07-24 RX ORDER — PANTOPRAZOLE SODIUM 40 MG/1
40 TABLET, DELAYED RELEASE ORAL DAILY
Status: DISCONTINUED | OUTPATIENT
Start: 2025-07-25 | End: 2025-07-29 | Stop reason: HOSPADM

## 2025-07-24 RX ORDER — ACETAMINOPHEN 650 MG/1
650 SUPPOSITORY RECTAL EVERY 6 HOURS PRN
Status: DISCONTINUED | OUTPATIENT
Start: 2025-07-24 | End: 2025-07-29 | Stop reason: HOSPADM

## 2025-07-24 RX ORDER — TROSPIUM CHLORIDE 20 MG/1
20 TABLET, FILM COATED ORAL
Status: DISCONTINUED | OUTPATIENT
Start: 2025-07-24 | End: 2025-07-29 | Stop reason: HOSPADM

## 2025-07-24 RX ORDER — LEVOTHYROXINE SODIUM 100 UG/1
100 TABLET ORAL
Status: DISCONTINUED | OUTPATIENT
Start: 2025-07-25 | End: 2025-07-29 | Stop reason: HOSPADM

## 2025-07-24 RX ORDER — 0.9 % SODIUM CHLORIDE 0.9 %
500 INTRAVENOUS SOLUTION INTRAVENOUS ONCE
Status: COMPLETED | OUTPATIENT
Start: 2025-07-24 | End: 2025-07-24

## 2025-07-24 RX ORDER — ONDANSETRON 4 MG/1
4 TABLET, ORALLY DISINTEGRATING ORAL EVERY 8 HOURS PRN
Status: DISCONTINUED | OUTPATIENT
Start: 2025-07-24 | End: 2025-07-29 | Stop reason: HOSPADM

## 2025-07-24 RX ORDER — ONDANSETRON 2 MG/ML
4 INJECTION INTRAMUSCULAR; INTRAVENOUS EVERY 6 HOURS PRN
Status: DISCONTINUED | OUTPATIENT
Start: 2025-07-24 | End: 2025-07-24

## 2025-07-24 RX ORDER — PREDNISONE 20 MG/1
40 TABLET ORAL DAILY
Status: DISCONTINUED | OUTPATIENT
Start: 2025-07-24 | End: 2025-07-27

## 2025-07-24 RX ORDER — POTASSIUM CHLORIDE 7.45 MG/ML
10 INJECTION INTRAVENOUS PRN
Status: DISCONTINUED | OUTPATIENT
Start: 2025-07-24 | End: 2025-07-29 | Stop reason: HOSPADM

## 2025-07-24 RX ORDER — CETIRIZINE HYDROCHLORIDE 10 MG/1
5 TABLET ORAL DAILY
Status: DISCONTINUED | OUTPATIENT
Start: 2025-07-25 | End: 2025-07-29 | Stop reason: HOSPADM

## 2025-07-24 RX ORDER — SODIUM CHLORIDE 9 MG/ML
INJECTION, SOLUTION INTRAVENOUS PRN
Status: DISCONTINUED | OUTPATIENT
Start: 2025-07-24 | End: 2025-07-29 | Stop reason: HOSPADM

## 2025-07-24 RX ORDER — FUROSEMIDE 40 MG/1
40 TABLET ORAL DAILY
Status: DISCONTINUED | OUTPATIENT
Start: 2025-07-25 | End: 2025-07-29 | Stop reason: HOSPADM

## 2025-07-24 RX ORDER — MAGNESIUM SULFATE IN WATER 40 MG/ML
2000 INJECTION, SOLUTION INTRAVENOUS PRN
Status: DISCONTINUED | OUTPATIENT
Start: 2025-07-24 | End: 2025-07-29 | Stop reason: HOSPADM

## 2025-07-24 RX ORDER — ALBUMIN (HUMAN) 12.5 G/50ML
25 SOLUTION INTRAVENOUS ONCE
Status: COMPLETED | OUTPATIENT
Start: 2025-07-24 | End: 2025-07-24

## 2025-07-24 RX ORDER — 0.9 % SODIUM CHLORIDE 0.9 %
1000 INTRAVENOUS SOLUTION INTRAVENOUS ONCE
Status: COMPLETED | OUTPATIENT
Start: 2025-07-24 | End: 2025-07-24

## 2025-07-24 RX ORDER — ASPIRIN 81 MG/1
81 TABLET, CHEWABLE ORAL DAILY
Status: DISCONTINUED | OUTPATIENT
Start: 2025-07-25 | End: 2025-07-29 | Stop reason: HOSPADM

## 2025-07-24 RX ORDER — TRAZODONE HYDROCHLORIDE 100 MG/1
300 TABLET ORAL NIGHTLY
Status: DISCONTINUED | OUTPATIENT
Start: 2025-07-25 | End: 2025-07-29 | Stop reason: HOSPADM

## 2025-07-24 RX ORDER — ACETAMINOPHEN 325 MG/1
650 TABLET ORAL EVERY 4 HOURS PRN
Status: DISCONTINUED | OUTPATIENT
Start: 2025-07-24 | End: 2025-07-24

## 2025-07-24 RX ORDER — ENOXAPARIN SODIUM 100 MG/ML
40 INJECTION SUBCUTANEOUS DAILY
Status: DISCONTINUED | OUTPATIENT
Start: 2025-07-24 | End: 2025-07-24

## 2025-07-24 RX ORDER — SODIUM CHLORIDE 0.9 % (FLUSH) 0.9 %
5-40 SYRINGE (ML) INJECTION EVERY 12 HOURS SCHEDULED
Status: DISCONTINUED | OUTPATIENT
Start: 2025-07-24 | End: 2025-07-29 | Stop reason: HOSPADM

## 2025-07-24 RX ORDER — MIDODRINE HYDROCHLORIDE 5 MG/1
10 TABLET ORAL
Status: DISCONTINUED | OUTPATIENT
Start: 2025-07-24 | End: 2025-07-26

## 2025-07-24 RX ORDER — METOPROLOL SUCCINATE 25 MG/1
25 TABLET, EXTENDED RELEASE ORAL DAILY
Status: DISCONTINUED | OUTPATIENT
Start: 2025-07-24 | End: 2025-07-26

## 2025-07-24 RX ORDER — SODIUM CHLORIDE 0.9 % (FLUSH) 0.9 %
5-40 SYRINGE (ML) INJECTION PRN
Status: DISCONTINUED | OUTPATIENT
Start: 2025-07-24 | End: 2025-07-29 | Stop reason: HOSPADM

## 2025-07-24 RX ORDER — POLYETHYLENE GLYCOL 3350 17 G/17G
17 POWDER, FOR SOLUTION ORAL DAILY PRN
Status: DISCONTINUED | OUTPATIENT
Start: 2025-07-24 | End: 2025-07-29 | Stop reason: HOSPADM

## 2025-07-24 RX ADMIN — TROSPIUM CHLORIDE 20 MG: 20 TABLET, FILM COATED ORAL at 21:23

## 2025-07-24 RX ADMIN — SODIUM CHLORIDE 500 ML: 0.9 INJECTION, SOLUTION INTRAVENOUS at 15:59

## 2025-07-24 RX ADMIN — CEFTRIAXONE 1000 MG: 1 INJECTION, POWDER, FOR SOLUTION INTRAMUSCULAR; INTRAVENOUS at 16:00

## 2025-07-24 RX ADMIN — SODIUM CHLORIDE, PRESERVATIVE FREE 10 ML: 5 INJECTION INTRAVENOUS at 21:27

## 2025-07-24 RX ADMIN — AZITHROMYCIN MONOHYDRATE 500 MG: 500 INJECTION, POWDER, LYOPHILIZED, FOR SOLUTION INTRAVENOUS at 16:02

## 2025-07-24 RX ADMIN — ARFORMOTEROL TARTRATE: 15 SOLUTION RESPIRATORY (INHALATION) at 19:24

## 2025-07-24 RX ADMIN — SODIUM CHLORIDE 500 ML: 0.9 INJECTION, SOLUTION INTRAVENOUS at 13:53

## 2025-07-24 RX ADMIN — ALBUMIN (HUMAN) 25 G: 0.25 INJECTION, SOLUTION INTRAVENOUS at 17:14

## 2025-07-24 RX ADMIN — PREDNISONE 40 MG: 20 TABLET ORAL at 19:40

## 2025-07-24 RX ADMIN — SODIUM CHLORIDE 1000 ML: 0.9 INJECTION, SOLUTION INTRAVENOUS at 17:07

## 2025-07-24 RX ADMIN — APIXABAN 2.5 MG: 2.5 TABLET, FILM COATED ORAL at 21:23

## 2025-07-24 RX ADMIN — MIDODRINE HYDROCHLORIDE 10 MG: 5 TABLET ORAL at 17:08

## 2025-07-24 RX ADMIN — Medication 1 TABLET: at 19:40

## 2025-07-24 ASSESSMENT — PAIN SCALES - GENERAL
PAINLEVEL_OUTOF10: 0

## 2025-07-24 NOTE — ED NOTES
TRANSFER - OUT REPORT:    Verbal report given to Oriana on Krystin Reed  being transferred to [facility] for routine progression of patient care       Report consisted of patient's Situation, Background, Assessment and   Recommendations(SBAR).     Information from the following report(s) ED Encounter Summary, ED SBAR, MAR, Recent Results, and Cardiac Rhythm atrial fib was reviewed with the receiving nurse.    Everly Fall Assessment:    Presents to emergency department  because of falls (Syncope, seizure, or loss of consciousness): No  Age > 70: Yes  Altered Mental Status, Intoxication with alcohol or substance confusion (Disorientation, impaired judgment, poor safety awaremess, or inability to follow instructions): No  Impaired Mobility: Ambulates or transfers with assistive devices or assistance; Unable to ambulate or transer.: Yes             Lines:   Peripheral IV 07/24/25 Left Forearm (Active)   Site Assessment Clean, dry & intact 07/24/25 1257   Line Status Flushed 07/24/25 1257   Phlebitis Assessment No symptoms 07/24/25 1257   Infiltration Assessment 0 07/24/25 1257   Dressing Status New dressing applied 07/24/25 1257   Dressing Type Transparent 07/24/25 1257   Dressing Intervention New 07/24/25 1257       Peripheral IV 07/24/25 Left Antecubital (Active)   Site Assessment Clean, dry & intact 07/24/25 1751   Line Status Blood return noted 07/24/25 1751   Phlebitis Assessment No symptoms 07/24/25 1751   Infiltration Assessment 0 07/24/25 1751        Opportunity for questions and clarification was provided.      Patient transported with:  Monitor and Registered Nurse

## 2025-07-24 NOTE — H&P
Hospitalist Admission Note    NAME:   Krystin Reed   : 1930   MRN: [id number]     Date/Time: 2025 5:22 PM    Patient PCP: Yann Hopper MD    ______________________________________________________________________  Given the patient's current clinical presentation, I have a high level of concern for decompensation if discharged from the emergency department.  Complex decision making was performed, which includes reviewing the patient's available past medical records, laboratory results, and x-ray films.       My assessment of this patient's clinical condition and my plan of care is as follows.    Assessment / Plan:    Pneumonia  Hypotension likely secondary to hypovolemia and poor oral intake  Hypoxia, do not qualify for respiratory failure  Chest x-ray hazy opacity in the lateral right upper lobe could reflect pneumonia. Faint  diffuse interstitial opacities could reflect fibrosis and/or edema.   - ICU consulted, appropriate for floor  - Chest x-ray showing pneumonia  - PCT urinary antigens pending  - Status post 2 L IV fluid and albumin 25 x 1  - Started on midodrine 10 mg 3 times daily, will wean down as tolerated  - Patient on prednisone 5 mg at home, will increase to 40 mg daily  - Continue ceftriaxone azithromycin  - Hold Lasix, metoprolol        Mild hyponatremia  Mild hyperkalemia  - Secondary to poor oral intake  Status post IV fluid, will follow further IV fluid given history of CHF  BMP in a.m.  -Patient takes potassium supplements daily, will hold    Early satiety  KUB ordered by Dr. Torres will follow-up      CKD stage III  Creatinine baseline around 1.3    Lethargy  Deconditioning  Likely secondary to pneumonia  Checking TSH, vitamin D  PT OT eval  If continues to be hypotensive will check cortisol level,       Myalgia  -Per patient myalgia started after being on statin post TIA on last admission  -Currently on tapering dose of prednisone and currently at 5 mg

## 2025-07-24 NOTE — PROGRESS NOTES
Chief Complaint   Patient presents with    Fatigue    Anorexia    Abdominal Pain     Better today       \"Have you been to the ER, urgent care clinic since your last visit?  Hospitalized since your last visit?\"    NO    “Have you seen or consulted any other health care providers outside of [facility] since your last visit?”    NO            Click Here for Release of Records Request       Vitals:    25 1103   BP: 96/61   Pulse: 94   Resp: 16   Temp: 98 °F (36.7 °C)   SpO2: 93%      Health Maintenance Due   Topic Date Due    DTaP/Tdap/Td vaccine (1 - Tdap) Never done    Respiratory Syncytial Virus (RSV) Pregnant or age 60 yrs+ (1 - 1-dose 75+ series) Never done    COVID-19 Vaccine (2024- season) 2024        The patient, Krystin Reed, identity was verified by name and .

## 2025-07-24 NOTE — CONSULTS
EVERY DAY 8/29/24  Yes Yann Hopper MD   MYRBETRIQ 50 MG TB24 Take 50 mg by mouth daily 6/29/23  Yes Provider, MD Stevenson   acetaminophen (TYLENOL) 650 MG extended release tablet Take 2 tablets by mouth in the morning and at bedtime   Yes Automatic Reconciliation, Ar   calcium carb-cholecalciferol 600-10 MG-MCG TABS per tab TAKE 1 TABLET TWICE A DAY. (CALCIUM SUPPLEMENT) 10/2/17  Yes Automatic Reconciliation, Ar         Allergies/Social/Family History:     Allergies   Allergen Reactions    Quetiapine Other (See Comments)     PSYCOLOGICAL REACTION Pt didn't feel like them selves but felt like a different person, iT DEEPLY DISTURBED PT.  PT BECAME IRRITABLE.    quetiapine    Zolpidem Other (See Comments)     Couldn't move    zolpidem    Crestor [Rosuvastatin] Myalgia      Social History     Tobacco Use    Smoking status: Never    Smokeless tobacco: Never   Substance Use Topics    Alcohol use: No      Family History   Problem Relation Age of Onset    Heart Disease Father     Heart Disease Son     Cancer Son          LABS AND  DATA:   Reviewed      Peak airway pressure:      Minute ventilation:        CRITICAL CARE CONSULTANT NOTE  I had a face to face encounter with the patient, reviewed and interpreted patient data including clinical events, labs, images, vital signs, I/O's, and examined patient.  I have discussed the case and the plan and management of the patient's care with the consulting services, the bedside nurses and the respiratory therapist.      NOTE OF PERSONAL INVOLVEMENT IN CARE   This patient has a high probability of imminent, clinically significant deterioration, which requires the highest level of preparedness to intervene urgently. I participated in the decision-making and personally managed or directed the management of the following life and organ supporting interventions that required my frequent assessment to treat or prevent imminent deterioration.      Isamar Torres MD

## 2025-07-24 NOTE — PROGRESS NOTES
Pharmacy Medication History Review    Medication history obtained by Rosamaria Irizarry while patient was in room ER21/21 and was completed based on information available during current patient encounter. Medication history was completed before home meds were reconciled by provider.    Pharmacist Admission Medication Reconciliation Recommendations:            PTA medication list was corrected to the following:   Prior to Admission Medications   Prescriptions Last Dose Informant   Cranberry 50 MG CHEW 7/24/2025 Family Member   Sig: Take by mouth daily   ELIQUIS 2.5 MG TABS tablet 7/24/2025 Family Member   Sig: TAKE ONE TABLET BY MOUTH 2 TIMES A DAY FOR STROKE PREVENTION   MYRBETRIQ 50 MG TB24 7/24/2025 Family Member   Sig: Take 50 mg by mouth daily   Multiple Vitamins-Minerals (PRESERVISION AREDS 2 PO) 7/24/2025 Family Member   Sig: Take 1 tablet by mouth daily   NONFORMULARY 7/24/2025 Family Member   Sig: Blink Lubricating Eye Drops  1 drop in both eyes as needed   NONFORMULARY 7/24/2025 Family Member   Sig: Blink NutriTears Supplement capsule  1 capsule daily   acetaminophen (TYLENOL) 650 MG extended release tablet 7/24/2025 Family Member   Sig: Take 2 tablets by mouth in the morning and at bedtime   aspirin 81 MG chewable tablet 7/24/2025 Family Member   Sig: Take 1 tablet by mouth daily   calcium carb-cholecalciferol 600-10 MG-MCG TABS per tab 7/24/2025 Family Member   Sig: TAKE 1 TABLET TWICE A DAY. (CALCIUM SUPPLEMENT)   fluticasone-salmeterol (ADVAIR) 100-50 MCG/ACT AEPB diskus inhaler 7/24/2025 Family Member   Sig: Take 1 Puff by inhalation two (2) times a day. for breathing   furosemide (LASIX) 20 MG tablet 7/24/2025 Family Member   Sig: Take 2 tablets by mouth daily   levothyroxine (SYNTHROID) 100 MCG tablet 7/24/2025 Family Member   Sig: TAKE ONE TABLET BY MOUTH EVERY DAY   loratadine (CLARITIN) 10 MG tablet 7/24/2025 Family Member   Sig: Take 1 tablet by mouth daily   magnesium oxide (MAG-OX) 400 (240 Mg) MG

## 2025-07-24 NOTE — PROGRESS NOTES
End of Shift Note    Bedside shift change report given to FRANCK Ramachandran (oncoming nurse) by Oriana Morocho RN (offgoing nurse).  Report included the following information SBAR    Shift worked:  630pm - 7pm     Shift summary and any significant changes:     Patient admitted to [facility] from ED at ~1837, patient settled in bed and vital signs obtained, see flowsheet for detials. Bedside shift report completed with on coming RN, notified of outstanding labs and imaging. Family present at bedside, all questions answered. No complaints of pain.      Concerns for physician to address:  N/A     Zone phone for oncoming shift:          Activity:     Number times ambulated in hallways past shift: 0  Number of times OOB to chair past shift: 0    Cardiac:   Cardiac Monitoring: Yes      Cardiac Rhythm: Atrial fib    Access:  Current line(s): PIV     Genitourinary:        Respiratory:   O2 Device: Nasal cannula  Chronic home O2 use?: YES  Incentive spirometer at bedside: NO    GI:     Current diet:  ADULT DIET; Regular  Passing flatus: YES    Pain Management:   Patient states pain is manageable on current regimen: YES    Skin:     Interventions:      Patient Safety:  Fall Risk:         Active Consults:   None    Length of Stay:  Expected LOS: 3  Actual LOS: 0    Oriana Morocho RN

## 2025-07-24 NOTE — ASSESSMENT & PLAN NOTE
Given that pt has borderline hypotension, mild tachycardia, anorexia, excessive sleepiness and fatigue and abnormal ausculatation of lungs, have referred her to ER for further evaluationn- needs lab, CXR, etc.

## 2025-07-24 NOTE — PROGRESS NOTES
Krystin Reed (:  1930) is a 89 y.o. female,Established patient, here for evaluation of the following chief complaint(s):  Fatigue, Anorexia, and Abdominal Pain (Better today)         Assessment & Plan  Hypotension due to hypovolemia   Given that pt has borderline hypotension, mild tachycardia, anorexia, excessive sleepiness and fatigue and abnormal ausculatation of lungs, have referred her to ER for further evaluationn- needs lab, CXR, etc.          Failure to thrive in adult            Abnormal breath sounds            PMR (polymyalgia rheumatica)   Currently on low dose prednisone for presumed PMR           No follow-ups on file.       Subjective   HPI Pain seems to be doing better. Was walking last week with walker. Then became progressively more tired since last week. Has been sleeping more and more. No appetite. Taking some Ensure, ate 1/2 of an egg sandwich this am. Was eating 3  meals a day until a few days ago. Also is now unable to get dressed on her own. No fever, chills, increased cough, no dysuria or increased voiding. Some loose stools for years. No one else at home has been ill.     Review of Systems       Objective   Physical Exam  Cardiovascular:      Rate and Rhythm: Normal rate and regular rhythm.      Heart sounds: Normal heart sounds. No murmur heard.     Comments: Bp 95/70 pulse 100  Pulmonary:      Effort: Pulmonary effort is normal.      Breath sounds: Normal breath sounds.      Comments: Diffuse rhonchi and some expiratory wheezes bilaterally  Abdominal:      General: Abdomen is flat. Bowel sounds are normal.      Palpations: Abdomen is soft.   Musculoskeletal:      Right lower leg: No edema.      Left lower leg: No edema.                  An electronic signature was used to authenticate this note.    --Yann Hopper MD

## 2025-07-24 NOTE — ED PROVIDER NOTES
[facility] EMERGENCY DEPARTMENT  EMERGENCY DEPARTMENT ENCOUNTER       Pt Name: Krystin Reed  MRN: [id number]  Birthdate 12/5/1930  Date of evaluation: 7/24/2025  Provider: Bob Chew MD   PCP: Yann Hopper MD  Note Started: 5:05 PM 7/24/25     CHIEF COMPLAINT       Chief Complaint   Patient presents with    Referral - General     Pt in personal WC in TR. Per family, PCP told them to bring pt into ED for \"low BP SBP 90's, and states lungs sounded congested, decreased appetite, and lethargy.\" Pt is currently A&Ox3 unsure of time. Pt has hx of CHF and is on diuretics         HISTORY OF PRESENT ILLNESS: 1 or more elements      History From: Patient, daughter, History limited by: No limitations     Krystin Reed is a 89 y.o. female who presents with chief complaint of  worsening fatigue, decreased appetite, and respiratory issues. Patient has a history of nocturnal oxygen use.    Over the past week, the patient has experienced a significant decline in energy and self-sufficiency worsening over the past few days. Yesterday, she slept almost all day and had little interest in eating or drinking. This morning, she required assistance getting dressed. The patient has had a long-standing intermittent dry cough. In recent days, her condition has deteriorated, with low blood pressure, low oxygen levels, and lung congestion noted. Her oxygen saturation was initially 88%, improving to 93% with supplementation.  She was seen by her PCP this morning and sent to the ED for evaluation.  The patient's blood pressure has been consistently low, often dropping to 105/50. She appears dehydrated and may have an infection such as pneumonia.     Nursing Notes were all reviewed and agreed with or any disagreements were addressed in the HPI.     REVIEW OF SYSTEMS        Positives and Pertinent negatives as per HPI.    PAST HISTORY     Past Medical History:  Past Medical History:   Diagnosis Date    Arrhythmia     atrial

## 2025-07-25 LAB
ANION GAP SERPL CALC-SCNC: 6 MMOL/L (ref 2–12)
BUN SERPL-MCNC: 24 MG/DL (ref 6–20)
BUN/CREAT SERPL: 22 (ref 12–20)
CALCIUM SERPL-MCNC: 8.3 MG/DL (ref 8.5–10.1)
CHLORIDE SERPL-SCNC: 100 MMOL/L (ref 97–108)
CO2 SERPL-SCNC: 27 MMOL/L (ref 21–32)
CREAT SERPL-MCNC: 1.08 MG/DL (ref 0.55–1.02)
ERYTHROCYTE [DISTWIDTH] IN BLOOD BY AUTOMATED COUNT: 15.4 % (ref 11.5–14.5)
GLUCOSE SERPL-MCNC: 122 MG/DL (ref 65–100)
HCT VFR BLD AUTO: 30.3 % (ref 35–47)
HGB BLD-MCNC: 9.5 G/DL (ref 11.5–16)
MAGNESIUM SERPL-MCNC: 1.8 MG/DL (ref 1.6–2.4)
MCH RBC QN AUTO: 29.1 PG (ref 26–34)
MCHC RBC AUTO-ENTMCNC: 31.4 G/DL (ref 30–36.5)
MCV RBC AUTO: 92.7 FL (ref 80–99)
NRBC # BLD: 0 K/UL (ref 0–0.01)
NRBC BLD-RTO: 0 PER 100 WBC
PHOSPHATE SERPL-MCNC: 3.6 MG/DL (ref 2.6–4.7)
PLATELET # BLD AUTO: 138 K/UL (ref 150–400)
PMV BLD AUTO: 10.4 FL (ref 8.9–12.9)
POTASSIUM SERPL-SCNC: 4.8 MMOL/L (ref 3.5–5.1)
RBC # BLD AUTO: 3.27 M/UL (ref 3.8–5.2)
SODIUM SERPL-SCNC: 133 MMOL/L (ref 136–145)
T4 FREE SERPL-MCNC: 1.2 NG/DL (ref 0.8–1.5)
TSH SERPL DL<=0.05 MIU/L-ACNC: 0.24 UIU/ML (ref 0.36–3.74)
WBC # BLD AUTO: 4.7 K/UL (ref 3.6–11)

## 2025-07-25 PROCEDURE — 6370000000 HC RX 637 (ALT 250 FOR IP): Performed by: INTERNAL MEDICINE

## 2025-07-25 PROCEDURE — 2580000003 HC RX 258: Performed by: INTERNAL MEDICINE

## 2025-07-25 PROCEDURE — 2700000000 HC OXYGEN THERAPY PER DAY

## 2025-07-25 PROCEDURE — 97116 GAIT TRAINING THERAPY: CPT

## 2025-07-25 PROCEDURE — 94640 AIRWAY INHALATION TREATMENT: CPT

## 2025-07-25 PROCEDURE — 6360000002 HC RX W HCPCS: Performed by: INTERNAL MEDICINE

## 2025-07-25 PROCEDURE — 2500000003 HC RX 250 WO HCPCS: Performed by: INTERNAL MEDICINE

## 2025-07-25 PROCEDURE — 97162 PT EVAL MOD COMPLEX 30 MIN: CPT

## 2025-07-25 PROCEDURE — 85027 COMPLETE CBC AUTOMATED: CPT

## 2025-07-25 PROCEDURE — 36415 COLL VENOUS BLD VENIPUNCTURE: CPT

## 2025-07-25 PROCEDURE — 97530 THERAPEUTIC ACTIVITIES: CPT

## 2025-07-25 PROCEDURE — 2060000000 HC ICU INTERMEDIATE R&B

## 2025-07-25 PROCEDURE — 83735 ASSAY OF MAGNESIUM: CPT

## 2025-07-25 PROCEDURE — 84439 ASSAY OF FREE THYROXINE: CPT

## 2025-07-25 PROCEDURE — 80048 BASIC METABOLIC PNL TOTAL CA: CPT

## 2025-07-25 PROCEDURE — 84443 ASSAY THYROID STIM HORMONE: CPT

## 2025-07-25 PROCEDURE — 97535 SELF CARE MNGMENT TRAINING: CPT

## 2025-07-25 PROCEDURE — 97165 OT EVAL LOW COMPLEX 30 MIN: CPT

## 2025-07-25 PROCEDURE — 84100 ASSAY OF PHOSPHORUS: CPT

## 2025-07-25 RX ORDER — CASTOR OIL AND BALSAM, PERU 788; 87 MG/G; MG/G
OINTMENT TOPICAL 2 TIMES DAILY
Status: DISCONTINUED | OUTPATIENT
Start: 2025-07-25 | End: 2025-07-25

## 2025-07-25 RX ADMIN — ACETAMINOPHEN 650 MG: 325 TABLET ORAL at 03:41

## 2025-07-25 RX ADMIN — Medication 1 TABLET: at 09:51

## 2025-07-25 RX ADMIN — ARFORMOTEROL TARTRATE: 15 SOLUTION RESPIRATORY (INHALATION) at 10:19

## 2025-07-25 RX ADMIN — ARFORMOTEROL TARTRATE: 15 SOLUTION RESPIRATORY (INHALATION) at 20:48

## 2025-07-25 RX ADMIN — AZITHROMYCIN MONOHYDRATE 500 MG: 500 INJECTION, POWDER, LYOPHILIZED, FOR SOLUTION INTRAVENOUS at 16:39

## 2025-07-25 RX ADMIN — SODIUM CHLORIDE, PRESERVATIVE FREE 10 ML: 5 INJECTION INTRAVENOUS at 20:47

## 2025-07-25 RX ADMIN — SODIUM CHLORIDE, PRESERVATIVE FREE 10 ML: 5 INJECTION INTRAVENOUS at 09:53

## 2025-07-25 RX ADMIN — MIDODRINE HYDROCHLORIDE 10 MG: 5 TABLET ORAL at 18:39

## 2025-07-25 RX ADMIN — MIDODRINE HYDROCHLORIDE 10 MG: 5 TABLET ORAL at 09:51

## 2025-07-25 RX ADMIN — MIDODRINE HYDROCHLORIDE 10 MG: 5 TABLET ORAL at 13:00

## 2025-07-25 RX ADMIN — PANTOPRAZOLE SODIUM 40 MG: 40 TABLET, DELAYED RELEASE ORAL at 09:52

## 2025-07-25 RX ADMIN — ASPIRIN 81 MG: 81 TABLET, CHEWABLE ORAL at 09:52

## 2025-07-25 RX ADMIN — TROSPIUM CHLORIDE 20 MG: 20 TABLET, FILM COATED ORAL at 16:44

## 2025-07-25 RX ADMIN — APIXABAN 2.5 MG: 2.5 TABLET, FILM COATED ORAL at 09:52

## 2025-07-25 RX ADMIN — WATER 1000 MG: 1 INJECTION INTRAMUSCULAR; INTRAVENOUS; SUBCUTANEOUS at 16:26

## 2025-07-25 RX ADMIN — LEVOTHYROXINE SODIUM 100 MCG: 0.1 TABLET ORAL at 05:25

## 2025-07-25 RX ADMIN — APIXABAN 2.5 MG: 2.5 TABLET, FILM COATED ORAL at 20:47

## 2025-07-25 RX ADMIN — PREDNISONE 40 MG: 20 TABLET ORAL at 09:52

## 2025-07-25 RX ADMIN — TROSPIUM CHLORIDE 20 MG: 20 TABLET, FILM COATED ORAL at 05:25

## 2025-07-25 RX ADMIN — CETIRIZINE HYDROCHLORIDE 5 MG: 10 TABLET, FILM COATED ORAL at 09:51

## 2025-07-25 ASSESSMENT — PAIN SCALES - GENERAL
PAINLEVEL_OUTOF10: 0
PAINLEVEL_OUTOF10: 2
PAINLEVEL_OUTOF10: 0

## 2025-07-25 ASSESSMENT — PAIN SCALES - WONG BAKER: WONGBAKER_NUMERICALRESPONSE: NO HURT

## 2025-07-25 NOTE — WOUND CARE
Wound care consult for \"possible DTI\".  Chart reviewed and patient assessed. Pt. Is 89 years old and she is admitted due to pneumonia and hypotension.   Past Medical History:   Diagnosis Date    Arrhythmia     atrial fibrillation, chronic.  Stress test 2011 essent. normal.  Mild-mod AR on ECHO    Atrial fibrillation ([facility])     Atrial flutter with rapid ventricular response ([facility]) 11/21/2019    Bilateral leg edema 07/05/2017    Chest pain with moderate risk for cardiac etiology 07/06/2017    CHF (congestive heart failure) ([facility]) 11/20/2019    CHF exacerbation ([facility]) 03/18/2023    Chronic atrial fibrillation ([facility]) 11/21/2019    Chronic kidney disease     CKD (chronic kidney disease) stage 3, GFR 30-59 ml/min ([facility]) 07/05/2017    Congestive heart failure ([facility])     COPD exacerbation ([facility]) 02/19/2024    Dehydration 12/08/2019    Dyspnea     FEV1 1.33 (98% predicted), FVC 1.59 (89% predicted)    Encephalopathy acute 03/15/2011    GI bleeding     Hyperlipidemia     Hypertension     Intracranial vascular stenosis 5/15/2025    Irregular heart beat 06/04/2018    Long term current use of anticoagulant therapy     Murmur     Pneumonia 11/20/2019    Refusal of blood transfusions as patient is Gnosticism     Rhabdomyolysis 03/14/2011    Thyroid disease     hypothyroid    Valvular heart disease      Past Surgical History:   Procedure Laterality Date    CARDIOVERSION ELECTIVE ARRHYTHMIA EXTERNAL  6/14/2011         COLONOSCOPY N/A 5/2/2017    COLONOSCOPY performed by Yann Richard MD at [facility] ENDOSCOPY    ECHO TRANSESOPHAGEAL (LUIS) W OR WO CONTR   4/27/2011         GYN      vaginal deliveries x10    TUBAL LIGATION       Assessment:  Pt. Is alert and oriented x 3.  She is reading the paper and talking with her family member in the room. Pt. Able to stand up with a steadiness of the walker. The skin around the anal area and perineum has a purple Dyschromia skin due to moisture and pressure combination (sitting a lot in a moist

## 2025-07-25 NOTE — CARE COORDINATION
Advance Care Planning     General Advance Care Planning (ACP) Conversation    Date of Conversation: 7/25/2025  Conducted with: Patient with Decision Making Capacity  Other persons present: Daughter      Healthcare Decision Maker:   Primary Decision Maker: Patricia Sheriff - Child - [phone]       Content/Action Overview:  Has ACP document(s) on file - reflects the patient's care preferences  Reviewed DNR/DNI and patient confirms current DNR status - completed forms on file (place new order if needed)        PATIENT HAS ADVANCE CARE DIRECTIVE WHICH LIST 2 [employer] MEMBERS AS HCDM  DAUGHTER RAFAEL SAYS SHE HAS A NEW ACD WHICH LIST HER AND SHE WILL BRING IT IN    Length of Voluntary ACP Conversation in minutes:  <16 minutes (Non-Billable)    ENGLISH H HOSAY

## 2025-07-25 NOTE — CARE COORDINATION
Care Management Initial Assessment       RUR:16%  Readmission? No  1st IM letter given? Yes -       1st  letter given: No     07/25/25 8664   Service Assessment   Patient Orientation Alert and Oriented   Cognition Alert   History Provided By Patient;Child/Family   Primary Caregiver Self   Accompanied By/Relationship daughter, Dolores   Support Systems Spouse/Significant Other;Children   Patient's Healthcare Decision Maker is: Legal Next of Kin   PCP Verified by CM Yes   Last Visit to PCP Within last 3 months   Prior Functional Level Independent in ADLs/IADLs;Housework;Cooking   Current Functional Level Assistance with the following:;Bathing;Dressing;Cooking;Housework;Shopping;Mobility   Can patient return to prior living arrangement Yes   Ability to make needs known: Good   Family able to assist with home care needs: Yes   Would you like for me to discuss the discharge plan with any other family members/significant others, and if so, who? Yes   Financial Resources Medicare   Social/Functional History   Lives With Spouse;Daughter   Type of Home House   Bathroom Toilet Standard   Bathroom Accessibility Accessible   Home Equipment Rollator   Receives Help From Family   Prior Level of Assist for ADLs Independent   Prior Level of Assist for Homemaking Independent   Ambulation Assistance Independent   Prior Level of Assist for Transfers Independent   Active  No   Occupation Retired   Discharge Planning   Type of Residence House   Living Arrangements Spouse/Significant Other;Children   Current Services Prior To Admission None   Potential Assistance Needed N/A   Potential Assistance Purchasing Medications No   Patient expects to be discharged to: House   Services At/After Discharge   Transition of Care Consult (CM Consult) Home Health   Services At/After Discharge Home Health    Resource Information Provided? No     CM met with patient and daughter .. She lives with spouse and daughter.  She had been

## 2025-07-25 NOTE — PROGRESS NOTES
Hospitalist Progress Note    NAME:   Krystin Reed   : 1930   MRN: [id number]     Date/Time: 2025 8:17 AM  Patient PCP: Yann Hopper MD    Estimated discharge date:  Barriers:       Assessment / Plan:    Pneumonia  Hypotension likely secondary to hypovolemia and poor oral intake  Hypoxia, do not qualify for respiratory failure  Chest x-ray hazy opacity in the lateral right upper lobe could reflect pneumonia. Faint  diffuse interstitial opacities could reflect fibrosis and/or edema.   - ICU evaluated in ED , appropriate for floor  - Chest x-ray showing pneumonia  - PCT - <0.05 , lactic acid - 1.93, Troponin - 15 , BNP - 1739   -urinary antigens pending  - Blood cultures no growth so far   - Status post 2 L IV fluid and albumin 25 x 1  - Started on midodrine 10 mg 3 times daily, will wean down as tolerated  - Patient on prednisone 5 mg at home, Continue with increased dose  40 mg daily  - Continue ceftriaxone azithromycin  - Hold Lasix, metoprolol           Mild hyponatremia- improving   Mild hyperkalemia- resolved   - Secondary to poor oral intake  -Status post IV fluid       Early satiety  KUB- no evidence of bowel distension         CKD stage III  Creatinine baseline around 1.3, at present BUN/Cr 24/1.08     Lethargy  Deconditioning  Likely secondary to pneumonia   vitamin D  PT OT eval  If continues to be hypotensive will check cortisol level,         Myalgia  -Per patient myalgia started after being on statin post TIA on last admission  -Currently on tapering dose of prednisone and currently at 5 mg daily        Hypothyroidism  TSH- 0.74, T4- 1.2  Continue Synthroid     A-fib  CHF with preserved ejection fraction chronic  History of COPD-not in exacerbation  Non-smoker  - Currently not fluid overloaded  Resume on Eliquis  Will hold Lasix metoprolol due to above        Patient Protestant  Goals of care discussed DNR patient has advanced directive       Medical Decision

## 2025-07-25 NOTE — PROGRESS NOTES
Hospital follow-up PCP transitional care appointment has been scheduled with Dr. Yann Hopper on 8/6/25 at 1220. This is the first available appt due to limited provider availability. PCP office does not offer alternate provider option for hospital follow up. Pending patient discharge. Delia Trujillo, Care Management Assistant

## 2025-07-26 LAB
ANION GAP SERPL CALC-SCNC: 6 MMOL/L (ref 2–12)
BUN SERPL-MCNC: 24 MG/DL (ref 6–20)
BUN/CREAT SERPL: 22 (ref 12–20)
CALCIUM SERPL-MCNC: 8.5 MG/DL (ref 8.5–10.1)
CHLORIDE SERPL-SCNC: 103 MMOL/L (ref 97–108)
CO2 SERPL-SCNC: 27 MMOL/L (ref 21–32)
CREAT SERPL-MCNC: 1.1 MG/DL (ref 0.55–1.02)
ERYTHROCYTE [DISTWIDTH] IN BLOOD BY AUTOMATED COUNT: 15.6 % (ref 11.5–14.5)
GLUCOSE SERPL-MCNC: 122 MG/DL (ref 65–100)
HCT VFR BLD AUTO: 30.4 % (ref 35–47)
HGB BLD-MCNC: 9.6 G/DL (ref 11.5–16)
L PNEUMO1 AG UR QL IA: NEGATIVE
MAGNESIUM SERPL-MCNC: 1.9 MG/DL (ref 1.6–2.4)
MCH RBC QN AUTO: 28.7 PG (ref 26–34)
MCHC RBC AUTO-ENTMCNC: 31.6 G/DL (ref 30–36.5)
MCV RBC AUTO: 90.7 FL (ref 80–99)
NRBC # BLD: 0 K/UL (ref 0–0.01)
NRBC BLD-RTO: 0 PER 100 WBC
PHOSPHATE SERPL-MCNC: 3.3 MG/DL (ref 2.6–4.7)
PLATELET # BLD AUTO: 161 K/UL (ref 150–400)
PMV BLD AUTO: 9.8 FL (ref 8.9–12.9)
POTASSIUM SERPL-SCNC: 4.3 MMOL/L (ref 3.5–5.1)
RBC # BLD AUTO: 3.35 M/UL (ref 3.8–5.2)
SODIUM SERPL-SCNC: 136 MMOL/L (ref 136–145)
SPECIMEN SOURCE: NORMAL
WBC # BLD AUTO: 8 K/UL (ref 3.6–11)

## 2025-07-26 PROCEDURE — 6370000000 HC RX 637 (ALT 250 FOR IP): Performed by: STUDENT IN AN ORGANIZED HEALTH CARE EDUCATION/TRAINING PROGRAM

## 2025-07-26 PROCEDURE — 80048 BASIC METABOLIC PNL TOTAL CA: CPT

## 2025-07-26 PROCEDURE — 6370000000 HC RX 637 (ALT 250 FOR IP): Performed by: INTERNAL MEDICINE

## 2025-07-26 PROCEDURE — 2500000003 HC RX 250 WO HCPCS: Performed by: INTERNAL MEDICINE

## 2025-07-26 PROCEDURE — 85027 COMPLETE CBC AUTOMATED: CPT

## 2025-07-26 PROCEDURE — 2060000000 HC ICU INTERMEDIATE R&B

## 2025-07-26 PROCEDURE — 94640 AIRWAY INHALATION TREATMENT: CPT

## 2025-07-26 PROCEDURE — 2700000000 HC OXYGEN THERAPY PER DAY

## 2025-07-26 PROCEDURE — 83735 ASSAY OF MAGNESIUM: CPT

## 2025-07-26 PROCEDURE — 2580000003 HC RX 258: Performed by: INTERNAL MEDICINE

## 2025-07-26 PROCEDURE — 84100 ASSAY OF PHOSPHORUS: CPT

## 2025-07-26 PROCEDURE — 36415 COLL VENOUS BLD VENIPUNCTURE: CPT

## 2025-07-26 PROCEDURE — 6360000002 HC RX W HCPCS: Performed by: INTERNAL MEDICINE

## 2025-07-26 RX ORDER — METOPROLOL SUCCINATE 25 MG/1
12.5 TABLET, EXTENDED RELEASE ORAL DAILY
Status: DISCONTINUED | OUTPATIENT
Start: 2025-07-26 | End: 2025-07-29 | Stop reason: HOSPADM

## 2025-07-26 RX ORDER — MIDODRINE HYDROCHLORIDE 5 MG/1
5 TABLET ORAL
Status: DISCONTINUED | OUTPATIENT
Start: 2025-07-26 | End: 2025-07-29

## 2025-07-26 RX ADMIN — PREDNISONE 40 MG: 20 TABLET ORAL at 08:55

## 2025-07-26 RX ADMIN — MIDODRINE HYDROCHLORIDE 5 MG: 5 TABLET ORAL at 12:02

## 2025-07-26 RX ADMIN — ARFORMOTEROL TARTRATE: 15 SOLUTION RESPIRATORY (INHALATION) at 19:17

## 2025-07-26 RX ADMIN — Medication 1 TABLET: at 08:55

## 2025-07-26 RX ADMIN — AZITHROMYCIN MONOHYDRATE 500 MG: 500 INJECTION, POWDER, LYOPHILIZED, FOR SOLUTION INTRAVENOUS at 16:06

## 2025-07-26 RX ADMIN — ARFORMOTEROL TARTRATE: 15 SOLUTION RESPIRATORY (INHALATION) at 07:34

## 2025-07-26 RX ADMIN — SODIUM CHLORIDE, PRESERVATIVE FREE 10 ML: 5 INJECTION INTRAVENOUS at 08:59

## 2025-07-26 RX ADMIN — PANTOPRAZOLE SODIUM 40 MG: 40 TABLET, DELAYED RELEASE ORAL at 08:55

## 2025-07-26 RX ADMIN — APIXABAN 2.5 MG: 2.5 TABLET, FILM COATED ORAL at 08:55

## 2025-07-26 RX ADMIN — ASPIRIN 81 MG: 81 TABLET, CHEWABLE ORAL at 08:54

## 2025-07-26 RX ADMIN — MIDODRINE HYDROCHLORIDE 5 MG: 5 TABLET ORAL at 16:38

## 2025-07-26 RX ADMIN — TROSPIUM CHLORIDE 20 MG: 20 TABLET, FILM COATED ORAL at 05:44

## 2025-07-26 RX ADMIN — POLYETHYLENE GLYCOL 3350 17 G: 17 POWDER, FOR SOLUTION ORAL at 18:42

## 2025-07-26 RX ADMIN — METOPROLOL SUCCINATE 12.5 MG: 25 TABLET, EXTENDED RELEASE ORAL at 10:19

## 2025-07-26 RX ADMIN — TRAZODONE HYDROCHLORIDE 300 MG: 100 TABLET ORAL at 20:14

## 2025-07-26 RX ADMIN — TROSPIUM CHLORIDE 20 MG: 20 TABLET, FILM COATED ORAL at 15:54

## 2025-07-26 RX ADMIN — CETIRIZINE HYDROCHLORIDE 5 MG: 10 TABLET, FILM COATED ORAL at 08:54

## 2025-07-26 RX ADMIN — SODIUM CHLORIDE, PRESERVATIVE FREE 10 ML: 5 INJECTION INTRAVENOUS at 20:14

## 2025-07-26 RX ADMIN — APIXABAN 2.5 MG: 2.5 TABLET, FILM COATED ORAL at 20:14

## 2025-07-26 RX ADMIN — WATER 1000 MG: 1 INJECTION INTRAMUSCULAR; INTRAVENOUS; SUBCUTANEOUS at 15:54

## 2025-07-26 RX ADMIN — LEVOTHYROXINE SODIUM 100 MCG: 0.1 TABLET ORAL at 05:44

## 2025-07-26 ASSESSMENT — PAIN SCALES - GENERAL
PAINLEVEL_OUTOF10: 0

## 2025-07-26 NOTE — PROGRESS NOTES
End of Shift Note    Bedside shift change report given to RN (oncoming nurse) by FARRUKH SEQUEIRA RN (offgoing nurse).  Report included the following information SBAR, Kardex, Intake/Output, MAR, and Recent Results    Shift worked:  7-7pm     Shift summary and any significant changes:       No significant changes. Orthostatic BP is positive.   VS are stable at rest.  Pt not able to provide sputum for respiratory culture.   Concerns for physician to address:      Zone phone for oncoming shift:         FARRUKH SEQUEIRA RN

## 2025-07-26 NOTE — PROGRESS NOTES
Hospitalist Progress Note    NAME:   Krystin Reed   : 1930   MRN: [id number]     Date/Time: 2025 8:30 AM  Patient PCP: Yann Hopper MD    Estimated discharge date:  Barriers:       Assessment / Plan:    Pneumonia  Hypotension likely secondary to hypovolemia and poor oral intake  Hypoxia, do not qualify for respiratory failure  Chest x-ray hazy opacity in the lateral right upper lobe could reflect pneumonia. Faint  diffuse interstitial opacities could reflect fibrosis and/or edema.   - ICU evaluated in ED , appropriate for floor  - Chest x-ray showing pneumonia  - PCT - <0.05 , lactic acid - 1.93, Troponin - 15 , BNP - 1739   -urinary antigens pending  - Blood cultures no growth so far X2 days   - Status post 2 L IV fluid and albumin 25 x 1  - Patient on prednisone 5 mg at home, Continue with increased dose  40 mg daily   On : Patient clinically better. BP improving . Decreased dose of  Midodrine to 5mg . Started her PTA Metoprolol - reduced dose with holding parameter.   - Continue ceftriaxone azithromycin  - Hold Lasix- appears euvolemic at present         Mild hyponatremia- resolved   Mild hyperkalemia- resolved   - Secondary to poor oral intake  -Status post IV fluid       Early satiety  KUB- no evidence of bowel distension   - Appetite slightly improving         CKD stage III  Creatinine baseline around 1.3, at present BUN/Cr 24/1.1     Lethargy  Deconditioning  Likely secondary to pneumonia   vitamin D  PT OT eval- Intermittent physical therapy up to 2-3x/week in previous living setting         Myalgia  -Per patient myalgia started after being on statin post TIA on last admission  -Currently on tapering dose of prednisone and currently at 5 mg daily. Plan to taper steroid dose on discharge        Hypothyroidism  TSH- 0.74, T4- 1.2  Continue Synthroid     A-fib  CHF with preserved ejection fraction chronic  History of COPD-not in exacerbation  Non-smoker  - Currently not fluid

## 2025-07-26 NOTE — PROGRESS NOTES
End of Shift Note    Bedside shift change report given to Serenity (oncoming nurse) by Sepideh Lowery RN (offgoing nurse).  Report included the following information SBAR    Shift worked:  7p-7a     Shift summary and any significant changes:    pt hypertensive for midnight vitals, provider notified (face to face). Orders given verbally to check again at 3 am and notify provider if BP continues to be elevated        Concerns for physician to address:       Zone phone for oncoming shift:              Sepideh Lowery RN

## 2025-07-27 ENCOUNTER — APPOINTMENT (OUTPATIENT)
Facility: HOSPITAL | Age: 89
DRG: 194 | End: 2025-07-27
Payer: MEDICARE

## 2025-07-27 LAB
ANION GAP SERPL CALC-SCNC: 6 MMOL/L (ref 2–12)
BUN SERPL-MCNC: 26 MG/DL (ref 6–20)
BUN/CREAT SERPL: 23 (ref 12–20)
CALCIUM SERPL-MCNC: 8.7 MG/DL (ref 8.5–10.1)
CHLORIDE SERPL-SCNC: 101 MMOL/L (ref 97–108)
CO2 SERPL-SCNC: 28 MMOL/L (ref 21–32)
CREAT SERPL-MCNC: 1.14 MG/DL (ref 0.55–1.02)
ERYTHROCYTE [DISTWIDTH] IN BLOOD BY AUTOMATED COUNT: 15.9 % (ref 11.5–14.5)
GLUCOSE SERPL-MCNC: 107 MG/DL (ref 65–100)
HCT VFR BLD AUTO: 30.9 % (ref 35–47)
HGB BLD-MCNC: 10 G/DL (ref 11.5–16)
MAGNESIUM SERPL-MCNC: 2 MG/DL (ref 1.6–2.4)
MCH RBC QN AUTO: 29.3 PG (ref 26–34)
MCHC RBC AUTO-ENTMCNC: 32.4 G/DL (ref 30–36.5)
MCV RBC AUTO: 90.6 FL (ref 80–99)
NRBC # BLD: 0 K/UL (ref 0–0.01)
NRBC BLD-RTO: 0 PER 100 WBC
PHOSPHATE SERPL-MCNC: 3.5 MG/DL (ref 2.6–4.7)
PLATELET # BLD AUTO: 146 K/UL (ref 150–400)
PMV BLD AUTO: 9.8 FL (ref 8.9–12.9)
POTASSIUM SERPL-SCNC: 4.4 MMOL/L (ref 3.5–5.1)
RBC # BLD AUTO: 3.41 M/UL (ref 3.8–5.2)
SODIUM SERPL-SCNC: 135 MMOL/L (ref 136–145)
WBC # BLD AUTO: 6.8 K/UL (ref 3.6–11)

## 2025-07-27 PROCEDURE — 85027 COMPLETE CBC AUTOMATED: CPT

## 2025-07-27 PROCEDURE — 6360000002 HC RX W HCPCS: Performed by: INTERNAL MEDICINE

## 2025-07-27 PROCEDURE — 2060000000 HC ICU INTERMEDIATE R&B

## 2025-07-27 PROCEDURE — 2580000003 HC RX 258: Performed by: STUDENT IN AN ORGANIZED HEALTH CARE EDUCATION/TRAINING PROGRAM

## 2025-07-27 PROCEDURE — 6370000000 HC RX 637 (ALT 250 FOR IP): Performed by: INTERNAL MEDICINE

## 2025-07-27 PROCEDURE — 70551 MRI BRAIN STEM W/O DYE: CPT

## 2025-07-27 PROCEDURE — 2500000003 HC RX 250 WO HCPCS: Performed by: INTERNAL MEDICINE

## 2025-07-27 PROCEDURE — 80048 BASIC METABOLIC PNL TOTAL CA: CPT

## 2025-07-27 PROCEDURE — 6370000000 HC RX 637 (ALT 250 FOR IP): Performed by: STUDENT IN AN ORGANIZED HEALTH CARE EDUCATION/TRAINING PROGRAM

## 2025-07-27 PROCEDURE — 84100 ASSAY OF PHOSPHORUS: CPT

## 2025-07-27 PROCEDURE — 6360000002 HC RX W HCPCS: Performed by: STUDENT IN AN ORGANIZED HEALTH CARE EDUCATION/TRAINING PROGRAM

## 2025-07-27 PROCEDURE — 2580000003 HC RX 258: Performed by: INTERNAL MEDICINE

## 2025-07-27 PROCEDURE — 94640 AIRWAY INHALATION TREATMENT: CPT

## 2025-07-27 PROCEDURE — 83735 ASSAY OF MAGNESIUM: CPT

## 2025-07-27 PROCEDURE — 36415 COLL VENOUS BLD VENIPUNCTURE: CPT

## 2025-07-27 PROCEDURE — 2700000000 HC OXYGEN THERAPY PER DAY

## 2025-07-27 RX ORDER — FLUTICASONE PROPIONATE AND SALMETEROL 100; 50 UG/1; UG/1
1 POWDER RESPIRATORY (INHALATION)
Status: DISCONTINUED | OUTPATIENT
Start: 2025-07-27 | End: 2025-07-27

## 2025-07-27 RX ORDER — SODIUM CHLORIDE 9 MG/ML
INJECTION, SOLUTION INTRAVENOUS CONTINUOUS
Status: ACTIVE | OUTPATIENT
Start: 2025-07-27 | End: 2025-07-27

## 2025-07-27 RX ADMIN — TROSPIUM CHLORIDE 20 MG: 20 TABLET, FILM COATED ORAL at 17:31

## 2025-07-27 RX ADMIN — SODIUM CHLORIDE: 0.9 INJECTION, SOLUTION INTRAVENOUS at 09:08

## 2025-07-27 RX ADMIN — MIDODRINE HYDROCHLORIDE 5 MG: 5 TABLET ORAL at 09:00

## 2025-07-27 RX ADMIN — ASPIRIN 81 MG: 81 TABLET, CHEWABLE ORAL at 09:00

## 2025-07-27 RX ADMIN — SODIUM CHLORIDE, PRESERVATIVE FREE 10 ML: 5 INJECTION INTRAVENOUS at 09:01

## 2025-07-27 RX ADMIN — CETIRIZINE HYDROCHLORIDE 5 MG: 10 TABLET, FILM COATED ORAL at 09:00

## 2025-07-27 RX ADMIN — Medication 1 TABLET: at 09:00

## 2025-07-27 RX ADMIN — PANTOPRAZOLE SODIUM 40 MG: 40 TABLET, DELAYED RELEASE ORAL at 09:00

## 2025-07-27 RX ADMIN — ARFORMOTEROL TARTRATE: 15 SOLUTION RESPIRATORY (INHALATION) at 19:33

## 2025-07-27 RX ADMIN — WATER 1000 MG: 1 INJECTION INTRAMUSCULAR; INTRAVENOUS; SUBCUTANEOUS at 15:00

## 2025-07-27 RX ADMIN — APIXABAN 2.5 MG: 2.5 TABLET, FILM COATED ORAL at 20:10

## 2025-07-27 RX ADMIN — LEVOTHYROXINE SODIUM 100 MCG: 0.1 TABLET ORAL at 06:45

## 2025-07-27 RX ADMIN — PREDNISONE 30 MG: 20 TABLET ORAL at 08:59

## 2025-07-27 RX ADMIN — AZITHROMYCIN MONOHYDRATE 500 MG: 500 INJECTION, POWDER, LYOPHILIZED, FOR SOLUTION INTRAVENOUS at 17:32

## 2025-07-27 RX ADMIN — ARFORMOTEROL TARTRATE: 15 SOLUTION RESPIRATORY (INHALATION) at 08:16

## 2025-07-27 RX ADMIN — APIXABAN 2.5 MG: 2.5 TABLET, FILM COATED ORAL at 09:00

## 2025-07-27 RX ADMIN — MIDODRINE HYDROCHLORIDE 5 MG: 5 TABLET ORAL at 17:32

## 2025-07-27 RX ADMIN — SODIUM CHLORIDE, PRESERVATIVE FREE 10 ML: 5 INJECTION INTRAVENOUS at 20:10

## 2025-07-27 RX ADMIN — MIDODRINE HYDROCHLORIDE 5 MG: 5 TABLET ORAL at 11:27

## 2025-07-27 RX ADMIN — TRAZODONE HYDROCHLORIDE 300 MG: 100 TABLET ORAL at 20:10

## 2025-07-27 RX ADMIN — TROSPIUM CHLORIDE 20 MG: 20 TABLET, FILM COATED ORAL at 06:45

## 2025-07-27 RX ADMIN — POLYETHYLENE GLYCOL 3350 17 G: 17 POWDER, FOR SOLUTION ORAL at 20:10

## 2025-07-27 ASSESSMENT — PAIN SCALES - GENERAL
PAINLEVEL_OUTOF10: 0

## 2025-07-27 NOTE — PROGRESS NOTES
End of Shift Note    Bedside shift change report given to Serenity (oncoming nurse) by Sepideh Lowery RN (offgoing nurse).  Report included the following information SBAR    Shift worked:  7p-7a     Shift summary and any significant changes:     Pt family expresses concern about patients mobility before discharge.     No events during shift      Concerns for physician to address:       Zone phone for oncoming shift:              Sepideh Lowery RN

## 2025-07-27 NOTE — PROGRESS NOTES
End of Shift Note    Bedside shift change report given to RN (oncoming nurse) by FARRUKH SEQUEIRA RN (offgoing nurse).  Report included the following information SBAR, Kardex, Intake/Output, MAR, and Recent Results    Shift worked:  7-7pm     Shift summary and any significant changes:       No significant changes. Orthostatic BP is positive.   VS are stable at rest.  MRI  is ciompleted   Concerns for physician to address:      Zone phone for oncoming shift:         FARRUKH SEQUEIRA RN

## 2025-07-27 NOTE — PROGRESS NOTES
Hospitalist Progress Note    NAME:   Krystin Reed   : 1930   MRN: [id number]     Date/Time: 2025 8:33 AM  Patient PCP: Yann Hopper MD    Estimated discharge date:  Barriers:       Assessment / Plan:    Pneumonia  Hypotension likely secondary to hypovolemia and poor oral intake  Hypoxia, do not qualify for respiratory failure  Chest x-ray hazy opacity in the lateral right upper lobe could reflect pneumonia. Faint  diffuse interstitial opacities could reflect fibrosis and/or edema.   - ICU evaluated in ED , appropriate for floor  - Chest x-ray showing pneumonia  - PCT - <0.05 , lactic acid - 1.93, Troponin - 15 , BNP - 1739   -urinary antigens pending  - Blood cultures no growth so far X2 days   - Status post 2 L IV fluid and albumin 25 x 1  - Patient on prednisone 5 mg at home, Continue with increased dose  40 mg daily   On : Patient clinically better. BP improving . Decreased dose of  Midodrine to 5mg . Started her PTA Metoprolol - reduced dose with holding parameter.   - on :   - Continue ceftriaxone azithromycin  - Hold Lasix- appears euvolemic at present     New onset floaters in bilateral eyes   -Daughter reported this is new from her baseline. Had Eye check up in March this year.  - No other neurological symptoms   - Advised for eye clinic follow up . Family requesting MRI brain since she had prior h/o TIA.- ordered         Mild hyponatremia  Mild hyperkalemia- resolved   - Secondary to poor oral intake  -Started gentle hydration for few hours        Early satiety  KUB- no evidence of bowel distension   - Appetite slightly improving         CKD stage III  Creatinine baseline around 1.3, at present BUN/Cr 26/1.14     Lethargy  Deconditioning  Orthostatic hypotension   Likely secondary to pneumonia  PT OT eval- Intermittent physical therapy up to 2-3x/week in previous living setting         Myalgia  -Per patient myalgia started after being on statin post TIA on last

## 2025-07-28 PROCEDURE — 94640 AIRWAY INHALATION TREATMENT: CPT

## 2025-07-28 PROCEDURE — 2500000003 HC RX 250 WO HCPCS: Performed by: INTERNAL MEDICINE

## 2025-07-28 PROCEDURE — 2700000000 HC OXYGEN THERAPY PER DAY

## 2025-07-28 PROCEDURE — 6370000000 HC RX 637 (ALT 250 FOR IP): Performed by: INTERNAL MEDICINE

## 2025-07-28 PROCEDURE — 6360000002 HC RX W HCPCS: Performed by: STUDENT IN AN ORGANIZED HEALTH CARE EDUCATION/TRAINING PROGRAM

## 2025-07-28 PROCEDURE — 97116 GAIT TRAINING THERAPY: CPT

## 2025-07-28 PROCEDURE — 97530 THERAPEUTIC ACTIVITIES: CPT

## 2025-07-28 PROCEDURE — 6370000000 HC RX 637 (ALT 250 FOR IP): Performed by: STUDENT IN AN ORGANIZED HEALTH CARE EDUCATION/TRAINING PROGRAM

## 2025-07-28 PROCEDURE — 2060000000 HC ICU INTERMEDIATE R&B

## 2025-07-28 PROCEDURE — 92610 EVALUATE SWALLOWING FUNCTION: CPT

## 2025-07-28 RX ORDER — CEFUROXIME AXETIL 250 MG/1
500 TABLET ORAL EVERY 12 HOURS SCHEDULED
Status: DISCONTINUED | OUTPATIENT
Start: 2025-07-28 | End: 2025-07-29 | Stop reason: HOSPADM

## 2025-07-28 RX ORDER — PREDNISONE 20 MG/1
20 TABLET ORAL DAILY
Status: DISCONTINUED | OUTPATIENT
Start: 2025-07-29 | End: 2025-07-29 | Stop reason: HOSPADM

## 2025-07-28 RX ADMIN — ACETAMINOPHEN 650 MG: 325 TABLET ORAL at 05:45

## 2025-07-28 RX ADMIN — MIDODRINE HYDROCHLORIDE 5 MG: 5 TABLET ORAL at 16:22

## 2025-07-28 RX ADMIN — ARFORMOTEROL TARTRATE: 15 SOLUTION RESPIRATORY (INHALATION) at 09:40

## 2025-07-28 RX ADMIN — Medication 1 TABLET: at 09:07

## 2025-07-28 RX ADMIN — PANTOPRAZOLE SODIUM 40 MG: 40 TABLET, DELAYED RELEASE ORAL at 09:07

## 2025-07-28 RX ADMIN — SODIUM CHLORIDE, PRESERVATIVE FREE 10 ML: 5 INJECTION INTRAVENOUS at 20:28

## 2025-07-28 RX ADMIN — MELATONIN 3 MG: at 20:27

## 2025-07-28 RX ADMIN — TRAZODONE HYDROCHLORIDE 300 MG: 100 TABLET ORAL at 20:27

## 2025-07-28 RX ADMIN — ARFORMOTEROL TARTRATE: 15 SOLUTION RESPIRATORY (INHALATION) at 21:11

## 2025-07-28 RX ADMIN — TROSPIUM CHLORIDE 20 MG: 20 TABLET, FILM COATED ORAL at 05:43

## 2025-07-28 RX ADMIN — APIXABAN 2.5 MG: 2.5 TABLET, FILM COATED ORAL at 20:27

## 2025-07-28 RX ADMIN — APIXABAN 2.5 MG: 2.5 TABLET, FILM COATED ORAL at 09:07

## 2025-07-28 RX ADMIN — SODIUM CHLORIDE, PRESERVATIVE FREE 10 ML: 5 INJECTION INTRAVENOUS at 09:08

## 2025-07-28 RX ADMIN — PREDNISONE 30 MG: 20 TABLET ORAL at 09:07

## 2025-07-28 RX ADMIN — LEVOTHYROXINE SODIUM 100 MCG: 0.1 TABLET ORAL at 05:43

## 2025-07-28 RX ADMIN — CEFUROXIME AXETIL 500 MG: 250 TABLET ORAL at 20:27

## 2025-07-28 RX ADMIN — CETIRIZINE HYDROCHLORIDE 5 MG: 10 TABLET, FILM COATED ORAL at 09:08

## 2025-07-28 RX ADMIN — MIDODRINE HYDROCHLORIDE 5 MG: 5 TABLET ORAL at 12:56

## 2025-07-28 RX ADMIN — MIDODRINE HYDROCHLORIDE 5 MG: 5 TABLET ORAL at 09:08

## 2025-07-28 RX ADMIN — TROSPIUM CHLORIDE 20 MG: 20 TABLET, FILM COATED ORAL at 16:22

## 2025-07-28 RX ADMIN — ASPIRIN 81 MG: 81 TABLET, CHEWABLE ORAL at 09:07

## 2025-07-28 ASSESSMENT — PAIN DESCRIPTION - LOCATION: LOCATION: HEAD

## 2025-07-28 ASSESSMENT — PAIN SCALES - GENERAL
PAINLEVEL_OUTOF10: 0
PAINLEVEL_OUTOF10: 3
PAINLEVEL_OUTOF10: 1

## 2025-07-28 NOTE — PROGRESS NOTES
End of Shift Note    Bedside shift change report given to Elaine (oncoming nurse) by Sepideh Lowery RN (offgoing nurse).  Report included the following information SBAR    Shift worked:  7a-7p     Shift summary and any significant changes:    No events during shift      Concerns for physician to address:       Zone phone for oncoming shift:              Sepideh Lowery RN

## 2025-07-28 NOTE — PROGRESS NOTES
Physician Progress Note      PATIENT:               JENNIFER DAMON  [facility] #:                  [id number]  :                       1930  ADMIT DATE:       2025 12:29 PM  DISCH DATE:  RESPONDING  PROVIDER #:        Zoe French MD          QUERY TEXT:    Based on your medical judgment, please clarify these findings and document if   any of the following are being evaluated and/or treated:    The clinical indicators include:  This is a 89 y.o. Female who presents with Pneumonia.  Age 89 Years, CHF, HTN, Female  A-fib documented in H&P on 2025? by Celsa López MD  A-fib Resume on Eliquis? documented in IM PN on 2025 by ?Zoe Galvan MD  Apixaban (ELIQUIS) tablet 2.5 mg(Epic MAR)    Thank you  BONNY Bergeron,CDS  Options provided:  -- Secondary hypercoagulable state in a patient with atrial fibrillation  -- Other - I will add my own diagnosis  -- Disagree - Not applicable / Not valid  -- Disagree - Clinically unable to determine / Unknown  -- Refer to Clinical Documentation Reviewer    PROVIDER RESPONSE TEXT:    This patient has secondary hypercoagulable state in a patient with atrial   fibrillation.    Query created by: Treasure Alston on 2025 1:28 AM      Electronically signed by:  Zoe French MD 2025 7:30 AM

## 2025-07-28 NOTE — CARE COORDINATION
Transition of Care Plan:    RUR: 16%  Prior Level of Functioning: independant  Disposition: home with Huber YE  BLADIMIR: 7/28  If SNF or IPR: Date FOC offered: na  Date FOC received: na  Accepting facility: na  Date authorization started with reference number: na  Date authorization received and expires: a  Follow up appointments: PCP and specialist  DME needed: has a Rollator and 02 at home and will go home with her own 02   Transportation a discharge: daughter  IM/IMM Medicare/ letter given:   Is patient a [place] and connected with [facility]?    If yes, was  transfer form completed and [facility] notified?   Caregiver Contact: Patricia Sheriff (Child)  [phone]   Discharge Caregiver contacted prior to discharge?   Care Conference needed?   Barriers to discharge: She is to have orthostatic blood pressure taken and daughter says speech will come to see her.    2:41 patient is not being discharged because she was tachycardic with PT    English Blanco RN CM [id number]

## 2025-07-28 NOTE — PROGRESS NOTES
Hospitalist Progress Note    NAME:   Krystin Reed   : 1930   MRN: [id number]     Date/Time: 2025 2:22 PM  Patient PCP: Yann Hopper MD    Estimated discharge date:  Barriers:       Assessment / Plan:    Pneumonia  Hypotension likely secondary to hypovolemia and poor oral intake  Hypoxia, do not qualify for respiratory failure  Chest x-ray hazy opacity in the lateral right upper lobe could reflect pneumonia. Faint  diffuse interstitial opacities could reflect fibrosis and/or edema.   - ICU evaluated in ED , appropriate for floor  - Chest x-ray showing pneumonia  - PCT - <0.05 , lactic acid - 1.93, Troponin - 15 , BNP - 1739   -urinary antigens pending  - Blood cultures no growth so far X2 days   - Status post 2 L IV fluid and albumin 25 x 1  - Patient on prednisone 5 mg at home, Continue with increased dose  40 mg daily   On :  Decreased dose of  Midodrine to 5mg . Started her PTA Metoprolol - reduced dose with holding parameter. On : Was persistent orthostatic hypotension so Metoprolol was discontinued .  - on  : Positive for orthostatic tachycardia.   - S/p  ceftriaxone- switched to oral antibiotics. Completed 5 days of  azithromycin  - Hold Lasix- appears euvolemic at present     New onset floaters in bilateral eyes   -Daughter reported this is new from her baseline. Had Eye check up in March this year.  - No other neurological symptoms   - Advised for eye clinic follow up .MRI negative for acute stroke.       Mild hyponatremia  Mild hyperkalemia- resolved   - Secondary to poor oral intake  -S/p  gentle hydration for few hours        Early satiety  KUB- no evidence of bowel distension   - Appetite slightly improving         CKD stage III  Creatinine baseline around 1.3, at present BUN/Cr 26/1.14     Lethargy  Deconditioning  Orthostatic hypotension -  persistent  Likely secondary to pneumonia  PT OT eval- Intermittent physical therapy up to 2-3x/week in previous living setting

## 2025-07-28 NOTE — FLOWSHEET NOTE
Orthostatic vitals completed as shown below:        07/28/25 1128   Vital Signs   Orthostatic B/P and Pulse? Yes   Blood Pressure Lying 115/68   Pulse Lying 71 PER MINUTE   Blood Pressure Sitting 103/61   Pulse Sitting 99 PER MINUTE   Blood Pressure Standing 106/47   Pulse Standing 106 PER MINUTE

## 2025-07-29 VITALS
RESPIRATION RATE: 22 BRPM | SYSTOLIC BLOOD PRESSURE: 128 MMHG | HEIGHT: 62 IN | DIASTOLIC BLOOD PRESSURE: 67 MMHG | HEART RATE: 107 BPM | TEMPERATURE: 97.7 F | BODY MASS INDEX: 25.92 KG/M2 | OXYGEN SATURATION: 92 % | WEIGHT: 140.87 LBS

## 2025-07-29 LAB
BACTERIA SPEC CULT: NORMAL
BACTERIA SPEC CULT: NORMAL
SERVICE CMNT-IMP: NORMAL
SERVICE CMNT-IMP: NORMAL

## 2025-07-29 PROCEDURE — 2500000003 HC RX 250 WO HCPCS: Performed by: INTERNAL MEDICINE

## 2025-07-29 PROCEDURE — 94640 AIRWAY INHALATION TREATMENT: CPT

## 2025-07-29 PROCEDURE — 6360000002 HC RX W HCPCS: Performed by: STUDENT IN AN ORGANIZED HEALTH CARE EDUCATION/TRAINING PROGRAM

## 2025-07-29 PROCEDURE — 6370000000 HC RX 637 (ALT 250 FOR IP): Performed by: STUDENT IN AN ORGANIZED HEALTH CARE EDUCATION/TRAINING PROGRAM

## 2025-07-29 PROCEDURE — 6370000000 HC RX 637 (ALT 250 FOR IP): Performed by: INTERNAL MEDICINE

## 2025-07-29 RX ORDER — MIDODRINE HYDROCHLORIDE 5 MG/1
7.5 TABLET ORAL
Status: DISCONTINUED | OUTPATIENT
Start: 2025-07-29 | End: 2025-07-29 | Stop reason: HOSPADM

## 2025-07-29 RX ORDER — CEFUROXIME AXETIL 500 MG/1
500 TABLET ORAL EVERY 12 HOURS SCHEDULED
Qty: 2 TABLET | Refills: 0 | Status: SHIPPED | OUTPATIENT
Start: 2025-07-29 | End: 2025-07-30

## 2025-07-29 RX ORDER — MIDODRINE HYDROCHLORIDE 5 MG/1
5 TABLET ORAL
Qty: 90 TABLET | Refills: 0 | Status: SHIPPED | OUTPATIENT
Start: 2025-07-29 | End: 2025-08-28

## 2025-07-29 RX ORDER — PREDNISONE 5 MG/1
TABLET ORAL
Qty: 42 TABLET | Refills: 0 | Status: SHIPPED | OUTPATIENT
Start: 2025-07-30 | End: 2025-09-02

## 2025-07-29 RX ADMIN — CETIRIZINE HYDROCHLORIDE 5 MG: 10 TABLET, FILM COATED ORAL at 09:16

## 2025-07-29 RX ADMIN — APIXABAN 2.5 MG: 2.5 TABLET, FILM COATED ORAL at 09:16

## 2025-07-29 RX ADMIN — Medication 1 TABLET: at 09:16

## 2025-07-29 RX ADMIN — ARFORMOTEROL TARTRATE: 15 SOLUTION RESPIRATORY (INHALATION) at 07:16

## 2025-07-29 RX ADMIN — SODIUM CHLORIDE, PRESERVATIVE FREE 10 ML: 5 INJECTION INTRAVENOUS at 09:16

## 2025-07-29 RX ADMIN — TROSPIUM CHLORIDE 20 MG: 20 TABLET, FILM COATED ORAL at 09:16

## 2025-07-29 RX ADMIN — LEVOTHYROXINE SODIUM 100 MCG: 0.1 TABLET ORAL at 07:36

## 2025-07-29 RX ADMIN — ASPIRIN 81 MG: 81 TABLET, CHEWABLE ORAL at 09:16

## 2025-07-29 RX ADMIN — CEFUROXIME AXETIL 500 MG: 250 TABLET ORAL at 09:16

## 2025-07-29 RX ADMIN — MIDODRINE HYDROCHLORIDE 5 MG: 5 TABLET ORAL at 14:46

## 2025-07-29 RX ADMIN — PREDNISONE 20 MG: 20 TABLET ORAL at 09:16

## 2025-07-29 RX ADMIN — PANTOPRAZOLE SODIUM 40 MG: 40 TABLET, DELAYED RELEASE ORAL at 09:16

## 2025-07-29 ASSESSMENT — PAIN SCALES - GENERAL
PAINLEVEL_OUTOF10: 0
PAINLEVEL_OUTOF10: 0

## 2025-07-29 NOTE — PROGRESS NOTES
1755: Patient discharge order noted. IV and tele remvoed. AVS printed and reviewed with patient, all questions answered. Care plan and education completed at this tie. Patient wheeled down to car via w/c with nurse.    Detail Level: Simple Patient Specific Counseling (Will Not Stick From Patient To Patient): **Lesions are not bothersome to pt. Will monitor for changes.**

## 2025-07-29 NOTE — PLAN OF CARE
Problem: Chronic Conditions and Co-morbidities  Goal: Patient's chronic conditions and co-morbidity symptoms are monitored and maintained or improved  7/24/2025 2229 by Madie Barron RN  Outcome: Progressing  7/24/2025 1856 by Oriana Morocho, RN  Outcome: Progressing     Problem: Discharge Planning  Goal: Discharge to home or other facility with appropriate resources  7/24/2025 2229 by Madie Barron RN  Outcome: Progressing  7/24/2025 1856 by Oriana Morocho, RN  Outcome: Progressing     
  Problem: Chronic Conditions and Co-morbidities  Goal: Patient's chronic conditions and co-morbidity symptoms are monitored and maintained or improved  Outcome: Progressing     Problem: Discharge Planning  Goal: Discharge to home or other facility with appropriate resources  Outcome: Progressing     Problem: Skin/Tissue Integrity  Goal: Skin integrity remains intact  Description: 1.  Monitor for areas of redness and/or skin breakdown  2.  Assess vascular access sites hourly  3.  Every 4-6 hours minimum:  Change oxygen saturation probe site  4.  Every 4-6 hours:  If on nasal continuous positive airway pressure, respiratory therapy assess nares and determine need for appliance change or resting period  Outcome: Progressing     
  Problem: Chronic Conditions and Co-morbidities  Goal: Patient's chronic conditions and co-morbidity symptoms are monitored and maintained or improved  Outcome: Progressing     Problem: Discharge Planning  Goal: Discharge to home or other facility with appropriate resources  Outcome: Progressing     Problem: Skin/Tissue Integrity  Goal: Skin integrity remains intact  Description: 1.  Monitor for areas of redness and/or skin breakdown  2.  Assess vascular access sites hourly  3.  Every 4-6 hours minimum:  Change oxygen saturation probe site  4.  Every 4-6 hours:  If on nasal continuous positive airway pressure, respiratory therapy assess nares and determine need for appliance change or resting period  Outcome: Progressing     Problem: Respiratory - Adult  Goal: Achieves optimal ventilation and oxygenation  7/26/2025 0953 by Serenity Nicholas, RN  Outcome: Progressing  7/26/2025 0737 by Radha Caban RCP  Outcome: Progressing  7/26/2025 0043 by Sepideh Lowery, RN  Outcome: Progressing  Flowsheets (Taken 7/26/2025 0043)  Achieves optimal ventilation and oxygenation: Assess for changes in respiratory status  7/25/2025 2054 by Marleni Stanton RCP  Outcome: Progressing     Problem: Skin/Tissue Integrity - Adult  Goal: Skin integrity remains intact  Description: 1.  Monitor for areas of redness and/or skin breakdown  2.  Assess vascular access sites hourly  3.  Every 4-6 hours minimum:  Change oxygen saturation probe site  4.  Every 4-6 hours:  If on nasal continuous positive airway pressure, respiratory therapy assess nares and determine need for appliance change or resting period  Outcome: Progressing     
  Problem: Chronic Conditions and Co-morbidities  Goal: Patient's chronic conditions and co-morbidity symptoms are monitored and maintained or improved  Outcome: Progressing     Problem: Discharge Planning  Goal: Discharge to home or other facility with appropriate resources  Outcome: Progressing     Problem: Skin/Tissue Integrity  Goal: Skin integrity remains intact  Description: 1.  Monitor for areas of redness and/or skin breakdown  2.  Assess vascular access sites hourly  3.  Every 4-6 hours minimum:  Change oxygen saturation probe site  4.  Every 4-6 hours:  If on nasal continuous positive airway pressure, respiratory therapy assess nares and determine need for appliance change or resting period  Outcome: Progressing     Problem: Respiratory - Adult  Goal: Achieves optimal ventilation and oxygenation  Outcome: Progressing     Problem: Safety - Adult  Goal: Free from fall injury  Outcome: Progressing     Problem: ABCDS Injury Assessment  Goal: Absence of physical injury  Outcome: Progressing     Problem: Pain  Goal: Verbalizes/displays adequate comfort level or baseline comfort level  Outcome: Progressing     Problem: Neurosensory - Adult  Goal: Achieves stable or improved neurological status  Outcome: Progressing  Goal: Absence of seizures  Outcome: Progressing  Goal: Achieves maximal functionality and self care  Outcome: Progressing     Problem: Cardiovascular - Adult  Goal: Maintains optimal cardiac output and hemodynamic stability  Outcome: Progressing  Goal: Absence of cardiac dysrhythmias or at baseline  Outcome: Progressing     Problem: Skin/Tissue Integrity - Adult  Goal: Skin integrity remains intact  Description: 1.  Monitor for areas of redness and/or skin breakdown  2.  Assess vascular access sites hourly  3.  Every 4-6 hours minimum:  Change oxygen saturation probe site  4.  Every 4-6 hours:  If on nasal continuous positive airway pressure, respiratory therapy assess nares and determine need for 
  Problem: Chronic Conditions and Co-morbidities  Goal: Patient's chronic conditions and co-morbidity symptoms are monitored and maintained or improved  Outcome: Progressing     Problem: Discharge Planning  Goal: Discharge to home or other facility with appropriate resources  Outcome: Progressing     Problem: Skin/Tissue Integrity  Goal: Skin integrity remains intact  Description: 1.  Monitor for areas of redness and/or skin breakdown  2.  Assess vascular access sites hourly  3.  Every 4-6 hours minimum:  Change oxygen saturation probe site  4.  Every 4-6 hours:  If on nasal continuous positive airway pressure, respiratory therapy assess nares and determine need for appliance change or resting period  Outcome: Progressing     Problem: Respiratory - Adult  Goal: Achieves optimal ventilation and oxygenation  Outcome: Progressing     Problem: Skin/Tissue Integrity - Adult  Goal: Skin integrity remains intact  Description: 1.  Monitor for areas of redness and/or skin breakdown  2.  Assess vascular access sites hourly  3.  Every 4-6 hours minimum:  Change oxygen saturation probe site  4.  Every 4-6 hours:  If on nasal continuous positive airway pressure, respiratory therapy assess nares and determine need for appliance change or resting period  Outcome: Progressing     
  Problem: Chronic Conditions and Co-morbidities  Goal: Patient's chronic conditions and co-morbidity symptoms are monitored and maintained or improved  Outcome: Progressing     Problem: Skin/Tissue Integrity  Goal: Skin integrity remains intact  Description: 1.  Monitor for areas of redness and/or skin breakdown  2.  Assess vascular access sites hourly  3.  Every 4-6 hours minimum:  Change oxygen saturation probe site  4.  Every 4-6 hours:  If on nasal continuous positive airway pressure, respiratory therapy assess nares and determine need for appliance change or resting period  Outcome: Progressing     Problem: Respiratory - Adult  Goal: Achieves optimal ventilation and oxygenation  7/27/2025 1018 by Serenity Nicholas, RN  Outcome: Progressing  7/27/2025 0815 by Cira Serrano RCP  Outcome: Progressing     Problem: Safety - Adult  Goal: Free from fall injury  7/27/2025 0103 by Sepideh Lowery, RN  Outcome: Progressing  Flowsheets (Taken 7/27/2025 0103)  Free From Fall Injury: Instruct family/caregiver on patient safety     Problem: Skin/Tissue Integrity - Adult  Goal: Skin integrity remains intact  Description: 1.  Monitor for areas of redness and/or skin breakdown  2.  Assess vascular access sites hourly  3.  Every 4-6 hours minimum:  Change oxygen saturation probe site  4.  Every 4-6 hours:  If on nasal continuous positive airway pressure, respiratory therapy assess nares and determine need for appliance change or resting period  Outcome: Progressing     
  Problem: Occupational Therapy - Adult  Goal: By Discharge: Performs self-care activities at highest level of function for planned discharge setting.  See evaluation for individualized goals.  Description: FUNCTIONAL STATUS PRIOR TO ADMISSION:  Pt lives at home with her daughter and was using RW or Rolator at home.  She was independent with ADLs PTA  Receives Help From: Family, Prior Level of Assist for ADLs: Independent,  ,  ,  ,  ,  , Prior Level of Assist for Homemaking: Independent, Ambulation Assistance: Independent, Prior Level of Assist for Transfers: Independent, Active : No     HOME SUPPORT: Patient lives daughter. And has 6 other living children(had 13 children)    Occupational Therapy Goals:  Initiated 7/25/2025  1.  Patient will perform grooming with Modified [place] within 7 day(s).  2.  Patient will perform bathing with Stand by Assist within 7 day(s).  3.  Patient will perform lower body dressing with Stand by Assist within 7 day(s).  4.  Patient will perform toilet transfers with Stand by Assist  within 7 day(s).  5.  Patient will perform all aspects of toileting with [place] within 7 day(s).  6.  Patient will participate in upper extremity therapeutic exercise/activities with [place] for 10 minutes within 7 day(s).    7.  Patient will utilize energy conservation techniques during functional activities with verbal cues within 7 day(s).    Outcome: Not Progressing  OCCUPATIONAL THERAPY EVALUATION    Patient: Krystin Reed (89 y.o. female)  Date: 7/25/2025  Primary Diagnosis: Pneumonia, bacterial [J15.9]  Severe dehydration [E86.0]  Failure to thrive in adult [R62.7]  Community acquired pneumonia of right upper lobe of lung [J18.9]         Precautions:                    ASSESSMENT :  The patient is limited by decreased functional mobility, independence in ADLs, strength, activity tolerance, endurance, balance, orthostatic hypotension, generalized weakness..    Based on the 
  Problem: Physical Therapy - Adult  Goal: By Discharge: Performs mobility at highest level of function for planned discharge setting.  See evaluation for individualized goals.  Description: FUNCTIONAL STATUS PRIOR TO ADMISSION: Patient was independent to modified independent using a rollator for functional mobility.    HOME SUPPORT PRIOR TO ADMISSION: The patient lived with daughter and son-in-law but did not require assistance typically at baseline. Daughter available to assist as needed.    Physical Therapy Goals  Initiated 7/25/2025  1.  Patient will move from supine to sit and sit to supine, scoot up and down, and roll side to side in bed with modified independence within 7 day(s).    2.  Patient will perform sit to stand with modified independence within 7 day(s).  3.  Patient will transfer from bed to chair and chair to bed with modified independence using the least restrictive device within 7 day(s).  4.  Patient will ambulate with modified independence for 100 feet with the least restrictive device within 7 day(s).   5.  Patient will ascend/descend 3 stairs with handrail(s) with supervision/set-up within 7 day(s).   Outcome: Progressing   PHYSICAL THERAPY TREATMENT    Patient: Krystin Reed (89 y.o. female)  Date: 7/28/2025  Diagnosis: Pneumonia, bacterial [J15.9]  Severe dehydration [E86.0]  Failure to thrive in adult [R62.7]  Community acquired pneumonia of right upper lobe of lung [J18.9] Pneumonia, bacterial      Precautions: Restrictions/Precautions  Restrictions/Precautions: Fall Risk            ASSESSMENT:  Patient continues to benefit from skilled PT services and is slowly progressing towards goals however remains limited by + orthostatic hypotension, tachycardia, generalized weakness, impaired activity tolerance, and overall impaired functional mobility. Pt + for orthostatic hypotension upon assuming standing position and BP continued to drop despite standing exercise/ambulation. Pt c/o increased 
  Problem: Physical Therapy - Adult  Goal: By Discharge: Performs mobility at highest level of function for planned discharge setting.  See evaluation for individualized goals.  Description: FUNCTIONAL STATUS PRIOR TO ADMISSION: Patient was independent to modified independent using a rollator for functional mobility.    HOME SUPPORT PRIOR TO ADMISSION: The patient lived with daughter and son-in-law but did not require assistance typically at baseline. Daughter available to assist as needed.    Physical Therapy Goals  Initiated 7/25/2025  1.  Patient will move from supine to sit and sit to supine, scoot up and down, and roll side to side in bed with modified independence within 7 day(s).    2.  Patient will perform sit to stand with modified independence within 7 day(s).  3.  Patient will transfer from bed to chair and chair to bed with modified independence using the least restrictive device within 7 day(s).  4.  Patient will ambulate with modified independence for 100 feet with the least restrictive device within 7 day(s).   5.  Patient will ascend/descend 3 stairs with handrail(s) with supervision/set-up within 7 day(s).   Outcome: Progressing  PHYSICAL THERAPY EVALUATION    Patient: Krystin Reed (89 y.o. female)  Date: 7/25/2025  Primary Diagnosis: Pneumonia, bacterial [J15.9]  Severe dehydration [E86.0]  Failure to thrive in adult [R62.7]  Community acquired pneumonia of right upper lobe of lung [J18.9]       Precautions: Restrictions/Precautions  Restrictions/Precautions: Fall Risk          ASSESSMENT :   DEFICITS/IMPAIRMENTS:   The patient is limited by decreased functional mobility, independence in ADLs, high-level IADLs, strength, activity tolerance, endurance, balance, orthostatic hypotension     Based on the impairments listed above patient presents below baseline for functional mobility. Patient in bed on arrival and agreeable to therapy. Noted patient to present with orthostatic hypotension 
  Problem: Respiratory - Adult  Goal: Achieves optimal ventilation and oxygenation  7/26/2025 0043 by Sepideh Lowery, RN  Outcome: Progressing  Flowsheets (Taken 7/26/2025 0043)  Achieves optimal ventilation and oxygenation: Assess for changes in respiratory status  7/25/2025 2054 by Marleni Stanton RCP  Outcome: Progressing  7/25/2025 1402 by Serenity Nicholas, RN  Outcome: Progressing        
  Problem: Respiratory - Adult  Goal: Achieves optimal ventilation and oxygenation  7/28/2025 0139 by Sepideh Lowery, RN  Outcome: Progressing  Flowsheets (Taken 7/28/2025 0139)  Achieves optimal ventilation and oxygenation: Assess for changes in respiratory status  7/27/2025 1938 by Archana Kenyon, RT  Outcome: Progressing     
  Problem: Safety - Adult  Goal: Free from fall injury  Outcome: Progressing  Flowsheets (Taken 7/27/2025 0103)  Free From Fall Injury: Instruct family/caregiver on patient safety     
Speech LAnguage Pathology EVALUATION    Patient: Krystin Reed (89 y.o. female)  Date: 7/28/2025  Primary Diagnosis: Pneumonia, bacterial [J15.9]  Severe dehydration [E86.0]  Failure to thrive in adult [R62.7]  Community acquired pneumonia of right upper lobe of lung [J18.9]       Precautions:  Fall Risk                  ASSESSMENT :  Patient with suspect functional oropharyngeal swallow for age. Timely mastication of solids. Strong cough noted with straw sips of thin; however, no overt s/s aspiration with cup sips. Recommend slow rate and avoiding straws. Diet was changed to soft and bite sized the other day after a choking episode with chicken. Will slightly advance to our easy to chew texture.    Patient without adequate BM since admission and reports feeling extremely full. This may certainly impact swallow function.    Patient will benefit from skilled intervention to address the above impairments.     PLAN :  Recommendations and Planned Interventions:  Diet: easy to chew diet/ thin liquids. NO STRAWS  Fully upright with all PO  Meds 1 at a time  Alternate liquid/solid     Recommend next SLP session: diet tolerance    Acute SLP Services: SLP Plan of Care: 2 times/week. Patient's rehabilitation potential is considered to be Excellent.  Discharge Recommendations: No, additional SLP treatment not indicated at discharge     SUBJECTIVE:   Patient stated, “I just want to sit on the commode and see if anything happens    OBJECTIVE:     Past Medical History:   Diagnosis Date    Arrhythmia     atrial fibrillation, chronic.  Stress test 2011 essent. normal.  Mild-mod AR on ECHO    Atrial fibrillation (HCC)     Atrial flutter with rapid ventricular response (HCC) 11/21/2019    Bilateral leg edema 07/05/2017    Chest pain with moderate risk for cardiac etiology 07/06/2017    CHF (congestive heart failure) ([facility]) 11/20/2019    CHF exacerbation ([facility]) 03/18/2023    Chronic atrial fibrillation ([facility]) 11/21/2019    Chronic 
Assess vascular access sites hourly  3.  Every 4-6 hours minimum:  Change oxygen saturation probe site  4.  Every 4-6 hours:  If on nasal continuous positive airway pressure, respiratory therapy assess nares and determine need for appliance change or resting period  Outcome: Adequate for Discharge  Goal: Incisions, wounds, or drain sites healing without S/S of infection  Outcome: Adequate for Discharge  Goal: Oral mucous membranes remain intact  Outcome: Adequate for Discharge     Problem: Musculoskeletal - Adult  Goal: Return mobility to safest level of function  Outcome: Adequate for Discharge  Goal: Maintain proper alignment of affected body part  Outcome: Adequate for Discharge  Goal: Return ADL status to a safe level of function  Outcome: Adequate for Discharge     Problem: Gastrointestinal - Adult  Goal: Minimal or absence of nausea and vomiting  Outcome: Adequate for Discharge  Goal: Maintains or returns to baseline bowel function  Outcome: Adequate for Discharge  Goal: Maintains adequate nutritional intake  Outcome: Adequate for Discharge  Goal: Establish and maintain optimal ostomy function  Outcome: Adequate for Discharge     Problem: Genitourinary - Adult  Goal: Absence of urinary retention  Outcome: Adequate for Discharge  Goal: Urinary catheter remains patent  Outcome: Adequate for Discharge     Problem: Infection - Adult  Goal: Absence of infection at discharge  Outcome: Adequate for Discharge  Goal: Absence of infection during hospitalization  Outcome: Adequate for Discharge  Goal: Absence of fever/infection during anticipated neutropenic period  Outcome: Adequate for Discharge     Problem: Metabolic/Fluid and Electrolytes - Adult  Goal: Electrolytes maintained within normal limits  Outcome: Adequate for Discharge  Goal: Hemodynamic stability and optimal renal function maintained  Outcome: Adequate for Discharge  Goal: Glucose maintained within prescribed range  Outcome: Adequate for Discharge   
with handrail(s) with supervision/set-up within 7 day(s).   Outcome: Progressing       Problem: SLP Adult - Impaired Swallowing  Goal: By Discharge: Advance to least restrictive diet without signs or symptoms of aspiration for planned discharge setting.  See evaluation for individualized goals.  Description: Speech pathology goals initiated 7/28/2025   1. Patient will tolerate baseline diet free of s/s aspiration  Outcome: Progressing

## 2025-07-29 NOTE — FLOWSHEET NOTE
Orthostatic vitals are as follows.       07/29/25 1051   Vital Signs   Orthostatic B/P and Pulse? Yes   Blood Pressure Lying 119/50   Pulse Lying 72 PER MINUTE   Blood Pressure Sitting 122/59   Pulse Sitting 94 PER MINUTE   Blood Pressure Standing 109/65   Pulse Standing 116 PER MINUTE

## 2025-07-29 NOTE — PROGRESS NOTES
Bedside shift change report given to FRANCK Henry (oncoming nurse) by FRANCK Manrique (offgoing nurse). Report included the following information Nurse Handoff Report, Index, Surgery Report, Intake/Output, MAR, Recent Results, Cardiac Rhythm  , Quality Measures, and Neuro Assessment.

## 2025-07-29 NOTE — DISCHARGE SUMMARY
taking these medications      cefUROXime 500 MG tablet  Commonly known as: CEFTIN  Take 1 tablet by mouth every 12 hours for 2 doses     midodrine 5 MG tablet  Commonly known as: PROAMATINE  Take 1 tablet by mouth 3 times daily (with meals)            CHANGE how you take these medications      predniSONE 5 MG tablet  Commonly known as: DELTASONE  Take 4 tablets by mouth daily for 2 days, THEN 2 tablets daily for 2 days, THEN 1 tablet daily.  Start taking on: July 30, 2025  What changed: See the new instructions.            CONTINUE taking these medications      acetaminophen 650 MG extended release tablet  Commonly known as: TYLENOL     aspirin 81 MG chewable tablet  Take 1 tablet by mouth daily     calcium carb-cholecalciferol 600-10 MG-MCG Tabs per tab     Claritin 10 MG tablet  Generic drug: loratadine     Cranberry 50 MG Chew     Eliquis 2.5 MG Tabs tablet  Generic drug: apixaban  TAKE ONE TABLET BY MOUTH 2 TIMES A DAY FOR STROKE PREVENTION     fluticasone-salmeterol 100-50 MCG/ACT Aepb diskus inhaler  Commonly known as: ADVAIR  Take 1 Puff by inhalation two (2) times a day. for breathing     levothyroxine 100 MCG tablet  Commonly known as: SYNTHROID  TAKE ONE TABLET BY MOUTH EVERY DAY     magnesium oxide 400 (240 Mg) MG tablet  Commonly known as: MAG-OX  TAKE ONE TABLET BY MOUTH EVERY DAY     Myrbetriq 50 MG Tb24  Generic drug: mirabegron     NONFORMULARY     NONFORMULARY     pantoprazole 40 MG tablet  Commonly known as: PROTONIX  TAKE ONE TABLET BY MOUTH EVERY DAY     PRESERVISION AREDS 2 PO     traZODone 300 MG tablet  Commonly known as: DESYREL  TAKE ONE TABLET BY MOUTH EVERY NIGHT            STOP taking these medications      albuterol sulfate  (90 Base) MCG/ACT inhaler  Commonly known as: PROVENTIL;VENTOLIN;PROAIR     furosemide 20 MG tablet  Commonly known as: LASIX     metoprolol succinate 25 MG extended release tablet  Commonly known as: TOPROL XL     ofloxacin 0.3 % solution  Commonly known as:

## 2025-07-29 NOTE — CARE COORDINATION
07/29/25 1218   Services At/After Discharge   Transition of Care Consult (CM Consult) Home Health   Internal Home Health No   Mode of Transport at Discharge Other (see comment)   Confirm Follow Up Transport Family   Condition of Participation: Discharge Planning   The Plan for Transition of Care is related to the following treatment goals: PCP and specialist   The Patient and/or Patient Representative was provided with a Choice of Provider? Patient     Patient discharged. Family to transport home. [facility] will follow  No further needs from     2:42 Patient not for DC today r/t vital signs  English Bella JUÁREZ CM [id number]

## 2025-07-29 NOTE — PROGRESS NOTES
Hospitalist Progress Note    NAME:   Krystin Reed   : 1930   MRN: [id number]     Date/Time: 2025 3:06 PM  Patient PCP: Yann Hopper MD    Estimated discharge date:  Barriers: Orthostatic hypotension        Assessment / Plan:    Pneumonia  Hypotension likely secondary to hypovolemia and poor oral intake  Hypoxia, do not qualify for respiratory failure  Chest x-ray hazy opacity in the lateral right upper lobe could reflect pneumonia. Faint  diffuse interstitial opacities could reflect fibrosis and/or edema.   - ICU evaluated in ED , appropriate for floor  - Chest x-ray showing pneumonia  - PCT - <0.05 , lactic acid - 1.93, Troponin - 15 , BNP - 1739   -urinary antigens pending  - Blood cultures no growth so far X2 days   - Status post 2 L IV fluid and albumin 25 x 1  - Patient on prednisone 5 mg at home, Continue with increased dose  40 mg daily   On :  Decreased dose of  Midodrine to 5mg . Started her PTA Metoprolol - reduced dose with holding parameter. On : Was persistent orthostatic hypotension so Metoprolol was discontinued .  - on  : Positive for orthostatic tachycardia.   - S/p  ceftriaxone- switched to oral antibiotics. Completed 5 days of  azithromycin  - Hold Lasix- appears euvolemic at present   - On  : Patient was still positive for orthostatic tachycardia. Discussed with son in law at bedside -  Explained to him that this might be present even on discharge. It will slowly improve. Updated the precautions to avoid fall.  He does not feel comfortable taking her home.  Increased midodrine to 7.5mg .  Continue to monitor     New onset floaters in bilateral eyes   -Daughter reported this is new from her baseline. Had Eye check up in March this year.  - No other neurological symptoms   - Advised for eye clinic follow up .MRI negative for acute stroke.       Mild hyponatremia  Mild hyperkalemia- resolved   - Secondary to poor oral intake  -S/p  gentle hydration for few

## 2025-07-29 NOTE — DISCHARGE INSTRUCTIONS
diarrhea, change in mentation, falling, bleeding, shortness of breath    Follow Up:   @PCP@  you are to call and set up an appointment to see them in 7-10 days.      Information obtained by :  I understand that if any problems occur once I am at home I am to contact my physician.    I understand and acknowledge receipt of the instructions indicated above.                                                                                                                                           Physician's or R.N.'s Signature                                                                  Date/Time                                                                                                                                              Patient or Representative Signature                                                          Date/Time

## 2025-07-29 NOTE — PROGRESS NOTES
End of Shift Note    Bedside shift change report given to RN (oncoming nurse) by Elaine Diaz RN (offgoing nurse).  Report included the following information SBAR, Kardex, Procedure Summary, MAR, and Recent Results    Shift worked:  7a-7p     Shift summary and any significant changes:    Patient completed orthostatic VS with PCT, see previous note. Repeat orthostatics completed with PT, patient positive for orthostatic hypotension. Speech evaluated patient, noted to not use straws due to increased coughing.     Concerns for physician to address:    Zone phone for oncoming shift:       Activity:  Level of Assistance: Minimal assist, patient does 75% or more  Number times ambulated in hallways past shift: 0  Number of times OOB to chair past shift: 2    Cardiac:   Cardiac Monitoring: Yes      Cardiac Rhythm: Atrial fib    Access:  Current line(s): PIV     Genitourinary:   Urinary Status: Voiding    Respiratory:   O2 Device: Nasal cannula  Chronic home O2 use?: YES  Incentive spirometer at bedside: NO    GI:  Last BM (including prior to admit): 07/27/25  Current diet:  ADULT DIET; Easy to Chew; bread allowed per slp  Passing flatus: YES    Pain Management:   Patient states pain is manageable on current regimen: YES    Skin:  Tate Scale Score: 17  Interventions: Wound Offloading (Prevention Methods): Pillows, Repositioning    Patient Safety:  Fall Risk: Nursing Judgement-Fall Risk High(Add Comments): Yes  Fall Risk Interventions  Nursing Judgement-Fall Risk High(Add Comments): Yes  Toilet Every 2 Hours-In Advance of Need: Yes  Hourly Visual Checks: In bed  Fall Visual Posted: Fall sign posted  Room Door Open: Yes  Alarm On: Bed  Patient Moved Closer to Nursing Station: No    Active Consults:   None    Length of Stay:  Expected LOS: 5  Actual LOS: 4    Elaine Diaz RN

## 2025-07-30 ENCOUNTER — TELEPHONE (OUTPATIENT)
Age: 89
End: 2025-07-30

## 2025-07-30 NOTE — TELEPHONE ENCOUNTER
Care Transitions Initial Follow Up Call    Outreach made within 2 business days of discharge: Yes    Patient: Krystin Reed Patient : 1930   MRN: [id number]  Reason for Admission: bacterial pneumonia  Discharge Date: 25       Spoke with: patient    Discharge department/facility: [facility] Interactive Patient Contact:  Was patient able to fill all prescriptions: Yes  Was patient instructed to bring all medications to the follow-up visit: Yes  Is patient taking all medications as directed in the discharge summary? Yes  Does patient understand their discharge instructions: Yes  Does patient have questions or concerns that need addressed prior to 7-14 day follow up office visit: no    Additional needs identified to be addressed with provider  No needs identified             Scheduled appointment with PCP within 7-14 days    Follow Up  Future Appointments   Date Time Provider Department Center   2025 12:20 PM Yann Hopper MD [facility] DEP   2025  8:30 AM Mikayla Gregory APRN - NP [id number]   2025 12:00 PM Yann Hopper MD [facility] DEP   2025  9:00 AM Yann Hopper MD [facility] DEP       Destinee Whelan

## 2025-08-01 ENCOUNTER — OFFICE VISIT (OUTPATIENT)
Age: 89
End: 2025-08-01

## 2025-08-01 VITALS
HEART RATE: 77 BPM | OXYGEN SATURATION: 96 % | RESPIRATION RATE: 18 BRPM | SYSTOLIC BLOOD PRESSURE: 121 MMHG | WEIGHT: 142 LBS | DIASTOLIC BLOOD PRESSURE: 66 MMHG | TEMPERATURE: 98 F | HEIGHT: 62 IN | BODY MASS INDEX: 26.13 KG/M2

## 2025-08-01 DIAGNOSIS — J98.4 PNEUMONITIS: ICD-10-CM

## 2025-08-01 DIAGNOSIS — E86.1 HYPOTENSION DUE TO HYPOVOLEMIA: Primary | ICD-10-CM

## 2025-08-01 ASSESSMENT — PATIENT HEALTH QUESTIONNAIRE - PHQ9
SUM OF ALL RESPONSES TO PHQ QUESTIONS 1-9: 0
SUM OF ALL RESPONSES TO PHQ QUESTIONS 1-9: 0
1. LITTLE INTEREST OR PLEASURE IN DOING THINGS: NOT AT ALL
SUM OF ALL RESPONSES TO PHQ QUESTIONS 1-9: 0
SUM OF ALL RESPONSES TO PHQ QUESTIONS 1-9: 0
2. FEELING DOWN, DEPRESSED OR HOPELESS: NOT AT ALL

## 2025-08-01 NOTE — PROGRESS NOTES
Krystin Reed (:  1930) is a 89 y.o. female,Established patient, here for evaluation of the following chief complaint(s):  Follow-Up from Hospital (Patient is here today for a hospital follow up. Patient has an open wound on her chest she would like to be looked at. )         Assessment & Plan  Hypotension due to hypovolemia       Orders:    CBC with Auto Differential; Future    Comprehensive Metabolic Panel; Future  bp 140/90  Pneumonitis   Resolved. Recheck cxr in 5 weeks.          Will dc midorine and resume metoprolol.   Return in about 4 weeks (around 2025).     Medications reconciled.   Subjective   HPI Comes in today with daughters Dolores and Valeria (visiting from [place]). Pt was discharged from [facility] 3 days ago. Has been contacted by the nurse navigator. Appetite is good. Breathing doing ok. Bp has been 150-160/70 at home.  Has finished taking antibiotics. Down to prednisone 10 mg daily.     Review of Systems       Objective   Physical Exam  Cardiovascular:      Rate and Rhythm: Normal rate and regular rhythm.      Heart sounds: Normal heart sounds. No murmur heard.  Pulmonary:      Effort: Pulmonary effort is normal.      Breath sounds: Normal breath sounds.   Abdominal:      General: Abdomen is flat. Bowel sounds are normal.      Palpations: Abdomen is soft.   Musculoskeletal:      Right lower leg: No edema.      Left lower leg: No edema.                  An electronic signature was used to authenticate this note.    --Yann Hopper MD

## 2025-08-01 NOTE — PROGRESS NOTES
Chief Complaint   Patient presents with    Follow-Up from Hospital     Patient is here today for a hospital follow up. Patient has an open wound on her chest she would like to be looked at.      \"Have you been to the ER, urgent care clinic since your last visit?  Hospitalized since your last visit?\"    7/24/25-7/29/25 [facility] for bacterial pneumonia    “Have you seen or consulted any other health care providers outside of [facility] since your last visit?”    NO            Click Here for Release of Records Request

## 2025-08-11 ENCOUNTER — PATIENT MESSAGE (OUTPATIENT)
Age: 89
End: 2025-08-11

## 2025-08-12 RX ORDER — SENNA AND DOCUSATE SODIUM 50; 8.6 MG/1; MG/1
1 TABLET, FILM COATED ORAL DAILY
Status: ON HOLD | COMMUNITY

## 2025-08-13 ENCOUNTER — PATIENT MESSAGE (OUTPATIENT)
Age: 89
End: 2025-08-13

## 2025-08-13 ENCOUNTER — OFFICE VISIT (OUTPATIENT)
Age: 89
End: 2025-08-13
Payer: MEDICARE

## 2025-08-13 VITALS
HEIGHT: 62 IN | BODY MASS INDEX: 25.8 KG/M2 | SYSTOLIC BLOOD PRESSURE: 124 MMHG | OXYGEN SATURATION: 93 % | RESPIRATION RATE: 18 BRPM | WEIGHT: 140.2 LBS | DIASTOLIC BLOOD PRESSURE: 74 MMHG | HEART RATE: 98 BPM

## 2025-08-13 DIAGNOSIS — R10.11 RUQ ABDOMINAL PAIN: Primary | ICD-10-CM

## 2025-08-13 DIAGNOSIS — I67.9 INTRACRANIAL VASCULAR STENOSIS: ICD-10-CM

## 2025-08-13 DIAGNOSIS — M47.812 CERVICAL SPONDYLOSIS: Primary | ICD-10-CM

## 2025-08-13 DIAGNOSIS — Z86.73 HISTORY OF TIA (TRANSIENT ISCHEMIC ATTACK): ICD-10-CM

## 2025-08-13 DIAGNOSIS — Z09 HOSPITAL DISCHARGE FOLLOW-UP: ICD-10-CM

## 2025-08-13 DIAGNOSIS — I67.1 CEREBRAL ANEURYSM: ICD-10-CM

## 2025-08-13 PROCEDURE — 1036F TOBACCO NON-USER: CPT | Performed by: NURSE PRACTITIONER

## 2025-08-13 PROCEDURE — G8427 DOCREV CUR MEDS BY ELIG CLIN: HCPCS | Performed by: NURSE PRACTITIONER

## 2025-08-13 PROCEDURE — 1160F RVW MEDS BY RX/DR IN RCRD: CPT | Performed by: NURSE PRACTITIONER

## 2025-08-13 PROCEDURE — G8419 CALC BMI OUT NRM PARAM NOF/U: HCPCS | Performed by: NURSE PRACTITIONER

## 2025-08-13 PROCEDURE — 1159F MED LIST DOCD IN RCRD: CPT | Performed by: NURSE PRACTITIONER

## 2025-08-13 PROCEDURE — 99214 OFFICE O/P EST MOD 30 MIN: CPT | Performed by: NURSE PRACTITIONER

## 2025-08-13 PROCEDURE — 1090F PRES/ABSN URINE INCON ASSESS: CPT | Performed by: NURSE PRACTITIONER

## 2025-08-13 PROCEDURE — 1125F AMNT PAIN NOTED PAIN PRSNT: CPT | Performed by: NURSE PRACTITIONER

## 2025-08-13 PROCEDURE — 1123F ACP DISCUSS/DSCN MKR DOCD: CPT | Performed by: NURSE PRACTITIONER

## 2025-08-13 PROCEDURE — 1111F DSCHRG MED/CURRENT MED MERGE: CPT | Performed by: NURSE PRACTITIONER

## 2025-08-13 ASSESSMENT — PATIENT HEALTH QUESTIONNAIRE - PHQ9
SUM OF ALL RESPONSES TO PHQ QUESTIONS 1-9: 0
1. LITTLE INTEREST OR PLEASURE IN DOING THINGS: NOT AT ALL
2. FEELING DOWN, DEPRESSED OR HOPELESS: NOT AT ALL
SUM OF ALL RESPONSES TO PHQ QUESTIONS 1-9: 0

## 2025-08-13 ASSESSMENT — ENCOUNTER SYMPTOMS: SHORTNESS OF BREATH: 1

## 2025-08-15 ENCOUNTER — APPOINTMENT (OUTPATIENT)
Facility: HOSPITAL | Age: 89
End: 2025-08-15
Payer: MEDICARE

## 2025-08-15 ENCOUNTER — TELEPHONE (OUTPATIENT)
Age: 89
End: 2025-08-15

## 2025-08-15 ENCOUNTER — PATIENT MESSAGE (OUTPATIENT)
Age: 89
End: 2025-08-15

## 2025-08-15 ENCOUNTER — HOSPITAL ENCOUNTER (INPATIENT)
Facility: HOSPITAL | Age: 89
LOS: 7 days | Discharge: HOME HEALTH | End: 2025-08-22
Attending: STUDENT IN AN ORGANIZED HEALTH CARE EDUCATION/TRAINING PROGRAM | Admitting: STUDENT IN AN ORGANIZED HEALTH CARE EDUCATION/TRAINING PROGRAM
Payer: MEDICARE

## 2025-08-15 DIAGNOSIS — I48.11 LONGSTANDING PERSISTENT ATRIAL FIBRILLATION (HCC): ICD-10-CM

## 2025-08-15 DIAGNOSIS — R06.02 SHORTNESS OF BREATH: ICD-10-CM

## 2025-08-15 DIAGNOSIS — I50.33 ACUTE ON CHRONIC DIASTOLIC CONGESTIVE HEART FAILURE (HCC): ICD-10-CM

## 2025-08-15 DIAGNOSIS — R09.02 HYPOXIA: ICD-10-CM

## 2025-08-15 DIAGNOSIS — J96.21 ACUTE ON CHRONIC HYPOXIC RESPIRATORY FAILURE (HCC): Primary | ICD-10-CM

## 2025-08-15 PROBLEM — I50.9 HEART FAILURE, UNSPECIFIED (HCC): Status: ACTIVE | Noted: 2025-08-15

## 2025-08-15 LAB
ALBUMIN SERPL-MCNC: 2.7 G/DL (ref 3.5–5.2)
ALBUMIN/GLOB SERPL: 0.8 (ref 1.1–2.2)
ALP SERPL-CCNC: 44 U/L (ref 35–104)
ALT SERPL-CCNC: 8 U/L (ref 10–35)
ANION GAP BLD CALC-SCNC: 12 (ref 10–20)
ANION GAP SERPL CALC-SCNC: 9 MMOL/L (ref 2–14)
AST SERPL-CCNC: 17 U/L (ref 10–35)
BASE EXCESS BLD CALC-SCNC: 5.4 MMOL/L
BASOPHILS # BLD: 0.03 K/UL (ref 0–0.1)
BASOPHILS NFR BLD: 0.5 % (ref 0–1)
BILIRUB SERPL-MCNC: 0.5 MG/DL (ref 0–1.2)
BUN SERPL-MCNC: 17 MG/DL (ref 8–23)
BUN/CREAT SERPL: 21 (ref 12–20)
CA-I BLD-MCNC: 1.24 MMOL/L (ref 1.15–1.33)
CALCIUM SERPL-MCNC: 8.3 MG/DL (ref 8.2–9.6)
CHLORIDE BLD-SCNC: 97 MMOL/L (ref 100–111)
CHLORIDE SERPL-SCNC: 99 MMOL/L (ref 98–107)
CO2 BLD-SCNC: 30 MMOL/L (ref 22–29)
CO2 SERPL-SCNC: 28 MMOL/L (ref 20–29)
CREAT SERPL-MCNC: 0.8 MG/DL (ref 0.6–1)
CREAT UR-MCNC: 0.8 MG/DL (ref 0.6–1.3)
DIFFERENTIAL METHOD BLD: ABNORMAL
EOSINOPHIL # BLD: 0.19 K/UL (ref 0–0.4)
EOSINOPHIL NFR BLD: 3.2 % (ref 0–7)
ERYTHROCYTE [DISTWIDTH] IN BLOOD BY AUTOMATED COUNT: 16.4 % (ref 11.5–14.5)
FLUAV RNA SPEC QL NAA+PROBE: NOT DETECTED
FLUBV RNA SPEC QL NAA+PROBE: NOT DETECTED
GLOBULIN SER CALC-MCNC: 3.3 G/DL (ref 2–4)
GLUCOSE BLD STRIP.AUTO-MCNC: 123 MG/DL (ref 74–99)
GLUCOSE SERPL-MCNC: 121 MG/DL (ref 65–100)
HCO3 BLDA-SCNC: 32 MMOL/L
HCT VFR BLD AUTO: 28.2 % (ref 35–47)
HGB BLD-MCNC: 8.6 G/DL (ref 11.5–16)
IMM GRANULOCYTES # BLD AUTO: 0.02 K/UL (ref 0–0.04)
IMM GRANULOCYTES NFR BLD AUTO: 0.3 % (ref 0–0.5)
LACTATE BLD-SCNC: 1.25 MMOL/L (ref 0.4–2)
LACTATE SERPL-SCNC: 1.2 MMOL/L (ref 0.5–2)
LYMPHOCYTES # BLD: 1.46 K/UL (ref 0.8–3.5)
LYMPHOCYTES NFR BLD: 24.8 % (ref 12–49)
MCH RBC QN AUTO: 29 PG (ref 26–34)
MCHC RBC AUTO-ENTMCNC: 30.5 G/DL (ref 30–36.5)
MCV RBC AUTO: 94.9 FL (ref 80–99)
MONOCYTES # BLD: 0.53 K/UL (ref 0–1)
MONOCYTES NFR BLD: 9 % (ref 5–13)
NEUTS SEG # BLD: 3.65 K/UL (ref 1.8–8)
NEUTS SEG NFR BLD: 62.2 % (ref 32–75)
NRBC # BLD: 0 K/UL (ref 0–0.01)
NRBC BLD-RTO: 0 PER 100 WBC
NT PRO BNP: 6012 PG/ML (ref 0–450)
PCO2 BLDV: 49.8 MMHG (ref 41–51)
PH BLDV: 7.41 (ref 7.32–7.42)
PLATELET # BLD AUTO: 127 K/UL (ref 150–400)
PMV BLD AUTO: 10 FL (ref 8.9–12.9)
PO2 BLDV: 46 MMHG (ref 25–40)
POTASSIUM BLD-SCNC: 3.4 MMOL/L (ref 3.5–5.5)
POTASSIUM SERPL-SCNC: 3.6 MMOL/L (ref 3.5–5.1)
PROT SERPL-MCNC: 6 G/DL (ref 6.4–8.3)
RBC # BLD AUTO: 2.97 M/UL (ref 3.8–5.2)
SAO2 % BLD: 82 % (ref 94–98)
SARS-COV-2 RNA RESP QL NAA+PROBE: NOT DETECTED
SODIUM BLD-SCNC: 139 MMOL/L (ref 136–145)
SODIUM SERPL-SCNC: 136 MMOL/L (ref 136–145)
SOURCE: NORMAL
SPECIMEN SITE: ABNORMAL
TROPONIN T SERPL HS-MCNC: 22.2 NG/L (ref 0–14)
WBC # BLD AUTO: 5.9 K/UL (ref 3.6–11)

## 2025-08-15 PROCEDURE — 99285 EMERGENCY DEPT VISIT HI MDM: CPT

## 2025-08-15 PROCEDURE — 94762 N-INVAS EAR/PLS OXIMTRY CONT: CPT

## 2025-08-15 PROCEDURE — 82803 BLOOD GASES ANY COMBINATION: CPT

## 2025-08-15 PROCEDURE — 96374 THER/PROPH/DIAG INJ IV PUSH: CPT

## 2025-08-15 PROCEDURE — 87636 SARSCOV2 & INF A&B AMP PRB: CPT

## 2025-08-15 PROCEDURE — 83605 ASSAY OF LACTIC ACID: CPT

## 2025-08-15 PROCEDURE — 71046 X-RAY EXAM CHEST 2 VIEWS: CPT

## 2025-08-15 PROCEDURE — 36415 COLL VENOUS BLD VENIPUNCTURE: CPT

## 2025-08-15 PROCEDURE — 2500000003 HC RX 250 WO HCPCS: Performed by: STUDENT IN AN ORGANIZED HEALTH CARE EDUCATION/TRAINING PROGRAM

## 2025-08-15 PROCEDURE — 94640 AIRWAY INHALATION TREATMENT: CPT

## 2025-08-15 PROCEDURE — 83880 ASSAY OF NATRIURETIC PEPTIDE: CPT

## 2025-08-15 PROCEDURE — 85025 COMPLETE CBC W/AUTO DIFF WBC: CPT

## 2025-08-15 PROCEDURE — 82330 ASSAY OF CALCIUM: CPT

## 2025-08-15 PROCEDURE — 6370000000 HC RX 637 (ALT 250 FOR IP): Performed by: STUDENT IN AN ORGANIZED HEALTH CARE EDUCATION/TRAINING PROGRAM

## 2025-08-15 PROCEDURE — 84484 ASSAY OF TROPONIN QUANT: CPT

## 2025-08-15 PROCEDURE — 6360000002 HC RX W HCPCS: Performed by: STUDENT IN AN ORGANIZED HEALTH CARE EDUCATION/TRAINING PROGRAM

## 2025-08-15 PROCEDURE — 80053 COMPREHEN METABOLIC PANEL: CPT

## 2025-08-15 PROCEDURE — 84295 ASSAY OF SERUM SODIUM: CPT

## 2025-08-15 PROCEDURE — 82947 ASSAY GLUCOSE BLOOD QUANT: CPT

## 2025-08-15 PROCEDURE — 74018 RADEX ABDOMEN 1 VIEW: CPT

## 2025-08-15 PROCEDURE — 1100000003 HC PRIVATE W/ TELEMETRY

## 2025-08-15 PROCEDURE — 84132 ASSAY OF SERUM POTASSIUM: CPT

## 2025-08-15 PROCEDURE — 87040 BLOOD CULTURE FOR BACTERIA: CPT

## 2025-08-15 RX ORDER — TROSPIUM CHLORIDE 20 MG/1
20 TABLET, FILM COATED ORAL
Status: DISCONTINUED | OUTPATIENT
Start: 2025-08-16 | End: 2025-08-22 | Stop reason: HOSPADM

## 2025-08-15 RX ORDER — ACETAMINOPHEN 325 MG/1
650 TABLET ORAL EVERY 6 HOURS PRN
Status: DISCONTINUED | OUTPATIENT
Start: 2025-08-15 | End: 2025-08-22 | Stop reason: HOSPADM

## 2025-08-15 RX ORDER — ONDANSETRON 2 MG/ML
4 INJECTION INTRAMUSCULAR; INTRAVENOUS EVERY 6 HOURS PRN
Status: DISCONTINUED | OUTPATIENT
Start: 2025-08-15 | End: 2025-08-22 | Stop reason: HOSPADM

## 2025-08-15 RX ORDER — PANTOPRAZOLE SODIUM 40 MG/1
40 TABLET, DELAYED RELEASE ORAL DAILY
Status: DISCONTINUED | OUTPATIENT
Start: 2025-08-15 | End: 2025-08-22 | Stop reason: HOSPADM

## 2025-08-15 RX ORDER — IPRATROPIUM BROMIDE AND ALBUTEROL SULFATE 2.5; .5 MG/3ML; MG/3ML
1 SOLUTION RESPIRATORY (INHALATION) EVERY 4 HOURS PRN
Status: DISCONTINUED | OUTPATIENT
Start: 2025-08-15 | End: 2025-08-22 | Stop reason: HOSPADM

## 2025-08-15 RX ORDER — ASPIRIN 81 MG/1
81 TABLET, CHEWABLE ORAL DAILY
Status: DISCONTINUED | OUTPATIENT
Start: 2025-08-15 | End: 2025-08-22 | Stop reason: HOSPADM

## 2025-08-15 RX ORDER — FUROSEMIDE 10 MG/ML
40 INJECTION INTRAMUSCULAR; INTRAVENOUS 2 TIMES DAILY
Status: COMPLETED | OUTPATIENT
Start: 2025-08-15 | End: 2025-08-16

## 2025-08-15 RX ORDER — METHYLPREDNISOLONE SODIUM SUCCINATE 125 MG/2ML
125 INJECTION INTRAMUSCULAR; INTRAVENOUS ONCE
Status: COMPLETED | OUTPATIENT
Start: 2025-08-15 | End: 2025-08-15

## 2025-08-15 RX ORDER — LEVOTHYROXINE SODIUM 100 UG/1
100 TABLET ORAL DAILY
Status: DISCONTINUED | OUTPATIENT
Start: 2025-08-15 | End: 2025-08-22 | Stop reason: HOSPADM

## 2025-08-15 RX ORDER — IPRATROPIUM BROMIDE AND ALBUTEROL SULFATE 2.5; .5 MG/3ML; MG/3ML
1 SOLUTION RESPIRATORY (INHALATION)
Status: COMPLETED | OUTPATIENT
Start: 2025-08-15 | End: 2025-08-15

## 2025-08-15 RX ORDER — SODIUM CHLORIDE 0.9 % (FLUSH) 0.9 %
5-40 SYRINGE (ML) INJECTION EVERY 12 HOURS SCHEDULED
Status: DISCONTINUED | OUTPATIENT
Start: 2025-08-15 | End: 2025-08-22 | Stop reason: HOSPADM

## 2025-08-15 RX ORDER — MAGNESIUM SULFATE IN WATER 40 MG/ML
2000 INJECTION, SOLUTION INTRAVENOUS PRN
Status: DISCONTINUED | OUTPATIENT
Start: 2025-08-15 | End: 2025-08-22 | Stop reason: HOSPADM

## 2025-08-15 RX ORDER — SODIUM CHLORIDE 0.9 % (FLUSH) 0.9 %
5-40 SYRINGE (ML) INJECTION PRN
Status: DISCONTINUED | OUTPATIENT
Start: 2025-08-15 | End: 2025-08-22 | Stop reason: HOSPADM

## 2025-08-15 RX ORDER — POTASSIUM CHLORIDE 7.45 MG/ML
10 INJECTION INTRAVENOUS PRN
Status: DISCONTINUED | OUTPATIENT
Start: 2025-08-15 | End: 2025-08-22 | Stop reason: HOSPADM

## 2025-08-15 RX ORDER — ACETAMINOPHEN 650 MG/1
650 SUPPOSITORY RECTAL EVERY 6 HOURS PRN
Status: DISCONTINUED | OUTPATIENT
Start: 2025-08-15 | End: 2025-08-22 | Stop reason: HOSPADM

## 2025-08-15 RX ORDER — ONDANSETRON 4 MG/1
4 TABLET, ORALLY DISINTEGRATING ORAL EVERY 8 HOURS PRN
Status: DISCONTINUED | OUTPATIENT
Start: 2025-08-15 | End: 2025-08-22 | Stop reason: HOSPADM

## 2025-08-15 RX ORDER — SODIUM CHLORIDE 9 MG/ML
INJECTION, SOLUTION INTRAVENOUS PRN
Status: DISCONTINUED | OUTPATIENT
Start: 2025-08-15 | End: 2025-08-22 | Stop reason: HOSPADM

## 2025-08-15 RX ORDER — POTASSIUM CHLORIDE 1500 MG/1
40 TABLET, EXTENDED RELEASE ORAL PRN
Status: DISCONTINUED | OUTPATIENT
Start: 2025-08-15 | End: 2025-08-22 | Stop reason: HOSPADM

## 2025-08-15 RX ORDER — POLYETHYLENE GLYCOL 3350 17 G/17G
17 POWDER, FOR SOLUTION ORAL DAILY PRN
Status: DISCONTINUED | OUTPATIENT
Start: 2025-08-15 | End: 2025-08-22 | Stop reason: HOSPADM

## 2025-08-15 RX ADMIN — ASPIRIN 81 MG: 81 TABLET, CHEWABLE ORAL at 20:35

## 2025-08-15 RX ADMIN — SODIUM CHLORIDE, PRESERVATIVE FREE 10 ML: 5 INJECTION INTRAVENOUS at 20:23

## 2025-08-15 RX ADMIN — FUROSEMIDE 40 MG: 10 INJECTION, SOLUTION INTRAMUSCULAR; INTRAVENOUS at 20:35

## 2025-08-15 RX ADMIN — IPRATROPIUM BROMIDE AND ALBUTEROL SULFATE 1 DOSE: .5; 3 SOLUTION RESPIRATORY (INHALATION) at 17:44

## 2025-08-15 RX ADMIN — LEVOTHYROXINE SODIUM 100 MCG: 0.1 TABLET ORAL at 20:36

## 2025-08-15 RX ADMIN — TRAZODONE HYDROCHLORIDE 150 MG: 100 TABLET ORAL at 20:35

## 2025-08-15 RX ADMIN — PANTOPRAZOLE SODIUM 40 MG: 40 TABLET, DELAYED RELEASE ORAL at 20:49

## 2025-08-15 RX ADMIN — APIXABAN 2.5 MG: 5 TABLET, FILM COATED ORAL at 20:36

## 2025-08-15 RX ADMIN — METHYLPREDNISOLONE SODIUM SUCCINATE 125 MG: 125 INJECTION INTRAMUSCULAR; INTRAVENOUS at 18:14

## 2025-08-15 ASSESSMENT — PAIN SCALES - GENERAL
PAINLEVEL_OUTOF10: 0
PAINLEVEL_OUTOF10: 0
PAINLEVEL_OUTOF10: 2

## 2025-08-15 ASSESSMENT — PAIN - FUNCTIONAL ASSESSMENT
PAIN_FUNCTIONAL_ASSESSMENT: 0-10
PAIN_FUNCTIONAL_ASSESSMENT: 0-10

## 2025-08-16 LAB
ANION GAP SERPL CALC-SCNC: 12 MMOL/L (ref 2–14)
BASOPHILS # BLD: 0.01 K/UL (ref 0–0.1)
BASOPHILS NFR BLD: 0.3 % (ref 0–1)
BUN SERPL-MCNC: 18 MG/DL (ref 8–23)
BUN/CREAT SERPL: 22 (ref 12–20)
CALCIUM SERPL-MCNC: 8.6 MG/DL (ref 8.2–9.6)
CHLORIDE SERPL-SCNC: 96 MMOL/L (ref 98–107)
CO2 SERPL-SCNC: 30 MMOL/L (ref 20–29)
CREAT SERPL-MCNC: 0.81 MG/DL (ref 0.6–1)
DIFFERENTIAL METHOD BLD: ABNORMAL
EOSINOPHIL # BLD: 0 K/UL (ref 0–0.4)
EOSINOPHIL NFR BLD: 0 % (ref 0–7)
ERYTHROCYTE [DISTWIDTH] IN BLOOD BY AUTOMATED COUNT: 16.3 % (ref 11.5–14.5)
GLUCOSE SERPL-MCNC: 132 MG/DL (ref 65–100)
HCT VFR BLD AUTO: 31.4 % (ref 35–47)
HGB BLD-MCNC: 9.6 G/DL (ref 11.5–16)
IMM GRANULOCYTES # BLD AUTO: 0.01 K/UL (ref 0–0.04)
IMM GRANULOCYTES NFR BLD AUTO: 0.3 % (ref 0–0.5)
LYMPHOCYTES # BLD: 0.7 K/UL (ref 0.8–3.5)
LYMPHOCYTES NFR BLD: 22.6 % (ref 12–49)
MAGNESIUM SERPL-MCNC: 1.7 MG/DL (ref 1.7–2.3)
MCH RBC QN AUTO: 29 PG (ref 26–34)
MCHC RBC AUTO-ENTMCNC: 30.6 G/DL (ref 30–36.5)
MCV RBC AUTO: 94.9 FL (ref 80–99)
MONOCYTES # BLD: 0.07 K/UL (ref 0–1)
MONOCYTES NFR BLD: 2.2 % (ref 5–13)
NEUTS SEG # BLD: 2.31 K/UL (ref 1.8–8)
NEUTS SEG NFR BLD: 74.6 % (ref 32–75)
NRBC # BLD: 0 K/UL (ref 0–0.01)
NRBC BLD-RTO: 0 PER 100 WBC
PLATELET # BLD AUTO: 123 K/UL (ref 150–400)
PMV BLD AUTO: 10.1 FL (ref 8.9–12.9)
POTASSIUM SERPL-SCNC: 3.8 MMOL/L (ref 3.5–5.1)
PROCALCITONIN SERPL-MCNC: 0.05 NG/ML
RBC # BLD AUTO: 3.31 M/UL (ref 3.8–5.2)
RBC MORPH BLD: ABNORMAL
SODIUM SERPL-SCNC: 138 MMOL/L (ref 136–145)
WBC # BLD AUTO: 3.1 K/UL (ref 3.6–11)

## 2025-08-16 PROCEDURE — 6360000002 HC RX W HCPCS: Performed by: STUDENT IN AN ORGANIZED HEALTH CARE EDUCATION/TRAINING PROGRAM

## 2025-08-16 PROCEDURE — 2500000003 HC RX 250 WO HCPCS: Performed by: STUDENT IN AN ORGANIZED HEALTH CARE EDUCATION/TRAINING PROGRAM

## 2025-08-16 PROCEDURE — 97530 THERAPEUTIC ACTIVITIES: CPT

## 2025-08-16 PROCEDURE — 6370000000 HC RX 637 (ALT 250 FOR IP): Performed by: STUDENT IN AN ORGANIZED HEALTH CARE EDUCATION/TRAINING PROGRAM

## 2025-08-16 PROCEDURE — 1100000003 HC PRIVATE W/ TELEMETRY

## 2025-08-16 PROCEDURE — 85025 COMPLETE CBC W/AUTO DIFF WBC: CPT

## 2025-08-16 PROCEDURE — 2700000000 HC OXYGEN THERAPY PER DAY

## 2025-08-16 PROCEDURE — 97161 PT EVAL LOW COMPLEX 20 MIN: CPT

## 2025-08-16 PROCEDURE — 80048 BASIC METABOLIC PNL TOTAL CA: CPT

## 2025-08-16 PROCEDURE — 84145 PROCALCITONIN (PCT): CPT

## 2025-08-16 PROCEDURE — 94640 AIRWAY INHALATION TREATMENT: CPT

## 2025-08-16 PROCEDURE — 94761 N-INVAS EAR/PLS OXIMETRY MLT: CPT

## 2025-08-16 PROCEDURE — 97116 GAIT TRAINING THERAPY: CPT

## 2025-08-16 PROCEDURE — 83735 ASSAY OF MAGNESIUM: CPT

## 2025-08-16 PROCEDURE — 97165 OT EVAL LOW COMPLEX 30 MIN: CPT

## 2025-08-16 PROCEDURE — 36415 COLL VENOUS BLD VENIPUNCTURE: CPT

## 2025-08-16 PROCEDURE — 97535 SELF CARE MNGMENT TRAINING: CPT

## 2025-08-16 RX ADMIN — TRAZODONE HYDROCHLORIDE 150 MG: 100 TABLET ORAL at 21:01

## 2025-08-16 RX ADMIN — APIXABAN 2.5 MG: 5 TABLET, FILM COATED ORAL at 08:51

## 2025-08-16 RX ADMIN — TROSPIUM CHLORIDE 20 MG: 20 TABLET, FILM COATED ORAL at 08:51

## 2025-08-16 RX ADMIN — TROSPIUM CHLORIDE 20 MG: 20 TABLET, FILM COATED ORAL at 16:26

## 2025-08-16 RX ADMIN — PANTOPRAZOLE SODIUM 40 MG: 40 TABLET, DELAYED RELEASE ORAL at 08:51

## 2025-08-16 RX ADMIN — LEVOTHYROXINE SODIUM 100 MCG: 0.1 TABLET ORAL at 08:51

## 2025-08-16 RX ADMIN — FUROSEMIDE 40 MG: 10 INJECTION, SOLUTION INTRAMUSCULAR; INTRAVENOUS at 08:51

## 2025-08-16 RX ADMIN — SODIUM CHLORIDE, PRESERVATIVE FREE 10 ML: 5 INJECTION INTRAVENOUS at 08:52

## 2025-08-16 RX ADMIN — ARFORMOTEROL TARTRATE: 15 SOLUTION RESPIRATORY (INHALATION) at 07:33

## 2025-08-16 RX ADMIN — SODIUM CHLORIDE, PRESERVATIVE FREE 10 ML: 5 INJECTION INTRAVENOUS at 21:03

## 2025-08-16 RX ADMIN — APIXABAN 2.5 MG: 5 TABLET, FILM COATED ORAL at 21:01

## 2025-08-16 RX ADMIN — ARFORMOTEROL TARTRATE: 15 SOLUTION RESPIRATORY (INHALATION) at 20:28

## 2025-08-16 RX ADMIN — ASPIRIN 81 MG: 81 TABLET, CHEWABLE ORAL at 08:51

## 2025-08-16 RX ADMIN — FUROSEMIDE 40 MG: 10 INJECTION, SOLUTION INTRAMUSCULAR; INTRAVENOUS at 21:01

## 2025-08-16 ASSESSMENT — PAIN SCALES - GENERAL
PAINLEVEL_OUTOF10: 0
PAINLEVEL_OUTOF10: 0

## 2025-08-17 LAB
ANION GAP SERPL CALC-SCNC: 11 MMOL/L (ref 2–14)
BUN SERPL-MCNC: 26 MG/DL (ref 8–23)
BUN/CREAT SERPL: 23 (ref 12–20)
CALCIUM SERPL-MCNC: 8.6 MG/DL (ref 8.2–9.6)
CHLORIDE SERPL-SCNC: 91 MMOL/L (ref 98–107)
CO2 SERPL-SCNC: 36 MMOL/L (ref 20–29)
CREAT SERPL-MCNC: 1.11 MG/DL (ref 0.6–1)
ERYTHROCYTE [DISTWIDTH] IN BLOOD BY AUTOMATED COUNT: 16.1 % (ref 11.5–14.5)
GLUCOSE SERPL-MCNC: 94 MG/DL (ref 65–100)
HCT VFR BLD AUTO: 32.3 % (ref 35–47)
HGB BLD-MCNC: 9.9 G/DL (ref 11.5–16)
MCH RBC QN AUTO: 28.9 PG (ref 26–34)
MCHC RBC AUTO-ENTMCNC: 30.7 G/DL (ref 30–36.5)
MCV RBC AUTO: 94.2 FL (ref 80–99)
NRBC # BLD: 0 K/UL (ref 0–0.01)
NRBC BLD-RTO: 0 PER 100 WBC
PLATELET # BLD AUTO: 172 K/UL (ref 150–400)
PMV BLD AUTO: 10.5 FL (ref 8.9–12.9)
POTASSIUM SERPL-SCNC: 3.1 MMOL/L (ref 3.5–5.1)
RBC # BLD AUTO: 3.43 M/UL (ref 3.8–5.2)
SODIUM SERPL-SCNC: 137 MMOL/L (ref 136–145)
WBC # BLD AUTO: 7.8 K/UL (ref 3.6–11)

## 2025-08-17 PROCEDURE — 2500000003 HC RX 250 WO HCPCS: Performed by: STUDENT IN AN ORGANIZED HEALTH CARE EDUCATION/TRAINING PROGRAM

## 2025-08-17 PROCEDURE — 85027 COMPLETE CBC AUTOMATED: CPT

## 2025-08-17 PROCEDURE — 6370000000 HC RX 637 (ALT 250 FOR IP): Performed by: STUDENT IN AN ORGANIZED HEALTH CARE EDUCATION/TRAINING PROGRAM

## 2025-08-17 PROCEDURE — 80048 BASIC METABOLIC PNL TOTAL CA: CPT

## 2025-08-17 PROCEDURE — 94640 AIRWAY INHALATION TREATMENT: CPT

## 2025-08-17 PROCEDURE — 36415 COLL VENOUS BLD VENIPUNCTURE: CPT

## 2025-08-17 PROCEDURE — 2700000000 HC OXYGEN THERAPY PER DAY

## 2025-08-17 PROCEDURE — 1100000003 HC PRIVATE W/ TELEMETRY

## 2025-08-17 PROCEDURE — 94761 N-INVAS EAR/PLS OXIMETRY MLT: CPT

## 2025-08-17 PROCEDURE — 6360000002 HC RX W HCPCS: Performed by: STUDENT IN AN ORGANIZED HEALTH CARE EDUCATION/TRAINING PROGRAM

## 2025-08-17 RX ADMIN — APIXABAN 2.5 MG: 5 TABLET, FILM COATED ORAL at 21:24

## 2025-08-17 RX ADMIN — ASPIRIN 81 MG: 81 TABLET, CHEWABLE ORAL at 08:33

## 2025-08-17 RX ADMIN — TROSPIUM CHLORIDE 20 MG: 20 TABLET, FILM COATED ORAL at 06:39

## 2025-08-17 RX ADMIN — SODIUM CHLORIDE, PRESERVATIVE FREE 10 ML: 5 INJECTION INTRAVENOUS at 21:24

## 2025-08-17 RX ADMIN — TRAZODONE HYDROCHLORIDE 150 MG: 100 TABLET ORAL at 21:24

## 2025-08-17 RX ADMIN — ACETAMINOPHEN 650 MG: 325 TABLET ORAL at 08:33

## 2025-08-17 RX ADMIN — SODIUM CHLORIDE, PRESERVATIVE FREE 10 ML: 5 INJECTION INTRAVENOUS at 08:34

## 2025-08-17 RX ADMIN — PANTOPRAZOLE SODIUM 40 MG: 40 TABLET, DELAYED RELEASE ORAL at 08:33

## 2025-08-17 RX ADMIN — ARFORMOTEROL TARTRATE: 15 SOLUTION RESPIRATORY (INHALATION) at 20:13

## 2025-08-17 RX ADMIN — POLYETHYLENE GLYCOL 3350 17 G: 17 POWDER, FOR SOLUTION ORAL at 18:37

## 2025-08-17 RX ADMIN — ARFORMOTEROL TARTRATE: 15 SOLUTION RESPIRATORY (INHALATION) at 07:52

## 2025-08-17 RX ADMIN — APIXABAN 2.5 MG: 5 TABLET, FILM COATED ORAL at 08:33

## 2025-08-17 RX ADMIN — TROSPIUM CHLORIDE 20 MG: 20 TABLET, FILM COATED ORAL at 16:11

## 2025-08-17 RX ADMIN — LEVOTHYROXINE SODIUM 100 MCG: 0.1 TABLET ORAL at 08:33

## 2025-08-17 ASSESSMENT — PAIN SCALES - GENERAL
PAINLEVEL_OUTOF10: 4
PAINLEVEL_OUTOF10: 0
PAINLEVEL_OUTOF10: 2
PAINLEVEL_OUTOF10: 0

## 2025-08-17 ASSESSMENT — PAIN - FUNCTIONAL ASSESSMENT
PAIN_FUNCTIONAL_ASSESSMENT: ACTIVITIES ARE NOT PREVENTED
PAIN_FUNCTIONAL_ASSESSMENT: 0-10
PAIN_FUNCTIONAL_ASSESSMENT: 0-10

## 2025-08-17 ASSESSMENT — PAIN DESCRIPTION - LOCATION: LOCATION: HEAD

## 2025-08-17 ASSESSMENT — PAIN DESCRIPTION - ORIENTATION: ORIENTATION: POSTERIOR

## 2025-08-17 ASSESSMENT — PAIN DESCRIPTION - DESCRIPTORS: DESCRIPTORS: ACHING

## 2025-08-18 ENCOUNTER — APPOINTMENT (OUTPATIENT)
Facility: HOSPITAL | Age: 89
End: 2025-08-18
Payer: MEDICARE

## 2025-08-18 LAB
ANION GAP SERPL CALC-SCNC: 10 MMOL/L (ref 2–14)
ARTERIAL PATENCY WRIST A: YES
BASE EXCESS BLDA CALC-SCNC: 10.4 MMOL/L
BDY SITE: ABNORMAL
BUN SERPL-MCNC: 24 MG/DL (ref 8–23)
BUN/CREAT SERPL: 28 (ref 12–20)
CALCIUM SERPL-MCNC: 8.6 MG/DL (ref 8.2–9.6)
CHLORIDE SERPL-SCNC: 89 MMOL/L (ref 98–107)
CO2 SERPL-SCNC: 33 MMOL/L (ref 20–29)
CREAT SERPL-MCNC: 0.86 MG/DL (ref 0.6–1)
ERYTHROCYTE [DISTWIDTH] IN BLOOD BY AUTOMATED COUNT: 15.9 % (ref 11.5–14.5)
GAS FLOW.O2 O2 DELIVERY SYS: 2 L/MIN
GLUCOSE SERPL-MCNC: 92 MG/DL (ref 65–100)
HCO3 BLDA-SCNC: 37 MMOL/L (ref 22–26)
HCT VFR BLD AUTO: 30.7 % (ref 35–47)
HGB BLD-MCNC: 9.5 G/DL (ref 11.5–16)
MCH RBC QN AUTO: 29.3 PG (ref 26–34)
MCHC RBC AUTO-ENTMCNC: 30.9 G/DL (ref 30–36.5)
MCV RBC AUTO: 94.8 FL (ref 80–99)
NRBC # BLD: 0 K/UL (ref 0–0.01)
NRBC BLD-RTO: 0 PER 100 WBC
PCO2 BLDA: 54 MMHG (ref 35–45)
PH BLDA: 7.45 (ref 7.35–7.45)
PLATELET # BLD AUTO: 163 K/UL (ref 150–400)
PMV BLD AUTO: 10.3 FL (ref 8.9–12.9)
PO2 BLDA: 104 MMHG (ref 80–100)
POTASSIUM SERPL-SCNC: 3.2 MMOL/L (ref 3.5–5.1)
RBC # BLD AUTO: 3.24 M/UL (ref 3.8–5.2)
SAO2 % BLD: 98 % (ref 92–97)
SAO2% DEVICE SAO2% SENSOR NAME: ABNORMAL
SODIUM SERPL-SCNC: 132 MMOL/L (ref 136–145)
SPECIMEN SITE: ABNORMAL
WBC # BLD AUTO: 5.6 K/UL (ref 3.6–11)

## 2025-08-18 PROCEDURE — 36600 WITHDRAWAL OF ARTERIAL BLOOD: CPT

## 2025-08-18 PROCEDURE — 94640 AIRWAY INHALATION TREATMENT: CPT

## 2025-08-18 PROCEDURE — 6360000002 HC RX W HCPCS: Performed by: STUDENT IN AN ORGANIZED HEALTH CARE EDUCATION/TRAINING PROGRAM

## 2025-08-18 PROCEDURE — 6370000000 HC RX 637 (ALT 250 FOR IP): Performed by: STUDENT IN AN ORGANIZED HEALTH CARE EDUCATION/TRAINING PROGRAM

## 2025-08-18 PROCEDURE — 85027 COMPLETE CBC AUTOMATED: CPT

## 2025-08-18 PROCEDURE — 2500000003 HC RX 250 WO HCPCS: Performed by: STUDENT IN AN ORGANIZED HEALTH CARE EDUCATION/TRAINING PROGRAM

## 2025-08-18 PROCEDURE — 6370000000 HC RX 637 (ALT 250 FOR IP): Performed by: INTERNAL MEDICINE

## 2025-08-18 PROCEDURE — 2700000000 HC OXYGEN THERAPY PER DAY

## 2025-08-18 PROCEDURE — 97530 THERAPEUTIC ACTIVITIES: CPT

## 2025-08-18 PROCEDURE — 80048 BASIC METABOLIC PNL TOTAL CA: CPT

## 2025-08-18 PROCEDURE — 1100000003 HC PRIVATE W/ TELEMETRY

## 2025-08-18 PROCEDURE — 36415 COLL VENOUS BLD VENIPUNCTURE: CPT

## 2025-08-18 PROCEDURE — 94761 N-INVAS EAR/PLS OXIMETRY MLT: CPT

## 2025-08-18 PROCEDURE — 82803 BLOOD GASES ANY COMBINATION: CPT

## 2025-08-18 PROCEDURE — 71045 X-RAY EXAM CHEST 1 VIEW: CPT

## 2025-08-18 RX ORDER — BUMETANIDE 0.25 MG/ML
0.5 INJECTION, SOLUTION INTRAMUSCULAR; INTRAVENOUS 2 TIMES DAILY
Status: DISCONTINUED | OUTPATIENT
Start: 2025-08-18 | End: 2025-08-20

## 2025-08-18 RX ORDER — METOPROLOL SUCCINATE 25 MG/1
12.5 TABLET, EXTENDED RELEASE ORAL DAILY
Status: DISCONTINUED | OUTPATIENT
Start: 2025-08-18 | End: 2025-08-22 | Stop reason: HOSPADM

## 2025-08-18 RX ADMIN — ARFORMOTEROL TARTRATE: 15 SOLUTION RESPIRATORY (INHALATION) at 20:33

## 2025-08-18 RX ADMIN — APIXABAN 2.5 MG: 5 TABLET, FILM COATED ORAL at 21:58

## 2025-08-18 RX ADMIN — ASPIRIN 81 MG: 81 TABLET, CHEWABLE ORAL at 10:36

## 2025-08-18 RX ADMIN — POLYETHYLENE GLYCOL 3350 17 G: 17 POWDER, FOR SOLUTION ORAL at 11:25

## 2025-08-18 RX ADMIN — POTASSIUM CHLORIDE 40 MEQ: 1500 TABLET, EXTENDED RELEASE ORAL at 11:25

## 2025-08-18 RX ADMIN — SODIUM CHLORIDE, PRESERVATIVE FREE 10 ML: 5 INJECTION INTRAVENOUS at 10:37

## 2025-08-18 RX ADMIN — TROSPIUM CHLORIDE 20 MG: 20 TABLET, FILM COATED ORAL at 06:25

## 2025-08-18 RX ADMIN — ACETAMINOPHEN 650 MG: 325 TABLET ORAL at 13:07

## 2025-08-18 RX ADMIN — PANTOPRAZOLE SODIUM 40 MG: 40 TABLET, DELAYED RELEASE ORAL at 10:37

## 2025-08-18 RX ADMIN — ACETAMINOPHEN 650 MG: 325 TABLET ORAL at 21:57

## 2025-08-18 RX ADMIN — LEVOTHYROXINE SODIUM 100 MCG: 0.1 TABLET ORAL at 10:37

## 2025-08-18 RX ADMIN — ONDANSETRON 4 MG: 2 INJECTION, SOLUTION INTRAMUSCULAR; INTRAVENOUS at 11:24

## 2025-08-18 RX ADMIN — ARFORMOTEROL TARTRATE: 15 SOLUTION RESPIRATORY (INHALATION) at 07:46

## 2025-08-18 RX ADMIN — TRAZODONE HYDROCHLORIDE 150 MG: 100 TABLET ORAL at 21:58

## 2025-08-18 RX ADMIN — SODIUM CHLORIDE, PRESERVATIVE FREE 10 ML: 5 INJECTION INTRAVENOUS at 22:41

## 2025-08-18 RX ADMIN — METOPROLOL SUCCINATE 12.5 MG: 25 TABLET, EXTENDED RELEASE ORAL at 18:33

## 2025-08-18 RX ADMIN — TROSPIUM CHLORIDE 20 MG: 20 TABLET, FILM COATED ORAL at 16:41

## 2025-08-18 RX ADMIN — APIXABAN 2.5 MG: 5 TABLET, FILM COATED ORAL at 10:37

## 2025-08-18 RX ADMIN — BUMETANIDE 0.5 MG: 0.25 INJECTION INTRAMUSCULAR; INTRAVENOUS at 14:01

## 2025-08-18 ASSESSMENT — PAIN SCALES - GENERAL
PAINLEVEL_OUTOF10: 7
PAINLEVEL_OUTOF10: 4
PAINLEVEL_OUTOF10: 0
PAINLEVEL_OUTOF10: 0
PAINLEVEL_OUTOF10: 1

## 2025-08-18 ASSESSMENT — PAIN DESCRIPTION - DESCRIPTORS
DESCRIPTORS: ACHING
DESCRIPTORS: ACHING

## 2025-08-18 ASSESSMENT — PAIN SCALES - WONG BAKER
WONGBAKER_NUMERICALRESPONSE: NO HURT
WONGBAKER_NUMERICALRESPONSE: NO HURT
WONGBAKER_NUMERICALRESPONSE: HURTS A LITTLE BIT

## 2025-08-18 ASSESSMENT — PAIN DESCRIPTION - ORIENTATION
ORIENTATION: RIGHT
ORIENTATION: MID

## 2025-08-18 ASSESSMENT — PAIN DESCRIPTION - LOCATION
LOCATION: NECK
LOCATION: HEAD

## 2025-08-18 ASSESSMENT — PAIN DESCRIPTION - PAIN TYPE: TYPE: ACUTE PAIN

## 2025-08-19 ENCOUNTER — APPOINTMENT (OUTPATIENT)
Facility: HOSPITAL | Age: 89
End: 2025-08-19
Attending: INTERNAL MEDICINE
Payer: MEDICARE

## 2025-08-19 PROBLEM — R53.83 OTHER FATIGUE: Status: ACTIVE | Noted: 2025-08-19

## 2025-08-19 PROBLEM — Z71.89 GOALS OF CARE, COUNSELING/DISCUSSION: Status: ACTIVE | Noted: 2025-08-19

## 2025-08-19 PROBLEM — Z51.5 PALLIATIVE CARE BY SPECIALIST: Status: ACTIVE | Noted: 2025-08-19

## 2025-08-19 PROBLEM — R06.02 SHORTNESS OF BREATH: Status: ACTIVE | Noted: 2025-08-19

## 2025-08-19 LAB
ANION GAP SERPL CALC-SCNC: 8 MMOL/L (ref 2–14)
BUN SERPL-MCNC: 19 MG/DL (ref 8–23)
BUN/CREAT SERPL: 22 (ref 12–20)
CALCIUM SERPL-MCNC: 8.7 MG/DL (ref 8.2–9.6)
CHLORIDE SERPL-SCNC: 91 MMOL/L (ref 98–107)
CO2 SERPL-SCNC: 34 MMOL/L (ref 20–29)
CREAT SERPL-MCNC: 0.88 MG/DL (ref 0.6–1)
ECHO AO ASC DIAM: 3.9 CM
ECHO AO ASCENDING AORTA INDEX: 2.39 CM/M2
ECHO AO ROOT DIAM: 3.3 CM
ECHO AO ROOT INDEX: 2.02 CM/M2
ECHO AR MAX VEL PISA: 4 M/S
ECHO AV AREA PEAK VELOCITY: 0.7 CM2
ECHO AV AREA VTI: 0.7 CM2
ECHO AV AREA/BSA PEAK VELOCITY: 0.4 CM2/M2
ECHO AV AREA/BSA VTI: 0.4 CM2/M2
ECHO AV MEAN GRADIENT: 24 MMHG
ECHO AV MEAN VELOCITY: 2.3 M/S
ECHO AV PEAK GRADIENT: 36 MMHG
ECHO AV PEAK VELOCITY: 3 M/S
ECHO AV REGURGITANT PHT: 367.6 MS
ECHO AV VELOCITY RATIO: 0.23
ECHO AV VTI: 52.7 CM
ECHO BSA: 1.68 M2
ECHO LA DIAMETER INDEX: 2.76 CM/M2
ECHO LA DIAMETER: 4.5 CM
ECHO LA TO AORTIC ROOT RATIO: 1.36
ECHO LV E' LATERAL VELOCITY: 2.96 CM/S
ECHO LV E' SEPTAL VELOCITY: 4.01 CM/S
ECHO LV EDV A4C: 46 ML
ECHO LV EDV INDEX A4C: 28 ML/M2
ECHO LV EF PHYSICIAN: 55 %
ECHO LV EJECTION FRACTION A4C: 27 %
ECHO LV ESV A4C: 33 ML
ECHO LV ESV INDEX A4C: 20 ML/M2
ECHO LV FRACTIONAL SHORTENING: 44 % (ref 28–44)
ECHO LV INTERNAL DIMENSION DIASTOLE INDEX: 2.52 CM/M2
ECHO LV INTERNAL DIMENSION DIASTOLIC: 4.1 CM (ref 3.9–5.3)
ECHO LV INTERNAL DIMENSION SYSTOLIC INDEX: 1.41 CM/M2
ECHO LV INTERNAL DIMENSION SYSTOLIC: 2.3 CM
ECHO LV IVSD: 1.1 CM (ref 0.6–0.9)
ECHO LV MASS 2D: 151.3 G (ref 67–162)
ECHO LV MASS INDEX 2D: 92.8 G/M2 (ref 43–95)
ECHO LV POSTERIOR WALL DIASTOLIC: 1.1 CM (ref 0.6–0.9)
ECHO LV RELATIVE WALL THICKNESS RATIO: 0.54
ECHO LVOT AREA: 3.1 CM2
ECHO LVOT AV VTI INDEX: 0.23
ECHO LVOT DIAM: 2 CM
ECHO LVOT MEAN GRADIENT: 1 MMHG
ECHO LVOT PEAK GRADIENT: 2 MMHG
ECHO LVOT PEAK VELOCITY: 0.7 M/S
ECHO LVOT STROKE VOLUME INDEX: 23.5 ML/M2
ECHO LVOT SV: 38.3 ML
ECHO LVOT VTI: 12.2 CM
ECHO MV A VELOCITY: 0.21 M/S
ECHO MV AREA VTI: 1.6 CM2
ECHO MV E DECELERATION TIME (DT): 165.7 MS
ECHO MV E VELOCITY: 1.19 M/S
ECHO MV E/A RATIO: 5.67
ECHO MV E/E' LATERAL: 40.2
ECHO MV E/E' RATIO (AVERAGED): 34.94
ECHO MV E/E' SEPTAL: 29.68
ECHO MV LVOT VTI INDEX: 1.98
ECHO MV MAX VELOCITY: 1.4 M/S
ECHO MV MEAN GRADIENT: 4 MMHG
ECHO MV MEAN VELOCITY: 1 M/S
ECHO MV PEAK GRADIENT: 8 MMHG
ECHO MV REGURGITANT PEAK GRADIENT: 25 MMHG
ECHO MV REGURGITANT PEAK VELOCITY: 2.5 M/S
ECHO MV VTI: 24.1 CM
ECHO RV INTERNAL DIMENSION: 3.3 CM
ECHO RV TAPSE: 0.5 CM (ref 1.7–?)
ECHO TV REGURGITANT MAX VELOCITY: 2.16 M/S
ECHO TV REGURGITANT PEAK GRADIENT: 19 MMHG
ERYTHROCYTE [DISTWIDTH] IN BLOOD BY AUTOMATED COUNT: 15.9 % (ref 11.5–14.5)
GLUCOSE SERPL-MCNC: 121 MG/DL (ref 65–100)
HCT VFR BLD AUTO: 34.7 % (ref 35–47)
HGB BLD-MCNC: 10.6 G/DL (ref 11.5–16)
MCH RBC QN AUTO: 29.3 PG (ref 26–34)
MCHC RBC AUTO-ENTMCNC: 30.5 G/DL (ref 30–36.5)
MCV RBC AUTO: 95.9 FL (ref 80–99)
NRBC # BLD: 0 K/UL (ref 0–0.01)
NRBC BLD-RTO: 0 PER 100 WBC
NT PRO BNP: 1473 PG/ML (ref 0–450)
PLATELET # BLD AUTO: 192 K/UL (ref 150–400)
PMV BLD AUTO: 10 FL (ref 8.9–12.9)
POTASSIUM SERPL-SCNC: 4.3 MMOL/L (ref 3.5–5.1)
RBC # BLD AUTO: 3.62 M/UL (ref 3.8–5.2)
SODIUM SERPL-SCNC: 133 MMOL/L (ref 136–145)
WBC # BLD AUTO: 8.2 K/UL (ref 3.6–11)

## 2025-08-19 PROCEDURE — 6370000000 HC RX 637 (ALT 250 FOR IP)

## 2025-08-19 PROCEDURE — 1100000003 HC PRIVATE W/ TELEMETRY

## 2025-08-19 PROCEDURE — 99222 1ST HOSP IP/OBS MODERATE 55: CPT | Performed by: NURSE PRACTITIONER

## 2025-08-19 PROCEDURE — 93306 TTE W/DOPPLER COMPLETE: CPT

## 2025-08-19 PROCEDURE — 2580000003 HC RX 258: Performed by: STUDENT IN AN ORGANIZED HEALTH CARE EDUCATION/TRAINING PROGRAM

## 2025-08-19 PROCEDURE — 6360000002 HC RX W HCPCS

## 2025-08-19 PROCEDURE — 2500000003 HC RX 250 WO HCPCS

## 2025-08-19 PROCEDURE — 94640 AIRWAY INHALATION TREATMENT: CPT

## 2025-08-19 PROCEDURE — 85027 COMPLETE CBC AUTOMATED: CPT

## 2025-08-19 PROCEDURE — 36415 COLL VENOUS BLD VENIPUNCTURE: CPT

## 2025-08-19 PROCEDURE — 6360000002 HC RX W HCPCS: Performed by: STUDENT IN AN ORGANIZED HEALTH CARE EDUCATION/TRAINING PROGRAM

## 2025-08-19 PROCEDURE — 2700000000 HC OXYGEN THERAPY PER DAY

## 2025-08-19 PROCEDURE — 80048 BASIC METABOLIC PNL TOTAL CA: CPT

## 2025-08-19 PROCEDURE — 6370000000 HC RX 637 (ALT 250 FOR IP): Performed by: STUDENT IN AN ORGANIZED HEALTH CARE EDUCATION/TRAINING PROGRAM

## 2025-08-19 PROCEDURE — 2580000003 HC RX 258

## 2025-08-19 PROCEDURE — 94761 N-INVAS EAR/PLS OXIMETRY MLT: CPT

## 2025-08-19 PROCEDURE — 2500000003 HC RX 250 WO HCPCS: Performed by: STUDENT IN AN ORGANIZED HEALTH CARE EDUCATION/TRAINING PROGRAM

## 2025-08-19 PROCEDURE — 83880 ASSAY OF NATRIURETIC PEPTIDE: CPT

## 2025-08-19 RX ORDER — MIDODRINE HYDROCHLORIDE 5 MG/1
2.5 TABLET ORAL
Status: DISCONTINUED | OUTPATIENT
Start: 2025-08-19 | End: 2025-08-19

## 2025-08-19 RX ORDER — DOCUSATE SODIUM 100 MG/1
100 CAPSULE, LIQUID FILLED ORAL DAILY
Status: DISCONTINUED | OUTPATIENT
Start: 2025-08-19 | End: 2025-08-22 | Stop reason: HOSPADM

## 2025-08-19 RX ORDER — 0.9 % SODIUM CHLORIDE 0.9 %
250 INTRAVENOUS SOLUTION INTRAVENOUS ONCE
Status: COMPLETED | OUTPATIENT
Start: 2025-08-19 | End: 2025-08-19

## 2025-08-19 RX ORDER — PREDNISONE 5 MG/1
5 TABLET ORAL DAILY
Status: DISCONTINUED | OUTPATIENT
Start: 2025-08-19 | End: 2025-08-20

## 2025-08-19 RX ADMIN — ARFORMOTEROL TARTRATE: 15 SOLUTION RESPIRATORY (INHALATION) at 09:55

## 2025-08-19 RX ADMIN — SODIUM CHLORIDE, PRESERVATIVE FREE 10 ML: 5 INJECTION INTRAVENOUS at 21:39

## 2025-08-19 RX ADMIN — ARFORMOTEROL TARTRATE: 15 SOLUTION RESPIRATORY (INHALATION) at 21:02

## 2025-08-19 RX ADMIN — POLYETHYLENE GLYCOL 3350 17 G: 17 POWDER, FOR SOLUTION ORAL at 13:03

## 2025-08-19 RX ADMIN — ACETAMINOPHEN 650 MG: 325 TABLET ORAL at 12:59

## 2025-08-19 RX ADMIN — APIXABAN 2.5 MG: 5 TABLET, FILM COATED ORAL at 21:39

## 2025-08-19 RX ADMIN — SODIUM CHLORIDE, PRESERVATIVE FREE 10 ML: 5 INJECTION INTRAVENOUS at 10:33

## 2025-08-19 RX ADMIN — TRAZODONE HYDROCHLORIDE 150 MG: 100 TABLET ORAL at 21:38

## 2025-08-19 RX ADMIN — SODIUM CHLORIDE 250 ML: 0.9 INJECTION, SOLUTION INTRAVENOUS at 17:39

## 2025-08-19 RX ADMIN — APIXABAN 2.5 MG: 5 TABLET, FILM COATED ORAL at 10:18

## 2025-08-19 RX ADMIN — PANTOPRAZOLE SODIUM 40 MG: 40 TABLET, DELAYED RELEASE ORAL at 10:18

## 2025-08-19 RX ADMIN — BUMETANIDE 0.5 MG: 0.25 INJECTION INTRAMUSCULAR; INTRAVENOUS at 10:15

## 2025-08-19 RX ADMIN — ACETAMINOPHEN 650 MG: 325 TABLET ORAL at 21:37

## 2025-08-19 RX ADMIN — LEVOTHYROXINE SODIUM 100 MCG: 0.1 TABLET ORAL at 10:17

## 2025-08-19 RX ADMIN — SODIUM CHLORIDE: 0.9 INJECTION, SOLUTION INTRAVENOUS at 17:30

## 2025-08-19 RX ADMIN — METHYLPREDNISOLONE SODIUM SUCCINATE 40 MG: 40 INJECTION, POWDER, LYOPHILIZED, FOR SOLUTION INTRAMUSCULAR; INTRAVENOUS at 21:36

## 2025-08-19 RX ADMIN — ASPIRIN 81 MG: 81 TABLET, CHEWABLE ORAL at 10:17

## 2025-08-19 RX ADMIN — MIDODRINE HYDROCHLORIDE 2.5 MG: 5 TABLET ORAL at 16:44

## 2025-08-19 RX ADMIN — METOPROLOL SUCCINATE 12.5 MG: 25 TABLET, EXTENDED RELEASE ORAL at 10:17

## 2025-08-19 RX ADMIN — TROSPIUM CHLORIDE 20 MG: 20 TABLET, FILM COATED ORAL at 16:13

## 2025-08-19 RX ADMIN — TROSPIUM CHLORIDE 20 MG: 20 TABLET, FILM COATED ORAL at 10:26

## 2025-08-19 RX ADMIN — PREDNISONE 5 MG: 5 TABLET ORAL at 14:01

## 2025-08-19 ASSESSMENT — PAIN DESCRIPTION - DESCRIPTORS
DESCRIPTORS: ACHING
DESCRIPTORS: ACHING

## 2025-08-19 ASSESSMENT — PAIN - FUNCTIONAL ASSESSMENT
PAIN_FUNCTIONAL_ASSESSMENT: 0-10

## 2025-08-19 ASSESSMENT — PAIN DESCRIPTION - LOCATION
LOCATION: HEAD;BACK
LOCATION: NECK;HEAD

## 2025-08-19 ASSESSMENT — PAIN SCALES - GENERAL
PAINLEVEL_OUTOF10: 0
PAINLEVEL_OUTOF10: 6

## 2025-08-19 ASSESSMENT — PAIN DESCRIPTION - ORIENTATION: ORIENTATION: LOWER

## 2025-08-19 ASSESSMENT — PAIN SCALES - WONG BAKER: WONGBAKER_NUMERICALRESPONSE: NO HURT

## 2025-08-20 LAB
ALBUMIN SERPL-MCNC: 2.6 G/DL (ref 3.5–5.2)
ALBUMIN/GLOB SERPL: 0.6 (ref 1.1–2.2)
ALP SERPL-CCNC: 63 U/L (ref 35–104)
ALT SERPL-CCNC: 12 U/L (ref 10–35)
ANION GAP SERPL CALC-SCNC: 9 MMOL/L (ref 2–14)
APPEARANCE UR: CLEAR
AST SERPL-CCNC: 20 U/L (ref 10–35)
BACTERIA URNS QL MICRO: NEGATIVE /HPF
BASOPHILS # BLD: 0.01 K/UL (ref 0–0.1)
BASOPHILS NFR BLD: 0.2 % (ref 0–1)
BILIRUB SERPL-MCNC: 0.5 MG/DL (ref 0–1.2)
BILIRUB UR QL: NEGATIVE
BUN SERPL-MCNC: 19 MG/DL (ref 8–23)
BUN/CREAT SERPL: 24 (ref 12–20)
CALCIUM SERPL-MCNC: 9.1 MG/DL (ref 8.2–9.6)
CHLORIDE SERPL-SCNC: 92 MMOL/L (ref 98–107)
CO2 SERPL-SCNC: 31 MMOL/L (ref 20–29)
COLOR UR: ABNORMAL
CREAT SERPL-MCNC: 0.82 MG/DL (ref 0.6–1)
DIFFERENTIAL METHOD BLD: ABNORMAL
EOSINOPHIL # BLD: 0 K/UL (ref 0–0.4)
EOSINOPHIL NFR BLD: 0 % (ref 0–7)
EPITH CASTS URNS QL MICRO: ABNORMAL /LPF
ERYTHROCYTE [DISTWIDTH] IN BLOOD BY AUTOMATED COUNT: 15.6 % (ref 11.5–14.5)
GLOBULIN SER CALC-MCNC: 4.3 G/DL (ref 2–4)
GLUCOSE SERPL-MCNC: 156 MG/DL (ref 65–100)
GLUCOSE UR STRIP.AUTO-MCNC: NEGATIVE MG/DL
HCT VFR BLD AUTO: 34.1 % (ref 35–47)
HGB BLD-MCNC: 10.4 G/DL (ref 11.5–16)
HGB UR QL STRIP: NEGATIVE
HYALINE CASTS URNS QL MICRO: ABNORMAL /LPF (ref 0–2)
IMM GRANULOCYTES # BLD AUTO: 0.04 K/UL (ref 0–0.04)
IMM GRANULOCYTES NFR BLD AUTO: 0.7 % (ref 0–0.5)
KETONES UR QL STRIP.AUTO: NEGATIVE MG/DL
LEUKOCYTE ESTERASE UR QL STRIP.AUTO: NEGATIVE
LYMPHOCYTES # BLD: 0.83 K/UL (ref 0.8–3.5)
LYMPHOCYTES NFR BLD: 13.6 % (ref 12–49)
MCH RBC QN AUTO: 29.3 PG (ref 26–34)
MCHC RBC AUTO-ENTMCNC: 30.5 G/DL (ref 30–36.5)
MCV RBC AUTO: 96.1 FL (ref 80–99)
MONOCYTES # BLD: 0.2 K/UL (ref 0–1)
MONOCYTES NFR BLD: 3.3 % (ref 5–13)
NEUTS SEG # BLD: 5.04 K/UL (ref 1.8–8)
NEUTS SEG NFR BLD: 82.2 % (ref 32–75)
NITRITE UR QL STRIP.AUTO: NEGATIVE
NRBC # BLD: 0 K/UL (ref 0–0.01)
NRBC BLD-RTO: 0 PER 100 WBC
PH UR STRIP: 5.5 (ref 5–8)
PLATELET # BLD AUTO: 186 K/UL (ref 150–400)
PMV BLD AUTO: 10.1 FL (ref 8.9–12.9)
POTASSIUM SERPL-SCNC: 4.3 MMOL/L (ref 3.5–5.1)
PROT SERPL-MCNC: 7 G/DL (ref 6.4–8.3)
PROT UR STRIP-MCNC: ABNORMAL MG/DL
RBC # BLD AUTO: 3.55 M/UL (ref 3.8–5.2)
RBC #/AREA URNS HPF: ABNORMAL /HPF (ref 0–5)
SODIUM SERPL-SCNC: 132 MMOL/L (ref 136–145)
SP GR UR REFRACTOMETRY: 1.01
URINE CULTURE IF INDICATED: ABNORMAL
UROBILINOGEN UR QL STRIP.AUTO: 0.2 EU/DL (ref 0.2–1)
WBC # BLD AUTO: 6.1 K/UL (ref 3.6–11)
WBC URNS QL MICRO: ABNORMAL /HPF (ref 0–4)

## 2025-08-20 PROCEDURE — 6370000000 HC RX 637 (ALT 250 FOR IP): Performed by: STUDENT IN AN ORGANIZED HEALTH CARE EDUCATION/TRAINING PROGRAM

## 2025-08-20 PROCEDURE — 97116 GAIT TRAINING THERAPY: CPT

## 2025-08-20 PROCEDURE — 2500000003 HC RX 250 WO HCPCS: Performed by: STUDENT IN AN ORGANIZED HEALTH CARE EDUCATION/TRAINING PROGRAM

## 2025-08-20 PROCEDURE — 36415 COLL VENOUS BLD VENIPUNCTURE: CPT

## 2025-08-20 PROCEDURE — 94640 AIRWAY INHALATION TREATMENT: CPT

## 2025-08-20 PROCEDURE — 85025 COMPLETE CBC W/AUTO DIFF WBC: CPT

## 2025-08-20 PROCEDURE — 2500000003 HC RX 250 WO HCPCS

## 2025-08-20 PROCEDURE — 6370000000 HC RX 637 (ALT 250 FOR IP)

## 2025-08-20 PROCEDURE — 97535 SELF CARE MNGMENT TRAINING: CPT

## 2025-08-20 PROCEDURE — 1100000003 HC PRIVATE W/ TELEMETRY

## 2025-08-20 PROCEDURE — 51798 US URINE CAPACITY MEASURE: CPT

## 2025-08-20 PROCEDURE — 94761 N-INVAS EAR/PLS OXIMETRY MLT: CPT

## 2025-08-20 PROCEDURE — 51701 INSERT BLADDER CATHETER: CPT

## 2025-08-20 PROCEDURE — 81001 URINALYSIS AUTO W/SCOPE: CPT

## 2025-08-20 PROCEDURE — 80053 COMPREHEN METABOLIC PANEL: CPT

## 2025-08-20 PROCEDURE — 6360000002 HC RX W HCPCS

## 2025-08-20 PROCEDURE — 6360000002 HC RX W HCPCS: Performed by: STUDENT IN AN ORGANIZED HEALTH CARE EDUCATION/TRAINING PROGRAM

## 2025-08-20 PROCEDURE — 2700000000 HC OXYGEN THERAPY PER DAY

## 2025-08-20 RX ORDER — PREDNISONE 5 MG/1
10 TABLET ORAL DAILY
Status: DISCONTINUED | OUTPATIENT
Start: 2025-08-27 | End: 2025-08-20

## 2025-08-20 RX ORDER — PREDNISONE 5 MG/1
20 TABLET ORAL DAILY
Status: DISCONTINUED | OUTPATIENT
Start: 2025-08-21 | End: 2025-08-20

## 2025-08-20 RX ORDER — PREDNISONE 5 MG/1
20 TABLET ORAL DAILY
Status: DISCONTINUED | OUTPATIENT
Start: 2025-08-27 | End: 2025-08-22 | Stop reason: HOSPADM

## 2025-08-20 RX ORDER — FUROSEMIDE 20 MG/1
20 TABLET ORAL DAILY PRN
Status: DISCONTINUED | OUTPATIENT
Start: 2025-08-20 | End: 2025-08-22 | Stop reason: HOSPADM

## 2025-08-20 RX ORDER — PREDNISONE 5 MG/1
5 TABLET ORAL DAILY
Status: DISCONTINUED | OUTPATIENT
Start: 2025-08-30 | End: 2025-08-20

## 2025-08-20 RX ORDER — PREDNISONE 5 MG/1
15 TABLET ORAL DAILY
Status: DISCONTINUED | OUTPATIENT
Start: 2025-08-24 | End: 2025-08-20

## 2025-08-20 RX ORDER — PREDNISONE 20 MG/1
40 TABLET ORAL DAILY
Status: DISCONTINUED | OUTPATIENT
Start: 2025-08-21 | End: 2025-08-22 | Stop reason: HOSPADM

## 2025-08-20 RX ORDER — PREDNISONE 5 MG/1
10 TABLET ORAL DAILY
Status: DISCONTINUED | OUTPATIENT
Start: 2025-08-30 | End: 2025-08-22 | Stop reason: HOSPADM

## 2025-08-20 RX ADMIN — SODIUM CHLORIDE, PRESERVATIVE FREE 10 ML: 5 INJECTION INTRAVENOUS at 10:43

## 2025-08-20 RX ADMIN — TROSPIUM CHLORIDE 20 MG: 20 TABLET, FILM COATED ORAL at 10:38

## 2025-08-20 RX ADMIN — TROSPIUM CHLORIDE 20 MG: 20 TABLET, FILM COATED ORAL at 17:57

## 2025-08-20 RX ADMIN — TRAZODONE HYDROCHLORIDE 150 MG: 100 TABLET ORAL at 20:43

## 2025-08-20 RX ADMIN — DOCUSATE SODIUM 100 MG: 100 CAPSULE, LIQUID FILLED ORAL at 10:38

## 2025-08-20 RX ADMIN — APIXABAN 2.5 MG: 5 TABLET, FILM COATED ORAL at 10:40

## 2025-08-20 RX ADMIN — ARFORMOTEROL TARTRATE: 15 SOLUTION RESPIRATORY (INHALATION) at 21:03

## 2025-08-20 RX ADMIN — METHYLPREDNISOLONE SODIUM SUCCINATE 40 MG: 40 INJECTION, POWDER, LYOPHILIZED, FOR SOLUTION INTRAMUSCULAR; INTRAVENOUS at 10:38

## 2025-08-20 RX ADMIN — ARFORMOTEROL TARTRATE: 15 SOLUTION RESPIRATORY (INHALATION) at 07:48

## 2025-08-20 RX ADMIN — ASPIRIN 81 MG: 81 TABLET, CHEWABLE ORAL at 10:38

## 2025-08-20 RX ADMIN — APIXABAN 2.5 MG: 5 TABLET, FILM COATED ORAL at 20:43

## 2025-08-20 RX ADMIN — PANTOPRAZOLE SODIUM 40 MG: 40 TABLET, DELAYED RELEASE ORAL at 10:38

## 2025-08-20 RX ADMIN — LEVOTHYROXINE SODIUM 100 MCG: 0.1 TABLET ORAL at 10:38

## 2025-08-20 ASSESSMENT — PAIN SCALES - GENERAL: PAINLEVEL_OUTOF10: 0

## 2025-08-21 ENCOUNTER — APPOINTMENT (OUTPATIENT)
Facility: HOSPITAL | Age: 89
End: 2025-08-21
Payer: MEDICARE

## 2025-08-21 LAB
ALBUMIN SERPL-MCNC: 2.5 G/DL (ref 3.5–5.2)
ALBUMIN/GLOB SERPL: 0.6 (ref 1.1–2.2)
ALP SERPL-CCNC: 50 U/L (ref 35–104)
ALT SERPL-CCNC: 14 U/L (ref 10–35)
ANION GAP SERPL CALC-SCNC: 10 MMOL/L (ref 2–14)
AST SERPL-CCNC: 23 U/L (ref 10–35)
BACTERIA SPEC CULT: NORMAL
BACTERIA SPEC CULT: NORMAL
BASOPHILS # BLD: 0.01 K/UL (ref 0–0.1)
BASOPHILS NFR BLD: 0.1 % (ref 0–1)
BILIRUB SERPL-MCNC: 0.4 MG/DL (ref 0–1.2)
BUN SERPL-MCNC: 26 MG/DL (ref 8–23)
BUN/CREAT SERPL: 32 (ref 12–20)
CALCIUM SERPL-MCNC: 9.1 MG/DL (ref 8.2–9.6)
CHLORIDE SERPL-SCNC: 93 MMOL/L (ref 98–107)
CO2 SERPL-SCNC: 30 MMOL/L (ref 20–29)
CREAT SERPL-MCNC: 0.82 MG/DL (ref 0.6–1)
DIFFERENTIAL METHOD BLD: ABNORMAL
EOSINOPHIL # BLD: 0 K/UL (ref 0–0.4)
EOSINOPHIL NFR BLD: 0 % (ref 0–7)
ERYTHROCYTE [DISTWIDTH] IN BLOOD BY AUTOMATED COUNT: 15.2 % (ref 11.5–14.5)
GLOBULIN SER CALC-MCNC: 4.1 G/DL (ref 2–4)
GLUCOSE SERPL-MCNC: 143 MG/DL (ref 65–100)
HCT VFR BLD AUTO: 30.5 % (ref 35–47)
HGB BLD-MCNC: 9.8 G/DL (ref 11.5–16)
IMM GRANULOCYTES # BLD AUTO: 0.05 K/UL (ref 0–0.04)
IMM GRANULOCYTES NFR BLD AUTO: 0.5 % (ref 0–0.5)
LYMPHOCYTES # BLD: 0.94 K/UL (ref 0.8–3.5)
LYMPHOCYTES NFR BLD: 9.8 % (ref 12–49)
MCH RBC QN AUTO: 29.9 PG (ref 26–34)
MCHC RBC AUTO-ENTMCNC: 32.1 G/DL (ref 30–36.5)
MCV RBC AUTO: 93 FL (ref 80–99)
MONOCYTES # BLD: 0.64 K/UL (ref 0–1)
MONOCYTES NFR BLD: 6.7 % (ref 5–13)
NEUTS SEG # BLD: 7.94 K/UL (ref 1.8–8)
NEUTS SEG NFR BLD: 82.9 % (ref 32–75)
NRBC # BLD: 0 K/UL (ref 0–0.01)
NRBC BLD-RTO: 0 PER 100 WBC
PLATELET # BLD AUTO: 206 K/UL (ref 150–400)
PMV BLD AUTO: 10.5 FL (ref 8.9–12.9)
POTASSIUM SERPL-SCNC: 4.4 MMOL/L (ref 3.5–5.1)
PROT SERPL-MCNC: 6.6 G/DL (ref 6.4–8.3)
RBC # BLD AUTO: 3.28 M/UL (ref 3.8–5.2)
SERVICE CMNT-IMP: NORMAL
SERVICE CMNT-IMP: NORMAL
SODIUM SERPL-SCNC: 133 MMOL/L (ref 136–145)
WBC # BLD AUTO: 9.6 K/UL (ref 3.6–11)

## 2025-08-21 PROCEDURE — 2500000003 HC RX 250 WO HCPCS: Performed by: STUDENT IN AN ORGANIZED HEALTH CARE EDUCATION/TRAINING PROGRAM

## 2025-08-21 PROCEDURE — 6370000000 HC RX 637 (ALT 250 FOR IP)

## 2025-08-21 PROCEDURE — 6370000000 HC RX 637 (ALT 250 FOR IP): Performed by: STUDENT IN AN ORGANIZED HEALTH CARE EDUCATION/TRAINING PROGRAM

## 2025-08-21 PROCEDURE — 85025 COMPLETE CBC W/AUTO DIFF WBC: CPT

## 2025-08-21 PROCEDURE — 94762 N-INVAS EAR/PLS OXIMTRY CONT: CPT

## 2025-08-21 PROCEDURE — 75571 CT HRT W/O DYE W/CA TEST: CPT

## 2025-08-21 PROCEDURE — 80053 COMPREHEN METABOLIC PANEL: CPT

## 2025-08-21 PROCEDURE — 99231 SBSQ HOSP IP/OBS SF/LOW 25: CPT | Performed by: NURSE PRACTITIONER

## 2025-08-21 PROCEDURE — 1100000003 HC PRIVATE W/ TELEMETRY

## 2025-08-21 PROCEDURE — 2700000000 HC OXYGEN THERAPY PER DAY

## 2025-08-21 PROCEDURE — 94761 N-INVAS EAR/PLS OXIMETRY MLT: CPT

## 2025-08-21 PROCEDURE — 94640 AIRWAY INHALATION TREATMENT: CPT

## 2025-08-21 PROCEDURE — 6360000002 HC RX W HCPCS: Performed by: STUDENT IN AN ORGANIZED HEALTH CARE EDUCATION/TRAINING PROGRAM

## 2025-08-21 PROCEDURE — 36415 COLL VENOUS BLD VENIPUNCTURE: CPT

## 2025-08-21 RX ADMIN — SODIUM CHLORIDE, PRESERVATIVE FREE 10 ML: 5 INJECTION INTRAVENOUS at 10:50

## 2025-08-21 RX ADMIN — PANTOPRAZOLE SODIUM 40 MG: 40 TABLET, DELAYED RELEASE ORAL at 10:47

## 2025-08-21 RX ADMIN — ARFORMOTEROL TARTRATE: 15 SOLUTION RESPIRATORY (INHALATION) at 09:35

## 2025-08-21 RX ADMIN — TROSPIUM CHLORIDE 20 MG: 20 TABLET, FILM COATED ORAL at 16:24

## 2025-08-21 RX ADMIN — APIXABAN 2.5 MG: 5 TABLET, FILM COATED ORAL at 10:48

## 2025-08-21 RX ADMIN — LEVOTHYROXINE SODIUM 100 MCG: 0.1 TABLET ORAL at 10:48

## 2025-08-21 RX ADMIN — TRAZODONE HYDROCHLORIDE 150 MG: 100 TABLET ORAL at 20:51

## 2025-08-21 RX ADMIN — ARFORMOTEROL TARTRATE: 15 SOLUTION RESPIRATORY (INHALATION) at 22:11

## 2025-08-21 RX ADMIN — DOCUSATE SODIUM 100 MG: 100 CAPSULE, LIQUID FILLED ORAL at 10:47

## 2025-08-21 RX ADMIN — APIXABAN 2.5 MG: 5 TABLET, FILM COATED ORAL at 20:51

## 2025-08-21 RX ADMIN — ASPIRIN 81 MG: 81 TABLET, CHEWABLE ORAL at 10:48

## 2025-08-21 RX ADMIN — PREDNISONE 40 MG: 20 TABLET ORAL at 10:47

## 2025-08-21 RX ADMIN — TROSPIUM CHLORIDE 20 MG: 20 TABLET, FILM COATED ORAL at 10:47

## 2025-08-21 ASSESSMENT — PAIN SCALES - WONG BAKER: WONGBAKER_NUMERICALRESPONSE: NO HURT

## 2025-08-21 ASSESSMENT — PAIN SCALES - GENERAL
PAINLEVEL_OUTOF10: 0

## 2025-08-22 ENCOUNTER — APPOINTMENT (OUTPATIENT)
Facility: HOSPITAL | Age: 89
End: 2025-08-22
Payer: MEDICARE

## 2025-08-22 VITALS
RESPIRATION RATE: 17 BRPM | DIASTOLIC BLOOD PRESSURE: 67 MMHG | HEART RATE: 93 BPM | HEIGHT: 62 IN | SYSTOLIC BLOOD PRESSURE: 134 MMHG | TEMPERATURE: 97.3 F | OXYGEN SATURATION: 95 % | BODY MASS INDEX: 25.32 KG/M2 | WEIGHT: 137.57 LBS

## 2025-08-22 LAB
ALBUMIN SERPL-MCNC: 2.4 G/DL (ref 3.5–5.2)
ALBUMIN/GLOB SERPL: 0.6 (ref 1.1–2.2)
ALP SERPL-CCNC: 43 U/L (ref 35–104)
ALT SERPL-CCNC: 16 U/L (ref 10–35)
ANION GAP SERPL CALC-SCNC: 12 MMOL/L (ref 2–14)
AST SERPL-CCNC: 23 U/L (ref 10–35)
BASOPHILS # BLD: 0.01 K/UL (ref 0–0.1)
BASOPHILS NFR BLD: 0.1 % (ref 0–1)
BILIRUB SERPL-MCNC: 0.3 MG/DL (ref 0–1.2)
BUN SERPL-MCNC: 30 MG/DL (ref 8–23)
BUN/CREAT SERPL: 35 (ref 12–20)
CALCIUM SERPL-MCNC: 8.6 MG/DL (ref 8.2–9.6)
CHLORIDE SERPL-SCNC: 94 MMOL/L (ref 98–107)
CO2 SERPL-SCNC: 29 MMOL/L (ref 20–29)
CREAT SERPL-MCNC: 0.86 MG/DL (ref 0.6–1)
DIFFERENTIAL METHOD BLD: ABNORMAL
EOSINOPHIL # BLD: 0 K/UL (ref 0–0.4)
EOSINOPHIL NFR BLD: 0 % (ref 0–7)
ERYTHROCYTE [DISTWIDTH] IN BLOOD BY AUTOMATED COUNT: 15.6 % (ref 11.5–14.5)
GLOBULIN SER CALC-MCNC: 3.8 G/DL (ref 2–4)
GLUCOSE SERPL-MCNC: 150 MG/DL (ref 65–100)
HCT VFR BLD AUTO: 29 % (ref 35–47)
HGB BLD-MCNC: 9.1 G/DL (ref 11.5–16)
IMM GRANULOCYTES # BLD AUTO: 0.05 K/UL (ref 0–0.04)
IMM GRANULOCYTES NFR BLD AUTO: 0.7 % (ref 0–0.5)
LYMPHOCYTES # BLD: 0.67 K/UL (ref 0.8–3.5)
LYMPHOCYTES NFR BLD: 9.6 % (ref 12–49)
MCH RBC QN AUTO: 29.2 PG (ref 26–34)
MCHC RBC AUTO-ENTMCNC: 31.4 G/DL (ref 30–36.5)
MCV RBC AUTO: 92.9 FL (ref 80–99)
MONOCYTES # BLD: 0.31 K/UL (ref 0–1)
MONOCYTES NFR BLD: 4.4 % (ref 5–13)
NEUTS SEG # BLD: 5.96 K/UL (ref 1.8–8)
NEUTS SEG NFR BLD: 85.2 % (ref 32–75)
NRBC # BLD: 0 K/UL (ref 0–0.01)
NRBC BLD-RTO: 0 PER 100 WBC
PLATELET # BLD AUTO: 217 K/UL (ref 150–400)
PMV BLD AUTO: 10 FL (ref 8.9–12.9)
POTASSIUM SERPL-SCNC: 4.7 MMOL/L (ref 3.5–5.1)
PROT SERPL-MCNC: 6.2 G/DL (ref 6.4–8.3)
RBC # BLD AUTO: 3.12 M/UL (ref 3.8–5.2)
RBC MORPH BLD: ABNORMAL
SODIUM SERPL-SCNC: 134 MMOL/L (ref 136–145)
WBC # BLD AUTO: 7 K/UL (ref 3.6–11)

## 2025-08-22 PROCEDURE — 97116 GAIT TRAINING THERAPY: CPT

## 2025-08-22 PROCEDURE — 6370000000 HC RX 637 (ALT 250 FOR IP)

## 2025-08-22 PROCEDURE — 36415 COLL VENOUS BLD VENIPUNCTURE: CPT

## 2025-08-22 PROCEDURE — 85025 COMPLETE CBC W/AUTO DIFF WBC: CPT

## 2025-08-22 PROCEDURE — 6370000000 HC RX 637 (ALT 250 FOR IP): Performed by: NURSE PRACTITIONER

## 2025-08-22 PROCEDURE — 6360000002 HC RX W HCPCS: Performed by: STUDENT IN AN ORGANIZED HEALTH CARE EDUCATION/TRAINING PROGRAM

## 2025-08-22 PROCEDURE — 97110 THERAPEUTIC EXERCISES: CPT

## 2025-08-22 PROCEDURE — 74018 RADEX ABDOMEN 1 VIEW: CPT

## 2025-08-22 PROCEDURE — 6370000000 HC RX 637 (ALT 250 FOR IP): Performed by: STUDENT IN AN ORGANIZED HEALTH CARE EDUCATION/TRAINING PROGRAM

## 2025-08-22 PROCEDURE — 80053 COMPREHEN METABOLIC PANEL: CPT

## 2025-08-22 PROCEDURE — 94640 AIRWAY INHALATION TREATMENT: CPT

## 2025-08-22 PROCEDURE — 97535 SELF CARE MNGMENT TRAINING: CPT

## 2025-08-22 RX ORDER — PREDNISONE 10 MG/1
TABLET ORAL
Qty: 22 TABLET | Refills: 0 | Status: SHIPPED | OUTPATIENT
Start: 2025-08-23 | End: 2025-08-26 | Stop reason: ALTCHOICE

## 2025-08-22 RX ORDER — MAGNESIUM CARB/ALUMINUM HYDROX 105-160MG
296 TABLET,CHEWABLE ORAL ONCE
Status: COMPLETED | OUTPATIENT
Start: 2025-08-22 | End: 2025-08-22

## 2025-08-22 RX ORDER — MIDODRINE HYDROCHLORIDE 5 MG/1
5 TABLET ORAL AS NEEDED
Qty: 90 TABLET | Refills: 0 | Status: SHIPPED | OUTPATIENT
Start: 2025-08-22 | End: 2025-09-21

## 2025-08-22 RX ORDER — METOPROLOL SUCCINATE 25 MG/1
12.5 TABLET, EXTENDED RELEASE ORAL DAILY
Qty: 30 TABLET | Refills: 3 | Status: SHIPPED | OUTPATIENT
Start: 2025-08-22

## 2025-08-22 RX ORDER — LACTULOSE 10 G/15ML
30 SOLUTION ORAL ONCE
Status: COMPLETED | OUTPATIENT
Start: 2025-08-22 | End: 2025-08-22

## 2025-08-22 RX ORDER — BISACODYL 10 MG
10 SUPPOSITORY, RECTAL RECTAL ONCE
Status: COMPLETED | OUTPATIENT
Start: 2025-08-22 | End: 2025-08-22

## 2025-08-22 RX ORDER — FUROSEMIDE 20 MG/1
20 TABLET ORAL DAILY PRN
Qty: 60 TABLET | Refills: 3 | Status: SHIPPED | OUTPATIENT
Start: 2025-08-22 | End: 2025-08-26 | Stop reason: ALTCHOICE

## 2025-08-22 RX ADMIN — ARFORMOTEROL TARTRATE: 15 SOLUTION RESPIRATORY (INHALATION) at 08:04

## 2025-08-22 RX ADMIN — PREDNISONE 40 MG: 20 TABLET ORAL at 08:31

## 2025-08-22 RX ADMIN — LACTULOSE 30 G: 10 SOLUTION ORAL at 09:58

## 2025-08-22 RX ADMIN — BISACODYL 10 MG: 10 SUPPOSITORY RECTAL at 15:11

## 2025-08-22 RX ADMIN — TROSPIUM CHLORIDE 20 MG: 20 TABLET, FILM COATED ORAL at 08:28

## 2025-08-22 RX ADMIN — MAJOR MAGNESIUM CITRATE ORAL SOLUTION - LEMON 296 ML: 1.75 LIQUID ORAL at 11:09

## 2025-08-22 RX ADMIN — LEVOTHYROXINE SODIUM 100 MCG: 0.1 TABLET ORAL at 06:19

## 2025-08-22 RX ADMIN — APIXABAN 2.5 MG: 5 TABLET, FILM COATED ORAL at 08:28

## 2025-08-22 RX ADMIN — DOCUSATE SODIUM 100 MG: 100 CAPSULE, LIQUID FILLED ORAL at 08:28

## 2025-08-22 RX ADMIN — PANTOPRAZOLE SODIUM 40 MG: 40 TABLET, DELAYED RELEASE ORAL at 06:19

## 2025-08-22 RX ADMIN — FUROSEMIDE 20 MG: 20 TABLET ORAL at 08:28

## 2025-08-22 RX ADMIN — ASPIRIN 81 MG: 81 TABLET, CHEWABLE ORAL at 08:28

## 2025-08-22 ASSESSMENT — PAIN SCALES - GENERAL: PAINLEVEL_OUTOF10: 0

## 2025-08-25 ENCOUNTER — CLINICAL DOCUMENTATION (OUTPATIENT)
Age: 89
End: 2025-08-25

## 2025-08-25 ENCOUNTER — TELEPHONE (OUTPATIENT)
Age: 89
End: 2025-08-25

## 2025-08-26 ENCOUNTER — OFFICE VISIT (OUTPATIENT)
Age: 89
End: 2025-08-26

## 2025-08-26 VITALS
WEIGHT: 140.4 LBS | SYSTOLIC BLOOD PRESSURE: 112 MMHG | HEIGHT: 62 IN | RESPIRATION RATE: 16 BRPM | HEART RATE: 88 BPM | DIASTOLIC BLOOD PRESSURE: 62 MMHG | BODY MASS INDEX: 25.83 KG/M2 | OXYGEN SATURATION: 97 %

## 2025-08-26 DIAGNOSIS — I50.33 ACUTE ON CHRONIC DIASTOLIC CONGESTIVE HEART FAILURE (HCC): ICD-10-CM

## 2025-08-26 DIAGNOSIS — K21.9 GASTROESOPHAGEAL REFLUX DISEASE, UNSPECIFIED WHETHER ESOPHAGITIS PRESENT: ICD-10-CM

## 2025-08-26 DIAGNOSIS — R07.89 OTHER CHEST PAIN: Primary | ICD-10-CM

## 2025-08-26 RX ORDER — SENNA AND DOCUSATE SODIUM 50; 8.6 MG/1; MG/1
1 TABLET, FILM COATED ORAL DAILY PRN
Status: SHIPPED | COMMUNITY
Start: 2025-08-26

## 2025-08-26 RX ORDER — POTASSIUM CHLORIDE 1500 MG/1
20 TABLET, EXTENDED RELEASE ORAL DAILY PRN
COMMUNITY

## 2025-08-26 RX ORDER — FUROSEMIDE 40 MG/1
40 TABLET ORAL DAILY
Qty: 90 TABLET | Refills: 1 | Status: SHIPPED | OUTPATIENT
Start: 2025-08-26

## 2025-08-26 RX ORDER — NITROGLYCERIN 0.4 MG/1
TABLET SUBLINGUAL
Qty: 25 TABLET | Refills: 3 | Status: SHIPPED | OUTPATIENT
Start: 2025-08-26

## 2025-08-26 RX ORDER — PREDNISONE 5 MG/1
5 TABLET ORAL DAILY
Qty: 30 TABLET | Refills: 2 | Status: SHIPPED | OUTPATIENT
Start: 2025-08-26 | End: 2025-11-24

## 2025-08-26 RX ORDER — PANTOPRAZOLE SODIUM 40 MG/1
40 TABLET, DELAYED RELEASE ORAL 2 TIMES DAILY
Qty: 180 TABLET | Refills: 3 | Status: SHIPPED | OUTPATIENT
Start: 2025-08-26

## 2025-09-02 ENCOUNTER — OFFICE VISIT (OUTPATIENT)
Age: 89
End: 2025-09-02
Payer: MEDICARE

## 2025-09-02 VITALS
TEMPERATURE: 97.5 F | HEIGHT: 62 IN | BODY MASS INDEX: 25.25 KG/M2 | OXYGEN SATURATION: 96 % | SYSTOLIC BLOOD PRESSURE: 116 MMHG | RESPIRATION RATE: 16 BRPM | DIASTOLIC BLOOD PRESSURE: 62 MMHG | HEART RATE: 96 BPM | WEIGHT: 137.2 LBS

## 2025-09-02 DIAGNOSIS — I48.0 PAROXYSMAL ATRIAL FIBRILLATION (HCC): ICD-10-CM

## 2025-09-02 DIAGNOSIS — I50.33 ACUTE ON CHRONIC DIASTOLIC CONGESTIVE HEART FAILURE (HCC): Primary | ICD-10-CM

## 2025-09-02 PROCEDURE — 1123F ACP DISCUSS/DSCN MKR DOCD: CPT | Performed by: FAMILY MEDICINE

## 2025-09-02 PROCEDURE — 1159F MED LIST DOCD IN RCRD: CPT | Performed by: FAMILY MEDICINE

## 2025-09-02 PROCEDURE — G8427 DOCREV CUR MEDS BY ELIG CLIN: HCPCS | Performed by: FAMILY MEDICINE

## 2025-09-02 PROCEDURE — 99213 OFFICE O/P EST LOW 20 MIN: CPT | Performed by: FAMILY MEDICINE

## 2025-09-02 PROCEDURE — 1090F PRES/ABSN URINE INCON ASSESS: CPT | Performed by: FAMILY MEDICINE

## 2025-09-02 PROCEDURE — 1111F DSCHRG MED/CURRENT MED MERGE: CPT | Performed by: FAMILY MEDICINE

## 2025-09-02 PROCEDURE — G8419 CALC BMI OUT NRM PARAM NOF/U: HCPCS | Performed by: FAMILY MEDICINE

## 2025-09-02 PROCEDURE — 1036F TOBACCO NON-USER: CPT | Performed by: FAMILY MEDICINE

## 2025-09-02 PROCEDURE — 1126F AMNT PAIN NOTED NONE PRSNT: CPT | Performed by: FAMILY MEDICINE

## (undated) LAB
ANION GAP SERPL CALC-SCNC: 0 MMOL/L (ref 5–15)
BUN SERPL-MCNC: 17 MG/DL (ref 6–20)
BUN/CREAT SERPL: 16 (ref 12–20)
CALCIUM SERPL-MCNC: 8.7 MG/DL (ref 8.5–10.1)
CHLORIDE SERPL-SCNC: 105 MMOL/L (ref 97–108)
CO2 SERPL-SCNC: 31 MMOL/L (ref 21–32)
CREAT SERPL-MCNC: 1.04 MG/DL (ref 0.55–1.02)
GLUCOSE SERPL-MCNC: 109 MG/DL (ref 65–100)
POTASSIUM SERPL-SCNC: 3.8 MMOL/L (ref 3.5–5.1)
SODIUM SERPL-SCNC: 136 MMOL/L (ref 136–145)

## (undated) DEVICE — Device: Brand: MEDEX

## (undated) DEVICE — CUFF ADULT 1 PC 1 VINYL DISP --

## (undated) DEVICE — BASIN EMESIS 500CC ROSE 250/CS 60/PLT: Brand: MEDEGEN MEDICAL PRODUCTS, LLC

## (undated) DEVICE — 1200 GUARD II KIT W/5MM TUBE W/O VAC TUBE: Brand: GUARDIAN

## (undated) DEVICE — CONTAINER SPEC 20 ML LID NEUT BUFF FORMALIN 10 % POLYPR STS

## (undated) DEVICE — SYR 3ML LL TIP 1/10ML GRAD --

## (undated) DEVICE — Device

## (undated) DEVICE — SOLIDIFIER MEDC 1200ML -- CONVERT TO 356117

## (undated) DEVICE — CATH IV AUTOGRD BC BLU 22GA 25 -- INSYTE

## (undated) DEVICE — MEDI-VAC YANK SUCT HNDL W/TPRD BULBOUS TIP: Brand: CARDINAL HEALTH

## (undated) DEVICE — TOWEL 4 PLY TISS 19X30 SUE WHT

## (undated) DEVICE — BLOCK BITE ENDOSCP AD 21 MM W/ DIL BLU LF DISP

## (undated) DEVICE — (D)SYR 10ML 1/5ML GRAD NSAF -- PKGING CHANGE USE ITEM 338027

## (undated) DEVICE — KENDALL RADIOLUCENT FOAM MONITORING ELECTRODE RECTANGULAR SHAPE: Brand: KENDALL

## (undated) DEVICE — BAG SPEC BIOHZRD 10 X 10 IN --

## (undated) DEVICE — Z DISCONTINUED PER MEDLINE LINE GAS SAMPLING O2/CO2 LNG AD 13 FT NSL W/ TBNG FILTERLINE

## (undated) DEVICE — SET ADMIN 16ML TBNG L100IN 2 Y INJ SITE IV PIGGY BK DISP

## (undated) DEVICE — FORCEPS BX L160CM DIA8MM GRSP DISECT CUP TIP NONLOCKING ROT

## (undated) DEVICE — NEONATAL-ADULT SPO2 SENSOR: Brand: NELLCOR

## (undated) DEVICE — NEEDLE HYPO 18GA L1.5IN PNK S STL HUB POLYPR SHLD REG BVL